# Patient Record
Sex: FEMALE | Race: WHITE | NOT HISPANIC OR LATINO | Employment: PART TIME
[De-identification: names, ages, dates, MRNs, and addresses within clinical notes are randomized per-mention and may not be internally consistent; named-entity substitution may affect disease eponyms.]

---

## 2017-02-16 ENCOUNTER — HISTORIC RESULTS (OUTPATIENT)
Dept: ADMINISTRATIVE | Age: 42
End: 2017-02-16

## 2017-03-30 ENCOUNTER — TELEPHONE (OUTPATIENT)
Dept: FAMILY MEDICINE | Facility: CLINIC | Age: 42
End: 2017-03-30

## 2017-03-30 NOTE — TELEPHONE ENCOUNTER
Pt is past due for f/u pap smear.  Reminder letter was sent 12/01/16.  LMTC and schedule at [facility].  Left this writer's number in case of questions ([phone]).  If no reply and/or appt within 2 weeks (04/1317) pt will be considered lost to pap tracking f/u.  Elizabeth Liu,    Pap Tracking

## 2017-06-17 ENCOUNTER — HEALTH MAINTENANCE LETTER (OUTPATIENT)
Age: 42
End: 2017-06-17

## 2017-09-13 ENCOUNTER — TRANSFERRED RECORDS (OUTPATIENT)
Dept: HEALTH INFORMATION MANAGEMENT | Facility: CLINIC | Age: 42
End: 2017-09-13

## 2017-11-17 ENCOUNTER — OFFICE VISIT (OUTPATIENT)
Dept: FAMILY MEDICINE | Facility: CLINIC | Age: 42
End: 2017-11-17
Payer: COMMERCIAL

## 2017-11-17 VITALS
DIASTOLIC BLOOD PRESSURE: 82 MMHG | BODY MASS INDEX: 21.53 KG/M2 | RESPIRATION RATE: 18 BRPM | TEMPERATURE: 98.3 F | HEART RATE: 78 BPM | SYSTOLIC BLOOD PRESSURE: 122 MMHG | HEIGHT: 63 IN | OXYGEN SATURATION: 98 % | WEIGHT: 121.5 LBS

## 2017-11-17 DIAGNOSIS — J32.0 MAXILLARY SINUSITIS, UNSPECIFIED CHRONICITY: Primary | ICD-10-CM

## 2017-11-17 DIAGNOSIS — M79.10 MYALGIA: ICD-10-CM

## 2017-11-17 DIAGNOSIS — F10.10 ETOH ABUSE: ICD-10-CM

## 2017-11-17 DIAGNOSIS — B00.9 HSV (HERPES SIMPLEX VIRUS) INFECTION: ICD-10-CM

## 2017-11-17 LAB
BASOPHILS # BLD AUTO: 0 10E9/L (ref 0–0.2)
BASOPHILS NFR BLD AUTO: 0.6 %
DIFFERENTIAL METHOD BLD: ABNORMAL
EOSINOPHIL # BLD AUTO: 0.2 10E9/L (ref 0–0.7)
EOSINOPHIL NFR BLD AUTO: 3.1 %
ERYTHROCYTE [DISTWIDTH] IN BLOOD BY AUTOMATED COUNT: 12.6 % (ref 10–15)
ERYTHROCYTE [SEDIMENTATION RATE] IN BLOOD BY WESTERGREN METHOD: 9 MM/H (ref 0–20)
HCT VFR BLD AUTO: 42.9 % (ref 35–47)
HGB BLD-MCNC: 14.1 G/DL (ref 11.7–15.7)
LYMPHOCYTES # BLD AUTO: 1.1 10E9/L (ref 0.8–5.3)
LYMPHOCYTES NFR BLD AUTO: 21.1 %
MCH RBC QN AUTO: 33.4 PG (ref 26.5–33)
MCHC RBC AUTO-ENTMCNC: 32.9 G/DL (ref 31.5–36.5)
MCV RBC AUTO: 102 FL (ref 78–100)
MONOCYTES # BLD AUTO: 0.8 10E9/L (ref 0–1.3)
MONOCYTES NFR BLD AUTO: 14.7 %
NEUTROPHILS # BLD AUTO: 3.1 10E9/L (ref 1.6–8.3)
NEUTROPHILS NFR BLD AUTO: 60.5 %
PLATELET # BLD AUTO: 239 10E9/L (ref 150–450)
RBC # BLD AUTO: 4.22 10E12/L (ref 3.8–5.2)
WBC # BLD AUTO: 5.1 10E9/L (ref 4–11)

## 2017-11-17 PROCEDURE — 82728 ASSAY OF FERRITIN: CPT | Performed by: NURSE PRACTITIONER

## 2017-11-17 PROCEDURE — 82306 VITAMIN D 25 HYDROXY: CPT | Performed by: NURSE PRACTITIONER

## 2017-11-17 PROCEDURE — 36415 COLL VENOUS BLD VENIPUNCTURE: CPT | Performed by: NURSE PRACTITIONER

## 2017-11-17 PROCEDURE — 85652 RBC SED RATE AUTOMATED: CPT | Performed by: NURSE PRACTITIONER

## 2017-11-17 PROCEDURE — 99214 OFFICE O/P EST MOD 30 MIN: CPT | Performed by: NURSE PRACTITIONER

## 2017-11-17 PROCEDURE — 85025 COMPLETE CBC W/AUTO DIFF WBC: CPT | Performed by: NURSE PRACTITIONER

## 2017-11-17 PROCEDURE — 80053 COMPREHEN METABOLIC PANEL: CPT | Performed by: NURSE PRACTITIONER

## 2017-11-17 RX ORDER — SERTRALINE HYDROCHLORIDE 25 MG/1
TABLET, FILM COATED ORAL DAILY
COMMUNITY
End: 2017-11-17

## 2017-11-17 RX ORDER — VALACYCLOVIR HYDROCHLORIDE 500 MG/1
500 TABLET, FILM COATED ORAL DAILY
Qty: 90 TABLET | Refills: 3 | Status: ON HOLD | OUTPATIENT
Start: 2017-11-17 | End: 2018-09-14

## 2017-11-17 RX ORDER — AZITHROMYCIN 250 MG/1
TABLET, FILM COATED ORAL
Qty: 6 TABLET | Refills: 0 | Status: SHIPPED | OUTPATIENT
Start: 2017-11-17 | End: 2017-11-22

## 2017-11-17 NOTE — MR AVS SNAPSHOT
"              After Visit Summary   11/17/2017    Alana Mujica    MRN: [id number]           Patient Information     Date Of Birth          1975        Visit Information        Provider Department      11/17/2017 4:00 PM Pati Coffey APRN CNP [facility]        Today's Diagnoses     HSV (herpes simplex virus) infection    -  1    ETOH abuse        Myalgia        Maxillary sinusitis, unspecified chronicity          Care Instructions    Begin taking flonase nasal spray.      Begin taking z-pack.     Come see me next Friday.           Follow-ups after your visit        Follow-up notes from your care team     Return in about 1 week (around 11/24/2017).      Who to contact     If you have questions or need follow up information about today's clinic visit or your schedule please contact [facility] directly at [phone].  Normal or non-critical lab and imaging results will be communicated to you by [employer], letter or phone within 4 business days after the clinic has received the results. If you do not hear from us within 7 days, please contact the clinic through [employer] or phone. If you have a critical or abnormal lab result, we will notify you by phone as soon as possible.  Submit refill requests through [facility] or call your pharmacy and they will forward the refill request to us. Please allow 3 business days for your refill to be completed.          Additional Information About Your Visit        MyChart Information     [facility] lets you send messages to your doctor, view your test results, renew your prescriptions, schedule appointments and more. To sign up, go to [email or web address] . Click on \"Log in\" on the left side of the screen, which will take you to the Welcome page. Then click on \"Sign up Now\" on the right side of the page.     You will be asked to enter the access code listed below, as well as some personal information. Please follow the directions to create your " "username and password.     Your access code is: [email or web address]  Expires: 2/15/2018  4:59 PM     Your access code will  in 90 days. If you need help or a new code, please call your [facility] or [phone].        Care EveryWhere ID     This is your Care EveryWhere ID. This could be used by other organizations to access your [place] medical records  [id number]        Your Vitals Were     Pulse Temperature Respirations Height Pulse Oximetry BMI (Body Mass Index)    78 98.3  F (36.8  C) (Oral) 18 5' 3\" (1.6 m) 98% 21.52 kg/m2       Blood Pressure from Last 3 Encounters:   17 122/82   09/15/16 112/82   16 118/85    Weight from Last 3 Encounters:   17 121 lb 8 oz (55.1 kg)   09/15/16 111 lb (50.3 kg)   16 120 lb (54.4 kg)              We Performed the Following     CBC with platelets differential     Comprehensive metabolic panel     ESR: Erythrocyte sedimentation rate     Ferritin     Vitamin D Deficiency          Today's Medication Changes          These changes are accurate as of: 17  4:59 PM.  If you have any questions, ask your nurse or doctor.               Start taking these medicines.        Dose/Directions    azithromycin 250 MG tablet   Commonly known as:  ZITHROMAX   Used for:  Maxillary sinusitis, unspecified chronicity   Started by:  Pati Coffey APRN CNP        Two tablets first day, then one tablet daily for four days.   Quantity:  6 tablet   Refills:  0            Where to get your medicines      These medications were sent to [facility]/pharmacy #[id number] - [place], [place] - [address]  [address] [facility] [address]     Phone:  [phone]     azithromycin 250 MG tablet    valACYclovir 500 MG tablet                Primary Care Provider Office Phone # Fax #    Sabrina Pérez PA-C [phone] [address]  [facility] [id number]        [facility]        General    Alana will contact her insurance to determine in-network " psychiatry providers. (pt-stated)     Notes - Note created  1/29/2016  9:52 AM by Peter Ochoa    As of today's date 1/29/2016 goal was unable to be assessed due to lack of contact from patient.           Equal Access to Services     CARMINE COOK : Hadkanu aldair woodard desmond Soconsueloali, wacristianda luqadaha, qaybta kaalmada ademary kate, isabel mina rashardelena joya manfred erika vann. So [facility] [phone].    ATENCIÓN: Si habla español, tiene a wang disposición servicios gratuitos de asistencia lingüística. Llame al [phone].    We comply with applicable federal civil rights laws and [place] laws. We do not discriminate on the basis of race, color, national origin, age, disability, sex, sexual orientation, or gender identity.            Thank you!     Thank you for choosing [facility]  for your care. Our goal is always to provide you with excellent care. Hearing back from our patients is one way we can continue to improve our services. Please take a few minutes to complete the written survey that you may receive in the mail after your visit with us. Thank you!             Your Updated Medication List - Protect others around you: Learn how to safely use, store and throw away your medicines at www.disposemymeds.org.          This list is accurate as of: 11/17/17  4:59 PM.  Always use your most recent med list.                   Brand Name Dispense Instructions for use Diagnosis    ADVIL PO      Take by mouth as needed for moderate pain        azithromycin 250 MG tablet    ZITHROMAX    6 tablet    Two tablets first day, then one tablet daily for four days.    Maxillary sinusitis, unspecified chronicity       NEXPLANON 68 MG Impl   Generic drug:  etonogestrel     1 each    1 each (68 mg) by Subdermal route once        valACYclovir 500 MG tablet    VALTREX    90 tablet    Take 1 tablet (500 mg) by mouth daily    HSV (herpes simplex virus) infection

## 2017-11-17 NOTE — NURSING NOTE
"Chief Complaint   Patient presents with     Eye Problem       Initial /82 (BP Location: Right arm, Patient Position: Chair, Cuff Size: Adult Regular)  Pulse 78  Temp 98.3  F (36.8  C) (Oral)  Resp 18  Ht 5' 3\" (1.6 m)  Wt 121 lb 8 oz (55.1 kg)  SpO2 98%  BMI 21.52 kg/m2 Estimated body mass index is 21.52 kg/(m^2) as calculated from the following:    Height as of this encounter: 5' 3\" (1.6 m).    Weight as of this encounter: 121 lb 8 oz (55.1 kg).  Medication Reconciliation: complete   Leann Santos MA       "
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
## 2017-11-19 LAB
ALBUMIN SERPL-MCNC: 4.3 G/DL (ref 3.4–5)
ALP SERPL-CCNC: 89 U/L (ref 40–150)
ALT SERPL W P-5'-P-CCNC: 88 U/L (ref 0–50)
ANION GAP SERPL CALCULATED.3IONS-SCNC: 9 MMOL/L (ref 3–14)
AST SERPL W P-5'-P-CCNC: 119 U/L (ref 0–45)
BILIRUB SERPL-MCNC: 0.8 MG/DL (ref 0.2–1.3)
BUN SERPL-MCNC: 9 MG/DL (ref 7–30)
CALCIUM SERPL-MCNC: 9.8 MG/DL (ref 8.5–10.1)
CHLORIDE SERPL-SCNC: 99 MMOL/L (ref 94–109)
CO2 SERPL-SCNC: 29 MMOL/L (ref 20–32)
CREAT SERPL-MCNC: 0.82 MG/DL (ref 0.52–1.04)
FERRITIN SERPL-MCNC: 168 NG/ML (ref 12–150)
GFR SERPL CREATININE-BSD FRML MDRD: 76 ML/MIN/1.7M2
GLUCOSE SERPL-MCNC: 94 MG/DL (ref 70–99)
POTASSIUM SERPL-SCNC: 4.2 MMOL/L (ref 3.4–5.3)
PROT SERPL-MCNC: 8.1 G/DL (ref 6.8–8.8)
SODIUM SERPL-SCNC: 137 MMOL/L (ref 133–144)

## 2017-11-21 LAB — DEPRECATED CALCIDIOL+CALCIFEROL SERPL-MC: 52 UG/L (ref 20–75)
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
## 2017-12-08 ENCOUNTER — OFFICE VISIT (OUTPATIENT)
Dept: FAMILY MEDICINE | Facility: CLINIC | Age: 42
End: 2017-12-08
Payer: COMMERCIAL

## 2017-12-08 VITALS
BODY MASS INDEX: 22.71 KG/M2 | TEMPERATURE: 98.5 F | RESPIRATION RATE: 18 BRPM | OXYGEN SATURATION: 96 % | DIASTOLIC BLOOD PRESSURE: 92 MMHG | SYSTOLIC BLOOD PRESSURE: 119 MMHG | HEART RATE: 86 BPM | WEIGHT: 128.2 LBS | HEIGHT: 63 IN

## 2017-12-08 DIAGNOSIS — R03.0 ELEVATED BLOOD PRESSURE READING WITHOUT DIAGNOSIS OF HYPERTENSION: ICD-10-CM

## 2017-12-08 DIAGNOSIS — R94.5 NONSPECIFIC ABNORMAL RESULTS OF LIVER FUNCTION STUDY: Primary | ICD-10-CM

## 2017-12-08 PROCEDURE — 99213 OFFICE O/P EST LOW 20 MIN: CPT | Performed by: NURSE PRACTITIONER

## 2017-12-08 RX ORDER — LORAZEPAM 1 MG/1
1 TABLET ORAL PRN
COMMUNITY
Start: 2017-12-05 | End: 2018-04-05

## 2017-12-08 RX ORDER — OXYCODONE AND ACETAMINOPHEN 5; 325 MG/1; MG/1
1-2 TABLET ORAL EVERY 4 HOURS
COMMUNITY
Start: 2017-12-05 | End: 2018-04-05

## 2017-12-08 RX ORDER — AZITHROMYCIN 250 MG/1
250 TABLET, FILM COATED ORAL 2 TIMES DAILY
COMMUNITY
Start: 2017-11-17 | End: 2018-04-05

## 2017-12-08 RX ORDER — NORELGESTROMIN AND ETHINYL ESTRADIOL 35; 150 UG/MG; UG/MG
PATCH TRANSDERMAL
COMMUNITY
Start: 2017-11-29 | End: 2018-04-05

## 2017-12-08 RX ORDER — VALACYCLOVIR HYDROCHLORIDE 500 MG/1
500 TABLET, FILM COATED ORAL PRN
COMMUNITY
Start: 2017-11-17 | End: 2018-04-05

## 2017-12-08 NOTE — NURSING NOTE
"Chief Complaint   Patient presents with     Consult     F/U from 11/17/17       Initial BP (!) 119/92 (BP Location: Right arm, Patient Position: Chair, Cuff Size: Adult Regular)  Pulse 86  Temp 98.5  F (36.9  C) (Oral)  Resp 18  Ht 5' 3\" (1.6 m)  Wt 128 lb 3.2 oz (58.2 kg)  SpO2 96%  BMI 22.71 kg/m2 Estimated body mass index is 22.71 kg/(m^2) as calculated from the following:    Height as of this encounter: 5' 3\" (1.6 m).    Weight as of this encounter: 128 lb 3.2 oz (58.2 kg).  Medication Reconciliation: complete   Leann Santos MA       "

## 2017-12-08 NOTE — PATIENT INSTRUCTIONS
Discontinue use of iron supplement.     Return to clinic if your muscle soreness does not improve in the next couple weeks.

## 2017-12-08 NOTE — MR AVS SNAPSHOT
After Visit Summary   12/8/2017    Alana Mujica    MRN: [id number]           Patient Information     Date Of Birth          1975        Visit Information        Provider Department      12/8/2017 4:30 PM Pati Coffey APRN CNP [facility]        Care Instructions    Discontinue use of iron supplement.     Return to clinic if your muscle soreness does not improve in the next couple weeks.           Follow-ups after your visit        Follow-up notes from your care team     Return in about 1 month (around 1/8/2018).      Who to contact     If you have questions or need follow up information about today's clinic visit or your schedule please contact [facility] directly at [phone].  Normal or non-critical lab and imaging results will be communicated to you by Eqvilibriahart, letter or phone within 4 business days after the clinic has received the results. If you do not hear from us within 7 days, please contact the clinic through Eqvilibriahart or phone. If you have a critical or abnormal lab result, we will notify you by phone as soon as possible.  Submit refill requests through WangYou or call your pharmacy and they will forward the refill request to us. Please allow 3 business days for your refill to be completed.          Additional Information About Your Visit        MyChart Information     WangYou gives you secure access to your electronic health record. If you see a primary care provider, you can also send messages to your care team and make appointments. If you have questions, please call your primary care clinic.  If you do not have a primary care provider, please call [phone] and they will assist you.        Care EveryWhere ID     This is your Care EveryWhere ID. This could be used by other organizations to access your [place] medical records  [id number]        Your Vitals Were     Pulse Temperature Respirations Height Pulse Oximetry BMI (Body Mass Index)  "   86 98.5  F (36.9  C) (Oral) 18 5' 3\" (1.6 m) 96% 22.71 kg/m2       Blood Pressure from Last 3 Encounters:   12/08/17 (!) 119/92   11/17/17 122/82   09/15/16 112/82    Weight from Last 3 Encounters:   12/08/17 128 lb 3.2 oz (58.2 kg)   11/17/17 121 lb 8 oz (55.1 kg)   09/15/16 111 lb (50.3 kg)              Today, you had the following     No orders found for display       Primary Care Provider Office Phone # Fax #    Sabrina Pérez PA-C [phone]       [id number] [address] [facility] [id number]        Goals        General    Alana will contact her insurance to determine in-network psychiatry providers. (pt-stated)     Notes - Note created  1/29/2016  9:52 AM by Peter Ochoa    As of today's date 1/29/2016 goal was unable to be assessed due to lack of contact from patient.           Equal Access to Services     [facility]: Hadii aldair logan Somarga, waaxda luqadaha, qaybta kaalmanorah valera, isabel james . So [facility] [phone].    ATENCIÓN: Si habla español, tiene a wang disposición servicios gratuitos de asistencia lingüística. Llame al [phone].    We comply with applicable federal civil rights laws and [place] laws. We do not discriminate on the basis of race, color, national origin, age, disability, sex, sexual orientation, or gender identity.            Thank you!     Thank you for choosing [facility]  for your care. Our goal is always to provide you with excellent care. Hearing back from our patients is one way we can continue to improve our services. Please take a few minutes to complete the written survey that you may receive in the mail after your visit with us. Thank you!             Your Updated Medication List - Protect others around you: Learn how to safely use, store and throw away your medicines at www.disposemymeds.org.          This list is accurate as of: 12/8/17  5:12 PM.  Always use your most recent med list.       "             Brand Name Dispense Instructions for use Diagnosis    ADVIL PO      Take by mouth as needed for moderate pain        azithromycin 250 MG tablet    ZITHROMAX     Take 250 mg by mouth 2 times daily        LORazepam 1 MG tablet    ATIVAN     Take 1 mg by mouth as needed        NEXPLANON 68 MG Impl   Generic drug:  etonogestrel     1 each    1 each (68 mg) by Subdermal route once        oxyCODONE-acetaminophen 5-325 MG per tablet    PERCOCET     Take 1-2 tablets by mouth every 4 hours        UNABLE TO FIND      MEDICATION NAME: Mirena IUD        * valACYclovir 500 MG tablet    VALTREX    90 tablet    Take 1 tablet (500 mg) by mouth daily    HSV (herpes simplex virus) infection       * valACYclovir 500 MG tablet    VALTREX     Take 500 mg by mouth as needed        XULANE 150-35 MCG/24HR patch   Generic drug:  norelgestromin-ethinyl estradiol      APPLY 1 PATCH(ES) TO BUTTOCK, ABDOMEN, UPPER OUTER AR EVERY WEEK FOR 3 WEEKS OF EACH MONTH.        * Notice:  This list has 2 medication(s) that are the same as other medications prescribed for you. Read the directions carefully, and ask your doctor or other care provider to review them with you.

## 2018-01-18 ENCOUNTER — TELEPHONE (OUTPATIENT)
Dept: FAMILY MEDICINE | Facility: CLINIC | Age: 43
End: 2018-01-18

## 2018-02-28 ENCOUNTER — TRANSFERRED RECORDS (OUTPATIENT)
Dept: HEALTH INFORMATION MANAGEMENT | Facility: CLINIC | Age: 43
End: 2018-02-28

## 2018-04-05 ENCOUNTER — OFFICE VISIT (OUTPATIENT)
Dept: PEDIATRICS | Facility: CLINIC | Age: 43
End: 2018-04-05
Payer: MEDICAID

## 2018-04-05 VITALS
HEART RATE: 124 BPM | OXYGEN SATURATION: 100 % | WEIGHT: 117.1 LBS | DIASTOLIC BLOOD PRESSURE: 88 MMHG | BODY MASS INDEX: 20.74 KG/M2 | SYSTOLIC BLOOD PRESSURE: 133 MMHG | TEMPERATURE: 98.8 F

## 2018-04-05 DIAGNOSIS — J02.9 ACUTE PHARYNGITIS, UNSPECIFIED ETIOLOGY: Primary | ICD-10-CM

## 2018-04-05 LAB
DEPRECATED S PYO AG THROAT QL EIA: NORMAL
SPECIMEN SOURCE: NORMAL

## 2018-04-05 PROCEDURE — 99213 OFFICE O/P EST LOW 20 MIN: CPT | Performed by: PHYSICIAN ASSISTANT

## 2018-04-05 PROCEDURE — 87880 STREP A ASSAY W/OPTIC: CPT | Performed by: PHYSICIAN ASSISTANT

## 2018-04-05 PROCEDURE — 87081 CULTURE SCREEN ONLY: CPT | Performed by: PHYSICIAN ASSISTANT

## 2018-04-05 RX ORDER — SERTRALINE HYDROCHLORIDE 25 MG/1
25 TABLET, FILM COATED ORAL DAILY
COMMUNITY
Start: 2017-11-07 | End: 2018-06-04

## 2018-04-05 NOTE — PATIENT INSTRUCTIONS
With distilled water 2-3x per day for a minimum 2-3 days  Mucinex, increased fluids, humidifier at night, steaming in the day  Cough drops and hot saltwater gargles as needed    Adult Self-Care for Colds  Colds are caused by viruses. They can't be cured with antibiotics. However, you can ease symptoms and support your body's efforts to heal itself.  No matter which symptoms you have, be sure to:    Drink plenty of fluids (water or clear soup)    Stop smoking and drinking alcohol    Get plenty of rest    Understand a fever    Take your temperature several times a day. If your fever is 100.4 F (38.0 C) for more than a day, call your healthcare provider.    Relax, lie down. Go to bed if you want. Just get off your feet and rest. Also, drink plenty of fluids to avoid dehydration.    Take acetaminophen or a nonsteroidal anti-inflammatory agent (NSAID), such as ibuprofen.  Treat a troubled nose kindly    Breathe steam or heated humidified air to open blocked nasal passages.  a hot shower or use a vaporizer. Be careful not to get burned by the steam.    Saline nasal sprays and decongestant tablets help open a stuffy nose. Antihistamines can also help, but they can cause side effects such as drowsiness and drying of the eyes, nose, and mouth.  Soothe a sore throat and cough    Gargle every 2 hours with 1/4 teaspoon of salt dissolved in 1/2 cup of warm water. Suck on throat lozenges and cough drops to moisten your throat.    Cough medicines are available but it is unclear how well they actually work.    Take acetaminophen or an NSAID, such as ibuprofen, to ease throat pain  Ease digestive problems    Put fluids back into your body. Take frequent sips of clear liquids such as water or broth. Avoid drinks that have a lot of sugar in them, such as juices and sodas. These can make diarrhea worse. Older children and adults can drink sports drinks.    As your appetite returns, you can resume your normal diet. Ask your  healthcare provider if there are any foods you should avoid.  When to seek medical care  When you first notice symptoms, ask your healthcare provider if antiviral medicines are appropriate. Antibiotics should not be taken for colds or flu. Also, call your healthcare provider if you have any of the following symptoms or if you aren't feeling better after 7 days:    Shortness of breath    Pain or pressure in the chest or belly (abdomen)    Worsening symptoms, especially after a period of improvement    Fever of 100.4 F  (38.0 C) or higher, or fever that doesn't go down with medicine    Sudden dizziness or confusion    Severe or continued vomiting    Signs of dehydration, including extreme thirst, dark urine, infrequent urination, dry mouth    Spotted, red, or very sore throat   Date Last Reviewed: 12/1/2016 2000-2017 [employer]. [address] [place] [address], [place] [address]. All rights reserved. This information is not intended as a substitute for professional medical care. Always follow your healthcare professional's instructions.

## 2018-04-05 NOTE — MR AVS SNAPSHOT
After Visit Summary   4/5/2018    Alana Mujica    MRN: [id number]           Patient Information     Date Of Birth          1975        Visit Information        Provider Department      4/5/2018 11:00 AM Mushtaq Parikh PA-C [facility]        Today's Diagnoses     Acute pharyngitis    -  1      Care Instructions        With distilled water 2-3x per day for a minimum 2-3 days  Mucinex, increased fluids, humidifier at night, steaming in the day  Cough drops and hot saltwater gargles as needed    Adult Self-Care for Colds  Colds are caused by viruses. They can't be cured with antibiotics. However, you can ease symptoms and support your body's efforts to heal itself.  No matter which symptoms you have, be sure to:    Drink plenty of fluids (water or clear soup)    Stop smoking and drinking alcohol    Get plenty of rest    Understand a fever    Take your temperature several times a day. If your fever is 100.4 F (38.0 C) for more than a day, call your healthcare provider.    Relax, lie down. Go to bed if you want. Just get off your feet and rest. Also, drink plenty of fluids to avoid dehydration.    Take acetaminophen or a nonsteroidal anti-inflammatory agent (NSAID), such as ibuprofen.  Treat a troubled nose kindly    Breathe steam or heated humidified air to open blocked nasal passages.  a hot shower or use a vaporizer. Be careful not to get burned by the steam.    Saline nasal sprays and decongestant tablets help open a stuffy nose. Antihistamines can also help, but they can cause side effects such as drowsiness and drying of the eyes, nose, and mouth.  Soothe a sore throat and cough    Gargle every 2 hours with 1/4 teaspoon of salt dissolved in 1/2 cup of warm water. Suck on throat lozenges and cough drops to moisten your throat.    Cough medicines are available but it is unclear how well they actually work.    Take acetaminophen or an NSAID, such as ibuprofen, to  ease throat pain  Ease digestive problems    Put fluids back into your body. Take frequent sips of clear liquids such as water or broth. Avoid drinks that have a lot of sugar in them, such as juices and sodas. These can make diarrhea worse. Older children and adults can drink sports drinks.    As your appetite returns, you can resume your normal diet. Ask your healthcare provider if there are any foods you should avoid.  When to seek medical care  When you first notice symptoms, ask your healthcare provider if antiviral medicines are appropriate. Antibiotics should not be taken for colds or flu. Also, call your healthcare provider if you have any of the following symptoms or if you aren't feeling better after 7 days:    Shortness of breath    Pain or pressure in the chest or belly (abdomen)    Worsening symptoms, especially after a period of improvement    Fever of 100.4 F  (38.0 C) or higher, or fever that doesn't go down with medicine    Sudden dizziness or confusion    Severe or continued vomiting    Signs of dehydration, including extreme thirst, dark urine, infrequent urination, dry mouth    Spotted, red, or very sore throat   Date Last Reviewed: 12/1/2016 2000-2017 [employer]. [address] [place] [address]. All rights reserved. This information is not intended as a substitute for professional medical care. Always follow your healthcare professional's instructions.            Follow-ups after your visit        Who to contact     If you have questions or need follow up information about today's clinic visit or your schedule please contact [facility] directly at [phone].  Normal or non-critical lab and imaging results will be communicated to you by MyChart, letter or phone within 4 business days after the clinic has received the results. If you do not hear from us within 7 days, please contact the clinic through MyChart or phone. If you have a critical or abnormal  lab result, we will notify you by phone as soon as possible.  Submit refill requests through Life800 or call your pharmacy and they will forward the refill request to us. Please allow 3 business days for your refill to be completed.          Additional Information About Your Visit        Coinkitehart Information     Life800 gives you secure access to your electronic health record. If you see a primary care provider, you can also send messages to your care team and make appointments. If you have questions, please call your primary care clinic.  If you do not have a primary care provider, please call [phone] and they will assist you.        Care EveryWhere ID     This is your Care EveryWhere ID. This could be used by other organizations to access your [place] medical records  [id number]        Your Vitals Were     Pulse Temperature Pulse Oximetry BMI (Body Mass Index)          124 98.8  F (37.1  C) (Tympanic) 100% 20.74 kg/m2         Blood Pressure from Last 3 Encounters:   04/05/18 133/88   12/08/17 (!) 119/92   11/17/17 122/82    Weight from Last 3 Encounters:   04/05/18 117 lb 1.6 oz (53.1 kg)   12/08/17 128 lb 3.2 oz (58.2 kg)   11/17/17 121 lb 8 oz (55.1 kg)              We Performed the Following     Beta strep group A culture     Strep, Rapid Screen        Primary Care Provider Office Phone # Fax #    Sabrina Pérez PA-C [phone] [address]  [facility] [id number]        Goals        General    Alana will contact her insurance to determine in-network psychiatry providers. (pt-stated)     Notes - Note created  1/29/2016  9:52 AM by Peter Ochoa    As of today's date 1/29/2016 goal was unable to be assessed due to lack of contact from patient.           Equal Access to Services     [facility] : Hadii aldair Christianson, waalivia martines, qaybta kaalmaisabel torrez. So [facility] [phone].    ATENCIÓN: elann Roldan  a wang disposición servicios gratuitos de asistencia lingüística. Vic rose [phone].    We comply with applicable federal civil rights laws and [place] laws. We do not discriminate on the basis of race, color, national origin, age, disability, sex, sexual orientation, or gender identity.            Thank you!     Thank you for choosing [facility]  for your care. Our goal is always to provide you with excellent care. Hearing back from our patients is one way we can continue to improve our services. Please take a few minutes to complete the written survey that you may receive in the mail after your visit with us. Thank you!             Your Updated Medication List - Protect others around you: Learn how to safely use, store and throw away your medicines at www.disposemymeds.org.          This list is accurate as of 4/5/18 11:51 AM.  Always use your most recent med list.                   Brand Name Dispense Instructions for use Diagnosis    NEXPLANON 68 MG Impl   Generic drug:  etonogestrel     1 each    1 each (68 mg) by Subdermal route once        sertraline 25 MG tablet    ZOLOFT     Take 25 mg by mouth daily        valACYclovir 500 MG tablet    VALTREX    90 tablet    Take 1 tablet (500 mg) by mouth daily    HSV (herpes simplex virus) infection
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
## 2018-04-06 LAB
BACTERIA SPEC CULT: NORMAL
SPECIMEN SOURCE: NORMAL

## 2018-04-13 ENCOUNTER — VIRTUAL VISIT (OUTPATIENT)
Dept: FAMILY MEDICINE | Facility: CLINIC | Age: 43
End: 2018-04-13
Payer: MEDICAID

## 2018-04-13 DIAGNOSIS — F19.20 CHEMICAL DEPENDENCY (H): Primary | ICD-10-CM

## 2018-04-13 PROCEDURE — 98966 PH1 ASSMT&MGMT NQHP 5-10: CPT | Performed by: NURSE PRACTITIONER

## 2018-04-13 NOTE — MR AVS SNAPSHOT
After Visit Summary   4/13/2018    Alana Mujica    MRN: [id number]           Patient Information     Date Of Birth          1975        Visit Information        Provider Department      4/13/2018 4:40 PM Pati Coffey APRN CNP [facility]        Today's Diagnoses     Chemical dependency (H)    -  1       Follow-ups after your visit        Your next 10 appointments already scheduled     Apr 20, 2018  1:20 PM CDT   Office Visit with SUE Sharpe CNP   [facility] ([facility]    [address] [id number] [phone]           Bring a current list of meds and any records pertaining to this visit. For Physicals, please bring immunization records and any forms needing to be filled out. Please arrive 10 minutes early to complete paperwork.              Who to contact     If you have questions or need follow up information about today's clinic visit or your schedule please contact [facility] directly at [phone].  Normal or non-critical lab and imaging results will be communicated to you by [employer], letter or phone within 4 business days after the clinic has received the results. If you do not hear from us within 7 days, please contact the clinic through ENDYMIONt or phone. If you have a critical or abnormal lab result, we will notify you by phone as soon as possible.  Submit refill requests through Extra Life or call your pharmacy and they will forward the refill request to us. Please allow 3 business days for your refill to be completed.          Additional Information About Your Visit        [employer] Information     Extra Life gives you secure access to your electronic health record. If you see a primary care provider, you can also send messages to your care team and make appointments. If you have questions, please call your primary care clinic.  If you do not have a primary care provider, please call  [phone] and they will assist you.        Care EveryWhere ID     This is your Care EveryWhere ID. This could be used by other organizations to access your [place] medical records  [id number]         Blood Pressure from Last 3 Encounters:   04/05/18 133/88   12/08/17 (!) 119/92   11/17/17 122/82    Weight from Last 3 Encounters:   04/05/18 117 lb 1.6 oz (53.1 kg)   12/08/17 128 lb 3.2 oz (58.2 kg)   11/17/17 121 lb 8 oz (55.1 kg)              Today, you had the following     No orders found for display       Primary Care Provider Office Phone # Fax #    Sabrina Pérez PA-C [phone]       [address]  [facility] [address]        Goals        General    Alana will contact her insurance to determine in-network psychiatry providers. (pt-stated)     Notes - Note created  1/29/2016  9:52 AM by Peter Ochoa    As of today's date 1/29/2016 goal was unable to be assessed due to lack of contact from patient.           Equal Access to Services     [facility]: Hadii aldair logan Somarga, waaxda luqadaha, qaybta kaalmada soto, isabel james . So [facility] [phone].    ATENCIÓN: Si habla español, tiene a wang disposición servicios gratuitos de asistencia lingüística. Abdulazizame al [phone].    We comply with applicable federal civil rights laws and [place] laws. We do not discriminate on the basis of race, color, national origin, age, disability, sex, sexual orientation, or gender identity.            Thank you!     Thank you for choosing [facility]  for your care. Our goal is always to provide you with excellent care. Hearing back from our patients is one way we can continue to improve our services. Please take a few minutes to complete the written survey that you may receive in the mail after your visit with us. Thank you!             Your Updated Medication List - Protect others around you: Learn how to safely use, store and throw away  your medicines at www.disposemymeds.org.          This list is accurate as of 4/13/18 11:59 PM.  Always use your most recent med list.                   Brand Name Dispense Instructions for use Diagnosis    cloNIDine 0.1 MG tablet    CATAPRES    10 tablet    TAKE 1 TABLET BY MOUTH 2 TIMES DAILY    Alcohol withdrawal syndrome without complication (H)       LORazepam 1 MG tablet    ATIVAN    15 tablet    TAKE 1 TABLET BY MOUTH 3 TIMES A DAY AS NEEDED FOR ANXIETY    Alcohol withdrawal syndrome without complication (H)       NEXPLANON 68 MG Impl   Generic drug:  etonogestrel     1 each    1 each (68 mg) by Subdermal route once        sertraline 25 MG tablet    ZOLOFT     Take 25 mg by mouth daily        valACYclovir 500 MG tablet    VALTREX    90 tablet    Take 1 tablet (500 mg) by mouth daily    HSV (herpes simplex virus) infection

## 2018-04-13 NOTE — PROGRESS NOTES
"Alana Mujica is a 42 year old female who is being evaluated via a telephone visit.      The patient has been notified of following (by GIANA TABOR M.A.       \"We have found that certain health care needs can be provided without the need for a physical exam.  This service lets us provide the care you need with a short phone conversation.  If a prescription is necessary we can send it directly to your pharmacy.  If lab work is needed we can place an order for that and you can then stop by our lab to have the test done at a later time.    This telephone visit will be conducted via 3 way call with the you (the patient) , the physician/provider, and a me all on the line at the same time.  This allows your physician/provider to have the phone conversation with you while I will be taking notes for your medical record.  We will have full access to your [place] medical record during this entire phone call.    Since this is like an office visit,  will bill your insurance company for this service.  Please check with your medical insurance if this type of telephone/virtual is covered . You may be responsible for the cost of this service if insurance coverage is denied.  The typical cost is $30 (10min), $59(11-20min) and $85 (21-30min)     If during the course of the call the physician/provider feels a telephone visit is not appropriate, you will not be charged for this service\"    Consent has been obtained for this service by care team member: yes.  See the scanned image in the medical record.        ===================================================  S:  Alana Mujica is a 42 year old female who presents for telephone visit.  Has requested a refill on meds that we did not prescribe.  I spoke with her and she relates that she had presented for withdrawal of ETOH symptoms to another healthcare system and was prescribed medication to help with withdrawal.  Reports that she is ETOH free currently and would like to continue " meds as she is still concerns about her early sobriety.      O:  There were no vitals taken for this visit.  Phone visit.  Normal rate tone and content of speech.  She sounds dramatically improved from her state she was in during her last clinic visit.  Much better historian and better affect in her speech  Verbalizes understanding of instructions.    A/P:    ICD-10-CM    1. Chemical dependency (H) F19.20         Refill meds short term and see patient at my next open clinic appt in one week.  Pt agrees and staff scheduled her immediately post phone call    Visit time 10 min

## 2018-04-26 ENCOUNTER — TRANSFERRED RECORDS (OUTPATIENT)
Dept: HEALTH INFORMATION MANAGEMENT | Facility: CLINIC | Age: 43
End: 2018-04-26

## 2018-04-26 LAB
HPV ABSTRACT: NORMAL
PAP-ABSTRACT: NORMAL

## 2018-05-28 ENCOUNTER — TRANSFERRED RECORDS (OUTPATIENT)
Dept: HEALTH INFORMATION MANAGEMENT | Facility: CLINIC | Age: 43
End: 2018-05-28

## 2018-06-04 ENCOUNTER — OFFICE VISIT (OUTPATIENT)
Dept: FAMILY MEDICINE | Facility: CLINIC | Age: 43
End: 2018-06-04
Payer: COMMERCIAL

## 2018-06-04 VITALS
BODY MASS INDEX: 21.09 KG/M2 | TEMPERATURE: 98.1 F | WEIGHT: 119 LBS | OXYGEN SATURATION: 98 % | HEIGHT: 63 IN | RESPIRATION RATE: 16 BRPM | SYSTOLIC BLOOD PRESSURE: 104 MMHG | DIASTOLIC BLOOD PRESSURE: 80 MMHG | HEART RATE: 90 BPM

## 2018-06-04 DIAGNOSIS — G44.201 ACUTE INTRACTABLE TENSION-TYPE HEADACHE: Primary | ICD-10-CM

## 2018-06-04 DIAGNOSIS — F10.20 UNCOMPLICATED ALCOHOL DEPENDENCE (H): ICD-10-CM

## 2018-06-04 PROCEDURE — 99214 OFFICE O/P EST MOD 30 MIN: CPT | Performed by: PHYSICIAN ASSISTANT

## 2018-06-04 NOTE — MR AVS SNAPSHOT
After Visit Summary   6/4/2018    Alana Mujica    MRN: [id number]           Patient Information     Date Of Birth          1975        Visit Information        Provider Department      6/4/2018 11:30 AM Sabrina Pérez PA-C [facility]        Today's Diagnoses     Acute intractable tension-type headache    -  1    Uncomplicated alcohol dependence (H)           Follow-ups after your visit        Follow-up notes from your care team     Return in about 4 weeks (around 7/2/2018) for If symptoms worsen or fail to improve.      Who to contact     If you have questions or need follow up information about today's clinic visit or your schedule please contact [facility] directly at [phone].  Normal or non-critical lab and imaging results will be communicated to you by LifeOnKeyhart, letter or phone within 4 business days after the clinic has received the results. If you do not hear from us within 7 days, please contact the clinic through LifeOnKeyhart or phone. If you have a critical or abnormal lab result, we will notify you by phone as soon as possible.  Submit refill requests through GENBAND or call your pharmacy and they will forward the refill request to us. Please allow 3 business days for your refill to be completed.          Additional Information About Your Visit        MyChart Information     GENBAND gives you secure access to your electronic health record. If you see a primary care provider, you can also send messages to your care team and make appointments. If you have questions, please call your primary care clinic.  If you do not have a primary care provider, please call [phone] and they will assist you.        Care EveryWhere ID     This is your Care EveryWhere ID. This could be used by other organizations to access your [place] medical records  [id number]        Your Vitals Were     Pulse Temperature Respirations Height Last Period Pulse Oximetry  "   90 98.1  F (36.7  C) (Oral) 16 5' 3\" (1.6 m) (LMP Unknown) 98%    Breastfeeding? BMI (Body Mass Index)                No 21.08 kg/m2           Blood Pressure from Last 3 Encounters:   06/04/18 104/80   04/05/18 133/88   12/08/17 (!) 119/92    Weight from Last 3 Encounters:   06/04/18 119 lb (54 kg)   04/05/18 117 lb 1.6 oz (53.1 kg)   12/08/17 128 lb 3.2 oz (58.2 kg)              Today, you had the following     No orders found for display       Primary Care Provider Office Phone # Fax #    Sabrina Pérez PA-C [phone] [address] [facility] [address]        Goals        General    Alana will contact her insurance to determine in-network psychiatry providers. (pt-stated)     Notes - Note created  1/29/2016  9:52 AM by Peter Ochoa    As of today's date 1/29/2016 goal was unable to be assessed due to lack of contact from patient.           Equal Access to Services     [facility] : Hadkanu Christianson, susan martines, isabel leigh . So [facility] [phone].    ATENCIÓN: Si habla español, tiene a wang disposición servicios gratuitos de asistencia lingüística. [facility] [phone].    We comply with applicable federal civil rights laws and [place] laws. We do not discriminate on the basis of race, color, national origin, age, disability, sex, sexual orientation, or gender identity.            Thank you!     Thank you for choosing [facility]  for your care. Our goal is always to provide you with excellent care. Hearing back from our patients is one way we can continue to improve our services. Please take a few minutes to complete the written survey that you may receive in the mail after your visit with us. Thank you!             Your Updated Medication List - Protect others around you: Learn how to safely use, store and throw away your medicines at www.[employer] JapanemStayzilla.org.          This list is " accurate as of 6/4/18 12:17 PM.  Always use your most recent med list.                   Brand Name Dispense Instructions for use Diagnosis    LORazepam 1 MG tablet    ATIVAN    15 tablet    TAKE 1 TABLET BY MOUTH 3 TIMES A DAY AS NEEDED FOR ANXIETY    Alcohol withdrawal syndrome without complication (H)       NEXPLANON 68 MG Impl   Generic drug:  etonogestrel     1 each    1 each (68 mg) by Subdermal route once        valACYclovir 500 MG tablet    VALTREX    90 tablet    Take 1 tablet (500 mg) by mouth daily    HSV (herpes simplex virus) infection
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
## 2018-06-05 ENCOUNTER — HOSPITAL ENCOUNTER (EMERGENCY)
Facility: CLINIC | Age: 43
Discharge: HOME | End: 2018-06-05
Attending: EMERGENCY MEDICINE | Admitting: EMERGENCY MEDICINE
Payer: COMMERCIAL

## 2018-06-05 VITALS
TEMPERATURE: 98.5 F | OXYGEN SATURATION: 97 % | SYSTOLIC BLOOD PRESSURE: 116 MMHG | RESPIRATION RATE: 18 BRPM | DIASTOLIC BLOOD PRESSURE: 84 MMHG | HEART RATE: 76 BPM

## 2018-06-05 DIAGNOSIS — R51.9 NONINTRACTABLE EPISODIC HEADACHE, UNSPECIFIED HEADACHE TYPE: ICD-10-CM

## 2018-06-05 LAB
ANION GAP SERPL CALCULATED.3IONS-SCNC: 6 MMOL/L (ref 3–14)
BASOPHILS # BLD AUTO: 0 10E9/L (ref 0–0.2)
BASOPHILS NFR BLD AUTO: 0.8 %
BUN SERPL-MCNC: 8 MG/DL (ref 7–30)
CALCIUM SERPL-MCNC: 9.3 MG/DL (ref 8.5–10.1)
CHLORIDE SERPL-SCNC: 101 MMOL/L (ref 94–109)
CO2 SERPL-SCNC: 28 MMOL/L (ref 20–32)
CREAT SERPL-MCNC: 0.7 MG/DL (ref 0.52–1.04)
DIFFERENTIAL METHOD BLD: ABNORMAL
EOSINOPHIL # BLD AUTO: 0.1 10E9/L (ref 0–0.7)
EOSINOPHIL NFR BLD AUTO: 1.5 %
ERYTHROCYTE [DISTWIDTH] IN BLOOD BY AUTOMATED COUNT: 11.9 % (ref 10–15)
GFR SERPL CREATININE-BSD FRML MDRD: >90 ML/MIN/1.7M2
GLUCOSE SERPL-MCNC: 113 MG/DL (ref 70–99)
HCT VFR BLD AUTO: 40.3 % (ref 35–47)
HGB BLD-MCNC: 13.4 G/DL (ref 11.7–15.7)
IMM GRANULOCYTES # BLD: 0 10E9/L (ref 0–0.4)
IMM GRANULOCYTES NFR BLD: 0.2 %
LYMPHOCYTES # BLD AUTO: 0.7 10E9/L (ref 0.8–5.3)
LYMPHOCYTES NFR BLD AUTO: 15.6 %
MAGNESIUM SERPL-MCNC: 1.8 MG/DL (ref 1.6–2.3)
MCH RBC QN AUTO: 33.9 PG (ref 26.5–33)
MCHC RBC AUTO-ENTMCNC: 33.3 G/DL (ref 31.5–36.5)
MCV RBC AUTO: 102 FL (ref 78–100)
MONOCYTES # BLD AUTO: 0.8 10E9/L (ref 0–1.3)
MONOCYTES NFR BLD AUTO: 15.9 %
NEUTROPHILS # BLD AUTO: 3.1 10E9/L (ref 1.6–8.3)
NEUTROPHILS NFR BLD AUTO: 66 %
NRBC # BLD AUTO: 0 10*3/UL
NRBC BLD AUTO-RTO: 0 /100
PLATELET # BLD AUTO: 238 10E9/L (ref 150–450)
POTASSIUM SERPL-SCNC: 3.7 MMOL/L (ref 3.4–5.3)
RBC # BLD AUTO: 3.95 10E12/L (ref 3.8–5.2)
SODIUM SERPL-SCNC: 135 MMOL/L (ref 133–144)
WBC # BLD AUTO: 4.7 10E9/L (ref 4–11)

## 2018-06-05 PROCEDURE — 25000132 ZZH RX MED GY IP 250 OP 250 PS 637: Performed by: EMERGENCY MEDICINE

## 2018-06-05 PROCEDURE — 99284 EMERGENCY DEPT VISIT MOD MDM: CPT | Mod: 25

## 2018-06-05 PROCEDURE — 85025 COMPLETE CBC W/AUTO DIFF WBC: CPT | Performed by: EMERGENCY MEDICINE

## 2018-06-05 PROCEDURE — 96367 TX/PROPH/DG ADDL SEQ IV INF: CPT

## 2018-06-05 PROCEDURE — 80048 BASIC METABOLIC PNL TOTAL CA: CPT | Performed by: EMERGENCY MEDICINE

## 2018-06-05 PROCEDURE — 25000128 H RX IP 250 OP 636: Performed by: EMERGENCY MEDICINE

## 2018-06-05 PROCEDURE — 96375 TX/PRO/DX INJ NEW DRUG ADDON: CPT

## 2018-06-05 PROCEDURE — 25000125 ZZHC RX 250: Performed by: EMERGENCY MEDICINE

## 2018-06-05 PROCEDURE — 83735 ASSAY OF MAGNESIUM: CPT | Performed by: EMERGENCY MEDICINE

## 2018-06-05 PROCEDURE — 96365 THER/PROPH/DIAG IV INF INIT: CPT

## 2018-06-05 RX ORDER — DIPHENHYDRAMINE HYDROCHLORIDE 50 MG/ML
25 INJECTION INTRAMUSCULAR; INTRAVENOUS ONCE
Status: COMPLETED | OUTPATIENT
Start: 2018-06-05 | End: 2018-06-05

## 2018-06-05 RX ORDER — KETOROLAC TROMETHAMINE 15 MG/ML
15 INJECTION, SOLUTION INTRAMUSCULAR; INTRAVENOUS ONCE
Status: COMPLETED | OUTPATIENT
Start: 2018-06-05 | End: 2018-06-05

## 2018-06-05 RX ORDER — BUTALBITAL, ACETAMINOPHEN AND CAFFEINE 50; 325; 40 MG/1; MG/1; MG/1
1 TABLET ORAL EVERY 4 HOURS PRN
Qty: 28 TABLET | Refills: 0 | Status: ON HOLD | OUTPATIENT
Start: 2018-06-05 | End: 2018-08-26

## 2018-06-05 RX ORDER — BUTALBITAL, ACETAMINOPHEN AND CAFFEINE 50; 325; 40 MG/1; MG/1; MG/1
1 TABLET ORAL ONCE
Status: COMPLETED | OUTPATIENT
Start: 2018-06-05 | End: 2018-06-05

## 2018-06-05 RX ORDER — VALPROATE SODIUM 100 MG/ML
500 INJECTION, SOLUTION INTRAVENOUS ONCE
Status: COMPLETED | OUTPATIENT
Start: 2018-06-05 | End: 2018-06-05

## 2018-06-05 RX ORDER — DEXAMETHASONE SODIUM PHOSPHATE 10 MG/ML
10 INJECTION, SOLUTION INTRAMUSCULAR; INTRAVENOUS ONCE
Status: COMPLETED | OUTPATIENT
Start: 2018-06-05 | End: 2018-06-05

## 2018-06-05 RX ADMIN — MAGNESIUM SULFATE HEPTAHYDRATE 2 G: 40 INJECTION, SOLUTION INTRAVENOUS at 18:02

## 2018-06-05 RX ADMIN — PROCHLORPERAZINE EDISYLATE 10 MG: 5 INJECTION INTRAMUSCULAR; INTRAVENOUS at 15:32

## 2018-06-05 RX ADMIN — KETOROLAC TROMETHAMINE 15 MG: 15 INJECTION, SOLUTION INTRAMUSCULAR; INTRAVENOUS at 15:32

## 2018-06-05 RX ADMIN — VALPROATE SODIUM 500 MG: 100 INJECTION, SOLUTION INTRAVENOUS at 19:30

## 2018-06-05 RX ADMIN — DIPHENHYDRAMINE HYDROCHLORIDE 25 MG: 50 INJECTION, SOLUTION INTRAMUSCULAR; INTRAVENOUS at 15:32

## 2018-06-05 RX ADMIN — DEXAMETHASONE SODIUM PHOSPHATE 10 MG: 10 INJECTION, SOLUTION INTRAMUSCULAR; INTRAVENOUS at 15:32

## 2018-06-05 RX ADMIN — BUTALBITAL, ACETAMINOPHEN AND CAFFEINE 1 TABLET: 50; 325; 40 TABLET ORAL at 18:02

## 2018-06-05 ASSESSMENT — ENCOUNTER SYMPTOMS
SLEEP DISTURBANCE: 1
HEADACHES: 1

## 2018-06-05 NOTE — ED AVS SNAPSHOT
[facility] Emergency Department    [address]    [facility] [address]    Phone:  [phone]    Fax:  [phone]                                       Alana Mujica   MRN: [id number]    Department:  [facility] Emergency Department   Date of Visit:  6/5/2018           After Visit Summary Signature Page     I have received my discharge instructions, and my questions have been answered. I have discussed any challenges I see with this plan with the nurse or doctor.    ..........................................................................................................................................  Patient/Patient Representative Signature      ..........................................................................................................................................  Patient Representative Print Name and Relationship to Patient    ..................................................               ................................................  Date                                            Time    ..........................................................................................................................................  Reviewed by Signature/Title    ...................................................              ..............................................  Date                                                            Time

## 2018-06-05 NOTE — ED AVS SNAPSHOT
` ` Patient Information     Patient Name Sex     Alana Mujica ([id number]) Female 1975       Room Bed    ED14 ED14      Patient Demographics     Address Phone E-mail Address    [address] [address] [phone] (Home)  [phone] (Work)  [phone] (Mobile) *Preferred* [email or web address]      Patient Ethnicity & Race     Ethnic Group Patient Race    American White      PCP and Center    Primary Care Provider  Phone [place]     RimaSabrina mcgilllle [phone] [address], [place] [address]        Emergency Contact(s)     Name Relation Home Work Mobile    Raghu Mujica Father [phone] NONE [phone]    Andreina Mujica Mother [phone] NONE [phone]      Documents on File        Status Date Received Description       Documents for the Patient    Privacy Notice - [place] Received 12     Insurance Card Received () 12     External Medication Information Consent       Patient ID Received 18 expires 2018    Consent for Services - Hospital/Clinic Received () 12     HIM RANDI Authorization - File Only  12 DEC RELEASE OF INFORMATION    HIM RANDI Authorization  09/10/12     HIM RANDI Authorization - File Only  12 AUTHORIZATION FOR RELEASE OF PROTECTED HEALTH INFORMATION    Privacy Notice - [place]  12 ACKNOWLEDGMENT OF RECEIPT OF NOTICE OF PRIVACY PRACTICE    Consent for EHR Access  13 Copied from existing Consent for services - C/HOD collected on 2012    HIM RANDI Authorization  10/14/13 [facility]    HIM RANDI Authorization  14 [place]    HIM RANDI Authorization  14 [place]    [facility] Specified Other Received () 10/30/15 42 CFR    Consent for Services - Hospital/Clinic Received () 10/06/15     HIM RANDI Authorization - File Only  10/09/15 DEC RANDI 10/6/15    HIM RANDI Authorization - File Only Received 10/30/15 Yassine Uribe therapist    HIM RANDI Authorization - File Only  Received 10/30/15 [facility] Specified Other Received () 11/18/15     External Medication Information Consent Accepted 11/24/15     HIM RANDI Authorization - File Only   Faxed to [facility] 12/7/15    HIM RANDI Authorization - File Only   MyChart 51    HIM RANDI Authorization  12/26/15 [employer] 2015    Insurance Card Received () 16 [id number]    Consent for Services/Privacy Notice - Hospital/Clinic Received () 16     Immunization Record  16 IMMUNIZATION HISTORY [facility] 2016    HIM RANDI Authorization - File Only  16 AUTHORIZATION TO RELEASE PHI-RELATED TO SUSPECTED MALTREATMENT    Insurance Card Received () 09/15/16 bcbs    Care Everywhere Prospective Auth Received 17     Consent for Services/Privacy Notice - Hospital/Clinic Received 17     Consent for Services - Hospital and Clinic Received 18     HIE Auth Received 18        Documents for the Encounter    CMS IM for Patient Signature         Admission Information     Attending Provider Admitting Provider Admission Type Admission Date/Time      Emergency 18  1436    Discharge Date Hospital Service Auth/Cert Status Service Area    18 Emergency Medicine Incomplete [facility] SERVICES    Unit Room/Bed Admission Status        EMERGENCY DEPT ED14/ED14 Discharged (Confirmed)       Admission     Complaint    None      Hospital Account     Name Acct ID Class Status Primary Coverage    Alana Mujica [id number] Emergency Discharged/Not Billed Critical access hospital            Guarantor Account (for Hospital Account #[id number])     Name Relation to Pt Service Area Active? Acct Type    Alana Mujica Self FCS Yes Personal/Family    Address Phone          [address] [place] [address] [phone](H)  [phone](O)              Coverage Information (for Hospital Account #[id number])     F/O Payor/Plan  Precert #    CHERIE/CHERIE GIBBS     Subscriber Subscriber #    Alana Mujica [id number]    Address Phone    [address]  [place] [address] [phone]

## 2018-06-05 NOTE — ED AVS SNAPSHOT
[facility]    [address]    [facility] [address]    Phone:  [phone]    Fax:  [phone]                                       Alana Mujica   MRN: [id number]    Department:  [facility]   Date of Visit:  6/5/2018           Patient Information     Date Of Birth          1975        Your diagnoses for this visit were:     Nonintractable episodic headache, unspecified headache type        You were seen by Cortes Escobar DO.      Follow-up Information     Follow up with Sabrina Pérez PA-C. Call in 2 days.    Specialty:  Physician Assistant    Why:  As needed    Contact information:    [address] [facility] [facility] [address]  [phone]          Follow up with [facility] OF NEUROLOGY.    Contact information:    [address]  [facility] [id number] [phone]        Follow up with [facility].    Specialty:  EMERGENCY MEDICINE    Why:  If symptoms worsen    Contact information:    [address]  [facility] [address] [phone]        Discharge Instructions          * HEADACHE [unspecified]    The cause of your headache today is not clear, but it does not appear to be the sign of any serious illness.  Under stress, some people tense the muscles of their shoulder, neck and scalp without knowing it. If this condition lasts long enough, a TENSION HEADACHE can occur.  A MIGRAINE HEADACHE is caused by changes in blood flow to the brain. It can be mild or severe. A migraine attack may be triggered by emotional stress, hormone changes during the menstrual cycle, oral contraceptives, alcohol use, certain foods containing tyramine, eye strain, weather changes, missing meals, lack of sleep or oversleeping.  Other causes of headache include a viral illness, sinus, ear or throat infection, dental pain and TMJ (jaw joint) pain.  HOME  CARE:      If you were given pain medicine for this headache, do not drive yourself home. Arrange for a ride, instead. When you get home, try to sleep. You should feel much better when you wake up.    If you are having nausea or vomiting, follow a light diet until your headache is relieved.    If you have a migraine type headache, use sunglasses when in the daylight or around bright indoor lighting until symptoms improve. Bright glaring light can worsen this kind of headache.  FOLLOW UP with your doctor if the headache is not better within the next 24 hours. If you have frequent headaches you should discuss a treatment plan with your primary care doctor. By being aware of the earliest signs of headache, and starting treatment right away, you may be able to stop the pain yourself.  GET PROMPT MEDICAL ATTENTION if any of the following occur:    Worsening of your head pain or no improvement within 24 hours    Repeated vomiting (unable to keep liquids down)    Fever over 101 F (38.3 C)    Stiff neck    Extreme drowsiness, confusion or fainting    Weakness of an arm or leg or one side of the face    Difficulty with speech or vision    5666-0318 [employer]. [address] [place] [address]. All rights reserved. This information is not intended as a substitute for professional medical care. Always follow your healthcare professional's instructions.  This information has been modified by your health care provider with permission from the publisher.      Your next 10 appointments already scheduled     Jun 12, 2018  9:30 AM CDT   Office Visit with Sabrina Pérez PA-C   [facility] ([facility]    [address] [facility] [address] [phone]           Bring a current list of meds and any records pertaining to this visit. For Physicals, please bring immunization records and any forms needing to be filled out. Please arrive 10 minutes early to  complete paperwork.              [facility] [place] [facility] Scheduling Hotline     To schedule an appointment at [facility], please call [phone]. If you don't have a family doctor or clinic, we will help you find one. [facility] are conveniently located to serve the needs of you and your family.           Review of your medicines      START taking        Dose / Directions Last dose taken    butalbital-acetaminophen-caffeine -40 MG per tablet   Commonly known as:  FIORICET/ESGIC   Dose:  1 tablet   Quantity:  28 tablet        Take 1 tablet by mouth every 4 hours as needed   Refills:  0          Our records show that you are taking the medicines listed below. If these are incorrect, please call your family doctor or clinic.        Dose / Directions Last dose taken    LORazepam 1 MG tablet   Commonly known as:  ATIVAN   Quantity:  15 tablet        TAKE 1 TABLET BY MOUTH 3 TIMES A DAY AS NEEDED FOR ANXIETY   Refills:  0        NEXPLANON 68 MG Impl   Dose:  1 each   Quantity:  1 each   Generic drug:  etonogestrel        1 each (68 mg) by Subdermal route once   Refills:  0        valACYclovir 500 MG tablet   Commonly known as:  VALTREX   Dose:  500 mg   Quantity:  90 tablet        Take 1 tablet (500 mg) by mouth daily   Refills:  3                Prescriptions were sent or printed at these locations (1 Prescription)                   Other Prescriptions                Printed at Department/Unit printer (1 of 1)         butalbital-acetaminophen-caffeine (FIORICET/ESGIC) -40 MG per tablet                Procedures and tests performed during your visit     Basic metabolic panel    CBC with platelets differential    Magnesium    Peripheral IV catheter      Orders Needing Specimen Collection     None      Pending Results     No orders found from 6/3/2018 to 6/6/2018.            Pending Culture Results     No orders found from 6/3/2018 to 6/6/2018.            Pending Results Instructions     If you had any lab  results that were not finalized at the time of your Discharge, you can call the ED Lab Result RN at [phone]. You will be contacted by this team for any positive Lab results or changes in treatment. The nurses are available 7 days a week from 10A to 6:30P.  You can leave a message 24 hours per day and they will return your call.        Test Results From Your Hospital Stay        6/5/2018  3:33 PM      Component Results     Component Value Ref Range & Units Status    WBC 4.7 4.0 - 11.0 10e9/L Final    RBC Count 3.95 3.8 - 5.2 10e12/L Final    Hemoglobin 13.4 11.7 - 15.7 g/dL Final    Hematocrit 40.3 35.0 - 47.0 % Final     (H) 78 - 100 fl Final    MCH 33.9 (H) 26.5 - 33.0 pg Final    MCHC 33.3 31.5 - 36.5 g/dL Final    RDW 11.9 10.0 - 15.0 % Final    Platelet Count 238 150 - 450 10e9/L Final    Diff Method Automated Method  Final    % Neutrophils 66.0 % Final    % Lymphocytes 15.6 % Final    % Monocytes 15.9 % Final    % Eosinophils 1.5 % Final    % Basophils 0.8 % Final    % Immature Granulocytes 0.2 % Final    Nucleated RBCs 0 0 /100 Final    Absolute Neutrophil 3.1 1.6 - 8.3 10e9/L Final    Absolute Lymphocytes 0.7 (L) 0.8 - 5.3 10e9/L Final    Absolute Monocytes 0.8 0.0 - 1.3 10e9/L Final    Absolute Eosinophils 0.1 0.0 - 0.7 10e9/L Final    Absolute Basophils 0.0 0.0 - 0.2 10e9/L Final    Abs Immature Granulocytes 0.0 0 - 0.4 10e9/L Final    Absolute Nucleated RBC 0.0  Final         6/5/2018  3:50 PM      Component Results     Component Value Ref Range & Units Status    Sodium 135 133 - 144 mmol/L Final    Potassium 3.7 3.4 - 5.3 mmol/L Final    Chloride 101 94 - 109 mmol/L Final    Carbon Dioxide 28 20 - 32 mmol/L Final    Anion Gap 6 3 - 14 mmol/L Final    Glucose 113 (H) 70 - 99 mg/dL Final    Urea Nitrogen 8 7 - 30 mg/dL Final    Creatinine 0.70 0.52 - 1.04 mg/dL Final    GFR Estimate >90 >60 mL/min/1.7m2 Final    Non  GFR Calc    GFR Estimate If Black >90 >60 mL/min/1.7m2 Final      GFR Calc    Calcium 9.3 8.5 - 10.1 mg/dL Final         6/5/2018  3:50 PM      Component Results     Component Value Ref Range & Units Status    Magnesium 1.8 1.6 - 2.3 mg/dL Final                Clinical Quality Measure: Blood Pressure Screening     Your blood pressure was checked while you were in the emergency department today. The last reading we obtained was  BP: 115/79 . Please read the guidelines below about what these numbers mean and what you should do about them.  If your systolic blood pressure (the top number) is less than 120 and your diastolic blood pressure (the bottom number) is less than 80, then your blood pressure is normal. There is nothing more that you need to do about it.  If your systolic blood pressure (the top number) is 120-139 or your diastolic blood pressure (the bottom number) is 80-89, your blood pressure may be higher than it should be. You should have your blood pressure rechecked within a year by a primary care provider.  If your systolic blood pressure (the top number) is 140 or greater or your diastolic blood pressure (the bottom number) is 90 or greater, you may have high blood pressure. High blood pressure is treatable, but if left untreated over time it can put you at risk for heart attack, stroke, or kidney failure. You should have your blood pressure rechecked by a primary care provider within the next 4 weeks.  If your provider in the emergency department today gave you specific instructions to follow-up with your doctor or provider even sooner than that, you should follow that instruction and not wait for up to 4 weeks for your follow-up visit.        Thank you for choosing [facility]       Thank you for choosing [facility] for your care. Our goal is always to provide you with excellent care. Hearing back from our patients is one way we can continue to improve our services. Please take a few minutes to complete the written survey that you may receive in the mail  after you visit with us. Thank you!        [employer] Information     [employer] gives you secure access to your electronic health record. If you see a primary care provider, you can also send messages to your care team and make appointments. If you have questions, please call your primary care clinic.  If you do not have a primary care provider, please call [phone] and they will assist you.        Care EveryWhere ID     This is your Care EveryWhere ID. This could be used by other organizations to access your [facility] medical records  [id number]        Equal Access to Services     CARMINE COOK : Josh Christianson, waalivia martines, mike renteriaalsuzanne valera, isabel james . So [facility] [phone].    ATENCIÓN: Si habla español, tiene a wang disposición servicios gratuitos de asistencia lingüística. Llame al [phone].    We comply with applicable federal civil rights laws and [place] laws. We do not discriminate on the basis of race, color, national origin, age, disability, sex, sexual orientation, or gender identity.            After Visit Summary       This is your record. Keep this with you and show to your community pharmacist(s) and doctor(s) at your next visit.

## 2018-06-05 NOTE — ED TRIAGE NOTES
Patient states that she has had a headache for a month, was seen in ED recently for similar symptoms. Patient states pain in back of head 8/10 currently, states that pain is constant now. Patient decreased appetite and fatigued.

## 2018-06-05 NOTE — ED AVS SNAPSHOT
[facility] [place] [facility]    [address]    [facility] [address]    Phone:  [phone]    Fax:  [phone]                                       Alana Mujica   MRN: [id number]    Department:  [facility] [place] [facility]   Date of Visit:  6/5/2018           Patient Information     Date Of Birth          1975        Your diagnoses for this visit were:     Nonintractable episodic headache, unspecified headache type        You were seen by Cortes Escobar DO.      Follow-up Information     Follow up with Sabrina Pérez PA-C. Call in 2 days.    Specialty:  Physician Assistant    Why:  As needed    Contact information:    [address] [facility] [address]  [phone]          Follow up with [facility] OF NEUROLOGY.    Contact information:    [address]  [facility] [id number] [phone]        Follow up with [facility] [place] [facility].    Specialty:  EMERGENCY MEDICINE    Why:  If symptoms worsen    Contact information:    [address]  [facility] [address] [phone]        Discharge Instructions          * HEADACHE [unspecified]    The cause of your headache today is not clear, but it does not appear to be the sign of any serious illness.  Under stress, some people tense the muscles of their shoulder, neck and scalp without knowing it. If this condition lasts long enough, a TENSION HEADACHE can occur.  A MIGRAINE HEADACHE is caused by changes in blood flow to the brain. It can be mild or severe. A migraine attack may be triggered by emotional stress, hormone changes during the menstrual cycle, oral contraceptives, alcohol use, certain foods containing tyramine, eye strain, weather changes, missing meals, lack of sleep or oversleeping.  Other causes of headache include a viral illness, sinus, ear or throat infection, dental pain and TMJ (jaw joint) pain.  HOME  CARE:      If you were given pain medicine for this headache, do not drive yourself home. Arrange for a ride, instead. When you get home, try to sleep. You should feel much better when you wake up.    If you are having nausea or vomiting, follow a light diet until your headache is relieved.    If you have a migraine type headache, use sunglasses when in the daylight or around bright indoor lighting until symptoms improve. Bright glaring light can worsen this kind of headache.  FOLLOW UP with your doctor if the headache is not better within the next 24 hours. If you have frequent headaches you should discuss a treatment plan with your primary care doctor. By being aware of the earliest signs of headache, and starting treatment right away, you may be able to stop the pain yourself.  GET PROMPT MEDICAL ATTENTION if any of the following occur:    Worsening of your head pain or no improvement within 24 hours    Repeated vomiting (unable to keep liquids down)    Fever over 101 F (38.3 C)    Stiff neck    Extreme drowsiness, confusion or fainting    Weakness of an arm or leg or one side of the face    Difficulty with speech or vision    5192-9561 [employer]. [address] [place] [address]. All rights reserved. This information is not intended as a substitute for professional medical care. Always follow your healthcare professional's instructions.  This information has been modified by your health care provider with permission from the publisher.      Your next 10 appointments already scheduled     Jun 12, 2018  9:30 AM CDT   Office Visit with Sabrina Pérez PA-C   [facility]    [address] [facility] [address] [phone]           Bring a current list of meds and any records pertaining to this visit. For Physicals, please bring immunization records and any forms needing to be filled out. Please arrive 10 minutes early to  complete paperwork.              24 Hour Appointment Hotline       To make an appointment at any [facility], call [phone] ([phone]). If you don't have a family doctor or clinic, we will help you find one. [facility] clinics are conveniently located to serve the needs of you and your family.             Review of your medicines      START taking        Dose / Directions Last dose taken    butalbital-acetaminophen-caffeine -40 MG per tablet   Commonly known as:  FIORICET/ESGIC   Dose:  1 tablet   Quantity:  28 tablet        Take 1 tablet by mouth every 4 hours as needed   Refills:  0          Our records show that you are taking the medicines listed below. If these are incorrect, please call your family doctor or clinic.        Dose / Directions Last dose taken    LORazepam 1 MG tablet   Commonly known as:  ATIVAN   Quantity:  15 tablet        TAKE 1 TABLET BY MOUTH 3 TIMES A DAY AS NEEDED FOR ANXIETY   Refills:  0        NEXPLANON 68 MG Impl   Dose:  1 each   Quantity:  1 each   Generic drug:  etonogestrel        1 each (68 mg) by Subdermal route once   Refills:  0        valACYclovir 500 MG tablet   Commonly known as:  VALTREX   Dose:  500 mg   Quantity:  90 tablet        Take 1 tablet (500 mg) by mouth daily   Refills:  3                Prescriptions were sent or printed at these locations (1 Prescription)                   Other Prescriptions                Printed at Department/Unit printer (1 of 1)         butalbital-acetaminophen-caffeine (FIORICET/ESGIC) -40 MG per tablet                Procedures and tests performed during your visit     Basic metabolic panel    CBC with platelets differential    Magnesium    Peripheral IV catheter      Orders Needing Specimen Collection     None      Pending Results     No orders found from 6/3/2018 to 6/6/2018.            Pending Culture Results     No orders found from 6/3/2018 to 6/6/2018.            Pending Results Instructions     If you had  any lab results that were not finalized at the time of your Discharge, you can call the ED Lab Result RN at [phone]. You will be contacted by this team for any positive Lab results or changes in treatment. The nurses are available 7 days a week from 10A to 6:30P.  You can leave a message 24 hours per day and they will return your call.        Test Results From Your Hospital Stay        6/5/2018  3:33 PM      Component Results     Component Value Ref Range & Units Status    WBC 4.7 4.0 - 11.0 10e9/L Final    RBC Count 3.95 3.8 - 5.2 10e12/L Final    Hemoglobin 13.4 11.7 - 15.7 g/dL Final    Hematocrit 40.3 35.0 - 47.0 % Final     (H) 78 - 100 fl Final    MCH 33.9 (H) 26.5 - 33.0 pg Final    MCHC 33.3 31.5 - 36.5 g/dL Final    RDW 11.9 10.0 - 15.0 % Final    Platelet Count 238 150 - 450 10e9/L Final    Diff Method Automated Method  Final    % Neutrophils 66.0 % Final    % Lymphocytes 15.6 % Final    % Monocytes 15.9 % Final    % Eosinophils 1.5 % Final    % Basophils 0.8 % Final    % Immature Granulocytes 0.2 % Final    Nucleated RBCs 0 0 /100 Final    Absolute Neutrophil 3.1 1.6 - 8.3 10e9/L Final    Absolute Lymphocytes 0.7 (L) 0.8 - 5.3 10e9/L Final    Absolute Monocytes 0.8 0.0 - 1.3 10e9/L Final    Absolute Eosinophils 0.1 0.0 - 0.7 10e9/L Final    Absolute Basophils 0.0 0.0 - 0.2 10e9/L Final    Abs Immature Granulocytes 0.0 0 - 0.4 10e9/L Final    Absolute Nucleated RBC 0.0  Final         6/5/2018  3:50 PM      Component Results     Component Value Ref Range & Units Status    Sodium 135 133 - 144 mmol/L Final    Potassium 3.7 3.4 - 5.3 mmol/L Final    Chloride 101 94 - 109 mmol/L Final    Carbon Dioxide 28 20 - 32 mmol/L Final    Anion Gap 6 3 - 14 mmol/L Final    Glucose 113 (H) 70 - 99 mg/dL Final    Urea Nitrogen 8 7 - 30 mg/dL Final    Creatinine 0.70 0.52 - 1.04 mg/dL Final    GFR Estimate >90 >60 mL/min/1.7m2 Final    Non  GFR Calc    GFR Estimate If Black >90 >60 mL/min/1.7m2  Final     GFR Calc    Calcium 9.3 8.5 - 10.1 mg/dL Final         6/5/2018  3:50 PM      Component Results     Component Value Ref Range & Units Status    Magnesium 1.8 1.6 - 2.3 mg/dL Final                Clinical Quality Measure: Blood Pressure Screening     Your blood pressure was checked while you were in the emergency department today. The last reading we obtained was  BP: 115/79 . Please read the guidelines below about what these numbers mean and what you should do about them.  If your systolic blood pressure (the top number) is less than 120 and your diastolic blood pressure (the bottom number) is less than 80, then your blood pressure is normal. There is nothing more that you need to do about it.  If your systolic blood pressure (the top number) is 120-139 or your diastolic blood pressure (the bottom number) is 80-89, your blood pressure may be higher than it should be. You should have your blood pressure rechecked within a year by a primary care provider.  If your systolic blood pressure (the top number) is 140 or greater or your diastolic blood pressure (the bottom number) is 90 or greater, you may have high blood pressure. High blood pressure is treatable, but if left untreated over time it can put you at risk for heart attack, stroke, or kidney failure. You should have your blood pressure rechecked by a primary care provider within the next 4 weeks.  If your provider in the emergency department today gave you specific instructions to follow-up with your doctor or provider even sooner than that, you should follow that instruction and not wait for up to 4 weeks for your follow-up visit.        Thank you for choosing [facility]       Thank you for choosing [facility] for your care. Our goal is always to provide you with excellent care. Hearing back from our patients is one way we can continue to improve our services. Please take a few minutes to complete the written survey that you may receive in  the mail after you visit with us. Thank you!        [employer] Information     [employer] gives you secure access to your electronic health record. If you see a primary care provider, you can also send messages to your care team and make appointments. If you have questions, please call your primary care clinic.  If you do not have a primary care provider, please call [phone] and they will assist you.        Care EveryWhere ID     This is your Care EveryWhere ID. This could be used by other organizations to access your [facility] medical records  [id number]        Equal Access to Services     CARMINE COOK : Josh Christianson, susan martines, mike valera, isabel james . So [facility] [phone].    ATENCIÓN: Si habla español, tiene a wang disposición servicios gratuitos de asistencia lingüística. Llame al [phone].    We comply with applicable federal civil rights laws and [place] laws. We do not discriminate on the basis of race, color, national origin, age, disability, sex, sexual orientation, or gender identity.            After Visit Summary       This is your record. Keep this with you and show to your community pharmacist(s) and doctor(s) at your next visit.

## 2018-06-05 NOTE — ED PROVIDER NOTES
History     Chief Complaint:  Headache    The history is provided by the patient.      Alana Mujica is a 42 year old female with a history of bulimia who presents with headache. The patient reports that for the past four weeks she has been feeling pain in the back of her head along with a throbbing pain in the temples and pressure in her forehead. She has previously been to the ER for the headaches, where she was encouraged to take ibuprofen and allergy medication, which she states has helped in the past but has not recently. She also notes trying ice and heat which has not helped, although pain improved when she drinks alcohol, which she states she has been consuming more frequently lately. She states she saw her primary doctor yesterday who recommended she come to the ED, although she did not come until this afternoon. She states her pain is unrelenting and she is having difficulty sleeping, which prompted her to come in today.    Allergies:  No known drug allergies    Medications:    Nexplanon  Ativan PRN  Valtrex    Past Medical History:    Anorexia  Anxiety  Bulimia  Depressive disorder  Genital herpes  HPV  Osteoporosis  Substance abuse  Hyponatremia  Suicidal ideation    Past Surgical History:    LEEP    Family History:    Breast cancer    Social History:  Smoking status: Former smoker  Alcohol use: Yes  Marital Status:  Single [1]    Review of Systems   Neurological: Positive for headaches.   Psychiatric/Behavioral: Positive for sleep disturbance.   All other systems reviewed and are negative.    Physical Exam     Patient Vitals for the past 24 hrs:   BP Temp Temp src Pulse Heart Rate Resp SpO2   06/05/18 1930 - - - 71 71 - 99 %   06/05/18 1915 - - - - - - 96 %   06/05/18 1900 115/79 - - - - - 97 %   06/05/18 1830 112/83 - - - - - 96 %   06/05/18 1815 - - - - - - 96 %   06/05/18 1800 122/90 - - - - - 96 %   06/05/18 1745 - - - 65 65 - 97 %   06/05/18 1730 115/85 - - - - - 99 %   06/05/18 1700 110/78 - -  - - - 97 %   06/05/18 1645 - - - - - - 99 %   06/05/18 1630 108/81 - - - - - 98 %   06/05/18 1615 - - - - - - 96 %   06/05/18 1600 109/85 - - 70 70 - 97 %   06/05/18 1545 - - - - - - 100 %   06/05/18 1530 114/85 - - - - - 98 %   06/05/18 1430 (!) 122/95 98.5  F (36.9  C) Oral 96 - 18 100 %     Physical Exam  Constitutional: Patient appears well-developed and well-nourished.   HENT:   Head: No external signs of trauma noted.  Eyes: Pupils are equal, round, and reactive to light.   Cardiovascular: Normal rate, regular rhythm, normal heart sounds and intact distal pulses.    Pulmonary/Chest: Effort normal and breath sounds normal. No respiratory distress. No wheezes noted.   Abdominal: Soft. There is no tenderness. There is no rebound.   Musculoskeletal:   No deformities appreciated   No edema noted  Neurological:    Patient is alert and oriented to person, place, and time.    Speech is fluent, cognition is normal.   CN 2-12 intact (PERRL, EOMI, symmetric smile, equal eye squeeze and forehead raise, normal and equal sensation to bilateral forehead/cheek/chin, equal hearing to finger rub, midline tongue protrusion with nl side-to-side movement, normal shoulder shrug).    RUE strength 5/5: , finger abd, wrist flex/ext, elbow flex/ext.    LUE strength 5/5: , finger abd, wrist flex/ext, elbow flex/ext.    RLE strength 5/5: ankle flex/ext, knee flex/ext, hip flex.    LLE strength 5/5: ankle flex/ext, knee flex/ext, hip flex.    Sensation equal in all 4 extremities.    No arm drift.     Cerebellar: Normal rapid alternating movements     ( finger-nose-finger, rapid pronation/supination, hand rolling)    Normal heel-to-shin   Normal gait.   Skin: Skin is warm and dry.     Emergency Department Course   Laboratory:  CBC: WNL (WBC 4.7, HGB 13.4, )   BMP: Glucose 113 (H), o/w WNL (Creatinine 0.70)  Magnesium: 1.8    Interventions:  1532: Compazine 10 mg IV  1532: Benadryl 25 mg IV  1532: Toradol 15 mg IV  1532:  Decadron 10 mg IV  1802: Magnesium sulfate 2 g IV  1802: Fioricet 1 tablet PO  1930: Depacon 500 mg IV    Emergency Department Course:  Past medical records, nursing notes, and vitals reviewed.  1449: I performed an exam of the patient and obtained history, as documented above.  IV inserted and blood drawn.    1725: I rechecked the patient. Explained findings to patient.    1835: I rechecked the patient.     Findings and plan explained to the Patient. Patient discharged home with instructions regarding supportive care, medications, and reasons to return. The importance of close follow-up was reviewed.     Impression & Plan    Medical Decision Making:  Alana Mujica is a 42 year old female presented with a headache consistent with previous headaches.  Evaluation in the emergency department has been negative. The patient's neurological examination has been normal. There has been no fever or neck stiffness so I doubt meningitis.  The headache was gradual in onset and like previous headaches so I doubt SAH.  There is no associated numbness, paresthesia or confusion and I doubt stroke or CNS tumor. I do not feel that advanced imaging is indicated at this time. The patient was treated symptomatically and pain has improved with medication interventions.  The patient should follow-up with the primary physician in the outpatient setting. If the headache continues or the frequency increases, consultation with neurology will be indicated.  Return if increasing pain, fever, vomiting or weakness.  Anticipatory guidance given prior to discharge.    Diagnosis:    ICD-10-CM   1. Nonintractable episodic headache, unspecified headache type R51     Disposition:  discharged to home    Discharge Medications:  New Prescriptions    BUTALBITAL-ACETAMINOPHEN-CAFFEINE (FIORICET/ESGIC) -40 MG PER TABLET    Take 1 tablet by mouth every 4 hours as needed       Madhu Meek  6/5/2018   [facility] EMERGENCY DEPARTMENT    I,  Madhu Meek, am serving as a scribe at 2:49 PM on 6/5/2018 to document services personally performed by Cortes Escobar DO based on my observations and the provider's statements to me.        Cortes Escobar DO  06/05/18 [id number]

## 2018-06-05 NOTE — ED NOTES
Patient reports she has had this headache for a month unrelentingly, even with ibuprofen. Pain is in temples, back of head, and into upper jaw and lower cheeks. She has also tried antihistamines in case this was sinus related. She has had a similar headache in the past and had an LP done which was negative. She has an active eating disorder (anorexia and bulemia).

## 2018-06-06 NOTE — DISCHARGE INSTRUCTIONS
* HEADACHE [unspecified]    The cause of your headache today is not clear, but it does not appear to be the sign of any serious illness.  Under stress, some people tense the muscles of their shoulder, neck and scalp without knowing it. If this condition lasts long enough, a TENSION HEADACHE can occur.  A MIGRAINE HEADACHE is caused by changes in blood flow to the brain. It can be mild or severe. A migraine attack may be triggered by emotional stress, hormone changes during the menstrual cycle, oral contraceptives, alcohol use, certain foods containing tyramine, eye strain, weather changes, missing meals, lack of sleep or oversleeping.  Other causes of headache include a viral illness, sinus, ear or throat infection, dental pain and TMJ (jaw joint) pain.  HOME CARE:      If you were given pain medicine for this headache, do not drive yourself home. Arrange for a ride, instead. When you get home, try to sleep. You should feel much better when you wake up.    If you are having nausea or vomiting, follow a light diet until your headache is relieved.    If you have a migraine type headache, use sunglasses when in the daylight or around bright indoor lighting until symptoms improve. Bright glaring light can worsen this kind of headache.  FOLLOW UP with your doctor if the headache is not better within the next 24 hours. If you have frequent headaches you should discuss a treatment plan with your primary care doctor. By being aware of the earliest signs of headache, and starting treatment right away, you may be able to stop the pain yourself.  GET PROMPT MEDICAL ATTENTION if any of the following occur:    Worsening of your head pain or no improvement within 24 hours    Repeated vomiting (unable to keep liquids down)    Fever over 101 F (38.3 C)    Stiff neck    Extreme drowsiness, confusion or fainting    Weakness of an arm or leg or one side of the face    Difficulty with speech or vision    [id number] [facility]  [employer], [place]. [address] [place] [id number]. All rights reserved. This information is not intended as a substitute for professional medical care. Always follow your healthcare professional's instructions.  This information has been modified by your health care provider with permission from the publisher.

## 2018-06-09 ENCOUNTER — HEALTH MAINTENANCE LETTER (OUTPATIENT)
Age: 43
End: 2018-06-09

## 2018-06-15 ENCOUNTER — OFFICE VISIT (OUTPATIENT)
Dept: FAMILY MEDICINE | Facility: CLINIC | Age: 43
End: 2018-06-15
Payer: COMMERCIAL

## 2018-06-15 VITALS
SYSTOLIC BLOOD PRESSURE: 110 MMHG | OXYGEN SATURATION: 98 % | DIASTOLIC BLOOD PRESSURE: 80 MMHG | TEMPERATURE: 98.5 F | HEART RATE: 75 BPM

## 2018-06-15 DIAGNOSIS — J01.90 ACUTE SINUSITIS WITH SYMPTOMS > 10 DAYS: Primary | ICD-10-CM

## 2018-06-15 DIAGNOSIS — F19.20 CHEMICAL DEPENDENCY (H): ICD-10-CM

## 2018-06-15 DIAGNOSIS — M54.2 NECK PAIN: ICD-10-CM

## 2018-06-15 PROCEDURE — 99214 OFFICE O/P EST MOD 30 MIN: CPT | Performed by: NURSE PRACTITIONER

## 2018-06-15 RX ORDER — CYCLOBENZAPRINE HCL 10 MG
10 TABLET ORAL
Qty: 10 TABLET | Refills: 0 | Status: SHIPPED | OUTPATIENT
Start: 2018-06-15 | End: 2018-06-22
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
## 2018-06-15 NOTE — PATIENT INSTRUCTIONS
Try Flexeril at night for 1 week, monitor symptoms & sleep. Also take antibiotics to treat a possible sinus infection & observe if this relieves any pain. Return after this time for a follow-up appointment to discuss how this worked.

## 2018-06-22 ENCOUNTER — OFFICE VISIT (OUTPATIENT)
Dept: FAMILY MEDICINE | Facility: CLINIC | Age: 43
End: 2018-06-22
Payer: COMMERCIAL

## 2018-06-22 VITALS
WEIGHT: 119 LBS | DIASTOLIC BLOOD PRESSURE: 82 MMHG | RESPIRATION RATE: 18 BRPM | BODY MASS INDEX: 21.08 KG/M2 | OXYGEN SATURATION: 100 % | SYSTOLIC BLOOD PRESSURE: 122 MMHG | HEART RATE: 79 BPM | TEMPERATURE: 98 F

## 2018-06-22 DIAGNOSIS — F19.20 CHEMICAL DEPENDENCY (H): ICD-10-CM

## 2018-06-22 DIAGNOSIS — M54.2 CERVICAL PAIN: ICD-10-CM

## 2018-06-22 DIAGNOSIS — M54.2 NECK PAIN: ICD-10-CM

## 2018-06-22 DIAGNOSIS — R51.9 CHRONIC DAILY HEADACHE: ICD-10-CM

## 2018-06-22 DIAGNOSIS — G47.09 OTHER INSOMNIA: Primary | ICD-10-CM

## 2018-06-22 DIAGNOSIS — J01.90 ACUTE SINUSITIS WITH SYMPTOMS > 10 DAYS: ICD-10-CM

## 2018-06-22 PROCEDURE — 99214 OFFICE O/P EST MOD 30 MIN: CPT | Performed by: NURSE PRACTITIONER

## 2018-06-22 RX ORDER — TRAZODONE HYDROCHLORIDE 50 MG/1
25 TABLET, FILM COATED ORAL
Qty: 10 TABLET | Refills: 0 | Status: ON HOLD | OUTPATIENT
Start: 2018-06-22 | End: 2018-08-26

## 2018-06-22 ASSESSMENT — ANXIETY QUESTIONNAIRES
5. BEING SO RESTLESS THAT IT IS HARD TO SIT STILL: SEVERAL DAYS
7. FEELING AFRAID AS IF SOMETHING AWFUL MIGHT HAPPEN: SEVERAL DAYS
1. FEELING NERVOUS, ANXIOUS, OR ON EDGE: MORE THAN HALF THE DAYS
2. NOT BEING ABLE TO STOP OR CONTROL WORRYING: MORE THAN HALF THE DAYS
6. BECOMING EASILY ANNOYED OR IRRITABLE: NOT AT ALL
GAD7 TOTAL SCORE: 10
3. WORRYING TOO MUCH ABOUT DIFFERENT THINGS: MORE THAN HALF THE DAYS
IF YOU CHECKED OFF ANY PROBLEMS ON THIS QUESTIONNAIRE, HOW DIFFICULT HAVE THESE PROBLEMS MADE IT FOR YOU TO DO YOUR WORK, TAKE CARE OF THINGS AT HOME, OR GET ALONG WITH OTHER PEOPLE: SOMEWHAT DIFFICULT

## 2018-06-22 ASSESSMENT — PATIENT HEALTH QUESTIONNAIRE - PHQ9: 5. POOR APPETITE OR OVEREATING: MORE THAN HALF THE DAYS
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
## 2018-06-22 NOTE — PATIENT INSTRUCTIONS
Discontinue flexeril. Recommend retrying trazodone instead at lowest dose possible- 25 mg.    Recommend PT for headaches

## 2018-06-22 NOTE — MR AVS SNAPSHOT
After Visit Summary   6/22/2018    Alana Mujica    MRN: [id number]           Patient Information     Date Of Birth          1975        Visit Information        Provider Department      6/22/2018 2:20 PM Sherman, Pati, APRN CNP [facility]        Today's Diagnoses     Other insomnia    -  1    Acute sinusitis with symptoms > 10 days        Neck pain          Care Instructions    Discontinue flexeril. Recommend retrying trazodone instead at lowest dose possible- 25 mg.    Recommend PT for headaches              Follow-ups after your visit        Additional Services     [facility] PT, HAND, AND CHIROPRACTIC REFERRAL       **This order will print in the [facility] Scheduling Office**    Physical Therapy, Hand Therapy and Chiropractic Care are available through:    *[facility]  *[facility]  *[facility]    Call one number to schedule at any of the above locations: [phone].    Your provider has referred you to: As Indicated:     Indication/Reason for Referral: neck pain  Onset of Illness: recurrent  Therapy Orders: Evaluate and Treat  Special Programs: None  Special Request: None    Anne Marie Kenney      Additional Comments for the Therapist or Chiropractor:     Please be aware that coverage of these services is subject to the terms and limitations of your health insurance plan.  Call member services at your health plan with any benefit or coverage questions.      Please bring the following to your appointment:    *Your personal calendar for scheduling future appointments  *Comfortable clothing                  Follow-up notes from your care team     Return in about 3 weeks (around 7/13/2018).      Who to contact     If you have questions or need follow up information about today's clinic visit or your schedule please contact [facility] directly at [phone].  Normal or non-critical lab and imaging results will be  communicated to you by Fashiontrothart, letter or phone within 4 business days after the clinic has received the results. If you do not hear from us within 7 days, please contact the clinic through The Switch or phone. If you have a critical or abnormal lab result, we will notify you by phone as soon as possible.  Submit refill requests through The Switch or call your pharmacy and they will forward the refill request to us. Please allow 3 business days for your refill to be completed.          Additional Information About Your Visit        The Switch Information     The Switch gives you secure access to your electronic health record. If you see a primary care provider, you can also send messages to your care team and make appointments. If you have questions, please call your primary care clinic.  If you do not have a primary care provider, please call [phone] and they will assist you.        Care EveryWhere ID     This is your Care EveryWhere ID. This could be used by other organizations to access your [facility] medical records  [id number]        Your Vitals Were     Pulse Temperature Respirations Last Period Pulse Oximetry BMI (Body Mass Index)    79 98  F (36.7  C) (Oral) 18 (LMP Unknown) 100% 21.08 kg/m2       Blood Pressure from Last 3 Encounters:   06/22/18 122/82   06/15/18 110/80   06/05/18 116/84    Weight from Last 3 Encounters:   06/22/18 119 lb (54 kg)   06/04/18 119 lb (54 kg)   04/05/18 117 lb 1.6 oz (53.1 kg)              We Performed the Following     [facility] PT, HAND, AND CHIROPRACTIC REFERRAL          Today's Medication Changes          These changes are accurate as of 6/22/18  3:04 PM.  If you have any questions, ask your nurse or doctor.               Start taking these medicines.        Dose/Directions    traZODone 50 MG tablet   Commonly known as:  DESYREL   Used for:  Other insomnia   Started by:  Pati Coffey APRN CNP        Dose:  25 mg   Take 0.5 tablets (25 mg) by mouth nightly as needed for sleep    Quantity:  10 tablet   Refills:  0         Stop taking these medicines if you haven't already. Please contact your care team if you have questions.     cyclobenzaprine 10 MG tablet   Commonly known as:  FLEXERIL   Stopped by:  Pati Coffey APRN CNP                Where to get your medicines      These medications were sent to [facility]/pharmacy #[id number] - [place], [place] - [address]  [address], [facility] [address]     Phone:  [phone]     amoxicillin-clavulanate 875-125 MG per tablet    traZODone 50 MG tablet                Primary Care Provider Office Phone # Fax #    Sabrina Pérez PA-C [phone] [address] [address]        Goals        General    Alana will contact her insurance to determine in-network psychiatry providers. (pt-stated)     Notes - Note created  1/29/2016  9:52 AM by Peter Ochoa    As of today's date 1/29/2016 goal was unable to be assessed due to lack of contact from patient.           Equal Access to Services     [facility]: Hadii aldair woodard hadasho Soomaali, waaxda luqadaha, qaybta kaalmada adeegyanorah, isabel james . So [facility] [phone].    ATENCIÓN: Si habla español, tiene a wang disposición servicios gratuitos de asistencia lingüística. [facility] [phone].    We comply with applicable federal civil rights laws and [place] laws. We do not discriminate on the basis of race, color, national origin, age, disability, sex, sexual orientation, or gender identity.            Thank you!     Thank you for choosing [facility]  for your care. Our goal is always to provide you with excellent care. Hearing back from our patients is one way we can continue to improve our services. Please take a few minutes to complete the written survey that you may receive in the mail after your visit with us. Thank you!             Your Updated Medication List - Protect others around you: Learn how to  safely use, store and throw away your medicines at www.disposemymeds.org.          This list is accurate as of 6/22/18  3:04 PM.  Always use your most recent med list.                   Brand Name Dispense Instructions for use Diagnosis    amoxicillin-clavulanate 875-125 MG per tablet    AUGMENTIN    8 tablet    Take 1 tablet by mouth 2 times daily    Acute sinusitis with symptoms > 10 days       butalbital-acetaminophen-caffeine -40 MG per tablet    FIORICET/ESGIC    28 tablet    Take 1 tablet by mouth every 4 hours as needed        LORazepam 1 MG tablet    ATIVAN    15 tablet    TAKE 1 TABLET BY MOUTH 3 TIMES A DAY AS NEEDED FOR ANXIETY    Alcohol withdrawal syndrome without complication (H)       NEXPLANON 68 MG Impl   Generic drug:  etonogestrel     1 each    1 each (68 mg) by Subdermal route once        traZODone 50 MG tablet    DESYREL    10 tablet    Take 0.5 tablets (25 mg) by mouth nightly as needed for sleep    Other insomnia       valACYclovir 500 MG tablet    VALTREX    90 tablet    Take 1 tablet (500 mg) by mouth daily    HSV (herpes simplex virus) infection

## 2018-06-23 ASSESSMENT — ANXIETY QUESTIONNAIRES: GAD7 TOTAL SCORE: 10

## 2018-06-23 ASSESSMENT — PATIENT HEALTH QUESTIONNAIRE - PHQ9: SUM OF ALL RESPONSES TO PHQ QUESTIONS 1-9: 15

## 2018-06-25 ENCOUNTER — THERAPY VISIT (OUTPATIENT)
Dept: PHYSICAL THERAPY | Facility: CLINIC | Age: 43
End: 2018-06-25
Payer: COMMERCIAL

## 2018-06-25 DIAGNOSIS — M54.2 CERVICAL PAIN: Primary | ICD-10-CM

## 2018-06-25 PROCEDURE — 97110 THERAPEUTIC EXERCISES: CPT | Mod: GP | Performed by: PHYSICAL THERAPIST

## 2018-06-25 PROCEDURE — 97161 PT EVAL LOW COMPLEX 20 MIN: CPT | Mod: GP | Performed by: PHYSICAL THERAPIST

## 2018-06-25 PROCEDURE — 97140 MANUAL THERAPY 1/> REGIONS: CPT | Mod: GP | Performed by: PHYSICAL THERAPIST
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
## 2018-06-25 NOTE — MR AVS SNAPSHOT
After Visit Summary   6/25/2018    Alana Mujica    MRN: [id number]           Patient Information     Date Of Birth          1975        Visit Information        Provider Department      6/25/2018 9:50 AM Ria Sams PT [facility] [place]        Today's Diagnoses     Cervical pain    -  1       Follow-ups after your visit        Your next 10 appointments already scheduled     Jul 02, 2018  9:50 AM CDT   [facility] Spine with Ria Sams PT   [facility] ([facility] [place])    [address] [place] [address]   [phone]            Jul 11, 2018  5:00 PM CDT   [facility] Spine with Ria Sams PT   [facility] ([facility] [place])    [address]  [place] [address]   [phone]              Who to contact     If you have questions or need follow up information about today's clinic visit or your schedule please contact [facility] [place] directly at [phone].  Normal or non-critical lab and imaging results will be communicated to you by [employer] i[employer] Online Science and Technologyhart, letter or phone within 4 business days after the clinic has received the results. If you do not hear from us within 7 days, please contact the clinic through Company Cubedt or phone. If you have a critical or abnormal lab result, we will notify you by phone as soon as possible.  Submit refill requests through Graph Alchemist or call your pharmacy and they will forward the refill request to us. Please allow 3 business days for your refill to be completed.          Additional Information About Your Visit        [employer] and [employer] Information     Graph Alchemist gives you secure access to your electronic health record. If you see a primary care provider, you can also send messages to your care team and make appointments. If you have questions, please call your primary care clinic.  If you do not have a primary care provider, please call [phone] and  they will assist you.        Care EveryWhere ID     This is your Care EveryWhere ID. This could be used by other organizations to access your [facility] medical records  [id number]        Your Vitals Were     Last Period                   (LMP Unknown)            Blood Pressure from Last 3 Encounters:   06/22/18 122/82   06/15/18 110/80   06/05/18 116/84    Weight from Last 3 Encounters:   06/22/18 54 kg (119 lb)   06/04/18 54 kg (119 lb)   04/05/18 53.1 kg (117 lb 1.6 oz)              We Performed the Following     HC PT EVAL, LOW COMPLEXITY     [facility] INITIAL EVAL REPORT     MANUAL THER TECH,1+REGIONS,EA 15 MIN     THERAPEUTIC EXERCISES        Primary Care Provider Office Phone # Fax #    Sabrina Pérez PA-C [phone]       [address] [place] [address]        Goals        General    Alana will contact her insurance to determine in-network psychiatry providers. (pt-stated)     Notes - Note created  1/29/2016  9:52 AM by Peter Ochoa    As of today's date 1/29/2016 goal was unable to be assessed due to lack of contact from patient.           Equal Access to Services     [facility] : Hadii aldair Christianson, waaxda lulazarusadaha, qaybta kaalmada ademary kate, isabel vann. So [facility] [phone].    ATENCIÓN: Si habla español, tiene a wang disposición servicios gratuitos de asistencia lingüística. Abdulazizame al [phone].    We comply with applicable federal civil rights laws and [place] laws. We do not discriminate on the basis of race, color, national origin, age, disability, sex, sexual orientation, or gender identity.            Thank you!     Thank you for choosing [facility]  for your care. Our goal is always to provide you with excellent care. Hearing back from our patients is one way we can continue to improve our services. Please take a few minutes to complete the written survey that you may receive in the mail  after your visit with us. Thank you!             Your Updated Medication List - Protect others around you: Learn how to safely use, store and throw away your medicines at www.disposemymeds.org.          This list is accurate as of 6/25/18 11:07 AM.  Always use your most recent med list.                   Brand Name Dispense Instructions for use Diagnosis    amoxicillin-clavulanate 875-125 MG per tablet    AUGMENTIN    8 tablet    Take 1 tablet by mouth 2 times daily    Acute sinusitis with symptoms > 10 days       butalbital-acetaminophen-caffeine -40 MG per tablet    FIORICET/ESGIC    28 tablet    Take 1 tablet by mouth every 4 hours as needed        LORazepam 1 MG tablet    ATIVAN    15 tablet    TAKE 1 TABLET BY MOUTH 3 TIMES A DAY AS NEEDED FOR ANXIETY    Alcohol withdrawal syndrome without complication (H)       NEXPLANON 68 MG Impl   Generic drug:  etonogestrel     1 each    1 each (68 mg) by Subdermal route once        traZODone 50 MG tablet    DESYREL    10 tablet    Take 0.5 tablets (25 mg) by mouth nightly as needed for sleep    Other insomnia       valACYclovir 500 MG tablet    VALTREX    90 tablet    Take 1 tablet (500 mg) by mouth daily    HSV (herpes simplex virus) infection

## 2018-06-29 PROBLEM — R51.9 CHRONIC DAILY HEADACHE: Status: ACTIVE | Noted: 2018-06-29

## 2018-07-02 ENCOUNTER — THERAPY VISIT (OUTPATIENT)
Dept: PHYSICAL THERAPY | Facility: CLINIC | Age: 43
End: 2018-07-02
Payer: COMMERCIAL

## 2018-07-02 DIAGNOSIS — M54.2 CERVICAL PAIN: ICD-10-CM

## 2018-07-02 DIAGNOSIS — R51.9 CHRONIC DAILY HEADACHE: ICD-10-CM

## 2018-07-02 PROCEDURE — 97110 THERAPEUTIC EXERCISES: CPT | Mod: GP | Performed by: PHYSICAL THERAPIST

## 2018-07-02 PROCEDURE — 97140 MANUAL THERAPY 1/> REGIONS: CPT | Mod: GP | Performed by: PHYSICAL THERAPIST

## 2018-07-11 ENCOUNTER — THERAPY VISIT (OUTPATIENT)
Dept: PHYSICAL THERAPY | Facility: CLINIC | Age: 43
End: 2018-07-11
Payer: COMMERCIAL

## 2018-07-11 DIAGNOSIS — R51.9 CHRONIC DAILY HEADACHE: ICD-10-CM

## 2018-07-11 DIAGNOSIS — M54.2 CERVICAL PAIN: ICD-10-CM

## 2018-07-11 PROCEDURE — 97140 MANUAL THERAPY 1/> REGIONS: CPT | Mod: GP | Performed by: PHYSICAL THERAPIST

## 2018-07-11 PROCEDURE — 97110 THERAPEUTIC EXERCISES: CPT | Mod: GP | Performed by: PHYSICAL THERAPIST

## 2018-07-11 NOTE — MR AVS SNAPSHOT
After Visit Summary   7/11/2018    Alana Mujica    MRN: [id number]           Patient Information     Date Of Birth          1975        Visit Information        Provider Department      7/11/2018 5:00 PM Ria Sams PT [facility] [place]        Today's Diagnoses     Chronic daily headache        Cervical pain           Follow-ups after your visit        Your next 10 appointments already scheduled     Aug 01, 2018  5:40 PM CDT   [facility] Spine with Ria Sams PT   [facility] ([facility] [place])    [address]  [place] [address] [phone]              Who to contact     If you have questions or need follow up information about today's clinic visit or your schedule please contact [facility] directly at [phone].  Normal or non-critical lab and imaging results will be communicated to you by InvitedHomehart, letter or phone within 4 business days after the clinic has received the results. If you do not hear from us within 7 days, please contact the clinic through InvitedHomehart or phone. If you have a critical or abnormal lab result, we will notify you by phone as soon as possible.  Submit refill requests through SalonBookr or call your pharmacy and they will forward the refill request to us. Please allow 3 business days for your refill to be completed.          Additional Information About Your Visit        MyChart Information     SalonBookr gives you secure access to your electronic health record. If you see a primary care provider, you can also send messages to your care team and make appointments. If you have questions, please call your primary care clinic.  If you do not have a primary care provider, please call [phone] and they will assist you.        Care EveryWhere ID     This is your Care EveryWhere ID. This could be used by other organizations to access your [facility]  records  [id number]         Blood Pressure from Last 3 Encounters:   06/22/18 122/82   06/15/18 110/80   06/05/18 116/84    Weight from Last 3 Encounters:   06/22/18 54 kg (119 lb)   06/04/18 54 kg (119 lb)   04/05/18 53.1 kg (117 lb 1.6 oz)              We Performed the Following     MANUAL THER TECH,1+REGIONS,EA 15 MIN     THERAPEUTIC EXERCISES        Primary Care Provider Office Phone # Fax #    Sabrina Pérez PA-C [phone]       [address]  [place] [address]        Goals        General    Alana will contact her insurance to determine in-network psychiatry providers. (pt-stated)     Notes - Note created  1/29/2016  9:52 AM by Peter Ochoa    As of today's date 1/29/2016 goal was unable to be assessed due to lack of contact from patient.           Equal Access to Services     [facility] : Hadii aldair Christianson, waaxda lulazarusadaha, qaybta kaalmada ademary kate, isabel james . So [facility] [phone].    ATENCIÓN: Si habla español, tiene a wang disposición servicios gratuitos de asistencia lingüística. Llame al [phone].    We comply with applicable federal civil rights laws and [place] laws. We do not discriminate on the basis of race, color, national origin, age, disability, sex, sexual orientation, or gender identity.            Thank you!     Thank you for choosing [facility]  for your care. Our goal is always to provide you with excellent care. Hearing back from our patients is one way we can continue to improve our services. Please take a few minutes to complete the written survey that you may receive in the mail after your visit with us. Thank you!             Your Updated Medication List - Protect others around you: Learn how to safely use, store and throw away your medicines at www.disposemymeds.org.          This list is accurate as of 7/11/18 11:59 PM.  Always use your most recent med list.                    Brand Name Dispense Instructions for use Diagnosis    amoxicillin-clavulanate 875-125 MG per tablet    AUGMENTIN    8 tablet    Take 1 tablet by mouth 2 times daily    Acute sinusitis with symptoms > 10 days       butalbital-acetaminophen-caffeine -40 MG per tablet    FIORICET/ESGIC    28 tablet    Take 1 tablet by mouth every 4 hours as needed        LORazepam 1 MG tablet    ATIVAN    15 tablet    TAKE 1 TABLET BY MOUTH 3 TIMES A DAY AS NEEDED FOR ANXIETY    Alcohol withdrawal syndrome without complication (H)       NEXPLANON 68 MG Impl   Generic drug:  etonogestrel     1 each    1 each (68 mg) by Subdermal route once        traZODone 50 MG tablet    DESYREL    10 tablet    Take 0.5 tablets (25 mg) by mouth nightly as needed for sleep    Other insomnia       valACYclovir 500 MG tablet    VALTREX    90 tablet    Take 1 tablet (500 mg) by mouth daily    HSV (herpes simplex virus) infection

## 2018-08-26 ENCOUNTER — HOSPITAL ENCOUNTER (OUTPATIENT)
Facility: CLINIC | Age: 43
Setting detail: OBSERVATION
Discharge: HOME | End: 2018-08-27
Attending: EMERGENCY MEDICINE | Admitting: INTERNAL MEDICINE
Payer: COMMERCIAL

## 2018-08-26 ENCOUNTER — MYC MEDICAL ADVICE (OUTPATIENT)
Dept: FAMILY MEDICINE | Facility: CLINIC | Age: 43
End: 2018-08-26

## 2018-08-26 DIAGNOSIS — F43.22 ADJUSTMENT DISORDER WITH ANXIOUS MOOD: Primary | ICD-10-CM

## 2018-08-26 DIAGNOSIS — E87.6 HYPOKALEMIA: ICD-10-CM

## 2018-08-26 DIAGNOSIS — E87.1 HYPONATREMIA: ICD-10-CM

## 2018-08-26 DIAGNOSIS — F10.930 ALCOHOL WITHDRAWAL SYNDROME WITHOUT COMPLICATION (H): ICD-10-CM

## 2018-08-26 DIAGNOSIS — F10.20 ALCOHOLISM (H): ICD-10-CM

## 2018-08-26 LAB
ALBUMIN SERPL-MCNC: 4.1 G/DL (ref 3.4–5)
ALBUMIN UR-MCNC: NEGATIVE MG/DL
ALP SERPL-CCNC: 91 U/L (ref 40–150)
ALT SERPL W P-5'-P-CCNC: 32 U/L (ref 0–50)
AMPHETAMINES UR QL SCN: NEGATIVE
ANION GAP SERPL CALCULATED.3IONS-SCNC: 15 MMOL/L (ref 3–14)
APPEARANCE UR: CLEAR
AST SERPL W P-5'-P-CCNC: 39 U/L (ref 0–45)
B-HCG FREE SERPL-ACNC: <5 IU/L
BARBITURATES UR QL: NEGATIVE
BASOPHILS # BLD AUTO: 0.1 10E9/L (ref 0–0.2)
BASOPHILS NFR BLD AUTO: 0.6 %
BENZODIAZ UR QL: NEGATIVE
BILIRUB SERPL-MCNC: 1 MG/DL (ref 0.2–1.3)
BILIRUB UR QL STRIP: NEGATIVE
BUN SERPL-MCNC: 6 MG/DL (ref 7–30)
CALCIUM SERPL-MCNC: 8.5 MG/DL (ref 8.5–10.1)
CANNABINOIDS UR QL SCN: NEGATIVE
CHLORIDE SERPL-SCNC: 89 MMOL/L (ref 94–109)
CO2 SERPL-SCNC: 23 MMOL/L (ref 20–32)
COCAINE UR QL: NEGATIVE
COLOR UR AUTO: YELLOW
CREAT SERPL-MCNC: 0.71 MG/DL (ref 0.52–1.04)
DIFFERENTIAL METHOD BLD: ABNORMAL
EOSINOPHIL # BLD AUTO: 0 10E9/L (ref 0–0.7)
EOSINOPHIL NFR BLD AUTO: 0.1 %
ERYTHROCYTE [DISTWIDTH] IN BLOOD BY AUTOMATED COUNT: 11.9 % (ref 10–15)
ETHANOL SERPL-MCNC: <0.01 G/DL
GFR SERPL CREATININE-BSD FRML MDRD: 90 ML/MIN/1.7M2
GLUCOSE SERPL-MCNC: 123 MG/DL (ref 70–99)
GLUCOSE UR STRIP-MCNC: NEGATIVE MG/DL
HCT VFR BLD AUTO: 37.9 % (ref 35–47)
HGB BLD-MCNC: 13.1 G/DL (ref 11.7–15.7)
HGB UR QL STRIP: NEGATIVE
IMM GRANULOCYTES # BLD: 0 10E9/L (ref 0–0.4)
IMM GRANULOCYTES NFR BLD: 0.2 %
INR PPP: 1.02 (ref 0.86–1.14)
KETONES UR STRIP-MCNC: NEGATIVE MG/DL
LEUKOCYTE ESTERASE UR QL STRIP: NEGATIVE
LYMPHOCYTES # BLD AUTO: 0.7 10E9/L (ref 0.8–5.3)
LYMPHOCYTES NFR BLD AUTO: 8.3 %
MAGNESIUM SERPL-MCNC: 1.3 MG/DL (ref 1.6–2.3)
MCH RBC QN AUTO: 32.7 PG (ref 26.5–33)
MCHC RBC AUTO-ENTMCNC: 34.6 G/DL (ref 31.5–36.5)
MCV RBC AUTO: 95 FL (ref 78–100)
MONOCYTES # BLD AUTO: 0.7 10E9/L (ref 0–1.3)
MONOCYTES NFR BLD AUTO: 7.8 %
NEUTROPHILS # BLD AUTO: 7.1 10E9/L (ref 1.6–8.3)
NEUTROPHILS NFR BLD AUTO: 83 %
NITRATE UR QL: NEGATIVE
NRBC # BLD AUTO: 0 10*3/UL
NRBC BLD AUTO-RTO: 0 /100
OPIATES UR QL SCN: NEGATIVE
PCP UR QL SCN: NEGATIVE
PH UR STRIP: 8 PH (ref 5–7)
PLATELET # BLD AUTO: 156 10E9/L (ref 150–450)
POTASSIUM SERPL-SCNC: 2.9 MMOL/L (ref 3.4–5.3)
PROT SERPL-MCNC: 7.6 G/DL (ref 6.8–8.8)
RBC # BLD AUTO: 4.01 10E12/L (ref 3.8–5.2)
RBC #/AREA URNS AUTO: <1 /HPF (ref 0–2)
SODIUM SERPL-SCNC: 127 MMOL/L (ref 133–144)
SOURCE: ABNORMAL
SP GR UR STRIP: 1 (ref 1–1.03)
UROBILINOGEN UR STRIP-MCNC: 0 MG/DL (ref 0–2)
WBC # BLD AUTO: 8.5 10E9/L (ref 4–11)
WBC #/AREA URNS AUTO: <1 /HPF (ref 0–5)

## 2018-08-26 PROCEDURE — 85610 PROTHROMBIN TIME: CPT | Performed by: EMERGENCY MEDICINE

## 2018-08-26 PROCEDURE — 80320 DRUG SCREEN QUANTALCOHOLS: CPT | Mod: 59 | Performed by: EMERGENCY MEDICINE

## 2018-08-26 PROCEDURE — 84702 CHORIONIC GONADOTROPIN TEST: CPT

## 2018-08-26 PROCEDURE — 99207 ZZC CDG-MDM COMPONENT: MEETS LOW - DOWN CODED: CPT | Performed by: PHYSICIAN ASSISTANT

## 2018-08-26 PROCEDURE — 25000132 ZZH RX MED GY IP 250 OP 250 PS 637: Performed by: EMERGENCY MEDICINE

## 2018-08-26 PROCEDURE — 93005 ELECTROCARDIOGRAM TRACING: CPT

## 2018-08-26 PROCEDURE — 25000132 ZZH RX MED GY IP 250 OP 250 PS 637: Performed by: PHYSICIAN ASSISTANT

## 2018-08-26 PROCEDURE — 99222 1ST HOSP IP/OBS MODERATE 55: CPT | Mod: AI | Performed by: PHYSICIAN ASSISTANT

## 2018-08-26 PROCEDURE — 83735 ASSAY OF MAGNESIUM: CPT | Performed by: EMERGENCY MEDICINE

## 2018-08-26 PROCEDURE — 96361 HYDRATE IV INFUSION ADD-ON: CPT

## 2018-08-26 PROCEDURE — 96376 TX/PRO/DX INJ SAME DRUG ADON: CPT

## 2018-08-26 PROCEDURE — 80307 DRUG TEST PRSMV CHEM ANLYZR: CPT | Mod: 59 | Performed by: EMERGENCY MEDICINE

## 2018-08-26 PROCEDURE — 80307 DRUG TEST PRSMV CHEM ANLYZR: CPT | Performed by: PHYSICIAN ASSISTANT

## 2018-08-26 PROCEDURE — 81001 URINALYSIS AUTO W/SCOPE: CPT | Performed by: PHYSICIAN ASSISTANT

## 2018-08-26 PROCEDURE — 96374 THER/PROPH/DIAG INJ IV PUSH: CPT

## 2018-08-26 PROCEDURE — 12000000 ZZH R&B MED SURG/OB

## 2018-08-26 PROCEDURE — 99285 EMERGENCY DEPT VISIT HI MDM: CPT | Mod: 25

## 2018-08-26 PROCEDURE — 96375 TX/PRO/DX INJ NEW DRUG ADDON: CPT

## 2018-08-26 PROCEDURE — 25000125 ZZHC RX 250: Performed by: EMERGENCY MEDICINE

## 2018-08-26 PROCEDURE — 25000128 H RX IP 250 OP 636: Performed by: EMERGENCY MEDICINE

## 2018-08-26 PROCEDURE — 80053 COMPREHEN METABOLIC PANEL: CPT | Performed by: EMERGENCY MEDICINE

## 2018-08-26 PROCEDURE — 25000128 H RX IP 250 OP 636: Performed by: PHYSICIAN ASSISTANT

## 2018-08-26 PROCEDURE — 85025 COMPLETE CBC W/AUTO DIFF WBC: CPT | Performed by: EMERGENCY MEDICINE

## 2018-08-26 RX ORDER — ONDANSETRON 4 MG/1
4 TABLET, ORALLY DISINTEGRATING ORAL EVERY 6 HOURS PRN
Status: DISCONTINUED | OUTPATIENT
Start: 2018-08-26 | End: 2018-08-27 | Stop reason: HOSPADM

## 2018-08-26 RX ORDER — FEXOFENADINE HCL 180 MG/1
180 TABLET ORAL DAILY PRN
Status: DISCONTINUED | OUTPATIENT
Start: 2018-08-26 | End: 2018-08-27 | Stop reason: HOSPADM

## 2018-08-26 RX ORDER — DIAZEPAM 5 MG
10 TABLET ORAL EVERY 30 MIN PRN
Status: DISCONTINUED | OUTPATIENT
Start: 2018-08-26 | End: 2018-08-27 | Stop reason: HOSPADM

## 2018-08-26 RX ORDER — FLUTICASONE PROPIONATE 50 MCG
1 SPRAY, SUSPENSION (ML) NASAL DAILY PRN
Status: DISCONTINUED | OUTPATIENT
Start: 2018-08-26 | End: 2018-08-27 | Stop reason: HOSPADM

## 2018-08-26 RX ORDER — CITALOPRAM HYDROBROMIDE 20 MG/1
20 TABLET ORAL DAILY
Status: ON HOLD | COMMUNITY
End: 2018-08-27

## 2018-08-26 RX ORDER — MAGNESIUM SULFATE HEPTAHYDRATE 40 MG/ML
4 INJECTION, SOLUTION INTRAVENOUS EVERY 4 HOURS PRN
Status: DISCONTINUED | OUTPATIENT
Start: 2018-08-26 | End: 2018-08-27 | Stop reason: HOSPADM

## 2018-08-26 RX ORDER — POTASSIUM CHLORIDE 1500 MG/1
20-40 TABLET, EXTENDED RELEASE ORAL
Status: DISCONTINUED | OUTPATIENT
Start: 2018-08-26 | End: 2018-08-27 | Stop reason: HOSPADM

## 2018-08-26 RX ORDER — IBUPROFEN 600 MG/1
600 TABLET, FILM COATED ORAL EVERY 6 HOURS PRN
Status: DISCONTINUED | OUTPATIENT
Start: 2018-08-26 | End: 2018-08-27 | Stop reason: HOSPADM

## 2018-08-26 RX ORDER — LORAZEPAM 2 MG/ML
1 INJECTION INTRAMUSCULAR ONCE
Status: COMPLETED | OUTPATIENT
Start: 2018-08-26 | End: 2018-08-26

## 2018-08-26 RX ORDER — AMOXICILLIN 500 MG/1
500 TABLET, FILM COATED ORAL 3 TIMES DAILY
Status: ON HOLD | COMMUNITY
Start: 2018-08-19 | End: 2018-08-27

## 2018-08-26 RX ORDER — PROCHLORPERAZINE 25 MG
25 SUPPOSITORY, RECTAL RECTAL EVERY 12 HOURS PRN
Status: DISCONTINUED | OUTPATIENT
Start: 2018-08-26 | End: 2018-08-27 | Stop reason: HOSPADM

## 2018-08-26 RX ORDER — FOLIC ACID 1 MG/1
1 TABLET ORAL DAILY
Status: DISCONTINUED | OUTPATIENT
Start: 2018-08-27 | End: 2018-08-27 | Stop reason: HOSPADM

## 2018-08-26 RX ORDER — FEXOFENADINE HCL 180 MG/1
180 TABLET ORAL DAILY PRN
Status: ON HOLD | COMMUNITY
End: 2018-09-16

## 2018-08-26 RX ORDER — POTASSIUM CHLORIDE 29.8 MG/ML
20 INJECTION INTRAVENOUS
Status: DISCONTINUED | OUTPATIENT
Start: 2018-08-26 | End: 2018-08-27 | Stop reason: HOSPADM

## 2018-08-26 RX ORDER — LIDOCAINE 40 MG/G
CREAM TOPICAL
Status: DISCONTINUED | OUTPATIENT
Start: 2018-08-26 | End: 2018-08-27 | Stop reason: HOSPADM

## 2018-08-26 RX ORDER — AMOXICILLIN 250 MG
1 CAPSULE ORAL 2 TIMES DAILY PRN
Status: DISCONTINUED | OUTPATIENT
Start: 2018-08-26 | End: 2018-08-27 | Stop reason: HOSPADM

## 2018-08-26 RX ORDER — FLUTICASONE PROPIONATE 50 MCG
1 SPRAY, SUSPENSION (ML) NASAL DAILY PRN
Status: ON HOLD | COMMUNITY
End: 2022-03-17

## 2018-08-26 RX ORDER — POTASSIUM CL/LIDO/0.9 % NACL 10MEQ/0.1L
10 INTRAVENOUS SOLUTION, PIGGYBACK (ML) INTRAVENOUS
Status: DISCONTINUED | OUTPATIENT
Start: 2018-08-26 | End: 2018-08-27 | Stop reason: HOSPADM

## 2018-08-26 RX ORDER — POLYETHYLENE GLYCOL 3350 17 G/17G
17 POWDER, FOR SOLUTION ORAL DAILY PRN
Status: DISCONTINUED | OUTPATIENT
Start: 2018-08-26 | End: 2018-08-27 | Stop reason: HOSPADM

## 2018-08-26 RX ORDER — ONDANSETRON 2 MG/ML
4 INJECTION INTRAMUSCULAR; INTRAVENOUS EVERY 6 HOURS PRN
Status: DISCONTINUED | OUTPATIENT
Start: 2018-08-26 | End: 2018-08-27 | Stop reason: HOSPADM

## 2018-08-26 RX ORDER — POTASSIUM CHLORIDE 1.5 G/1.58G
20-40 POWDER, FOR SOLUTION ORAL
Status: DISCONTINUED | OUTPATIENT
Start: 2018-08-26 | End: 2018-08-27 | Stop reason: HOSPADM

## 2018-08-26 RX ORDER — POTASSIUM CHLORIDE 1500 MG/1
40 TABLET, EXTENDED RELEASE ORAL ONCE
Status: COMPLETED | OUTPATIENT
Start: 2018-08-26 | End: 2018-08-26

## 2018-08-26 RX ORDER — AMOXICILLIN 250 MG
2 CAPSULE ORAL 2 TIMES DAILY PRN
Status: DISCONTINUED | OUTPATIENT
Start: 2018-08-26 | End: 2018-08-27 | Stop reason: HOSPADM

## 2018-08-26 RX ORDER — NALOXONE HYDROCHLORIDE 0.4 MG/ML
.1-.4 INJECTION, SOLUTION INTRAMUSCULAR; INTRAVENOUS; SUBCUTANEOUS
Status: DISCONTINUED | OUTPATIENT
Start: 2018-08-26 | End: 2018-08-27 | Stop reason: HOSPADM

## 2018-08-26 RX ORDER — LORAZEPAM 2 MG/ML
1 INJECTION INTRAMUSCULAR
Status: DISCONTINUED | OUTPATIENT
Start: 2018-08-26 | End: 2018-08-26

## 2018-08-26 RX ORDER — CITALOPRAM HYDROBROMIDE 20 MG/1
20 TABLET ORAL DAILY
Status: DISCONTINUED | OUTPATIENT
Start: 2018-08-27 | End: 2018-08-27 | Stop reason: HOSPADM

## 2018-08-26 RX ORDER — SODIUM CHLORIDE, SODIUM LACTATE, POTASSIUM CHLORIDE, CALCIUM CHLORIDE 600; 310; 30; 20 MG/100ML; MG/100ML; MG/100ML; MG/100ML
1000 INJECTION, SOLUTION INTRAVENOUS CONTINUOUS
Status: DISCONTINUED | OUTPATIENT
Start: 2018-08-26 | End: 2018-08-26

## 2018-08-26 RX ORDER — DIAZEPAM 10 MG/2ML
5-10 INJECTION, SOLUTION INTRAMUSCULAR; INTRAVENOUS EVERY 30 MIN PRN
Status: DISCONTINUED | OUTPATIENT
Start: 2018-08-26 | End: 2018-08-27 | Stop reason: HOSPADM

## 2018-08-26 RX ORDER — PROCHLORPERAZINE MALEATE 5 MG
10 TABLET ORAL EVERY 6 HOURS PRN
Status: DISCONTINUED | OUTPATIENT
Start: 2018-08-26 | End: 2018-08-27 | Stop reason: HOSPADM

## 2018-08-26 RX ORDER — MULTIPLE VITAMINS W/ MINERALS TAB 9MG-400MCG
1 TAB ORAL DAILY
Status: DISCONTINUED | OUTPATIENT
Start: 2018-08-27 | End: 2018-08-27 | Stop reason: HOSPADM

## 2018-08-26 RX ORDER — SODIUM CHLORIDE, SODIUM LACTATE, POTASSIUM CHLORIDE, CALCIUM CHLORIDE 600; 310; 30; 20 MG/100ML; MG/100ML; MG/100ML; MG/100ML
INJECTION, SOLUTION INTRAVENOUS CONTINUOUS
Status: DISCONTINUED | OUTPATIENT
Start: 2018-08-26 | End: 2018-08-27 | Stop reason: HOSPADM

## 2018-08-26 RX ORDER — AMITRIPTYLINE HYDROCHLORIDE 10 MG/1
10 TABLET ORAL
COMMUNITY
End: 2018-12-04

## 2018-08-26 RX ORDER — LANOLIN ALCOHOL/MO/W.PET/CERES
100 CREAM (GRAM) TOPICAL DAILY
Status: DISCONTINUED | OUTPATIENT
Start: 2018-08-27 | End: 2018-08-27 | Stop reason: HOSPADM

## 2018-08-26 RX ORDER — CLONIDINE HYDROCHLORIDE 0.1 MG/1
0.1 TABLET ORAL 2 TIMES DAILY
Status: DISCONTINUED | OUTPATIENT
Start: 2018-08-26 | End: 2018-08-27 | Stop reason: HOSPADM

## 2018-08-26 RX ORDER — ACETAMINOPHEN 325 MG/1
650 TABLET ORAL EVERY 4 HOURS PRN
Status: DISCONTINUED | OUTPATIENT
Start: 2018-08-26 | End: 2018-08-27 | Stop reason: HOSPADM

## 2018-08-26 RX ORDER — AMITRIPTYLINE HYDROCHLORIDE 10 MG/1
10 TABLET ORAL
Status: DISCONTINUED | OUTPATIENT
Start: 2018-08-26 | End: 2018-08-27 | Stop reason: HOSPADM

## 2018-08-26 RX ORDER — POTASSIUM CHLORIDE 7.45 MG/ML
10 INJECTION INTRAVENOUS
Status: DISCONTINUED | OUTPATIENT
Start: 2018-08-26 | End: 2018-08-27 | Stop reason: HOSPADM

## 2018-08-26 RX ADMIN — SODIUM CHLORIDE, POTASSIUM CHLORIDE, SODIUM LACTATE AND CALCIUM CHLORIDE 1000 ML: 600; 310; 30; 20 INJECTION, SOLUTION INTRAVENOUS at 20:30

## 2018-08-26 RX ADMIN — LORAZEPAM 1 MG: 2 INJECTION INTRAMUSCULAR; INTRAVENOUS at 20:30

## 2018-08-26 RX ADMIN — POTASSIUM CHLORIDE 40 MEQ: 1500 TABLET, EXTENDED RELEASE ORAL at 21:27

## 2018-08-26 RX ADMIN — FOLIC ACID: 5 INJECTION, SOLUTION INTRAMUSCULAR; INTRAVENOUS; SUBCUTANEOUS at 21:31

## 2018-08-26 RX ADMIN — DIAZEPAM 10 MG: 5 TABLET ORAL at 22:43

## 2018-08-26 RX ADMIN — CLONIDINE HYDROCHLORIDE 0.1 MG: 0.1 TABLET ORAL at 22:43

## 2018-08-26 RX ADMIN — SODIUM CHLORIDE, POTASSIUM CHLORIDE, SODIUM LACTATE AND CALCIUM CHLORIDE: 600; 310; 30; 20 INJECTION, SOLUTION INTRAVENOUS at 22:37

## 2018-08-26 RX ADMIN — LORAZEPAM 1 MG: 2 INJECTION INTRAMUSCULAR; INTRAVENOUS at 21:27

## 2018-08-26 ASSESSMENT — ENCOUNTER SYMPTOMS
VOMITING: 0
TREMORS: 1
NAUSEA: 0
BLOOD IN STOOL: 0
NERVOUS/ANXIOUS: 1
ABDOMINAL PAIN: 0
DIARRHEA: 0
PALPITATIONS: 1

## 2018-08-26 NOTE — IP AVS SNAPSHOT
[place] [address] [facility]    [address]    [facility] [address]    Phone:  [phone]    Fax:  [phone]                                       After Visit Summary   8/26/2018    Alana Mujica    MRN: [id number]           After Visit Summary Signature Page     I have received my discharge instructions, and my questions have been answered. I have discussed any challenges I see with this plan with the nurse or doctor.    ..........................................................................................................................................  Patient/Patient Representative Signature      ..........................................................................................................................................  Patient Representative Print Name and Relationship to Patient    ..................................................               ................................................  Date                                            Time    ..........................................................................................................................................  Reviewed by Signature/Title    ...................................................              ..............................................  Date                                                            Time          22EPIC Rev 08/18

## 2018-08-26 NOTE — IP AVS SNAPSHOT
MRN:[id number]                      After Visit Summary   8/26/2018    Alana Mujica    MRN: [id number]           Thank you!     Thank you for choosing [facility] for your care. Our goal is always to provide you with excellent care. Hearing back from our patients is one way we can continue to improve our services. Please take a few minutes to complete the written survey that you may receive in the mail after you visit. If you would like to speak to someone directly about your visit please contact Patient Relations at [phone]. Thank you!          Patient Information     Date Of Birth          1975        Designated Caregiver       Most Recent Value    Caregiver    Will someone help with your care after discharge? no      About your hospital stay     You were admitted on:  August 26, 2018 You last received care in the:  [place] [address] [facility]    You were discharged on:  August 27, 2018        Reason for your hospital stay       Concern for alcohol withdrawal                  Who to Call     For medical emergencies, please call 911.  For non-urgent questions about your medical care, please call your primary care provider or clinic, [phone]          Attending Provider     Provider Specialty    Felecia Márquez MD Emergency Medicine    EnigmaGa quinteros MD Internal Medicine    Messi Pabon MD Internal Medicine       Primary Care Provider Office Phone # Fax #    Sabrina Pérez PA-C [phone]      After Care Instructions     Diet       Follow this diet upon discharge: Orders Placed This Encounter      Combination Diet Regular Diet Adult                  Follow-up Appointments     Follow-up and recommended labs and tests        Follow-up with chemical dependency on Wednesday as scheduled  Follow-up was primary care physician later next week this week  Refer to psych psychiatrist in the next 2 weeks                 "  Pending Results     No orders found for last 3 day(s).            Statement of Approval     Ordered          08/27/18 1232  I have reviewed and agree with all the recommendations and orders detailed in this document.  EFFECTIVE NOW     Approved and electronically signed by:  Messi Pabon MD             Admission Information     Date & Time Provider Department Dept. Phone    8/26/2018 Messi Pabon MD [place] [address] [facility] [phone]      Your Vitals Were     Blood Pressure Temperature Respirations Height Weight Pulse Oximetry    95/65 (BP Location: Left arm) 97.7  F (36.5  C) (Oral) 16 1.6 m (5' 3\") 53.3 kg (117 lb 8 oz) 98%    BMI (Body Mass Index)                   20.81 kg/m2           MyChart Information     "[employer] gives you secure access to your electronic health record. If you see a primary care provider, you can also send messages to your care team and make appointments. If you have questions, please call your primary care clinic.  If you do not have a primary care provider, please call [phone] and they will assist you.        Care EveryWhere ID     This is your Care EveryWhere ID. This could be used by other organizations to access your [place] medical records  [id number]        Equal Access to Services     CARMINE COOK AH: Josh Christianson, wacristianda conrad, qaybta kaalmada ademary kate, isabel vann. So [facility] [phone].    ATENCIÓN: Si habla español, tiene a wang disposición servicios gratuitos de asistencia lingüística. Llame al [phone].    We comply with applicable federal civil rights laws and [place] laws. We do not discriminate on the basis of race, color, national origin, age, disability, sex, sexual orientation, or gender identity.               Review of your medicines      START taking        Dose / Directions    multivitamin, therapeutic with minerals Tabs tablet   Used for:  Alcohol withdrawal syndrome without " complication (H)        Dose:  1 tablet   Start taking on:  8/28/2018   Take 1 tablet by mouth daily   Quantity:  30 each   Refills:  0         CONTINUE these medicines which may have CHANGED, or have new prescriptions. If we are uncertain of the size of tablets/capsules you have at home, strength may be listed as something that might have changed.        Dose / Directions    LORazepam 1 MG tablet   Commonly known as:  ATIVAN   This may have changed:  See the new instructions.   Used for:  Alcohol withdrawal syndrome without complication (H)        Dose:  1 mg   Take 1 tablet (1 mg) by mouth every 8 hours as needed for anxiety AS NEEDED FOR ANXIETY   Quantity:  10 tablet   Refills:  0         CONTINUE these medicines which have NOT CHANGED        Dose / Directions    amitriptyline 10 MG tablet   Commonly known as:  ELAVIL        Dose:  10 mg   Take 10 mg by mouth nightly as needed for sleep   Refills:  0       citalopram 20 MG tablet   Commonly known as:  celeXA   Used for:  Adjustment disorder with anxious mood        Dose:  20 mg   Take 1 tablet (20 mg) by mouth daily   Quantity:  30 tablet   Refills:  0       fexofenadine 180 MG tablet   Commonly known as:  ALLEGRA        Dose:  180 mg   Take 180 mg by mouth daily as needed for allergies   Refills:  0       fluticasone 50 MCG/ACT spray   Commonly known as:  FLONASE        Dose:  1 spray   Spray 1 spray into both nostrils daily as needed for rhinitis or allergies   Refills:  0       NEXPLANON 68 MG Impl   Generic drug:  etonogestrel        Dose:  1 each   1 each (68 mg) by Subdermal route once   Quantity:  1 each   Refills:  0       valACYclovir 500 MG tablet   Commonly known as:  VALTREX   Used for:  HSV (herpes simplex virus) infection        Dose:  500 mg   Take 1 tablet (500 mg) by mouth daily   Quantity:  90 tablet   Refills:  3         STOP taking     amoxicillin 500 MG tablet   Commonly known as:  AMOXIL                Where to get your medicines      These  medications were sent to [place], [place] - [address] [facility]  [address] [facility] [address]     Phone:  [phone]     citalopram 20 MG tablet    multivitamin, therapeutic with minerals Tabs tablet         Some of these will need a paper prescription and others can be bought over the counter. Ask your nurse if you have questions.     Bring a paper prescription for each of these medications     LORazepam 1 MG tablet                Protect others around you: Learn how to safely use, store and throw away your medicines at www.disposemymeds.org.             Medication List: This is a list of all your medications and when to take them. Check marks below indicate your daily home schedule. Keep this list as a reference.      Medications           Morning Afternoon Evening Bedtime As Needed    amitriptyline 10 MG tablet   Commonly known as:  ELAVIL   Take 10 mg by mouth nightly as needed for sleep                                citalopram 20 MG tablet   Commonly known as:  celeXA   Take 1 tablet (20 mg) by mouth daily                                fexofenadine 180 MG tablet   Commonly known as:  ALLEGRA   Take 180 mg by mouth daily as needed for allergies                                fluticasone 50 MCG/ACT spray   Commonly known as:  FLONASE   Spray 1 spray into both nostrils daily as needed for rhinitis or allergies                                LORazepam 1 MG tablet   Commonly known as:  ATIVAN   Take 1 tablet (1 mg) by mouth every 8 hours as needed for anxiety AS NEEDED FOR ANXIETY                                multivitamin, therapeutic with minerals Tabs tablet   Take 1 tablet by mouth daily   Start taking on:  8/28/2018   Last time this was given:  1 tablet on 8/27/2018  9:14 AM                                NEXPLANON 68 MG Impl   1 each (68 mg) by Subdermal route once   Generic drug:  etonogestrel                                valACYclovir 500 MG tablet    Commonly known as:  VALTREX   Take 1 tablet (500 mg) by mouth daily

## 2018-08-27 VITALS
WEIGHT: 117.5 LBS | BODY MASS INDEX: 20.82 KG/M2 | OXYGEN SATURATION: 98 % | HEIGHT: 63 IN | DIASTOLIC BLOOD PRESSURE: 65 MMHG | RESPIRATION RATE: 16 BRPM | TEMPERATURE: 97.7 F | SYSTOLIC BLOOD PRESSURE: 95 MMHG

## 2018-08-27 PROBLEM — F10.10 ALCOHOL ABUSE: Status: ACTIVE | Noted: 2018-08-27

## 2018-08-27 LAB
ANION GAP SERPL CALCULATED.3IONS-SCNC: 5 MMOL/L (ref 3–14)
BASOPHILS # BLD AUTO: 0 10E9/L (ref 0–0.2)
BASOPHILS NFR BLD AUTO: 0.8 %
BUN SERPL-MCNC: 6 MG/DL (ref 7–30)
CALCIUM SERPL-MCNC: 8.2 MG/DL (ref 8.5–10.1)
CHLORIDE SERPL-SCNC: 105 MMOL/L (ref 94–109)
CO2 SERPL-SCNC: 27 MMOL/L (ref 20–32)
CREAT SERPL-MCNC: 0.83 MG/DL (ref 0.52–1.04)
DIFFERENTIAL METHOD BLD: ABNORMAL
EOSINOPHIL # BLD AUTO: 0.1 10E9/L (ref 0–0.7)
EOSINOPHIL NFR BLD AUTO: 2.3 %
ERYTHROCYTE [DISTWIDTH] IN BLOOD BY AUTOMATED COUNT: 12.3 % (ref 10–15)
GFR SERPL CREATININE-BSD FRML MDRD: 75 ML/MIN/1.7M2
GLUCOSE SERPL-MCNC: 87 MG/DL (ref 70–99)
HCT VFR BLD AUTO: 37.4 % (ref 35–47)
HGB BLD-MCNC: 12.4 G/DL (ref 11.7–15.7)
IMM GRANULOCYTES # BLD: 0 10E9/L (ref 0–0.4)
IMM GRANULOCYTES NFR BLD: 0.3 %
INTERPRETATION ECG - MUSE: NORMAL
LYMPHOCYTES # BLD AUTO: 1 10E9/L (ref 0.8–5.3)
LYMPHOCYTES NFR BLD AUTO: 24.9 %
MAGNESIUM SERPL-MCNC: 3.5 MG/DL (ref 1.6–2.3)
MCH RBC QN AUTO: 32.3 PG (ref 26.5–33)
MCHC RBC AUTO-ENTMCNC: 33.2 G/DL (ref 31.5–36.5)
MCV RBC AUTO: 97 FL (ref 78–100)
MONOCYTES # BLD AUTO: 0.5 10E9/L (ref 0–1.3)
MONOCYTES NFR BLD AUTO: 12.1 %
NEUTROPHILS # BLD AUTO: 2.3 10E9/L (ref 1.6–8.3)
NEUTROPHILS NFR BLD AUTO: 59.6 %
NRBC # BLD AUTO: 0 10*3/UL
NRBC BLD AUTO-RTO: 0 /100
PLATELET # BLD AUTO: 142 10E9/L (ref 150–450)
POTASSIUM SERPL-SCNC: 4.5 MMOL/L (ref 3.4–5.3)
RBC # BLD AUTO: 3.84 10E12/L (ref 3.8–5.2)
SODIUM SERPL-SCNC: 137 MMOL/L (ref 133–144)
WBC # BLD AUTO: 3.9 10E9/L (ref 4–11)

## 2018-08-27 PROCEDURE — 25000128 H RX IP 250 OP 636: Performed by: PHYSICIAN ASSISTANT

## 2018-08-27 PROCEDURE — 80048 BASIC METABOLIC PNL TOTAL CA: CPT | Performed by: PHYSICIAN ASSISTANT

## 2018-08-27 PROCEDURE — G0378 HOSPITAL OBSERVATION PER HR: HCPCS

## 2018-08-27 PROCEDURE — 83735 ASSAY OF MAGNESIUM: CPT | Performed by: PHYSICIAN ASSISTANT

## 2018-08-27 PROCEDURE — 36415 COLL VENOUS BLD VENIPUNCTURE: CPT | Performed by: PHYSICIAN ASSISTANT

## 2018-08-27 PROCEDURE — 25000132 ZZH RX MED GY IP 250 OP 250 PS 637: Performed by: PHYSICIAN ASSISTANT

## 2018-08-27 PROCEDURE — 85025 COMPLETE CBC W/AUTO DIFF WBC: CPT | Performed by: PHYSICIAN ASSISTANT

## 2018-08-27 PROCEDURE — 99217 ZZC OBSERVATION CARE DISCHARGE: CPT | Performed by: INTERNAL MEDICINE

## 2018-08-27 RX ORDER — CITALOPRAM HYDROBROMIDE 20 MG/1
20 TABLET ORAL DAILY
Qty: 30 TABLET | Refills: 0 | Status: ON HOLD | OUTPATIENT
Start: 2018-08-27 | End: 2018-09-14

## 2018-08-27 RX ORDER — LORAZEPAM 1 MG/1
1 TABLET ORAL EVERY 8 HOURS PRN
Qty: 10 TABLET | Refills: 0 | Status: SHIPPED | OUTPATIENT
Start: 2018-08-27 | End: 2018-09-14

## 2018-08-27 RX ORDER — MULTIPLE VITAMINS W/ MINERALS TAB 9MG-400MCG
1 TAB ORAL DAILY
Qty: 30 EACH | Refills: 0 | Status: SHIPPED | OUTPATIENT
Start: 2018-08-28 | End: 2018-12-04

## 2018-08-27 RX ADMIN — ACETAMINOPHEN 650 MG: 325 TABLET, FILM COATED ORAL at 09:19

## 2018-08-27 RX ADMIN — MULTIPLE VITAMINS W/ MINERALS TAB 1 TABLET: TAB at 09:14

## 2018-08-27 RX ADMIN — FOLIC ACID 1 MG: 1 TABLET ORAL at 09:14

## 2018-08-27 RX ADMIN — MAGNESIUM SULFATE HEPTAHYDRATE 4 G: 40 INJECTION, SOLUTION INTRAVENOUS at 00:43

## 2018-08-27 RX ADMIN — Medication 100 MG: at 09:13

## 2018-08-27 RX ADMIN — POTASSIUM CHLORIDE 40 MEQ: 1500 TABLET, EXTENDED RELEASE ORAL at 00:43

## 2018-08-27 ASSESSMENT — ACTIVITIES OF DAILY LIVING (ADL)
ADLS_ACUITY_SCORE: 9
ADLS_ACUITY_SCORE: 8
ADLS_ACUITY_SCORE: 8
ADLS_ACUITY_SCORE: 9

## 2018-08-27 ASSESSMENT — PAIN DESCRIPTION - DESCRIPTORS: DESCRIPTORS: ACHING

## 2018-08-27 NOTE — PHARMACY-ADMISSION MEDICATION HISTORY
Admission medication history interview status for this patient is complete. See [facility] admission navigator for allergy information, prior to admission medications and immunization status.     Medication history interview source(s):Patient  Medication history resources (including written lists, pill bottles, clinic record):None  Primary pharmacy: [id number] - [facility] [address]    Changes made to PTA medication list:  Added: amitriptyline, amoxicillin, fluticasone nasal, fexofenadine  Deleted: fioricet, augmentin, trazodone  Changed: none    Actions taken by pharmacist (provider contacted, etc):None   Additional medication history information: PATIENT IS NON-COMPLIANT WITH MEDS AND A POOR HISTORIAN  Medication reconciliation/reorder completed by provider prior to medication history? No      Prior to Admission medications    Medication Sig Last Dose Taking? Auth Provider   amitriptyline (ELAVIL) 10 MG tablet Take 10 mg by mouth nightly as needed for sleep Past Month at Unknown time Yes Unknown, Entered By History   amoxicillin (AMOXIL) 500 MG tablet Take 500 mg by mouth 3 times daily Past Week at Unknown time Yes Unknown, Entered By History   etonogestrel (NEXPLANON) 68 MG IMPL 1 each (68 mg) by Subdermal route once  Yes Sabrina Pérez PA-C   citalopram (CELEXA) 20 MG tablet Take 20 mg by mouth daily More than a month at Unknown time  Unknown, Entered By History   fexofenadine (ALLEGRA) 180 MG tablet Take 180 mg by mouth daily as needed for allergies More than a month at Unknown time  Unknown, Entered By History   fluticasone (FLONASE) 50 MCG/ACT spray Spray 1 spray into both nostrils daily as needed for rhinitis or allergies More than a month at Unknown time  Unknown, Entered By History   LORazepam (ATIVAN) 1 MG tablet TAKE 1 TABLET BY MOUTH 3 TIMES A DAY AS NEEDED FOR ANXIETY More than a month at Unknown time  Pati Coffey APRN CNP   valACYclovir (VALTREX) 500 MG tablet Take 1  tablet (500 mg) by mouth daily More than a month at Unknown time  Pati Coffey APRN CNP

## 2018-08-27 NOTE — DISCHARGE SUMMARY
"    [facility]  Discharge Summary  Hospitalist      Date of Admission:  8/26/2018  Date of Discharge:  8/27/2018  Provider:  Messi Pabon MD. [facility]  Date of Service (when I last saw the patient): 08/27/18      Primary Provider: Sabrina Pérez          Discharge Diagnosis:     Discharge Diagnoses   Anxiety with panic attack  Alcohol abuse with possible withdrawal  Eating disorder bulimia      Other medical issues:  Past Medical History:   Diagnosis Date     Anorexia      Anxiety      Bulimia      Depressive disorder      Genital herpes      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/15    NIL, +HR HPV. Co-test 12 months     Osteoporosis      Substance abuse          Please see the admission history and physical for full details.     Hospital Course     Alana Mujica was admitted on 8/26/2018.  The following problems were addressed during her hospitalization:  42 year old female with a PMH significant for alcohol dependence, anorexia and bulmia s/p treatment at [facility] who presents for evaluation of concerns for alcohol withdrawal with anxiety and shakiness.     Patient reports 2 weeks of drinking heavily (\"1 case\" which appears to be 12-24 cans of alcoholic sparkling water, 12 oz cans; patient is not forthcoming about specific amount) since recently being dismissed from her outpatient treatment for alcohol dependence at [facility]. Her last drink was at 8 PM last Saturday. She started feeling anxious and tremulous Sunday AM. Does have history of alcohol withdrawal but has never been hospitalized for it. No seizure history. Patient declines Chem Dep consultation and states she has a planned meeting with [facility] for a Rule 25 assessment this week on 8/29/2018.  She was admitted with concern for alcohol withdrawal however since her admission she has not required and the benzodiazepine, her CIWA score this morning are 0.  She declined chemical dependency evaluation in the hospital.  She did not " have any of her medication, she was treating her anxiety with alcohol, I gave her a refill on her medication and 10 pills of lorazepam and highly recommend that she follows up with a mental health provider as well as chemical dependency and her primary care physician.  She was stable tolerating oral intake and showing no sign of withdrawal prior to discharge    Pending Results   Unresulted Labs Ordered in the Past 30 Days of this Admission     No orders found for last 61 day(s).          Discharge Orders     Reason for your hospital stay   Concern for alcohol withdrawal     Follow-up and recommended labs and tests    Follow-up with chemical dependency on Wednesday as scheduled  Follow-up was primary care physician later next week this week  Refer to psych psychiatrist in the next 2 weeks     Diet   Follow this diet upon discharge: Orders Placed This Encounter     Combination Diet Regular Diet Adult         Code Status   Full Code       Primary Care Physician   Sabrina Pérez    Physical Exam   Temp: 97.7  F (36.5  C) Temp src: Oral BP: 95/65   Heart Rate: 80 Resp: 16 SpO2: 98 % O2 Device: None (Room air)    Vitals:    08/26/18 2220   Weight: 53.3 kg (117 lb 8 oz)     Vital Signs with Ranges  Temp:  [97.7  F (36.5  C)-99.5  F (37.5  C)] 97.7  F (36.5  C)  Heart Rate:  [] 80  Resp:  [16-20] 16  BP: ()/() 95/65  SpO2:  [95 %-99 %] 98 %  I/O last 3 completed shifts:  In: 200 [P.O.:200]  Out: 2000 [Urine:2000]    Constitutional:  alert, cooperative, no apparent distress  Respiratory: No increased work of breathing, good air exchange, no crackles or wheezing.  Cardiovascular: apical impulse,normal S1 and S2  GI: bowel sounds present, soft, non-distended, non-tender      Discharge Disposition   Discharged to home    Consultations This Hospital Stay   CHEMICAL DEPENDENCY IP CONSULT  SOCIAL WORK IP CONSULT    Time Spent on this Encounter   IMessi, personally saw the patient today  and spent greater than 30 minutes discharging this patient.      Discharge Medications   Current Discharge Medication List      START taking these medications    Details   multivitamin, therapeutic with minerals (THERA-VIT-M) TABS tablet Take 1 tablet by mouth daily  Qty: 30 each, Refills: 0    Associated Diagnoses: Alcohol withdrawal syndrome without complication (H)         CONTINUE these medications which have CHANGED    Details   citalopram (CELEXA) 20 MG tablet Take 1 tablet (20 mg) by mouth daily  Qty: 30 tablet, Refills: 0    Associated Diagnoses: Adjustment disorder with anxious mood      LORazepam (ATIVAN) 1 MG tablet Take 1 tablet (1 mg) by mouth every 8 hours as needed for anxiety AS NEEDED FOR ANXIETY  Qty: 10 tablet, Refills: 0    Associated Diagnoses: Alcohol withdrawal syndrome without complication (H)         CONTINUE these medications which have NOT CHANGED    Details   amitriptyline (ELAVIL) 10 MG tablet Take 10 mg by mouth nightly as needed for sleep      etonogestrel (NEXPLANON) 68 MG IMPL 1 each (68 mg) by Subdermal route once  Qty: 1 each, Refills: 0      fexofenadine (ALLEGRA) 180 MG tablet Take 180 mg by mouth daily as needed for allergies      fluticasone (FLONASE) 50 MCG/ACT spray Spray 1 spray into both nostrils daily as needed for rhinitis or allergies      valACYclovir (VALTREX) 500 MG tablet Take 1 tablet (500 mg) by mouth daily  Qty: 90 tablet, Refills: 3    Associated Diagnoses: HSV (herpes simplex virus) infection         STOP taking these medications       amoxicillin (AMOXIL) 500 MG tablet Comments:   Reason for Stopping:             Allergies   No Known Allergies  Data   Most Recent 3 CBC's:  Recent Labs   Lab Test  08/27/18   0749  08/26/18 2014 06/05/18   1517   WBC  3.9*  8.5  4.7   HGB  12.4  13.1  13.4   MCV  97  95  102*   PLT  142*  156  238      Most Recent 3 BMP's:  Recent Labs   Lab Test  08/27/18   0749  08/26/18 2014 06/05/18   1517   NA  137  127*  135    POTASSIUM  4.5  2.9*  3.7   CHLORIDE  105  89*  101   CO2  27  23  28   BUN  6*  6*  8   CR  0.83  0.71  0.70   ANIONGAP  5  15*  6   KODY  8.2*  8.5  9.3   GLC  87  123*  113*     Most Recent 2 LFT's:  Recent Labs   Lab Test  08/26/18 2014 11/17/17   1701   AST  39  119*   ALT  32  88*   ALKPHOS  91  89   BILITOTAL  1.0  0.8     Most Recent INR's and Anticoagulation Dosing History:  Anticoagulation Dose History     Recent Dosing and Labs Latest Ref Rng & Units 8/26/2018    INR 0.86 - 1.14 1.02        Most Recent 3 Troponin's:No lab results found.  Most Recent Cholesterol Panel:  Recent Labs   Lab Test  11/24/15   0812   CHOL  236*   LDL  107*   HDL  114   TRIG  74     Most Recent 6 Bacteria Isolates From Any Culture (See EPIC Reports for Culture Details):  Recent Labs   Lab Test  04/05/18   1137  02/01/16   1415  11/24/15   0718   CULT  Questionable specimen  No growth  >100,000 colonies/mL mixed urogenital sasha     Most Recent TSH, T4 and A1c Labs:  Recent Labs   Lab Test  11/24/15   0812   TSH  1.98     Results for orders placed or performed in visit on 04/26/18   Mammogram - HIM Scan    Narrative    [facility] PROGRESS NOTE CLINIC NOTE           Disclaimer: This note consists of symbols derived from keyboarding, dictation and/or voice recognition software. As a result, there may be errors in the script that have gone undetected. Please consider this when interpreting information found in this chart.
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
## 2018-08-27 NOTE — ED NOTES
[facility]  ED Nurse Handoff Report    Alana Mujica is a 42 year old female   ED Chief complaint: Alcohol Problem  . ED Diagnosis:   Final diagnoses:   Alcohol withdrawal syndrome without complication (H)   Hypokalemia   Alcoholism (H)     Allergies: No Known Allergies    Code Status: Full Code  Activity level - Baseline/Home:  Independent. Activity Level - Current:   Stand with Assist. Lift room needed: No. Bariatric: No   Needed: No   Isolation: No. Infection: Not Applicable.     Vital Signs:   Vitals:    08/26/18 2001 08/26/18 2030 08/26/18 2100   BP: (!) 171/103  (!) 144/94   Resp: 18     Temp: 99  F (37.2  C)     TempSrc: Oral     SpO2: 99% 97% 98%       Cardiac Rhythm:  ,      Pain level:    Patient confused: No. Patient Falls Risk: Yes.   Elimination Status: Has voided   Patient Report - Initial Complaint: Alcohol withdrawl. Focused Assessment: Patient experiencing withdrawal symptoms, cooperative   Tests Performed:   No orders to display   . Abnormal Results:   Labs Ordered and Resulted from Time of ED Arrival Up to the Time of Departure from the ED   CBC WITH PLATELETS DIFFERENTIAL - Abnormal; Notable for the following:        Result Value    Absolute Lymphocytes 0.7 (*)     All other components within normal limits   COMPREHENSIVE METABOLIC PANEL - Abnormal; Notable for the following:     Sodium 127 (*)     Potassium 2.9 (*)     Chloride 89 (*)     Anion Gap 15 (*)     Glucose 123 (*)     Urea Nitrogen 6 (*)     All other components within normal limits   ALCOHOL ETHYL   INR   PULSE OXIMETRY NURSING   CARDIAC CONTINUOUS MONITORING   ISTAT HCG QUANTITATIVE PREGNANCY NURSING POCT   ISTAT HCG QUANTITATIVE PREGNANCY POCT   .   Treatments provided: IV, meds, fluids  Family Comments: N/A  OBS brochure/video discussed/provided to patient:  N/A  ED Medications:   Medications   lactated ringers BOLUS 1,000 mL (0 mLs Intravenous Stopped 8/26/18 2125)     Followed by   lactated ringers  infusion (not administered)   sodium chloride 0.9 % 1,000 mL with INFUVITE ADULT 10 mL, thiamine 100 mg, folic acid 1 mg, magnesium sulfate 2 g infusion ( Intravenous New Bag 8/26/18 2131)   LORazepam (ATIVAN) injection 1 mg (1 mg Intravenous Given 8/26/18 2030)   LORazepam (ATIVAN) injection 1 mg (1 mg Intravenous Given 8/26/18 2127)   potassium chloride SA (K-DUR/KLOR-CON M) CR tablet 40 mEq (40 mEq Oral Given 8/26/18 2127)     Drips infusing:  Yes  For the majority of the shift, the patient's behavior Green. Interventions performed were N/A.     Severe Sepsis OR Septic Shock Diagnosis Present: No      ED Nurse Name/Phone Number: Becky Jackson,   9:34 PM    RECEIVING UNIT ED HANDOFF REVIEW    Above ED Nurse Handoff Report was reviewed: Yes  Reviewed by: Diamond Winter on August 26, 2018 at 10:00 PM

## 2018-08-27 NOTE — UTILIZATION REVIEW
"Admission Status; Secondary Review Determination     Under the authority of the Utilization Management Committee, the utilization review process indicated a secondary review on the above patient.  The review outcome is based on review of the medical records, discussions with staff, and applying clinical experience noted on the date of the review.       (x) Observation Status Appropriate - This patient does not meet hospital inpatient criteria and is placed in observation status. If this patient's primary payer is Medicare and was admitted as an inpatient, Condition Code 44 should be used and patient status changed to \"observation\".     RATIONALE FOR DETERMINATION: 42-year-old female with significant alcohol dependence, history of anorexia and bulimia, insomnia who presented to the hospital with concerns of potential alcohol withdrawal due to feeling increase anxiousness and shakiness.  Observation care appropriate to monitor patient for any signs and symptoms of significant alcohol withdrawal that would require more aggressive inpatient management.        The severity of illness, intensity of service provided, expected LOS and risk for adverse outcome make the care appropriate for further observation; however, doesn't meet criteria for hospital inpatient admission. This was discussed with attending physician who concurred with this determination.    The information on this document is developed by the utilization review team in order for the business office to ensure compliance.  This only denotes the appropriateness of proper admission status and does not reflect the quality of care rendered.         The definitions of Inpatient Status and Observation Status used in making the determination above are those provided in the CMS Coverage Manual, Chapter 1 and Chapter 6, section 70.4.      Sincerely,     Jaden Kuhn MD    Physician Advisor  Utilization Review/ Case Management  [facility].    "

## 2018-08-27 NOTE — PROGRESS NOTES
Pt understands discharge meds follow up and instructions ------pt has paper to  own meds in pharmacy ------- Pt discharged with teaching completed at 1430

## 2018-08-27 NOTE — ED TRIAGE NOTES
Pt presents for help with alcohol withdrawal.  Pt states her last drink of alcohol was on Saturday but unsure what time.  Denies hx of alcohol withdrawal seizures.  Denies and n/v, denies pain.

## 2018-08-27 NOTE — H&P
"[facility]    Hospitalist History and Physical    Name: Alana Mujica    MRN: [id number]  YOB: 1975    Age: 42 year old  Date of Admission:  8/26/2018  Date of Service (when I saw the patient): 08/26/18    Assessment & Plan   Alana Mujica is a 42 year old female with a PMH significant for alcohol dependence, anorexia and bulmia s/p treatment at [facility], [facility] who presents for evaluation of concerns for alcohol withdrawal with anxiety and shakiness.     1. Alcohol withdrawal: Patient reports 2 weeks of drinking heavily (\"1 case\" which appears to be 12-24 cans of alcoholic sparkling water, 12 oz cans; patient is not forthcoming about specific amount) since recently being dismissed from her outpatient treatment for alcohol dependence at [facility]. Her last drink was at 8 PM last evening. She started feeling anxious and tremulous this AM. Does have history of alcohol withdrawal but has never been hospitalized for it. No seizure history. Patient declines Chem Dep consultation and states she has a planned meeting with [facility] for a Rule 25 assessment this week on 8/29/2018.  -CIWA protocol in place w/ PRN valium  -Monitor on telemetry  -Electrolyte replacement  -Thiamine, folic acid, and multivitamin replacement  -Supportive IVF  ml/hr overnight  -Collect UA and UDS to evaluate for co-ingestants  -Social work consult    2. H/o eating disorder, anxiety, insomnia: Patient reports she does not act on her eating disorder anymore, was treated at [facility] [place] for anorexia with bulimia.   -Continue PTA amitriptyline 10 mg HS prn and citalopram daily. Hold PTA lorazepam for now as patient is on CIWA protocol w/ prn valium.    DVT Prophylaxis: Pneumatic Compression Devices  Code Status: Full Code  Disposition: Anticipate discharge in > 2 evenings    Primary Care Physician   Sabrina Pérez    Chief Complaint   Alcohol withdrawal    History is obtained from the " "patient.    History of Present Illness   Alana Mujica is a 42 year old female with a PMH significant for alcohol dependence, anorexia and bulmia s/p treatment at [facility] who presents for evaluation of concerns for alcohol withdrawal with anxiety and shakiness.     Ms. Mujica is not forthcoming about her amount of alcohol consumption recently, stating she drinks \"a box of Smirnoff seltzer stroud or sometimes margaritas\" daily for the past 2 weeks (?12 but possibly as high as 24 alcoholic 12 oz cans in a case). Her last drink was at 8 PM yesterday evening, and this morning she started feeling shaky and anxious. She was recently dismissed from [facility] outpatient treatment because she had \"dirty UAs\" a couple of times and it sounds like she may have missed some of the treatment because her work conflicted with the timing. It sounds like she was not entirely sober while going through outpatient treatment, but when she was excused from the program a couple weeks ago, she started drinking more heavily.      She reports she has had alcohol withdrawal before but has never been hospitalized for it. She does state that she went through treatment for her eating disorder sometime prior to 2013 through [facility], and then some time after that she went to an inpatient program in [place] for drinking co-occurring with her eating disorder. She had been enrolled in [facility] outpatient treatment since 6/22/2018 and then was excused from the program about 2 weeks ago. She denies history of seizures. She has never required intubation for any reason. She adamantly denies any prior tobacco, marijuana, or illicit drug use. She was recently placed on amoxicillin for a sinus-related headache with no relief and she thinks she was supposed to complete her course today.    Workup in the ED reveals T 99 F, , /103, RR 18, SpO2 99% on room air.  CMP - Na 127, K 2.9, Cl 89, BUN 6, AG 15, , o/w wnl. HCG " quantitative serum testing <5.0. CBC w/ diff unremarkable. BAL <0.01. ECG  - sinus tachycardia . The patient received 1 L IV LR, 2 mg IV ativan, 40 mEq po K replacement, and IV 2 g infuvite infusion with moderate improvement in symptoms.      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 Co-test 12 months     Osteoporosis      Substance abuse          Past Surgical History   Past Surgical History:   Procedure Laterality Date     LEEP TX, CERVICAL      greater than 5 years ago from , uncertain date       Prior to Admission Medications   Prior to Admission Medications   Prescriptions Last Dose Informant Patient Reported? Taking?   LORazepam (ATIVAN) 1 MG tablet   No No   Sig: TAKE 1 TABLET BY MOUTH 3 TIMES A DAY AS NEEDED FOR ANXIETY   amoxicillin-clavulanate (AUGMENTIN) 875-125 MG per tablet   No No   Sig: Take 1 tablet by mouth 2 times daily   butalbital-acetaminophen-caffeine (FIORICET/ESGIC) -40 MG per tablet   No No   Sig: Take 1 tablet by mouth every 4 hours as needed   etonogestrel (NEXPLANON) 68 MG IMPL   Yes No   Si each (68 mg) by Subdermal route once   traZODone (DESYREL) 50 MG tablet   No No   Sig: Take 0.5 tablets (25 mg) by mouth nightly as needed for sleep   valACYclovir (VALTREX) 500 MG tablet   No No   Sig: Take 1 tablet (500 mg) by mouth daily      Facility-Administered Medications: None     Allergies   No Known Allergies    Social History   Social History   Substance Use Topics     Smoking status: Former Smoker     Smokeless tobacco: Never Used     Alcohol use 17.5 oz/week     21 Glasses of wine, 14 Cans of beer per week     Social History     Social History Narrative       Family History   Family history reviewed with patient and is noncontributory.    Review of Systems   A Comprehensive greater than 10 system review of systems was  carried out.  Pertinent positives and negatives are noted above.  Otherwise negative for contributory information.    Physical Exam   Temp: 99  F (37.2  C) Temp src: Oral BP: (!) 144/94   Heart Rate: 103 Resp: 18 SpO2: 98 % O2 Device: None (Room air)    Vital Signs with Ranges  Temp:  [99  F (37.2  C)] 99  F (37.2  C)  Heart Rate:  [103-130] 103  Resp:  [18] 18  BP: (144-171)/() 144/94  SpO2:  [97 %-99 %] 98 %  0 lbs 0 oz    GEN:  Alert, oriented x 3, appears anxious and mildly tremulous but no overt distress  HEENT:  Normocephalic/atraumatic, no scleral icterus, no nasal discharge, mouth moist.  CV:  Mildly tachycardic but regular rate and rhythm, no murmur or JVD.  S1 + S2 noted, no S3 or S4.  LUNGS:  Clear to auscultation bilaterally without rales/rhonchi/wheezing/retractions.  Symmetric chest rise on inhalation noted.  ABD:  Active bowel sounds, soft, non-tender/non-distended.  No rebound/guarding/rigidity.  EXT:  No edema.  No cyanosis.  No acute joint synovitis noted.  SKIN:  Dry to touch, no exanthems noted in the visualized areas.  NEURO:  Symmetric muscle strength, sensation to touch grossly intact.  Coordination symmetric on general exam.  No new focal deficits appreciated.    Data   Data reviewed today:  ECG - Sinus tachycardia w/ .    Results for orders placed or performed during the hospital encounter of 08/26/18 (from the past 48 hour(s))   Alcohol ethyl   Result Value Ref Range    Ethanol g/dL <0.01 <0.01 g/dL   CBC with platelets differential   Result Value Ref Range    WBC 8.5 4.0 - 11.0 10e9/L    RBC Count 4.01 3.8 - 5.2 10e12/L    Hemoglobin 13.1 11.7 - 15.7 g/dL    Hematocrit 37.9 35.0 - 47.0 %    MCV 95 78 - 100 fl    MCH 32.7 26.5 - 33.0 pg    MCHC 34.6 31.5 - 36.5 g/dL    RDW 11.9 10.0 - 15.0 %    Platelet Count 156 150 - 450 10e9/L    Diff Method Automated Method     % Neutrophils 83.0 %    % Lymphocytes 8.3 %    % Monocytes 7.8 %    % Eosinophils 0.1 %    % Basophils 0.6 %    %  Immature Granulocytes 0.2 %    Nucleated RBCs 0 0 /100    Absolute Neutrophil 7.1 1.6 - 8.3 10e9/L    Absolute Lymphocytes 0.7 (L) 0.8 - 5.3 10e9/L    Absolute Monocytes 0.7 0.0 - 1.3 10e9/L    Absolute Eosinophils 0.0 0.0 - 0.7 10e9/L    Absolute Basophils 0.1 0.0 - 0.2 10e9/L    Abs Immature Granulocytes 0.0 0 - 0.4 10e9/L    Absolute Nucleated RBC 0.0    Comprehensive metabolic panel   Result Value Ref Range    Sodium 127 (L) 133 - 144 mmol/L    Potassium 2.9 (L) 3.4 - 5.3 mmol/L    Chloride 89 (L) 94 - 109 mmol/L    Carbon Dioxide 23 20 - 32 mmol/L    Anion Gap 15 (H) 3 - 14 mmol/L    Glucose 123 (H) 70 - 99 mg/dL    Urea Nitrogen 6 (L) 7 - 30 mg/dL    Creatinine 0.71 0.52 - 1.04 mg/dL    GFR Estimate 90 >60 mL/min/1.7m2    GFR Estimate If Black >90 >60 mL/min/1.7m2    Calcium 8.5 8.5 - 10.1 mg/dL    Bilirubin Total 1.0 0.2 - 1.3 mg/dL    Albumin 4.1 3.4 - 5.0 g/dL    Protein Total 7.6 6.8 - 8.8 g/dL    Alkaline Phosphatase 91 40 - 150 U/L    ALT 32 0 - 50 U/L    AST 39 0 - 45 U/L   INR   Result Value Ref Range    INR 1.02 0.86 - 1.14   ISTAT HCG Quantitative Pregnancy POCT   Result Value Ref Range    HCG Quantitative Serum <5.0 <5.0 IU/L       Eva Jacques PA-C    I discussed the patient with Dr. Barlow who is in agreement with the above plan.

## 2018-08-27 NOTE — ED PROVIDER NOTES
History     Chief Complaint:  Alcohol Withdrawal     HPI   Alana Mujica is a 42 year old female with a history of alcohol abuse, depression, and anxiety who presents for evaluation of alcohol withdrawal. The patient reports that she has been drinking approximately 12 alcoholic drinks every day for the last two weeks and drank more heavily over the last three days, and her last drink was in the evening of 8/25/2018. This morning, the patient started to develop feelings of anxiety, palpitations, and tremulousness. Due to this, the patient presents to the ED with concern that she is experiencing alcohol withdrawal. She did take two tablets of a medication that she uses for sleep around 1800 this evening. She has no history of alcohol withdrawal seizures. She has not had any recent abdominal pain, nausea, vomiting, diarrhea, or bloody stools. The patient was in outpatient treatment until several weeks ago and plans on going back into treatment soon. She is scheduled to have a Rule 25 assessment next week.      Allergies:  NKDA      Medications:    amoxicillin-clavulanate (AUGMENTIN) 875-125 MG per tablet  butalbital-acetaminophen-caffeine (FIORICET/ESGIC) -40 MG per tablet  etonogestrel (NEXPLANON) 68 MG IMPL  LORazepam (ATIVAN) 1 MG tablet  traZODone (DESYREL) 50 MG tablet  valACYclovir (VALTREX) 500 MG tablet    Past Medical History:    Anorexia  Bulimia  Anxiety  Depression  Genital herpes  HPV   Osteoporosis   Substance abuse   Alcohol dependence   Chronic daily headache     Past Surgical History:    Leep tx cervical     Family History:    Cancer, breast - Mother     Social History:  Tobacco use:    Former smoker  Alcohol use:    Positive, 21 glasses of wine and 14 cans of beer per week   Marital status:    Single   Accompanied to ED by:  Alone      Review of Systems   Cardiovascular: Positive for palpitations.   Gastrointestinal: Negative for abdominal pain, blood in stool, diarrhea, nausea and vomiting.    Neurological: Positive for tremors.   Psychiatric/Behavioral: The patient is nervous/anxious.    All other systems reviewed and are negative.    Physical Exam     Patient Vitals for the past 24 hrs:   BP Temp Temp src Heart Rate Resp SpO2   08/26/18 2145 - - - 111 - 95 %   08/26/18 2130 - - - 105 - 98 %   08/26/18 2120 (!) 146/101 - - 111 - 98 %   08/26/18 2115 - - - 110 - 98 %   08/26/18 2100 (!) 144/94 - - 103 - 98 %   08/26/18 2030 - - - 118 - 97 %   08/26/18 2001 (!) 171/103 99  F (37.2  C) Oral 130 18 99 %       Physical Exam    Nursing note and vitals reviewed.    Constitutional: Pleasant and well groomed. Normal lean habitus.          HENT:    Mouth/Throat: Oropharynx is without swelling or erythema. Oral mucosa moist.    Eyes: Conjunctivae are normal. No scleral icterus. Pupils equal round and reactive. Extraocular motion is intact. No nystagmus.    Neck: Neck supple.   Cardiovascular: Tachycardic rate, regular rhythm and intact distal pulses.    Pulmonary/Chest: Effort normal and breath sounds normal.   Abdominal: Soft.  No distension. There is no tenderness.   Musculoskeletal:  No edema, No calf tenderness  Neurological:Alert and oriented x3. Tongue fasciculations. Tremulous.    Skin: Skin is warm and dry.   Psychiatric: Normal mood and affect.     Emergency Department Course   ECG (20:20:12):  Indication: Screening for cardiovascular disease.   Rate 114 bpm. WI interval 132 ms. QRS duration 66 ms. QT/QTc 332/457 ms. P-R-T axes 39 15 50.   Interpretation: Sinus tachycardia, Otherwise normal ECG  Agree with computer interpretation. Yes    Interpreted at 2025 by Dr. Márquez.      Laboratory:  CBC: WNL (WBC 8.5, HGB 13.1, )   CMP:  low, Potassium 2.9 low, Chloride 89 low, Anion 15 high, Glucose 123 high, BUN 6 low, o/w WNL (Creatinine 0.71)  INR: 1.02   Alcohol ethyl: <0.01   ISTAT HCG Quantitative Pregnancy POCT: <5.0     Interventions:  2030 Lactated ringers 1,000 mL IV   2030 Ativan 1 mg IV    2127 Ativan 1 mg IV   2127 Potassium chloride 40 mEq PO  2131 NS 1,000 mL with infuvite 10 mL, thiamine 100 mg, folic acid 1 mg, magnesium sulfate 2 g, 1,000 mL/hr IV     Emergency Department Course:  Nursing notes and vitals reviewed.  2010: I performed an exam of the patient as documented above.     2103: I updated and reassessed the patient. She reports feeling a little better but is still tremulous.     2112: I updated and reassessed the patient.     2124: I spoke with WILLIAM Jacques for Dr. Barlow of the hospitalist service regarding patient's presentation, findings, and plan of care.    Findings and plan explained to the Patient who consents to admission. Discussed the patient with WILLIAM Jacques for Dr. Barlow, who will admit the patient to a [facility] bed for further monitoring, evaluation, and treatment.     Impression & Plan      Medical Decision Making:  Alana Mujica is a 42 year old female who presents with palpitations, shakiness, and feeling anxious in the setting of recent daily alcohol consumption, which she stopped approximately 24 hours ago. Differential diagnosis included but was not limited to alcohol withdrawal, dehydration, electrolyte abnormality, less likely toxidrome or anemia. ED evaluation was as noted above. She was noted to be hyponatremic and hypokalemic. The remainder of her ED evaluation was otherwise non-diagnostic. We initiated treatment for alcohol withdrawal with IV benzodiazepines with improvement of her blood pressure and heart rate. She also had improvement of, but not resolution of, the fasciculations and tremulousness. The patient has been accepted for admission by WILLIAM Leon, for Dr. Barlow. She will be admitted for ongoing evaluation, monitoring, and management.      Diagnosis:    ICD-10-CM    1. Alcohol withdrawal syndrome without complication (H) F10.230 UA with Microscopic reflex to Culture     Drug abuse screen 77 urine (FL, RH, SH)     Magnesium     Magnesium      CANCELED: Magnesium   2. Hypokalemia E87.6    3. Alcoholism (H) F10.20    4. Hyponatremia E87.1          Disposition:  Admit.       I, Joshua Langford, am serving as a scribe at 8:10 PM on 8/26/2018 to document services personally performed by Dr. Márquez, based on my observations and the provider's statements to me.    [facility] EMERGENCY DEPARTMENT       Felecia Márquez MD  08/27/18 0012

## 2018-08-28 ENCOUNTER — TELEPHONE (OUTPATIENT)
Dept: FAMILY MEDICINE | Facility: CLINIC | Age: 43
End: 2018-08-28

## 2018-08-28 NOTE — TELEPHONE ENCOUNTER
Please contact patient for In-patient follow up.  [phone] (home) [phone] (work)    Visit date: 8/27/2018  Diagnosis listed:Alcohol withdrawal syndrome without complication (H), Adjustment disorder with anxious mood  Number of visits in past 12 months:1/1

## 2018-09-11 ENCOUNTER — OFFICE VISIT (OUTPATIENT)
Dept: FAMILY MEDICINE | Facility: CLINIC | Age: 43
End: 2018-09-11
Payer: COMMERCIAL

## 2018-09-11 VITALS
HEART RATE: 112 BPM | SYSTOLIC BLOOD PRESSURE: 122 MMHG | HEIGHT: 63 IN | DIASTOLIC BLOOD PRESSURE: 103 MMHG | RESPIRATION RATE: 14 BRPM | TEMPERATURE: 97.6 F | OXYGEN SATURATION: 92 %

## 2018-09-11 DIAGNOSIS — R30.0 DYSURIA: Primary | ICD-10-CM

## 2018-09-11 DIAGNOSIS — N30.01 ACUTE CYSTITIS WITH HEMATURIA: ICD-10-CM

## 2018-09-11 DIAGNOSIS — R82.90 NONSPECIFIC FINDING ON EXAMINATION OF URINE: ICD-10-CM

## 2018-09-11 LAB
BACTERIA #/AREA URNS HPF: ABNORMAL /HPF
NON-SQ EPI CELLS #/AREA URNS LPF: ABNORMAL /LPF
RBC #/AREA URNS AUTO: ABNORMAL /HPF
URNS CMNT MICRO: ABNORMAL
WBC #/AREA URNS AUTO: ABNORMAL /HPF

## 2018-09-11 PROCEDURE — 87086 URINE CULTURE/COLONY COUNT: CPT | Performed by: PHYSICIAN ASSISTANT

## 2018-09-11 PROCEDURE — 87088 URINE BACTERIA CULTURE: CPT | Performed by: PHYSICIAN ASSISTANT

## 2018-09-11 PROCEDURE — 87186 SC STD MICRODIL/AGAR DIL: CPT | Performed by: PHYSICIAN ASSISTANT

## 2018-09-11 PROCEDURE — 99213 OFFICE O/P EST LOW 20 MIN: CPT | Performed by: PHYSICIAN ASSISTANT

## 2018-09-11 PROCEDURE — 81015 MICROSCOPIC EXAM OF URINE: CPT | Performed by: PHYSICIAN ASSISTANT

## 2018-09-11 RX ORDER — CIPROFLOXACIN 500 MG/1
500 TABLET, FILM COATED ORAL 2 TIMES DAILY
Qty: 14 TABLET | Refills: 0 | Status: ON HOLD | OUTPATIENT
Start: 2018-09-11 | End: 2018-09-16
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
## 2018-09-11 NOTE — MR AVS SNAPSHOT
After Visit Summary   9/11/2018    Alana Mujica    MRN: [id number]           Patient Information     Date Of Birth          1975        Visit Information        Provider Department      9/11/2018 11:40 AM Ga Alaniz PA-C [facility]        Today's Diagnoses     Dysuria    -  1    Nonspecific finding on examination of urine        Acute cystitis with hematuria           Follow-ups after your visit        Follow-up notes from your care team     Return in about 1 week (around 9/18/2018) for recheck if symptoms are not improving..      Who to contact     If you have questions or need follow up information about today's clinic visit or your schedule please contact [facility] directly at [phone].  Normal or non-critical lab and imaging results will be communicated to you by [employer], letter or phone within 4 business days after the clinic has received the results. If you do not hear from us within 7 days, please contact the clinic through [employer] or phone. If you have a critical or abnormal lab result, we will notify you by phone as soon as possible.  Submit refill requests through [employer] or call your pharmacy and they will forward the refill request to us. Please allow 3 business days for your refill to be completed.          Additional Information About Your Visit        MyChart Information     [employer] gives you secure access to your electronic health record. If you see a primary care provider, you can also send messages to your care team and make appointments. If you have questions, please call your primary care clinic.  If you do not have a primary care provider, please call [phone] and they will assist you.        Care EveryWhere ID     This is your Care EveryWhere ID. This could be used by other organizations to access your [place] medical records  [id number]        Your Vitals Were     Pulse Temperature Respirations Height Pulse Oximetry  "      112 97.6  F (36.4  C) (Tympanic) 14 5' 3\" (1.6 m) 92%        Blood Pressure from Last 3 Encounters:   09/11/18 (!) 122/103   08/27/18 95/65   06/22/18 122/82    Weight from Last 3 Encounters:   08/26/18 117 lb 8 oz (53.3 kg)   06/22/18 119 lb (54 kg)   06/04/18 119 lb (54 kg)              We Performed the Following     Urine Culture Aerobic Bacterial     Urine Microscopic          Today's Medication Changes          These changes are accurate as of 9/11/18 12:52 PM.  If you have any questions, ask your nurse or doctor.               Start taking these medicines.        Dose/Directions    ciprofloxacin 500 MG tablet   Commonly known as:  CIPRO   Used for:  Acute cystitis with hematuria   Started by:  Ga Alaniz PA-C        Dose:  500 mg   Take 1 tablet (500 mg) by mouth 2 times daily   Quantity:  14 tablet   Refills:  0            Where to get your medicines      These medications were sent to [facility] [id number] [place] [id number]     Phone:  [phone]     ciprofloxacin 500 MG tablet                Primary Care Provider Office Phone # Fax #    Sabrina Lisa Pérez PA-C [phone] [address]  [facility] [address]        Goals        General    Alana will contact her insurance to determine in-network psychiatry providers. (pt-stated)     Notes - Note created  1/29/2016  9:52 AM by Peter Ochoa    As of today's date 1/29/2016 goal was unable to be assessed due to lack of contact from patient.           Equal Access to Services     [facility]: Josh Christianson, waalivia lulazarusadaha, qaybsonya renteriaalisabel penny. So [facility] [phone].    ATENCIÓN: Si habla español, tiene a wang disposición servicios gratuitos de asistencia lingüística. Llame al [phone].    We comply with applicable federal civil rights laws and [place] laws. We do not discriminate on the " basis of race, color, national origin, age, disability, sex, sexual orientation, or gender identity.            Thank you!     Thank you for choosing [facility]  for your care. Our goal is always to provide you with excellent care. Hearing back from our patients is one way we can continue to improve our services. Please take a few minutes to complete the written survey that you may receive in the mail after your visit with us. Thank you!             Your Updated Medication List - Protect others around you: Learn how to safely use, store and throw away your medicines at www.disposemymeds.org.          This list is accurate as of 9/11/18 12:52 PM.  Always use your most recent med list.                   Brand Name Dispense Instructions for use Diagnosis    amitriptyline 10 MG tablet    ELAVIL     Take 10 mg by mouth nightly as needed for sleep        ciprofloxacin 500 MG tablet    CIPRO    14 tablet    Take 1 tablet (500 mg) by mouth 2 times daily    Acute cystitis with hematuria       citalopram 20 MG tablet    celeXA    30 tablet    Take 1 tablet (20 mg) by mouth daily    Adjustment disorder with anxious mood       fexofenadine 180 MG tablet    ALLEGRA     Take 180 mg by mouth daily as needed for allergies        fluticasone 50 MCG/ACT spray    FLONASE     Spray 1 spray into both nostrils daily as needed for rhinitis or allergies        LORazepam 1 MG tablet    ATIVAN    10 tablet    Take 1 tablet (1 mg) by mouth every 8 hours as needed for anxiety AS NEEDED FOR ANXIETY    Alcohol withdrawal syndrome without complication (H)       multivitamin, therapeutic with minerals Tabs tablet     30 each    Take 1 tablet by mouth daily    Alcohol withdrawal syndrome without complication (H)       NEXPLANON 68 MG Impl   Generic drug:  etonogestrel     1 each    1 each (68 mg) by Subdermal route once        valACYclovir 500 MG tablet    VALTREX    90 tablet    Take 1 tablet (500 mg) by mouth daily    HSV (herpes  simplex virus) infection

## 2018-09-12 ENCOUNTER — TELEPHONE (OUTPATIENT)
Dept: FAMILY MEDICINE | Facility: CLINIC | Age: 43
End: 2018-09-12

## 2018-09-12 NOTE — TELEPHONE ENCOUNTER
Pt was seen for an ov on 9/11/18 - for unrelated issue.      See telephone encounter of 9/12/18.

## 2018-09-12 NOTE — TELEPHONE ENCOUNTER
Pt calling in thinking she is going through alcohol withdraw. Last drink 8am. Pt is shaking. Pt is on Cipro for UTI. Not eaten today. Sips of water- approx 4 oz. Pt also took MV and AZO. Urinated twice- small amounts. She is worried it is going to get worse- wanting some Lorazepam and another medication. Huddled w/ KO- adv that pt need to be seen in ER. Asked if she could go to , adv no ER would be the best.     Eli JOEL RN

## 2018-09-13 LAB
BACTERIA SPEC CULT: ABNORMAL
BACTERIA SPEC CULT: ABNORMAL
SPECIMEN SOURCE: ABNORMAL

## 2018-09-14 ENCOUNTER — HOSPITAL ENCOUNTER (INPATIENT)
Facility: CLINIC | Age: 43
LOS: 3 days | Discharge: HOME | DRG: 683 | End: 2018-09-17
Attending: EMERGENCY MEDICINE | Admitting: INTERNAL MEDICINE
Payer: COMMERCIAL

## 2018-09-14 ENCOUNTER — APPOINTMENT (OUTPATIENT)
Dept: CT IMAGING | Facility: CLINIC | Age: 43
DRG: 683 | End: 2018-09-14
Attending: EMERGENCY MEDICINE
Payer: COMMERCIAL

## 2018-09-14 DIAGNOSIS — N17.9 ACUTE RENAL FAILURE, UNSPECIFIED ACUTE RENAL FAILURE TYPE (H): ICD-10-CM

## 2018-09-14 DIAGNOSIS — F43.22 ADJUSTMENT DISORDER WITH ANXIOUS MOOD: Primary | ICD-10-CM

## 2018-09-14 DIAGNOSIS — F10.239 ALCOHOL DEPENDENCE WITH WITHDRAWAL WITH COMPLICATION (H): ICD-10-CM

## 2018-09-14 DIAGNOSIS — R30.0 DYSURIA: ICD-10-CM

## 2018-09-14 LAB
ALBUMIN SERPL-MCNC: 3.7 G/DL (ref 3.4–5)
ALBUMIN UR-MCNC: NEGATIVE MG/DL
ALP SERPL-CCNC: 85 U/L (ref 40–150)
ALT SERPL W P-5'-P-CCNC: 16 U/L (ref 0–50)
ANION GAP SERPL CALCULATED.3IONS-SCNC: 11 MMOL/L (ref 3–14)
ANION GAP SERPL CALCULATED.3IONS-SCNC: 6 MMOL/L (ref 3–14)
APPEARANCE UR: CLEAR
AST SERPL W P-5'-P-CCNC: 27 U/L (ref 0–45)
BASOPHILS # BLD AUTO: 0 10E9/L (ref 0–0.2)
BASOPHILS NFR BLD AUTO: 0.4 %
BILIRUB SERPL-MCNC: 1.1 MG/DL (ref 0.2–1.3)
BILIRUB UR QL STRIP: NEGATIVE
BUN SERPL-MCNC: 13 MG/DL (ref 7–30)
BUN SERPL-MCNC: 13 MG/DL (ref 7–30)
CALCIUM SERPL-MCNC: 8.1 MG/DL (ref 8.5–10.1)
CALCIUM SERPL-MCNC: 8.8 MG/DL (ref 8.5–10.1)
CHLORIDE SERPL-SCNC: 85 MMOL/L (ref 94–109)
CHLORIDE SERPL-SCNC: 93 MMOL/L (ref 94–109)
CO2 SERPL-SCNC: 28 MMOL/L (ref 20–32)
CO2 SERPL-SCNC: 29 MMOL/L (ref 20–32)
COLOR UR AUTO: YELLOW
CREAT SERPL-MCNC: 3.13 MG/DL (ref 0.52–1.04)
CREAT SERPL-MCNC: 3.5 MG/DL (ref 0.52–1.04)
DIFFERENTIAL METHOD BLD: ABNORMAL
EOSINOPHIL # BLD AUTO: 0.1 10E9/L (ref 0–0.7)
EOSINOPHIL NFR BLD AUTO: 1 %
ERYTHROCYTE [DISTWIDTH] IN BLOOD BY AUTOMATED COUNT: 11.9 % (ref 10–15)
ETHANOL SERPL-MCNC: <0.01 G/DL
GFR SERPL CREATININE-BSD FRML MDRD: 14 ML/MIN/1.7M2
GFR SERPL CREATININE-BSD FRML MDRD: 16 ML/MIN/1.7M2
GLUCOSE SERPL-MCNC: 127 MG/DL (ref 70–99)
GLUCOSE SERPL-MCNC: 92 MG/DL (ref 70–99)
GLUCOSE UR STRIP-MCNC: NEGATIVE MG/DL
HCG UR QL: NEGATIVE
HCT VFR BLD AUTO: 39.1 % (ref 35–47)
HGB BLD-MCNC: 13.5 G/DL (ref 11.7–15.7)
HGB UR QL STRIP: NEGATIVE
IMM GRANULOCYTES # BLD: 0 10E9/L (ref 0–0.4)
IMM GRANULOCYTES NFR BLD: 0.4 %
KETONES UR STRIP-MCNC: NEGATIVE MG/DL
LEUKOCYTE ESTERASE UR QL STRIP: NEGATIVE
LIPASE SERPL-CCNC: 182 U/L (ref 73–393)
LYMPHOCYTES # BLD AUTO: 0.5 10E9/L (ref 0.8–5.3)
LYMPHOCYTES NFR BLD AUTO: 6.6 %
MCH RBC QN AUTO: 32.4 PG (ref 26.5–33)
MCHC RBC AUTO-ENTMCNC: 34.5 G/DL (ref 31.5–36.5)
MCV RBC AUTO: 94 FL (ref 78–100)
MONOCYTES # BLD AUTO: 0.8 10E9/L (ref 0–1.3)
MONOCYTES NFR BLD AUTO: 10.8 %
NEUTROPHILS # BLD AUTO: 5.8 10E9/L (ref 1.6–8.3)
NEUTROPHILS NFR BLD AUTO: 80.8 %
NITRATE UR QL: NEGATIVE
NRBC # BLD AUTO: 0 10*3/UL
NRBC BLD AUTO-RTO: 0 /100
PH UR STRIP: 7 PH (ref 5–7)
PLATELET # BLD AUTO: 196 10E9/L (ref 150–450)
POTASSIUM SERPL-SCNC: 3.5 MMOL/L (ref 3.4–5.3)
POTASSIUM SERPL-SCNC: 3.7 MMOL/L (ref 3.4–5.3)
PROT SERPL-MCNC: 7.4 G/DL (ref 6.8–8.8)
RBC # BLD AUTO: 4.17 10E12/L (ref 3.8–5.2)
RBC #/AREA URNS AUTO: <1 /HPF (ref 0–2)
SODIUM SERPL-SCNC: 124 MMOL/L (ref 133–144)
SODIUM SERPL-SCNC: 128 MMOL/L (ref 133–144)
SODIUM UR-SCNC: 22 MMOL/L
SOURCE: ABNORMAL
SP GR UR STRIP: 1 (ref 1–1.03)
SQUAMOUS #/AREA URNS AUTO: 1 /HPF (ref 0–1)
UROBILINOGEN UR STRIP-MCNC: 0 MG/DL (ref 0–2)
WBC # BLD AUTO: 7.2 10E9/L (ref 4–11)
WBC #/AREA URNS AUTO: 2 /HPF (ref 0–5)

## 2018-09-14 PROCEDURE — 36415 COLL VENOUS BLD VENIPUNCTURE: CPT | Performed by: INTERNAL MEDICINE

## 2018-09-14 PROCEDURE — 81025 URINE PREGNANCY TEST: CPT | Performed by: EMERGENCY MEDICINE

## 2018-09-14 PROCEDURE — 40000358 ZZHCL STATISTIC DRUG SCREEN MULTIPLE (METRO): Performed by: INTERNAL MEDICINE

## 2018-09-14 PROCEDURE — 99285 EMERGENCY DEPT VISIT HI MDM: CPT | Mod: 25

## 2018-09-14 PROCEDURE — 80053 COMPREHEN METABOLIC PANEL: CPT | Performed by: EMERGENCY MEDICINE

## 2018-09-14 PROCEDURE — 96375 TX/PRO/DX INJ NEW DRUG ADDON: CPT

## 2018-09-14 PROCEDURE — HZ2ZZZZ DETOXIFICATION SERVICES FOR SUBSTANCE ABUSE TREATMENT: ICD-10-PCS | Performed by: INTERNAL MEDICINE

## 2018-09-14 PROCEDURE — 74176 CT ABD & PELVIS W/O CONTRAST: CPT

## 2018-09-14 PROCEDURE — 12000007 ZZH R&B INTERMEDIATE

## 2018-09-14 PROCEDURE — 83690 ASSAY OF LIPASE: CPT | Performed by: EMERGENCY MEDICINE

## 2018-09-14 PROCEDURE — 25000128 H RX IP 250 OP 636: Performed by: INTERNAL MEDICINE

## 2018-09-14 PROCEDURE — 25000128 H RX IP 250 OP 636: Performed by: EMERGENCY MEDICINE

## 2018-09-14 PROCEDURE — 25000132 ZZH RX MED GY IP 250 OP 250 PS 637: Performed by: EMERGENCY MEDICINE

## 2018-09-14 PROCEDURE — 85025 COMPLETE CBC W/AUTO DIFF WBC: CPT | Performed by: EMERGENCY MEDICINE

## 2018-09-14 PROCEDURE — 84300 ASSAY OF URINE SODIUM: CPT | Performed by: EMERGENCY MEDICINE

## 2018-09-14 PROCEDURE — 81001 URINALYSIS AUTO W/SCOPE: CPT | Performed by: EMERGENCY MEDICINE

## 2018-09-14 PROCEDURE — 25000125 ZZHC RX 250: Performed by: INTERNAL MEDICINE

## 2018-09-14 PROCEDURE — 96361 HYDRATE IV INFUSION ADD-ON: CPT

## 2018-09-14 PROCEDURE — 80320 DRUG SCREEN QUANTALCOHOLS: CPT | Performed by: EMERGENCY MEDICINE

## 2018-09-14 PROCEDURE — 80048 BASIC METABOLIC PNL TOTAL CA: CPT | Performed by: INTERNAL MEDICINE

## 2018-09-14 PROCEDURE — 25000132 ZZH RX MED GY IP 250 OP 250 PS 637: Performed by: INTERNAL MEDICINE

## 2018-09-14 PROCEDURE — 40000358 ZZHCL STATISTIC DRUG SCREEN MULTIPLE (METRO): Performed by: EMERGENCY MEDICINE

## 2018-09-14 PROCEDURE — 80307 DRUG TEST PRSMV CHEM ANLYZR: CPT | Performed by: INTERNAL MEDICINE

## 2018-09-14 PROCEDURE — 96374 THER/PROPH/DIAG INJ IV PUSH: CPT

## 2018-09-14 PROCEDURE — 99223 1ST HOSP IP/OBS HIGH 75: CPT | Mod: AI | Performed by: INTERNAL MEDICINE

## 2018-09-14 RX ORDER — VALACYCLOVIR HYDROCHLORIDE 500 MG/1
500 TABLET, FILM COATED ORAL DAILY PRN
COMMUNITY
End: 2018-12-04

## 2018-09-14 RX ORDER — DIAZEPAM 10 MG/2ML
5 INJECTION, SOLUTION INTRAMUSCULAR; INTRAVENOUS EVERY 4 HOURS PRN
Status: DISCONTINUED | OUTPATIENT
Start: 2018-09-14 | End: 2018-09-14

## 2018-09-14 RX ORDER — CEFTRIAXONE 1 G/1
1 INJECTION, POWDER, FOR SOLUTION INTRAMUSCULAR; INTRAVENOUS EVERY 24 HOURS
Status: DISCONTINUED | OUTPATIENT
Start: 2018-09-14 | End: 2018-09-16 | Stop reason: ALTCHOICE

## 2018-09-14 RX ORDER — DIAZEPAM 5 MG
5 TABLET ORAL ONCE
Status: COMPLETED | OUTPATIENT
Start: 2018-09-14 | End: 2018-09-14

## 2018-09-14 RX ORDER — ONDANSETRON 2 MG/ML
4 INJECTION INTRAMUSCULAR; INTRAVENOUS EVERY 6 HOURS PRN
Status: DISCONTINUED | OUTPATIENT
Start: 2018-09-14 | End: 2018-09-17 | Stop reason: HOSPADM

## 2018-09-14 RX ORDER — ONDANSETRON 4 MG/1
4 TABLET, ORALLY DISINTEGRATING ORAL EVERY 6 HOURS PRN
Status: DISCONTINUED | OUTPATIENT
Start: 2018-09-14 | End: 2018-09-17 | Stop reason: HOSPADM

## 2018-09-14 RX ORDER — LORAZEPAM 2 MG/ML
0.5 INJECTION INTRAMUSCULAR ONCE
Status: COMPLETED | OUTPATIENT
Start: 2018-09-14 | End: 2018-09-14

## 2018-09-14 RX ORDER — QUETIAPINE FUMARATE 25 MG/1
25 TABLET, FILM COATED ORAL EVERY 6 HOURS PRN
Status: DISCONTINUED | OUTPATIENT
Start: 2018-09-14 | End: 2018-09-17 | Stop reason: HOSPADM

## 2018-09-14 RX ORDER — LORAZEPAM 1 MG/1
1-2 TABLET ORAL EVERY 30 MIN PRN
Status: DISCONTINUED | OUTPATIENT
Start: 2018-09-14 | End: 2018-09-17 | Stop reason: HOSPADM

## 2018-09-14 RX ORDER — ONDANSETRON 2 MG/ML
4 INJECTION INTRAMUSCULAR; INTRAVENOUS ONCE
Status: COMPLETED | OUTPATIENT
Start: 2018-09-14 | End: 2018-09-14

## 2018-09-14 RX ORDER — CITALOPRAM HYDROBROMIDE 20 MG/1
20 TABLET ORAL DAILY PRN
COMMUNITY
End: 2018-12-04

## 2018-09-14 RX ORDER — DIPHENHYDRAMINE HCL 25 MG
50 CAPSULE ORAL
Status: DISCONTINUED | OUTPATIENT
Start: 2018-09-14 | End: 2018-09-17 | Stop reason: HOSPADM

## 2018-09-14 RX ORDER — SODIUM CHLORIDE 9 MG/ML
1000 INJECTION, SOLUTION INTRAVENOUS CONTINUOUS
Status: DISCONTINUED | OUTPATIENT
Start: 2018-09-14 | End: 2018-09-14

## 2018-09-14 RX ORDER — NALOXONE HYDROCHLORIDE 0.4 MG/ML
.1-.4 INJECTION, SOLUTION INTRAMUSCULAR; INTRAVENOUS; SUBCUTANEOUS
Status: DISCONTINUED | OUTPATIENT
Start: 2018-09-14 | End: 2018-09-17 | Stop reason: HOSPADM

## 2018-09-14 RX ORDER — LORAZEPAM 2 MG/ML
1-2 INJECTION INTRAMUSCULAR EVERY 30 MIN PRN
Status: DISCONTINUED | OUTPATIENT
Start: 2018-09-14 | End: 2018-09-17 | Stop reason: HOSPADM

## 2018-09-14 RX ORDER — SODIUM CHLORIDE 9 MG/ML
INJECTION, SOLUTION INTRAVENOUS CONTINUOUS
Status: DISCONTINUED | OUTPATIENT
Start: 2018-09-14 | End: 2018-09-16 | Stop reason: ALTCHOICE

## 2018-09-14 RX ORDER — HYDROMORPHONE HYDROCHLORIDE 1 MG/ML
0.2 INJECTION, SOLUTION INTRAMUSCULAR; INTRAVENOUS; SUBCUTANEOUS
Status: DISCONTINUED | OUTPATIENT
Start: 2018-09-14 | End: 2018-09-17 | Stop reason: HOSPADM

## 2018-09-14 RX ADMIN — LORAZEPAM 1 MG: 1 TABLET ORAL at 14:01

## 2018-09-14 RX ADMIN — LORAZEPAM 1 MG: 1 TABLET ORAL at 23:58

## 2018-09-14 RX ADMIN — CEFTRIAXONE SODIUM 1 G: 1 INJECTION, POWDER, FOR SOLUTION INTRAMUSCULAR; INTRAVENOUS at 14:41

## 2018-09-14 RX ADMIN — SODIUM CHLORIDE 1000 ML: 9 INJECTION, SOLUTION INTRAVENOUS at 11:06

## 2018-09-14 RX ADMIN — Medication 1 MG: at 21:55

## 2018-09-14 RX ADMIN — LORAZEPAM 0.5 MG: 2 INJECTION INTRAMUSCULAR; INTRAVENOUS at 08:58

## 2018-09-14 RX ADMIN — ONDANSETRON 4 MG: 2 INJECTION INTRAMUSCULAR; INTRAVENOUS at 10:25

## 2018-09-14 RX ADMIN — QUETIAPINE FUMARATE 25 MG: 25 TABLET ORAL at 21:55

## 2018-09-14 RX ADMIN — FOLIC ACID: 5 INJECTION, SOLUTION INTRAMUSCULAR; INTRAVENOUS; SUBCUTANEOUS at 14:01

## 2018-09-14 RX ADMIN — SODIUM CHLORIDE 1000 ML: 9 INJECTION, SOLUTION INTRAVENOUS at 08:53

## 2018-09-14 RX ADMIN — DIAZEPAM 5 MG: 5 TABLET ORAL at 11:06

## 2018-09-14 ASSESSMENT — ACTIVITIES OF DAILY LIVING (ADL)
FALL_HISTORY_WITHIN_LAST_SIX_MONTHS: NO
COGNITION: 0 - NO COGNITION ISSUES REPORTED
ADLS_ACUITY_SCORE: 11
SWALLOWING: 0-->SWALLOWS FOODS/LIQUIDS WITHOUT DIFFICULTY
ADLS_ACUITY_SCORE: 11
TRANSFERRING: 0-->INDEPENDENT
AMBULATION: 0-->INDEPENDENT
BATHING: 0-->INDEPENDENT
RETIRED_EATING: 0-->INDEPENDENT
DRESS: 0-->INDEPENDENT
TOILETING: 0-->INDEPENDENT
RETIRED_COMMUNICATION: 0-->UNDERSTANDS/COMMUNICATES WITHOUT DIFFICULTY

## 2018-09-14 ASSESSMENT — ENCOUNTER SYMPTOMS
VOMITING: 1
SHORTNESS OF BREATH: 0
NAUSEA: 1
TREMORS: 1
DIARRHEA: 0
DIZZINESS: 1

## 2018-09-14 ASSESSMENT — PAIN DESCRIPTION - DESCRIPTORS: DESCRIPTORS: HEADACHE

## 2018-09-14 NOTE — H&P
"[facility]  Hospitalist History and Physical    Name: Alana Mujica    MRN: [id number]  YOB: 1975    Age: 42 year old  Date of Admission:  9/14/2018    Assessment & Plan   Alana Mujica is a 42 year old female with chronic alcohol abuse history who presented to the [facility] ER feeling unwell.  She had marked hyponatremia, RUPINDER, and signs of alcohol withdrawal.  She also had been diagnosed with a recent UTI and given ciprofloxacin.    RUPINDER:  -  Suspect volume depletion with poor recent intake.  Cipro also possibly contributing though she only took 3 doses.  Plan hydration and repeat labs in a few hours.    UTI:  -  Recently diagnosis of a UTI.  Appears was E. Coli.  Patient nauseated and unable to take cipro.  I will change to IV ceftriaxone.  I don't think her back pain likely represents pyelonephritis though will cover with IV abx either way as she cannot take oral right now with nausea.    Hyponatremia:  -  Recurrent problem looking back with her alcohol abuse/variable intake.  Recheck this afternoon.    Recent Nausea/emesis:  -  Patient denies inducing vomiting.  Possibly related to recent cipro and UTI.  Should could also have some alcoholic gastritis.  I will give some empiric pepcid.    Bulemia/Eating disorder:  -  Patient denies inducing vomiting recently.  She reports she hasn't eaten well lately though is unsure if she is afraid of weight gain or more just doesn't feel hungry.    Alcohol abuse and withdrawal:  -  Lorazepam WA protocol  -  Vitamin replacement  -  CD Consult  -  Patient reports she was going to start outpatient treatment next week    DVT Prophylaxis: Pneumatic Compression Devices  Code Status: Full Code    Disposition: Expected discharge in 2+ days given multiple acute issues.    Primary Care Physician   Sabrina Pérez    Chief Complaint   \"I didn't feel well\"    History is obtained from the patient.  I also spoke with the ER provider about the history. "     History of Present Illness   Alana Mujica is a 42 year old female who presents with feeling tired and nauseated.  She had some dysuria and was diagnosed with a UTI earlier this week.  Culture grew E. Coli.  She was given Cipro.  She took 3 doses and had worsening nausea/emesis.  She hasn't been able to eat or drink.  She also reports decreased urination and fatigue.  She denies self inducing emesis. She drinks significant alcohol baseline.  Her last drink was 2300 yesterday.  She drinks anywhere from 3-6 beers everyday along with larger amounts of other alcohol some days.  Last weekend she reports she drank a bottle of wine and mixed drinks.  She reports she was planning to enter an outpatient program next week for alcohol abuse.  She denies chest pain, SOB, abdominal pain.  She has some mild left back pain.  No other acute complaints.    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 Co-test 12 months     Osteoporosis      Substance abuse          Past Surgical History   Past Surgical History:   Procedure Laterality Date     LEEP TX, CERVICAL      greater than 5 years ago from , uncertain date       Prior to Admission Medications   Prior to Admission Medications   Prescriptions Last Dose Informant Patient Reported? Taking?   TRAZODONE HCL PO Past Week at Unknown time  Yes Yes   Sig: Take 25 mg by mouth nightly as needed    amitriptyline (ELAVIL) 10 MG tablet More than a month at Unknown time  Yes No   Sig: Take 10 mg by mouth nightly as needed for sleep   ciprofloxacin (CIPRO) 500 MG tablet 2018 at Unknown time  No Yes   Sig: Take 1 tablet (500 mg) by mouth 2 times daily   citalopram (CELEXA) 20 MG tablet More than a month at Unknown time  Yes No   Sig: Take 20 mg by mouth daily as needed   etonogestrel (NEXPLANON) 68 MG IMPL   Yes Yes   Si each (68 mg)  by Subdermal route once   fexofenadine (ALLEGRA) 180 MG tablet More than a month at Unknown time  Yes No   Sig: Take 180 mg by mouth daily as needed for allergies   fluticasone (FLONASE) 50 MCG/ACT spray More than a month at Unknown time  Yes No   Sig: Spray 1 spray into both nostrils daily as needed for rhinitis or allergies   multivitamin, therapeutic with minerals (THERA-VIT-M) TABS tablet Past Month at Unknown time  No Yes   Sig: Take 1 tablet by mouth daily   valACYclovir (VALTREX) 500 MG tablet More than a month at Unknown time  Yes No   Sig: Take 500 mg by mouth daily as needed (when starting to feel sick)      Facility-Administered Medications: None     Allergies   No Known Allergies    Social History   Social History   Substance Use Topics     Smoking status: Former Smoker     Smokeless tobacco: Never Used     Alcohol use 17.5 oz/week     21 Glasses of wine, 14 Cans of beer per week       Family History   I have reviewed this patient's family history and updated it with pertinent information if needed.   Family History   Problem Relation Age of Onset     Breast Cancer Mother 68     2014/2015     Unknown/Adopted No family hx of      Depression No family hx of      Anxiety Disorder No family hx of      Schizophrenia No family hx of      Bipolar Disorder No family hx of      Suicide No family hx of      Substance Abuse No family hx of      Dementia No family hx of      [place] Disease No family hx of      Parkinsonism No family hx of      Autism Spectrum Disorder No family hx of      Intellectual Disability (Mental Retardation) No family hx of      Mental Illness No family hx of        Review of Systems   A Comprehensive greater than 10 system review of systems was carried out.  Pertinent positives and negatives are noted above.  Otherwise negative for contributory information.    Physical Exam   Temp: 99.3  F (37.4  C) Temp src: Oral BP: 126/87 Pulse: 88 Heart Rate: 96 Resp: 18 SpO2: 97 % O2 Device: None  (Room air)    Vital Signs with Ranges  Temp:  [98.2  F (36.8  C)-99.3  F (37.4  C)] 99.3  F (37.4  C)  Pulse:  [88] 88  Heart Rate:  [88-96] 96  Resp:  [18] 18  BP: (126-132)/(87-97) 126/87  SpO2:  [97 %-99 %] 97 %  118 lbs 0 oz    GEN:  Alert, oriented x 3, appears ill but comfortable, somewhat shaky.  HEENT:  Normocephalic/atraumatic, no scleral icterus, no nasal discharge, mouth dry.  CV:  Regular rate and rhythm, no murmur or JVD.  S1 + S2 noted, no S3 or S4.  LUNGS:  Clear to auscultation bilaterally without rales/rhonchi/wheezing/retractions.  Symmetric chest rise on inhalation noted.  ABD:  Active bowel sounds, soft, non-tender, mildly distended.  No rebound/guarding/rigidity.  EXT:  No edema.  No cyanosis.  No acute joint synovitis noted.  SKIN:  Dry to touch, no exanthems noted in the visualized areas.  NEURO:  Symmetric muscle strength, sensation to touch grossly intact.  Hands tremulous on exam.  No new focal deficits appreciated.    Data   Data reviewed today:  I personally reviewed no images or EKG's today.      Recent Labs  Lab 09/14/18  0853   WBC 7.2   HGB 13.5   HCT 39.1   MCV 94          Recent Labs  Lab 09/14/18  0853   *   POTASSIUM 3.5   CHLORIDE 85*   CO2 28   ANIONGAP 11   GLC 92   BUN 13   CR 3.50*   GFRESTIMATED 14*   GFRESTBLACK 17*   KODY 8.8   PROTTOTAL 7.4   ALBUMIN 3.7   BILITOTAL 1.1   ALKPHOS 85   AST 27   ALT 16       Recent Labs  Lab 09/14/18  0804   COLOR Yellow   APPEARANCE Clear   URINEGLC Negative   URINEBILI Negative   URINEKETONE Negative   SG 1.002*   UBLD Negative   URINEPH 7.0   PROTEIN Negative   NITRITE Negative   LEUKEST Negative   RBCU <1   WBCU 2     Urine sodium 22  HCG negative    Recent Results (from the past 24 hour(s))   Abd/pelvis CT no contrast - Stone Protocol    Narrative    CT ABDOMEN AND PELVIS WITHOUT CONTRAST  9/14/2018 10:31 AM    HISTORY: Worsening renal function.    TECHNIQUE: Helical axial scans from the dome of liver through the  pubic  symphysis without contrast. Radiation dose for this scan was  reduced using automated exposure control, adjustment of the mA and/or  kV according to patient size, or iterative reconstruction technique.    COMPARISON: None.      FINDINGS: No urinary tract calculi or obstruction bilaterally. There  is mild symmetric bilateral perinephric soft tissue stranding which is  of uncertain age and etiology.    The liver is normal without contrast. High-density material seen in  the dependent portion of the gallbladder which may be milk-of-calcium  bile or multiple tiny gallstones. Correlation with ultrasound is  recommended if clinically indicated. The spleen, pancreas, bilateral  adrenal glands and kidneys bilaterally without contrast appear normal.  The bowel and mesentery in the upper abdomen are unremarkable.    Scans through the pelvis show no acute abnormality or free fluid. The  appendix is identified and appears normal.      Impression    IMPRESSION:  1. Mild symmetric perinephric soft tissue stranding bilaterally of  uncertain age and etiology.  2. Milk-of-calcium bile versus cholelithiasis. Ultrasound exam could  be obtained if clinically indicated.

## 2018-09-14 NOTE — ED PROVIDER NOTES
History     Chief Complaint:  Alcohol Withdrawal Symptoms    The history is provided by the patient.      Alana Mujica is a 42 year old female with a history of alcohol abuse, alcohol withdrawals, and recurrent UTI who presents to the emergency department for evaluation of alcohol withdrawal symptoms. Of note, on 9/11 the patient was seen in clinic for evaluation of hematuria, dysuria and frequency that began on 9/7. The patient was given a urinalysis that showed signs of infection and urine culture was sent, results below. The patient was prescribed ciprofloxacin. Since her discharge, the patient reports that she has been nauseous and dizzy secondary to the ciprofloxacin with 3-4 episodes of vomiting yesterday. Additionally, the patient reports frequent alcohol drinking, with her last drink being at 2300 yesterday. This morning, the patient was prompted to come to the ED for further evaluation of the combination of nausea and dizziness from ciprofloxacin as well as shaking from what she attributes to be alcohol withdrawal. Of note, the patient reports taking Azo lately for her UTI, but denies taking any medication this morning, including her ciprofloxacin or her prescribed ativan for alcohol withdrawal symptoms. Here, the patient complains of the shaking associated with her alcohol withdrawal as well as nausea, dizziness and vomiting from ciprofloxacin. The patient notes the co-occurrence of lower back pain as well, but denies any diarrhea, chest pain or shortness of breath. Of note, the patient reports that she is starting outpatient treatment for alcoholism in 3 days.      Urine Culture 9/11: Positive, 10,000-50,000 colonies of Escherichia Coli    Allergies:  NKDA    Medications:    Amitriptyline  Ciprofloxacin  Citalopram  Etonogestrel  Fexofenadine  Fluticasone  Lorazepam  Valacyclovir  Phenazopyridine    Past Medical History:    Anorexia  Anxiety  Bulimia  Depressive Disorder  HPV  Osteoporosis  Substance  Abuse  Alcohol Abuse  Alcohol Withdrawal  Anorexia   Chemical Dependency  Recurrent UTI    Past Surgical History:    Cervical Leep Procedure    Family History:    Breast Cancer    Social History:  Marital Status:  Single [1]  Tobacco Use: Former  Alcohol Use: 21 glasses of wine, 14 cans of beer per week      Review of Systems   Respiratory: Negative for shortness of breath.    Cardiovascular: Negative for chest pain.   Gastrointestinal: Positive for nausea and vomiting. Negative for diarrhea.   Neurological: Positive for dizziness and tremors.   All other systems reviewed and are negative.      Physical Exam     Patient Vitals for the past 24 hrs:   BP Temp Temp src Pulse Heart Rate Resp SpO2   09/14/18 0900 (!) 132/97 98.2  F (36.8  C) Oral 88 88 18 99 %       Physical Exam  General: Patient is alert and interactive when I enter the room  Head:  The scalp, face, and head appear normal  Eyes:  Conjunctivae are normal  ENT:    The nose is normal, dry mucous membranes    Pinnae are normal    External acoustic canals are normal  Neck:  Trachea midline  CV:  Pulses are normal.    Resp:  No respiratory distress   Abdomen:      Soft, non-tender, non-distended  Musc:  Normal muscular tone    No major joint effusions  Skin:  No rash or lesions noted  Neuro: Speech is normal and fluent. Face is symmetric.     Moving all extremities well. Tongue fasciculations. Tremulous.   Psych:  Awake. Alert.  Normal affect. Anxious mood Appropriate interactions.      Emergency Department Course     Imaging:  CT Abdomen/Pelvis without contrast- Stone Protocol:   1. Mild symmetric perinephric soft tissue stranding bilaterally of uncertain age and etiology.  2. Milk-of-calcium bile versus cholelithiasis. Ultrasound exam could be obtained if clinically indicated. As per radiology.    Laboratory:  CBC: WBC: 7.2, HGB: 13.5, PLT: 196  CMP: Creatinine: 3.50 (H), GFR: 14 (L), o/w WNL  Lipase: 182  UA with micro: 1.002 (L) o/w negative  Sodium  Urine: 22  Alcohol Ethyl: <0.01  HCG Qualitative Urine: Negative    Interventions:  0853 NS 1L IV  0858 Ativan 0.5 mg, IV  1025 Zofran 4 mg, IV  1106 Valium 5 mg, PO  1106 NS Infusion 125 mL/hr     Emergency Department Course:  0825 Nursing notes and vitals reviewed. I performed an exam of the patient as documented above.     IV inserted. Medicine administered as documented above. Blood drawn. This was sent to the lab for further testing, results above.    The patient was sent for a stone protocol abdomen/pelvic CT while in the emergency department, findings above.     The patient provided a urine sample here in the emergency department. This was sent for laboratory testing, findings above.     1022 I rechecked the patient and discussed the results of her workup thus far.     1042 I rechecked the patient and informed her of her CT results.    1045 I consulted with Dr. Ramey of the hospitalist services. They are in agreement to accept the patient for admission.    Findings and plan explained to the patient who consents to admission. Discussed the patient with Dr. Ramey, who will admit the patient to a cardiac tele bed for further monitoring, evaluation, and treatment.        Impression & Plan      Medical Decision Making:  Alana Mjuica is a 42 year old female with a history of alcohol abuse who presents with concern for alcohol withdrawal and possible UTI.  She definitely was tremulous on exam, but was vitally stable. Concern for alcohol withdrawal, so ativan was given. Her creatinine returned quite elevated at 3.50. Her BUN was not elevated, so it is not clearly pre-renal however she has not been taking in much PO so it seems likely to be pre-urinal than anything lese. Urine shows no signs of infection however with a recent treatment of a UTI we did do a CT with revealed mild perinephric stranding. Given a negative urine, I do not think this is pyelonephritis. Patient was given valium, PO, for her alcohol  withdrawal. Patient otherwise looked well. Patient will be admitted for RUPINDER and alcohol withdrawal. Patient admitted to cardiac tele.     Diagnosis:    ICD-10-CM    1. Alcohol dependence with withdrawal with complication (H) F10.239 Sodium random urine     Sodium random urine     CANCELED: Sodium random urine   2. Acute renal failure, unspecified acute renal failure type (H) N17.9        Disposition:  Admitted to Dr. Ramey to a cardiac tele bed    Scribe Disclosure:  I, Kaushik Banegas, am serving as a scribe on 9/14/2018 at 8:00 AM to personally document services performed by Sangeeta Yanes MD based on my observations and the provider's statements to me.     Kaushik Banegas  9/14/2018   [facility] EMERGENCY DEPARTMENT       Sangeeta Yanes MD  09/14/18 [id number]

## 2018-09-14 NOTE — ED NOTES
"[facility]  ED Nurse Handoff Report    Alana Mujica is a 42 year old female   ED Chief complaint: Alcohol Intoxication and UTI  . ED Diagnosis:   Final diagnoses:   None     Allergies: No Known Allergies    Code Status: Full Code  Activity level - Baseline/Home:  Independent. Activity Level - Current:   Stand with Assist. Lift room needed: No. Bariatric: No   Needed: No   Isolation: No. Infection: Not Applicable.     Vital Signs:   Vitals:    09/14/18 0900   BP: (!) 132/97   Pulse: 88   Resp: 18   Temp: 98.2  F (36.8  C)   TempSrc: Oral   SpO2: 99%       Cardiac Rhythm:  ,      Pain level:    Patient confused: No. Patient Falls Risk: Yes.   Elimination Status: Has voided   Patient Report - Initial Complaint: alcohol withdrawal symptoms and continued UTI symptoms. Focused Assessment: Patient seen in clinic for urgency and blood in urine. Patient given cipro but patient reports taking medication intermittently due to nausea and vomiting after taking medication. Reports poor PO intake for past couple days but \"has drank a lot Saturday\". Patient denies fevers, blood in urine recently, or any flank pain. Patient reports she is planned to go to detox for alcohol abuse on Monday. Patient reports last drink was around 23:30 yesterday. Patient denies history of seizures. Currently noted to have tremors throughout. Patient also states she feels anxious.   Tests Performed: labs, ct. Abnormal Results:   Labs Ordered and Resulted from Time of ED Arrival Up to the Time of Departure from the ED   CBC WITH PLATELETS DIFFERENTIAL - Abnormal; Notable for the following:        Result Value    Absolute Lymphocytes 0.5 (*)     All other components within normal limits   COMPREHENSIVE METABOLIC PANEL - Abnormal; Notable for the following:     Sodium 124 (*)     Chloride 85 (*)     Creatinine 3.50 (*)     GFR Estimate 14 (*)     GFR Estimate If Black 17 (*)     All other components within normal limits   ROUTINE " UA WITH MICROSCOPIC - Abnormal; Notable for the following:     Specific Gravity Urine 1.002 (*)     All other components within normal limits   LIPASE   HCG QUALITATIVE URINE   ALCOHOL ETHYL   SODIUM RANDOM URINE     Abd/pelvis CT no contrast - Stone Protocol   Preliminary Result   IMPRESSION:   1. Mild symmetric perinephric soft tissue stranding bilaterally of   uncertain age and etiology.   2. Milk-of-calcium bile versus cholelithiasis. Ultrasound exam could   be obtained if clinically indicated.         Treatments provided: ativan, fluids, nausea medicine  Family Comments: not present  OBS brochure/video discussed/provided to patient:  N/A  ED Medications:   Medications   0.9% sodium chloride BOLUS (1,000 mLs Intravenous New Bag 9/14/18 0853)     Followed by   sodium chloride 0.9% infusion (not administered)   diazepam (VALIUM) tablet 5 mg (not administered)   LORazepam (ATIVAN) injection 0.5 mg (0.5 mg Intravenous Given 9/14/18 0858)   ondansetron (ZOFRAN) injection 4 mg (4 mg Intravenous Given 9/14/18 1025)     Drips infusing:  Yes  For the majority of the shift, the patient's behavior Green. Interventions performed were to provide much emotional support.     Severe Sepsis OR Septic Shock Diagnosis Present: No      ED Nurse Name/Phone Number: Eli Tita,   10:43 AM    RECEIVING UNIT ED HANDOFF REVIEW    Above ED Nurse Handoff Report was reviewed: Yes  Reviewed by: Susan Escobar on September 14, 2018 at 11:25 AM

## 2018-09-14 NOTE — PLAN OF CARE
Problem: Anxiety (Adult)  Goal: Identify Related Risk Factors and Signs and Symptoms  Related risk factors and signs and symptoms are identified upon initiation of Human [employer] Clinical Practice Guideline (CPG).  PRIMARY DIAGNOSIS: Acute Kidney Injury    GOALS TO BE MET BEFORE DISCHARGE  1. Orthostatic performed: N/A    2. Tolerating PO medications: No. Intermittent nausea is persisting per patient.    3. Return to near baseline physical activity: No. Weakness & unsteady gait, x1 assist w/gait belt for ambulation    4. Cleared for discharge by consultants (if involved): No. Telemetry & blood work monitoring will continue.    Discharge Planner Nurse   Safe discharge environment identified: No. Unsteady & poor gait requires x1 assist with gait belt for patient's safety.  Barriers to discharge: Yes. Weakness, intermittent nausea continues       Entered by: VIRGILIO NORRIS 09/14/2018      Patient alert & orient x3-4, forgetful at times. No respiratory or cardiac distress voiced or noted. (+)CSM BUE/BLE. Alcohol Withdrawal Assessment (CIWA) score is 7. Patient weak with unsteady gait, requires x1 assist with gait belt for ambulation. Telemetry shows SR w/elevated T-waves. Blood work shows elevation in Creat (3.13) levels. 9/11/18 shows Positive for E-Coli in urine. Antibiotics continue per provider's orders.

## 2018-09-14 NOTE — IP AVS SNAPSHOT
[place] [address] [facility]    [address]    [facility] [address]    Phone:  [phone]    Fax:  [phone]                                       After Visit Summary   9/14/2018    Alana Mujica    MRN: [id number]           After Visit Summary Signature Page     I have received my discharge instructions, and my questions have been answered. I have discussed any challenges I see with this plan with the nurse or doctor.    ..........................................................................................................................................  Patient/Patient Representative Signature      ..........................................................................................................................................  Patient Representative Print Name and Relationship to Patient    ..................................................               ................................................  Date                                   Time    ..........................................................................................................................................  Reviewed by Signature/Title    ...................................................              ..............................................  Date                                               Time          22EPIC Rev 08/18

## 2018-09-14 NOTE — ED TRIAGE NOTES
Patient arrived at around 08:00 complaining of withdrawal symptoms and UTI symptoms. Patient recently given cipro to treat urgency and dysuria. Patient reports having difficulty tolerating the antibiotic stating that it made her nauseated and gave her an upset stomach. Reports taking it intermittently with last dose yesterday. Patient also worried about withdrawal. Patient is a daily drinker and plans to go to rehab on Monday. Reports last drink around 23:30 last night. Noted to have tremors. No emesis today. Denies hallucinations or seizures. ABCs intact. Alert and oriented X4. Oriented to room and call light. Patient educated about hand hygiene practices.

## 2018-09-14 NOTE — IP AVS SNAPSHOT
MRN:[id number]                      After Visit Summary   9/14/2018    Alana Mujica    MRN: [id number]           Thank you!     Thank you for choosing [facility] for your care. Our goal is always to provide you with excellent care. Hearing back from our patients is one way we can continue to improve our services. Please take a few minutes to complete the written survey that you may receive in the mail after you visit. If you would like to speak to someone directly about your visit please contact Patient Relations at [phone]. Thank you!          Patient Information     Date Of Birth          1975        Designated Caregiver       Most Recent Value    Caregiver    Name of designated caregiver NA    Phone number of caregiver NA      About your hospital stay     You were admitted on:  September 14, 2018 You last received care in the:  [place] [address] [facility]    You were discharged on:  September 17, 2018        Reason for your hospital stay       RUPINDER, UTI                  Who to Call     For medical emergencies, please call 911.  For non-urgent questions about your medical care, please call your primary care provider or clinic, [phone]          Attending Provider     Provider Specialty    Sangeeta Yanes MD Emergency Medicine    Julio César Ramey DO Internal Medicine       Primary Care Provider Office Phone # Fax #    Sabrina Pérez PA-C [phone]      After Care Instructions     Activity       Your activity upon discharge: activity as tolerated            Diet       Follow this diet upon discharge: Orders Placed This Encounter      Regular Diet Adult                  Follow-up Appointments     Follow-up and recommended labs and tests        Follow up with primary care provider, Sabrina Pérez, within 7 days                  Pending Results     Date and Time Order Name Status Description    9/14/2018 1436 Drug screen urine In  "process             Statement of Approval     Ordered          09/17/18 0846  I have reviewed and agree with all the recommendations and orders detailed in this document.  EFFECTIVE NOW     Approved and electronically signed by:  Aurelio Fraser MD             Admission Information     Date & Time Provider Department Dept. Phone    9/14/2018 Julio César Ramey DO [place] [address] [facility] [phone]      Your Vitals Were     Blood Pressure Pulse Temperature Respirations Height Weight    136/84 (BP Location: Right arm) 57 98.7  F (37.1  C) (Oral) 16 1.6 m (5' 3\") 53.5 kg (118 lb)    Pulse Oximetry BMI (Body Mass Index)                98% 20.9 kg/m2          MyChart Information     [employer] gives you secure access to your electronic health record. If you see a primary care provider, you can also send messages to your care team and make appointments. If you have questions, please call your primary care clinic.  If you do not have a primary care provider, please call [phone] and they will assist you.        Care EveryWhere ID     This is your Care EveryWhere ID. This could be used by other organizations to access your [facility] medical records  [id number]        Equal Access to Services     CARMINE COOK AH: Hadii aldair Christianson, wacristianda ludion, qaybta kaalmada soto, isabel vann. So [facility] [phone].    ATENCIÓN: Si habla español, tiene a wang disposición servicios gratuitos de asistencia lingüística. Llame al [phone].    We comply with applicable federal civil rights laws and [place] laws. We do not discriminate on the basis of race, color, national origin, age, disability, sex, sexual orientation, or gender identity.               Review of your medicines      START taking        Dose / Directions    cephALEXin 500 MG capsule   Commonly known as:  KEFLEX   Indication:  Urinary Tract Infection   Used for:  Dysuria        Dose:  500 mg   Take 1 " capsule (500 mg) by mouth every 12 hours for 4 days   Quantity:  8 capsule   Refills:  0       folic acid 1 MG tablet   Commonly known as:  FOLVITE   Used for:  Alcohol dependence with withdrawal with complication (H)        Dose:  1 mg   Take 1 tablet (1 mg) by mouth daily   Quantity:  30 tablet   Refills:  0       QUEtiapine 25 MG tablet   Commonly known as:  SEROquel   Used for:  Adjustment disorder with anxious mood        Dose:  25 mg   Take 1 tablet (25 mg) by mouth every 6 hours as needed (Moderate agitation)   Quantity:  60 tablet   Refills:  0       thiamine mononitrate 100 MG Tabs   Used for:  Alcohol dependence with withdrawal with complication (H)        Dose:  100 mg   Take 100 mg by mouth daily   Quantity:  30 tablet   Refills:  0         CONTINUE these medicines which have NOT CHANGED        Dose / Directions    amitriptyline 10 MG tablet   Commonly known as:  ELAVIL        Dose:  10 mg   Take 10 mg by mouth nightly as needed for sleep   Refills:  0       citalopram 20 MG tablet   Commonly known as:  celeXA        Dose:  20 mg   Take 20 mg by mouth daily as needed   Refills:  0       fluticasone 50 MCG/ACT spray   Commonly known as:  FLONASE        Dose:  1 spray   Spray 1 spray into both nostrils daily as needed for rhinitis or allergies   Refills:  0       multivitamin, therapeutic with minerals Tabs tablet   Used for:  Alcohol withdrawal syndrome without complication (H)        Dose:  1 tablet   Take 1 tablet by mouth daily   Quantity:  30 each   Refills:  0       NEXPLANON 68 MG Impl   Generic drug:  etonogestrel        Dose:  1 each   1 each (68 mg) by Subdermal route once   Quantity:  1 each   Refills:  0       TRAZODONE HCL PO   Indication:  Trouble Sleeping        Dose:  25 mg   Take 25 mg by mouth nightly as needed   Refills:  0       valACYclovir 500 MG tablet   Commonly known as:  VALTREX        Dose:  500 mg   Take 500 mg by mouth daily as needed (when starting to feel sick)   Refills:  0          STOP taking     ciprofloxacin 500 MG tablet   Commonly known as:  CIPRO           fexofenadine 180 MG tablet   Commonly known as:  ALLEGRA                Where to get your medicines      These medications were sent to [facility] [place], [place] - [address] [facility]  [address] [facility] [address]     Phone:  [phone]     cephALEXin 500 MG capsule    folic acid 1 MG tablet    QUEtiapine 25 MG tablet    thiamine mononitrate 100 MG Tabs                Protect others around you: Learn how to safely use, store and throw away your medicines at www.disposemymeds.org.        ANTIBIOTIC INSTRUCTION     You've Been Prescribed an Antibiotic - Now What?  Your healthcare team thinks that you or your loved one might have an infection. Some infections can be treated with antibiotics, which are powerful, life-saving drugs. Like all medications, antibiotics have side effects and should only be used when necessary. There are some important things you should know about your antibiotic treatment.      Your healthcare team may run tests before you start taking an antibiotic.    Your team may take samples (e.g., from your blood, urine or other areas) to run tests to look for bacteria. These test can be important to determine if you need an antibiotic at all and, if you do, which antibiotic will work best.      Within a few days, your healthcare team might change or even stop your antibiotic.    Your team may start you on an antibiotic while they are working to find out what is making you sick.    Your team might change your antibiotic because test results show that a different antibiotic would be better to treat your infection.    In some cases, once your team has more information, they learn that you do not need an antibiotic at all. They may find out that you don't have an infection, or that the antibiotic you're taking won't work against your infection. For example, an infection caused  by a virus can't be treated with antibiotics. Staying on an antibiotic when you don't need it is more likely to be harmful than helpful.      You may experience side effects from your antibiotic.    Like all medications, antibiotics have side effects. Some of these can be serious.    Let you healthcare team know if you have any known allergies when you are admitted to the hospital.    One significant side effect of nearly all antibiotics is the risk of severe and sometimes deadly diarrhea caused by Clostridium difficile (C. Difficile). This occurs when a person takes antibiotics because some good germs are destroyed. Antibiotic use allows C. diificile to take over, putting patients at high risk for this serious infection.    As a patient or caregiver, it is important to understand your or your loved one's antibiotic treatment. It is especially important for caregivers to speak up when patients can't speak for themselves. Here are some important questions to ask your healthcare team.    What infection is this antibiotic treating and how do you know I have that infection?    What side effects might occur from this antibiotic?    How long will I need to take this antibiotic?    Is it safe to take this antibiotic with other medications or supplements (e.g., vitamins) that I am taking?     Are there any special directions I need to know about taking this antibiotic? For example, should I take it with food?    How will I be monitored to know whether my infection is responding to the antibiotic?    What tests may help to make sure the right antibiotic is prescribed for me?      Information provided by:  www.cdc.gov/getsmart  U.S. Department of Health and Human Services  Centers for disease Control and Prevention  National Center for Emerging and Zoonotic Infectious Diseases  Division of Healthcare Quality Promotion             Medication List: This is a list of all your medications and when to take them. Check marks below  indicate your daily home schedule. Keep this list as a reference.      Medications           Morning Afternoon Evening Bedtime As Needed    amitriptyline 10 MG tablet   Commonly known as:  ELAVIL   Take 10 mg by mouth nightly as needed for sleep                                cephALEXin 500 MG capsule   Commonly known as:  KEFLEX   Take 1 capsule (500 mg) by mouth every 12 hours for 4 days   Last time this was given:  500 mg on 9/17/2018 10:46 AM                                citalopram 20 MG tablet   Commonly known as:  celeXA   Take 20 mg by mouth daily as needed                                fluticasone 50 MCG/ACT spray   Commonly known as:  FLONASE   Spray 1 spray into both nostrils daily as needed for rhinitis or allergies                                folic acid 1 MG tablet   Commonly known as:  FOLVITE   Take 1 tablet (1 mg) by mouth daily   Last time this was given:  1 mg on 9/17/2018  7:51 AM                                multivitamin, therapeutic with minerals Tabs tablet   Take 1 tablet by mouth daily                                NEXPLANON 68 MG Impl   1 each (68 mg) by Subdermal route once   Generic drug:  etonogestrel                                QUEtiapine 25 MG tablet   Commonly known as:  SEROquel   Take 1 tablet (25 mg) by mouth every 6 hours as needed (Moderate agitation)   Last time this was given:  25 mg on 9/16/2018  9:40 PM                                thiamine mononitrate 100 MG Tabs   Take 100 mg by mouth daily                                TRAZODONE HCL PO   Take 25 mg by mouth nightly as needed                                valACYclovir 500 MG tablet   Commonly known as:  VALTREX   Take 500 mg by mouth daily as needed (when starting to feel sick)                                          More Information        Alcohol Abuse  Alcoholic drinks are harmful when you have too many of them. There is no set number of drinks that defines too much. Drinking that disrupts your life or  "your health is called alcohol abuse. Alcohol abuse can hurt your relationships with others. You may lose friends, a spouse, or even your job. You may be abusing alcohol if any of the following are true for you:    Duties at home or with  suffer because of drinking.    Duties at work or in school suffer because of drinking.    You have missed work or school because of drinking.    You use alcohol while driving or operating machinery.    You have legal problems such as arrests due to drinking.    You keep drinking even though it causes serious problems in your life.  Health effects  Alcohol abuse causes health problems. Sometimes this can happen after only drinking a  little.\" There is no set number of drinks or amount of alcohol that defines too much. The more you drink at one time, and the more often you drink determine both the short-term and long-term health effects. It affects all parts of your body and your health, including your:    Brain. Alcohol is a central nervous system depressant. It can damage parts of the brain that affect your balance, memory, thinking, and emotions. It can cause memory loss, blackouts, depression, agitation, sleep cycle changes, and seizures. These changes may or may not be reversible.    Heart and vascular system. Alcohol affects multiple areas. It can damage heart muscle causing cardiomyopathy, which is a weakening and stretching of the heart muscle. This can lead to trouble breathing, an irregular heartbeat, atrial fibrillation, leg swelling, and heart failure. Alcohol use makes the blood vessels stiffen causing high blood pressure. All of these problems increase your risk of having heart attacks or strokes.    Liver. Alcohol causes fat to build up in the liver, affecting its normal function. This increases the risk for hepatitis, leading to abdominal pain, appetite loss, jaundice, bleeding problems, liver fibrosis, and cirrhosis. This, in turn, can affect your ability to " fight off infections, and can cause diabetes. The liver changes prevent it from removing toxins in your blood that can cause encephalopathy, which may show with confusion, altered level of consciousness, personality changes, memory loss, seizures, coma, and death.    Pancreas. Alcohol can cause inflammation of the pancreas, or pancreatitis. This can cause abdominal pain, fever, and diabetes.    Immune system. Alcohol weakens your immune system in a number of ways. It suppresses your immune system making it harder to fight infections and colds. It also increases the chance of getting pneumonia and tuberculosis.    Cancer. Alcohol is a risk factor for developing cancer of the mouth, esophagus, pharynx, larynx, liver, and breast.    Sexual function. Alcohol can lead to sexual problems.  Home care  The following guidelines will help you deal with alcohol abuse:    Admit you have a problem with alcohol.    Ask for help from your healthcare provider and trusted family members or close friends.    Get help from people trained in dealing with alcohol abuse. This may be individual counseling or group therapy, or it may be a supervised alcohol treatment program.    Join a self-help group for alcohol abuse such as Alcoholics Anonymous (AA).    Stay away from people who abuse alcohol or tempt you to drink.  Follow-up care  Follow up with your healthcare provider, or as advised. Contact these groups to get help:    Alcoholics Anonymous (AA): Go to www.aa.org or check the phone book for meetings near you.    National Alcohol and Substance Abuse Information Center (NASAIC): 366.849.9746, www.addictioncareoptions.com    National [place] on Alcoholism and Drug Dependence (NCADD): 884-SDK-YBKH (611-308-4760), www.ncadd.org  Call 911  Call 911 if any of these occur:    Trouble breathing or slow irregular breathing    Chest pain    Sudden weakness on one side of your body or sudden trouble speaking    Heavy bleeding or vomiting  blood    Very drowsy or trouble awakening    Fainting or loss of consciousness    Rapid heart rate    Seizure  When to seek medical care  Call your healthcare provider right away if any of these occur:     Confusion    Hallucinations (seeing, hearing, or feeling things that aren t there)    Pain in your upper abdomen that gets worse    Repeated vomiting or black or tarry stools    Severe shakiness  Date Last Reviewed: 6/1/2016 2000-2017 [employer]. [address] [place] [address], [place] [id number]. All rights reserved. This information is not intended as a substitute for professional medical care. Always follow your healthcare professional's instructions.

## 2018-09-15 LAB
ANION GAP SERPL CALCULATED.3IONS-SCNC: 8 MMOL/L (ref 3–14)
BUN SERPL-MCNC: 12 MG/DL (ref 7–30)
CALCIUM SERPL-MCNC: 8.1 MG/DL (ref 8.5–10.1)
CHLORIDE SERPL-SCNC: 103 MMOL/L (ref 94–109)
CO2 SERPL-SCNC: 27 MMOL/L (ref 20–32)
CREAT SERPL-MCNC: 2.51 MG/DL (ref 0.52–1.04)
GFR SERPL CREATININE-BSD FRML MDRD: 21 ML/MIN/1.7M2
GLUCOSE SERPL-MCNC: 89 MG/DL (ref 70–99)
POTASSIUM SERPL-SCNC: 3.9 MMOL/L (ref 3.4–5.3)
SODIUM SERPL-SCNC: 138 MMOL/L (ref 133–144)

## 2018-09-15 PROCEDURE — 12000007 ZZH R&B INTERMEDIATE

## 2018-09-15 PROCEDURE — 99207 ZZC CDG-MDM COMPONENT: MEETS LOW - DOWN CODED: CPT | Performed by: INTERNAL MEDICINE

## 2018-09-15 PROCEDURE — 36415 COLL VENOUS BLD VENIPUNCTURE: CPT | Performed by: INTERNAL MEDICINE

## 2018-09-15 PROCEDURE — 80048 BASIC METABOLIC PNL TOTAL CA: CPT | Performed by: INTERNAL MEDICINE

## 2018-09-15 PROCEDURE — 25000128 H RX IP 250 OP 636: Performed by: INTERNAL MEDICINE

## 2018-09-15 PROCEDURE — 99232 SBSQ HOSP IP/OBS MODERATE 35: CPT | Performed by: INTERNAL MEDICINE

## 2018-09-15 PROCEDURE — 25000125 ZZHC RX 250: Performed by: INTERNAL MEDICINE

## 2018-09-15 RX ADMIN — FAMOTIDINE 20 MG: 10 INJECTION, SOLUTION INTRAVENOUS at 15:08

## 2018-09-15 RX ADMIN — FOLIC ACID: 5 INJECTION, SOLUTION INTRAMUSCULAR; INTRAVENOUS; SUBCUTANEOUS at 12:24

## 2018-09-15 RX ADMIN — SODIUM CHLORIDE: 9 INJECTION, SOLUTION INTRAVENOUS at 05:21

## 2018-09-15 RX ADMIN — CEFTRIAXONE SODIUM 1 G: 1 INJECTION, POWDER, FOR SOLUTION INTRAMUSCULAR; INTRAVENOUS at 15:08

## 2018-09-15 ASSESSMENT — ACTIVITIES OF DAILY LIVING (ADL)
ADLS_ACUITY_SCORE: 12
ADLS_ACUITY_SCORE: 11
ADLS_ACUITY_SCORE: 12
ADLS_ACUITY_SCORE: 11

## 2018-09-15 NOTE — PLAN OF CARE
Problem: Patient Care Overview  Goal: Plan of Care/Patient Progress Review  Pt a/o x4, forgetful, up with SBA.  VSS. Banana bag running.  LS clear. Scoring 6 and 5 on CIWA, not requiring any Ativan at this time.  Cr 2.51.  Possible DC tomorrow if still not needing Ativan and labs are good.  Will continue POC.

## 2018-09-15 NOTE — PLAN OF CARE
"Problem: Patient Care Overview  Goal: Plan of Care/Patient Progress Review  Outcome: No Change  A&O. VSS on RA. LS clear. Scoring 8, 3, 3 on CIWA, 1 mg ativan given this shift, tremors, anxiety and headache present. NS infusing at 150 mL/hr. Tolerating clear liquid diet. Denies n/v. Up with SBA. Chem dep following. Continue with POC.     /82  Pulse 88  Temp 97.2  F (36.2  C) (Oral)  Resp 18  Ht 1.6 m (5' 3\")  Wt 53.5 kg (118 lb)  SpO2 99%  BMI 20.9 kg/m2      "

## 2018-09-15 NOTE — PROGRESS NOTES
[facility]    Hospitalist Progress Note  Name: Alana Mujica    MRN: [id number]  Provider: Sofía Gomez MD  Date of Service: 09/15/2018    Assessment & Plan   Summary of Stay: Alana Mujica is a 42 year old female with history of chronic alcohol abuse presented to the ER with symptoms of not feeling well.  She was found to have severe hyponatremia, AK I and signs of alcohol withdrawal.  She also has a recent diagnosis of UTI and was given ciprofloxacin.  She was admitted on 9/14/2018 for a TIA, UTI, hyponatremia and possible alcohol withdrawal.    Acute kidney injury  --Secondary to poor oral intake and volume depletion  --Was also started on Cipro  --IV fluids  --Improved somewhat with hydration.  Down to 2.5 today    UTI  --Secondary to E. Coli  --Started on IV ceftriaxone  --Unable to tolerate p.o. Cipro secondary to nausea and vomiting.    Hyponatremia  --Resolved sodium today was 138    Significant nausea and vomiting  --Likely secondary to Cipro,  UTI, hyponatremia cannot exclude alcoholic gastritis  --Started on Pepcid    Bulimia and eating disorder  --Presented with poor oral intake  --Denies inducing vomiting.  --Patient has history and cannot exclude eating disorder for poor oral intake    Possible alcohol withdrawal  --On CIWA protocol with Lorazepam  --Thiamine folate and multivitamin  -Cd consult    DVT Prophylaxis: Pneumatic Compression Devices  Code Status: Full Code    Disposition: Expected discharge in 2-3 days to home once able to tolerate p.o. and not in withdrawal      Interval History   Patient complains of feeling lightheaded.  Nausea is somewhat better but is still shaky and restless.  Not able to eat much only tolerating  clears.  We will try to advance diet.  Denies chest pain or shortness of breath.  Review of all the other systems negative    -Data reviewed today: I reviewed all new labs and imaging reports over the last 24 hours. I personally reviewed no images or EKG's  today.    Physical Exam   Temp: 98  F (36.7  C) Temp src: Oral BP: 134/81 Pulse: 88 Heart Rate: 79 Resp: 18 SpO2: 99 % O2 Device: None (Room air)    Vitals:    09/14/18 1150   Weight: 53.5 kg (118 lb)     Vital Signs with Ranges  Temp:  [97.2  F (36.2  C)-99.3  F (37.4  C)] 98  F (36.7  C)  Pulse:  [78-88] 88  Heart Rate:  [69-96] 79  Resp:  [16-18] 18  BP: (116-134)/(73-97) 134/81  SpO2:  [94 %-99 %] 99 %  I/O last 3 completed shifts:  In: 4697 [P.O.:2847; I.V.:1850]  Out: 880 [Urine:880]      GEN:  Alert, oriented x 3, appears ill but comfortable, somewhat shaky.  HEENT:  Normocephalic/atraumatic, no scleral icterus, no nasal discharge, mouth dry.  CV:  Regular rate and rhythm, no murmur or JVD.  S1 + S2 noted, no S3 or S4.  LUNGS:  Clear to auscultation bilaterally without rales/rhonchi/wheezing/retractions.  Symmetric chest rise on inhalation noted.  ABD:  Active bowel sounds, soft, non-tender, mildly distended.  No rebound/guarding/rigidity.  EXT:  No edema.  No cyanosis.  No acute joint synovitis noted.  SKIN:  Dry to touch, no exanthems noted in the visualized areas.  NEURO:  + Fine tremors otherwise symmetric muscle strength, sensation to touch grossly intact.  Hands tremulous on exam.  No new focal deficits appreciated.    Medications     - MEDICATION INSTRUCTIONS -       sodium chloride 150 mL/hr at 09/15/18 0521       cefTRIAXone  1 g Intravenous Q24H     famotidine  20 mg Intravenous Q24H     IV Fluid with vitamins   Intravenous Q24H     Data       Recent Labs  Lab 09/14/18  0853   WBC 7.2   HGB 13.5   HCT 39.1   MCV 94          Recent Labs  Lab 09/15/18  0606 09/14/18  1515 09/14/18  0853    128* 124*   POTASSIUM 3.9 3.7 3.5   CHLORIDE 103 93* 85*   CO2 27 29 28   ANIONGAP 8 6 11   GLC 89 127* 92   BUN 12 13 13   CR 2.51* 3.13* 3.50*   GFRESTIMATED 21* 16* 14*   GFRESTBLACK 25* 19* 17*   KODY 8.1* 8.1* 8.8       Recent Labs  Lab 09/11/18  1151   CULT 10,000 to 50,000 colonies/mLEscherichia  coli*  <10,000 colonies/mLurogenital floraSusceptibility testing not routinely done       Recent Labs  Lab 09/14/18  0853   AST 27   ALT 16   ALKPHOS 85   BILITOTAL 1.1     No results for input(s): INR in the last 168 hours.  No results for input(s): LACT in the last 168 hours.  No results for input(s): TROPONIN, TROPI, TROPR in the last 168 hours.    Invalid input(s): TROP, TROPONINIES    Recent Labs  Lab 09/14/18  0804   COLOR Yellow   APPEARANCE Clear   URINEGLC Negative   URINEBILI Negative   URINEKETONE Negative   SG 1.002*   UBLD Negative   URINEPH 7.0   PROTEIN Negative   NITRITE Negative   LEUKEST Negative   RBCU <1   WBCU 2       Recent Results (from the past 24 hour(s))   Abd/pelvis CT no contrast - [facility] Protocol    Narrative    CT ABDOMEN AND PELVIS WITHOUT CONTRAST  9/14/2018 10:31 AM    HISTORY: Worsening renal function.    TECHNIQUE: Helical axial scans from the dome of liver through the  pubic symphysis without contrast. Radiation dose for this scan was  reduced using automated exposure control, adjustment of the mA and/or  kV according to patient size, or iterative reconstruction technique.    COMPARISON: None.      FINDINGS: No urinary tract calculi or obstruction bilaterally. There  is mild symmetric bilateral perinephric soft tissue stranding which is  of uncertain age and etiology.    The liver is normal without contrast. High-density material seen in  the dependent portion of the gallbladder which may be milk-of-calcium  bile or multiple tiny gallstones. Correlation with ultrasound is  recommended if clinically indicated. The spleen, pancreas, bilateral  adrenal glands and kidneys bilaterally without contrast appear normal.  The bowel and mesentery in the upper abdomen are unremarkable.    Scans through the pelvis show no acute abnormality or free fluid. The  appendix is identified and appears normal.      Impression    IMPRESSION:  1. Mild symmetric perinephric soft tissue stranding  bilaterally of  uncertain age and etiology.  2. Milk-of-calcium bile versus cholelithiasis. Ultrasound exam could  be obtained if clinically indicated.    GIGI BERRY MD

## 2018-09-15 NOTE — CONSULTS
9/15/18 chem dep consult.  Met with patient, she reported that she has IOP treatment set up at [facility] to start this Monday.  She said she had been in treatment at [facility] this summer but did not like it. She said she recently had an evaluation, she did ask some questions about [place] outpatient programs. I encouraged her to follow up with [facility] as she has a scheduled start date and to attend AA or other community support groups.  I did inform her if she began and was looking for different program or other resources she could reach out to me and I left her with my business card.

## 2018-09-16 LAB
ANION GAP SERPL CALCULATED.3IONS-SCNC: 8 MMOL/L (ref 3–14)
BUN SERPL-MCNC: 10 MG/DL (ref 7–30)
CALCIUM SERPL-MCNC: 7.9 MG/DL (ref 8.5–10.1)
CHLORIDE SERPL-SCNC: 106 MMOL/L (ref 94–109)
CO2 SERPL-SCNC: 25 MMOL/L (ref 20–32)
CREAT SERPL-MCNC: 1.65 MG/DL (ref 0.52–1.04)
GFR SERPL CREATININE-BSD FRML MDRD: 34 ML/MIN/1.7M2
GLUCOSE SERPL-MCNC: 92 MG/DL (ref 70–99)
POTASSIUM SERPL-SCNC: 3.7 MMOL/L (ref 3.4–5.3)
SODIUM SERPL-SCNC: 139 MMOL/L (ref 133–144)

## 2018-09-16 PROCEDURE — 25000132 ZZH RX MED GY IP 250 OP 250 PS 637

## 2018-09-16 PROCEDURE — 80048 BASIC METABOLIC PNL TOTAL CA: CPT | Performed by: INTERNAL MEDICINE

## 2018-09-16 PROCEDURE — 12000007 ZZH R&B INTERMEDIATE

## 2018-09-16 PROCEDURE — 36415 COLL VENOUS BLD VENIPUNCTURE: CPT | Performed by: INTERNAL MEDICINE

## 2018-09-16 PROCEDURE — 25000128 H RX IP 250 OP 636: Performed by: INTERNAL MEDICINE

## 2018-09-16 PROCEDURE — 25000132 ZZH RX MED GY IP 250 OP 250 PS 637: Performed by: INTERNAL MEDICINE

## 2018-09-16 PROCEDURE — 99232 SBSQ HOSP IP/OBS MODERATE 35: CPT | Performed by: INTERNAL MEDICINE

## 2018-09-16 RX ORDER — ACETAMINOPHEN 325 MG/1
650 TABLET ORAL EVERY 4 HOURS PRN
Status: DISCONTINUED | OUTPATIENT
Start: 2018-09-16 | End: 2018-09-17 | Stop reason: HOSPADM

## 2018-09-16 RX ORDER — GAUZE BANDAGE 2" X 2"
100 BANDAGE TOPICAL DAILY
Qty: 30 TABLET | Refills: 0 | Status: ON HOLD | OUTPATIENT
Start: 2018-09-16 | End: 2019-02-22

## 2018-09-16 RX ORDER — QUETIAPINE FUMARATE 25 MG/1
25 TABLET, FILM COATED ORAL EVERY 6 HOURS PRN
Qty: 60 TABLET | Refills: 0 | Status: SHIPPED | OUTPATIENT
Start: 2018-09-16 | End: 2018-12-04

## 2018-09-16 RX ORDER — CALCIUM CARBONATE 500 MG/1
1000 TABLET, CHEWABLE ORAL
Status: DISCONTINUED | OUTPATIENT
Start: 2018-09-16 | End: 2018-09-17 | Stop reason: HOSPADM

## 2018-09-16 RX ORDER — FOLIC ACID 1 MG/1
1 TABLET ORAL DAILY
Status: DISCONTINUED | OUTPATIENT
Start: 2018-09-16 | End: 2018-09-17 | Stop reason: HOSPADM

## 2018-09-16 RX ORDER — ACETAMINOPHEN 650 MG/1
650 SUPPOSITORY RECTAL EVERY 4 HOURS PRN
Status: DISCONTINUED | OUTPATIENT
Start: 2018-09-16 | End: 2018-09-17 | Stop reason: HOSPADM

## 2018-09-16 RX ORDER — LANOLIN ALCOHOL/MO/W.PET/CERES
100 CREAM (GRAM) TOPICAL DAILY
Status: DISCONTINUED | OUTPATIENT
Start: 2018-09-16 | End: 2018-09-17 | Stop reason: HOSPADM

## 2018-09-16 RX ORDER — CEPHALEXIN 500 MG/1
500 CAPSULE ORAL EVERY 12 HOURS
Qty: 8 CAPSULE | Refills: 0 | Status: ON HOLD | OUTPATIENT
Start: 2018-09-17 | End: 2019-02-22

## 2018-09-16 RX ORDER — FEXOFENADINE HCL 60 MG/1
60 TABLET, FILM COATED ORAL DAILY
Status: DISCONTINUED | OUTPATIENT
Start: 2018-09-16 | End: 2018-09-17 | Stop reason: HOSPADM

## 2018-09-16 RX ORDER — CEPHALEXIN 500 MG/1
500 CAPSULE ORAL EVERY 12 HOURS
Status: DISCONTINUED | OUTPATIENT
Start: 2018-09-16 | End: 2018-09-17 | Stop reason: HOSPADM

## 2018-09-16 RX ORDER — FOLIC ACID 1 MG/1
1 TABLET ORAL DAILY
Qty: 30 TABLET | Refills: 0 | Status: ON HOLD | OUTPATIENT
Start: 2018-09-16 | End: 2019-02-22

## 2018-09-16 RX ADMIN — SODIUM CHLORIDE: 9 INJECTION, SOLUTION INTRAVENOUS at 00:33

## 2018-09-16 RX ADMIN — Medication 100 MG: at 13:10

## 2018-09-16 RX ADMIN — QUETIAPINE FUMARATE 25 MG: 25 TABLET ORAL at 00:05

## 2018-09-16 RX ADMIN — ACETAMINOPHEN 650 MG: 325 TABLET, FILM COATED ORAL at 00:52

## 2018-09-16 RX ADMIN — QUETIAPINE FUMARATE 25 MG: 25 TABLET ORAL at 21:40

## 2018-09-16 RX ADMIN — ACETAMINOPHEN 650 MG: 325 TABLET, FILM COATED ORAL at 17:28

## 2018-09-16 RX ADMIN — CEPHALEXIN 500 MG: 500 CAPSULE ORAL at 23:39

## 2018-09-16 RX ADMIN — Medication 1 MG: at 21:40

## 2018-09-16 RX ADMIN — CEPHALEXIN 500 MG: 500 CAPSULE ORAL at 13:10

## 2018-09-16 RX ADMIN — FOLIC ACID 1 MG: 1 TABLET ORAL at 13:10

## 2018-09-16 RX ADMIN — Medication 1 MG: at 00:05

## 2018-09-16 RX ADMIN — FEXOFENADINE 60 MG: 60 TABLET, FILM COATED ORAL at 18:56

## 2018-09-16 RX ADMIN — RANITIDINE 150 MG: 150 TABLET ORAL at 13:10

## 2018-09-16 RX ADMIN — CALCIUM CARBONATE (ANTACID) CHEW TAB 500 MG 1000 MG: 500 CHEW TAB at 06:50

## 2018-09-16 RX ADMIN — SODIUM CHLORIDE: 9 INJECTION, SOLUTION INTRAVENOUS at 06:51

## 2018-09-16 ASSESSMENT — ACTIVITIES OF DAILY LIVING (ADL)
ADLS_ACUITY_SCORE: 11
ADLS_ACUITY_SCORE: 11
ADLS_ACUITY_SCORE: 12
ADLS_ACUITY_SCORE: 11

## 2018-09-16 ASSESSMENT — PAIN DESCRIPTION - DESCRIPTORS: DESCRIPTORS: HEADACHE

## 2018-09-16 NOTE — PLAN OF CARE
Problem: Alcohol Withdrawal Acute, Risk/Actual (Adult)  Goal: Signs and Symptoms of Listed Potential Problems Will be Absent, Minimized or Managed (Alcohol Withdrawal Acute, Risk/Actual)  Signs and symptoms of listed potential problems will be absent, minimized or managed by discharge/transition of care (reference Alcohol Withdrawal Acute, Risk/Actual (Adult) CPG).   PRIMARY DIAGNOSIS: Acute Kidney Injury  GOALS TO BE MET BEFORE DISCHARGE:  1. ADLs back to baseline: No. CIWA (ETOH withdrawal assessment) score is 7.    2. Activity and level of assistance: Up with standby assistance.    3. Pain status: Improved-controlled with oral pain medications.    4. Return to near baseline physical activity: No. Weakness with unsteady gait.  Patient is a possible discharge tomorrow into a Rehab unit located in [place], [place].     Discharge Planner Nurse   Safe discharge environment identified: No  Barriers to discharge: Yes. CIWA score is 7, No noted seizure activity noted.  Patient alert & orient x3-4, anxious & intermittent forgetfulness. (+)CSM BUE/BLE. No respiratory or cardiac distress voiced or noted. Ambulates with Stand-by assist & gait belt. No seizure activity noted. Patient denies any chest pain/discomfort. Oral intake without complaints of Nausea/vomiting.

## 2018-09-16 NOTE — PLAN OF CARE
"Problem: Patient Care Overview  Goal: Plan of Care/Patient Progress Review  Outcome: Improving  A&O. VSS on RA. LS clear. Scoring 2, 4, 2 on CIWA, mild anxiety and tremors present. C/o difficulty sleeping, PRN melatonin and Seroquel given. C/o 4/10 back pain, MD notified, PRN tylenol ordered and given. NS infusing at 150 mL/hr. Denies nausea. C/o heartburn, PRN tums given. Up with SBA. Continue with POC.    /86 (BP Location: Right arm)  Pulse 70  Temp 98.7  F (37.1  C) (Oral)  Resp 20  Ht 1.6 m (5' 3\")  Wt 53.5 kg (118 lb)  SpO2 97%  BMI 20.9 kg/m2      "

## 2018-09-16 NOTE — PLAN OF CARE
Problem: Patient Care Overview  Goal: Plan of Care/Patient Progress Review  Pt a/o x4 and forgetful.  Up with SBA.  On oral antibiotic and vitamins.  Tolerating regular diet.  Scoring 3 on CIWA, no Ativan required.  Cr 1.65.  Will continue POC.

## 2018-09-16 NOTE — PROGRESS NOTES
[facility]    Hospitalist Progress Note  Name: Alana Mujica    MRN: [id number]  Provider: Sofía Gomez MD  Date of Service: 09/16/2018    Assessment & Plan   Summary of Stay: Alana Mujica is a 42 year old female with history of chronic alcohol abuse presented to the ER with symptoms of not feeling well.  She was found to have severe hyponatremia, AK I and signs of alcohol withdrawal.  She also has a recent diagnosis of UTI and was given ciprofloxacin.  She was admitted on 9/14/2018 for RUPINDER, UTI, hyponatremia and alcohol withdrawal.    AK I resolved.  E. coli UTI seems to be responsive to cephalosporins will switch to Keflex as patient was not able to tolerate ciprofloxacin.  She continues to feel anxious and is tremulous somewhat today.  Patient is determined to undergo treatment and would like to be discharged tomorrow prior to her intake.  Feels unsafe to be discharged today given her anxiety and tremors.    Acute kidney injury  --Secondary to poor oral intake and volume depletion  --Was on Cipro prior to admission  --IV fluids initially discontinued as tolerating p.o.  --Resolved  -- DC IV fluids  -- encourage PO    UTI  --Secondary to E. Coli  --Started on IV ceftriaxone  --Unable to tolerate p.o. Cipro secondary to nausea and vomiting.  -- will switch to Keflex today    Hyponatremia  --Resolved sodium today was 138    Significant nausea and vomiting  --Likely secondary to Cipro,  UTI, hyponatremia cannot exclude alcoholic gastritis  --Started on Pepcid    Bulimia and eating disorder  --Presented with poor oral intake  --Denies inducing vomiting.  --Patient has history and cannot exclude eating disorder for poor oral intake    Possible alcohol withdrawal/ continues to feel shaky and is tremulous  --On CIWA protocol with Lorazepam  --Thiamine folate and multivitamin  -Cd scheduled for outpt treatment 9/17 for intake    DVT Prophylaxis: Pneumatic Compression Devices  Code Status: Full  Code    Disposition: Expected discharge tomorrow if not severely withdrawing.  Patient is scheduled for chemical dependency treatment intake on 117.  Will discharge tomorrow so she could make to her appointment if she remains stable    Interval History   Patient complains of feeling shaky unsteady and tremulous.  She is determined to treat her dependency.  She does have a scheduled appointment tomorrow and would like to be discharged before noon tomorrow so that she could make her intake appointment.  She feels unsafe today given her tremors and anxiety.  Review of all the other systems negative    -Data reviewed today: I reviewed all new labs and imaging reports over the last 24 hours. I personally reviewed no images or EKG's today.    Physical Exam   Temp: 98.7  F (37.1  C) Temp src: Oral BP: 137/86 Pulse: 70 Heart Rate: 61 Resp: 20 SpO2: 97 % O2 Device: None (Room air)    Vitals:    09/14/18 1150   Weight: 53.5 kg (118 lb)     Vital Signs with Ranges  Temp:  [97.1  F (36.2  C)-98.9  F (37.2  C)] 98.7  F (37.1  C)  Pulse:  [70-79] 70  Heart Rate:  [61-80] 61  Resp:  [16-20] 20  BP: (120-139)/(86-88) 137/86  SpO2:  [97 %-98 %] 97 %  I/O last 3 completed shifts:  In: 5307 [P.O.:2150; I.V.:3157]  Out: -       GEN:  Alert, oriented x 3, appears ill but comfortable, somewhat shaky.  HEENT:  Normocephalic/atraumatic, no scleral icterus, no nasal discharge, mouth dry.  CV:  Regular rate and rhythm, no murmur or JVD.  S1 + S2 noted, no S3 or S4.  LUNGS:  Clear to auscultation bilaterally without rales/rhonchi/wheezing/retractions.  Symmetric chest rise on inhalation noted.  ABD:  Active bowel sounds, soft, non-tender, mildly distended.  No rebound/guarding/rigidity.  EXT:  No edema.  No cyanosis.  No acute joint synovitis noted.  SKIN:  Dry to touch, no exanthems noted in the visualized areas.  NEURO:  + Fine tremors otherwise symmetric muscle strength, sensation to touch grossly intact.  Hands tremulous on exam.  No new  focal deficits appreciated.    Medications     - MEDICATION INSTRUCTIONS -       sodium chloride 150 mL/hr at 09/16/18 0651       cefTRIAXone  1 g Intravenous Q24H     famotidine  20 mg Intravenous Q24H     IV Fluid with vitamins   Intravenous Q24H     Data       Recent Labs  Lab 09/14/18  0853   WBC 7.2   HGB 13.5   HCT 39.1   MCV 94          Recent Labs  Lab 09/16/18  0618 09/15/18  0606 09/14/18  1515    138 128*   POTASSIUM 3.7 3.9 3.7   CHLORIDE 106 103 93*   CO2 25 27 29   ANIONGAP 8 8 6   GLC 92 89 127*   BUN 10 12 13   CR 1.65* 2.51* 3.13*   GFRESTIMATED 34* 21* 16*   GFRESTBLACK 41* 25* 19*   KODY 7.9* 8.1* 8.1*       Recent Labs  Lab 09/11/18  1151   CULT 10,000 to 50,000 colonies/mLEscherichia coli*  <10,000 colonies/mLurogenital floraSusceptibility testing not routinely done       Recent Labs  Lab 09/14/18  0853   AST 27   ALT 16   ALKPHOS 85   BILITOTAL 1.1     No results for input(s): INR in the last 168 hours.  No results for input(s): LACT in the last 168 hours.  No results for input(s): TROPONIN, TROPI, TROPR in the last 168 hours.    Invalid input(s): TROP, TROPONINIES    Recent Labs  Lab 09/14/18  0804   COLOR Yellow   APPEARANCE Clear   URINEGLC Negative   URINEBILI Negative   URINEKETONE Negative   SG 1.002*   UBLD Negative   URINEPH 7.0   PROTEIN Negative   NITRITE Negative   LEUKEST Negative   RBCU <1   WBCU 2       No results found for this or any previous visit (from the past 24 hour(s)).

## 2018-09-16 NOTE — PROGRESS NOTES
"CLINICAL NUTRITION SERVICES  -  ASSESSMENT NOTE    Recommendations Ordered by Registered Dietitian (RD):   - Continue Regular diet as ordered   Malnutrition: unable to fully assess     REASON FOR ASSESSMENT  Alana Mujica is a 42 year old female seen by Registered Dietitian for Admission Nutrition Risk Screen - reduced oral intakes over the last month.     NUTRITION HISTORY  - Information obtained from Chart Review  - PMH chronic alcohol abuse, recurrent UTI, bulimia/eating disorder - cannot exclude from cause of poor oral intakes.   - Presented to ED with severe hyponatremia, RUPINDER, signs of alcohol withdrawal. Nausea/vomiting reported, patient denies induced vomiting recently. She feels she has not eaten well lately but was unable to state if cause was lack of appetite or due to fear of weight gain.   - Patient hopes to discharge to treatment center for CD.   - Alcohol use has been documented as 21 glasses of wine, 14 beers weekly. Last drink the evening PTA.   - Pt follows a ?regular diet   - NKFA      CURRENT NUTRITION ORDERS  Diet Order:     Regular (upgraded from clears yesterday evening)    Current Intake/Tolerance:  Tolerating solids.   BM x1 9/15    PHYSICAL FINDINGS  Observed  Unable to observe patient  Obtained from Chart/Interdisciplinary Team  Ill but comfortable, somewhat shaky.   Planned discharge tomorrow for CD intake    ANTHROPOMETRICS  Height: 5' 3\"  Weight: 118 lbs 0 oz (53.5 kg)   Body mass index is 20.9 kg/(m^2).  Weight Status:  Normal BMI  IBW: 52.3 kg  % IBW: 102%  Weight History: Weight appears stable over the past ~5 months, potential for up to 10# loss in the past 10 months (7.8%).   Wt Readings from Last 10 Encounters:   09/14/18 53.5 kg (118 lb)   08/26/18 53.3 kg (117 lb 8 oz)   06/22/18 54 kg (119 lb)   06/04/18 54 kg (119 lb)   04/05/18 53.1 kg (117 lb 1.6 oz)   12/08/17 58.2 kg (128 lb 3.2 oz)   11/17/17 55.1 kg (121 lb 8 oz)   09/15/16 50.3 kg (111 lb)   03/01/16 54.4 kg (120 lb) "   02/12/16 50.6 kg (111 lb 9.6 oz)       LABS  Labs reviewed    MEDICATIONS  Medications reviewed  Folic acid 1 mg, Thiamine 100 mg daily    ASSESSED NUTRITION NEEDS PER APPROVED PRACTICE GUIDELINES:  Dosing Weight 53.5 kg  Estimated Energy Needs: 6125-8045 kcals (25-30 Kcal/Kg)  Justification: maintenance  Estimated Protein Needs: 54-64 grams protein (1.2-1.5 g pro/Kg)  Justification: preservation of lean body mass  Estimated Fluid Needs: 7476-9577 mL (1 mL/Kcal)  Justification: maintenance    MALNUTRITION:  % Weight Loss:  Weight loss does not meet criteria for malnutrition   % Intake:  Unable to assess  Subcutaneous Fat Loss:  Unable to assess  Muscle Loss:  Unable to assess  Fluid Retention:  None noted    Malnutrition Diagnosis: Unable to fully assess at this time.     NUTRITION DIAGNOSIS:  Inadequate oral intake related to decreased appetite 2/2 withdrawal, nausea/vomiting, ?impact of eating disorder hx as evidenced by intakes <50% x at least 2 days since admission, now tolerating solids.       NUTRITION INTERVENTIONS  Recommendations / Nutrition Prescription  Diet per MD      Implementation  Nutrition education: Per Provider order if indicated       Nutrition Goals  Pt to tolerate at least 75% meals TID.       MONITORING AND EVALUATION:  Progress towards goals will be monitored and evaluated per protocol and Practice Guidelines        Nelli Barone RD, LD  3rd floor/ICU: [phone]  All other floors: [phone]  Weekend/holiday: [phone]  Office: [phone]

## 2018-09-17 VITALS
TEMPERATURE: 98.7 F | RESPIRATION RATE: 16 BRPM | WEIGHT: 118 LBS | DIASTOLIC BLOOD PRESSURE: 84 MMHG | HEIGHT: 63 IN | SYSTOLIC BLOOD PRESSURE: 136 MMHG | HEART RATE: 57 BPM | OXYGEN SATURATION: 98 % | BODY MASS INDEX: 20.91 KG/M2

## 2018-09-17 LAB
ACETAMINOPHEN QUAL: NEGATIVE
AMANTADINE: NEGATIVE
AMITRIPTYLINE QUAL: NEGATIVE
AMOXAPINE: NEGATIVE
AMPHETAMINES QUAL: NEGATIVE
ANION GAP SERPL CALCULATED.3IONS-SCNC: 6 MMOL/L (ref 3–14)
ATROPINE: NEGATIVE
BENZODIAZ UR QL: NEGATIVE
BUN SERPL-MCNC: 7 MG/DL (ref 7–30)
CAFFEINE QUAL: POSITIVE
CALCIUM SERPL-MCNC: 8.4 MG/DL (ref 8.5–10.1)
CANNABINOIDS UR QL SCN: NEGATIVE
CARBAMAZEPINE QUAL: NEGATIVE
CHLORIDE SERPL-SCNC: 107 MMOL/L (ref 94–109)
CHLORPHENIRAMINE: NEGATIVE
CHLORPROMAZINE: NEGATIVE
CITALOPRAM QUAL: POSITIVE
CLOMIPRAMINE QUAL: NEGATIVE
CO2 SERPL-SCNC: 27 MMOL/L (ref 20–32)
COCAINE QUAL: NEGATIVE
COCAINE UR QL: NEGATIVE
CODEINE QUAL: NEGATIVE
CREAT SERPL-MCNC: 1.38 MG/DL (ref 0.52–1.04)
DESIPRAMINE QUAL: NEGATIVE
DEXTROMETHORPHAN: NEGATIVE
DIPHENHYDRAMINE: NEGATIVE
DOXEPIN/METABOLITE: NEGATIVE
DOXYLAMINE: NEGATIVE
EPHEDRINE OR PSEUDO: NEGATIVE
FENTANYL QUAL: NEGATIVE
FLUOXETINE AND METAB: NEGATIVE
GFR SERPL CREATININE-BSD FRML MDRD: 41 ML/MIN/1.7M2
GLUCOSE SERPL-MCNC: 82 MG/DL (ref 70–99)
HYDROCODONE QUAL: NEGATIVE
HYDROMORPHONE QUAL: NEGATIVE
IBUPROFEN QUAL: NEGATIVE
IMIPRAMINE QUAL: NEGATIVE
LAMOTRIGINE QUAL: NEGATIVE
LOXAPINE: NEGATIVE
MAPROTYLINE: NEGATIVE
MDMA QUAL: NEGATIVE
MEPERIDINE QUAL: NEGATIVE
METHAMPHETAMINE: NEGATIVE
METHODONE QUAL: NEGATIVE
MORPHINE QUAL: NEGATIVE
NICOTINE: NEGATIVE
NORTRIPTYLINE QUAL: NEGATIVE
OLANZAPINE QUAL: NEGATIVE
OPIATES UR QL SCN: NEGATIVE
OXYCODONE QUAL: NEGATIVE
PENTAZOCINE: NEGATIVE
PHENCYCLIDINE QUAL: NEGATIVE
PHENMETRAZINE: NEGATIVE
PHENTERMINE: NEGATIVE
PHENYLBUTAZONE: NEGATIVE
PHENYLPROPANOLAMINE: NEGATIVE
POTASSIUM SERPL-SCNC: 3.7 MMOL/L (ref 3.4–5.3)
PROPOXPHENE QUAL: NEGATIVE
PROPRANOLOL QUAL: NEGATIVE
PYRILAMINE: NEGATIVE
SALICYLATE QUAL: NEGATIVE
SODIUM SERPL-SCNC: 140 MMOL/L (ref 133–144)
THEOBROMINE: NEGATIVE
TOPIRAMATE QUAL: NEGATIVE
TRIMIPRAMINE QUAL: NEGATIVE
VENLAFAXINE QUAL: NEGATIVE

## 2018-09-17 PROCEDURE — 99239 HOSP IP/OBS DSCHRG MGMT >30: CPT | Performed by: INTERNAL MEDICINE

## 2018-09-17 PROCEDURE — 25000132 ZZH RX MED GY IP 250 OP 250 PS 637: Performed by: INTERNAL MEDICINE

## 2018-09-17 PROCEDURE — 80048 BASIC METABOLIC PNL TOTAL CA: CPT | Performed by: INTERNAL MEDICINE

## 2018-09-17 PROCEDURE — 25000132 ZZH RX MED GY IP 250 OP 250 PS 637

## 2018-09-17 PROCEDURE — 36415 COLL VENOUS BLD VENIPUNCTURE: CPT | Performed by: INTERNAL MEDICINE

## 2018-09-17 RX ADMIN — FEXOFENADINE 60 MG: 60 TABLET, FILM COATED ORAL at 07:51

## 2018-09-17 RX ADMIN — CEPHALEXIN 500 MG: 500 CAPSULE ORAL at 10:46

## 2018-09-17 RX ADMIN — Medication 100 MG: at 07:52

## 2018-09-17 RX ADMIN — FOLIC ACID 1 MG: 1 TABLET ORAL at 07:51

## 2018-09-17 RX ADMIN — RANITIDINE 150 MG: 150 TABLET ORAL at 07:52

## 2018-09-17 ASSESSMENT — ACTIVITIES OF DAILY LIVING (ADL)
ADLS_ACUITY_SCORE: 11
ADLS_ACUITY_SCORE: 11
ADLS_ACUITY_SCORE: 12

## 2018-09-17 NOTE — DISCHARGE SUMMARY
[facility]    Discharge Summary  Hospitalist    Date of Admission:  9/14/2018  Date of Discharge:  9/17/2018 11:05 AM  Discharging Provider: Aurelio Fraser MD  Date of Service (when I saw the patient): 09/17/18    Discharge Diagnoses      1. AK I resolved.     2. Acute kidney injury     3.  E. coli UTI.    4. Hyponatremia     5. Significant nausea and vomiting     6. Bulimia and eating disorder    History of Present Illness   Alana Mujica is an 42 year old female     Hospital Course    Summary of Stay: Alana Mujica is a 42 year old female with history of chronic alcohol abuse presented to the ER with symptoms of not feeling well.  She was found to have severe hyponatremia, AK I and signs of alcohol withdrawal. She also has a recent diagnosis of UTI and was given ciprofloxacin. She was admitted on 9/14/2018 for RUPINDER, UTI, hyponatremia and alcohol withdrawal.     AK I resolved.  E. coli UTI seems to be responsive to cephalosporins will switch to Keflex as patient was not able to tolerate ciprofloxacin. She continues to feel anxious and is tremulous somewhat today.  Patient is determined to undergo treatment.  She stated that her appointment is today and wants to attend.      Acute kidney injury  --Secondary to poor oral intake and volume depletion.  She was given IV fluids.  Cipro was discontinued.  She was given IV antibiotic for her UTI.  On discharge her antibiotic was changed to Keflex to complete course of treatment.     UTI  --Secondary to E. Coli  --Started on IV ceftriaxone in the hospital.  She was unable to tolerate oral ciprofloxacin at home.  On discharge she was put on oral Keflex.  sury     Hyponatremia  --Serum sodium resolved     Significant nausea and vomiting  --Likely secondary to Cipro,  UTI, hyponatremia cannot exclude alcoholic gastritis  --Started on Pepcid     Bulimia and eating disorder  --Presented with poor oral intake     Possible alcohol withdrawal/ continues to  feel shaky and is tremulous  --On CIWA protocol with Lorazepam.  --Thiamine folate and multivitamin  -Cd scheduled for outpt treatment 9/17 for intake     Significant Results and Procedures   Results for orders placed or performed during the hospital encounter of 09/14/18   Abd/pelvis CT no contrast - Stone Protocol    Narrative    CT ABDOMEN AND PELVIS WITHOUT CONTRAST  9/14/2018 10:31 AM    HISTORY: Worsening renal function.    TECHNIQUE: Helical axial scans from the dome of liver through the  pubic symphysis without contrast. Radiation dose for this scan was  reduced using automated exposure control, adjustment of the mA and/or  kV according to patient size, or iterative reconstruction technique.    COMPARISON: None.      FINDINGS: No urinary tract calculi or obstruction bilaterally. There  is mild symmetric bilateral perinephric soft tissue stranding which is  of uncertain age and etiology.    The liver is normal without contrast. High-density material seen in  the dependent portion of the gallbladder which may be milk-of-calcium  bile or multiple tiny gallstones. Correlation with ultrasound is  recommended if clinically indicated. The spleen, pancreas, bilateral  adrenal glands and kidneys bilaterally without contrast appear normal.  The bowel and mesentery in the upper abdomen are unremarkable.    Scans through the pelvis show no acute abnormality or free fluid. The  appendix is identified and appears normal.      Impression    IMPRESSION:  1. Mild symmetric perinephric soft tissue stranding bilaterally of  uncertain age and etiology.  2. Milk-of-calcium bile versus cholelithiasis. Ultrasound exam could  be obtained if clinically indicated.    GIGI BERRY MD         Pending Results   None  Code Status   Full Code       Primary Care Physician   Sabrina Pérez        Discharge Disposition   Discharged to home  Condition at discharge: Stable    Consultations This Hospital Stay   CHEMICAL DEPENDENCY  IP CONSULT    Time Spent on this Encounter   I, Aurelio Fraser MD, personally saw the patient today and spent greater than 30 minutes discharging this patient.    Discharge Orders     Reason for your hospital stay   RUPINDER, UTI     Follow-up and recommended labs and tests    Follow up with primary care provider, Sabrina Pérez, within 7 days     Activity   Your activity upon discharge: activity as tolerated     Full Code     Diet   Follow this diet upon discharge: Orders Placed This Encounter     Regular Diet Adult       Discharge Medications       Allergies   No Known Allergies  Data   Most Recent 3 CBC's:  Recent Labs   Lab Test  09/14/18   0853  08/27/18   0749  08/26/18 2014   WBC  7.2  3.9*  8.5   HGB  13.5  12.4  13.1   MCV  94  97  95   PLT  196  142*  156      Most Recent 3 BMP's:  Recent Labs   Lab Test  09/17/18   0649  09/16/18   0618  09/15/18   0606   NA  140  139  138   POTASSIUM  3.7  3.7  3.9   CHLORIDE  107  106  103   CO2  27  25  27   BUN  7  10  12   CR  1.38*  1.65*  2.51*   ANIONGAP  6  8  8   KODY  8.4*  7.9*  8.1*   GLC  82  92  89     Most Recent 2 LFT's:  Recent Labs   Lab Test  09/14/18   0853  08/26/18 2014   AST  27  39   ALT  16  32   ALKPHOS  85  91   BILITOTAL  1.1  1.0     Most Recent INR's and Anticoagulation Dosing History:  Anticoagulation Dose History     Recent Dosing and Labs Latest Ref Rng & Units 8/26/2018    INR 0.86 - 1.14 1.02        Most Recent 3 Troponin's:No lab results found.  Most Recent Cholesterol Panel:  Recent Labs   Lab Test  11/24/15   0812   CHOL  236*   LDL  107*   HDL  114   TRIG  74     Most Recent 6 Bacteria Isolates From Any Culture (See EPIC Reports for Culture Details):  Recent Labs   Lab Test  09/11/18   1151  04/05/18   1137  02/01/16   1415  11/24/15   0718   CULT  10,000 to 50,000 colonies/mL  Escherichia coli  *  <10,000 colonies/mL  urogenital sasha  Susceptibility testing not routinely done    Questionable specimen  No growth   >100,000 colonies/mL mixed urogenital sasha     Most Recent TSH, T4 and A1c Labs:  Recent Labs   Lab Test  11/24/15   0812   TSH  1.98

## 2018-09-17 NOTE — PLAN OF CARE
Problem: Patient Care Overview  Goal: Plan of Care/Patient Progress Review  Pt a/o x4, up independently in room.  Cr 1.38.  Scoring 3 on CIWA.  Plan to DC today and go to her scheduled outpt treatment in [place].  Will continue POC.      Addendum:  DC paperwork gone over with pt with understanding.  All belongings packed up to be sent with pt. Parents transporting pt to outpt therapy.  Keflex, thiamine, folic acid and seroquel to be sent with pt upon DC.

## 2018-09-17 NOTE — PLAN OF CARE
"Problem: Patient Care Overview  Goal: Plan of Care/Patient Progress Review  Outcome: Improving  A&O. BP elevated, other VSS on RA. LS clear. CIWA 2, 2, tremors present. Denies pain, SOB, nausea. Up with SBA. On PO keflex. Plan to discharge today, continue with POC.    /77 (BP Location: Right arm)  Pulse 57  Temp 98.1  F (36.7  C) (Oral)  Resp 20  Ht 1.6 m (5' 3\")  Wt 53.5 kg (118 lb)  SpO2 96%  BMI 20.9 kg/m2      "

## 2018-09-18 ENCOUNTER — TELEPHONE (OUTPATIENT)
Dept: FAMILY MEDICINE | Facility: CLINIC | Age: 43
End: 2018-09-18

## 2018-09-18 NOTE — TELEPHONE ENCOUNTER
Please contact patient for In-patient follow up.  [phone] (home) [phone] (work)    Visit date: 9/17/2018  Diagnosis listed:Adjustment Disorder with Anxious Mood, Alcohol Dependence with Withdrawal with Complication  Number of visits in past 12 months:1/1

## 2018-09-19 NOTE — TELEPHONE ENCOUNTER
"Pt calls back.      ED/Discharge Protocol    \"Hi, my name is Gabriela HUGHESGee Ochoa, a registered nurse, and I am calling on behalf of Sabrina Pérez's office at [facility].  I am calling to follow up and see how things are going for you after your recent visit.\"    \"I see that you were in the (ER/UC/IP) on 9/14/18.    How are you doing now that you are home?\" doing ok, on antibiotics, otherwise doing good    Is patient experiencing symptoms that may require a hospital visit?  no    Discharge Instructions    \"Let's review your discharge instructions.  What is/are the follow-up recommendations?  Pt. Response: She is to make an appt with Sabrina Pérez    \"Were you instructed to make a follow-up appointment?\"  Pt. Response: Yes.  Has appointment been made?   No.  \"Can I help you schedule that appointment?\" yes      \"When you see the provider, I would recommend that you bring your discharge instructions with you.    Medications    \"How many new medications are you on since your hospitalization/ED visit?\"    2 or more, declined MTM visit - antibiotics, vitamins, seroquel  \"How many of your current medicines changed (dose, timing, name, etc.) while you were in the hospital/ED visit?\"   0-1  \"Do you have questions about your medications?\"   No  \"Were you newly diagnosed with heart failure, COPD, diabetes or did you have a heart attack?\"   No  For patients on insulin: \"Did you start on insulin in the hospital or did you have your insulin dose changed?\"   No    Medication reconciliation completed? Yes    Was MTM referral placed (*Make sure to put transitions as reason for referral)?   No    Call Summary    \"Do you have any questions or concerns about your condition or care plan at the moment?\"    No  Triage nurse advice given: none    Patient was in ER four times in the past year (assess appropriateness of ER visits.)      \"If you have questions or things don't continue to improve, we encourage you contact us through the " "main clinic number,  [phone].  Even if the clinic is not open, triage nurses are available 24/7 to help you.     We would like you to know that our clinic has extended hours (provide information).  We also have urgent care (provide details on closest location and hours/contact info)\"      \"Thank you for your time and take care!\"        "

## 2018-09-26 PROBLEM — R51.9 CHRONIC DAILY HEADACHE: Status: RESOLVED | Noted: 2018-06-29 | Resolved: 2018-09-26

## 2018-09-26 PROBLEM — M54.2 CERVICAL PAIN: Status: RESOLVED | Noted: 2018-06-25 | Resolved: 2018-09-26
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
## 2018-10-22 ENCOUNTER — TELEPHONE (OUTPATIENT)
Dept: FAMILY MEDICINE | Facility: CLINIC | Age: 43
End: 2018-10-22

## 2018-10-22 NOTE — TELEPHONE ENCOUNTER
Called the pt back.      She fell down 3 stairs.  She came down more on her right side.  She was seen at [facility].  They wanted her to do a f/u.  She was to f/u in 7 days.  She says it is not as convenient to go to [place].  She does not think anything is broken.  Her leg is swollen, under her knee she is having pain.      Advised to be seen.  Advised to get records from [facility].      She is thinking she may need to go to ortho.  Advised to call her insurance to see if she needs a referral to see ortho and if not, then she could schedule with them because she says it is hard to go to all of these appointments.

## 2018-10-22 NOTE — TELEPHONE ENCOUNTER
Reason for Call:  Other call back    Detailed comments: Patient was seen at [facility] about 3 weeks ago for knee/leg injury and had xray done. Patient is still having some concerns and she would like to discuss with the nurse. Please call patient back.     Phone Number Patient can be reached at: Home number on file [phone] (home)    Best Time: any    Can we leave a detailed message on this number? Not Applicable    Call taken on 10/22/2018 at 1:31 PM by Geovanna Sands

## 2018-12-04 ENCOUNTER — HOSPITAL ENCOUNTER (OUTPATIENT)
Facility: CLINIC | Age: 43
Setting detail: OBSERVATION
Discharge: HOME | End: 2018-12-05
Attending: EMERGENCY MEDICINE | Admitting: INTERNAL MEDICINE
Payer: COMMERCIAL

## 2018-12-04 ENCOUNTER — APPOINTMENT (OUTPATIENT)
Dept: CT IMAGING | Facility: CLINIC | Age: 43
End: 2018-12-04
Attending: EMERGENCY MEDICINE
Payer: COMMERCIAL

## 2018-12-04 ENCOUNTER — APPOINTMENT (OUTPATIENT)
Dept: GENERAL RADIOLOGY | Facility: CLINIC | Age: 43
End: 2018-12-04
Attending: EMERGENCY MEDICINE
Payer: COMMERCIAL

## 2018-12-04 DIAGNOSIS — F10.939 ALCOHOL WITHDRAWAL SYNDROME WITH COMPLICATION (H): ICD-10-CM

## 2018-12-04 LAB
ALBUMIN SERPL-MCNC: 4.6 G/DL (ref 3.4–5)
ALBUMIN UR-MCNC: NEGATIVE MG/DL
ALP SERPL-CCNC: 111 U/L (ref 40–150)
ALT SERPL W P-5'-P-CCNC: 56 U/L (ref 0–50)
ANION GAP SERPL CALCULATED.3IONS-SCNC: 26 MMOL/L (ref 3–14)
APPEARANCE UR: CLEAR
AST SERPL W P-5'-P-CCNC: 79 U/L (ref 0–45)
BACTERIA #/AREA URNS HPF: ABNORMAL /HPF
BASOPHILS # BLD AUTO: 0.1 10E9/L (ref 0–0.2)
BASOPHILS NFR BLD AUTO: 0.6 %
BILIRUB SERPL-MCNC: 0.7 MG/DL (ref 0.2–1.3)
BILIRUB UR QL STRIP: NEGATIVE
BUN SERPL-MCNC: 20 MG/DL (ref 7–30)
CALCIUM SERPL-MCNC: 9.1 MG/DL (ref 8.5–10.1)
CHLORIDE SERPL-SCNC: 101 MMOL/L (ref 94–109)
CO2 SERPL-SCNC: 11 MMOL/L (ref 20–32)
COLOR UR AUTO: ABNORMAL
CREAT SERPL-MCNC: 0.86 MG/DL (ref 0.52–1.04)
D DIMER PPP FEU-MCNC: 1.9 UG/ML FEU (ref 0–0.5)
DIFFERENTIAL METHOD BLD: ABNORMAL
EOSINOPHIL # BLD AUTO: 0 10E9/L (ref 0–0.7)
EOSINOPHIL NFR BLD AUTO: 0 %
ERYTHROCYTE [DISTWIDTH] IN BLOOD BY AUTOMATED COUNT: 12.3 % (ref 10–15)
GFR SERPL CREATININE-BSD FRML MDRD: 71 ML/MIN/1.7M2
GLUCOSE SERPL-MCNC: 71 MG/DL (ref 70–99)
GLUCOSE UR STRIP-MCNC: NEGATIVE MG/DL
HCT VFR BLD AUTO: 44.3 % (ref 35–47)
HGB BLD-MCNC: 14 G/DL (ref 11.7–15.7)
HGB UR QL STRIP: NEGATIVE
IMM GRANULOCYTES # BLD: 0.1 10E9/L (ref 0–0.4)
IMM GRANULOCYTES NFR BLD: 0.5 %
INTERPRETATION ECG - MUSE: NORMAL
KETONES UR STRIP-MCNC: 80 MG/DL
LACTATE BLD-SCNC: 3.8 MMOL/L (ref 0.7–2)
LACTATE BLD-SCNC: 6.5 MMOL/L (ref 0.7–2)
LACTATE SERPL-SCNC: 0.8 MMOL/L (ref 0.4–2)
LEUKOCYTE ESTERASE UR QL STRIP: NEGATIVE
LIPASE SERPL-CCNC: 277 U/L (ref 73–393)
LYMPHOCYTES # BLD AUTO: 0.5 10E9/L (ref 0.8–5.3)
LYMPHOCYTES NFR BLD AUTO: 4 %
MCH RBC QN AUTO: 32.7 PG (ref 26.5–33)
MCHC RBC AUTO-ENTMCNC: 31.6 G/DL (ref 31.5–36.5)
MCV RBC AUTO: 104 FL (ref 78–100)
MONOCYTES # BLD AUTO: 0.7 10E9/L (ref 0–1.3)
MONOCYTES NFR BLD AUTO: 5.6 %
NEUTROPHILS # BLD AUTO: 11.4 10E9/L (ref 1.6–8.3)
NEUTROPHILS NFR BLD AUTO: 89.3 %
NITRATE UR QL: NEGATIVE
NRBC # BLD AUTO: 0 10*3/UL
NRBC BLD AUTO-RTO: 0 /100
PH UR STRIP: 5 PH (ref 5–7)
PLATELET # BLD AUTO: 197 10E9/L (ref 150–450)
POTASSIUM SERPL-SCNC: 4.1 MMOL/L (ref 3.4–5.3)
PROT SERPL-MCNC: 8.8 G/DL (ref 6.8–8.8)
RBC # BLD AUTO: 4.28 10E12/L (ref 3.8–5.2)
RBC #/AREA URNS AUTO: <1 /HPF (ref 0–2)
SODIUM SERPL-SCNC: 138 MMOL/L (ref 133–144)
SOURCE: ABNORMAL
SP GR UR STRIP: 1.01 (ref 1–1.03)
TROPONIN I SERPL-MCNC: <0.015 UG/L (ref 0–0.04)
TSH SERPL DL<=0.005 MIU/L-ACNC: 0.75 MU/L (ref 0.4–4)
UROBILINOGEN UR STRIP-MCNC: 0 MG/DL (ref 0–2)
WBC # BLD AUTO: 12.7 10E9/L (ref 4–11)
WBC #/AREA URNS AUTO: 1 /HPF (ref 0–5)

## 2018-12-04 PROCEDURE — 25000125 ZZHC RX 250: Performed by: PHYSICIAN ASSISTANT

## 2018-12-04 PROCEDURE — 25000132 ZZH RX MED GY IP 250 OP 250 PS 637: Performed by: PHYSICIAN ASSISTANT

## 2018-12-04 PROCEDURE — 96376 TX/PRO/DX INJ SAME DRUG ADON: CPT

## 2018-12-04 PROCEDURE — 36415 COLL VENOUS BLD VENIPUNCTURE: CPT | Performed by: PHYSICIAN ASSISTANT

## 2018-12-04 PROCEDURE — 96361 HYDRATE IV INFUSION ADD-ON: CPT

## 2018-12-04 PROCEDURE — 83605 ASSAY OF LACTIC ACID: CPT | Performed by: EMERGENCY MEDICINE

## 2018-12-04 PROCEDURE — 12000007 ZZH R&B INTERMEDIATE

## 2018-12-04 PROCEDURE — 85379 FIBRIN DEGRADATION QUANT: CPT | Performed by: EMERGENCY MEDICINE

## 2018-12-04 PROCEDURE — 71260 CT THORAX DX C+: CPT

## 2018-12-04 PROCEDURE — 84484 ASSAY OF TROPONIN QUANT: CPT | Performed by: EMERGENCY MEDICINE

## 2018-12-04 PROCEDURE — 71046 X-RAY EXAM CHEST 2 VIEWS: CPT

## 2018-12-04 PROCEDURE — 83605 ASSAY OF LACTIC ACID: CPT | Performed by: PHYSICIAN ASSISTANT

## 2018-12-04 PROCEDURE — 25000132 ZZH RX MED GY IP 250 OP 250 PS 637: Performed by: EMERGENCY MEDICINE

## 2018-12-04 PROCEDURE — 96374 THER/PROPH/DIAG INJ IV PUSH: CPT

## 2018-12-04 PROCEDURE — C9113 INJ PANTOPRAZOLE SODIUM, VIA: HCPCS | Performed by: EMERGENCY MEDICINE

## 2018-12-04 PROCEDURE — 84443 ASSAY THYROID STIM HORMONE: CPT | Performed by: PHYSICIAN ASSISTANT

## 2018-12-04 PROCEDURE — 25000128 H RX IP 250 OP 636: Performed by: EMERGENCY MEDICINE

## 2018-12-04 PROCEDURE — 96375 TX/PRO/DX INJ NEW DRUG ADDON: CPT

## 2018-12-04 PROCEDURE — 25000128 H RX IP 250 OP 636: Performed by: PHYSICIAN ASSISTANT

## 2018-12-04 PROCEDURE — 84443 ASSAY THYROID STIM HORMONE: CPT | Performed by: EMERGENCY MEDICINE

## 2018-12-04 PROCEDURE — 93005 ELECTROCARDIOGRAM TRACING: CPT

## 2018-12-04 PROCEDURE — 83690 ASSAY OF LIPASE: CPT | Performed by: EMERGENCY MEDICINE

## 2018-12-04 PROCEDURE — 99223 1ST HOSP IP/OBS HIGH 75: CPT | Mod: AI | Performed by: PHYSICIAN ASSISTANT

## 2018-12-04 PROCEDURE — 81001 URINALYSIS AUTO W/SCOPE: CPT | Performed by: EMERGENCY MEDICINE

## 2018-12-04 PROCEDURE — 80053 COMPREHEN METABOLIC PANEL: CPT | Performed by: EMERGENCY MEDICINE

## 2018-12-04 PROCEDURE — 85025 COMPLETE CBC W/AUTO DIFF WBC: CPT | Performed by: EMERGENCY MEDICINE

## 2018-12-04 PROCEDURE — 99285 EMERGENCY DEPT VISIT HI MDM: CPT | Mod: 25

## 2018-12-04 RX ORDER — LANOLIN ALCOHOL/MO/W.PET/CERES
100 CREAM (GRAM) TOPICAL DAILY
Status: DISCONTINUED | OUTPATIENT
Start: 2018-12-05 | End: 2018-12-05 | Stop reason: HOSPADM

## 2018-12-04 RX ORDER — DIAZEPAM 10 MG/2ML
5 INJECTION, SOLUTION INTRAMUSCULAR; INTRAVENOUS ONCE
Status: COMPLETED | OUTPATIENT
Start: 2018-12-04 | End: 2018-12-04

## 2018-12-04 RX ORDER — DIAZEPAM 5 MG
5 TABLET ORAL ONCE
Status: COMPLETED | OUTPATIENT
Start: 2018-12-04 | End: 2018-12-04

## 2018-12-04 RX ORDER — SODIUM CHLORIDE 9 MG/ML
INJECTION, SOLUTION INTRAVENOUS CONTINUOUS
Status: DISCONTINUED | OUTPATIENT
Start: 2018-12-04 | End: 2018-12-05

## 2018-12-04 RX ORDER — MULTIPLE VITAMINS W/ MINERALS TAB 9MG-400MCG
1 TAB ORAL DAILY
Status: DISCONTINUED | OUTPATIENT
Start: 2018-12-05 | End: 2018-12-05 | Stop reason: HOSPADM

## 2018-12-04 RX ORDER — ONDANSETRON 2 MG/ML
4 INJECTION INTRAMUSCULAR; INTRAVENOUS EVERY 30 MIN PRN
Status: DISCONTINUED | OUTPATIENT
Start: 2018-12-04 | End: 2018-12-04

## 2018-12-04 RX ORDER — LANOLIN ALCOHOL/MO/W.PET/CERES
100 CREAM (GRAM) TOPICAL ONCE
Status: COMPLETED | OUTPATIENT
Start: 2018-12-04 | End: 2018-12-04

## 2018-12-04 RX ORDER — ATENOLOL 25 MG/1
25 TABLET ORAL DAILY
Status: DISCONTINUED | OUTPATIENT
Start: 2018-12-04 | End: 2018-12-05 | Stop reason: HOSPADM

## 2018-12-04 RX ORDER — LORAZEPAM 2 MG/ML
1-2 INJECTION INTRAMUSCULAR EVERY 30 MIN PRN
Status: DISCONTINUED | OUTPATIENT
Start: 2018-12-04 | End: 2018-12-05 | Stop reason: HOSPADM

## 2018-12-04 RX ORDER — FOLIC ACID 1 MG/1
1 TABLET ORAL DAILY
Status: DISCONTINUED | OUTPATIENT
Start: 2018-12-05 | End: 2018-12-05 | Stop reason: HOSPADM

## 2018-12-04 RX ORDER — DIAZEPAM 5 MG
5 TABLET ORAL
Status: COMPLETED | OUTPATIENT
Start: 2018-12-04 | End: 2018-12-04

## 2018-12-04 RX ORDER — NALOXONE HYDROCHLORIDE 0.4 MG/ML
.1-.4 INJECTION, SOLUTION INTRAMUSCULAR; INTRAVENOUS; SUBCUTANEOUS
Status: DISCONTINUED | OUTPATIENT
Start: 2018-12-04 | End: 2018-12-05 | Stop reason: HOSPADM

## 2018-12-04 RX ORDER — THIAMINE HYDROCHLORIDE 100 MG/ML
100 INJECTION, SOLUTION INTRAMUSCULAR; INTRAVENOUS ONCE
Status: DISCONTINUED | OUTPATIENT
Start: 2018-12-04 | End: 2018-12-04

## 2018-12-04 RX ORDER — QUETIAPINE FUMARATE 25 MG/1
25-50 TABLET, FILM COATED ORAL EVERY 6 HOURS PRN
Status: DISCONTINUED | OUTPATIENT
Start: 2018-12-04 | End: 2018-12-05 | Stop reason: HOSPADM

## 2018-12-04 RX ORDER — SODIUM CHLORIDE 9 MG/ML
INJECTION, SOLUTION INTRAVENOUS CONTINUOUS
Status: DISCONTINUED | OUTPATIENT
Start: 2018-12-04 | End: 2018-12-04

## 2018-12-04 RX ORDER — IOPAMIDOL 755 MG/ML
500 INJECTION, SOLUTION INTRAVASCULAR ONCE
Status: COMPLETED | OUTPATIENT
Start: 2018-12-04 | End: 2018-12-04

## 2018-12-04 RX ORDER — MULTIVITAMIN,THERAPEUTIC
1 TABLET ORAL DAILY
COMMUNITY
End: 2019-04-30

## 2018-12-04 RX ORDER — FOLIC ACID 1 MG/1
1 TABLET ORAL ONCE
Status: COMPLETED | OUTPATIENT
Start: 2018-12-04 | End: 2018-12-04

## 2018-12-04 RX ORDER — ACETAMINOPHEN 325 MG/1
650 TABLET ORAL EVERY 4 HOURS PRN
Status: DISCONTINUED | OUTPATIENT
Start: 2018-12-04 | End: 2018-12-05 | Stop reason: HOSPADM

## 2018-12-04 RX ORDER — VALACYCLOVIR HYDROCHLORIDE 500 MG/1
1000 TABLET, FILM COATED ORAL PRN
COMMUNITY
End: 2019-12-13

## 2018-12-04 RX ORDER — LORAZEPAM 1 MG/1
1-2 TABLET ORAL EVERY 30 MIN PRN
Status: DISCONTINUED | OUTPATIENT
Start: 2018-12-04 | End: 2018-12-05 | Stop reason: HOSPADM

## 2018-12-04 RX ORDER — ONDANSETRON 2 MG/ML
4 INJECTION INTRAMUSCULAR; INTRAVENOUS ONCE
Status: COMPLETED | OUTPATIENT
Start: 2018-12-04 | End: 2018-12-04

## 2018-12-04 RX ORDER — SODIUM CHLORIDE 9 MG/ML
1000 INJECTION, SOLUTION INTRAVENOUS CONTINUOUS
Status: DISCONTINUED | OUTPATIENT
Start: 2018-12-04 | End: 2018-12-04

## 2018-12-04 RX ORDER — MULTIVITAMIN WITH IRON
1 TABLET ORAL DAILY
Status: ON HOLD | COMMUNITY
End: 2022-03-17

## 2018-12-04 RX ADMIN — FOLIC ACID 1 MG: 1 TABLET ORAL at 12:31

## 2018-12-04 RX ADMIN — Medication 5 MG: at 11:13

## 2018-12-04 RX ADMIN — SODIUM CHLORIDE: 9 INJECTION, SOLUTION INTRAVENOUS at 18:45

## 2018-12-04 RX ADMIN — ONDANSETRON 4 MG: 2 INJECTION INTRAMUSCULAR; INTRAVENOUS at 11:13

## 2018-12-04 RX ADMIN — SODIUM CHLORIDE: 9 INJECTION, SOLUTION INTRAVENOUS at 13:14

## 2018-12-04 RX ADMIN — Medication 1 MG: at 22:09

## 2018-12-04 RX ADMIN — Medication 5 MG: at 14:03

## 2018-12-04 RX ADMIN — ACETAMINOPHEN 650 MG: 325 TABLET, FILM COATED ORAL at 18:53

## 2018-12-04 RX ADMIN — SODIUM CHLORIDE 82 ML: 9 INJECTION, SOLUTION INTRAVENOUS at 15:45

## 2018-12-04 RX ADMIN — DIAZEPAM 5 MG: 5 TABLET ORAL at 16:11

## 2018-12-04 RX ADMIN — LORAZEPAM 1 MG: 1 TABLET ORAL at 18:51

## 2018-12-04 RX ADMIN — PANTOPRAZOLE SODIUM 40 MG: 40 INJECTION, POWDER, FOR SOLUTION INTRAVENOUS at 14:03

## 2018-12-04 RX ADMIN — FOLIC ACID: 5 INJECTION, SOLUTION INTRAMUSCULAR; INTRAVENOUS; SUBCUTANEOUS at 18:45

## 2018-12-04 RX ADMIN — Medication 5 MG: at 14:48

## 2018-12-04 RX ADMIN — ATENOLOL 25 MG: 25 TABLET ORAL at 18:35

## 2018-12-04 RX ADMIN — ONDANSETRON 4 MG: 2 INJECTION INTRAMUSCULAR; INTRAVENOUS at 14:03

## 2018-12-04 RX ADMIN — Medication 100 MG: at 12:31

## 2018-12-04 RX ADMIN — SODIUM CHLORIDE 1000 ML: 9 INJECTION, SOLUTION INTRAVENOUS at 12:31

## 2018-12-04 RX ADMIN — SODIUM CHLORIDE 1000 ML: 9 INJECTION, SOLUTION INTRAVENOUS at 11:07

## 2018-12-04 RX ADMIN — DIAZEPAM 5 MG: 5 TABLET ORAL at 12:31

## 2018-12-04 RX ADMIN — IOPAMIDOL 60 ML: 755 INJECTION, SOLUTION INTRAVENOUS at 15:45

## 2018-12-04 ASSESSMENT — ENCOUNTER SYMPTOMS
NAUSEA: 1
ABDOMINAL PAIN: 1
CHILLS: 0
PALPITATIONS: 1
FEVER: 0
VOMITING: 1
SHORTNESS OF BREATH: 1
DIARRHEA: 0

## 2018-12-04 ASSESSMENT — ACTIVITIES OF DAILY LIVING (ADL): ADLS_ACUITY_SCORE: 11

## 2018-12-04 NOTE — ED NOTES
[facility]  ED Nurse Handoff Report    Alana Mujica is a 42 year old female   ED Chief complaint: Chest Pain and Delirium Tremens (DTS)  . ED Diagnosis:   Final diagnoses:   Alcohol withdrawal syndrome with complication (H)     Allergies: No Known Allergies    Code Status: Full Code  Activity level - Baseline/Home:  Independent. Activity Level - Current:   Independent. Lift room needed: No. Bariatric: No   Needed: No   Isolation: No. Infection: Not Applicable.     Vital Signs:   Vitals:    12/04/18 1200 12/04/18 1215 12/04/18 1230 12/04/18 1245   BP: 132/86 133/89 134/80 134/89   Pulse:       Resp:       Temp:       SpO2:  100% 100%    Weight:           Cardiac Rhythm:  ,      Pain level: 0-10 Pain Scale: 6  Patient confused: No. Patient Falls Risk: no.   Elimination Status: has not voided yet   Patient Report - Initial Complaint: chest pain for 2 weeks with palpitations.  Pt also in outpatient alcohol treatment and had been sober for 6 weeks, drank this weekend.  Pt last drink was 5pm Monday. Pt has been vomiting at home unable to keep fluids down.. Focused Assessment: tremors,  Tachycardia, headache, nausea   Tests Performed: labs, xray. Abnormal Results:   Labs Ordered and Resulted from Time of ED Arrival Up to the Time of Departure from the ED   CBC WITH PLATELETS DIFFERENTIAL - Abnormal; Notable for the following:        Result Value    WBC 12.7 (*)      (*)     Absolute Neutrophil 11.4 (*)     Absolute Lymphocytes 0.5 (*)     All other components within normal limits   COMPREHENSIVE METABOLIC PANEL - Abnormal; Notable for the following:     Carbon Dioxide 11 (*)     Anion Gap 26 (*)     ALT 56 (*)     AST 79 (*)     All other components within normal limits   LACTIC ACID WHOLE BLOOD - Abnormal; Notable for the following:     Lactic Acid 6.5 (*)     All other components within normal limits   LACTIC ACID WHOLE BLOOD - Abnormal; Notable for the following:     Lactic Acid 3.8 (*)      All other components within normal limits   LIPASE   TROPONIN I   ROUTINE UA WITH MICROSCOPIC   BASIC METABOLIC PANEL   TSH WITH FREE T4 REFLEX     XR Chest 2 Views   Final Result   IMPRESSION:    Heart and pulmonary vessels within normal limits. Lungs clear. No   pleural effusion.. Old right seventh rib fracture.      MARC OROURKE MD        .   Treatments provided: fluids, meds  Family Comments: parents at bedside for part of ED visit.  Aware of admission plan  OBS brochure/video discussed/provided to patient:  No  ED Medications:   Medications   0.9% sodium chloride BOLUS (0 mLs Intravenous Stopped 12/4/18 1232)     Followed by   sodium chloride 0.9% infusion (0 mLs Intravenous Stopped 12/4/18 1232)   ondansetron (ZOFRAN) injection 4 mg (4 mg Intravenous Given 12/4/18 1113)   sodium chloride 0.9% infusion ( Intravenous New Bag 12/4/18 1314)   diazepam (VALIUM) injection 5 mg (5 mg Intravenous Given 12/4/18 1113)   diazepam (VALIUM) tablet 5 mg (5 mg Oral Given 12/4/18 1231)   folic acid (FOLVITE) tablet 1 mg (1 mg Oral Given 12/4/18 1231)   vitamin B1 (THIAMINE) tablet 100 mg (100 mg Oral Given 12/4/18 1231)     Drips infusing:  No  For the majority of the shift, the patient's behavior Green. Interventions performed were none  .     Severe Sepsis OR Septic Shock Diagnosis Present: No      ED Nurse Name/Phone Number: Diamond HINES Wesleymoses,   1:21 PM    RECEIVING UNIT ED HANDOFF REVIEW    Above ED Nurse Handoff Report was reviewed: Yes  Reviewed by: Kathrine Beckwith on December 4, 2018 at 2:10 PM

## 2018-12-04 NOTE — ED TRIAGE NOTES
Pt having chest pain for the past 2 weeks. Pt also withdrawing from alcohol currnirmaly.  Last drink last night at 5pm.  In outpatient treatment currenly but drank the last 3 days.  Was sober for 6 weeks prior. Pt also has been vomiting.
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
## 2018-12-04 NOTE — PHARMACY-ADMISSION MEDICATION HISTORY
Admission medication history interview status for this patient is complete. See [facility] admission navigator for allergy information, prior to admission medications and immunization status.     Medication history interview source(s):Patient  Medication history resources (including written lists, pill bottles, clinic record):None  Primary pharmacy: [facility]     Changes made to PTA medication list:  Added: MVI, folic acid, b complex, valtrex  Deleted: none  Changed: none    Actions taken by pharmacist (provider contacted, etc):None     Additional medication history information:None    Medication reconciliation/reorder completed by provider prior to medication history? No    Do you take OTC medications (eg tylenol, ibuprofen, fish oil, eye/ear drops, etc)? Y (Y/N)    For patients on insulin therapy: N (Y/N)    Prior to Admission medications    Medication Sig Last Dose Taking? Auth Provider   etonogestrel (NEXPLANON) 68 MG IMPL 1 each (68 mg) by Subdermal route once  at in place Yes Sabrina Pérez PA-C   FOLIC ACID PO Take 1 tablet by mouth daily (unknown dose) Past Month at Unknown time Yes Unknown, Entered By History   multivitamin, therapeutic (THERA-VIT) TABS tablet Take 1 tablet by mouth daily Past Month at Unknown time Yes Unknown, Entered By History   vitamin (B COMPLEX-C) tablet Take 1 tablet by mouth daily Past Month at Unknown time Yes Unknown, Entered By History   fluticasone (FLONASE) 50 MCG/ACT spray Spray 1 spray into both nostrils daily as needed for rhinitis or allergies More than a month at Unknown time  Unknown, Entered By History   valACYclovir (VALTREX) 500 MG tablet Take 1,000 mg by mouth as needed (herpes outbreak) More than a month at Unknown time  Unknown, Entered By History

## 2018-12-04 NOTE — H&P
Full H and P Dictated    41 yo hx of etoh abuse here with w/d sxs, nausea and palpitations. Last drink 5:30 pm last noc.   Here she is tremulous but alert no sxs of seizure or dT.   Admit for etoh w/d protocol  Fluids, ativan, MVI etc  SW and chemical dep to see

## 2018-12-04 NOTE — IP AVS SNAPSHOT
MRN:[id number]                      After Visit Summary   12/4/2018    Alana Mujica    MRN: [id number]           Thank you!     Thank you for choosing [facility] for your care. Our goal is always to provide you with excellent care. Hearing back from our patients is one way we can continue to improve our services. Please take a few minutes to complete the written survey that you may receive in the mail after you visit. If you would like to speak to someone directly about your visit please contact Patient Relations at [phone]. Thank you!          Patient Information     Date Of Birth          1975        Designated Caregiver       Most Recent Value    Caregiver    Will someone help with your care after discharge? no    Name of designated caregiver NA      About your hospital stay     You were admitted on:  December 4, 2018 You last received care in the:  [place] [address] [facility]    You were discharged on:  December 5, 2018        Reason for your hospital stay       Dehydration, lactic acidosis-resolved  ?mild active withdrawal                  Who to Call     For medical emergencies, please call 911.  For non-urgent questions about your medical care, please call your primary care provider or clinic, None          Attending Provider     Provider Specialty    Sangeeta Yanes MD Emergency Medicine    Aurelio Fraser MD Internal Medicine       Primary Care Provider Fax #    [facility] [phone]      After Care Instructions     Activity       Your activity upon discharge: activity as tolerated            Diet       Follow this diet upon discharge: Orders Placed This Encounter      Regular Diet Adult                  Follow-up Appointments     Follow-up and recommended labs and tests        Follow up with primary care provider, [facility], within 7 days  Continue CD treatment as scheduled                  Pending Results      "No orders found for last 3 day(s).            Statement of Approval     Ordered          12/05/18 1253  I have reviewed and agree with all the recommendations and orders detailed in this document.  EFFECTIVE NOW     Approved and electronically signed by:  Aurelio Fraser MD             Admission Information     Date & Time Provider Department Dept. Phone    12/4/2018 Aurelio Fraser MD [place] [address] [facility] [phone]      Your Vitals Were     Blood Pressure Pulse Temperature Respirations Height Weight    121/85 143 98.6  F (37  C) (Oral) 20 1.6 m (5' 3\") 53.1 kg (117 lb)    Pulse Oximetry BMI (Body Mass Index)                98% 20.73 kg/m2          MyChart Information     [facility] gives you secure access to your electronic health record. If you see a primary care provider, you can also send messages to your care team and make appointments. If you have questions, please call your primary care clinic.  If you do not have a primary care provider, please call [phone] and they will assist you.        Care EveryWhere ID     This is your Care EveryWhere ID. This could be used by other organizations to access your [place] medical records  [id number]        Equal Access to Services     CARMINE COOK AH: Josh Christianson, waalivia martines, qaybta kaalmada soto, isabel vann. So [facility] [phone].    ATENCIÓN: Si habla español, tiene a wang disposición servicios gratuitos de asistencia lingüística. Llame al [phone].    We comply with applicable federal civil rights laws and [place] laws. We do not discriminate on the basis of race, color, national origin, age, disability, sex, sexual orientation, or gender identity.               Review of your medicines      CONTINUE these medicines which have NOT CHANGED        Dose / Directions    fluticasone 50 MCG/ACT nasal spray   Commonly known as:  FLONASE        Dose:  1 spray   Spray 1 spray " into both nostrils daily as needed for rhinitis or allergies   Refills:  0       FOLIC ACID PO        Dose:  1 tablet   Take 1 tablet by mouth daily (unknown dose)   Refills:  0       multivitamin, therapeutic Tabs tablet        Dose:  1 tablet   Take 1 tablet by mouth daily   Refills:  0       NEXPLANON 68 MG Impl   Generic drug:  etonogestrel        Dose:  1 each   1 each (68 mg) by Subdermal route once   Quantity:  1 each   Refills:  0       valACYclovir 500 MG tablet   Commonly known as:  VALTREX        Dose:  1000 mg   Take 1,000 mg by mouth as needed (herpes outbreak)   Refills:  0       vitamin tablet   Commonly known as:  B COMPLEX-C        Dose:  1 tablet   Take 1 tablet by mouth daily   Refills:  0                Protect others around you: Learn how to safely use, store and throw away your medicines at www.disposemymeds.org.        ANTIBIOTIC INSTRUCTION     You've Been Prescribed an Antibiotic - Now What?  Your healthcare team thinks that you or your loved one might have an infection. Some infections can be treated with antibiotics, which are powerful, life-saving drugs. Like all medications, antibiotics have side effects and should only be used when necessary. There are some important things you should know about your antibiotic treatment.      Your healthcare team may run tests before you start taking an antibiotic.    Your team may take samples (e.g., from your blood, urine or other areas) to run tests to look for bacteria. These test can be important to determine if you need an antibiotic at all and, if you do, which antibiotic will work best.      Within a few days, your healthcare team might change or even stop your antibiotic.    Your team may start you on an antibiotic while they are working to find out what is making you sick.    Your team might change your antibiotic because test results show that a different antibiotic would be better to treat your infection.    In some cases, once your team  has more information, they learn that you do not need an antibiotic at all. They may find out that you don't have an infection, or that the antibiotic you're taking won't work against your infection. For example, an infection caused by a virus can't be treated with antibiotics. Staying on an antibiotic when you don't need it is more likely to be harmful than helpful.      You may experience side effects from your antibiotic.    Like all medications, antibiotics have side effects. Some of these can be serious.    Let you healthcare team know if you have any known allergies when you are admitted to the hospital.    One significant side effect of nearly all antibiotics is the risk of severe and sometimes deadly diarrhea caused by Clostridium difficile (C. Difficile). This occurs when a person takes antibiotics because some good germs are destroyed. Antibiotic use allows C. diificile to take over, putting patients at high risk for this serious infection.    As a patient or caregiver, it is important to understand your or your loved one's antibiotic treatment. It is especially important for caregivers to speak up when patients can't speak for themselves. Here are some important questions to ask your healthcare team.    What infection is this antibiotic treating and how do you know I have that infection?    What side effects might occur from this antibiotic?    How long will I need to take this antibiotic?    Is it safe to take this antibiotic with other medications or supplements (e.g., vitamins) that I am taking?     Are there any special directions I need to know about taking this antibiotic? For example, should I take it with food?    How will I be monitored to know whether my infection is responding to the antibiotic?    What tests may help to make sure the right antibiotic is prescribed for me?      Information provided by:  www.cdc.gov/getsmart  U.S. Department of Health and Human Services  Centers for disease  Control and Prevention  National Center for Emerging and Zoonotic Infectious Diseases  Division of Healthcare Quality Promotion             Medication List: This is a list of all your medications and when to take them. Check marks below indicate your daily home schedule. Keep this list as a reference.      Medications           Morning Afternoon Evening Bedtime As Needed    fluticasone 50 MCG/ACT nasal spray   Commonly known as:  FLONASE   Spray 1 spray into both nostrils daily as needed for rhinitis or allergies                                FOLIC ACID PO   Take 1 tablet by mouth daily (unknown dose)   Last time this was given:  1 mg on 12/5/2018 10:00 AM                                multivitamin, therapeutic Tabs tablet   Take 1 tablet by mouth daily                                NEXPLANON 68 MG Impl   1 each (68 mg) by Subdermal route once   Generic drug:  etonogestrel                                valACYclovir 500 MG tablet   Commonly known as:  VALTREX   Take 1,000 mg by mouth as needed (herpes outbreak)                                vitamin tablet   Commonly known as:  B COMPLEX-C   Take 1 tablet by mouth daily

## 2018-12-04 NOTE — IP AVS SNAPSHOT
[place] [address] [facility]    [address]    [facility] [address]    Phone:  [phone]    Fax:  [phone]                                       After Visit Summary   12/4/2018    Alana Mujica    MRN: [id number]           After Visit Summary Signature Page     I have received my discharge instructions, and my questions have been answered. I have discussed any challenges I see with this plan with the nurse or doctor.    ..........................................................................................................................................  Patient/Patient Representative Signature      ..........................................................................................................................................  Patient Representative Print Name and Relationship to Patient    ..................................................               ................................................  Date                                   Time    ..........................................................................................................................................  Reviewed by Signature/Title    ...................................................              ..............................................  Date                                               Time          22EPIC Rev 08/18

## 2018-12-05 VITALS
BODY MASS INDEX: 20.73 KG/M2 | TEMPERATURE: 98.6 F | RESPIRATION RATE: 20 BRPM | SYSTOLIC BLOOD PRESSURE: 121 MMHG | DIASTOLIC BLOOD PRESSURE: 85 MMHG | WEIGHT: 117 LBS | HEIGHT: 63 IN | OXYGEN SATURATION: 98 % | HEART RATE: 143 BPM

## 2018-12-05 LAB
ALBUMIN SERPL-MCNC: 3.1 G/DL (ref 3.4–5)
ALP SERPL-CCNC: 78 U/L (ref 40–150)
ALT SERPL W P-5'-P-CCNC: 33 U/L (ref 0–50)
ANION GAP SERPL CALCULATED.3IONS-SCNC: 7 MMOL/L (ref 3–14)
AST SERPL W P-5'-P-CCNC: 39 U/L (ref 0–45)
BILIRUB DIRECT SERPL-MCNC: 0.3 MG/DL (ref 0–0.2)
BILIRUB SERPL-MCNC: 1.3 MG/DL (ref 0.2–1.3)
BUN SERPL-MCNC: 10 MG/DL (ref 7–30)
CALCIUM SERPL-MCNC: 7.9 MG/DL (ref 8.5–10.1)
CHLORIDE SERPL-SCNC: 108 MMOL/L (ref 94–109)
CO2 SERPL-SCNC: 22 MMOL/L (ref 20–32)
CREAT SERPL-MCNC: 0.76 MG/DL (ref 0.52–1.04)
ERYTHROCYTE [DISTWIDTH] IN BLOOD BY AUTOMATED COUNT: 12 % (ref 10–15)
GFR SERPL CREATININE-BSD FRML MDRD: 82 ML/MIN/1.7M2
GLUCOSE SERPL-MCNC: 104 MG/DL (ref 70–99)
HCT VFR BLD AUTO: 33.5 % (ref 35–47)
HGB BLD-MCNC: 10.8 G/DL (ref 11.7–15.7)
MCH RBC QN AUTO: 33.1 PG (ref 26.5–33)
MCHC RBC AUTO-ENTMCNC: 32.2 G/DL (ref 31.5–36.5)
MCV RBC AUTO: 103 FL (ref 78–100)
PLATELET # BLD AUTO: 122 10E9/L (ref 150–450)
POTASSIUM SERPL-SCNC: 4.1 MMOL/L (ref 3.4–5.3)
PROT SERPL-MCNC: 6.3 G/DL (ref 6.8–8.8)
RBC # BLD AUTO: 3.26 10E12/L (ref 3.8–5.2)
SODIUM SERPL-SCNC: 137 MMOL/L (ref 133–144)
WBC # BLD AUTO: 4.3 10E9/L (ref 4–11)

## 2018-12-05 PROCEDURE — 36415 COLL VENOUS BLD VENIPUNCTURE: CPT | Performed by: PHYSICIAN ASSISTANT

## 2018-12-05 PROCEDURE — 80076 HEPATIC FUNCTION PANEL: CPT | Performed by: PHYSICIAN ASSISTANT

## 2018-12-05 PROCEDURE — 25000128 H RX IP 250 OP 636: Performed by: PHYSICIAN ASSISTANT

## 2018-12-05 PROCEDURE — 80048 BASIC METABOLIC PNL TOTAL CA: CPT | Performed by: PHYSICIAN ASSISTANT

## 2018-12-05 PROCEDURE — 99207 ZZC CDG-CODE CATEGORY CHANGED: CPT | Performed by: INTERNAL MEDICINE

## 2018-12-05 PROCEDURE — G0378 HOSPITAL OBSERVATION PER HR: HCPCS

## 2018-12-05 PROCEDURE — 85027 COMPLETE CBC AUTOMATED: CPT | Performed by: PHYSICIAN ASSISTANT

## 2018-12-05 PROCEDURE — 25000132 ZZH RX MED GY IP 250 OP 250 PS 637: Performed by: PHYSICIAN ASSISTANT

## 2018-12-05 PROCEDURE — 99217 ZZC OBSERVATION CARE DISCHARGE: CPT | Performed by: INTERNAL MEDICINE

## 2018-12-05 RX ADMIN — FOLIC ACID 1 MG: 1 TABLET ORAL at 10:00

## 2018-12-05 RX ADMIN — MULTIPLE VITAMINS W/ MINERALS TAB 1 TABLET: TAB at 10:00

## 2018-12-05 RX ADMIN — SODIUM CHLORIDE: 9 INJECTION, SOLUTION INTRAVENOUS at 04:55

## 2018-12-05 RX ADMIN — Medication 100 MG: at 10:00

## 2018-12-05 RX ADMIN — ATENOLOL 25 MG: 25 TABLET ORAL at 10:00

## 2018-12-05 ASSESSMENT — ACTIVITIES OF DAILY LIVING (ADL)
ADLS_ACUITY_SCORE: 11

## 2018-12-05 NOTE — H&P
Admitted:     12/04/2018      PRIMARY CARE PROVIDER:  Through [facility].      CHIEF COMPLAINT:  Nausea, vomiting and potential alcohol withdrawal.      HISTORY OF PRESENT ILLNESS:  Ms. Mujica is a pleasant 42-year-old female with a longstanding history of depression, PTSD, alcohol and eating disorder, who presents to the emergency room due to concerns for early alcohol withdrawal along with intractable nausea and vomiting and palpitation.  History is obtained by speaking with the ER physician, the patient interview and chart review.  The patient states that she has had a longstanding history of alcohol abuse, at least for the last 6 years.  She was a able to maintain sobriety by attending outpatient AA until a few weeks ago.  She was able to maintain sobriety for about 6 weeks and then she relapsed.  She states that she has been drinking heavily over the weekend, at least 1 liter of vodka that will last her a couple of days.  Her last drink was around 5:30 yesterday evening.  She started becoming somewhat tremulous overnight and she started to feel mild palpitations, but by this morning, her tremors were worse.  She was lightheaded and dizzy and she could not keep any food down and therefore prompted her to come into the emergency room.      Upon arrival to the emergency room, she was tachycardic and hypertensive with blood pressures in the 130s/100s, heart rate were as high as 143.  However, EKG obtained showed sinus tachycardia, but no dysrhythmias.      LABORATORY DATA:  Show actually fairly normal electrolyte panel, but AST, ALT are elevated along with elevated lactic acid at 6.5.  Lipase was normal.  TSH was also normal.  She was aggressively fluid resuscitated with improvement of her lactic acid down to 3.8.  Urinalysis showed no obvious infection, but presence of ketones, likely related to starvation ketosis.  She received a couple doses of IV Valium with improvement in her symptoms and I  have been requested to admit her to the hospital for treatment of alcohol withdrawal.      PAST MEDICAL HISTORY:   1.  History of substance abuse, osteoporosis, HPV, depression, PTSD, previous history of bulimia, anxiety and anorexia.   2.  Alcohol abuse history.  She has never been into inpatient rehabilitation.  No reported history of alcohol withdrawal seizures.      PAST SURGICAL HISTORY:  LEEP.      MEDICATIONS:  Prior to arrival includes birth control subdermal, folic acid, multivitamins, Flonase as needed.      ALLERGIES:  NO KNOWN DRUG ALLERGY.      FAMILY HISTORY:  Reviewed and noncontributory to this admission.      SOCIAL HISTORY:  The patient works in IT remotely.  She is a nonsmoker.  She denies any illicit drug use.  Alcohol: Mainly drinks vodka or wine, recently has been drinking at least 1 liter of vodka every 2 days.      REVIEW OF SYSTEMS:  Negative for any recent fevers or chills.  No urinary symptoms.  Otherwise, 12-point system reviewed and all negative beyond that stated in HPI.      PHYSICAL EXAMINATION:   VITAL SIGNS:  T-max of 99.8 with a heart rate in the one-teens, blood pressures in the 140s.   GENERAL:  She is alert,  but tremulous.   HEENT:  Pupils are reactive.  Extraocular movements intact.  Sclerae are nonicteric.  Conjunctivae is pink.  Oral mucosa is slightly dry.  She does have visible fasciculation in her tongue.   NECK:  Supple.  No evidence of cervical lymphadenopathy or thyromegaly.   HEART:  Tachycardic, normal S1, S2.  No loud murmurs appreciated.   PULMONARY:  Exam is clear to auscultation bilaterally.  No wheezing, rales, rhonchi, no use of accessory muscles or intercostal retraction.   ABDOMEN:  Bowel sounds present, soft, nontender, no hepatomegaly.   EXTREMITIES:  Reveals no clubbing, cyanosis or edema.   NEUROLOGIC:  Cranial II-XII intact.  She moves all extremities.  She has no focal deficit.  She has visible tremors.  She has no asterixis.   PSYCHIATRIC:  Mood and  affect are appropriate.      LABORATORY RESULTS:  D-dimer was also obtained in the emergency room which was mildly elevated with subsequent CT scan of the chest that was unremarkable for PE.      ASSESSMENT AND PLAN:  Mr. Vivek Burroughs is a 42-year-old female with a history of depression, substance abuse, alcohol abuse and eating disorder, who unfortunately, relapsed after a 6-week period of sobriety.  She comes in with complaints of palpitations, nausea, vomiting and tremors.  Her last drink was around 5:30 yesterday.  She will be admitted to the hospital for treatment of alcohol withdrawal.   1.  Acute alcohol withdrawal -- seems a bit early to be in withdrawal with her last drink at 530, but she has been drinking quite heavily over the last 4 or 5 days.  She is tremulous on exam and tachycardic.  She will be placed on the CIWA protocol with p.r.n. Ativan.  I have also added atenolol to her regimen.  She needs continue IV fluid hydration along with multivitamins.  Upon further questioning, she states that she hopes to return back to outpatient AA group.  I am not quite sure that this is realistic given her difficulty in maintaining sobriety.  She did agree to have social work come talk to her about additional potential resources as outpatient.   2.  Lactic acidosis:  Initially elevated at 6.5, but improved to 3.8.  Continue IV fluid hydration.  This is not indicative of sepsis, but rather an elevated lactic acidosis related to alcohol use.  She will repeat another lactic acid this afternoon.      This patient will be admitted under inpatient status.   1.  Deep venous thrombosis prophylaxis -- Recommend increase ambulation.   2.  Disposition -- unclear.  I suspect in 2-3 days.         RL CALHOUN MD       As dictated by WILLIAM JIMENEZ            D: 2018   T: 2018   MT: HERBERT      Name:     VIVEK BURROUGHS   MRN:      [id number]        Account:      [id number]   :      1975         Admitted:     12/04/2018                   Document: [id number]

## 2018-12-05 NOTE — PLAN OF CARE
Problem: Patient Care Overview  Goal: Plan of Care/Patient Progress Review  Outcome: No Change  Pt A/OX4, VSS, c/o HA- PRN tylenol given with relief, IVF NS @ 125ml/hr, banana bag infusing @ 100/hr, scoring 12,8 and 6 on CIWA scale- PRN valium given x1, ativan given x1, lactic recheck 0.8, on clear liquid diet, denies nausea, transfers SBA, alarms on for safety, continue POC.

## 2018-12-05 NOTE — PLAN OF CARE
Problem: Patient Care Overview  Goal: Plan of Care/Patient Progress Review  Outcome: No Change  Pt. A&Ox3, up with SBA, IVF infusing without difficulty. Lung sounds clear, room air sat's at 97%. Pt. tolerating clear liquid diet. CIWA score 1. Plan: IVF, monitor ETOH w/d, Pt. denies any needs at this time. Will continue with POC.

## 2018-12-05 NOTE — CONSULTS
Care Transition Initial Assessment - KERRIE  Reason For Consult: substance use concerns  Met with: Patient    Active Problems:    Alcohol withdrawal (H)       DATA  Lives With: parent(s) - Wanda  Living Arrangements: house  Description of Support System: Involved, Supportive - Pt states that her parents are very supportive of her sobriety.  Who is your support system?: Parent(s)  Support Assessment: Adequate family and caregiver support. Pt states that she thinks she has enough support.  Identified issues/concerns regarding health management: Pt was admitted for ETOH withdrawal.        Other Resources: [facility] Dependency [facility]  Quality Of Family Relationships: supportive, helpful, involved  Transportation Available: car, family or friend will provide - Raghu will transport the pt home.    ASSESSMENT  Cognitive Status:  awake, alert and oriented  Concerns to be addressed: Pt will discharge back home today.  Pt states that she is already part of an intensive outpatient CD treatment program, but did not want to elaborate.  She said that she is also going to be seeing a MH counselor soon.  Pt said that her sobriety was going well, but she just drank too much this past weekend.  Her plan is to continue the treatment program she is currently involved in.  She said that the treatment program she was previously with was not a good fit for her so she found a new one and she like this program.   Sw provided the pt with the CD resource pamphlet and information about the SUDS program.  She accepted the resources and said she will keep them in case she needs other treatment options.     PLAN  Sw will continue to be available as needed until discharge.

## 2018-12-05 NOTE — DISCHARGE SUMMARY
[facility]    Discharge Summary  Hospitalist    Date of Admission:  12/4/2018  Date of Discharge:  12/5/2018  1:25 PM  Discharging Provider: Aurelio Fraser MD  Date of Service (when I saw the patient): 12/05/18    Discharge Diagnoses     1.  Mild  alcohol withdrawal    2. Lactic acidosis-resolved.     History of Present Illness   Alana Mujica is an 42 year old female who presented with complaints of palpitations, nausea, vomiting and tremors. Please see H&P and hospital course for details.     Hospital Course   Mr. Alana Mujica is a 42-year-old female with a history of depression, substance abuse, alcohol abuse and eating disorder, who unfortunately, relapsed after a 6-week period of sobriety.  She comes in with complaints of palpitations, nausea, vomiting and tremors.  Her last drink was around 5:30 yesterday.  She will be admitted to the hospital for treatment of alcohol withdrawal.     1.Mild alcohol withdrawal -- seems a bit early to be in withdrawal with her last drink at 530, but she has been drinking quite heavily over the last 4 or 5 days.  She is tremulous on exam and tachycardic. She was placed on CIWA protocol with p.r.n. Ativan. She didn't have significant withdrawal symptoms. She didn't want CD evaluation. She stated that she has CD treatment arranged as outpatient and wants to keep her appointment. She went home in a stable condition.     2.  Lactic acidosis:  Initially elevated at 6.5, but improved to 3.8. She was given IV fluids. Her lactic acid resolved. There is no evidence of sepsis.    Significant Results and Procedures   No results found for this or any previous visit (from the past 24 hour(s)).      Pending Results   None  Code Status   Full Code       Primary Care Physician   [facility]        Discharge Disposition   Discharged to home  Condition at discharge: Stable    Consultations This Hospital Stay   SOCIAL WORK IP CONSULT  CHEMICAL DEPENDENCY IP  CONSULT    Time Spent on this Encounter   I, Aurelio Fraser MD, personally saw the patient today and spent less than or equal to 30 minutes discharging this patient.    Discharge Orders     Reason for your hospital stay   Dehydration, lactic acidosis-resolved  ?mild active withdrawal     Follow-up and recommended labs and tests    Follow up with primary care provider, [facility], within 7 days  Continue CD treatment as scheduled     Activity   Your activity upon discharge: activity as tolerated     Diet   Follow this diet upon discharge: Orders Placed This Encounter     Regular Diet Adult       Discharge Medications   Discharge Medication List as of 12/5/2018  1:02 PM      CONTINUE these medications which have NOT CHANGED    Details   etonogestrel (NEXPLANON) 68 MG IMPL 1 each (68 mg) by Subdermal route once, Disp-1 each, R-0, Historical      FOLIC ACID PO Take 1 tablet by mouth daily (unknown dose), Historical      multivitamin, therapeutic (THERA-VIT) TABS tablet Take 1 tablet by mouth daily, Historical      vitamin (B COMPLEX-C) tablet Take 1 tablet by mouth daily, Historical      fluticasone (FLONASE) 50 MCG/ACT spray Spray 1 spray into both nostrils daily as needed for rhinitis or allergies, Historical      valACYclovir (VALTREX) 500 MG tablet Take 1,000 mg by mouth as needed (herpes outbreak), Historical           # Pain Assessment:  Current Pain Score 12/5/2018   Patient currently in pain? denies   Pain score (0-10) -   Pain location -   Pain descriptors -   Alana hampton pain level was assessed and she currently denies pain.      Allergies   No Known Allergies  Data   Most Recent 3 CBC's:  Recent Labs   Lab Test  12/05/18   0739  12/04/18   1107  09/14/18   0853   WBC  4.3  12.7*  7.2   HGB  10.8*  14.0  13.5   MCV  103*  104*  94   PLT  122*  197  196      Most Recent 3 BMP's:  Recent Labs   Lab Test  12/05/18   0739  12/04/18   1107  09/17/18   0649   NA  137  138  140   POTASSIUM  4.1   4.1  3.7   CHLORIDE  108  101  107   CO2  22  11*  27   BUN  10  20  7   CR  0.76  0.86  1.38*   ANIONGAP  7  26*  6   KODY  7.9*  9.1  8.4*   GLC  104*  71  82     Most Recent 2 LFT's:  Recent Labs   Lab Test  12/05/18   0739  12/04/18   1107   AST  39  79*   ALT  33  56*   ALKPHOS  78  111   BILITOTAL  1.3  0.7     Most Recent INR's and Anticoagulation Dosing History:  Anticoagulation Dose History     Recent Dosing and Labs Latest Ref Rng & Units 8/26/2018    INR 0.86 - 1.14 1.02        Most Recent 3 Troponin's:  Recent Labs   Lab Test  12/04/18   1107   TROPI  <0.015     Most Recent Cholesterol Panel:  Recent Labs   Lab Test  11/24/15   0812   CHOL  236*   LDL  107*   HDL  114   TRIG  74     Most Recent 6 Bacteria Isolates From Any Culture (See EPIC Reports for Culture Details):  Recent Labs   Lab Test  09/11/18   1151  04/05/18   1137  02/01/16   1415  11/24/15   0718   CULT  10,000 to 50,000 colonies/mL  Escherichia coli  *  <10,000 colonies/mL  urogenital sasha  Susceptibility testing not routinely done    Questionable specimen  No growth  >100,000 colonies/mL mixed urogenital sasha     Most Recent TSH, T4 and A1c Labs:  Recent Labs   Lab Test  12/04/18   1107   TSH  0.75

## 2018-12-05 NOTE — UTILIZATION REVIEW
"[facility]     Admission Status; Secondary Review Determination     Admission Date: 12/4/2018 10:42 AM      Under the authority of the Utilization Management Committee, the utilization review process indicated a secondary review on the above patient.  The review outcome is based on review of the medical records, discussions with staff, and applying clinical experience noted on the date of the review.          (x) Observation Status Appropriate - This patient does not meet hospital inpatient criteria and is placed in observation status. If this patient's primary payer is Medicare and was admitted as an inpatient, Condition Code 44 should be used and patient status changed to \"observation\".     RATIONALE FOR DETERMINATION   42-year-old female with history of alcohol dependence presented with alcohol withdrawal symptoms as well as lactic acidosis.  She was provided with IV fluids and the lactic acid level has improved.  This morning she has minimal evidence of ongoing and evolving alcohol withdrawal.  She is feeling well and ambulating with good oral intake.  At the time of this review the patient does not meet medical necessity for inpatient hospitalization.    The severity of illness, intensity of service provided, expected LOS and risk for adverse outcome make the care appropriate for further observation; however, doesn't meet criteria for hospital inpatient admission. This was discussed with Dr Fraser who concurred with this determination.        The information on this document is developed by the utilization review team in order for the business office to ensure compliance.  This only denotes the appropriateness of proper admission status and does not reflect the quality of care rendered.         The definitions of Inpatient Status and Observation Status used in making the determination above are those provided in the CMS Coverage Manual, Chapter 1 and Chapter 6, section 70.4.      Sincerely, "     Madhu Greenwood DO MPH   Physician Advisor  Utilization Review  [facility]

## 2018-12-05 NOTE — PROGRESS NOTES
Md determined this pt was adequate for discharge.  PIV removed.  Writer went through all discharge instructions, including all medications and follow up appointments.  Pt expressed understanding of all discharge teaching and all questions were answered.  Pt requested to ambulate to the main entrance and wait for family transportion home.

## 2018-12-05 NOTE — PROGRESS NOTES
LATE NOTE AFTER DISCHARGE    CM notified by UR that patient has changed from IP to Obs status. Pt discharged from hospital at 1:25pm. CM notified at 1:45pm of status change. Obs letter not given.     Jaycee Batista RN, BSN, CTS  [facility]  [phone]

## 2018-12-06 ENCOUNTER — TELEPHONE (OUTPATIENT)
Dept: FAMILY MEDICINE | Facility: CLINIC | Age: 43
End: 2018-12-06

## 2018-12-06 NOTE — TELEPHONE ENCOUNTER
ED / Discharge Outreach Protocol    Patient Contact    Attempt # 1    Was call answered?  No.  Left message on voicemail with information to call me back.    Lisa Cedeño RN

## 2018-12-06 NOTE — TELEPHONE ENCOUNTER
Please contact patient for In-patient follow up.  [phone] (home) [phone] (work)    Visit date: 12/5/2018  Diagnosis listed:Alcohol withdrawal Syndrome with Complication (H)  Number of visits in past 12 months:1/2

## 2018-12-07 NOTE — TELEPHONE ENCOUNTER
ED / Discharge Outreach Protocol    Patient Contact    Attempt # 2    Was call answered?  No.  Left message on voicemail with information to call me back.    Lisa Cedeño RN

## 2018-12-11 NOTE — TELEPHONE ENCOUNTER
"Pt calls back.  Her right forearm is sore where her IV was.  The vein is kind of hard and swollen.  It hurts.  No reddness.  Advised she could try a warm compress/heat 4 times a day, putting on for 15 minutes at a time.  Advised to let us know if it gets worse and monitor.      ED/Discharge Protocol    \"Hi, my name is Gabriela Ochoa, a registered nurse, and I am calling on behalf of Sabrina Pérez's office at [place].  I am calling to follow up and see how things are going for you after your recent visit.\"    \"I see that you were in the (ER/UC/IP) on 12/4/18.    How are you doing now that you are home?\" see above    Is patient experiencing symptoms that may require a hospital visit?  no    Discharge Instructions    \"Let's review your discharge instructions.  What is/are the follow-up recommendations?  Pt. Response: She is supposed to f/u, but she says she is not going to.  Asked if she goes here or to [facility] per above.  She says she kind of goes back and forth.      \"Were you instructed to make a follow-up appointment?\"  Pt. Response: Yes.  Has appointment been made?   No.  \"Can I help you schedule that appointment?\" no, see above, she says she was told to f/u, but she is not going to      \"When you see the provider, I would recommend that you bring your discharge instructions with you.    Medications    \"How many new medications are you on since your hospitalization/ED visit?\"    0-1  \"How many of your current medicines changed (dose, timing, name, etc.) while you were in the hospital/ED visit?\"   0-1  \"Do you have questions about your medications?\"   No  \"Were you newly diagnosed with heart failure, COPD, diabetes or did you have a heart attack?\"   No  For patients on insulin: \"Did you start on insulin in the hospital or did you have your insulin dose changed?\"   No    Medication reconciliation completed? Yes    Was MTM referral placed (*Make sure to put transitions as reason for " "referral)?   No    Call Summary    \"Do you have any questions or concerns about your condition or care plan at the moment?\"    Yes see above - re: IV   Triage nurse advice given: see above    Patient was in ER 5 times in the past year (assess appropriateness of ER visits.)      \"If you have questions or things don't continue to improve, we encourage you contact us through the main clinic number,  [phone].  Even if the clinic is not open, triage nurses are available 24/7 to help you.     We would like you to know that our clinic has extended hours (provide information).  We also have urgent care (provide details on closest location and hours/contact info)\"      \"Thank you for your time and take care!\"                "

## 2019-02-18 ENCOUNTER — HOSPITAL ENCOUNTER (INPATIENT)
Facility: CLINIC | Age: 44
LOS: 4 days | Discharge: HOME | End: 2019-02-22
Attending: EMERGENCY MEDICINE | Admitting: INTERNAL MEDICINE
Payer: COMMERCIAL

## 2019-02-18 DIAGNOSIS — T18.9XXA INGESTION OF FOREIGN SUBSTANCE, INITIAL ENCOUNTER: ICD-10-CM

## 2019-02-18 DIAGNOSIS — F32.89 OTHER DEPRESSION: ICD-10-CM

## 2019-02-18 DIAGNOSIS — F10.939 ALCOHOL WITHDRAWAL SYNDROME WITH COMPLICATION (H): ICD-10-CM

## 2019-02-18 DIAGNOSIS — F10.930 ALCOHOL WITHDRAWAL SYNDROME WITHOUT COMPLICATION (H): ICD-10-CM

## 2019-02-18 DIAGNOSIS — G47.09 OTHER INSOMNIA: Primary | ICD-10-CM

## 2019-02-18 LAB
ALBUMIN SERPL-MCNC: 4.3 G/DL (ref 3.4–5)
ALP SERPL-CCNC: 72 U/L (ref 40–150)
ALT SERPL W P-5'-P-CCNC: 84 U/L (ref 0–50)
ANION GAP SERPL CALCULATED.3IONS-SCNC: 13 MMOL/L (ref 3–14)
APAP SERPL-MCNC: <2 MG/L (ref 10–20)
AST SERPL W P-5'-P-CCNC: 83 U/L (ref 0–45)
B-HCG FREE SERPL-ACNC: <5 IU/L
BASOPHILS # BLD AUTO: 0 10E9/L (ref 0–0.2)
BASOPHILS NFR BLD AUTO: 0.9 %
BILIRUB SERPL-MCNC: 0.4 MG/DL (ref 0.2–1.3)
BUN SERPL-MCNC: 4 MG/DL (ref 7–30)
CALCIUM SERPL-MCNC: 9.5 MG/DL (ref 8.5–10.1)
CHLORIDE SERPL-SCNC: 101 MMOL/L (ref 94–109)
CO2 SERPL-SCNC: 24 MMOL/L (ref 20–32)
CREAT SERPL-MCNC: 0.65 MG/DL (ref 0.52–1.04)
DIFFERENTIAL METHOD BLD: ABNORMAL
EOSINOPHIL # BLD AUTO: 0 10E9/L (ref 0–0.7)
EOSINOPHIL NFR BLD AUTO: 0.6 %
ERYTHROCYTE [DISTWIDTH] IN BLOOD BY AUTOMATED COUNT: 13.6 % (ref 10–15)
ETHANOL SERPL-MCNC: 0.1 G/DL
GFR SERPL CREATININE-BSD FRML MDRD: >90 ML/MIN/{1.73_M2}
GLUCOSE BLDC GLUCOMTR-MCNC: 100 MG/DL (ref 70–99)
GLUCOSE SERPL-MCNC: 85 MG/DL (ref 70–99)
HCT VFR BLD AUTO: 38.3 % (ref 35–47)
HGB BLD-MCNC: 12.7 G/DL (ref 11.7–15.7)
IMM GRANULOCYTES # BLD: 0 10E9/L (ref 0–0.4)
IMM GRANULOCYTES NFR BLD: 0.3 %
INR PPP: 0.96 (ref 0.86–1.14)
INTERPRETATION ECG - MUSE: NORMAL
LACTATE BLD-SCNC: 1.8 MMOL/L (ref 0.7–2)
LYMPHOCYTES # BLD AUTO: 0.5 10E9/L (ref 0.8–5.3)
LYMPHOCYTES NFR BLD AUTO: 14.7 %
MCH RBC QN AUTO: 32.4 PG (ref 26.5–33)
MCHC RBC AUTO-ENTMCNC: 33.2 G/DL (ref 31.5–36.5)
MCV RBC AUTO: 98 FL (ref 78–100)
MONOCYTES # BLD AUTO: 0.4 10E9/L (ref 0–1.3)
MONOCYTES NFR BLD AUTO: 10.4 %
NEUTROPHILS # BLD AUTO: 2.5 10E9/L (ref 1.6–8.3)
NEUTROPHILS NFR BLD AUTO: 73.1 %
NRBC # BLD AUTO: 0 10*3/UL
NRBC BLD AUTO-RTO: 0 /100
PLATELET # BLD AUTO: 122 10E9/L (ref 150–450)
POTASSIUM SERPL-SCNC: 3.7 MMOL/L (ref 3.4–5.3)
PROT SERPL-MCNC: 8.1 G/DL (ref 6.8–8.8)
RBC # BLD AUTO: 3.92 10E12/L (ref 3.8–5.2)
SALICYLATES SERPL-MCNC: <2 MG/DL
SODIUM SERPL-SCNC: 138 MMOL/L (ref 133–144)
WBC # BLD AUTO: 3.5 10E9/L (ref 4–11)

## 2019-02-18 PROCEDURE — 25000125 ZZHC RX 250: Performed by: INTERNAL MEDICINE

## 2019-02-18 PROCEDURE — 96374 THER/PROPH/DIAG INJ IV PUSH: CPT

## 2019-02-18 PROCEDURE — 83605 ASSAY OF LACTIC ACID: CPT | Performed by: INTERNAL MEDICINE

## 2019-02-18 PROCEDURE — 87640 STAPH A DNA AMP PROBE: CPT | Performed by: INTERNAL MEDICINE

## 2019-02-18 PROCEDURE — 87641 MR-STAPH DNA AMP PROBE: CPT | Performed by: INTERNAL MEDICINE

## 2019-02-18 PROCEDURE — 36415 COLL VENOUS BLD VENIPUNCTURE: CPT | Performed by: INTERNAL MEDICINE

## 2019-02-18 PROCEDURE — 80053 COMPREHEN METABOLIC PANEL: CPT | Performed by: EMERGENCY MEDICINE

## 2019-02-18 PROCEDURE — 99207 ZZC APP CREDIT; MD BILLING SHARED VISIT: CPT | Performed by: INTERNAL MEDICINE

## 2019-02-18 PROCEDURE — 99285 EMERGENCY DEPT VISIT HI MDM: CPT | Mod: 25

## 2019-02-18 PROCEDURE — 25800030 ZZH RX IP 258 OP 636: Performed by: EMERGENCY MEDICINE

## 2019-02-18 PROCEDURE — 25000132 ZZH RX MED GY IP 250 OP 250 PS 637: Performed by: EMERGENCY MEDICINE

## 2019-02-18 PROCEDURE — 25800030 ZZH RX IP 258 OP 636: Performed by: INTERNAL MEDICINE

## 2019-02-18 PROCEDURE — 20000003 ZZH R&B ICU

## 2019-02-18 PROCEDURE — 80329 ANALGESICS NON-OPIOID 1 OR 2: CPT | Performed by: EMERGENCY MEDICINE

## 2019-02-18 PROCEDURE — 80320 DRUG SCREEN QUANTALCOHOLS: CPT | Performed by: EMERGENCY MEDICINE

## 2019-02-18 PROCEDURE — 25000132 ZZH RX MED GY IP 250 OP 250 PS 637: Performed by: INTERNAL MEDICINE

## 2019-02-18 PROCEDURE — HZ2ZZZZ DETOXIFICATION SERVICES FOR SUBSTANCE ABUSE TREATMENT: ICD-10-PCS | Performed by: INTERNAL MEDICINE

## 2019-02-18 PROCEDURE — 25000128 H RX IP 250 OP 636: Performed by: INTERNAL MEDICINE

## 2019-02-18 PROCEDURE — 93005 ELECTROCARDIOGRAM TRACING: CPT

## 2019-02-18 PROCEDURE — 00000146 ZZHCL STATISTIC GLUCOSE BY METER IP

## 2019-02-18 PROCEDURE — 85610 PROTHROMBIN TIME: CPT | Performed by: EMERGENCY MEDICINE

## 2019-02-18 PROCEDURE — 25000128 H RX IP 250 OP 636: Performed by: EMERGENCY MEDICINE

## 2019-02-18 PROCEDURE — 85025 COMPLETE CBC W/AUTO DIFF WBC: CPT | Performed by: EMERGENCY MEDICINE

## 2019-02-18 PROCEDURE — 99223 1ST HOSP IP/OBS HIGH 75: CPT | Mod: AI | Performed by: INTERNAL MEDICINE

## 2019-02-18 PROCEDURE — 84702 CHORIONIC GONADOTROPIN TEST: CPT

## 2019-02-18 PROCEDURE — 96361 HYDRATE IV INFUSION ADD-ON: CPT

## 2019-02-18 RX ORDER — LANOLIN ALCOHOL/MO/W.PET/CERES
100 CREAM (GRAM) TOPICAL DAILY
Status: COMPLETED | OUTPATIENT
Start: 2019-02-19 | End: 2019-02-22

## 2019-02-18 RX ORDER — POTASSIUM CHLORIDE 7.45 MG/ML
10 INJECTION INTRAVENOUS
Status: DISCONTINUED | OUTPATIENT
Start: 2019-02-18 | End: 2019-02-22 | Stop reason: HOSPADM

## 2019-02-18 RX ORDER — DIAZEPAM 10 MG/2ML
10 INJECTION, SOLUTION INTRAMUSCULAR; INTRAVENOUS ONCE
Status: COMPLETED | OUTPATIENT
Start: 2019-02-18 | End: 2019-02-18

## 2019-02-18 RX ORDER — FLUTICASONE PROPIONATE 50 MCG
1 SPRAY, SUSPENSION (ML) NASAL DAILY PRN
Status: DISCONTINUED | OUTPATIENT
Start: 2019-02-18 | End: 2019-02-22 | Stop reason: HOSPADM

## 2019-02-18 RX ORDER — ONDANSETRON 2 MG/ML
4 INJECTION INTRAMUSCULAR; INTRAVENOUS EVERY 6 HOURS PRN
Status: DISCONTINUED | OUTPATIENT
Start: 2019-02-18 | End: 2019-02-22 | Stop reason: HOSPADM

## 2019-02-18 RX ORDER — FOLIC ACID 1 MG/1
1 TABLET ORAL DAILY
Status: DISCONTINUED | OUTPATIENT
Start: 2019-02-19 | End: 2019-02-22 | Stop reason: HOSPADM

## 2019-02-18 RX ORDER — LORAZEPAM 2 MG/ML
1-2 INJECTION INTRAMUSCULAR EVERY 30 MIN PRN
Status: DISCONTINUED | OUTPATIENT
Start: 2019-02-18 | End: 2019-02-22 | Stop reason: HOSPADM

## 2019-02-18 RX ORDER — DIAZEPAM 5 MG
10 TABLET ORAL ONCE
Status: COMPLETED | OUTPATIENT
Start: 2019-02-18 | End: 2019-02-18

## 2019-02-18 RX ORDER — MULTIPLE VITAMINS W/ MINERALS TAB 9MG-400MCG
1 TAB ORAL DAILY
Status: DISCONTINUED | OUTPATIENT
Start: 2019-02-19 | End: 2019-02-22 | Stop reason: HOSPADM

## 2019-02-18 RX ORDER — METOPROLOL TARTRATE 1 MG/ML
5 INJECTION, SOLUTION INTRAVENOUS ONCE
Status: COMPLETED | OUTPATIENT
Start: 2019-02-18 | End: 2019-02-18

## 2019-02-18 RX ORDER — NALOXONE HYDROCHLORIDE 0.4 MG/ML
.1-.4 INJECTION, SOLUTION INTRAMUSCULAR; INTRAVENOUS; SUBCUTANEOUS
Status: DISCONTINUED | OUTPATIENT
Start: 2019-02-18 | End: 2019-02-22 | Stop reason: HOSPADM

## 2019-02-18 RX ORDER — MAGNESIUM SULFATE HEPTAHYDRATE 40 MG/ML
4 INJECTION, SOLUTION INTRAVENOUS EVERY 4 HOURS PRN
Status: DISCONTINUED | OUTPATIENT
Start: 2019-02-18 | End: 2019-02-22 | Stop reason: HOSPADM

## 2019-02-18 RX ORDER — LORAZEPAM 1 MG/1
1-2 TABLET ORAL EVERY 30 MIN PRN
Status: DISCONTINUED | OUTPATIENT
Start: 2019-02-18 | End: 2019-02-22 | Stop reason: HOSPADM

## 2019-02-18 RX ORDER — ONDANSETRON 4 MG/1
4 TABLET, ORALLY DISINTEGRATING ORAL EVERY 6 HOURS PRN
Status: DISCONTINUED | OUTPATIENT
Start: 2019-02-18 | End: 2019-02-22 | Stop reason: HOSPADM

## 2019-02-18 RX ORDER — POTASSIUM CHLORIDE 29.8 MG/ML
20 INJECTION INTRAVENOUS
Status: DISCONTINUED | OUTPATIENT
Start: 2019-02-18 | End: 2019-02-22 | Stop reason: HOSPADM

## 2019-02-18 RX ORDER — POTASSIUM CHLORIDE 1500 MG/1
20-40 TABLET, EXTENDED RELEASE ORAL
Status: DISCONTINUED | OUTPATIENT
Start: 2019-02-18 | End: 2019-02-22 | Stop reason: HOSPADM

## 2019-02-18 RX ORDER — POTASSIUM CL/LIDO/0.9 % NACL 10MEQ/0.1L
10 INTRAVENOUS SOLUTION, PIGGYBACK (ML) INTRAVENOUS
Status: DISCONTINUED | OUTPATIENT
Start: 2019-02-18 | End: 2019-02-22 | Stop reason: HOSPADM

## 2019-02-18 RX ORDER — POTASSIUM CHLORIDE 1.5 G/1.58G
20-40 POWDER, FOR SOLUTION ORAL
Status: DISCONTINUED | OUTPATIENT
Start: 2019-02-18 | End: 2019-02-22 | Stop reason: HOSPADM

## 2019-02-18 RX ORDER — LANOLIN ALCOHOL/MO/W.PET/CERES
100 CREAM (GRAM) TOPICAL ONCE
Status: COMPLETED | OUTPATIENT
Start: 2019-02-18 | End: 2019-02-18

## 2019-02-18 RX ORDER — IBUPROFEN 400 MG/1
400 TABLET, FILM COATED ORAL ONCE
Status: COMPLETED | OUTPATIENT
Start: 2019-02-18 | End: 2019-02-18

## 2019-02-18 RX ADMIN — FOLIC ACID: 5 INJECTION, SOLUTION INTRAMUSCULAR; INTRAVENOUS; SUBCUTANEOUS at 18:30

## 2019-02-18 RX ADMIN — LORAZEPAM 2 MG: 1 TABLET ORAL at 18:05

## 2019-02-18 RX ADMIN — IBUPROFEN 400 MG: 400 TABLET ORAL at 19:41

## 2019-02-18 RX ADMIN — LORAZEPAM 1 MG: 1 TABLET ORAL at 18:42

## 2019-02-18 RX ADMIN — SODIUM CHLORIDE, POTASSIUM CHLORIDE, SODIUM LACTATE AND CALCIUM CHLORIDE 1000 ML: 600; 310; 30; 20 INJECTION, SOLUTION INTRAVENOUS at 11:44

## 2019-02-18 RX ADMIN — DIAZEPAM 10 MG: 5 TABLET ORAL at 14:31

## 2019-02-18 RX ADMIN — LORAZEPAM 2 MG: 1 TABLET ORAL at 16:56

## 2019-02-18 RX ADMIN — DIAZEPAM 10 MG: 5 TABLET ORAL at 13:54

## 2019-02-18 RX ADMIN — LORAZEPAM 2 MG: 1 TABLET ORAL at 20:58

## 2019-02-18 RX ADMIN — Medication 10 MG: at 11:56

## 2019-02-18 RX ADMIN — LORAZEPAM 2 MG: 1 TABLET ORAL at 19:58

## 2019-02-18 RX ADMIN — LORAZEPAM 2 MG: 1 TABLET ORAL at 21:57

## 2019-02-18 RX ADMIN — Medication 100 MG: at 11:54

## 2019-02-18 RX ADMIN — METOPROLOL TARTRATE 5 MG: 1 INJECTION, SOLUTION INTRAVENOUS at 19:41

## 2019-02-18 RX ADMIN — LORAZEPAM 2 MG: 1 TABLET ORAL at 23:00

## 2019-02-18 RX ADMIN — Medication 1 TABLET: at 18:05

## 2019-02-18 ASSESSMENT — ACTIVITIES OF DAILY LIVING (ADL): ADLS_ACUITY_SCORE: 14.5

## 2019-02-18 ASSESSMENT — ENCOUNTER SYMPTOMS
HALLUCINATIONS: 0
CONFUSION: 1
HEADACHES: 1
NAUSEA: 0
TREMORS: 1

## 2019-02-18 NOTE — H&P
[facility]    History and Physical  Hospitalist       Date of Admission:  2/18/2019  Date of Service (when I saw the patient): 02/18/19    Assessment & Plan      Alana Mujica is a 43 year old female patient with past medical history of anxiety, depression, anorexia, bulimia, substance abuse, osteoporosis, was brought to emergency room for evaluation for possible overdose.    1. Alcohol withdrawal symptoms. Her last alcohol intake was last night. She has tremors. Feels anxious. Will start on alcohol withdrawal protocol, vitamins.     2. Possible drug overdose. Patient was found with empty pill bottle near her but she couldn't tell what she took.. Acetaminophen level <2, ethanol level 0.1, salicylate <2. Currently appears to have withdrawal symptoms.      3.  Mild transaminitis: Likely due to alcohol abuse.  Will recheck her LFTs.  Acetaminophen level is undetectable.    4.  Alcohol abuse: Patient with evaluation by chemical dependency once alcohol withdrawal symptoms improved    5.  Depression: Does not appear to be on treatment for depression.    Patient will be admitted to telemetry unit     DVT Prophylaxis: Pneumatic Compression Devices  Code Status: Full Code    Disposition: Expected discharge: anticipate more than two nights of hospital course until withdrawal symptoms improve.     Aurelio Fraser MD    Primary Care Physician   [facility]    Chief Complaint   Possible overdose    History is obtained from the patient and ER physician    History of Present Illness   Alana Mujica is a 43 year old female patient with past medical history of anxiety, depression, anorexia, bulimia, substance abuse, osteoporosis, was brought to emergency room for evaluation for possible overdose. History was mainly obtained from ER physician. Patient has been drinking daily. Last night she drank heavily but her father was unsure how much she drank. Her father found her sleeping on the floor with an  empty pill bottle near her. Patient couldn't tell if she took the pills but she also stated that she took pills for headaches. She was having tremors and was confused. She was also complaining of headaches. Per history obtained from the ER physician, she was trying to end her life last night. Patient denied suicidal ideation when I asked her.she also denies prior suicidal attempts.    She denies nausea or vomiting. She also denies abdominal pain, dysuria, urgency or frequency. She has history of alcohol withdrawal in the past. She stated that she has been to outpatient treatment. She denies history of seizures.   Vital signs on arrival to emergency room showed temperature 98, pulse 132, blood pressure 136/80, oxygen saturation 100% on room air.  Laboratory workup showed normal BMP, lactic acid 1.8, hemoglobin 12.7, WBC 3.5, glucose 85.  Blood alcohol level was 0.1.  Salicylate less than 2, acetaminophen level less than 2.  In the ER, she was given IV fluids.  He was also given diazepam for alcohol withdrawal symptoms.  She was admitted for further management.    Past Medical History    I have reviewed this patient's medical history and updated it with pertinent information if needed.   Past Medical History:   Diagnosis Date     Anorexia      Anxiety      Bulimia      Depressive disorder      Genital herpes      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/15    NIL, +HR HPV. Co-test 12 months     Osteoporosis      Substance abuse (H)        Past Surgical History   I have reviewed this patient's surgical history and updated it with pertinent information if needed.  Past Surgical History:   Procedure Laterality Date     LEEP TX, CERVICAL      greater than 5 years ago from 2015, uncertain date       Prior to Admission Medications   Prior to Admission Medications   Prescriptions Last Dose Informant Patient Reported? Taking?   cephALEXin (KEFLEX) 500 MG capsule   No No   Sig: Take 1 capsule (500 mg) by mouth every 12 hours for 4 days    etonogestrel (NEXPLANON) 68 MG IMPL   Yes Yes   Si each (68 mg) by Subdermal route once   fluticasone (FLONASE) 50 MCG/ACT spray Past Month at Unknown time  Yes Yes   Sig: Spray 1 spray into both nostrils daily as needed for rhinitis or allergies   folic acid (FOLVITE) 1 MG tablet   No No   Sig: Take 1 tablet (1 mg) by mouth daily   multivitamin, therapeutic (THERA-VIT) TABS tablet Past Week at Unknown time  Yes Yes   Sig: Take 1 tablet by mouth daily   thiamine mononitrate 100 MG TABS   No No   Sig: Take 100 mg by mouth daily   valACYclovir (VALTREX) 500 MG tablet prn  Yes Yes   Sig: Take 1,000 mg by mouth as needed (herpes outbreak)   vitamin (B COMPLEX-C) tablet Past Week at Unknown time  Yes Yes   Sig: Take 1 tablet by mouth daily      Facility-Administered Medications: None     Allergies   No Known Allergies    Social History   I have reviewed this patient's social history and updated it with pertinent information if needed. Alana YOUNGBLOOD Sil  reports that she has quit smoking. she has never used smokeless tobacco. She reports that she drinks about 17.5 oz of alcohol per week. She reports that she does not use drugs.    Family History   I have reviewed this patient's family history and updated it with pertinent information if needed.   Family History   Problem Relation Age of Onset     Breast Cancer Mother 68             Unknown/Adopted No family hx of      Depression No family hx of      Anxiety Disorder No family hx of      Schizophrenia No family hx of      Bipolar Disorder No family hx of      Suicide No family hx of      Substance Abuse No family hx of      Dementia No family hx of      Deuel Disease No family hx of      Parkinsonism No family hx of      Autism Spectrum Disorder No family hx of      Intellectual Disability (Mental Retardation) No family hx of      Mental Illness No family hx of        Review of Systems   The 10 point Review of Systems is negative other than noted in the  HPI or here.     Physical Exam   Temp: 98  F (36.7  C) Temp src: Oral BP: (!) 143/94 Pulse: 101     SpO2: 99 %      Vital Signs with Ranges  Temp:  [98  F (36.7  C)] 98  F (36.7  C)  Pulse:  [] 101  BP: (128-147)/() 143/94  SpO2:  [92 %-100 %] 99 %  0 lbs 0 oz    GEN:  Alert, oriented x 3, appears comfortable, NAD.  HEENT:  Normocephalic/atraumatic, no scleral icterus, no nasal discharge, mouth moist.  CV:  Regular rate and rhythm, no murmur or JVD.  S1 + S2 noted, no S3 or S4.  LUNGS:  Clear to auscultation bilaterally without rales/rhonchi/wheezing/retractions.  Symmetric chest rise on inhalation noted.  ABD:  Active bowel sounds, soft, non-tender/non-distended.  No rebound/guarding/rigidity.  EXT:  No edema or cyanosis.  Hands/feet warm to touch with good signs of peripheral perfusion.  No joint synovitis noted.  SKIN:  Dry to touch, no exanthems noted in the visualized areas.  NEURO:  Symmetric muscle strength, sensation to touch grossly intact.  No new focal deficits appreciated.    Data   Data reviewed today:  I personally reviewed no imaging done today  Recent Labs   Lab 02/18/19  1137   WBC 3.5*   HGB 12.7   MCV 98   *   INR 0.96      POTASSIUM 3.7   CHLORIDE 101   CO2 24   BUN 4*   CR 0.65   ANIONGAP 13   KODY 9.5   GLC 85   ALBUMIN 4.3   PROTTOTAL 8.1   BILITOTAL 0.4   ALKPHOS 72   ALT 84*   AST 83*       No results found for this or any previous visit (from the past 24 hour(s)).
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
## 2019-02-18 NOTE — PHARMACY-ADMISSION MEDICATION HISTORY
Admission medication history interview status for this patient is complete. See [facility] admission navigator for allergy information, prior to admission medications and immunization status.     Medication history interview source(s):Patient  Medication history resources (including written lists, pill bottles, clinic record):None    Changes made to PTA medication list:  Added: none  Deleted: folic acid  Changed: none    Actions taken by pharmacist (provider contacted, etc):None     Additional medication history information:None    Medication reconciliation/reorder completed by provider prior to medication history? No    For patients on insulin therapy: no (Yes/No)   Lantus/levemir/NPH/Mix 70/30 dose: ___ in AM/PM or twice daily   Sliding scale Novolog Y/N   If Yes, do you have a baseline novolog pre-meal dose: ______units with meals   Patients eat three meals a day: Y/N ---  How many episodes of hypoglycemia (low blood glucose) do you have weekly: ---   How many missed doses do you have a week: ---  How many times do you check your blood glucose per day: ---  Any Barriers to therapy: cost of medications/comfortable with giving injections (if applicable)/ comfortable and confident with current diabetes regimen ---      Prior to Admission medications    Medication Sig Last Dose Taking? Auth Provider   etonogestrel (NEXPLANON) 68 MG IMPL 1 each (68 mg) by Subdermal route once  Yes Sabrina Pérez PA-C   fluticasone (FLONASE) 50 MCG/ACT spray Spray 1 spray into both nostrils daily as needed for rhinitis or allergies Past Month at Unknown time Yes Unknown, Entered By History   multivitamin, therapeutic (THERA-VIT) TABS tablet Take 1 tablet by mouth daily Past Week at Unknown time Yes Unknown, Entered By History   valACYclovir (VALTREX) 500 MG tablet Take 1,000 mg by mouth as needed (herpes outbreak) prn Yes Unknown, Entered By History   vitamin (B COMPLEX-C) tablet Take 1 tablet by mouth daily Past Week at Unknown  time Yes Unknown, Entered By History

## 2019-02-18 NOTE — ED TRIAGE NOTES
Arrives with parents after an apparent ingestion, found lying on floor, incontinent and pills on floor around her. Was drinking heavily last night and drinks daily. Patient states she is unaware of what or how many pills she took, does admit it was a suicide attempt. She arrives very tremorous, confused and having trouble answering questions. ABCs intact at this time.

## 2019-02-19 LAB
ACETAMINOPHEN QUAL: NEGATIVE
ALBUMIN SERPL-MCNC: 3.9 G/DL (ref 3.4–5)
ALP SERPL-CCNC: 72 U/L (ref 40–150)
ALT SERPL W P-5'-P-CCNC: 75 U/L (ref 0–50)
AMANTADINE: NEGATIVE
AMITRIPTYLINE QUAL: POSITIVE
AMOXAPINE: NEGATIVE
AMPHETAMINES QUAL: NEGATIVE
ANION GAP SERPL CALCULATED.3IONS-SCNC: 7 MMOL/L (ref 3–14)
AST SERPL W P-5'-P-CCNC: 66 U/L (ref 0–45)
ATROPINE: NEGATIVE
BASOPHILS # BLD AUTO: 0 10E9/L (ref 0–0.2)
BASOPHILS NFR BLD AUTO: 0.8 %
BENZODIAZ UR QL: POSITIVE
BILIRUB SERPL-MCNC: 0.9 MG/DL (ref 0.2–1.3)
BUN SERPL-MCNC: 7 MG/DL (ref 7–30)
BUPROPION QUAL: NEGATIVE
CAFFEINE QUAL: POSITIVE
CALCIUM SERPL-MCNC: 9.2 MG/DL (ref 8.5–10.1)
CANNABINOIDS UR QL SCN: NEGATIVE
CARBAMAZEPINE QUAL: NEGATIVE
CHLORIDE SERPL-SCNC: 101 MMOL/L (ref 94–109)
CHLORPHENIRAMINE: NEGATIVE
CHLORPROMAZINE: NEGATIVE
CITALOPRAM QUAL: NEGATIVE
CLOMIPRAMINE QUAL: NEGATIVE
CO2 SERPL-SCNC: 27 MMOL/L (ref 20–32)
COCAINE QUAL: NEGATIVE
COCAINE UR QL: NEGATIVE
CODEINE QUAL: NEGATIVE
CREAT SERPL-MCNC: 0.86 MG/DL (ref 0.52–1.04)
DESIPRAMINE QUAL: NEGATIVE
DEXTROMETHORPHAN: NEGATIVE
DIFFERENTIAL METHOD BLD: ABNORMAL
DIPHENHYDRAMINE: POSITIVE
DOXEPIN/METABOLITE: NEGATIVE
DOXYLAMINE: NEGATIVE
EOSINOPHIL # BLD AUTO: 0.1 10E9/L (ref 0–0.7)
EOSINOPHIL NFR BLD AUTO: 3 %
EPHEDRINE OR PSEUDO: POSITIVE
ERYTHROCYTE [DISTWIDTH] IN BLOOD BY AUTOMATED COUNT: 13.8 % (ref 10–15)
FENTANYL QUAL: NEGATIVE
FLUOXETINE AND METAB: NEGATIVE
GFR SERPL CREATININE-BSD FRML MDRD: 82 ML/MIN/{1.73_M2}
GLUCOSE BLDC GLUCOMTR-MCNC: 73 MG/DL (ref 70–99)
GLUCOSE BLDC GLUCOMTR-MCNC: 77 MG/DL (ref 70–99)
GLUCOSE BLDC GLUCOMTR-MCNC: 82 MG/DL (ref 70–99)
GLUCOSE BLDC GLUCOMTR-MCNC: 85 MG/DL (ref 70–99)
GLUCOSE SERPL-MCNC: 74 MG/DL (ref 70–99)
HCT VFR BLD AUTO: 39.4 % (ref 35–47)
HGB BLD-MCNC: 12.7 G/DL (ref 11.7–15.7)
HYDROCODONE QUAL: NEGATIVE
HYDROMORPHONE QUAL: NEGATIVE
IBUPROFEN QUAL: NEGATIVE
IMIPRAMINE QUAL: NEGATIVE
IMM GRANULOCYTES # BLD: 0 10E9/L (ref 0–0.4)
IMM GRANULOCYTES NFR BLD: 0 %
KETAMINE QUAL: NEGATIVE
LAMOTRIGINE QUAL: NEGATIVE
LIDOCAIN SPEC QL: NEGATIVE
LOXAPINE: NEGATIVE
LYMPHOCYTES # BLD AUTO: 0.7 10E9/L (ref 0.8–5.3)
LYMPHOCYTES NFR BLD AUTO: 17.8 %
MAGNESIUM SERPL-MCNC: 2 MG/DL (ref 1.6–2.3)
MAPROTYLINE: NEGATIVE
MCH RBC QN AUTO: 31.8 PG (ref 26.5–33)
MCHC RBC AUTO-ENTMCNC: 32.2 G/DL (ref 31.5–36.5)
MCV RBC AUTO: 99 FL (ref 78–100)
MDMA QUAL: NEGATIVE
MEPERIDINE QUAL: NEGATIVE
METHAMPHETAMINE: NEGATIVE
METHODONE QUAL: NEGATIVE
MIRTAZAPINE QUAL: NEGATIVE
MONOCYTES # BLD AUTO: 0.5 10E9/L (ref 0–1.3)
MONOCYTES NFR BLD AUTO: 12 %
MORPHINE QUAL: NEGATIVE
MRSA DNA SPEC QL NAA+PROBE: NEGATIVE
NEUTROPHILS # BLD AUTO: 2.7 10E9/L (ref 1.6–8.3)
NEUTROPHILS NFR BLD AUTO: 66.4 %
NICOTINE: NEGATIVE
NORTRIPTYLINE QUAL: POSITIVE
NRBC # BLD AUTO: 0 10*3/UL
NRBC BLD AUTO-RTO: 0 /100
OLANZAPINE QUAL: NEGATIVE
OPIATES UR QL SCN: NEGATIVE
OXYCODONE QUAL: NEGATIVE
PENTAZOCINE: NEGATIVE
PHENCYCLIDINE QUAL: NEGATIVE
PHENTERMINE: NEGATIVE
PHOSPHATE SERPL-MCNC: 3.8 MG/DL (ref 2.5–4.5)
PLATELET # BLD AUTO: 122 10E9/L (ref 150–450)
POTASSIUM SERPL-SCNC: 3.7 MMOL/L (ref 3.4–5.3)
PROPOFOL QUAL: NEGATIVE
PROPOXPHENE QUAL: NEGATIVE
PROPRANOLOL QUAL: NEGATIVE
PROT SERPL-MCNC: 7.6 G/DL (ref 6.8–8.8)
PYRILAMINE: NEGATIVE
QUETIAPINE METAB QUAL: NEGATIVE
RBC # BLD AUTO: 3.99 10E12/L (ref 3.8–5.2)
SALICYLATE QUAL: NEGATIVE
SERTRALINE QUAL: NEGATIVE
SODIUM SERPL-SCNC: 135 MMOL/L (ref 133–144)
SPECIMEN SOURCE: NORMAL
THEOBROMINE: NEGATIVE
TOPIRAMATE QUAL: NEGATIVE
TRAMADOL QUAL: NEGATIVE
TRIMIPRAMINE QUAL: NEGATIVE
VENLAFAXINE QUAL: NEGATIVE
WBC # BLD AUTO: 4 10E9/L (ref 4–11)

## 2019-02-19 PROCEDURE — 25000125 ZZHC RX 250: Performed by: INTERNAL MEDICINE

## 2019-02-19 PROCEDURE — 25800030 ZZH RX IP 258 OP 636: Performed by: INTERNAL MEDICINE

## 2019-02-19 PROCEDURE — 40000358 ZZHCL STATISTIC DRUG SCREEN MULTIPLE (METRO): Performed by: INTERNAL MEDICINE

## 2019-02-19 PROCEDURE — 83735 ASSAY OF MAGNESIUM: CPT | Performed by: INTERNAL MEDICINE

## 2019-02-19 PROCEDURE — 84100 ASSAY OF PHOSPHORUS: CPT | Performed by: INTERNAL MEDICINE

## 2019-02-19 PROCEDURE — 40000914 ZZH STATISTIC SITTER, DAY HOURS

## 2019-02-19 PROCEDURE — 80053 COMPREHEN METABOLIC PANEL: CPT | Performed by: INTERNAL MEDICINE

## 2019-02-19 PROCEDURE — 25000132 ZZH RX MED GY IP 250 OP 250 PS 637: Performed by: INTERNAL MEDICINE

## 2019-02-19 PROCEDURE — 40000915 ZZH STATISTIC SITTER, EVENING HOURS

## 2019-02-19 PROCEDURE — 00000146 ZZHCL STATISTIC GLUCOSE BY METER IP

## 2019-02-19 PROCEDURE — 80307 DRUG TEST PRSMV CHEM ANLYZR: CPT | Performed by: INTERNAL MEDICINE

## 2019-02-19 PROCEDURE — 99233 SBSQ HOSP IP/OBS HIGH 50: CPT | Performed by: INTERNAL MEDICINE

## 2019-02-19 PROCEDURE — 85025 COMPLETE CBC W/AUTO DIFF WBC: CPT | Performed by: INTERNAL MEDICINE

## 2019-02-19 PROCEDURE — 20000003 ZZH R&B ICU

## 2019-02-19 PROCEDURE — 25000128 H RX IP 250 OP 636: Performed by: INTERNAL MEDICINE

## 2019-02-19 PROCEDURE — 36415 COLL VENOUS BLD VENIPUNCTURE: CPT | Performed by: INTERNAL MEDICINE

## 2019-02-19 RX ORDER — SODIUM CHLORIDE 9 MG/ML
INJECTION, SOLUTION INTRAVENOUS CONTINUOUS
Status: DISCONTINUED | OUTPATIENT
Start: 2019-02-19 | End: 2019-02-21

## 2019-02-19 RX ADMIN — Medication 100 MG: at 09:44

## 2019-02-19 RX ADMIN — Medication 1 MG: at 22:25

## 2019-02-19 RX ADMIN — LORAZEPAM 1 MG: 1 TABLET ORAL at 22:25

## 2019-02-19 RX ADMIN — MULTIPLE VITAMINS W/ MINERALS TAB 1 TABLET: TAB at 09:45

## 2019-02-19 RX ADMIN — SODIUM CHLORIDE: 9 INJECTION, SOLUTION INTRAVENOUS at 08:00

## 2019-02-19 RX ADMIN — LORAZEPAM 2 MG: 2 INJECTION INTRAMUSCULAR; INTRAVENOUS at 23:09

## 2019-02-19 RX ADMIN — FOLIC ACID 1 MG: 1 TABLET ORAL at 09:44

## 2019-02-19 RX ADMIN — DEXMEDETOMIDINE HYDROCHLORIDE 0.2 MCG/KG/HR: 100 INJECTION, SOLUTION INTRAVENOUS at 00:10

## 2019-02-19 RX ADMIN — Medication 1 TABLET: at 09:44

## 2019-02-19 RX ADMIN — LORAZEPAM 1 MG: 1 TABLET ORAL at 19:34

## 2019-02-19 ASSESSMENT — ACTIVITIES OF DAILY LIVING (ADL)
ADLS_ACUITY_SCORE: 14.5
ADLS_ACUITY_SCORE: 23

## 2019-02-19 NOTE — PROVIDER NOTIFICATION
2020: Patient still scoring high on CIWA, last score 23, has maxed out Ativan doses per orders. Also, /104 after metoprolol. Please Advise. Thanks

## 2019-02-19 NOTE — PLAN OF CARE
ICU End of Shift Summary.  For vital signs and complete assessments, please see documentation flowsheets.   RN [id number]    Pertinent assessments: VSS, Disoriented to time and situation, denies pain, LS clear/diminished, CMS intact, BS +, gomez patent w/ good UOP, Tele SR   Major Shift Events: CIWA score 7, precedex 0.2 mcg/kg/hr with RASS scores at 0, pt calm, very flat, more alert this evening, sitter at bedside, increased appetite this evening   Plan (Upcoming Events): CIWA, wean off precedex as tolerated, continue safety plan, possible CD consult, possible psych consult   Discharge/Transfer Needs: TBD, continue plan of care     Bedside Shift Report Completed : Yes   Bedside Safety Check Completed: Yes

## 2019-02-19 NOTE — PLAN OF CARE
ICU End of Shift Summary.  For vital signs and complete assessments, please see documentation flowsheets.     Pertinent assessments: Precedex gtt at 0.2 mg/hr. NS at TKO. RASS 0/-1. Drowsy, arouses to voice and gentle touch. Oriented to self only. Denies pain. L/S clear/diminished. Positive bowel sounds. Soriano in place for  retention, good UO. X2 PIV. Tele SR.   Major Shift Events: Very flat and withdrawn. Refusing to eat, as patient states she is depressed. Parents at the bedside this morning discussed frustration and feeling stuck. Active listening and reassurance provided. Offered  services, which parents stated they would be interested in tomorrow.   Plan (Upcoming Events): CIWA. Safety. Eventual psych, CD and care coordinator consults.   Discharge/Transfer Needs: TBD, continue POC.     Bedside Shift Report Completed : yes  Bedside Safety Check Completed: yes

## 2019-02-19 NOTE — PLAN OF CARE
ICU End of Shift Summary.  For vital signs and complete assessments, please see documentation flowsheets.     Pertinent assessments: When pt arrived, CIWA elevated, still elevated when pt awake, but improved with use of precedex.  Pt denies pain, nausea.  A&O to self.  Pt LS clear.  Tele initially ST, improving to SR.  When asked about ideation, pt did verbalize some, no specific plan given.  Major Shift Events: Precedex initiated, gomez placed  Plan (Upcoming Events): continue precedex gtt  Discharge/Transfer Needs: Continue ICU cares    Bedside Shift Report Completed : y  Bedside Safety Check Completed:y

## 2019-02-19 NOTE — PROGRESS NOTES
[facility]    Hospitalist Progress Note  Provider : Aurelio Fraser MD  Date of Service (when I saw the patient): 02/19/2019    Assessment & Plan      Alana Mujica is a 43 year old female patient with past medical history of anxiety, depression, anorexia, bulimia, substance abuse, osteoporosis, was brought to emergency room for evaluation for possible overdose.     1. Alcohol withdrawal symptoms.  She was initially started on alcohol withdrawal protocol and admitted to telemetry floor.  She was transferred to ICU last night and started on Precedex drip.  Her anxiety and tremors improving.  We will continue Precedex drip and wean as able.     2. Possible drug overdose. Patient was found with empty pill bottle near her but she couldn't tell what she took.  -- Acetaminophen level <2, ethanol level 0.1, salicylate <2.  Currently having alcohol withdrawal symptoms.  --Will continue Precedex drip    3.  Mild transaminitis: Likely due to alcohol abuse.    LFTs slightly down. Acetaminophen level is undetectable.     4.  Alcohol abuse: Patient with evaluation by chemical dependency once alcohol withdrawal symptoms improved     5.  Depression: Does not appear to be on treatment for depression.       DVT Prophylaxis: Pneumatic Compression Devices  Code Status: Full Code     Disposition: Expected discharge: anticipate more than two nights of hospital course until withdrawal symptoms improve.     Aurelio Fraser MD    Interval History   Patient seen and examined.  She stated that she has no nausea or vomiting.  Sleepy.  Anxiety and tremors improving.    -Data reviewed today: I reviewed all new labs and imaging results over the last 24 hours. I personally reviewed       Physical Exam   Temp: 97.1  F (36.2  C) Temp src: Axillary BP: 120/90 Pulse: 81 Heart Rate: 81 Resp: 19 SpO2: 98 % O2 Device: None (Room air)    There were no vitals filed for this visit.  Vital Signs with Ranges  Temp:  [97.1  F (36.2   C)-99.5  F (37.5  C)] 97.1  F (36.2  C)  Pulse:  [] 81  Heart Rate:  [] 81  Resp:  [10-26] 19  BP: (120-147)/() 120/90  SpO2:  [92 %-100 %] 98 %  I/O last 3 completed shifts:  In: -   Out: 2600 [Urine:2600]    GEN:  Alert, oriented x 3, sleepy, appears comfortable, NAD.  HEENT:  Normocephalic/atraumatic, no scleral icterus, no nasal discharge, mouth moist.  CV:  Regular rate and rhythm, no murmur or JVD.  S1 + S2 noted, no S3 or S4.  LUNGS:  Clear to auscultation bilaterally without rales/rhonchi/wheezing/retractions.  Symmetric chest rise on inhalation noted.  ABD:  Active bowel sounds, soft, non-tender/non-distended.  No rebound/guarding/rigidity.  EXT:  No edema or cyanosis.  Hands/feet warm to touch with good signs of peripheral perfusion.  No joint synovitis noted.  SKIN:  Dry to touch, no exanthems noted in the visualized areas.  NEURO: Has mild tremors, symmetric muscle strength, sensation to touch grossly intact.  No new focal deficits appreciated.    Medications     dexmedetomidine 0.2 mcg/kg/hr (02/19/19 0800)     sodium chloride 10 mL/hr at 02/19/19 0800       folic acid  1 mg Oral Daily     multivitamin w/minerals  1 tablet Oral Daily     vitamin B complex with vitamin C  1 tablet Oral Daily     vitamin B1  100 mg Oral Daily       Data   Recent Labs   Lab 02/19/19  0527 02/18/19  1137   WBC 4.0 3.5*   HGB 12.7 12.7   MCV 99 98   * 122*   INR  --  0.96    138   POTASSIUM 3.7 3.7   CHLORIDE 101 101   CO2 27 24   BUN 7 4*   CR 0.86 0.65   ANIONGAP 7 13   KODY 9.2 9.5   GLC 74 85   ALBUMIN 3.9 4.3   PROTTOTAL 7.6 8.1   BILITOTAL 0.9 0.4   ALKPHOS 72 72   ALT 75* 84*   AST 66* 83*       No results found for this or any previous visit (from the past 24 hour(s)).

## 2019-02-19 NOTE — PROGRESS NOTES
[facility]    Critical care progress Note  Name: Alana Mujica    MRN: [id number]  Provider: Sofía Gomez MD  Date of Service: 02/18/2019    Assessment & Plan   Summary of Stay: Alana Mujica is a 43 year old female who was admitted on 2/18/2019 for alcohol withdrawal.  Asked to evaluate patient and is requiring higher doses of Ativan and still remains agitated and restless.  Heart rate was up to 170s        --We will transfer patient to ICU  --We will start Precedex  --We will notify telemetry ICU #2 I called telemetry ICU  --Ordered IV metoprolol for tachycardia heart rate responded well          Code Status: Full Code        Interval History   Increasingly restless and agitated    -Data reviewed today: I reviewed all new labs and imaging reports over the last 24 hours. I personally reviewed the EKG tracing showing Sinus tachycardia.  Heart rate was up to 170s as noted on alarm review.  .    Physical Exam   Temp: 98.8  F (37.1  C) Temp src: Oral BP: (!) 145/106 Pulse: 119   Resp: 16 SpO2: 97 % O2 Device: None (Room air)    There were no vitals filed for this visit.  Vital Signs with Ranges  Temp:  [98  F (36.7  C)-98.8  F (37.1  C)] 98.8  F (37.1  C)  Pulse:  [] 119  Resp:  [14-16] 16  BP: (128-147)/() 145/106  SpO2:  [92 %-100 %] 97 %  No intake/output data recorded.      GEN: Agitated restless shaking  HEENT:  Normocephalic/atraumatic, no scleral icterus, no nasal discharge, mouth moist.  CV: Tachycardic no murmur or JVD.  S1 + S2 noted, no S3 or S4.  LUNGS:  Clear to auscultation bilaterally without rales/rhonchi/wheezing/retractions.  Symmetric chest rise on inhalation noted.  ABD:  Active bowel sounds, soft, non-tender/non-distended.  No rebound/guarding/rigidity.  EXT:  No edema.  No cyanosis.    SKIN:  Dry to touch    Medications       [START ON 2/19/2019] folic acid  1 mg Oral Daily     [START ON 2/19/2019] multivitamin w/minerals  1 tablet Oral Daily     vitamin B complex  with vitamin C  1 tablet Oral Daily     [START ON 2/19/2019] vitamin B1  100 mg Oral Daily     Data     Recent Labs   Lab 02/18/19  1137      POTASSIUM 3.7   CHLORIDE 101   CO2 24   ANIONGAP 13   GLC 85   BUN 4*   CR 0.65   GFRESTIMATED >90   GFRESTBLACK >90   KODY 9.5   PROTTOTAL 8.1   ALBUMIN 4.3   BILITOTAL 0.4   ALKPHOS 72   AST 83*   ALT 84*     Recent Labs   Lab 02/18/19  1137   AST 83*   ALT 84*   ALKPHOS 72   BILITOTAL 0.4     Recent Labs   Lab 02/18/19  1137   INR 0.96     No results for input(s): LACT in the last 168 hours.    No results found for this or any previous visit (from the past 24 hour(s)).       labor/delivery

## 2019-02-19 NOTE — PROGRESS NOTES
TELE ICU    Dr. Gomez contacted me to informed me that patient was transferred to ICU due to ongoing agitation secondary to alcohol withdrawal symptoms.   Patient will be started on precedex drip, titrate to RASS 0 to -1.    Denzel Katz MD  Critical Care Medicine   2/18/2019 21:00

## 2019-02-19 NOTE — PLAN OF CARE
ORIENTATION: Disoriented to situation, time and place; became increasingly disoriented as night progressed. Was first just disoriented to situation.  VS: Tachycardic and elevated BPs; IV metoprolol x 1  NEURO: Very unsteady with moderate to severe tremors  TELE: ST  LUNGS: 97% RA; Clear  : Pt asked to use bathroom, was too unsteady to get up; attempted bed pan and unable to void  GI: Per patient, last BM 2/17/19  IV: Infusing Banana Bag 100mL/hr  PAIN: c/o headache, Advil x 1  ACTIVITY: Assist x 2 w/karlee steady  DIET: Regular diet, no appetite  PLAN: Patient to be transferred to ICU once bed is available  OTHER: Patient continues to have auditory and visual hallucinations. She was having conversations with people that were not present in the room. Had to be redirected several times by sitter as she would try to get out of bed and say that she needed to go home. Patient was also very difficult to understand and was mumbling and incoherent.

## 2019-02-20 LAB
ALBUMIN UR-MCNC: NEGATIVE MG/DL
APPEARANCE UR: ABNORMAL
BACTERIA #/AREA URNS HPF: ABNORMAL /HPF
BILIRUB UR QL STRIP: NEGATIVE
COLOR UR AUTO: YELLOW
GLUCOSE UR STRIP-MCNC: NEGATIVE MG/DL
HGB UR QL STRIP: NEGATIVE
KETONES UR STRIP-MCNC: NEGATIVE MG/DL
LACTATE BLD-SCNC: 2.4 MMOL/L (ref 0.7–2)
LEUKOCYTE ESTERASE UR QL STRIP: ABNORMAL
NITRATE UR QL: NEGATIVE
PH UR STRIP: 7 PH (ref 5–7)
RBC #/AREA URNS AUTO: <1 /HPF (ref 0–2)
SOURCE: ABNORMAL
SP GR UR STRIP: 1 (ref 1–1.03)
SQUAMOUS #/AREA URNS AUTO: <1 /HPF (ref 0–1)
UROBILINOGEN UR STRIP-MCNC: 0 MG/DL (ref 0–2)
WBC #/AREA URNS AUTO: 24 /HPF (ref 0–5)

## 2019-02-20 PROCEDURE — 25000132 ZZH RX MED GY IP 250 OP 250 PS 637: Performed by: INTERNAL MEDICINE

## 2019-02-20 PROCEDURE — 40000914 ZZH STATISTIC SITTER, DAY HOURS

## 2019-02-20 PROCEDURE — 87086 URINE CULTURE/COLONY COUNT: CPT | Performed by: INTERNAL MEDICINE

## 2019-02-20 PROCEDURE — 25000125 ZZHC RX 250: Performed by: INTERNAL MEDICINE

## 2019-02-20 PROCEDURE — 40000916 ZZH STATISTIC SITTER, NIGHT HOURS

## 2019-02-20 PROCEDURE — 40000915 ZZH STATISTIC SITTER, EVENING HOURS

## 2019-02-20 PROCEDURE — 81001 URINALYSIS AUTO W/SCOPE: CPT | Performed by: INTERNAL MEDICINE

## 2019-02-20 PROCEDURE — 25800030 ZZH RX IP 258 OP 636: Performed by: INTERNAL MEDICINE

## 2019-02-20 PROCEDURE — 83605 ASSAY OF LACTIC ACID: CPT | Performed by: INTERNAL MEDICINE

## 2019-02-20 PROCEDURE — 12000000 ZZH R&B MED SURG/OB

## 2019-02-20 PROCEDURE — 36415 COLL VENOUS BLD VENIPUNCTURE: CPT | Performed by: INTERNAL MEDICINE

## 2019-02-20 PROCEDURE — 25000128 H RX IP 250 OP 636: Performed by: INTERNAL MEDICINE

## 2019-02-20 PROCEDURE — 99233 SBSQ HOSP IP/OBS HIGH 50: CPT | Performed by: INTERNAL MEDICINE

## 2019-02-20 RX ORDER — ACETAMINOPHEN 325 MG/1
650 TABLET ORAL EVERY 6 HOURS PRN
Status: DISCONTINUED | OUTPATIENT
Start: 2019-02-20 | End: 2019-02-22 | Stop reason: HOSPADM

## 2019-02-20 RX ORDER — IBUPROFEN 200 MG
200 TABLET ORAL EVERY 6 HOURS PRN
Status: DISCONTINUED | OUTPATIENT
Start: 2019-02-20 | End: 2019-02-22 | Stop reason: HOSPADM

## 2019-02-20 RX ADMIN — FOLIC ACID 1 MG: 1 TABLET ORAL at 14:21

## 2019-02-20 RX ADMIN — LORAZEPAM 1 MG: 1 TABLET ORAL at 21:37

## 2019-02-20 RX ADMIN — Medication 12.5 MG: at 20:51

## 2019-02-20 RX ADMIN — LORAZEPAM 2 MG: 2 INJECTION INTRAMUSCULAR; INTRAVENOUS at 20:22

## 2019-02-20 RX ADMIN — Medication 100 MG: at 14:21

## 2019-02-20 RX ADMIN — LORAZEPAM 2 MG: 2 INJECTION INTRAMUSCULAR; INTRAVENOUS at 00:44

## 2019-02-20 RX ADMIN — Medication 1 TABLET: at 14:21

## 2019-02-20 RX ADMIN — DEXMEDETOMIDINE HYDROCHLORIDE 0.6 MCG/KG/HR: 100 INJECTION, SOLUTION INTRAVENOUS at 02:40

## 2019-02-20 RX ADMIN — MULTIPLE VITAMINS W/ MINERALS TAB 1 TABLET: TAB at 14:21

## 2019-02-20 RX ADMIN — ACETAMINOPHEN 650 MG: 325 TABLET, FILM COATED ORAL at 21:37

## 2019-02-20 RX ADMIN — SODIUM CHLORIDE, POTASSIUM CHLORIDE, SODIUM LACTATE AND CALCIUM CHLORIDE 1000 ML: 600; 310; 30; 20 INJECTION, SOLUTION INTRAVENOUS at 22:47

## 2019-02-20 ASSESSMENT — ACTIVITIES OF DAILY LIVING (ADL)
ADLS_ACUITY_SCORE: 14.5

## 2019-02-20 NOTE — PLAN OF CARE
ICU End of Shift Summary.  For vital signs and complete assessments, please see documentation flowsheets.     Pertinent assessments: A&Ox4, lethargic, soft voice, VSS, LS clear on RA, c/o HA at times, tolerating regular diet, due to void after gomez removed, declines activity  Major Shift Events: precedex stopped, gomez removed  Plan (Upcoming Events): psych eval tomorrow  Discharge/Transfer Needs: meet with chem dep before discharge, could transfer tomorrow if no longer requiring precedex    Bedside Shift Report Completed : y  Bedside Safety Check Completed: y

## 2019-02-20 NOTE — PROGRESS NOTES
[facility]    Hospitalist Progress Note  Provider : Aurelio Fraser MD  Date of Service (when I saw the patient): 02/20/2019    Assessment & Plan      Alana Mujica is a 43 year old female patient with past medical history of anxiety, depression, anorexia, bulimia, substance abuse, osteoporosis, was brought to emergency room for evaluation for possible overdose.     1. Alcohol withdrawal symptoms. She was initially started on alcohol withdrawal protocol and admitted to telemetry floor.  --She was transferred to ICU and started on Precedex drip.  Her anxiety and tremors improving. Still sleepy  -- Will continue Precedex drip and wean as able.     2. Possible drug overdose. Patient was found with empty pill bottle near her but she couldn't tell what she took.  -- Acetaminophen level <2, ethanol level 0.1, salicylate <2.  Currently having alcohol withdrawal symptoms.  --Will continue Precedex drip    3.  Mild transaminitis: Likely due to alcohol abuse.  LFTs slightly down. Acetaminophen level is undetectable.     4.  Alcohol abuse: Patient with evaluation by chemical dependency once alcohol withdrawal symptoms improved     5.  Depression: Does not appear to be on treatment for depression. Per report from ER physician, patient tried to end her life the night before admission. She denies suicidal ideation here. Sitter at bed side. Will consult psych tomorrow for further evaluation and recommendations if she is more awake    DVT Prophylaxis: Pneumatic Compression Devices    Code Status: Full Code     Disposition: Expected discharge: anticipate more than two nights of hospital course until withdrawal symptoms improve.     Aurelio Fraser MD    Interval History   Patient seen and examined. She stated that she has no nausea or vomiting.  Sleepy.  Anxiety and tremors improving.    -Data reviewed today: I reviewed all new labs and imaging results over the last 24 hours. I personally reviewed      Physical Exam   Temp: 97.8  F (36.6  C) Temp src: Oral BP: 126/89 Pulse: 77 Heart Rate: 80 Resp: 17 SpO2: 97 % O2 Device: None (Room air)    There were no vitals filed for this visit.  Vital Signs with Ranges  Temp:  [97.7  F (36.5  C)-98.7  F (37.1  C)] 97.8  F (36.6  C)  Pulse:  [] 77  Heart Rate:  [] 80  Resp:  [9-42] 17  BP: (116-148)/() 126/89  SpO2:  [95 %-100 %] 97 %  I/O last 3 completed shifts:  In: 795.83 [P.O.:501; I.V.:294.83]  Out: 3258 [Urine:3258]    GEN:   sleepy, appears comfortable, NAD.  HEENT:  Normocephalic/atraumatic, no scleral icterus, no nasal discharge, mouth moist.  CV:  Regular rate and rhythm, no murmur or JVD.  S1 + S2 noted, no S3 or S4.  LUNGS:  Clear to auscultation bilaterally without rales/rhonchi/wheezing/retractions.  Symmetric chest rise on inhalation noted.  ABD:  Active bowel sounds, soft, non-tender/non-distended.  No rebound/guarding/rigidity.  EXT:  No edema or cyanosis.  Hands/feet warm to touch with good signs of peripheral perfusion.  No joint synovitis noted.  SKIN:  Dry to touch, no exanthems noted in the visualized areas.  NEURO: Has mild tremors, symmetric muscle strength, sensation to touch grossly intact.  No new focal deficits appreciated.    Medications     dexmedetomidine 0.2 mcg/kg/hr (02/20/19 0940)     sodium chloride 10 mL/hr at 02/19/19 1800       folic acid  1 mg Oral Daily     multivitamin w/minerals  1 tablet Oral Daily     vitamin B complex with vitamin C  1 tablet Oral Daily     vitamin B1  100 mg Oral Daily       Data   Recent Labs   Lab 02/19/19  0527 02/18/19  1137   WBC 4.0 3.5*   HGB 12.7 12.7   MCV 99 98   * 122*   INR  --  0.96    138   POTASSIUM 3.7 3.7   CHLORIDE 101 101   CO2 27 24   BUN 7 4*   CR 0.86 0.65   ANIONGAP 7 13   KODY 9.2 9.5   GLC 74 85   ALBUMIN 3.9 4.3   PROTTOTAL 7.6 8.1   BILITOTAL 0.9 0.4   ALKPHOS 72 72   ALT 75* 84*   AST 66* 83*       No results found for this or any previous visit (from  the past 24 hour(s)).

## 2019-02-20 NOTE — PLAN OF CARE
ICU End of Shift Summary.  For vital signs and complete assessments, please see documentation flowsheets.     Pertinent assessments: VSS, tele SR.  Pt agitated at times, improving appetite.  Sleeping following increase in precedex.  Pt gomez with good output.  Pt LS clear, equal bilat.  Denying pain besides a headache.  Major Shift Events: Increase precedex, denying suicidal ideation.  Plan (Upcoming Events): Chem dep and psych to see  Discharge/Transfer Needs: Continue ICU cares while receiving precedex    Bedside Shift Report Completed : y  Bedside Safety Check Completed:y

## 2019-02-21 LAB — LACTATE SERPL-SCNC: 2.3 MMOL/L (ref 0.4–2)

## 2019-02-21 PROCEDURE — 90791 PSYCH DIAGNOSTIC EVALUATION: CPT | Performed by: PSYCHOLOGIST

## 2019-02-21 PROCEDURE — 12000000 ZZH R&B MED SURG/OB

## 2019-02-21 PROCEDURE — 36415 COLL VENOUS BLD VENIPUNCTURE: CPT | Performed by: INTERNAL MEDICINE

## 2019-02-21 PROCEDURE — 25000132 ZZH RX MED GY IP 250 OP 250 PS 637: Performed by: INTERNAL MEDICINE

## 2019-02-21 PROCEDURE — 99207 ZZC CDG-MDM COMPONENT: MEETS LOW - DOWN CODED: CPT | Performed by: INTERNAL MEDICINE

## 2019-02-21 PROCEDURE — 99232 SBSQ HOSP IP/OBS MODERATE 35: CPT | Performed by: INTERNAL MEDICINE

## 2019-02-21 PROCEDURE — 83605 ASSAY OF LACTIC ACID: CPT | Performed by: INTERNAL MEDICINE

## 2019-02-21 RX ORDER — TRAZODONE HYDROCHLORIDE 50 MG/1
50 TABLET, FILM COATED ORAL AT BEDTIME
Status: DISCONTINUED | OUTPATIENT
Start: 2019-02-21 | End: 2019-02-22 | Stop reason: HOSPADM

## 2019-02-21 RX ORDER — SERTRALINE HYDROCHLORIDE 25 MG/1
25 TABLET, FILM COATED ORAL DAILY
Status: DISCONTINUED | OUTPATIENT
Start: 2019-02-21 | End: 2019-02-22 | Stop reason: HOSPADM

## 2019-02-21 RX ADMIN — ACETAMINOPHEN 650 MG: 325 TABLET, FILM COATED ORAL at 17:29

## 2019-02-21 RX ADMIN — Medication 100 MG: at 08:55

## 2019-02-21 RX ADMIN — ACETAMINOPHEN 650 MG: 325 TABLET, FILM COATED ORAL at 03:47

## 2019-02-21 RX ADMIN — TRAZODONE HYDROCHLORIDE 50 MG: 50 TABLET ORAL at 21:14

## 2019-02-21 RX ADMIN — SERTRALINE HYDROCHLORIDE 25 MG: 25 TABLET ORAL at 13:21

## 2019-02-21 RX ADMIN — Medication 12.5 MG: at 21:13

## 2019-02-21 RX ADMIN — MULTIPLE VITAMINS W/ MINERALS TAB 1 TABLET: TAB at 08:55

## 2019-02-21 RX ADMIN — FOLIC ACID 1 MG: 1 TABLET ORAL at 08:55

## 2019-02-21 RX ADMIN — Medication 1 TABLET: at 08:55

## 2019-02-21 RX ADMIN — Medication 12.5 MG: at 08:55

## 2019-02-21 ASSESSMENT — ACTIVITIES OF DAILY LIVING (ADL)
ADLS_ACUITY_SCORE: 15
ADLS_ACUITY_SCORE: 14.5
ADLS_ACUITY_SCORE: 23
ADLS_ACUITY_SCORE: 23
ADLS_ACUITY_SCORE: 22
ADLS_ACUITY_SCORE: 15

## 2019-02-21 NOTE — PROGRESS NOTES
[facility]    Sepsis Evaluation Progress Note    Date of Service: 02/20/2019    I was called to see Alana Mujica due to abnormal vital signs triggering the Sepsis SIRS screening alert. She is not known to have an infection.     Physical Exam    Vital Signs:  Temp: 98.2  F (36.8  C) Temp src: Oral BP: (!) 135/95 Pulse: 125 Heart Rate: 97 Resp: 18 SpO2: 100 % O2 Device: None (Room air)      Lab:  Lactic Acid   Date Value Ref Range Status   12/04/2018 0.8 0.4 - 2.0 mmol/L Final     Lactate for Sepsis Protocol   Date Value Ref Range Status   02/20/2019 2.4 (H) 0.7 - 2.0 mmol/L Final     Comment:     Critical Value called to and read back by  MS3 (AMIE) @5648 ON 02/20/19 BY MT         The patient is at baseline mental status.    Patient with some urinary urgency    Assessment and Plan    The SIRS and exam findings are likely due to alcohol withdrawal and dehydration, there is no sign of sepsis at this time.    Disposition: The patient will remain on the current unit. We will continue to monitor this patient closely     Being treated for alcohol withdrawal, was tremulous on arrival to floor from ICU, but doing better after receiving IV ativan.Will give 1L fluid bolus and recheck lactic acid. Discussed case with nursing staff.     Travis Mancia MD

## 2019-02-21 NOTE — PLAN OF CARE
VSS on RA. A/O. CIWA 2,3. Tele ST. Ambulated in the saleem x1 this shift with SBA. Chem dep to see before discharge tomorrow.

## 2019-02-21 NOTE — PROGRESS NOTES
[facility]    Hospitalist Progress Note  Provider : Aurelio Fraser MD  Date of Service (when I saw the patient): 02/21/2019    Assessment & Plan      Alana Mujica is a 43 year old female patient with past medical history of anxiety, depression, anorexia, bulimia, substance abuse, osteoporosis, was brought to emergency room for evaluation for possible overdose.     1. Alcohol withdrawal symptoms. She was initially started on alcohol withdrawal protocol and admitted to telemetry floor.  --She was initially transferred to ICU and started on Precedex drip. Her alcohol withdrawal symptoms improved. She was transferred out of ICU.     2. Possible drug overdose. Patient was found with empty pill bottle near her but she couldn't tell what she took.  -- Acetaminophen level <2, ethanol level 0.1, salicylate <2.  Currently having alcohol withdrawal symptoms.  --now stable. Off precedex drip. No agitation    3.  Mild transaminitis: Likely due to alcohol abuse.  LFTs slightly down. Acetaminophen level is undetectable.     4.  Alcohol abuse: Patient with evaluation by chemical dependency once alcohol withdrawal symptoms improved     5.  Depression: Does not appear to be on treatment for depression. Per report from ER physician, patient tried to end her life the night before admission. She denies suicidal ideation here.  --seen by psych and started on Zoloft 25 mg daily, trazodone  mg at bedtime prn for sleep. Discussed with Dr. Garrison  --Will watch for tonight. Anticipate discharge tomorrow.   --sitter discontinued      DVT Prophylaxis: Pneumatic Compression Devices    Code Status: Full Code     Disposition: Expected discharge: anticipate discharge tomorrow if stable.     Aurelio Fraser MD    Interval History   Patient seen and examined. She is sleepy. Denies sucidal ideations. No nausea or vomiting. No abdominal pain. Denies fever.     -Data reviewed today: I reviewed all new labs and imaging  results over the last 24 hours. I personally reviewed     Physical Exam   Temp: 96.8  F (36  C) Temp src: Axillary BP: (!) 132/93(RN notified) Pulse: 125 Heart Rate: 96 Resp: 20 SpO2: 98 % O2 Device: None (Room air)    Vitals:    02/21/19 0511   Weight: 52.3 kg (115 lb 3.2 oz)     Vital Signs with Ranges  Temp:  [96.8  F (36  C)-98.8  F (37.1  C)] 96.8  F (36  C)  Pulse:  [125-138] 125  Heart Rate:  [] 96  Resp:  [18-26] 20  BP: (105-138)/(75-99) 132/93  SpO2:  [97 %-100 %] 98 %  I/O last 3 completed shifts:  In: 3217 [P.O.:2170; I.V.:1047]  Out: 2350 [Urine:2350]    GEN:   sleepy, appears comfortable, NAD.  HEENT:  Normocephalic/atraumatic, no scleral icterus, no nasal discharge, mouth moist.  CV:  Regular rate and rhythm, no murmur or JVD.  S1 + S2 noted, no S3 or S4.  LUNGS:  Clear to auscultation bilaterally without rales/rhonchi/wheezing/retractions.  Symmetric chest rise on inhalation noted.  ABD:  Active bowel sounds, soft, non-tender/non-distended.  No rebound/guarding/rigidity.  EXT:  No edema or cyanosis.  Hands/feet warm to touch with good signs of peripheral perfusion.  No joint synovitis noted.  SKIN:  Dry to touch, no exanthems noted in the visualized areas.  NEURO: Has mild tremors, symmetric muscle strength, sensation to touch grossly intact.  No new focal deficits appreciated.    Medications       folic acid  1 mg Oral Daily     metoprolol tartrate  12.5 mg Oral BID     multivitamin w/minerals  1 tablet Oral Daily     sertraline  25 mg Oral Daily     traZODone  50 mg Oral At Bedtime    Or     traZODone  75 mg Oral At Bedtime     vitamin B complex with vitamin C  1 tablet Oral Daily     vitamin B1  100 mg Oral Daily       Data   Recent Labs   Lab 02/19/19  0527 02/18/19  1137   WBC 4.0 3.5*   HGB 12.7 12.7   MCV 99 98   * 122*   INR  --  0.96    138   POTASSIUM 3.7 3.7   CHLORIDE 101 101   CO2 27 24   BUN 7 4*   CR 0.86 0.65   ANIONGAP 7 13   KODY 9.2 9.5   GLC 74 85   ALBUMIN 3.9  4.3   PROTTOTAL 7.6 8.1   BILITOTAL 0.9 0.4   ALKPHOS 72 72   ALT 75* 84*   AST 66* 83*       No results found for this or any previous visit (from the past 24 hour(s)).

## 2019-02-21 NOTE — PROVIDER NOTIFICATION
Text page sent to admitting hospitalist:    Pt had positive lactic acid drawn on eves, received 1L bolus. Note indicates lactic redraw but nothing ordered. Please address. Thank you    5:29 AM  Lactic acid collected at 0111, remains elevated but improved. Continue to monitor.

## 2019-02-21 NOTE — CONSULTS
This document was created with voice recognition software.  As result, there may be errors in grammar and syntax.  Please consider this when reading this document.    Psychiatric consult, under supervision of Dr. Lal, ordered by Dr. Fraser.  This was an initial consult, which took a total of 55 minutes, with half the time coordinating care.    Summary of consult  Please see the H&P by Dr. Fraser for detailed information about why Alana Mujica was admitted.  For the purposes of this report, it should be noted that she is a 43-year-old female with PMH significant for anxiety, depression, anorexia, bulimia, substance abuse and osteoporosis.  She was brought to the [facility] ED for evaluation of possible overdose.  She was found by her parents, with whom she lives,  with an empty pill bottle nearby, and was unable to accurately report how many pills she had taken.  In the ED she was assessed as being in withdrawal from alcohol abuse.    Alana was admitted as a voluntary patient for treatment of alcohol withdrawal, and to permit further assessment of possible psychiatric/behavioral/CHAD risk factors including suicidal risk.    Consultation with other team members  I conferred remotely with Dr. Lal, who recommended:   1.  For help with sleep, trazodone, as needed, 50 to 100 mg at bedtime.  She also recommended consideration of Remeron, 15 mg at bedtime although the patient has an eating disorder and told me that she is reluctant to take any medication that might promote weight gain, and this would be a concern with Remeron.  This could be discussed with the patient, if the hospitalist feels that it is indicated.  2.  Dr. Lal also recommended offering Zoloft, 25 mg in the morning, increase to 50 mg after 1 week.    I conferred with Dr. Fraser to discuss Dr. Lal's recommendations.     -----------------------------------------------------------------------------------------    Records reviewed   H&P  "done by Dr. Fraser, including review of systems,  as well as chart review including  previous admissions, Care Everywhere records and  the pharmacy admission note.     Progress notes/consults  The most recent nursing progress note documents that she is monitored with a an in-room , she is cooperative with nursing cares, but is not oriented to situation, persisting and stating that she is here because of a fall and hitting her head.    Previous treatment records  Discharge summary from treatment here at [facility], dated 12-5-18, documents that she was here for alcohol withdrawal.  A social work consult documents that patient lives with her parents and informed staff that she was participating in an [facility] CD treatment program and plan to start seeing a mental health  therapist.  She claimed that she was working on sobriety, but  had a slip on the weekend prior to admission, and otherwise was doing well.    I also briefly scanned to previous discharge summaries from treatment here in the latter part of 2018, both with diagnosis of alcohol abuse and one also diagnosing her with anxiety and panic symptoms.      Patient Report of Admission Events/Circumstances  Alana initially stated that she is here because, \"I hit my head.\"  She went on to explain that she slipped on the ice, hit her head and was briefly unconscious, a passerby brought her back to her home, and she was bothered by headaches and nausea.  She unwisely resumed drinking to \"help with the pain.\"  She also acknowledged taking Benadryl for sleep, but emphasized that there were \"only a few pills\" in the bottle which her parents found to be empty.  She also spontaneously told me that she had been distressed and took the Benadryl for help with sleep and distress, and said, \"I should have talked with my parents about what was bothering me.\"  She denied suicidal intent.     Mental and Chemical Health Treatment History    Alana has a " "history of inpatient ED treatment, in about 2013.  She initially was in the [facility] eating disorder program, affiliated with [facility], and transferred to an eating disorder program in [place], which did not work out for her and she returned to the [facility] program.  She reports that part of the program was \"dual programming\" for both CHAD symptoms and eating disorder symptoms.  She acknowledged that she currently continues to struggle with her eating disorder, with excessive concern about her weight and food.  While discussing medication options, anticipating my discussion with Dr. Lal, Alana emphasized that she is reluctant to take any medication that might promote weight gain.     Alana has a history of 2 CD treatment programs, and recently started her third program, [facility].  She has been enrolled in the program for about 2 weeks, but has a problem with insurance coverage and apparently completed an intake with the program but has not attended regular programming due to the insurance coverage problem.  She did, however, report that she believes the program is a good match for her and she plans to participate once insurance problem is resolved.  The CD programming includes counseling.  We also discussed the potential benefits of seeing a therapist with specialized training in eating disorder services.  She has a history of participating in DBT programming, which often is beneficial for patients with and eating disorder, and I encouraged her to resume DBT once she establishes sobriety.    Social background  Alana lives with her parents.  She is single, and has no children and is not currently dating.  She spontaneously said she is dissatisfied with her life circumstances.  She works in an [employer] field and is also satisfied with this line of work.    Behavioral Assessment  Alana was resting in bed when I introduced myself, and greeted me in a quiet manner..  Appearance was notable for looking tired and " "run down, and otherwise was typical for age and medical status.  She was oriented in all spheres. Eye contact was normal  Attention and concentration were intact. No unusual movements were observed. Muscle strength/tone, gait and station are deferred to the hospitalist team.     Alana's speech was soft, fluent, logical and goal-directed.  It was not pressured, and he made no unusual statements. Memory functions are intact.  She endorses some concern about feeling \"foggy in the head\" after falling on 2-17-19.  Associations are intact. IQ and fund of knowledge are cautiously estimated to be in the Average range.     Alana described her current mood as \"anxious.\"  Affect was glum.  She endorses current symptoms of depression, particularly low mood and discouragement.  She also endorses ongoing problems with anxiety, including muscular tension, shallow breathing, and palpitations.  She reports that her sleep is chronically poor, with problems with onset, maintenance, and early awakening.    She has no history of bipolar behavioral patterns. No history of hallucinations, and there were no obvious behavioral indications of problems with basic perceptual and cognitive process.     Insight/judgement are likely at baseline, with some problems but they do not rise to the level of considering referral to inpatient care.    Risk Assessment  She denies history of suicidal behavior, and denies, with solid eye contact and convincing affect, current suicidal ideation.     Impressions  Strengths: Alana interacted with me in a quiet and collaborative manner  Liabilities: Alana suffers from a chronic eating disorder, depression anxiety and  alcohol dependency.    Diagnoses   Alcohol dependency, not in remission; depression, unspecified, rule out secondary to chronic substance abuse; anxiety disorder, unspecified, rule out secondary to chronic substance abuse;    Recommendations   1. Discontinue . This was discussed " with Dr. Fraser.   2. When medically stable discharge to community resources including her new CD program. I encouraged her to add therapy for her eating disorder, depression and anxiety once she establishes sobriety     Ambar [email or web address]., L.P.  [phone] (cell)  [phone] (office)

## 2019-02-21 NOTE — PLAN OF CARE
VSS on RA overnight. C/O HA improving throughout shift, prn tylenol given but pt denies change. CIWA scores 6, 5, no indications for intervention. PIV with IVF at TKO following bolus of LR; lactic acid recheck 2.3. Voiding with good UO. Reg diet and tolerating frequent snacks overnight. Disoriented to situation, continues to state pt admitted for fall and hitting head. Safe room provided this shift. Sitter at bedside, cooperative. Tele ST. Continue to monitor.

## 2019-02-21 NOTE — PLAN OF CARE
ICU End of Shift Summary.  For vital signs and complete assessments, please see documentation flowsheets.     Pertinent assessments: LS clear, tele ST. CIWA 13, given 2mg ativan. Pt voiding spontaneously following gomez removal.  Major Shift Events: Ativan given, started metoprolol for HR  Plan (Upcoming Events): Transfer to [facility]  Discharge/Transfer Needs: Transferring to floor, report given to ROSE Johnson    Bedside Shift Report Completed : N-telephone report given  Bedside Safety Check Completed: Y

## 2019-02-21 NOTE — PLAN OF CARE
Pt arrived to floor from ICU around 2130. AOx4. CIWA 9\6 - ativan given. Ext.1 w/ transfers. Cont of B&B - complains of frequency with urination. UA ordered - sample sent. Sepsis triggered upon arrival - lactic 2.4. LR Bolus running. Sitter present. CD/ Psych consulted. Continue to monitor.

## 2019-02-22 VITALS
RESPIRATION RATE: 16 BRPM | DIASTOLIC BLOOD PRESSURE: 77 MMHG | HEART RATE: 125 BPM | OXYGEN SATURATION: 99 % | TEMPERATURE: 97.2 F | BODY MASS INDEX: 20.73 KG/M2 | SYSTOLIC BLOOD PRESSURE: 111 MMHG | WEIGHT: 117 LBS

## 2019-02-22 LAB
BACTERIA SPEC CULT: NORMAL
Lab: NORMAL
SPECIMEN SOURCE: NORMAL

## 2019-02-22 PROCEDURE — 25000132 ZZH RX MED GY IP 250 OP 250 PS 637: Performed by: INTERNAL MEDICINE

## 2019-02-22 PROCEDURE — 99239 HOSP IP/OBS DSCHRG MGMT >30: CPT | Performed by: INTERNAL MEDICINE

## 2019-02-22 RX ORDER — MULTIPLE VITAMINS W/ MINERALS TAB 9MG-400MCG
1 TAB ORAL DAILY
Qty: 30 TABLET | Refills: 0 | Status: SHIPPED | OUTPATIENT
Start: 2019-02-23 | End: 2019-04-30

## 2019-02-22 RX ORDER — SERTRALINE HYDROCHLORIDE 25 MG/1
25 TABLET, FILM COATED ORAL DAILY
Qty: 30 TABLET | Refills: 0 | Status: SHIPPED | OUTPATIENT
Start: 2019-02-23 | End: 2019-04-30

## 2019-02-22 RX ORDER — FOLIC ACID 1 MG/1
1 TABLET ORAL DAILY
Qty: 30 TABLET | Refills: 0 | Status: SHIPPED | OUTPATIENT
Start: 2019-02-23 | End: 2019-04-30

## 2019-02-22 RX ORDER — TRAZODONE HYDROCHLORIDE 50 MG/1
50 TABLET, FILM COATED ORAL
Qty: 30 TABLET | Refills: 0 | Status: SHIPPED | OUTPATIENT
Start: 2019-02-22 | End: 2019-04-30

## 2019-02-22 RX ADMIN — MULTIPLE VITAMINS W/ MINERALS TAB 1 TABLET: TAB at 08:52

## 2019-02-22 RX ADMIN — SERTRALINE HYDROCHLORIDE 25 MG: 25 TABLET ORAL at 08:52

## 2019-02-22 RX ADMIN — Medication 1 TABLET: at 08:53

## 2019-02-22 RX ADMIN — Medication 100 MG: at 08:52

## 2019-02-22 RX ADMIN — FOLIC ACID 1 MG: 1 TABLET ORAL at 08:52

## 2019-02-22 RX ADMIN — Medication 12.5 MG: at 08:52

## 2019-02-22 ASSESSMENT — ACTIVITIES OF DAILY LIVING (ADL)
ADLS_ACUITY_SCORE: 22

## 2019-02-22 NOTE — CONSULTS
2/22/2019    CD consult acknowledged. Writer called and spoke with nurse and confirmed that pt's plan is to stay with [facility] (as report in Dr. Garrison, psychologist) for treatment. Will close consult as she already has services in place. If she wants to go some where else for treatment, please re order consult.     TONI Roberts  [email or web address]

## 2019-02-22 NOTE — DISCHARGE SUMMARY
[facility]    Discharge Summary  Hospitalist    Date of Admission:  2/18/2019  Date of Discharge:  2/22/2019 10:26 AM  Discharging Provider: Aurelio Fraser MD  Date of Service (when I saw the patient): 02/22/19    Discharge Diagnoses      1. Alcohol withdrawal symptoms. She was initially started on alcohol withdrawal protocol and admitted to telemetry floor     2. Possible drug overdose.    3.  Mild transaminitis: Likely due to alcohol abuse    4.  Alcohol use with mood disorder     5.  Depression: Does not appear to be on treatment for depression     Aurelio Fraser MD    History of Present Illness   Alana Mujica is an 43 year old female who presented with     Hospital Course   Alana Mujica is a 43 year old female patient with past medical history of anxiety, depression, anorexia, bulimia, substance abuse, osteoporosis, was brought to emergency room for evaluation for possible overdose.     1. Alcohol withdrawal symptoms. She was initially started on alcohol withdrawal protocol and admitted to telemetry floor.  --She was initially transferred to ICU and started on Precedex drip. Her alcohol withdrawal symptoms improved. She was transferred out of ICU an continued on CIWA protocol until her withdrawal symptoms improved.      2. Possible drug overdose. Patient was found with empty pill bottle near her but she couldn't tell what she took.  -- Acetaminophen level <2, ethanol level 0.1, salicylate <2.  Currently having alcohol withdrawal symptoms. Off precedex drip.She had no agitation.      3.  Mild transaminitis: Likely due to alcohol abuse.  LFTs slightly down. Acetaminophen level is undetectable.     4.  Alcohol abuse: Patient with evaluation by chemical dependency once alcohol withdrawal symptoms improved     5.  Depression: Does not appear to be on treatment for depression. Per report from ER physician, patient tried to end her life the night before admission. She denies suicidal  ideation here.  --seen by psych and started on Zoloft 25 mg daily, trazodone  mg at bedtime prn for sleep. Discussed with Dr. Garrison  Patient was advised to follow as outpatient. She was discharged home in a stable condition.     Aurelio Fraser MD     Significant Results and Procedures   Results for orders placed or performed during the hospital encounter of 12/04/18   XR Chest 2 Views    Narrative    CHEST TWO VIEWS  12/4/2018 11:33 AM     HISTORY: Shortness of breath and chest pain    COMPARISON: None.      Impression    IMPRESSION:   Heart and pulmonary vessels within normal limits. Lungs clear. No  pleural effusion.. Old right seventh rib fracture.    MARC OROURKE MD   Chest CT, IV contrast only - PE protocol    Narrative    CT CHEST PULMONARY EMBOLISM WITH CONTRAST 12/4/2018 3:54 PM     HISTORY: Chest pain.     COMPARISON: Chest x-ray 12/4/2018. CT abdomen and pelvis 9/14/2018.    TECHNIQUE: Thin section axial images are performed from the thoracic  inlet to the lung bases utilizing 60 mL of Isovue 370 IV contrast  without adverse event. Coronal reformatted images are also generated.  Radiation dose for this scan was reduced using automated exposure  control, adjustment of the mA and/or kV according to patient size, or  iterative reconstruction technique.    FINDINGS:     Chest: Lungs are clear. No infiltrate, consolidation or mass. Airways  appear patent. No pleural or pericardial fluid. Heart is normal in  size. Small hiatal hernia is present. Esophagus demonstrates mild  circumferential wall thickening possibly related to esophagitis.  Thyroid gland is normal in size. No enlarged lymph nodes. No evidence  of pulmonary embolism. Thoracic aorta is unremarkable. Limited images  upper abdomen demonstrate new diffuse fatty infiltration of the liver.  Spleen is normal in size where imaged. Upper abdomen is otherwise  unremarkable. Bone window examination is unremarkable. Old healed  posterior right rib  fractures are again noted.      Impression    IMPRESSION:  1. No evidence of pulmonary embolism. Thoracic aorta is unremarkable.  2. Lungs are clear. No infiltrate or consolidation. No pleural fluid.  3. New diffuse fatty infiltration of the liver.    FERNANDA HILL MD         Pending Results    None  Code Status   Full Code       Primary Care Physician   [facility]        Discharge Disposition   Discharged to home  Condition at discharge: Stable    Consultations This Hospital Stay   CARE COORDINATOR IP CONSULT  PSYCHIATRY IP CONSULT  CHEMICAL DEPENDENCY IP CONSULT    Time Spent on this Encounter   I, Aurelio Fraser MD, personally saw the patient today and spent greater than 30 minutes discharging this patient.    Discharge Orders      Reason for your hospital stay    Alcohol withdrawal syndrome     Follow-up and recommended labs and tests     Follow up with primary care provider, [facility], within 7 days  Outpatient chemical dependency/psych treatment  Further sertraline(zoloft)dose adjustment per psych     Activity    Your activity upon discharge: activity as tolerated     Diet    Follow this diet upon discharge: Orders Placed This Encounter      Combination Diet Regular Diet Adult; Safe Tray - with utensils     Discharge Medications   Discharge Medication List as of 2/22/2019 10:14 AM      START taking these medications    Details   multivitamin w/minerals (THERA-VIT-M) tablet Take 1 tablet by mouth daily, Disp-30 tablet, R-0, E-Prescribe      sertraline (ZOLOFT) 25 MG tablet Take 1 tablet (25 mg) by mouth daily, Disp-30 tablet, R-0, E-Prescribe      traZODone (DESYREL) 50 MG tablet Take 1 tablet (50 mg) by mouth nightly as needed for sleep, Disp-30 tablet, R-0, E-Prescribe         CONTINUE these medications which have CHANGED    Details   folic acid (FOLVITE) 1 MG tablet Take 1 tablet (1 mg) by mouth daily, Disp-30 tablet, R-0, E-Prescribe         CONTINUE these medications  which have NOT CHANGED    Details   etonogestrel (NEXPLANON) 68 MG IMPL 1 each (68 mg) by Subdermal route once, Disp-1 each, R-0, Historical      fluticasone (FLONASE) 50 MCG/ACT spray Spray 1 spray into both nostrils daily as needed for rhinitis or allergies, Historical      multivitamin, therapeutic (THERA-VIT) TABS tablet Take 1 tablet by mouth daily, Historical      valACYclovir (VALTREX) 500 MG tablet Take 1,000 mg by mouth as needed (herpes outbreak), Historical      vitamin (B COMPLEX-C) tablet Take 1 tablet by mouth daily, Historical         STOP taking these medications       cephALEXin (KEFLEX) 500 MG capsule Comments:   Reason for Stopping:         thiamine mononitrate 100 MG TABS Comments:   Reason for Stopping:                 Allergies   No Known Allergies  Data   Most Recent 3 CBC's:  Recent Labs   Lab Test 02/19/19 0527 02/18/19  1137 12/05/18  0739   WBC 4.0 3.5* 4.3   HGB 12.7 12.7 10.8*   MCV 99 98 103*   * 122* 122*      Most Recent 3 BMP's:  Recent Labs   Lab Test 02/19/19 0527 02/18/19  1137 12/05/18  0739    138 137   POTASSIUM 3.7 3.7 4.1   CHLORIDE 101 101 108   CO2 27 24 22   BUN 7 4* 10   CR 0.86 0.65 0.76   ANIONGAP 7 13 7   KODY 9.2 9.5 7.9*   GLC 74 85 104*     Most Recent 2 LFT's:  Recent Labs   Lab Test 02/19/19 0527 02/18/19  1137   AST 66* 83*   ALT 75* 84*   ALKPHOS 72 72   BILITOTAL 0.9 0.4     Most Recent INR's and Anticoagulation Dosing History:  Anticoagulation Dose History     Recent Dosing and Labs Latest Ref Rng & Units 8/26/2018 2/18/2019    INR 0.86 - 1.14 1.02 0.96        Most Recent 3 Troponin's:  Recent Labs   Lab Test 12/04/18  1107   TROPI <0.015     Most Recent Cholesterol Panel:  Recent Labs   Lab Test 11/24/15  0812   CHOL 236*   *      TRIG 74     Most Recent 6 Bacteria Isolates From Any Culture (See EPIC Reports for Culture Details):  Recent Labs   Lab Test 02/20/19  2310 09/11/18  1151 04/05/18  1137 02/01/16  1415 11/24/15  0718    CULT <10,000 colonies/mL  urogenital sasha  Susceptibility testing not routinely done   10,000 to 50,000 colonies/mL  Escherichia coli  *  <10,000 colonies/mL  urogenital sasha  Susceptibility testing not routinely done   Questionable specimen No growth >100,000 colonies/mL mixed urogenital sasha     Most Recent TSH, T4 and A1c Labs:  Recent Labs   Lab Test 12/04/18  1107   TSH 0.75

## 2019-02-22 NOTE — PLAN OF CARE
A&Ox4. VSS. Headache reported - tylenol given with some relief. CIWA scores 2,1. Up - SBA. Diet - regular. Tele - SR. Possible discharge tomorrow.

## 2019-02-22 NOTE — PLAN OF CARE
Pt here for alcohol withdrawal and overdose. CIWA score of 1 and 1. Pt complained of slight headache at beginning of shift but denied any interventions. Psych cleared pt from sitter. Full code. SBA. Regular diet. Plan is to discharge today.

## 2019-02-25 ENCOUNTER — TELEPHONE (OUTPATIENT)
Dept: FAMILY MEDICINE | Facility: CLINIC | Age: 44
End: 2019-02-25

## 2019-02-25 NOTE — TELEPHONE ENCOUNTER
Please contact patient for In-patient follow up.  [phone] (home) [phone] (work)    Visit date:  02 22 2019   Diagnosis listed: alcohol withdrawl syndrome w complications(H), other insomnia,  Number of visits in past 12 months: 0/2

## 2019-02-26 NOTE — TELEPHONE ENCOUNTER
Pt has an appt scheduled for 3/1/19.      ED / Discharge Outreach Protocol    Patient Contact    Attempt # 2    Was call answered?  No.  Left message on voicemail with information to call me back.

## 2019-04-30 ENCOUNTER — HOSPITAL ENCOUNTER (INPATIENT)
Facility: CLINIC | Age: 44
LOS: 3 days | Discharge: HOME | End: 2019-05-04
Attending: EMERGENCY MEDICINE | Admitting: INTERNAL MEDICINE
Payer: MEDICAID

## 2019-04-30 DIAGNOSIS — F10.930 ALCOHOL WITHDRAWAL SYNDROME WITHOUT COMPLICATION (H): ICD-10-CM

## 2019-04-30 DIAGNOSIS — B96.89 BACTERIAL VAGINOSIS: ICD-10-CM

## 2019-04-30 DIAGNOSIS — N76.0 BACTERIAL VAGINOSIS: ICD-10-CM

## 2019-04-30 DIAGNOSIS — F19.20 CHEMICAL DEPENDENCY (H): Primary | ICD-10-CM

## 2019-04-30 LAB
ALBUMIN SERPL-MCNC: 3.8 G/DL (ref 3.4–5)
ALBUMIN UR-MCNC: 30 MG/DL
ALP SERPL-CCNC: 84 U/L (ref 40–150)
ALT SERPL W P-5'-P-CCNC: 111 U/L (ref 0–50)
AMORPH CRY #/AREA URNS HPF: ABNORMAL /HPF
ANION GAP SERPL CALCULATED.3IONS-SCNC: 7 MMOL/L (ref 3–14)
APPEARANCE UR: ABNORMAL
AST SERPL W P-5'-P-CCNC: 127 U/L (ref 0–45)
B-HCG FREE SERPL-ACNC: <5 IU/L
BACTERIA #/AREA URNS HPF: ABNORMAL /HPF
BASOPHILS # BLD AUTO: 0.1 10E9/L (ref 0–0.2)
BASOPHILS NFR BLD AUTO: 1.7 %
BILIRUB SERPL-MCNC: 0.4 MG/DL (ref 0.2–1.3)
BILIRUB UR QL STRIP: NEGATIVE
BUN SERPL-MCNC: 10 MG/DL (ref 7–30)
CALCIUM SERPL-MCNC: 9 MG/DL (ref 8.5–10.1)
CHLORIDE SERPL-SCNC: 105 MMOL/L (ref 94–109)
CO2 SERPL-SCNC: 27 MMOL/L (ref 20–32)
COLOR UR AUTO: YELLOW
CREAT SERPL-MCNC: 0.74 MG/DL (ref 0.52–1.04)
DIFFERENTIAL METHOD BLD: ABNORMAL
EOSINOPHIL # BLD AUTO: 0 10E9/L (ref 0–0.7)
EOSINOPHIL NFR BLD AUTO: 0.6 %
ERYTHROCYTE [DISTWIDTH] IN BLOOD BY AUTOMATED COUNT: 13 % (ref 10–15)
ETHANOL SERPL-MCNC: 0.1 G/DL
GFR SERPL CREATININE-BSD FRML MDRD: >90 ML/MIN/{1.73_M2}
GLUCOSE SERPL-MCNC: 86 MG/DL (ref 70–99)
GLUCOSE UR STRIP-MCNC: NEGATIVE MG/DL
HCG UR QL: NEGATIVE
HCT VFR BLD AUTO: 43.3 % (ref 35–47)
HGB BLD-MCNC: 14.3 G/DL (ref 11.7–15.7)
HGB UR QL STRIP: NEGATIVE
IMM GRANULOCYTES # BLD: 0 10E9/L (ref 0–0.4)
IMM GRANULOCYTES NFR BLD: 0.4 %
INTERPRETATION ECG - MUSE: NORMAL
KETONES UR STRIP-MCNC: NEGATIVE MG/DL
LACTATE BLD-SCNC: 2.7 MMOL/L (ref 0.7–2)
LACTATE SERPL-SCNC: 1 MMOL/L (ref 0.4–2)
LEUKOCYTE ESTERASE UR QL STRIP: NEGATIVE
LIPASE SERPL-CCNC: 389 U/L (ref 73–393)
LYMPHOCYTES # BLD AUTO: 0.5 10E9/L (ref 0.8–5.3)
LYMPHOCYTES NFR BLD AUTO: 9.7 %
MAGNESIUM SERPL-MCNC: 1.8 MG/DL (ref 1.6–2.3)
MCH RBC QN AUTO: 33.4 PG (ref 26.5–33)
MCHC RBC AUTO-ENTMCNC: 33 G/DL (ref 31.5–36.5)
MCV RBC AUTO: 101 FL (ref 78–100)
MONOCYTES # BLD AUTO: 0.5 10E9/L (ref 0–1.3)
MONOCYTES NFR BLD AUTO: 10.3 %
MUCOUS THREADS #/AREA URNS LPF: PRESENT /LPF
NEUTROPHILS # BLD AUTO: 4 10E9/L (ref 1.6–8.3)
NEUTROPHILS NFR BLD AUTO: 77.3 %
NITRATE UR QL: NEGATIVE
NRBC # BLD AUTO: 0 10*3/UL
NRBC BLD AUTO-RTO: 0 /100
PH UR STRIP: 7 PH (ref 5–7)
PHOSPHATE SERPL-MCNC: 2.5 MG/DL (ref 2.5–4.5)
PLATELET # BLD AUTO: 143 10E9/L (ref 150–450)
POTASSIUM SERPL-SCNC: 4.3 MMOL/L (ref 3.4–5.3)
PROT SERPL-MCNC: 8.1 G/DL (ref 6.8–8.8)
RBC # BLD AUTO: 4.28 10E12/L (ref 3.8–5.2)
RBC #/AREA URNS AUTO: <1 /HPF (ref 0–2)
SODIUM SERPL-SCNC: 139 MMOL/L (ref 133–144)
SOURCE: ABNORMAL
SP GR UR STRIP: 1.02 (ref 1–1.03)
SPECIMEN SOURCE: ABNORMAL
SQUAMOUS #/AREA URNS AUTO: 3 /HPF (ref 0–1)
UROBILINOGEN UR STRIP-MCNC: NORMAL MG/DL (ref 0–2)
WBC # BLD AUTO: 5.2 10E9/L (ref 4–11)
WBC #/AREA URNS AUTO: 1 /HPF (ref 0–5)
WET PREP SPEC: ABNORMAL

## 2019-04-30 PROCEDURE — 83605 ASSAY OF LACTIC ACID: CPT | Performed by: INTERNAL MEDICINE

## 2019-04-30 PROCEDURE — HZ2ZZZZ DETOXIFICATION SERVICES FOR SUBSTANCE ABUSE TREATMENT: ICD-10-PCS | Performed by: HOSPITALIST

## 2019-04-30 PROCEDURE — 99285 EMERGENCY DEPT VISIT HI MDM: CPT | Mod: 25

## 2019-04-30 PROCEDURE — 25000128 H RX IP 250 OP 636: Performed by: INTERNAL MEDICINE

## 2019-04-30 PROCEDURE — 99220 ZZC INITIAL OBSERVATION CARE,LEVL III: CPT | Performed by: INTERNAL MEDICINE

## 2019-04-30 PROCEDURE — 80320 DRUG SCREEN QUANTALCOHOLS: CPT | Performed by: EMERGENCY MEDICINE

## 2019-04-30 PROCEDURE — 25000128 H RX IP 250 OP 636: Performed by: PHYSICIAN ASSISTANT

## 2019-04-30 PROCEDURE — 84702 CHORIONIC GONADOTROPIN TEST: CPT

## 2019-04-30 PROCEDURE — 81001 URINALYSIS AUTO W/SCOPE: CPT | Performed by: EMERGENCY MEDICINE

## 2019-04-30 PROCEDURE — 83735 ASSAY OF MAGNESIUM: CPT | Performed by: EMERGENCY MEDICINE

## 2019-04-30 PROCEDURE — 81025 URINE PREGNANCY TEST: CPT | Performed by: EMERGENCY MEDICINE

## 2019-04-30 PROCEDURE — 87086 URINE CULTURE/COLONY COUNT: CPT | Performed by: PHYSICIAN ASSISTANT

## 2019-04-30 PROCEDURE — 25800030 ZZH RX IP 258 OP 636: Performed by: PHYSICIAN ASSISTANT

## 2019-04-30 PROCEDURE — 36415 COLL VENOUS BLD VENIPUNCTURE: CPT | Performed by: INTERNAL MEDICINE

## 2019-04-30 PROCEDURE — 36415 COLL VENOUS BLD VENIPUNCTURE: CPT | Performed by: PHYSICIAN ASSISTANT

## 2019-04-30 PROCEDURE — 85025 COMPLETE CBC W/AUTO DIFF WBC: CPT | Performed by: EMERGENCY MEDICINE

## 2019-04-30 PROCEDURE — 96372 THER/PROPH/DIAG INJ SC/IM: CPT

## 2019-04-30 PROCEDURE — 96361 HYDRATE IV INFUSION ADD-ON: CPT

## 2019-04-30 PROCEDURE — 87591 N.GONORRHOEAE DNA AMP PROB: CPT | Performed by: EMERGENCY MEDICINE

## 2019-04-30 PROCEDURE — G0378 HOSPITAL OBSERVATION PER HR: HCPCS

## 2019-04-30 PROCEDURE — 83690 ASSAY OF LIPASE: CPT | Performed by: EMERGENCY MEDICINE

## 2019-04-30 PROCEDURE — 93005 ELECTROCARDIOGRAM TRACING: CPT

## 2019-04-30 PROCEDURE — 25000132 ZZH RX MED GY IP 250 OP 250 PS 637: Performed by: PHYSICIAN ASSISTANT

## 2019-04-30 PROCEDURE — 25000128 H RX IP 250 OP 636: Performed by: EMERGENCY MEDICINE

## 2019-04-30 PROCEDURE — 87210 SMEAR WET MOUNT SALINE/INK: CPT | Performed by: EMERGENCY MEDICINE

## 2019-04-30 PROCEDURE — 96376 TX/PRO/DX INJ SAME DRUG ADON: CPT

## 2019-04-30 PROCEDURE — 25000132 ZZH RX MED GY IP 250 OP 250 PS 637: Performed by: EMERGENCY MEDICINE

## 2019-04-30 PROCEDURE — 96374 THER/PROPH/DIAG INJ IV PUSH: CPT

## 2019-04-30 PROCEDURE — 87491 CHLMYD TRACH DNA AMP PROBE: CPT | Performed by: EMERGENCY MEDICINE

## 2019-04-30 PROCEDURE — 84100 ASSAY OF PHOSPHORUS: CPT | Performed by: PHYSICIAN ASSISTANT

## 2019-04-30 PROCEDURE — 80053 COMPREHEN METABOLIC PANEL: CPT | Performed by: EMERGENCY MEDICINE

## 2019-04-30 RX ORDER — PROCHLORPERAZINE 25 MG
25 SUPPOSITORY, RECTAL RECTAL EVERY 12 HOURS PRN
Status: DISCONTINUED | OUTPATIENT
Start: 2019-04-30 | End: 2019-05-04 | Stop reason: HOSPADM

## 2019-04-30 RX ORDER — POLYETHYLENE GLYCOL 3350 17 G/17G
17 POWDER, FOR SOLUTION ORAL DAILY PRN
Status: DISCONTINUED | OUTPATIENT
Start: 2019-04-30 | End: 2019-05-04 | Stop reason: HOSPADM

## 2019-04-30 RX ORDER — MULTIPLE VITAMINS W/ MINERALS TAB 9MG-400MCG
1 TAB ORAL DAILY
COMMUNITY

## 2019-04-30 RX ORDER — TRAZODONE HYDROCHLORIDE 50 MG/1
50 TABLET, FILM COATED ORAL
COMMUNITY
End: 2019-10-21

## 2019-04-30 RX ORDER — POTASSIUM CHLORIDE 29.8 MG/ML
20 INJECTION INTRAVENOUS
Status: DISCONTINUED | OUTPATIENT
Start: 2019-04-30 | End: 2019-05-04 | Stop reason: HOSPADM

## 2019-04-30 RX ORDER — LANOLIN ALCOHOL/MO/W.PET/CERES
100 CREAM (GRAM) TOPICAL ONCE
Status: COMPLETED | OUTPATIENT
Start: 2019-04-30 | End: 2019-04-30

## 2019-04-30 RX ORDER — LORAZEPAM 1 MG/1
1-2 TABLET ORAL EVERY 30 MIN PRN
Status: DISCONTINUED | OUTPATIENT
Start: 2019-04-30 | End: 2019-05-04 | Stop reason: HOSPADM

## 2019-04-30 RX ORDER — MAGNESIUM SULFATE HEPTAHYDRATE 40 MG/ML
4 INJECTION, SOLUTION INTRAVENOUS EVERY 4 HOURS PRN
Status: DISCONTINUED | OUTPATIENT
Start: 2019-04-30 | End: 2019-05-04 | Stop reason: HOSPADM

## 2019-04-30 RX ORDER — MULTIPLE VITAMINS W/ MINERALS TAB 9MG-400MCG
1 TAB ORAL DAILY
Status: DISCONTINUED | OUTPATIENT
Start: 2019-05-01 | End: 2019-05-04 | Stop reason: HOSPADM

## 2019-04-30 RX ORDER — CALCIUM/MAGNESIUM/ZINC 333-133 MG
3 TABLET ORAL DAILY
Status: ON HOLD | COMMUNITY
End: 2022-03-18

## 2019-04-30 RX ORDER — POTASSIUM CHLORIDE 1500 MG/1
20-40 TABLET, EXTENDED RELEASE ORAL
Status: DISCONTINUED | OUTPATIENT
Start: 2019-04-30 | End: 2019-05-04 | Stop reason: HOSPADM

## 2019-04-30 RX ORDER — FOLIC ACID 1 MG/1
1 TABLET ORAL DAILY PRN
Status: ON HOLD | COMMUNITY
End: 2022-03-17

## 2019-04-30 RX ORDER — FOLIC ACID 1 MG/1
1 TABLET ORAL ONCE
Status: COMPLETED | OUTPATIENT
Start: 2019-04-30 | End: 2019-04-30

## 2019-04-30 RX ORDER — MULTIPLE VITAMINS W/ MINERALS TAB 9MG-400MCG
1 TAB ORAL ONCE
Status: COMPLETED | OUTPATIENT
Start: 2019-04-30 | End: 2019-04-30

## 2019-04-30 RX ORDER — SODIUM CHLORIDE 9 MG/ML
1000 INJECTION, SOLUTION INTRAVENOUS CONTINUOUS
Status: DISCONTINUED | OUTPATIENT
Start: 2019-04-30 | End: 2019-04-30

## 2019-04-30 RX ORDER — ACETAMINOPHEN 650 MG/1
650 SUPPOSITORY RECTAL EVERY 4 HOURS PRN
Status: DISCONTINUED | OUTPATIENT
Start: 2019-04-30 | End: 2019-05-04 | Stop reason: HOSPADM

## 2019-04-30 RX ORDER — HEPARIN SODIUM 5000 [USP'U]/.5ML
5000 INJECTION, SOLUTION INTRAVENOUS; SUBCUTANEOUS EVERY 12 HOURS
Status: DISCONTINUED | OUTPATIENT
Start: 2019-04-30 | End: 2019-05-04 | Stop reason: HOSPADM

## 2019-04-30 RX ORDER — POTASSIUM CHLORIDE 7.45 MG/ML
10 INJECTION INTRAVENOUS
Status: DISCONTINUED | OUTPATIENT
Start: 2019-04-30 | End: 2019-05-04 | Stop reason: HOSPADM

## 2019-04-30 RX ORDER — ONDANSETRON 2 MG/ML
4 INJECTION INTRAMUSCULAR; INTRAVENOUS EVERY 6 HOURS PRN
Status: DISCONTINUED | OUTPATIENT
Start: 2019-04-30 | End: 2019-05-04 | Stop reason: HOSPADM

## 2019-04-30 RX ORDER — NALOXONE HYDROCHLORIDE 0.4 MG/ML
.1-.4 INJECTION, SOLUTION INTRAMUSCULAR; INTRAVENOUS; SUBCUTANEOUS
Status: DISCONTINUED | OUTPATIENT
Start: 2019-04-30 | End: 2019-05-04 | Stop reason: HOSPADM

## 2019-04-30 RX ORDER — ACETAMINOPHEN 325 MG/1
650 TABLET ORAL EVERY 4 HOURS PRN
Status: DISCONTINUED | OUTPATIENT
Start: 2019-04-30 | End: 2019-05-04 | Stop reason: HOSPADM

## 2019-04-30 RX ORDER — LORAZEPAM 2 MG/ML
1-2 INJECTION INTRAMUSCULAR EVERY 30 MIN PRN
Status: DISCONTINUED | OUTPATIENT
Start: 2019-04-30 | End: 2019-05-04 | Stop reason: HOSPADM

## 2019-04-30 RX ORDER — POTASSIUM CL/LIDO/0.9 % NACL 10MEQ/0.1L
10 INTRAVENOUS SOLUTION, PIGGYBACK (ML) INTRAVENOUS
Status: DISCONTINUED | OUTPATIENT
Start: 2019-04-30 | End: 2019-05-04 | Stop reason: HOSPADM

## 2019-04-30 RX ORDER — LIDOCAINE 40 MG/G
CREAM TOPICAL
Status: DISCONTINUED | OUTPATIENT
Start: 2019-04-30 | End: 2019-05-04 | Stop reason: HOSPADM

## 2019-04-30 RX ORDER — AMOXICILLIN 250 MG
2 CAPSULE ORAL 2 TIMES DAILY PRN
Status: DISCONTINUED | OUTPATIENT
Start: 2019-04-30 | End: 2019-05-04 | Stop reason: HOSPADM

## 2019-04-30 RX ORDER — SERTRALINE HYDROCHLORIDE 25 MG/1
25 TABLET, FILM COATED ORAL DAILY
COMMUNITY
End: 2019-06-24

## 2019-04-30 RX ORDER — AMOXICILLIN 250 MG
1 CAPSULE ORAL 2 TIMES DAILY PRN
Status: DISCONTINUED | OUTPATIENT
Start: 2019-04-30 | End: 2019-05-04 | Stop reason: HOSPADM

## 2019-04-30 RX ORDER — LANOLIN ALCOHOL/MO/W.PET/CERES
100 CREAM (GRAM) TOPICAL DAILY
Status: DISCONTINUED | OUTPATIENT
Start: 2019-05-01 | End: 2019-05-04 | Stop reason: HOSPADM

## 2019-04-30 RX ORDER — PROCHLORPERAZINE MALEATE 5 MG
10 TABLET ORAL EVERY 6 HOURS PRN
Status: DISCONTINUED | OUTPATIENT
Start: 2019-04-30 | End: 2019-05-04 | Stop reason: HOSPADM

## 2019-04-30 RX ORDER — FOLIC ACID 1 MG/1
1 TABLET ORAL DAILY
Status: DISCONTINUED | OUTPATIENT
Start: 2019-05-01 | End: 2019-05-04 | Stop reason: HOSPADM

## 2019-04-30 RX ORDER — POTASSIUM CHLORIDE 1.5 G/1.58G
20-40 POWDER, FOR SOLUTION ORAL
Status: DISCONTINUED | OUTPATIENT
Start: 2019-04-30 | End: 2019-05-04 | Stop reason: HOSPADM

## 2019-04-30 RX ORDER — SODIUM CHLORIDE, SODIUM LACTATE, POTASSIUM CHLORIDE, CALCIUM CHLORIDE 600; 310; 30; 20 MG/100ML; MG/100ML; MG/100ML; MG/100ML
INJECTION, SOLUTION INTRAVENOUS CONTINUOUS
Status: DISCONTINUED | OUTPATIENT
Start: 2019-04-30 | End: 2019-05-01

## 2019-04-30 RX ORDER — ONDANSETRON 4 MG/1
4 TABLET, ORALLY DISINTEGRATING ORAL EVERY 6 HOURS PRN
Status: DISCONTINUED | OUTPATIENT
Start: 2019-04-30 | End: 2019-05-04 | Stop reason: HOSPADM

## 2019-04-30 RX ORDER — LORAZEPAM 2 MG/ML
1 INJECTION INTRAMUSCULAR
Status: DISCONTINUED | OUTPATIENT
Start: 2019-04-30 | End: 2019-04-30

## 2019-04-30 RX ORDER — METRONIDAZOLE 7.5 MG/G
GEL VAGINAL 2 TIMES DAILY
Status: DISCONTINUED | OUTPATIENT
Start: 2019-04-30 | End: 2019-05-04 | Stop reason: HOSPADM

## 2019-04-30 RX ORDER — LORATADINE 10 MG/1
10 TABLET ORAL DAILY PRN
COMMUNITY
End: 2019-10-21

## 2019-04-30 RX ADMIN — LORAZEPAM 1 MG: 1 TABLET ORAL at 15:12

## 2019-04-30 RX ADMIN — FOLIC ACID 1 MG: 1 TABLET ORAL at 08:36

## 2019-04-30 RX ADMIN — LORAZEPAM 1 MG: 1 TABLET ORAL at 14:15

## 2019-04-30 RX ADMIN — LORAZEPAM 2 MG: 2 INJECTION INTRAMUSCULAR; INTRAVENOUS at 21:07

## 2019-04-30 RX ADMIN — Medication 100 MG: at 08:36

## 2019-04-30 RX ADMIN — SODIUM CHLORIDE 1000 ML: 9 INJECTION, SOLUTION INTRAVENOUS at 08:36

## 2019-04-30 RX ADMIN — LORAZEPAM 2 MG: 1 TABLET ORAL at 18:04

## 2019-04-30 RX ADMIN — SODIUM CHLORIDE, POTASSIUM CHLORIDE, SODIUM LACTATE AND CALCIUM CHLORIDE: 600; 310; 30; 20 INJECTION, SOLUTION INTRAVENOUS at 12:31

## 2019-04-30 RX ADMIN — LORAZEPAM 1 MG: 2 INJECTION INTRAMUSCULAR; INTRAVENOUS at 22:49

## 2019-04-30 RX ADMIN — LORAZEPAM 1 MG: 2 INJECTION INTRAMUSCULAR; INTRAVENOUS at 08:52

## 2019-04-30 RX ADMIN — MULTIPLE VITAMINS W/ MINERALS TAB 1 TABLET: TAB at 08:36

## 2019-04-30 RX ADMIN — SODIUM CHLORIDE 1000 ML: 9 INJECTION, SOLUTION INTRAVENOUS at 13:28

## 2019-04-30 RX ADMIN — LORAZEPAM 2 MG: 2 INJECTION INTRAMUSCULAR; INTRAVENOUS at 23:27

## 2019-04-30 RX ADMIN — LORAZEPAM 2 MG: 2 INJECTION INTRAMUSCULAR; INTRAVENOUS at 20:10

## 2019-04-30 RX ADMIN — LORAZEPAM 1 MG: 1 TABLET ORAL at 12:23

## 2019-04-30 RX ADMIN — LORAZEPAM 1 MG: 2 INJECTION INTRAMUSCULAR; INTRAVENOUS at 10:15

## 2019-04-30 RX ADMIN — LORAZEPAM 2 MG: 2 INJECTION INTRAMUSCULAR; INTRAVENOUS at 12:56

## 2019-04-30 RX ADMIN — LORAZEPAM 2 MG: 2 INJECTION INTRAMUSCULAR; INTRAVENOUS at 21:39

## 2019-04-30 RX ADMIN — LORAZEPAM 2 MG: 2 INJECTION INTRAMUSCULAR; INTRAVENOUS at 15:52

## 2019-04-30 RX ADMIN — HEPARIN SODIUM 5000 UNITS: 5000 INJECTION, SOLUTION INTRAVENOUS; SUBCUTANEOUS at 15:12

## 2019-04-30 ASSESSMENT — ENCOUNTER SYMPTOMS
DIZZINESS: 1
TREMORS: 1
ABDOMINAL PAIN: 1
HEADACHES: 1

## 2019-04-30 ASSESSMENT — MIFFLIN-ST. JEOR
SCORE: 1153.91
SCORE: 1127.6

## 2019-04-30 NOTE — ED TRIAGE NOTES
ABCs intact. Pt hx ETOH abuse. Pt c/o withdrawal symptoms. Last drink this morning. Pt would like help with detox. Pt c/o UTI symptoms. Pt went to [facility] about a week ago and was on abx. Pt c/o ongoing symptoms.

## 2019-04-30 NOTE — PHARMACY-ADMISSION MEDICATION HISTORY
Admission medication history interview status for this patient is complete. See [facility] admission navigator for allergy information, prior to admission medications and immunization status.     Medication history interview source(s):Patient  Medication history resources (including written lists, pill bottles, clinic record):None  Primary pharmacy:[facility]    Changes made to PTA medication list:  Added: milk thistle, unknown allergy medication  Deleted: none  Changed: none    Actions taken by pharmacist (provider contacted, etc):None     Additional medication history information:Has not taken medications for a couple of months    Medication reconciliation/reorder completed by provider prior to medication history? No    Do you take OTC medications (eg tylenol, ibuprofen, fish oil, eye/ear drops, etc)? Y(Y/N)    For patients on insulin therapy: N (Y/N)  Lantus/levemir/NPH/Mix 70/30 dose:   (Y/N) (see Med list for doses)   Sliding scale Novolog Y/N  If Yes, do you have a baseline novolog pre-meal dose:  units with meals  Patients eat three meals a day:   Y/N    How many episodes of hypoglycemia do you have per week: _______  How many missed doses do you have per week: ______  How many times do you check your blood glucose per day: _______  Do you have a Continuous glucose monitor (CGM)   Y/N (remind pt that not approved for hospital use)   Any Barriers to therapy - Be specific :  cost of medications, comfortable with giving injections (if applicable), comfortable and confident with current diabetes regimen: Y/N ______________      Prior to Admission medications    Medication Sig Last Dose Taking? Auth Provider   etonogestrel (NEXPLANON) 68 MG IMPL 1 each (68 mg) by Subdermal route once  Yes Sabrina Pérez PA-C   fluticasone (FLONASE) 50 MCG/ACT spray Spray 1 spray into both nostrils daily as needed for rhinitis or allergies  Yes Unknown, Entered By History   loratadine (CLEAR-ATADINE) 10 MG tablet Take 10 mg by mouth  daily as needed for allergies  Yes Unknown, Entered By History   valACYclovir (VALTREX) 500 MG tablet Take 1,000 mg by mouth as needed (herpes outbreak)  Yes Unknown, Entered By History   folic acid (FOLVITE) 1 MG tablet Take 1 mg by mouth daily More than a month at Unknown time  Unknown, Entered By History   Milk Thistle-Dand-Fennel-Licor (MILK THISTLE XTRA) CAPS capsule Take 3 capsules by mouth daily More than a month at Unknown time  Unknown, Entered By History   multivitamin w/minerals (THERA-VIT-M) tablet Take 1 tablet by mouth daily More than a month at Unknown time  Unknown, Entered By History   sertraline (ZOLOFT) 25 MG tablet Take 25 mg by mouth daily More than a month at Unknown time  Unknown, Entered By History   traZODone (DESYREL) 50 MG tablet Take 50 mg by mouth nightly as needed for sleep More than a month at Unknown time  Unknown, Entered By History   vitamin (B COMPLEX-C) tablet Take 1 tablet by mouth daily More than a month at Unknown time  Unknown, Entered By History
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
## 2019-04-30 NOTE — LETTER
Key Recommendations:  FYI of pt hospitalizations for ETOH:    CTS consulted for elevated YEE score. SW consulted for resources. This is pt's third admit in the last 6 months. She was here from 12/4-12/5 with alcohol withdrawal. She was seen by SW and given the CD resource pamphlet and SUDS program information. She was again admitted from 2/18-2/22 and was seen by CD and psych. Per their notes, pt was continuing with her [facility] program. She has tried [facility] and [facility] support and AA. She continues to live with her parents and is again admitted with ETOH withdrawal. She has been followed by psych and is recommended to call her identified CD program to schedule an intake prior to discharge.    She is scheduled for an assessment for CD support services. This will be 3-6 month out pt intensive CD program that offers both MH/CD support 3 days per week  Pt does not drive.  .      PLAN  KERRI[facility] provided resources for counseling support and AA info        Aubree Choi    AVS/Discharge Summary is the source of truth; this is a helpful guide for improved communication of patient story
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
## 2019-04-30 NOTE — LETTER
Transition Communication Hand-off for Care Transitions to Next Level of Care Provider    Name: Alana Mujica  : 1975  MRN #: [id number]  Primary Care Provider: Eva Davies     Primary Clinic: [address]  [facility] [address]  Primary Care Clinic Name: [facility]  Reason for Hospitalization:  Bacterial vaginosis [N76.0, B96.89]  Alcohol withdrawal syndrome without complication (H) [F10.230]  Alcohol withdrawal delirium (H) [F10.231]  Admit Date/Time: 2019  7:47 AM  Discharge Date: 19  Payor Source: No coverage found.      Readmission Assessment Measure (YEE) Risk Score/category: Elevated         Reason for Communication Hand-off Referral: No support or lacking a support system    Discharge Plan:       Concern for non-adherence with plan of care:   Yes  Discharge Needs Assessment:  Needs      Most Recent Value   Equipment Currently Used at Home  none   Other Resources  Chemical Dependency Services          Already enrolled in Tele-monitoring program and name of program:  NA  Follow-up specialty is recommended: No    Follow-up plan:  No future appointments.    Any outstanding tests or procedures:       Key Recommendations:  FYI of pt hospitalizations for ETOH:    CTS consulted for elevated YEE score. SW consulted for resources. This is pt's third admit in the last 6 months. She was here from - with alcohol withdrawal. She was seen by SW and given the CD resource pamphlet and SUDS program information. She was again admitted from - and was seen by CD and psych. Per their notes, pt was continuing with her [facility] program. She has tried [employer], [employer] and [employer] support and AA. She continues to live with her parents and is again admitted with ETOH withdrawal. She has been followed by psych and is recommended to call her identified CD program to schedule an intake prior to discharge.    She is scheduled for an assessment for CD support  services. This will be 3-6 month out pt intensive CD program that offers both MH/CD support 3 days per week  Pt does not drive.  .      PLAN  SW provided resources for counseling support and AA info    Pt reporting that she plans to attend [facility] in [place] as well as AA    Aubree Choi/ JESSICA Andrea RN CTS    AVS/Discharge Summary is the source of truth; this is a helpful guide for improved communication of patient story

## 2019-04-30 NOTE — PROGRESS NOTES
[facility]    Sepsis Evaluation Progress Note    Date of Service: 04/30/2019    I was called to see Alana Mujica due to abnormal vital signs triggering the Sepsis SIRS screening alert. She is not known to have an infection.     Physical Exam    Vital Signs:  Temp: 100.1  F (37.8  C) Temp src: Oral BP: (!) 149/100 Pulse: 109 Heart Rate: 109 Resp: 20 SpO2: 97 % O2 Device: None (Room air)      Lab:  Lactic Acid   Date Value Ref Range Status   02/21/2019 2.3 (H) 0.4 - 2.0 mmol/L Final     Lactate for Sepsis Protocol   Date Value Ref Range Status   04/30/2019 2.7 (H) 0.7 - 2.0 mmol/L Final     Comment:     Critical Value called to and read back by  ANSHUL COATES([id number]) ON 4.30.19 AT 1235 BY CK         The patient is at baseline mental status.    The rest of their physical exam is significant for + tremors, abdominal exam is benign.    Assessment and Plan    The SIRS and exam findings are likely due to ETOH w/d., there is no sign of sepsis at this time.    Disposition: The patient will remain on the current unit. We will continue to monitor this patient closely.    Carmen Graf MD

## 2019-05-01 LAB
ALT SERPL W P-5'-P-CCNC: 80 U/L (ref 0–50)
ANION GAP SERPL CALCULATED.3IONS-SCNC: 4 MMOL/L (ref 3–14)
AST SERPL W P-5'-P-CCNC: 76 U/L (ref 0–45)
BACTERIA SPEC CULT: NORMAL
BUN SERPL-MCNC: 13 MG/DL (ref 7–30)
C TRACH DNA SPEC QL NAA+PROBE: NEGATIVE
CALCIUM SERPL-MCNC: 8.5 MG/DL (ref 8.5–10.1)
CHLORIDE SERPL-SCNC: 104 MMOL/L (ref 94–109)
CO2 SERPL-SCNC: 27 MMOL/L (ref 20–32)
CREAT SERPL-MCNC: 0.67 MG/DL (ref 0.52–1.04)
ERYTHROCYTE [DISTWIDTH] IN BLOOD BY AUTOMATED COUNT: 12.8 % (ref 10–15)
GFR SERPL CREATININE-BSD FRML MDRD: >90 ML/MIN/{1.73_M2}
GLUCOSE BLDC GLUCOMTR-MCNC: 100 MG/DL (ref 70–99)
GLUCOSE BLDC GLUCOMTR-MCNC: 109 MG/DL (ref 70–99)
GLUCOSE SERPL-MCNC: 93 MG/DL (ref 70–99)
HCT VFR BLD AUTO: 39.5 % (ref 35–47)
HGB BLD-MCNC: 12.8 G/DL (ref 11.7–15.7)
LACTATE BLD-SCNC: 0.7 MMOL/L (ref 0.7–2)
Lab: NORMAL
MCH RBC QN AUTO: 33.2 PG (ref 26.5–33)
MCHC RBC AUTO-ENTMCNC: 32.4 G/DL (ref 31.5–36.5)
MCV RBC AUTO: 103 FL (ref 78–100)
MRSA DNA SPEC QL NAA+PROBE: NEGATIVE
N GONORRHOEA DNA SPEC QL NAA+PROBE: NEGATIVE
PLATELET # BLD AUTO: 116 10E9/L (ref 150–450)
POTASSIUM SERPL-SCNC: 4 MMOL/L (ref 3.4–5.3)
RBC # BLD AUTO: 3.85 10E12/L (ref 3.8–5.2)
SODIUM SERPL-SCNC: 135 MMOL/L (ref 133–144)
SPECIMEN SOURCE: NORMAL
WBC # BLD AUTO: 3.9 10E9/L (ref 4–11)

## 2019-05-01 PROCEDURE — 83605 ASSAY OF LACTIC ACID: CPT | Performed by: INTERNAL MEDICINE

## 2019-05-01 PROCEDURE — 25000128 H RX IP 250 OP 636: Performed by: INTERNAL MEDICINE

## 2019-05-01 PROCEDURE — 84460 ALANINE AMINO (ALT) (SGPT): CPT | Performed by: PHYSICIAN ASSISTANT

## 2019-05-01 PROCEDURE — 96372 THER/PROPH/DIAG INJ SC/IM: CPT

## 2019-05-01 PROCEDURE — 36415 COLL VENOUS BLD VENIPUNCTURE: CPT | Performed by: INTERNAL MEDICINE

## 2019-05-01 PROCEDURE — G0378 HOSPITAL OBSERVATION PER HR: HCPCS

## 2019-05-01 PROCEDURE — 25000128 H RX IP 250 OP 636: Performed by: PHYSICIAN ASSISTANT

## 2019-05-01 PROCEDURE — 36415 COLL VENOUS BLD VENIPUNCTURE: CPT | Performed by: PHYSICIAN ASSISTANT

## 2019-05-01 PROCEDURE — 25000125 ZZHC RX 250: Performed by: INTERNAL MEDICINE

## 2019-05-01 PROCEDURE — 00000146 ZZHCL STATISTIC GLUCOSE BY METER IP

## 2019-05-01 PROCEDURE — 96376 TX/PRO/DX INJ SAME DRUG ADON: CPT

## 2019-05-01 PROCEDURE — 90791 PSYCH DIAGNOSTIC EVALUATION: CPT | Performed by: PSYCHOLOGIST

## 2019-05-01 PROCEDURE — 85027 COMPLETE CBC AUTOMATED: CPT | Performed by: PHYSICIAN ASSISTANT

## 2019-05-01 PROCEDURE — 99233 SBSQ HOSP IP/OBS HIGH 50: CPT | Performed by: INTERNAL MEDICINE

## 2019-05-01 PROCEDURE — 20000003 ZZH R&B ICU

## 2019-05-01 PROCEDURE — 84450 TRANSFERASE (AST) (SGOT): CPT | Performed by: PHYSICIAN ASSISTANT

## 2019-05-01 PROCEDURE — 25000132 ZZH RX MED GY IP 250 OP 250 PS 637: Performed by: PHYSICIAN ASSISTANT

## 2019-05-01 PROCEDURE — 25800030 ZZH RX IP 258 OP 636: Performed by: INTERNAL MEDICINE

## 2019-05-01 PROCEDURE — 25800030 ZZH RX IP 258 OP 636: Performed by: PHYSICIAN ASSISTANT

## 2019-05-01 PROCEDURE — 87641 MR-STAPH DNA AMP PROBE: CPT | Performed by: INTERNAL MEDICINE

## 2019-05-01 PROCEDURE — 87640 STAPH A DNA AMP PROBE: CPT | Performed by: INTERNAL MEDICINE

## 2019-05-01 PROCEDURE — 80048 BASIC METABOLIC PNL TOTAL CA: CPT | Performed by: PHYSICIAN ASSISTANT

## 2019-05-01 RX ORDER — SODIUM CHLORIDE 9 MG/ML
INJECTION, SOLUTION INTRAVENOUS CONTINUOUS
Status: DISCONTINUED | OUTPATIENT
Start: 2019-05-01 | End: 2019-05-03

## 2019-05-01 RX ORDER — HALOPERIDOL 5 MG/ML
2.5-5 INJECTION INTRAMUSCULAR
Status: DISCONTINUED | OUTPATIENT
Start: 2019-05-01 | End: 2019-05-04 | Stop reason: HOSPADM

## 2019-05-01 RX ORDER — LORAZEPAM 2 MG/ML
2 INJECTION INTRAMUSCULAR EVERY 4 HOURS
Status: COMPLETED | OUTPATIENT
Start: 2019-05-01 | End: 2019-05-03

## 2019-05-01 RX ADMIN — HEPARIN SODIUM 5000 UNITS: 5000 INJECTION, SOLUTION INTRAVENOUS; SUBCUTANEOUS at 17:29

## 2019-05-01 RX ADMIN — MULTIPLE VITAMINS W/ MINERALS TAB 1 TABLET: TAB at 07:46

## 2019-05-01 RX ADMIN — LORAZEPAM 1 MG: 1 TABLET ORAL at 07:47

## 2019-05-01 RX ADMIN — HEPARIN SODIUM 5000 UNITS: 5000 INJECTION, SOLUTION INTRAVENOUS; SUBCUTANEOUS at 03:37

## 2019-05-01 RX ADMIN — LORAZEPAM 1 MG: 1 TABLET ORAL at 10:01

## 2019-05-01 RX ADMIN — LORAZEPAM 2 MG: 2 INJECTION INTRAMUSCULAR; INTRAVENOUS at 13:47

## 2019-05-01 RX ADMIN — LORAZEPAM 2 MG: 2 INJECTION INTRAMUSCULAR; INTRAVENOUS at 10:49

## 2019-05-01 RX ADMIN — LORAZEPAM 2 MG: 2 INJECTION INTRAMUSCULAR; INTRAVENOUS at 17:29

## 2019-05-01 RX ADMIN — LORAZEPAM 2 MG: 2 INJECTION INTRAMUSCULAR; INTRAVENOUS at 12:33

## 2019-05-01 RX ADMIN — LORAZEPAM 2 MG: 2 INJECTION INTRAMUSCULAR; INTRAVENOUS at 20:44

## 2019-05-01 RX ADMIN — Medication 100 MG: at 07:46

## 2019-05-01 RX ADMIN — LORAZEPAM 1 MG: 2 INJECTION INTRAMUSCULAR; INTRAVENOUS at 01:19

## 2019-05-01 RX ADMIN — LORAZEPAM 2 MG: 2 INJECTION INTRAMUSCULAR; INTRAVENOUS at 00:10

## 2019-05-01 RX ADMIN — LORAZEPAM 1 MG: 1 TABLET ORAL at 09:10

## 2019-05-01 RX ADMIN — LORAZEPAM 2 MG: 2 INJECTION INTRAMUSCULAR; INTRAVENOUS at 03:37

## 2019-05-01 RX ADMIN — LORAZEPAM 2 MG: 2 INJECTION INTRAMUSCULAR; INTRAVENOUS at 15:14

## 2019-05-01 RX ADMIN — METRONIDAZOLE: 7.5 GEL VAGINAL at 09:12

## 2019-05-01 RX ADMIN — LORAZEPAM 2 MG: 2 INJECTION INTRAMUSCULAR; INTRAVENOUS at 04:58

## 2019-05-01 RX ADMIN — ACETAMINOPHEN 650 MG: 325 TABLET, FILM COATED ORAL at 16:10

## 2019-05-01 RX ADMIN — SODIUM CHLORIDE, POTASSIUM CHLORIDE, SODIUM LACTATE AND CALCIUM CHLORIDE: 600; 310; 30; 20 INJECTION, SOLUTION INTRAVENOUS at 01:19

## 2019-05-01 RX ADMIN — LORAZEPAM 2 MG: 2 INJECTION INTRAMUSCULAR; INTRAVENOUS at 00:51

## 2019-05-01 RX ADMIN — LORAZEPAM 2 MG: 2 INJECTION INTRAMUSCULAR; INTRAVENOUS at 02:54

## 2019-05-01 RX ADMIN — ONDANSETRON 4 MG: 2 INJECTION INTRAMUSCULAR; INTRAVENOUS at 15:15

## 2019-05-01 RX ADMIN — LORAZEPAM 2 MG: 2 INJECTION INTRAMUSCULAR; INTRAVENOUS at 02:17

## 2019-05-01 RX ADMIN — DEXMEDETOMIDINE 0.2 MCG/KG/HR: 100 INJECTION, SOLUTION, CONCENTRATE INTRAVENOUS at 16:57

## 2019-05-01 RX ADMIN — SODIUM CHLORIDE: 9 INJECTION, SOLUTION INTRAVENOUS at 16:04

## 2019-05-01 RX ADMIN — FOLIC ACID 1 MG: 1 TABLET ORAL at 07:45

## 2019-05-01 ASSESSMENT — ACTIVITIES OF DAILY LIVING (ADL)
ADLS_ACUITY_SCORE: 16
ADLS_ACUITY_SCORE: 16
ADLS_ACUITY_SCORE: 14

## 2019-05-01 ASSESSMENT — MIFFLIN-ST. JEOR: SCORE: 1128.13

## 2019-05-01 NOTE — CONSULTS
Care Coordination:  CTS consulted for elevated YEE score. SW consulted for resources. This is pt's third admit in the last 6 months. She was here from 12/4-12/5 with alcohol withdrawal. She was seen by SW and given the CD resource pamphlet and SUDS program information. She was again admitted from 2/18-2/22 and was seen by CD and psych. Per their notes, pt was continuing with her [facility] program. She has tried [facility] support and AA. She continues to live with her parents and is again admitted with ETOH withdrawal. She has been followed by psych and is recommended to call her identified CD program to schedule an intake prior to discharge. SW to meet with pt and mom this afternoon. She follows with [facility] clinic. Handoff will be sent to [facility] regarding hospitalizations.    Aubree Choi RN CTS

## 2019-05-01 NOTE — PROGRESS NOTES
Increased agitation and anxiety- pt had dressed self and called dad to come and ; having dry heaves, states will stay if VPM removed from room- given Ativan 2 mg IV at 1515 and Zofran for nausea; pt impulsive and unsafe out of bed-unsteady gait; paged Dr Graf; plan to send to ICU on Precedex gtt; sepsis protocol triggered, lactic drawn, pending results; started IVF per orders; pt c/o HA-Tylenol given; Report called to ICU RN; transferred via bed

## 2019-05-01 NOTE — PROGRESS NOTES
[facility]    Medicine Progress Note - Hospitalist Service       Date of Admission:  4/30/2019  Assessment & Plan      Alana Mujica is a 43 year old female with a PMH significant for alcoholism, recent diagnoses of UTI and BV, anxiety/depression, anorexia, bulimia and osteoporosis who presents with concern of alcohol withdrawal.     Patient was discussed with Dr. Bond, who was provider in ED. Chart review of ED work up was reviewed as well as chart review of Care Everywhere, previous visits and admissions.      1. Alcohol withdrawal with hx of dependency  Presents with blood alcohol level of 0.1 requesting to go to detox for alcohol withdrawal but found to be tremulous and tachycardic.  Last admission in 2/2019 required transfer to ICU for Precedex trip due to agitation and tachycardia from 2/18-2/20.   -Monitor on [employer] protocol, has required quite a bit of ativan, however, seems stable.  -oral vitamins  -Chem dep consult (In February was in [facility] but now will be joining [facility] in )  -Seen by Psych in 2/2019 with recommendations of Trazodone or Remeron to help with sleep and Zoloft but she has not been taking     2. Bacterial vaginosis  Diagnosed on 4/16 but did not complete treatment with Metrogel At that time HIV, Chlamydia, and Gonorrhea were negative. Pregnancy test negative.  -Will start Metrogel vaginally BID x 5 days     3. Urinary symptoms  Diagnosed with UTI on visit to ED on 4/16. Culture growing multiple organisms. Did not complete treatment with Keflex. Complaining of dysuria but UA appears clean, could be related to #2.  -Send urine for culture     4. Anxiety, depression and eating disorder  Not on medication and requesting to see Psychiatry  -Psych consult     5. Transaminitis  AST of 127 and ALT of 111 consistent with alcohol use.  No right upper quadrant discomfort with palpation.    Addendum.  - Will transfer to ICU given > 20 mg ativan over a short period of time.  Start precedex gtt. Pt agreeable to stay, however, if attempts to elope would need hold.        Diet: Regular Diet Adult    DVT Prophylaxis: Heparin SQ  Soriano Catheter: not present  Code Status: Full Code      Disposition Plan   Expected discharge: 2 - 3 days, recommended to prior living arrangement once ETOH w/d resolved..  Entered: Carmen Graf MD 05/01/2019, 9:22 AM       The patient's care was discussed with the Patient.    Carmen Graf MD  Hospitalist Service  [facility]    ______________________________________________________________________    Interval History     No chest pain/SOB/palpitations/emesis.    Data reviewed today: I reviewed all medications, new labs and imaging results over the last 24 hours. I personally reviewed no images or EKG's today.    Physical Exam   Vital Signs: Temp: 98.3  F (36.8  C) Temp src: Oral BP: (!) 133/98 Pulse: 110 Heart Rate: 82 Resp: 20 SpO2: 98 % O2 Device: None (Room air)    Weight: 113 lbs 3.2 oz    Gen - AAO x 3 in NAD.  Lungs - CTA B.  Heart - RR,S1+S2 nml, no m/g/r.  Abd - soft, NT, ND, + BS.  Ext - no edema.  Neuro - Cn II-XII wnl, FALK's.    Data   Recent Labs   Lab 05/01/19  0638 04/30/19  0835   WBC 3.9* 5.2   HGB 12.8 14.3   * 101*   * 143*    139   POTASSIUM 4.0 4.3   CHLORIDE 104 105   CO2 27 27   BUN 13 10   CR 0.67 0.74   ANIONGAP 4 7   KODY 8.5 9.0   GLC 93 86   ALBUMIN  --  3.8   PROTTOTAL  --  8.1   BILITOTAL  --  0.4   ALKPHOS  --  84   ALT  --  111*   AST  --  127*   LIPASE  --  389     No results found for this or any previous visit (from the past 24 hour(s)).  Medications     lactated ringers 100 mL/hr at 05/01/19 0119       folic acid  1 mg Oral Daily     heparin  5,000 Units Subcutaneous Q12H     metroNIDAZOLE   Vaginal BID     multivitamin w/minerals  1 tablet Oral Daily     sodium chloride (PF)  3 mL Intracatheter Q8H     vitamin B1  100 mg Oral Daily

## 2019-05-01 NOTE — PROGRESS NOTES
"PRIMARY DIAGNOSIS: \"GENERIC\" NURSING (ETOH)  OUTPATIENT/OBSERVATION GOALS TO BE MET BEFORE DISCHARGE:  1. ADLs back to baseline: No    2. Activity and level of assistance: Up with 1 assistance D/T active ETOH W/D.    3. Pain status: Pain free.    4. Return to near baseline physical activity: No     Discharge Planner Nurse   Safe discharge environment identified: Not at this time  Barriers to discharge: Yes D/T active ETOH W/D       Entered by: Carole Bradshaw 05/01/2019 3:49 AM     Please review provider order for any additional goals.   Nurse to notify provider when observation goals have been met and patient is ready for discharge.  "

## 2019-05-01 NOTE — PROGRESS NOTES
Brief sw note:    Sw attempted multiple times to see the pt today to provide CD resources.  Pt requested sw return because she was on the phone and later pt requested sw return because she was throwing up.  Sw will attempt to see the pt again tomorrow morning.

## 2019-05-01 NOTE — CONSULTS
"CLINICAL NUTRITION SERVICES  -  ASSESSMENT NOTE      MALNUTRITION:  % Weight Loss:  None noted  % Intake: Unable to determine, possibility of nutritionally inadequate diet with etoh hx  Subcutaneous Fat Loss: Unable to determine --> patient denied NFPE  Muscle Loss: Unable to determine --> patient denied NFPE  Fluid Retention: None documented    Malnutrition Diagnosis: Patient does not meet two of the above criteria necessary for diagnosing malnutrition        REASON FOR ASSESSMENT  Alana Mujica is a 43 year old female seen by Registered Dietitian for Admission Nutrition Risk Screen for unintentional loss of 10# or more in the past two months and reduced oral intake over the last month.      NUTRITION HISTORY  - Information obtained mostly from chart.  Patient reporting in 1 word answers with this writer.    - Patient with a h/o etoh abuse, \"anorexia, bulimia\", anxiety, depression.  Admitted 2/2 concerns for alcohol withdrawal.  - Does not verbalize eating disorder history (previous documentation of restriction and binge/purging), or if currently experiencing active eating disorder behavior with this writer.  Psych following.  - Patient reports ?regular diet at home.  Only glimpse into nutrition history provided is report of eating \"food\".  Does not wish to describe usual number of meals/snacks, likes/dislikes, or if concerns with decreased oral intake PTA as indicated in screen above.  In combination with etoh history, would suspect potential for nutritionally inadequate diet at baseline.  - NKFA.      CURRENT NUTRITION ORDERS  Diet Order:     Regular    Current Intake/Tolerance:  % meal consumption since admission.  Consistently ordering balanced meals TID since admit.        NUTRITION FOCUSED PHYSICAL ASSESSMENT (NFPA)  Completed:  No, patient refusal         Observed:    Unable to determine    Obtained from Chart/Interdisciplinary Team:  Refer to psych note    ANTHROPOMETRICS  Height: 5' 3\"  Weight: 52.2 " "kg (113#)  Body mass index is 20.05 kg/m .  Weight Status:  Normal BMI  IBW: 52.3 kg (115#)  % IBW: 100%  Weight History:  Wt Readings from Last 10 Encounters:   05/02/19 52.2 kg (115 lb 1.3 oz)   02/22/19 53.1 kg (117 lb)   12/04/18 53.1 kg (117 lb)   09/14/18 53.5 kg (118 lb)   08/26/18 53.3 kg (117 lb 8 oz)   06/22/18 54 kg (119 lb)   06/04/18 54 kg (119 lb)   04/05/18 53.1 kg (117 lb 1.6 oz)   12/08/17 58.2 kg (128 lb 3.2 oz)   11/17/17 55.1 kg (121 lb 8 oz)   - When asked open ended questions relating to weight trends, UBW, or recent changes, patient simply replied \"no\". Per trends above, appears relatively stable over the past year, though data limited.     LABS  Labs reviewed    MEDICATIONS  Medications reviewed      ASSESSED NUTRITION NEEDS PER APPROVED PRACTICE GUIDELINES:    Dosing Weight 52.2 kg  Estimated Energy Needs: 0013-1936 kcals (25-30 Kcal/Kg)  Justification: maintenance  Estimated Protein Needs: 52-63 grams protein (1-1.2 g pro/Kg)  Justification: preservation of lean body mass  Estimated Fluid Needs: per MD    MALNUTRITION:  % Weight Loss:  None noted  % Intake: Unable to determine, possibility of nutritionally inadequate diet with etoh hx  Subcutaneous Fat Loss: Unable to determine --> patient denied NFPE  Muscle Loss: Unable to determine --> patient denied NFPE  Fluid Retention: None documented    Malnutrition Diagnosis: Patient does not meet two of the above criteria necessary for diagnosing malnutrition      NUTRITION DIAGNOSIS:  Predicted suboptimal nutrient intake (energy/protein) related to potential for decline in PO intakes during hospital LOS with etoh and documentation of ED history, currently consuming %.       NUTRITION INTERVENTIONS  Recommendations / Nutrition Prescription  Diet per MD.      Continue daily MVI/M.      Implementation  Nutrition education: Provided education on availability for RD contact prior to set follow-up as requested.  Available for any " nutrition-related discussions as needed.     Collaboration and Referral of Nutrition care: Discussed POC with team during rounds.        Nutrition Goals  Patient to consume at least 75% of meals TID.       MONITORING AND EVALUATION:  Progress towards goals will be monitored and evaluated per protocol and Practice Guidelines          Jaymie Quesada RD, LD  Clinical Dietitian  3rd floor/ICU: [phone]  All other floors: [phone]  Weekend/holiday: [phone]

## 2019-05-01 NOTE — PROVIDER NOTIFICATION
Admitting MD web base paged to inform of administering total of 22 mg IV Lorazepam since 20:10 last evening. Pt now scoring < 7. No call back needed at this time.  Carole Bradshaw, BSN, RN  Medical/Telemetry - 3

## 2019-05-01 NOTE — PROGRESS NOTES
"PRIMARY DIAGNOSIS: \"GENERIC\" NURSING (ETOH)  OUTPATIENT/OBSERVATION GOALS TO BE MET BEFORE DISCHARGE:  1. ADLs back to baseline: No    2. Activity and level of assistance: Up w/1 assist currently D/T unsteadiness    3. Pain status: Pain free.    4. Return to near baseline physical activity: No D/T active ETOH W/D     Discharge Planner Nurse   Safe discharge environment identified: Not at this time  Barriers to discharge: Yes, actively W/D from ETOH scoring 12-21, medicated w/Lorazepam according to [facility] protocol.       Entered by: Carole Bradshaw 04/30/2019 9:19 PM     Please review provider order for any additional goals.   Nurse to notify provider when observation goals have been met and patient is ready for discharge.  "
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
## 2019-05-01 NOTE — PROGRESS NOTES
"PRIMARY DIAGNOSIS: \"GENERIC\" NURSING (ETOH)  OUTPATIENT/OBSERVATION GOALS TO BE MET BEFORE DISCHARGE:  1. ADLs back to baseline: No    2. Activity and level of assistance: Up with standby to 1 assist assistance D/T unsteadiness for active W/D.    3. Pain status: Pain free.    4. Return to near baseline physical activity: No, requiring assistance     Discharge Planner Nurse   Safe discharge environment identified: Not at this time  Barriers to discharge: Yes, active ETOH W/D, has received 9 mg of Lorazepam IV since 20:10 this evening w/scores of 12-21.        Entered by: Carole Bradshaw 04/30/2019 11:47 PM     Please review provider order for any additional goals.   Nurse to notify provider when observation goals have been met and patient is ready for discharge.  "

## 2019-05-02 LAB
GLUCOSE BLDC GLUCOMTR-MCNC: 106 MG/DL (ref 70–99)
GLUCOSE BLDC GLUCOMTR-MCNC: 107 MG/DL (ref 70–99)
GLUCOSE BLDC GLUCOMTR-MCNC: 118 MG/DL (ref 70–99)
GLUCOSE BLDC GLUCOMTR-MCNC: 87 MG/DL (ref 70–99)
GLUCOSE BLDC GLUCOMTR-MCNC: 98 MG/DL (ref 70–99)
GLUCOSE BLDC GLUCOMTR-MCNC: 98 MG/DL (ref 70–99)

## 2019-05-02 PROCEDURE — 20000003 ZZH R&B ICU

## 2019-05-02 PROCEDURE — 00000146 ZZHCL STATISTIC GLUCOSE BY METER IP

## 2019-05-02 PROCEDURE — 25000128 H RX IP 250 OP 636: Performed by: PHYSICIAN ASSISTANT

## 2019-05-02 PROCEDURE — 40000916 ZZH STATISTIC SITTER, NIGHT HOURS

## 2019-05-02 PROCEDURE — 25000128 H RX IP 250 OP 636: Performed by: INTERNAL MEDICINE

## 2019-05-02 PROCEDURE — 25000125 ZZHC RX 250: Performed by: INTERNAL MEDICINE

## 2019-05-02 PROCEDURE — 99233 SBSQ HOSP IP/OBS HIGH 50: CPT | Performed by: INTERNAL MEDICINE

## 2019-05-02 PROCEDURE — 25000132 ZZH RX MED GY IP 250 OP 250 PS 637: Performed by: PHYSICIAN ASSISTANT

## 2019-05-02 PROCEDURE — 25800030 ZZH RX IP 258 OP 636: Performed by: INTERNAL MEDICINE

## 2019-05-02 RX ADMIN — Medication 100 MG: at 09:26

## 2019-05-02 RX ADMIN — LORAZEPAM 2 MG: 2 INJECTION INTRAMUSCULAR; INTRAVENOUS at 13:38

## 2019-05-02 RX ADMIN — SODIUM CHLORIDE: 9 INJECTION, SOLUTION INTRAVENOUS at 04:40

## 2019-05-02 RX ADMIN — LORAZEPAM 2 MG: 2 INJECTION INTRAMUSCULAR; INTRAVENOUS at 04:36

## 2019-05-02 RX ADMIN — LORAZEPAM 2 MG: 2 INJECTION INTRAMUSCULAR; INTRAVENOUS at 21:25

## 2019-05-02 RX ADMIN — FOLIC ACID 1 MG: 1 TABLET ORAL at 09:26

## 2019-05-02 RX ADMIN — LORAZEPAM 2 MG: 2 INJECTION INTRAMUSCULAR; INTRAVENOUS at 19:10

## 2019-05-02 RX ADMIN — SODIUM CHLORIDE: 9 INJECTION, SOLUTION INTRAVENOUS at 18:21

## 2019-05-02 RX ADMIN — LORAZEPAM 2 MG: 2 INJECTION INTRAMUSCULAR; INTRAVENOUS at 12:36

## 2019-05-02 RX ADMIN — LORAZEPAM 2 MG: 2 INJECTION INTRAMUSCULAR; INTRAVENOUS at 10:53

## 2019-05-02 RX ADMIN — HEPARIN SODIUM 5000 UNITS: 5000 INJECTION, SOLUTION INTRAVENOUS; SUBCUTANEOUS at 04:35

## 2019-05-02 RX ADMIN — LORAZEPAM 2 MG: 2 INJECTION INTRAMUSCULAR; INTRAVENOUS at 16:06

## 2019-05-02 RX ADMIN — LORAZEPAM 1 MG: 1 TABLET ORAL at 23:02

## 2019-05-02 RX ADMIN — MULTIPLE VITAMINS W/ MINERALS TAB 1 TABLET: TAB at 09:26

## 2019-05-02 RX ADMIN — METRONIDAZOLE: 7.5 GEL VAGINAL at 12:36

## 2019-05-02 RX ADMIN — DEXMEDETOMIDINE 0.2 MCG/KG/HR: 100 INJECTION, SOLUTION, CONCENTRATE INTRAVENOUS at 09:40

## 2019-05-02 RX ADMIN — LORAZEPAM 2 MG: 2 INJECTION INTRAMUSCULAR; INTRAVENOUS at 16:28

## 2019-05-02 RX ADMIN — ACETAMINOPHEN 650 MG: 325 TABLET, FILM COATED ORAL at 09:34

## 2019-05-02 RX ADMIN — METRONIDAZOLE: 7.5 GEL VAGINAL at 04:34

## 2019-05-02 RX ADMIN — LORAZEPAM 2 MG: 2 INJECTION INTRAMUSCULAR; INTRAVENOUS at 14:55

## 2019-05-02 RX ADMIN — LORAZEPAM 2 MG: 2 INJECTION INTRAMUSCULAR; INTRAVENOUS at 16:57

## 2019-05-02 RX ADMIN — LORAZEPAM 2 MG: 2 INJECTION INTRAMUSCULAR; INTRAVENOUS at 00:47

## 2019-05-02 RX ADMIN — LORAZEPAM 2 MG: 2 INJECTION INTRAMUSCULAR; INTRAVENOUS at 09:26

## 2019-05-02 ASSESSMENT — ACTIVITIES OF DAILY LIVING (ADL)
ADLS_ACUITY_SCORE: 15
ADLS_ACUITY_SCORE: 15
ADLS_ACUITY_SCORE: 10.5
ADLS_ACUITY_SCORE: 15

## 2019-05-02 ASSESSMENT — MIFFLIN-ST. JEOR: SCORE: 1136.13
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
## 2019-05-02 NOTE — PROGRESS NOTES
[facility]    Medicine Progress Note - Hospitalist Service       Date of Admission:  4/30/2019  Assessment & Plan       Alana Mujica is a 43 year old female with a PMH significant for alcoholism, recent diagnoses of UTI and BV, anxiety/depression, anorexia, bulimia and osteoporosis who presents with concern of alcohol withdrawal.     Patient was discussed with Dr. Bond, who was provider in ED. Chart review of ED work up was reviewed as well as chart review of Care Everywhere, previous visits and admissions.      1. Alcohol withdrawal and DT's with hx of dependency  Presents with blood alcohol level of 0.1 requesting to go to detox for alcohol withdrawal but found to be tremulous and tachycardic.  Last admission in 2/2019 required transfer to ICU for Precedex trip due to agitation and tachycardia from 2/18-2/20.   -Monitor on tele  -On precedex gtt and scheduled ativan.  -oral vitamins  -Chem dep consult (In February was in [facility] but now will be joining [facility] in )  -Seen by Psych in 2/2019 with recommendations of Trazodone or Remeron to help with sleep and Zoloft but she has not been taking     2. Bacterial vaginosis  Diagnosed on 4/16 but did not complete treatment with Metrogel At that time HIV, Chlamydia, and Gonorrhea were negative. Pregnancy test negative.  -Will start Metrogel vaginally BID x 5 days     3. Urinary symptoms  Diagnosed with UTI on visit to ED on 4/16. Culture growing multiple organisms. Did not complete treatment with Keflex. Complaining of dysuria but UA appears clean, could be related to #2.  -Send urine for culture     4. Anxiety, depression and eating disorder  Not on medication and requesting to see Psychiatry  -Psych consult, no med changes at present, needs to contact CD program.     5. Transaminitis  Improved, likely ETOH related.      Diet: Regular Diet Adult    DVT Prophylaxis: Heparin SQ  Soriano Catheter: not present  Code Status: Full Code      Disposition  Plan   Expected discharge: 2 - 3 days, recommended to prior living arrangement once mental status at baseline.  Entered: Carmen Graf MD 05/02/2019, 8:04 AM       The patient's care was discussed with the Bedside Nurse and Patient.    Carmen Graf MD  Hospitalist Service  [facility]    ______________________________________________________________________    Interval History     No chest pain/SOB/fever/chills/N/V.    Data reviewed today: I reviewed all medications, new labs and imaging results over the last 24 hours. I personally reviewed no images or EKG's today.    Physical Exam   Vital Signs: Temp: 97.3  F (36.3  C) Temp src: Axillary BP: 120/87 Pulse: 63 Heart Rate: 68 Resp: 16 SpO2: 97 % O2 Device: None (Room air)    Weight: 115 lbs 1.28 oz    Gen - AAO x 3 in NAD.  Lungs - CTA B.  Heart - RR,S1+S2 nml, no m/g/r.  Abd - soft, NT, ND, + BS.  Ext - no edema.  Neuro - Cn II-XII wnl, FALK's.          Data   Recent Labs   Lab 05/01/19  0638 04/30/19  0835   WBC 3.9* 5.2   HGB 12.8 14.3   * 101*   * 143*    139   POTASSIUM 4.0 4.3   CHLORIDE 104 105   CO2 27 27   BUN 13 10   CR 0.67 0.74   ANIONGAP 4 7   KODY 8.5 9.0   GLC 93 86   ALBUMIN  --  3.8   PROTTOTAL  --  8.1   BILITOTAL  --  0.4   ALKPHOS  --  84   ALT 80* 111*   AST 76* 127*   LIPASE  --  389     No results found for this or any previous visit (from the past 24 hour(s)).  Medications     dexmedetomidine 0.2 mcg/kg/hr (05/02/19 0600)     sodium chloride 75 mL/hr at 05/02/19 0600       folic acid  1 mg Oral Daily     heparin  5,000 Units Subcutaneous Q12H     LORazepam  2 mg Intravenous Q4H     metroNIDAZOLE   Vaginal BID     multivitamin w/minerals  1 tablet Oral Daily     sodium chloride (PF)  3 mL Intracatheter Q8H     vitamin B1  100 mg Oral Daily

## 2019-05-03 LAB
GLUCOSE BLDC GLUCOMTR-MCNC: 100 MG/DL (ref 70–99)
GLUCOSE BLDC GLUCOMTR-MCNC: 127 MG/DL (ref 70–99)

## 2019-05-03 PROCEDURE — 40000915 ZZH STATISTIC SITTER, EVENING HOURS

## 2019-05-03 PROCEDURE — 99233 SBSQ HOSP IP/OBS HIGH 50: CPT | Performed by: INTERNAL MEDICINE

## 2019-05-03 PROCEDURE — 40000916 ZZH STATISTIC SITTER, NIGHT HOURS

## 2019-05-03 PROCEDURE — 00000146 ZZHCL STATISTIC GLUCOSE BY METER IP

## 2019-05-03 PROCEDURE — 25000131 ZZH RX MED GY IP 250 OP 636 PS 637: Performed by: PHYSICIAN ASSISTANT

## 2019-05-03 PROCEDURE — 40000914 ZZH STATISTIC SITTER, DAY HOURS

## 2019-05-03 PROCEDURE — 25000128 H RX IP 250 OP 636: Performed by: INTERNAL MEDICINE

## 2019-05-03 PROCEDURE — 20000003 ZZH R&B ICU

## 2019-05-03 PROCEDURE — 25000128 H RX IP 250 OP 636: Performed by: PHYSICIAN ASSISTANT

## 2019-05-03 PROCEDURE — 25000132 ZZH RX MED GY IP 250 OP 250 PS 637: Performed by: PHYSICIAN ASSISTANT

## 2019-05-03 RX ADMIN — LORAZEPAM 2 MG: 2 INJECTION INTRAMUSCULAR; INTRAVENOUS at 21:22

## 2019-05-03 RX ADMIN — LORAZEPAM 1 MG: 2 INJECTION INTRAMUSCULAR; INTRAVENOUS at 15:54

## 2019-05-03 RX ADMIN — LORAZEPAM 2 MG: 1 TABLET ORAL at 23:20

## 2019-05-03 RX ADMIN — Medication 100 MG: at 08:00

## 2019-05-03 RX ADMIN — METRONIDAZOLE: 7.5 GEL VAGINAL at 21:22

## 2019-05-03 RX ADMIN — FOLIC ACID 1 MG: 1 TABLET ORAL at 08:00

## 2019-05-03 RX ADMIN — LORAZEPAM 2 MG: 2 INJECTION INTRAMUSCULAR; INTRAVENOUS at 13:34

## 2019-05-03 RX ADMIN — LORAZEPAM 2 MG: 2 INJECTION INTRAMUSCULAR; INTRAVENOUS at 20:46

## 2019-05-03 RX ADMIN — LORAZEPAM 2 MG: 2 INJECTION INTRAMUSCULAR; INTRAVENOUS at 15:18

## 2019-05-03 RX ADMIN — LORAZEPAM 2 MG: 1 TABLET ORAL at 22:42

## 2019-05-03 RX ADMIN — MULTIPLE VITAMINS W/ MINERALS TAB 1 TABLET: TAB at 08:00

## 2019-05-03 RX ADMIN — LORAZEPAM 2 MG: 2 INJECTION INTRAMUSCULAR; INTRAVENOUS at 01:31

## 2019-05-03 RX ADMIN — LORAZEPAM 2 MG: 1 TABLET ORAL at 20:10

## 2019-05-03 RX ADMIN — ACETAMINOPHEN 650 MG: 325 TABLET, FILM COATED ORAL at 18:17

## 2019-05-03 RX ADMIN — ONDANSETRON 4 MG: 4 TABLET, ORALLY DISINTEGRATING ORAL at 04:43

## 2019-05-03 RX ADMIN — METRONIDAZOLE: 7.5 GEL VAGINAL at 10:58

## 2019-05-03 RX ADMIN — ACETAMINOPHEN 650 MG: 325 TABLET, FILM COATED ORAL at 08:00

## 2019-05-03 RX ADMIN — LORAZEPAM 2 MG: 1 TABLET ORAL at 16:33

## 2019-05-03 RX ADMIN — HEPARIN SODIUM 5000 UNITS: 5000 INJECTION, SOLUTION INTRAVENOUS; SUBCUTANEOUS at 05:00

## 2019-05-03 RX ADMIN — LORAZEPAM 2 MG: 2 INJECTION INTRAMUSCULAR; INTRAVENOUS at 22:10

## 2019-05-03 RX ADMIN — LORAZEPAM 1 MG: 1 TABLET ORAL at 12:04

## 2019-05-03 RX ADMIN — LORAZEPAM 2 MG: 2 INJECTION INTRAMUSCULAR; INTRAVENOUS at 09:02

## 2019-05-03 RX ADMIN — LORAZEPAM 2 MG: 1 TABLET ORAL at 18:17

## 2019-05-03 RX ADMIN — LORAZEPAM 2 MG: 2 INJECTION INTRAMUSCULAR; INTRAVENOUS at 05:00

## 2019-05-03 RX ADMIN — LORAZEPAM 2 MG: 1 TABLET ORAL at 10:44

## 2019-05-03 ASSESSMENT — ACTIVITIES OF DAILY LIVING (ADL)
ADLS_ACUITY_SCORE: 15
ADLS_ACUITY_SCORE: 10.5
ADLS_ACUITY_SCORE: 10.5
ADLS_ACUITY_SCORE: 15
ADLS_ACUITY_SCORE: 15
ADLS_ACUITY_SCORE: 10.5

## 2019-05-03 NOTE — PROGRESS NOTES
"Patient woke up at 01:00 and was alert.  Patient used restroom with an assist of one.  Patient called her dad at 1:10 and became argumentative with him.  Patient stated to her dad she wanted to leave the hospital and go home.  Patient became more agitated and stated \"fuck you dad\" and hung up the phone.  Patient has calmed down and is now eating crackers.   "

## 2019-05-03 NOTE — CONSULTS
5/3/2019    Per chart review, pt has a CD plan in place and has an assessment set up for Monday at [facility]. SW and psych notes both indicate that plan.  CD will close consult at this time. If pt's plans change, please re-order consult.     Ella Fontaine, [facility]  [phone]

## 2019-05-03 NOTE — PROGRESS NOTES
[facility]    Medicine Progress Note - Hospitalist Service       Date of Admission:  4/30/2019  Assessment & Plan       Alana Mujica is a 43 year old female with a PMH significant for alcoholism, recent diagnoses of UTI and BV, anxiety/depression, anorexia, bulimia and osteoporosis who presents with concern of alcohol withdrawal.     Patient was discussed with Dr. Bond, who was provider in ED. Chart review of ED work up was reviewed as well as chart review of Care Everywhere, previous visits and admissions.      1. Alcohol withdrawal and DT's with hx of dependency  Presents with blood alcohol level of 0.1 requesting to go to detox for alcohol withdrawal but found to be tremulous and tachycardic.  Last admission in 2/2019 required transfer to ICU for Precedex trip due to agitation and tachycardia from 2/18-2/20.   -Monitor on tele  -On precedex gtt and scheduled ativan. Attempt to wean off precedex gtt today.  -oral vitamins  -Chem dep consult (In February was in [facility] but now will be joining [facility] in )  -Seen by Psych in 2/2019 with recommendations of Trazodone or Remeron to help with sleep and Zoloft but she has not been taking     2. Bacterial vaginosis  Diagnosed on 4/16 but did not complete treatment with Metrogel At that time HIV, Chlamydia, and Gonorrhea were negative. Pregnancy test negative.  -On Metrogel vaginally BID x 5 days     3. Urinary symptoms  Diagnosed with UTI on visit to ED on 4/16. Culture growing multiple organisms. Did not complete treatment with Keflex. Complaining of dysuria but UA appears clean, could be related to #2.  -Send urine for culture     4. Anxiety, depression and eating disorder  Not on medication and requesting to see Psychiatry  -Psych consult, no med changes at present, needs to contact CD program.     5. Transaminitis  Improved, likely ETOH related.  Recheck in am.    Will need hold if attempts to elope today.       Diet: Regular Diet Adult    DVT  Prophylaxis: Heparin SQ  Soriano Catheter: not present  Code Status: Full Code      Disposition Plan   Expected discharge: Tomorrow, recommended to prior living arrangement once mental status at baseline.  Entered: Carmen Graf MD 05/03/2019, 7:40 AM       The patient's care was discussed with the Bedside Nurse and Patient.    Carmen Graf MD  Hospitalist Service  [facility]    ______________________________________________________________________    Interval History     No chest pain/SOB/N/V/fever/chills.    Data reviewed today: I reviewed all medications, new labs and imaging results over the last 24 hours. I personally reviewed no images or EKG's today.    Physical Exam   Vital Signs: Temp: 97.6  F (36.4  C) Temp src: Oral BP: 125/84 Pulse: 58 Heart Rate: 54 Resp: 29 SpO2: 97 % O2 Device: None (Room air)    Weight: 115 lbs 1.28 oz    Gen - AAO x 3 in NAD.  Lungs - CTA B.  Heart - RR,S1+S2 nml, no m/g/r.  Abd - soft, NT, ND, + BS.  Ext - no edema.  Neuro - Cn II-XII wnl, FALK's.          Data   Recent Labs   Lab 05/01/19  0638 04/30/19  0835   WBC 3.9* 5.2   HGB 12.8 14.3   * 101*   * 143*    139   POTASSIUM 4.0 4.3   CHLORIDE 104 105   CO2 27 27   BUN 13 10   CR 0.67 0.74   ANIONGAP 4 7   KODY 8.5 9.0   GLC 93 86   ALBUMIN  --  3.8   PROTTOTAL  --  8.1   BILITOTAL  --  0.4   ALKPHOS  --  84   ALT 80* 111*   AST 76* 127*   LIPASE  --  389     No results found for this or any previous visit (from the past 24 hour(s)).  Medications     dexmedetomidine 0.2 mcg/kg/hr (05/03/19 0600)     sodium chloride 75 mL/hr at 05/03/19 0600       folic acid  1 mg Oral Daily     heparin  5,000 Units Subcutaneous Q12H     LORazepam  2 mg Intravenous Q4H     metroNIDAZOLE   Vaginal BID     multivitamin w/minerals  1 tablet Oral Daily     sodium chloride (PF)  3 mL Intracatheter Q8H     vitamin B1  100 mg Oral Daily

## 2019-05-03 NOTE — PROGRESS NOTES
Patient ambulated in the saleem 120ft, with the assist of one using a gait belt.  Patient c/o dizziness, so we returned to room.

## 2019-05-04 VITALS
SYSTOLIC BLOOD PRESSURE: 133 MMHG | HEIGHT: 63 IN | HEART RATE: 87 BPM | BODY MASS INDEX: 20.39 KG/M2 | OXYGEN SATURATION: 100 % | TEMPERATURE: 97.4 F | DIASTOLIC BLOOD PRESSURE: 99 MMHG | WEIGHT: 115.08 LBS | RESPIRATION RATE: 18 BRPM

## 2019-05-04 LAB
ALBUMIN SERPL-MCNC: 3.6 G/DL (ref 3.4–5)
ALP SERPL-CCNC: 75 U/L (ref 40–150)
ALT SERPL W P-5'-P-CCNC: 67 U/L (ref 0–50)
ANION GAP SERPL CALCULATED.3IONS-SCNC: 3 MMOL/L (ref 3–14)
AST SERPL W P-5'-P-CCNC: 41 U/L (ref 0–45)
BILIRUB SERPL-MCNC: 0.6 MG/DL (ref 0.2–1.3)
BUN SERPL-MCNC: 10 MG/DL (ref 7–30)
CALCIUM SERPL-MCNC: 8.8 MG/DL (ref 8.5–10.1)
CHLORIDE SERPL-SCNC: 103 MMOL/L (ref 94–109)
CO2 SERPL-SCNC: 28 MMOL/L (ref 20–32)
CREAT SERPL-MCNC: 0.71 MG/DL (ref 0.52–1.04)
GFR SERPL CREATININE-BSD FRML MDRD: >90 ML/MIN/{1.73_M2}
GLUCOSE SERPL-MCNC: 85 MG/DL (ref 70–99)
PLATELET # BLD AUTO: 149 10E9/L (ref 150–450)
POTASSIUM SERPL-SCNC: 3.7 MMOL/L (ref 3.4–5.3)
PROT SERPL-MCNC: 7.6 G/DL (ref 6.8–8.8)
SODIUM SERPL-SCNC: 134 MMOL/L (ref 133–144)

## 2019-05-04 PROCEDURE — 25000128 H RX IP 250 OP 636: Performed by: PHYSICIAN ASSISTANT

## 2019-05-04 PROCEDURE — 85049 AUTOMATED PLATELET COUNT: CPT | Performed by: INTERNAL MEDICINE

## 2019-05-04 PROCEDURE — 80053 COMPREHEN METABOLIC PANEL: CPT | Performed by: INTERNAL MEDICINE

## 2019-05-04 PROCEDURE — 25000132 ZZH RX MED GY IP 250 OP 250 PS 637: Performed by: PHYSICIAN ASSISTANT

## 2019-05-04 PROCEDURE — 99238 HOSP IP/OBS DSCHRG MGMT 30/<: CPT | Performed by: HOSPITALIST

## 2019-05-04 PROCEDURE — 36415 COLL VENOUS BLD VENIPUNCTURE: CPT | Performed by: INTERNAL MEDICINE

## 2019-05-04 PROCEDURE — 40000914 ZZH STATISTIC SITTER, DAY HOURS

## 2019-05-04 RX ORDER — LORAZEPAM 1 MG/1
1-2 TABLET ORAL EVERY 6 HOURS PRN
Qty: 14 TABLET | Refills: 0 | Status: SHIPPED | OUTPATIENT
Start: 2019-05-04 | End: 2019-10-21

## 2019-05-04 RX ORDER — METRONIDAZOLE 7.5 MG/G
GEL VAGINAL 2 TIMES DAILY
Qty: 70 G | Refills: 0 | Status: SHIPPED | OUTPATIENT
Start: 2019-05-04 | End: 2019-06-24

## 2019-05-04 RX ADMIN — Medication 1 MG: at 02:34

## 2019-05-04 RX ADMIN — LORAZEPAM 1 MG: 1 TABLET ORAL at 07:46

## 2019-05-04 RX ADMIN — METRONIDAZOLE: 7.5 GEL VAGINAL at 08:57

## 2019-05-04 RX ADMIN — LORAZEPAM 2 MG: 1 TABLET ORAL at 05:06

## 2019-05-04 RX ADMIN — MULTIPLE VITAMINS W/ MINERALS TAB 1 TABLET: TAB at 07:47

## 2019-05-04 RX ADMIN — FOLIC ACID 1 MG: 1 TABLET ORAL at 07:46

## 2019-05-04 RX ADMIN — LORAZEPAM 2 MG: 1 TABLET ORAL at 02:34

## 2019-05-04 RX ADMIN — LORAZEPAM 2 MG: 1 TABLET ORAL at 01:11

## 2019-05-04 RX ADMIN — Medication 100 MG: at 07:48

## 2019-05-04 RX ADMIN — LORAZEPAM 2 MG: 2 INJECTION INTRAMUSCULAR; INTRAVENOUS at 01:46

## 2019-05-04 ASSESSMENT — ACTIVITIES OF DAILY LIVING (ADL)
ADLS_ACUITY_SCORE: 10.5

## 2019-05-04 NOTE — PROGRESS NOTES
[facility]    Medicine Progress Note - Hospitalist Service       Date of Admission:  4/30/2019  Assessment & Plan          Alana Mujica is a 43 year old female with a PMH significant for alcoholism, recent diagnoses of UTI and BV, anxiety/depression, anorexia, bulimia and osteoporosis who presents with concern of alcohol withdrawal.       1. Alcohol withdrawal and DT's with hx of dependency  Presents with blood alcohol level of 0.1 requesting to go to detox for alcohol withdrawal but found to be tremulous and tachycardic.  Last admission in 2/2019 required transfer to ICU for Precedex trip due to agitation and tachycardia from 2/18-2/20.   -Monitor on tele  -On precedex gtt and scheduled ativan. Attempt to wean off precedex gtt today.  -oral vitamins  -Chem dep consult (In February was in [facility] but now will be joining [facility] in ).  Also plans to do AA she tells me.  -Seen by Psych in 2/2019 with recommendations of Trazodone or Remeron to help with sleep and Zoloft but she has not been taking     2. Bacterial vaginosis  Diagnosed on 4/16 but did not complete treatment with Metrogel At that time HIV, Chlamydia, and Gonorrhea were negative. Pregnancy test negative.  -On Metrogel vaginally BID x 5 days     3. Urinary symptoms  Diagnosed with UTI on visit to ED on 4/16. Culture growing multiple organisms. Did not complete treatment with Keflex. Complaining of dysuria but UA appears clean, could be related to #2.  -Send urine for culture.  Reviewed Microbiology, negative.     4. Anxiety, depression and eating disorder  Not on medication and requesting to see Psychiatry  -Psych consult, no med changes at present, needs to contact CD program.     5. Transaminitis  Improved, likely ETOH related.      Diet: Regular Diet Adult    DVT Prophylaxis: Low Risk/Ambulatory with no VTE prophylaxis indicated  Soriano Catheter: not present  Code Status: Full Code      Disposition Plan   Expected discharge: Today,  recommended to prior living arrangement once discharge orders done.  Patient has outpatient followup scheduled..  Entered: Isaak Chang MD 05/04/2019, 10:10 AM       The patient's care was discussed with the Bedside Nurse, Patient, Patient's Family and sitter.    Isaak Chang MD  Hospitalist Service  [facility]    ______________________________________________________________________    Interval History   Patient doing better.  Scored down to CIWA zero and three today.  Anxious to go home.  Boyfriend is here.  She has a plan for alcohol cessation.    Data reviewed today: I reviewed all medications, new labs and imaging results over the last 24 hours. I personally reviewed no images or EKG's today.    Physical Exam   Vital Sign: Temp: 95.5  F (35.3  C) Temp src: Oral BP: 117/82 Pulse: 83 Heart Rate: 66 Resp: 18 SpO2: 100 % O2 Device: None (Room air)    Weight: 115 lbs 1.28 oz  General Appearance: NAD  Respiratory: Bilateral, clear to auscultation.  Cardiovascular: RRR no murmers gallops or rubs  GI: Active bowel sounds, nontender.  Skin: no rashes  Other: Calm and cooperative.  Minimal tremor.     Data   Recent Labs   Lab 05/04/19  0538 05/01/19  0638 04/30/19  0835   WBC  --  3.9* 5.2   HGB  --  12.8 14.3   MCV  --  103* 101*   * 116* 143*    135 139   POTASSIUM 3.7 4.0 4.3   CHLORIDE 103 104 105   CO2 28 27 27   BUN 10 13 10   CR 0.71 0.67 0.74   ANIONGAP 3 4 7   KODY 8.8 8.5 9.0   GLC 85 93 86   ALBUMIN 3.6  --  3.8   PROTTOTAL 7.6  --  8.1   BILITOTAL 0.6  --  0.4   ALKPHOS 75  --  84   ALT 67* 80* 111*   AST 41 76* 127*   LIPASE  --   --  389

## 2019-05-04 NOTE — PROGRESS NOTES
Discharge instructions given, iv removed. Written prescriptions given for patient to fill at own pharmacy. Patient wheeled down to vehicle by staff.

## 2019-05-04 NOTE — DISCHARGE INSTRUCTIONS
Negative Effects of Alcohol including multiple organ damage, and the various mechanisms of   *** brain damage to the most uniquely human part of the brain:  the frontal lobes***  (see large blue sidebar):  http://1.usa.gov/8l4MlF7     When you damage the frontal lobes, you risk damaging the area that is most important in making you special and unique from other people.      Find Help:  http://BotanoCap.[facility].gov/find-help    >UpToDate Information Regarding Chronic Pancreatitis (this is what happens when you drink alcohol chronically especially if you have already had pancreatitis):  http://OneMob.KeyCAPTCHA/JobxF3    > Stop alcohol.  Your body is obviously sensitive, and it will severely damage your pancreas (and probably other organs) with continued use.       Some patients that cannot stop alcohol drink less with the Jordan Method, but this should only be undertaken with consultation with an addicion psychiatrist, and is not as good at preventing alcohol toxicity compared to cessation.   I recommend reading about other people's experience with this on message boards before considering this approach, which again has some disadvantages compared to cessation.  Http://www.Optini.EnChroma/community/

## 2019-05-04 NOTE — DISCHARGE SUMMARY
[facility]  Hospitalist Discharge Summary       Date of Admission:  4/30/2019  Date of Discharge:  5/4/2019 10:45 AM  Discharging Provider: Isaak Chang MD    Discharge Diagnoses   1.  EtOH abuse.  2.  Bacterial Vaginosis.  3.  Anxiety  4.  Depression.  5.  Recent UTI.  6.  Alcoholism with withdrawal    Follow-ups Needed After Discharge   1.  Alcohol cessation.  2.  Routine post-hospital followup.    Unresulted Labs Ordered in the Past 30 Days of this Admission     No orders found from 3/1/2019 to 5/1/2019.      These results will be followed up by N/A    Discharge Disposition   Discharged to home  Condition at discharge: Stable    Hospital Course         Alana Mujica is a 43 year old female with a PMH significant for alcoholism, recent diagnoses of UTI and BV, anxiety/depression, anorexia, bulimia and osteoporosis who presents with concern of alcohol withdrawal.    1. Alcohol withdrawal and DT's with hx of dependency  Presents with blood alcohol level of 0.1 requesting to go to detox for alcohol withdrawal but found to be tremulous and tachycardic.  Last admission in 2/2019 required transfer to ICU for Precedex trip due to agitation and tachycardia from 2/18-2/20.   -Monitor on tele  -On precedex gtt and scheduled ativan. Attempt to wean off precedex gtt today.  -oral vitamins  -Chem dep consult (In February was in [facility] but now will be joining [facility] in [place]).  Also plans to do AA she tells me.  -Seen by Psych in 2/2019 with recommendations of Trazodone or Remeron to help with sleep and Zoloft but she has not been taking     2. Bacterial vaginosis  Diagnosed on 4/16 but did not complete treatment with Metrogel At that time HIV, Chlamydia, and Gonorrhea were negative. Pregnancy test negative.  -On Metrogel vaginally BID x 5 days     3. Urinary symptoms  Diagnosed with UTI on visit to ED on 4/16. Culture growing multiple organisms. Did not complete treatment with Keflex. Complaining  of dysuria but UA appears clean, could be related to #2.  -Send urine for culture.  Reviewed Microbiology, negative.     4. Anxiety, depression and eating disorder  Not on medication and requesting to see Psychiatry  -Psych consult, no med changes at present, needs to contact CD program.     5. Transaminitis  Improved, likely ETOH related.      Diet: Regular Diet Adult    DVT Prophylaxis: Low Risk/Ambulatory with no VTE prophylaxis indicated  Soriano Catheter: not present  Code Status: Full Code        Consultations This Hospital Stay   PSYCHIATRY IP CONSULT  CHEMICAL DEPENDENCY IP CONSULT  SOCIAL WORK IP CONSULT  CARE COORDINATOR IP CONSULT    Code Status   Full Code    Time Spent on this Encounter   I, Isaak Chang, personally saw the patient today and spent less than or equal to 30 minutes discharging this patient.       Isaak Chang MD  [facility]  ______________________________________________________________________    Physical Exam   Vital Signs: Temp: 95.5  F (35.3  C) Temp src: Oral BP: 117/82 Pulse: 83 Heart Rate: 66 Resp: 18 SpO2: 100 % O2 Device: None (Room air)    Weight: 115 lbs 1.28 oz  General Appearance:  NAD  Respiratory: Bilateral, clear to auscultation.  Cardiovascular: RRR no murmers gallops or rubs  GI: Active bowel sounds, nontender.  Skin: no rashes  Other:  Calm and cooperative.  Minimal tremor.        Primary Care Physician   [facility]     [phone]    Discharge Orders   No discharge procedures on file.    Significant Results and Procedures   Results for orders placed or performed during the hospital encounter of 12/04/18   XR Chest 2 Views    Narrative    CHEST TWO VIEWS  12/4/2018 11:33 AM     HISTORY: Shortness of breath and chest pain    COMPARISON: None.      Impression    IMPRESSION:   Heart and pulmonary vessels within normal limits. Lungs clear. No  pleural effusion.. Old right seventh rib fracture.    MARC OROURKE MD   Chest  CT, IV contrast only - PE protocol    Narrative    CT CHEST PULMONARY EMBOLISM WITH CONTRAST 12/4/2018 3:54 PM     HISTORY: Chest pain.     COMPARISON: Chest x-ray 12/4/2018. CT abdomen and pelvis 9/14/2018.    TECHNIQUE: Thin section axial images are performed from the thoracic  inlet to the lung bases utilizing 60 mL of Isovue 370 IV contrast  without adverse event. Coronal reformatted images are also generated.  Radiation dose for this scan was reduced using automated exposure  control, adjustment of the mA and/or kV according to patient size, or  iterative reconstruction technique.    FINDINGS:     Chest: Lungs are clear. No infiltrate, consolidation or mass. Airways  appear patent. No pleural or pericardial fluid. Heart is normal in  size. Small hiatal hernia is present. Esophagus demonstrates mild  circumferential wall thickening possibly related to esophagitis.  Thyroid gland is normal in size. No enlarged lymph nodes. No evidence  of pulmonary embolism. Thoracic aorta is unremarkable. Limited images  upper abdomen demonstrate new diffuse fatty infiltration of the liver.  Spleen is normal in size where imaged. Upper abdomen is otherwise  unremarkable. Bone window examination is unremarkable. Old healed  posterior right rib fractures are again noted.      Impression    IMPRESSION:  1. No evidence of pulmonary embolism. Thoracic aorta is unremarkable.  2. Lungs are clear. No infiltrate or consolidation. No pleural fluid.  3. New diffuse fatty infiltration of the liver.    FERNANDA HILL MD       Discharge Medications   Current Discharge Medication List      CONTINUE these medications which have NOT CHANGED    Details   etonogestrel (NEXPLANON) 68 MG IMPL 1 each (68 mg) by Subdermal route once  Qty: 1 each, Refills: 0      fluticasone (FLONASE) 50 MCG/ACT spray Spray 1 spray into both nostrils daily as needed for rhinitis or allergies      loratadine (CLEAR-ATADINE) 10 MG tablet Take 10 mg by mouth daily as needed  for allergies      valACYclovir (VALTREX) 500 MG tablet Take 1,000 mg by mouth as needed (herpes outbreak)      folic acid (FOLVITE) 1 MG tablet Take 1 mg by mouth daily      Milk Thistle-Dand-Fennel-Licor (MILK THISTLE XTRA) CAPS capsule Take 3 capsules by mouth daily      multivitamin w/minerals (THERA-VIT-M) tablet Take 1 tablet by mouth daily      sertraline (ZOLOFT) 25 MG tablet Take 25 mg by mouth daily      traZODone (DESYREL) 50 MG tablet Take 50 mg by mouth nightly as needed for sleep      vitamin (B COMPLEX-C) tablet Take 1 tablet by mouth daily           Allergies   No Known Allergies
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
## 2019-05-05 NOTE — PROGRESS NOTES
Transition Communication Hand-off for Care Transitions to Next Level of Care Provider    Name: Alana Mujica  : 1975  MRN #: [id number]  Primary Care Provider: Eva Davies     Primary Clinic: [address]  Replaced by [facility] [address]  Primary Care Clinic Name: [facility]  Reason for Hospitalization:  Bacterial vaginosis [N76.0, B96.89]  Alcohol withdrawal syndrome without complication (H) [F10.230]  Alcohol withdrawal delirium (H) [F10.231]  Admit Date/Time: 2019  7:47 AM  Discharge Date: 19  Payor Source: No coverage found.      Readmission Assessment Measure (YEE) Risk Score/category: Elevated         Reason for Communication Hand-off Referral: No support or lacking a support system    Discharge Plan:       Concern for non-adherence with plan of care:   Yes  Discharge Needs Assessment:  Needs      Most Recent Value   Equipment Currently Used at Home  none   Other Resources  Chemical Dependency Services          Already enrolled in Tele-monitoring program and name of program:  NA  Follow-up specialty is recommended: No    Follow-up plan:  No future appointments.    Any outstanding tests or procedures:       Key Recommendations:  FYI of pt hospitalizations for ETOH:    CTS consulted for elevated YEE score. SW consulted for resources. This is pt's third admit in the last 6 months. She was here from - with alcohol withdrawal. She was seen by SW and given the CD resource pamphlet and SUDS program information. She was again admitted from - and was seen by CD and psych. Per their notes, pt was continuing with her [facility] program. She has tried [facility], [facility] and [facility] support and AA. She continues to live with her parents and is again admitted with ETOH withdrawal. She has been followed by psych and is recommended to call her identified CD program to schedule an intake prior to discharge.    She is scheduled for an assessment for CD support  services. This will be 3-6 month out pt intensive CD program that offers both MH/CD support 3 days per week  Pt does not drive.  .      PLAN  SW provided resources for counseling support and AA info    Pt reporting that she plans to attend [facility] in [place] as well as AA    Aubree Choi/ JESSICA Andrea RN CTS    AVS/Discharge Summary is the source of truth; this is a helpful guide for improved communication of patient story

## 2019-05-06 ENCOUNTER — PATIENT OUTREACH (OUTPATIENT)
Dept: CARE COORDINATION | Facility: CLINIC | Age: 44
End: 2019-05-06

## 2019-05-06 ASSESSMENT — ACTIVITIES OF DAILY LIVING (ADL): DEPENDENT_IADLS:: INDEPENDENT
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
## 2019-05-06 NOTE — PROGRESS NOTES
Clinic Care Coordination Contact  [facility]/Voicemail    Referral Source: IP Handoff  Clinical Data: Care Coordinator Outreach  Outreach attempted x 1.  Left message on voicemail with call back information and requested return call.  Plan: Care Coordinator mailed out care coordination introduction letter on 5/6/19. Care Coordinator will try to reach patient again in 3-5 business days.      ALVARO Pham   Care Coordination Team  [phone]

## 2019-05-06 NOTE — LETTER
[place] CARE COORDINATION  [facility]   May 6, 2019    Alana Mujica  [address]  [place] [address]      Dear Alana,    I am a clinic care coordinator who works with [facility] at. I have been trying to reach you recently to introduce Clinic Care Coordination and to see if there was anything I could assist you with.  I wanted to introduce myself and provide you with my contact information so that you can call me with questions or concerns about your health care. Below is a description of clinic care coordination and how I can further assist you.     The clinic care coordinator is a registered nurse and/or  who understand the health care system. The goal of clinic care coordination is to help you manage your health and improve access to the [facility] system in the most efficient manner. The registered nurse can assist you in meeting your health care goals by providing education, coordinating services, and strengthening the communication among your providers. The  can assist you with financial, behavioral, psychosocial, chemical dependency, counseling, and/or psychiatric resources.    Please feel free to contact me at [phone], with any questions or concerns. We at [facility] are focused on providing you with the highest-quality healthcare experience possible and that all starts with you.     Sincerely,     Mesha Lackey    Enclosed: I have enclosed a copy of a 24 Hour Access Plan. This has helpful phone numbers for you to call when needed. Please keep this in an easy to access place to use as needed.

## 2019-05-16 ENCOUNTER — HOSPITAL ENCOUNTER (EMERGENCY)
Facility: CLINIC | Age: 44
Discharge: HOME | End: 2019-05-16
Attending: NURSE PRACTITIONER | Admitting: NURSE PRACTITIONER
Payer: COMMERCIAL

## 2019-05-16 ENCOUNTER — APPOINTMENT (OUTPATIENT)
Dept: ULTRASOUND IMAGING | Facility: CLINIC | Age: 44
End: 2019-05-16
Attending: NURSE PRACTITIONER
Payer: COMMERCIAL

## 2019-05-16 VITALS
TEMPERATURE: 99.2 F | HEART RATE: 99 BPM | HEIGHT: 63 IN | DIASTOLIC BLOOD PRESSURE: 78 MMHG | SYSTOLIC BLOOD PRESSURE: 118 MMHG | RESPIRATION RATE: 16 BRPM | BODY MASS INDEX: 21.09 KG/M2 | OXYGEN SATURATION: 98 % | WEIGHT: 119 LBS

## 2019-05-16 DIAGNOSIS — F10.929 ALCOHOL INTOXICATION (H): ICD-10-CM

## 2019-05-16 DIAGNOSIS — N39.0 URINARY TRACT INFECTION: ICD-10-CM

## 2019-05-16 DIAGNOSIS — F10.10 ETOH ABUSE: ICD-10-CM

## 2019-05-16 DIAGNOSIS — F14.90 COCAINE USE: ICD-10-CM

## 2019-05-16 LAB
ALBUMIN SERPL-MCNC: 3.9 G/DL (ref 3.4–5)
ALBUMIN UR-MCNC: 30 MG/DL
ALP SERPL-CCNC: 85 U/L (ref 40–150)
ALT SERPL W P-5'-P-CCNC: 80 U/L (ref 0–50)
AMPHETAMINES UR QL SCN: NEGATIVE
ANION GAP SERPL CALCULATED.3IONS-SCNC: 11 MMOL/L (ref 3–14)
APPEARANCE UR: CLEAR
AST SERPL W P-5'-P-CCNC: 101 U/L (ref 0–45)
BACTERIA #/AREA URNS HPF: ABNORMAL /HPF
BARBITURATES UR QL: NEGATIVE
BENZODIAZ UR QL: NEGATIVE
BILIRUB SERPL-MCNC: 0.3 MG/DL (ref 0.2–1.3)
BILIRUB UR QL STRIP: NEGATIVE
BUN SERPL-MCNC: 8 MG/DL (ref 7–30)
CALCIUM SERPL-MCNC: 8.6 MG/DL (ref 8.5–10.1)
CANNABINOIDS UR QL SCN: NEGATIVE
CHLORIDE SERPL-SCNC: 110 MMOL/L (ref 94–109)
CO2 SERPL-SCNC: 27 MMOL/L (ref 20–32)
COCAINE UR QL: POSITIVE
COLOR UR AUTO: YELLOW
CREAT SERPL-MCNC: 0.87 MG/DL (ref 0.52–1.04)
ERYTHROCYTE [DISTWIDTH] IN BLOOD BY AUTOMATED COUNT: 13 % (ref 10–15)
ETHANOL SERPL-MCNC: 0.38 G/DL
GFR SERPL CREATININE-BSD FRML MDRD: 80 ML/MIN/{1.73_M2}
GLUCOSE SERPL-MCNC: 88 MG/DL (ref 70–99)
GLUCOSE UR STRIP-MCNC: NEGATIVE MG/DL
HCG UR QL: NEGATIVE
HCT VFR BLD AUTO: 37.3 % (ref 35–47)
HGB BLD-MCNC: 12.7 G/DL (ref 11.7–15.7)
HGB UR QL STRIP: ABNORMAL
KETONES UR STRIP-MCNC: NEGATIVE MG/DL
LEUKOCYTE ESTERASE UR QL STRIP: ABNORMAL
LIPASE SERPL-CCNC: 460 U/L (ref 73–393)
MCH RBC QN AUTO: 33.5 PG (ref 26.5–33)
MCHC RBC AUTO-ENTMCNC: 34 G/DL (ref 31.5–36.5)
MCV RBC AUTO: 98 FL (ref 78–100)
MUCOUS THREADS #/AREA URNS LPF: PRESENT /LPF
NITRATE UR QL: NEGATIVE
OPIATES UR QL SCN: NEGATIVE
PCP UR QL SCN: NEGATIVE
PH UR STRIP: 6.5 PH (ref 5–7)
PLATELET # BLD AUTO: 191 10E9/L (ref 150–450)
POTASSIUM SERPL-SCNC: 3.8 MMOL/L (ref 3.4–5.3)
PROT SERPL-MCNC: 7.8 G/DL (ref 6.8–8.8)
RBC # BLD AUTO: 3.79 10E12/L (ref 3.8–5.2)
RBC #/AREA URNS AUTO: 3 /HPF (ref 0–2)
SODIUM SERPL-SCNC: 148 MMOL/L (ref 133–144)
SOURCE: ABNORMAL
SP GR UR STRIP: 1.01 (ref 1–1.03)
SQUAMOUS #/AREA URNS AUTO: 2 /HPF (ref 0–1)
UROBILINOGEN UR STRIP-MCNC: NORMAL MG/DL (ref 0–2)
WBC # BLD AUTO: 5.1 10E9/L (ref 4–11)
WBC #/AREA URNS AUTO: 81 /HPF (ref 0–5)

## 2019-05-16 PROCEDURE — 25000128 H RX IP 250 OP 636: Performed by: NURSE PRACTITIONER

## 2019-05-16 PROCEDURE — 96360 HYDRATION IV INFUSION INIT: CPT

## 2019-05-16 PROCEDURE — 80053 COMPREHEN METABOLIC PANEL: CPT | Performed by: NURSE PRACTITIONER

## 2019-05-16 PROCEDURE — 76705 ECHO EXAM OF ABDOMEN: CPT

## 2019-05-16 PROCEDURE — 25000132 ZZH RX MED GY IP 250 OP 250 PS 637: Performed by: NURSE PRACTITIONER

## 2019-05-16 PROCEDURE — 96361 HYDRATE IV INFUSION ADD-ON: CPT

## 2019-05-16 PROCEDURE — 81025 URINE PREGNANCY TEST: CPT | Performed by: NURSE PRACTITIONER

## 2019-05-16 PROCEDURE — 99285 EMERGENCY DEPT VISIT HI MDM: CPT | Mod: 25

## 2019-05-16 PROCEDURE — 85027 COMPLETE CBC AUTOMATED: CPT | Performed by: NURSE PRACTITIONER

## 2019-05-16 PROCEDURE — 80320 DRUG SCREEN QUANTALCOHOLS: CPT | Performed by: NURSE PRACTITIONER

## 2019-05-16 PROCEDURE — 83690 ASSAY OF LIPASE: CPT | Performed by: NURSE PRACTITIONER

## 2019-05-16 PROCEDURE — 81001 URINALYSIS AUTO W/SCOPE: CPT | Performed by: NURSE PRACTITIONER

## 2019-05-16 PROCEDURE — 80307 DRUG TEST PRSMV CHEM ANLYZR: CPT | Performed by: NURSE PRACTITIONER

## 2019-05-16 RX ORDER — CEPHALEXIN 500 MG/1
500 CAPSULE ORAL ONCE
Status: COMPLETED | OUTPATIENT
Start: 2019-05-16 | End: 2019-05-16

## 2019-05-16 RX ORDER — CEPHALEXIN 500 MG/1
500 CAPSULE ORAL 2 TIMES DAILY
Qty: 14 CAPSULE | Refills: 0 | Status: SHIPPED | OUTPATIENT
Start: 2019-05-16 | End: 2019-07-17

## 2019-05-16 RX ADMIN — SODIUM CHLORIDE 1000 ML: 9 INJECTION, SOLUTION INTRAVENOUS at 13:55

## 2019-05-16 RX ADMIN — CEPHALEXIN 500 MG: 500 CAPSULE ORAL at 18:49

## 2019-05-16 ASSESSMENT — ENCOUNTER SYMPTOMS
DYSURIA: 0
CHILLS: 0
ABDOMINAL DISTENTION: 1
ABDOMINAL PAIN: 1
FREQUENCY: 0
SHORTNESS OF BREATH: 0
FEVER: 0

## 2019-05-16 ASSESSMENT — MIFFLIN-ST. JEOR: SCORE: 1153.91

## 2019-05-16 NOTE — DISCHARGE INSTRUCTIONS

## 2019-05-16 NOTE — ED NOTES
Pt transferred from room 22 to room 16. Pt has expressed she is upset about her labs showing positive cocaine. Pt was offered a meal tray but declined to take it.
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
## 2019-05-16 NOTE — ED AVS SNAPSHOT
Emergency Department  [address] [place] [id number]  Phone:  [phone]  Fax:  [phone]                                    Alana Mujica   MRN: [id number]    Department:   Emergency Department   Date of Visit:  5/16/2019           After Visit Summary Signature Page    I have received my discharge instructions, and my questions have been answered. I have discussed any challenges I see with this plan with the nurse or doctor.    ..........................................................................................................................................  Patient/Patient Representative Signature      ..........................................................................................................................................  Patient Representative Print Name and Relationship to Patient    ..................................................               ................................................  Date                                   Time    ..........................................................................................................................................  Reviewed by Signature/Title    ...................................................              ..............................................  Date                                               Time          22EPIC Rev 08/18
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
## 2019-05-20 NOTE — PROGRESS NOTES
Clinic Care Coordination Contact  [facility]/Voicemail    Referral Source: IP Handoff  Clinical Data: Care Coordinator Outreach  Outreach attempted x 2.  Left message on voicemail with call back information and requested return call.  Plan: Care Coordinator mailed out care coordination introduction letter on 5/6/19.. Care Coordinator will do no further outreaches at this time.      ALVARO Pham   Care Coordination Team  [phone]

## 2019-06-24 ENCOUNTER — OFFICE VISIT (OUTPATIENT)
Dept: FAMILY MEDICINE | Facility: CLINIC | Age: 44
End: 2019-06-24
Payer: COMMERCIAL

## 2019-06-24 VITALS
BODY MASS INDEX: 21.37 KG/M2 | OXYGEN SATURATION: 99 % | TEMPERATURE: 98.5 F | WEIGHT: 120.6 LBS | SYSTOLIC BLOOD PRESSURE: 109 MMHG | DIASTOLIC BLOOD PRESSURE: 79 MMHG | HEART RATE: 99 BPM | RESPIRATION RATE: 18 BRPM | HEIGHT: 63 IN

## 2019-06-24 DIAGNOSIS — B96.89 BACTERIAL VAGINOSIS: Primary | ICD-10-CM

## 2019-06-24 DIAGNOSIS — A60.00 GENITAL HERPES SIMPLEX, UNSPECIFIED SITE: ICD-10-CM

## 2019-06-24 DIAGNOSIS — N76.0 BACTERIAL VAGINOSIS: Primary | ICD-10-CM

## 2019-06-24 DIAGNOSIS — B37.31 CANDIDIASIS OF VAGINA: ICD-10-CM

## 2019-06-24 DIAGNOSIS — R30.0 DYSURIA: ICD-10-CM

## 2019-06-24 LAB
ALBUMIN UR-MCNC: 30 MG/DL
APPEARANCE UR: ABNORMAL
BACTERIA #/AREA URNS HPF: ABNORMAL /HPF
BILIRUB UR QL STRIP: ABNORMAL
COLOR UR AUTO: YELLOW
GLUCOSE UR STRIP-MCNC: NEGATIVE MG/DL
HGB UR QL STRIP: NEGATIVE
KETONES UR STRIP-MCNC: 15 MG/DL
LEUKOCYTE ESTERASE UR QL STRIP: NEGATIVE
NITRATE UR QL: NEGATIVE
NON-SQ EPI CELLS #/AREA URNS LPF: ABNORMAL /LPF
PH UR STRIP: 6 PH (ref 5–7)
RBC #/AREA URNS AUTO: ABNORMAL /HPF
SOURCE: ABNORMAL
SP GR UR STRIP: 1.02 (ref 1–1.03)
SPECIMEN SOURCE: ABNORMAL
UROBILINOGEN UR STRIP-ACNC: 0.2 EU/DL (ref 0.2–1)
WBC #/AREA URNS AUTO: ABNORMAL /HPF
WET PREP SPEC: ABNORMAL

## 2019-06-24 PROCEDURE — 99213 OFFICE O/P EST LOW 20 MIN: CPT | Performed by: PHYSICIAN ASSISTANT

## 2019-06-24 PROCEDURE — 81001 URINALYSIS AUTO W/SCOPE: CPT | Performed by: PHYSICIAN ASSISTANT

## 2019-06-24 PROCEDURE — 87210 SMEAR WET MOUNT SALINE/INK: CPT | Performed by: PHYSICIAN ASSISTANT

## 2019-06-24 RX ORDER — CLINDAMYCIN HCL 300 MG
300 CAPSULE ORAL 2 TIMES DAILY
Qty: 14 CAPSULE | Refills: 0 | Status: SHIPPED | OUTPATIENT
Start: 2019-06-24 | End: 2019-07-17 | Stop reason: SINTOL

## 2019-06-24 RX ORDER — FLUCONAZOLE 150 MG/1
150 TABLET ORAL ONCE
Qty: 1 TABLET | Refills: 0 | Status: SHIPPED | OUTPATIENT
Start: 2019-06-24 | End: 2019-07-17

## 2019-06-24 RX ORDER — GABAPENTIN 100 MG/1
100 CAPSULE ORAL 3 TIMES DAILY
COMMUNITY
End: 2019-10-21

## 2019-06-24 RX ORDER — VALACYCLOVIR HYDROCHLORIDE 500 MG/1
500 TABLET, FILM COATED ORAL 2 TIMES DAILY
Qty: 18 TABLET | Refills: 1 | Status: SHIPPED | OUTPATIENT
Start: 2019-06-24 | End: 2019-07-17

## 2019-06-24 ASSESSMENT — MIFFLIN-ST. JEOR: SCORE: 1161.17

## 2019-06-24 NOTE — PATIENT INSTRUCTIONS
With the clindamycin we have to be very mindful of ANY DIARRHEA.  Make sure to take your probiotics.  Let us know any symptoms occur
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
## 2019-06-28 ENCOUNTER — TRANSFERRED RECORDS (OUTPATIENT)
Dept: HEALTH INFORMATION MANAGEMENT | Facility: CLINIC | Age: 44
End: 2019-06-28

## 2019-07-02 ENCOUNTER — OFFICE VISIT (OUTPATIENT)
Dept: FAMILY MEDICINE | Facility: CLINIC | Age: 44
End: 2019-07-02
Payer: COMMERCIAL

## 2019-07-02 VITALS
DIASTOLIC BLOOD PRESSURE: 60 MMHG | HEIGHT: 63 IN | WEIGHT: 122.1 LBS | HEART RATE: 82 BPM | RESPIRATION RATE: 16 BRPM | TEMPERATURE: 97.8 F | SYSTOLIC BLOOD PRESSURE: 98 MMHG | BODY MASS INDEX: 21.63 KG/M2

## 2019-07-02 DIAGNOSIS — R30.0 DYSURIA: Primary | ICD-10-CM

## 2019-07-02 DIAGNOSIS — N76.0 BV (BACTERIAL VAGINOSIS): ICD-10-CM

## 2019-07-02 DIAGNOSIS — B96.89 BV (BACTERIAL VAGINOSIS): ICD-10-CM

## 2019-07-02 LAB
ALBUMIN UR-MCNC: NEGATIVE MG/DL
APPEARANCE UR: CLEAR
BILIRUB UR QL STRIP: NEGATIVE
COLOR UR AUTO: YELLOW
GLUCOSE UR STRIP-MCNC: NEGATIVE MG/DL
HGB UR QL STRIP: NEGATIVE
KETONES UR STRIP-MCNC: NEGATIVE MG/DL
LEUKOCYTE ESTERASE UR QL STRIP: NEGATIVE
NITRATE UR QL: NEGATIVE
PH UR STRIP: 6.5 PH (ref 5–7)
SOURCE: NORMAL
SP GR UR STRIP: <=1.005 (ref 1–1.03)
SPECIMEN SOURCE: ABNORMAL
UROBILINOGEN UR STRIP-ACNC: 0.2 EU/DL (ref 0.2–1)
WET PREP SPEC: ABNORMAL

## 2019-07-02 PROCEDURE — 99213 OFFICE O/P EST LOW 20 MIN: CPT | Performed by: PHYSICIAN ASSISTANT

## 2019-07-02 PROCEDURE — 81003 URINALYSIS AUTO W/O SCOPE: CPT | Performed by: PHYSICIAN ASSISTANT

## 2019-07-02 PROCEDURE — 87210 SMEAR WET MOUNT SALINE/INK: CPT | Performed by: PHYSICIAN ASSISTANT

## 2019-07-02 ASSESSMENT — MIFFLIN-ST. JEOR: SCORE: 1167.97
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
## 2019-07-02 NOTE — PATIENT INSTRUCTIONS
Use the complete course of the clindamycin suppositories.     Follow-up if not improving in 1 week. Consider OBGYN referral if not resolved.

## 2019-07-16 ENCOUNTER — TELEPHONE (OUTPATIENT)
Dept: FAMILY MEDICINE | Facility: CLINIC | Age: 44
End: 2019-07-16

## 2019-07-16 NOTE — TELEPHONE ENCOUNTER
Pt calls back.     She is still having pain in lower ab and like where her intestines are have been bothering her, has a headache, nausea, and she feels like she pulled a muscle.      Advised she should be seen.  Offered appt today.  She said that does not work for her.  Advised of UC hours and locations or else she will call in the morning for a same day.

## 2019-07-16 NOTE — TELEPHONE ENCOUNTER
Reason for call:  Patient reporting a symptom    Symptom or request: pt is calling about the same symptoms as in the two previous visits. The spot in the muscle of the rear area is spreading going up towards the back. No appetite and feels nauseated.    Duration (how long have symptoms been present): 2-3 weeks    Have you been treated for this before? Yes    Additional comments: wants to be seen soon.    Phone Number patient can be reached at:  Home number on file [phone] (home)    Best Time:  any    Can we leave a detailed message on this number:  YES    Call taken on 7/16/2019 at 12:04 PM by Janis Velázquez

## 2019-07-17 ENCOUNTER — OFFICE VISIT (OUTPATIENT)
Dept: FAMILY MEDICINE | Facility: CLINIC | Age: 44
End: 2019-07-17
Payer: COMMERCIAL

## 2019-07-17 ENCOUNTER — RESULT FOLLOW UP (OUTPATIENT)
Dept: FAMILY MEDICINE | Facility: CLINIC | Age: 44
End: 2019-07-17

## 2019-07-17 VITALS
DIASTOLIC BLOOD PRESSURE: 80 MMHG | SYSTOLIC BLOOD PRESSURE: 136 MMHG | TEMPERATURE: 98.4 F | WEIGHT: 117 LBS | HEIGHT: 63 IN | RESPIRATION RATE: 16 BRPM | BODY MASS INDEX: 20.73 KG/M2 | HEART RATE: 101 BPM | OXYGEN SATURATION: 98 %

## 2019-07-17 DIAGNOSIS — N94.9 VAGINAL DISCOMFORT: ICD-10-CM

## 2019-07-17 DIAGNOSIS — R82.90 ABNORMAL URINE FINDINGS: ICD-10-CM

## 2019-07-17 DIAGNOSIS — F10.20 UNCOMPLICATED ALCOHOL DEPENDENCE (H): ICD-10-CM

## 2019-07-17 DIAGNOSIS — R10.2 PELVIC PAIN IN FEMALE: Primary | ICD-10-CM

## 2019-07-17 DIAGNOSIS — B97.7 HPV IN FEMALE: ICD-10-CM

## 2019-07-17 DIAGNOSIS — M54.42 ACUTE BILATERAL LOW BACK PAIN WITH LEFT-SIDED SCIATICA: ICD-10-CM

## 2019-07-17 DIAGNOSIS — R25.1 TREMOR: ICD-10-CM

## 2019-07-17 LAB
ALBUMIN UR-MCNC: NEGATIVE MG/DL
APPEARANCE UR: CLEAR
BACTERIA #/AREA URNS HPF: ABNORMAL /HPF
BILIRUB UR QL STRIP: NEGATIVE
COLOR UR AUTO: YELLOW
GLUCOSE UR STRIP-MCNC: NEGATIVE MG/DL
HGB UR QL STRIP: NEGATIVE
KETONES UR STRIP-MCNC: NEGATIVE MG/DL
LEUKOCYTE ESTERASE UR QL STRIP: ABNORMAL
NITRATE UR QL: NEGATIVE
NON-SQ EPI CELLS #/AREA URNS LPF: ABNORMAL /LPF
PH UR STRIP: 7 PH (ref 5–7)
RBC #/AREA URNS AUTO: ABNORMAL /HPF
SOURCE: ABNORMAL
SP GR UR STRIP: 1.01 (ref 1–1.03)
SPECIMEN SOURCE: NORMAL
UROBILINOGEN UR STRIP-ACNC: 0.2 EU/DL (ref 0.2–1)
WBC #/AREA URNS AUTO: ABNORMAL /HPF
WET PREP SPEC: NORMAL

## 2019-07-17 PROCEDURE — 99214 OFFICE O/P EST MOD 30 MIN: CPT | Performed by: PHYSICIAN ASSISTANT

## 2019-07-17 PROCEDURE — G0476 HPV COMBO ASSAY CA SCREEN: HCPCS | Performed by: PHYSICIAN ASSISTANT

## 2019-07-17 PROCEDURE — 81001 URINALYSIS AUTO W/SCOPE: CPT | Performed by: PHYSICIAN ASSISTANT

## 2019-07-17 PROCEDURE — G0145 SCR C/V CYTO,THINLAYER,RESCR: HCPCS | Performed by: PHYSICIAN ASSISTANT

## 2019-07-17 PROCEDURE — 87491 CHLMYD TRACH DNA AMP PROBE: CPT | Performed by: PHYSICIAN ASSISTANT

## 2019-07-17 PROCEDURE — 87591 N.GONORRHOEAE DNA AMP PROB: CPT | Performed by: PHYSICIAN ASSISTANT

## 2019-07-17 PROCEDURE — 87210 SMEAR WET MOUNT SALINE/INK: CPT | Performed by: PHYSICIAN ASSISTANT

## 2019-07-17 PROCEDURE — 87086 URINE CULTURE/COLONY COUNT: CPT | Performed by: PHYSICIAN ASSISTANT

## 2019-07-17 ASSESSMENT — MIFFLIN-ST. JEOR: SCORE: 1144.84

## 2019-07-17 NOTE — LETTER
July 29, 2019      Alana RYLIE Sil  [address] [address] [facility] [address]    Dear ,      This letter is in regards to the PAP smear and HPV (Human Papillomavirus) test you had done recently. Your PAP test result is normal, but your HPV (Human Papillomavirus) test was positive.     About 80 percent of women have been exposed to HPV virus throughout their lifetime. There is no medication for the treatment of HPV. Typically your own immune system gets rid of the virus before it does harm. HPV is spread by direct skin-to-skin contact, including sexual intercourse, oral sex, anal sex, or any other contact involving the genital area (example: hand to genital contact). It is not possible to become infected with HPV by touching an object, such as a toilet seat. Most people who are infected with HPV have no signs or symptoms.    Things that you can do to boost your immune system and help your body get rid of HPV: get plenty of rest, eat a well-balanced diet of healthy foods, stop smoking, exercise regularly and decrease stress.    Please return in 1 year to repeat your pap smear and HPV test.     If you have additional questions regarding this result, please call our registered nurse, Lawanda at [phone].    Sincerely,      Sabrina Pérez PA-C/geronimo
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
## 2019-07-18 LAB
BACTERIA SPEC CULT: NORMAL
SPECIMEN SOURCE: NORMAL

## 2019-07-19 LAB
C TRACH DNA SPEC QL NAA+PROBE: NEGATIVE
N GONORRHOEA DNA SPEC QL NAA+PROBE: NEGATIVE
SPECIMEN SOURCE: NORMAL
SPECIMEN SOURCE: NORMAL
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
## 2019-07-23 ENCOUNTER — ANCILLARY PROCEDURE (OUTPATIENT)
Dept: ULTRASOUND IMAGING | Facility: CLINIC | Age: 44
End: 2019-07-23
Attending: PHYSICIAN ASSISTANT
Payer: COMMERCIAL

## 2019-07-23 DIAGNOSIS — R10.2 PELVIC PAIN IN FEMALE: ICD-10-CM

## 2019-07-23 LAB
COPATH REPORT: NORMAL
PAP: NORMAL

## 2019-07-23 PROCEDURE — 76856 US EXAM PELVIC COMPLETE: CPT | Performed by: OBSTETRICS & GYNECOLOGY

## 2019-07-23 PROCEDURE — 76830 TRANSVAGINAL US NON-OB: CPT | Performed by: OBSTETRICS & GYNECOLOGY

## 2019-07-24 ENCOUNTER — OFFICE VISIT (OUTPATIENT)
Dept: OBGYN | Facility: CLINIC | Age: 44
End: 2019-07-24
Payer: COMMERCIAL

## 2019-07-24 VITALS
BODY MASS INDEX: 21.08 KG/M2 | HEART RATE: 88 BPM | SYSTOLIC BLOOD PRESSURE: 98 MMHG | WEIGHT: 119 LBS | DIASTOLIC BLOOD PRESSURE: 80 MMHG

## 2019-07-24 DIAGNOSIS — R10.2 PELVIC PAIN IN FEMALE: ICD-10-CM

## 2019-07-24 DIAGNOSIS — Z11.3 ROUTINE SCREENING FOR STI (SEXUALLY TRANSMITTED INFECTION): ICD-10-CM

## 2019-07-24 DIAGNOSIS — N89.8 VAGINAL DISCHARGE: Primary | ICD-10-CM

## 2019-07-24 DIAGNOSIS — N89.8 VAGINAL ODOR: ICD-10-CM

## 2019-07-24 LAB
FINAL DIAGNOSIS: ABNORMAL
GRAM STN SPEC: ABNORMAL
HPV HR 12 DNA CVX QL NAA+PROBE: POSITIVE
HPV16 DNA SPEC QL NAA+PROBE: NEGATIVE
HPV18 DNA SPEC QL NAA+PROBE: NEGATIVE
Lab: ABNORMAL
SPECIMEN DESCRIPTION: ABNORMAL
SPECIMEN SOURCE CVX/VAG CYTO: ABNORMAL
SPECIMEN SOURCE: ABNORMAL
SPECIMEN SOURCE: NORMAL
T VAGINALIS DNA SPEC QL NAA+PROBE: NORMAL

## 2019-07-24 PROCEDURE — 87109 MYCOPLASMA: CPT | Mod: 59 | Performed by: NURSE PRACTITIONER

## 2019-07-24 PROCEDURE — 87653 STREP B DNA AMP PROBE: CPT | Performed by: NURSE PRACTITIONER

## 2019-07-24 PROCEDURE — 87205 SMEAR GRAM STAIN: CPT | Performed by: NURSE PRACTITIONER

## 2019-07-24 PROCEDURE — 87661 TRICHOMONAS VAGINALIS AMPLIF: CPT | Performed by: NURSE PRACTITIONER

## 2019-07-24 PROCEDURE — 87109 MYCOPLASMA: CPT | Performed by: NURSE PRACTITIONER

## 2019-07-24 PROCEDURE — 99203 OFFICE O/P NEW LOW 30 MIN: CPT | Performed by: NURSE PRACTITIONER

## 2019-07-24 RX ORDER — DIAZEPAM 10 MG
TABLET ORAL
Refills: 0 | COMMUNITY
Start: 2019-05-30 | End: 2019-07-24

## 2019-07-24 NOTE — Clinical Note
Please abstract the following data from this visit with this patient into the appropriate field in Epic:Mammogram done on this date: 6/28/19 (approximately), by this group: Emily, results were BenignPlease abstract the following data from this visit with this patient into the appropriate field in Epic:Mammogram done on this date: 4/27/18 (approximately), by this group: Emily, results were Normal. .

## 2019-07-24 NOTE — NURSING NOTE
Please abstract the following data from this visit with this patient into the appropriate field in Epic:    Mammogram done on this date: 6/28/19 (approximately), by this group: Eimly, results were Benign      Please abstract the following data from this visit with this patient into the appropriate field in Epic:    Mammogram done on this date: 4/27/18 (approximately), by this group: Emily, results were Normal.   .

## 2019-07-24 NOTE — PATIENT INSTRUCTIONS
Vaginitis (Vaginal Irritation/Infection)    Vaginitis is very common!  The most common vaginal infections are bacterial vaginosis or yeast. These infections are not sexually transmitted but can be incredibly uncomfortable. Seek care from your midwife if signs or symptoms arise.     Normal vaginal discharge:      Is white, clear, thick or thin (it may change depending on where you are in your cycle)    Does not have a foul odor    The amount of discharge varies    Abnormal discharge/symptoms:       Itching in and around the vagina    Redness, pain or swelling    Discharge that is foamy, greenish, curd like, or bloody    Foul smelling odor    Pain when urinating or having sex    Fever    Causes of vaginal infections:      Good bacteria from the vagina have been destroyed by bad bacteria    Reaction to something in the vagina such as a tampon or scented/perfumed soaps or bubble bath    STI's    Sensitivities to soaps/detergents/dryer sheets, lubricants, etc.    Hormonal changes    Recent use of antibiotics     Infections can also occur after you've had intercourse with a new partner or if you have had frequent intercourse         Here is a list of suggestions that may help prevent/treat vaginal infections and will help maintain a healthy vaginal environment:      1.  Boosting your immune system so you can heal faster      Make sure you are getting adequate sleep    Drink 2-3 quarts of fluids per day, Cranberries or cranberry juice (unsweetened)    Eat more nuts, grains, raw veggies, yogurt, andria, grapefruit    Decrease intake of refined sugar, red meat and alcohol    Echinacea - 3 times a day for chronic problem or every 2 hours for acute symptoms; use as directed on bottle          2.  Changing the vaginal environment to a more acid state       Soak in a warm bath tub with one cup of vinegar or lemon juice. Do not use scented soap, bubble bath, or oils.       3.  Increasing the good healthy bacteria      At each  meal drink 1 tsp apple cider vinegar and 1 tsp honey in   cup warm water    Eat garlic daily, capsules or fresh.      Take probiotics 4-8 billion units/day      4.  Preventive measures      Wear cotton underwear, no thongs.  Do not wear tight clothes or pantyhose    Shower soon after working or change out of sweaty clothing     Do not wear underwear to bed.  The vaginal environment needs to breathe    Never douche or use vaginal , the vagina is self-cleaning!    Use white, unscented toilet paper.  Do not use baby wipes.  Wipe from front to back    Use only unscented tampons and pads, buy organic products if desired    Do not use perfumes/oils/lotions near your vagina or take bubble baths    Use only mild, unscented soaps around your vaginal area     Do not use fabric softeners/dryer sheets    Use gentle, unscented detergent, consider buying non-petroleum based detergents    Use only water based lubricants during sexual contact    Abstain from intercourse during times of infection      If your symptoms do not resolve or if you have questions please call:     [facility]   [phone]              Patient Education     Pelvic Pain, Uncertain Cause    Pelvic pain is pain felt in the lowest part of the belly (abdomen) and between the hipbones. The pain may occur suddenly and recently (acute). Or the pain may last for 6 months or longer (chronic).  There are many possible causes of pelvic pain. The pain may be due to a problem in the female reproductive system. Or, it may be due to a problem in the digestive, urinary, or musculoskeletal systems.  Based on your visit today, the exact cause of your pelvic pain is not certain. Your condition does not appear to be serious at this time. But it is important for you to keep watching for any new symptoms or worsening of your condition.  General care  Your healthcare provider may advise a number of ways to help manage your pain. These can include:    Taking  over-the-counter pain medicine. Stronger pain medicine may also be prescribed, if needed.    Applying heat to the pelvic area. Use a heating pad or a hot pack. Taking a hot bath may also help.    Getting plenty of rest.    Making certain lifestyle changes. These can include practicing good posture and getting regular exercise. Studies have shown that these changes help reduce pelvic pain in some women.    Seeing a physical therapist or pain specialist. These healthcare providers can discuss other ways to manage pain with you.  Follow-up care  Follow up with your healthcare provider, or as advised.   When to seek medical advice  Call your healthcare provider right away if any of the following occur:    Fever of 100.4 F or higher, or as directed by your healthcare provider    Pain worsens or you have sudden, severe pain or new pain    Nausea, vomiting, sweating, or restlessness    Dizziness or fainting    Unusual vaginal discharge    Abnormal vaginal bleeding (especially bleeding after menopause)  Date Last Reviewed: 10/1/2017    1316-2870 [employer]. [address] [place] [address], [place] [address]. All rights reserved. This information is not intended as a substitute for professional medical care. Always follow your healthcare professional's instructions.

## 2019-07-25 DIAGNOSIS — N89.8 VAGINAL DISCHARGE: Primary | ICD-10-CM

## 2019-07-26 LAB
GP B STREP DNA SPEC QL NAA+PROBE: NEGATIVE
SPECIMEN SOURCE: NORMAL

## 2019-07-29 NOTE — PROGRESS NOTES
Hx of LEEP in 2010?  11/24/15 NIL, +HR HPV. Co-test 12 months  04/13/17 Would consider patient to be lost to follow-up.  4/26/18 Normal, HPV not detected (pt reported pap result)  7/17/19 NIL, +HR HPV, not 16/18. Plan cotest in 1 year per provider. Pt was advised.  07/29/19 Result letter sent at request of RN. ([facility])

## 2019-07-31 ENCOUNTER — THERAPY VISIT (OUTPATIENT)
Dept: PHYSICAL THERAPY | Facility: CLINIC | Age: 44
End: 2019-07-31
Payer: COMMERCIAL

## 2019-07-31 DIAGNOSIS — M54.42 BILATERAL LOW BACK PAIN WITH LEFT-SIDED SCIATICA: ICD-10-CM

## 2019-07-31 PROCEDURE — 97110 THERAPEUTIC EXERCISES: CPT | Mod: GP | Performed by: PHYSICAL THERAPIST

## 2019-07-31 PROCEDURE — 97161 PT EVAL LOW COMPLEX 20 MIN: CPT | Mod: GP | Performed by: PHYSICAL THERAPIST

## 2019-07-31 NOTE — PROGRESS NOTES
[facility] Initial Evaluation  Subjective:  The history is provided by the patient. No  was used.   Alana Mujica being seen for LBP with radiation into L LE.   Problem began 7/17/2019 (saw MD, had been going on for about a month). Problem occurred: unknown  and reported as 8/10 on pain scale. General health as reported by patient is good. Pertinent medical history includes:  Chemical dependency and depression (in treatment for chemical dependency, osteopenia).    Surgeries include:  None.  Current medications:  Other. Other medications details: Vitoral.    Other job/home tasks details: walking.  Pain is described as aching and burning (numbness) and is constant. Pain is the same all the time. Since onset symptoms are gradually worsening.      Patient is none.   Barriers include:  None as reported by patient.  Red flags:  None as reported by patient (denies bowel or bladder changes).  Type of problem:  Lumbar (more gluteal pain, not as much back pain)   Condition occurred with:  Other reason. This is a new condition   Problem details: Started Vitoral injections 2 months ago, injections into the buttock region, switches every month, had already been having L sided symptoms before injection.     Concerned because it has progressively gotten worse but she has not changed anything about her workload. She has avoided more intense exercises.     Pain is mostly generated with transitional movements from sitting to standing, or twisting to maneuver in bed. .   Site of Pain: denies much low back pain. Radiates to:  Gluteals left, thigh left and knee left. Associated symptoms:  Loss of strength and numbness. Symptoms are exacerbated by bending, sitting and lifting and relieved by activity/movement and rest (Tens unit provides temporarily relief).                      Objective:  System         Lumbar/SI Evaluation  ROM:    AROM Lumbar:   Flexion:        To ankles, PDM in buttock  Ext:                     WNL, no pain    Side Bend:        Left:     Right:   Rotation:           Left:     Right:   Side Glide:        Left:     Right:           Lumbar Myotomes:    T12-L3 (Hip Flex):  Left: 5    Right: 5  L2-4 (Quads):  Left:  5    Right:  5  L4 (Ankle DF):  Left:  5    Right:  5  L5 (Great Toe Ext): Left: 5    Right: 5           Neural Tension/Mobility:    Left side:  Slump positive.                                                            Mayra Lumbar Evaluation    Posture:  Sitting: poor  Standing: poor  Lordosis: Reduced    Correction of Posture: worse  Other Observations: lumbar roll immediately removed due to increase in buttock pain  Movement Loss:  Flexion (Flex): min and pain  Extension (EXT): nil  Side Glide R (SG R): nil  Side Glide L (SG L): nil    Static Tests:  Sitting Slouched: produced more buttock pain, unchanged numbness in the back of the leg  Sitting Erect: felt worse in the buttock, unchanged numbness down leg      Lying Prone in Extension: prone on elbows produced peripheralization      Principle of Treatment:          Other: Repeated motions not recommended at this time due to unpredictability and unstability of symptoms                                        ROS    Assessment/Plan:    Patient is a 43 year old female with lumbar complaints.    Patient has the following significant findings with corresponding treatment plan.                Diagnosis 1:  Low back pain with radiculopathy  Pain -  hot/cold therapy and home program  Decreased ROM/flexibility - manual therapy, therapeutic exercise and home program  Impaired muscle performance - neuro re-education and home program  Decreased function - therapeutic activities and home program  Impaired posture - neuro re-education and home program  Instability -  Therapeutic Activity  Therapeutic Exercise  home program    Therapy Evaluation Codes:     Cumulative Therapy Evaluation is: Low complexity.    Previous and current  functional limitations:  (See Goal Flow Sheet for this information)    Short term and Long term goals: (See Goal Flow Sheet for this information)     Communication ability:  Patient appears to be able to clearly communicate and understand verbal and written communication and follow directions correctly.  Treatment Explanation - The following has been discussed with the patient:   RX ordered/plan of care  Anticipated outcomes  Possible risks and side effects  This patient would benefit from PT intervention to resume normal activities.   Rehab potential is good.    Frequency:  1 X week, once daily  Duration:  for 6-8 weeks  Discharge Plan:  Achieve all LTG.  Independent in home treatment program.  Return to previous functional level by discharge.  Reach maximal therapeutic benefit.    Please refer to the daily flowsheet for treatment today, total treatment time and time spent performing 1:1 timed codes.     Examination was performed by Roshan Sinha, SPT, ATC, under full supervision by Nathan Cardona DPT.

## 2019-08-02 LAB
BACTERIA SPEC CULT: NORMAL
BACTERIA SPEC CULT: NORMAL
Lab: NORMAL
Lab: NORMAL
SPECIMEN SOURCE: NORMAL
SPECIMEN SOURCE: NORMAL

## 2019-08-07 ENCOUNTER — THERAPY VISIT (OUTPATIENT)
Dept: PHYSICAL THERAPY | Facility: CLINIC | Age: 44
End: 2019-08-07
Payer: COMMERCIAL

## 2019-08-07 ENCOUNTER — TELEPHONE (OUTPATIENT)
Dept: FAMILY MEDICINE | Facility: CLINIC | Age: 44
End: 2019-08-07

## 2019-08-07 DIAGNOSIS — M54.42 ACUTE BILATERAL LOW BACK PAIN WITH LEFT-SIDED SCIATICA: ICD-10-CM

## 2019-08-07 DIAGNOSIS — R10.2 PELVIC PAIN IN FEMALE: Primary | ICD-10-CM

## 2019-08-07 DIAGNOSIS — M54.42 BILATERAL LOW BACK PAIN WITH LEFT-SIDED SCIATICA: ICD-10-CM

## 2019-08-07 PROCEDURE — 97140 MANUAL THERAPY 1/> REGIONS: CPT | Mod: GP | Performed by: PHYSICAL THERAPIST

## 2019-08-07 PROCEDURE — 97110 THERAPEUTIC EXERCISES: CPT | Mod: GP | Performed by: PHYSICAL THERAPIST

## 2019-08-07 NOTE — TELEPHONE ENCOUNTER
Alana would like a women's wellness referral in [place] or [place]. She was told about it at physical therapy. Please advise.

## 2019-08-20 ENCOUNTER — TELEPHONE (OUTPATIENT)
Dept: FAMILY MEDICINE | Facility: CLINIC | Age: 44
End: 2019-08-20

## 2019-08-20 ENCOUNTER — HOSPITAL ENCOUNTER (EMERGENCY)
Facility: CLINIC | Age: 44
Discharge: HOME | End: 2019-08-20
Attending: EMERGENCY MEDICINE | Admitting: EMERGENCY MEDICINE
Payer: COMMERCIAL

## 2019-08-20 ENCOUNTER — APPOINTMENT (OUTPATIENT)
Dept: GENERAL RADIOLOGY | Facility: CLINIC | Age: 44
End: 2019-08-20
Attending: EMERGENCY MEDICINE
Payer: COMMERCIAL

## 2019-08-20 VITALS
DIASTOLIC BLOOD PRESSURE: 100 MMHG | OXYGEN SATURATION: 100 % | SYSTOLIC BLOOD PRESSURE: 140 MMHG | RESPIRATION RATE: 16 BRPM | TEMPERATURE: 98.9 F | HEART RATE: 91 BPM

## 2019-08-20 DIAGNOSIS — B97.89 VIRAL SINUSITIS: ICD-10-CM

## 2019-08-20 DIAGNOSIS — J32.9 VIRAL SINUSITIS: ICD-10-CM

## 2019-08-20 DIAGNOSIS — S63.635A SPRAIN OF INTERPHALANGEAL JOINT OF LEFT RING FINGER, INITIAL ENCOUNTER: ICD-10-CM

## 2019-08-20 DIAGNOSIS — S06.0X0A CONCUSSION WITHOUT LOSS OF CONSCIOUSNESS, INITIAL ENCOUNTER: ICD-10-CM

## 2019-08-20 LAB
ALBUMIN UR-MCNC: 10 MG/DL
APPEARANCE UR: CLEAR
BILIRUB UR QL STRIP: NEGATIVE
COLOR UR AUTO: ABNORMAL
GLUCOSE UR STRIP-MCNC: NEGATIVE MG/DL
HCG UR QL: NEGATIVE
HGB UR QL STRIP: NEGATIVE
HYALINE CASTS #/AREA URNS LPF: 1 /LPF (ref 0–2)
KETONES UR STRIP-MCNC: 10 MG/DL
LEUKOCYTE ESTERASE UR QL STRIP: NEGATIVE
MUCOUS THREADS #/AREA URNS LPF: PRESENT /LPF
NITRATE UR QL: NEGATIVE
PH UR STRIP: 7.5 PH (ref 5–7)
RBC #/AREA URNS AUTO: <1 /HPF (ref 0–2)
SOURCE: ABNORMAL
SP GR UR STRIP: 1.01 (ref 1–1.03)
SQUAMOUS #/AREA URNS AUTO: 13 /HPF (ref 0–1)
UROBILINOGEN UR STRIP-MCNC: NORMAL MG/DL (ref 0–2)
WBC #/AREA URNS AUTO: 1 /HPF (ref 0–5)

## 2019-08-20 PROCEDURE — 25000131 ZZH RX MED GY IP 250 OP 636 PS 637: Performed by: EMERGENCY MEDICINE

## 2019-08-20 PROCEDURE — 81025 URINE PREGNANCY TEST: CPT | Performed by: EMERGENCY MEDICINE

## 2019-08-20 PROCEDURE — 99284 EMERGENCY DEPT VISIT MOD MDM: CPT

## 2019-08-20 PROCEDURE — 25000132 ZZH RX MED GY IP 250 OP 250 PS 637: Performed by: EMERGENCY MEDICINE

## 2019-08-20 PROCEDURE — 81001 URINALYSIS AUTO W/SCOPE: CPT | Performed by: EMERGENCY MEDICINE

## 2019-08-20 PROCEDURE — 73140 X-RAY EXAM OF FINGER(S): CPT | Mod: LT

## 2019-08-20 RX ORDER — OXYMETAZOLINE HYDROCHLORIDE 0.05 G/100ML
2 SPRAY NASAL 2 TIMES DAILY
Qty: 1 BOTTLE | Refills: 0 | Status: SHIPPED | OUTPATIENT
Start: 2019-08-20 | End: 2019-10-21

## 2019-08-20 RX ORDER — ACETAMINOPHEN 500 MG
1000 TABLET ORAL ONCE
Status: COMPLETED | OUTPATIENT
Start: 2019-08-20 | End: 2019-08-20

## 2019-08-20 RX ORDER — METOCLOPRAMIDE 10 MG/1
10 TABLET ORAL 4 TIMES DAILY PRN
Qty: 15 TABLET | Refills: 0 | Status: SHIPPED | OUTPATIENT
Start: 2019-08-20 | End: 2019-10-21

## 2019-08-20 RX ORDER — ONDANSETRON 4 MG/1
4 TABLET, ORALLY DISINTEGRATING ORAL ONCE
Status: COMPLETED | OUTPATIENT
Start: 2019-08-20 | End: 2019-08-20

## 2019-08-20 RX ADMIN — ACETAMINOPHEN 1000 MG: 500 TABLET, FILM COATED ORAL at 11:43

## 2019-08-20 RX ADMIN — ONDANSETRON 4 MG: 4 TABLET, ORALLY DISINTEGRATING ORAL at 11:44

## 2019-08-20 ASSESSMENT — ENCOUNTER SYMPTOMS
RHINORRHEA: 1
HEADACHES: 1
DIZZINESS: 1
NECK PAIN: 1
VOMITING: 1
NAUSEA: 1
MYALGIAS: 1
BACK PAIN: 1
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
## 2019-08-20 NOTE — ED AVS SNAPSHOT
[facility] Emergency Department  [address]  [facility] [address]  Phone:  [phone]  Fax:  [phone]                                    Alana Mujica   MRN: [id number]    Department:  [facility] Emergency Department   Date of Visit:  8/20/2019           After Visit Summary Signature Page    I have received my discharge instructions, and my questions have been answered. I have discussed any challenges I see with this plan with the nurse or doctor.    ..........................................................................................................................................  Patient/Patient Representative Signature      ..........................................................................................................................................  Patient Representative Print Name and Relationship to Patient    ..................................................               ................................................  Date                                   Time    ..........................................................................................................................................  Reviewed by Signature/Title    ...................................................              ..............................................  Date                                               Time          22EPIC Rev 08/18
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
## 2019-08-20 NOTE — DISCHARGE INSTRUCTIONS
Discharge Instructions  Concussion    You were seen today for signs of a concussion.  The symptoms will vary, depending on the nature of your injury and your health. You may have: headache, confusion, nausea (feel sick to your stomach), vomiting (throwing up) and problems with memory, concentrating, or sleep. You may feel dizzy, irritable, and tired. Children and teens may need help from their parents, teachers, and coaches to watch for symptoms as they recover.    Generally, every Emergency Department visit should have a follow-up clinic visit with either a primary or a specialty clinic/provider. Please follow-up as instructed by your emergency provider today.     Return to the Emergency Department if:  Your headache gets worse or you start to have a really bad headache even with the recommended treatment plan.   You feel drowsier, have growing confusion, or slurred speech.   You keep repeating yourself.   You have strange behavior or are feeling more irritable.   You have a seizure.   You vomit (throw up) more than once.   You have trouble walking.   You have weakness or numbness.  Your neck pain gets worse.   You have a loss of consciousness.   You have blood for fluid coming from your ears or nose.   You have new symptoms or anything that worries you.     Home Care:  Get lots of rest and get enough sleep at night. Take daytime naps or rest if you feel tired.   Limit physical activity and  thinking  activities. These can make symptoms worse.   Physical activities include gym, sports, weight training, running, exercise, and heavy lifting.   Thinking activities include homework, class work, job-related work, and screen time (phone, computer, tablet, TV, and video games).   Stick to a healthy diet and drink lots of fluids. Avoid alcohol.  As symptoms improve, you may slowly return to your daily activities. If symptoms get worse or return, reduce your activity.   Know that it is normal to feel sad or frustrated when  you do not feel right and are less active.     Going Back to Work:  Your care team will tell you when you are ready to return to work.    Limit the amount of work you do soon after your injury. This may speed healing. Take breaks if your symptoms get worse. You should also reduce your physical activity as well as activities that require a lot of thinking until you see your doctor. You may need shorter work days and a lighter workload.  Avoid heavy lifting, working with machinery, driving and working at heights until your symptoms are gone or you are cleared by a provider.    Going Back to School:  If you are still having symptoms, you may need extra help at school.  Tell your teachers and school nurse about your injury and symptoms. Ask them to watch for problems with learning, memory, and concentrating. Symptoms may get worse when you do schoolwork, and you may become more irritable. You may need shorter school days, a reduced workload, and to postpone testing.  Do not drive or take gym class (physical activity) until cleared by a provider.    Returning to Sports:  Never return to play if you have any symptoms. A full recovery will reduce the chances of getting hurt again. Remember, it is better to miss one or two games than a whole season.  You should rest from all physical activity until you see your provider. Generally, if all symptoms have completely cleared, your provider can help guide you to slowly return to sports. If symptoms return or worsen, stop the activity and see your provider.  Important: If you are in an organized sport and under age 18, you will need written consent from a healthcare provider before you return to sports. Typically, this will be your primary care or sports medicine provider. Please make an appointment.    If you were given a prescription for medicine here today, be sure to read all of the information (including the package insert) that comes with your prescription.  This will  include important information about the medicine, its side effects, and any warnings that you need to know about.  The pharmacist who fills the prescription can provide more information and answer questions you may have about the medicine.  If you have questions or concerns that the pharmacist cannot address, please call or return to the Emergency Department.     Remember that you can always come back to the Emergency Department if you are not able to see your regular provider in the amount of time listed above, if you get any new symptoms, or if there is anything that worries you.  Discharge Instructions  Sinus Infection    You have acute sinusitis, or an infection of the sinuses. The sinuses are the hollow areas within the facial bones that are connected to the nasal opening. The most common cause of acute sinusitis is a virus infection associated with the common cold. Bacterial sinusitis occurs much less commonly, usually as a complication of viral sinusitis. Experts say that most sinusitis is caused by a virus within the first 7-10 days of illness. Antibiotics do nothing to help with virus infections, so most people do not need antibiotics for acute sinusitis.     Generally, every Emergency Department visit should have a follow-up clinic visit with either a primary or a specialty clinic/provider. Please follow-up as instructed by your emergency provider today.    Return to the Emergency Department if:  Your vision changes.  You are confused or have difficulty thinking clearly.  You have swelling around your eye.  You develop a severe headache or neck stiffness.  Your symptoms get worse and you are unable to see your primary provider.      Treatment:  Pain relief -- Non-prescription pain medications, such as Tylenol  (acetaminophen) or Motrin  or Advil  (ibuprofen) are recommended for pain.  Do not use a medicine that you are allergic to, or if your provider has told you not to use it.     Nasal irrigation --  Flushing the nose and sinuses with a saline solution several times per day can help to decrease pain caused by congestion.  Nasal decongestants -- Nasal decongestant sprays, including Afrin  (oxymetazoline) and Eduardo-Synephrine  (phenylephrine) can be used to temporarily treat congestion. However, these sprays should not be used for more than two to three days due to the risk of rebound congestion (when the nose is congested constantly unless the medication is used repeatedly).  Nasal glucocorticoids -- These are prescription steroids delivered by a nasal spray that can help to reduce swelling inside the nose, usually within two to three days. These drugs have few side effects and dramatically relieve symptoms in most people.  If you use these in conjunction with Afrin  you will need to use at least 15 minutes prior to the nasal decongestant.    Antibiotic? -- Rarely antibiotics are used along with the above treatments.    If you were given a prescription for medicine here today, be sure to read all of the information (including the package insert) that comes with your prescription.  This will include important information about the medicine, its side effects, and any warnings that you need to know about.  The pharmacist who fills the prescription can provide more information and answer questions you may have about the medicine.  If you have questions or concerns that the pharmacist cannot address, please call or return to the Emergency Department.   Remember that you can always come back to the Emergency Department if you are not able to see your regular provider in the amount of time listed above, if you get any new symptoms, or if there is anything that worries you.
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
## 2019-08-30 ENCOUNTER — TELEPHONE (OUTPATIENT)
Dept: FAMILY MEDICINE | Facility: CLINIC | Age: 44
End: 2019-08-30

## 2019-08-30 ENCOUNTER — MYC MEDICAL ADVICE (OUTPATIENT)
Dept: FAMILY MEDICINE | Facility: CLINIC | Age: 44
End: 2019-08-30

## 2019-08-30 ENCOUNTER — NURSE TRIAGE (OUTPATIENT)
Dept: NURSING | Facility: CLINIC | Age: 44
End: 2019-08-30

## 2019-08-30 ENCOUNTER — E-VISIT (OUTPATIENT)
Dept: FAMILY MEDICINE | Facility: CLINIC | Age: 44
End: 2019-08-30
Payer: COMMERCIAL

## 2019-08-30 DIAGNOSIS — J01.90 ACUTE SINUSITIS WITH SYMPTOMS > 10 DAYS: Primary | ICD-10-CM

## 2019-08-30 PROCEDURE — 99444 ZZC PHYSICIAN ONLINE EVALUATION & MANAGEMENT SERVICE: CPT | Performed by: PHYSICIAN ASSISTANT

## 2019-08-30 NOTE — TELEPHONE ENCOUNTER
Alana is having headaches and also has congestion.  Alana has drainage in back of throat.  Drainage is green.  Symptoms have been going on for over three weeks.      Additional Information    Negative: Bluish (or gray) lips or face    Negative: Severe difficulty breathing (e.g., struggling for each breath, speaks in single words)    Negative: Rapid onset of cough and has hives    Negative: Coughing started suddenly after medicine, an allergic food or bee sting    Negative: Difficulty breathing after exposure to flames, smoke, or fumes    Negative: Sounds like a life-threatening emergency to the triager    Negative: Chest pain present when not coughing    Negative: Difficulty breathing    Negative: Passed out (i.e., fainted, collapsed and was not responding)    Negative: Patient sounds very sick or weak to the triager    Negative: Coughed up > 1 tablespoon (15 ml) blood (Exception: blood-tinged sputum)    Negative: Fever > 103 F (39.4 C)    Negative: Fever > 101 F (38.3 C) and over 60 years of age    Negative: Fever > 100.0 F (37.8 C) and has diabetes mellitus or a weak immune system (e.g., HIV positive, cancer chemotherapy, organ transplant, splenectomy, chronic steroids)    Negative: Fever > 100.0 F (37.8 C) and bedridden (e.g., nursing home patient, stroke, chronic illness, recovering from surgery)    Negative: Increasing ankle swelling    Negative: Wheezing is present    Negative: SEVERE coughing spells (e.g., whooping sound after coughing, vomiting after coughing)    Negative: Coughing up jose alfredo-colored (reddish-brown) or blood-tinged sputum    Negative: Fever present > 3 days (72 hours)    Negative: Fever returns after gone for over 24 hours and symptoms worse or not improved    Negative: Using nasal washes and pain medicine > 24 hours and sinus pain persists    Negative: Known COPD or other severe lung disease (i.e., bronchiectasis, cystic fibrosis, lung surgery) and worsening symptoms (i.e., increased sputum  purulence or amount, increased breathing difficulty)    Negative: Continuous (nonstop) coughing interferes with work or school and no improvement using cough treatment per Care Advice    Negative: Patient wants to be seen    Cough has been present for > 3 weeks    Protocols used: COUGH-A-OH

## 2019-08-30 NOTE — PATIENT INSTRUCTIONS
Thank you for choosing us for your care. I have placed an order for a prescription so that you can start treatment. View your full visit summary for details by clicking on the link below. Your pharmacist will able to address any questions you may have about the medication.     If you're not feeling better within 5-7 days, please schedule an appointment.  You can [place] an [place] right here [place] [facility], or call [phone]  If the visit is for the same symptoms as your e-visit, we'll refund the cost of your e-visit if seen within seven days.      Sinusitis (Antibiotic Treatment)    The sinuses are air-filled spaces within the bones of the face. They connect to the inside of the nose. Sinusitis is an inflammation of the tissue that lines the sinuses. Sinusitis can occur during a cold. It can also happen due to allergies to pollens and other particles in the air. Sinusitis can cause symptoms of sinus congestion and a feeling of fullness. A sinus infection causes fever, headache, and facial pain. There is often green or yellow fluid draining from the nose or into the back of the throat (post-nasal drip). You have been given antibiotics to treat this condition.  Home care    Take the full course of antibiotics as instructed. Do not stop taking them, even when you feel better.    Drink plenty of water, hot tea, and other liquids. This may help thin nasal mucus. It also may help your sinuses drain fluids.    Heat may help soothe painful areas of your face. Use a towel soaked in hot water. Or,  the shower and direct the warm spray onto your face. Using a vaporizer along with a menthol rub at night may also help soothe symptoms.     An expectorant with guaifenesin may help thin nasal mucus and help your sinuses drain fluids.    You can use an over-the-counter decongestant, unless a similar medicine was prescribed to you. Nasal sprays work the fastest. Use one that contains phenylephrine or oxymetazoline.  First blow your nose gently. Then use the spray. Do not use these medicines more often than directed on the label. If you do, your symptoms may get worse. You may also take pills that contain pseudoephedrine. Don t use products that combine multiple medicines. This is because side effects may be increased. Read labels. You can also ask the pharmacist for help. (People with high blood pressure should not use decongestants. They can raise blood pressure.)    Over-the-counter antihistamines may help if allergies contributed to your sinusitis.      Do not use nasal rinses or irrigation during an acute sinus infection, unless your healthcare provider tells you to. Rinsing may spread the infection to other areas in your sinuses.    Use acetaminophen or ibuprofen to control pain, unless another pain medicine was prescribed to you. If you have chronic liver or kidney disease or ever had a stomach ulcer, talk with your healthcare provider before using these medicines. (Aspirin should never be taken by anyone under age 18 who is ill with a fever. It may cause severe liver damage.)    Don't smoke. This can make symptoms worse.  Follow-up care  Follow up with your healthcare provider or our staff if you are not better in 1 week.  When to seek medical advice  Call your healthcare provider if any of these occur:    Facial pain or headache that gets worse    Stiff neck    Unusual drowsiness or confusion    Swelling of your forehead or eyelids    Vision problems, such as blurred or double vision    Fever of 100.4 F (38 C) or higher, or as directed by your healthcare provider    Seizure    Breathing problems    Symptoms don't go away in 10 days  Prevention  Here are steps you can take to help prevent an infection:    Keep good hand washing habits.    Don t have close contact with people who have sore throats, colds, or other upper respiratory infections.    Don t smoke, and stay away from secondhand smoke.    Stay up to date with of  your vaccines.  Date Last Reviewed: 11/1/2017 2000-2018 [employer]. [address] [place] [address], [place] [address]. All rights reserved. This information is not intended as a substitute for professional medical care. Always follow your healthcare professional's instructions.

## 2019-08-30 NOTE — TELEPHONE ENCOUNTER
Patient spoke to triage and is having sinus inf symptoms. She would like to be seen today in [facility] only, due to limited transportation options. Please contact patient today as per her request. She can be reached at [phone] ok to leave a voicemail.

## 2019-09-04 ENCOUNTER — NURSE TRIAGE (OUTPATIENT)
Dept: NURSING | Facility: CLINIC | Age: 44
End: 2019-09-04

## 2019-09-04 NOTE — TELEPHONE ENCOUNTER
"Pt reports taking Augmentin x 5 days for a sinus infection.  Diarrhea and vomiting started shortly after the first dose of medication and has been persisting since.  Pt reports symptoms seemed to be getting better and are now worse again.      Disposition:  ED.  She verbalized understanding and had requested to make an appointment, call transferred to scheduling.      Morelia Bahena RN/DOE    Reason for Disposition    Severe headache (e.g., excruciating) (Exception: similar to previous migraines)    Additional Information    Negative: Shock suspected (e.g., cold/pale/clammy skin, too weak to stand, low BP, rapid pulse)    Negative: Difficult to awaken or acting confused (e.g., disoriented, slurred speech)    Negative: Sounds like a life-threatening emergency to the triager    Negative: [1] Vomiting AND [2] contains red blood or black (\"coffee ground\") material  (Exception: few red streaks in vomit that only happened once)    Negative: Severe pain in one eye    Negative: Recent head injury (within last 3 days)    Negative: Recent abdominal injury (within last 3 days)    Negative: [1] Insulin-dependent diabetes (Type I) AND [2] glucose > 400 mg/dl (22 mmol/l)    Negative: [1] SEVERE vomiting (e.g., 6 or more times/day) AND [2] present > 8 hours    Negative: [1] MODERATE vomiting (e.g., 3 - 5 times/day) AND [2] age > 60    Protocols used: VOMITING-A-AH      "

## 2019-09-09 ENCOUNTER — TELEPHONE (OUTPATIENT)
Dept: FAMILY MEDICINE | Facility: CLINIC | Age: 44
End: 2019-09-09

## 2019-09-09 ENCOUNTER — HOSPITAL ENCOUNTER (OUTPATIENT)
Dept: BEHAVIORAL HEALTH | Facility: CLINIC | Age: 44
Discharge: HOME | End: 2019-09-09
Attending: SOCIAL WORKER | Admitting: SOCIAL WORKER
Payer: COMMERCIAL

## 2019-09-09 DIAGNOSIS — N89.8 VAGINAL DISCHARGE: Primary | ICD-10-CM

## 2019-09-09 PROCEDURE — H0001 ALCOHOL AND/OR DRUG ASSESS: HCPCS

## 2019-09-09 RX ORDER — FLUCONAZOLE 150 MG/1
150 TABLET ORAL ONCE
Qty: 1 TABLET | Refills: 0 | Status: SHIPPED | OUTPATIENT
Start: 2019-09-09 | End: 2019-10-21

## 2019-09-09 ASSESSMENT — ANXIETY QUESTIONNAIRES
5. BEING SO RESTLESS THAT IT IS HARD TO SIT STILL: NOT AT ALL
7. FEELING AFRAID AS IF SOMETHING AWFUL MIGHT HAPPEN: NOT AT ALL
6. BECOMING EASILY ANNOYED OR IRRITABLE: NOT AT ALL
GAD7 TOTAL SCORE: 0
2. NOT BEING ABLE TO STOP OR CONTROL WORRYING: NOT AT ALL
IF YOU CHECKED OFF ANY PROBLEMS ON THIS QUESTIONNAIRE, HOW DIFFICULT HAVE THESE PROBLEMS MADE IT FOR YOU TO DO YOUR WORK, TAKE CARE OF THINGS AT HOME, OR GET ALONG WITH OTHER PEOPLE: NOT DIFFICULT AT ALL
4. TROUBLE RELAXING: NOT AT ALL
3. WORRYING TOO MUCH ABOUT DIFFERENT THINGS: NOT AT ALL
1. FEELING NERVOUS, ANXIOUS, OR ON EDGE: NOT AT ALL

## 2019-09-09 ASSESSMENT — PATIENT HEALTH QUESTIONNAIRE - PHQ9: SUM OF ALL RESPONSES TO PHQ QUESTIONS 1-9: 0

## 2019-09-09 ASSESSMENT — PAIN SCALES - GENERAL: PAINLEVEL: NO PAIN (0)

## 2019-09-09 NOTE — TELEPHONE ENCOUNTER
Reason for call:  Other   Patient called regarding (reason for call): call back  Additional comments: Patient would like to know if she can get the prescription for her yeast infection. She states that It is one pill she took in the past that cleared up her yeast infection.    Phone number to reach patient:  Cell number on file:    Telephone Information:   Mobile [phone]       Best Time:  anytime    Can we leave a detailed message on this number?  YES
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
## 2019-09-09 NOTE — PROGRESS NOTES
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 following, occurring within a 12-month period:    Alcohol/drug is often taken in larger amounts or over a longer period than was intended.  There is a persistent desire or unsuccessful efforts to cut down or control alcohol/drug use  A great deal of time is spent in activities necessary to obtain alcohol, use alcohol, or recover from its effects.  Craving, or a strong desire or urge to use alcohol/drug  Continued alcohol use despite having persistent or recurrent social or interpersonal problems caused or exacerbated by the effects of alcohol/drug.  Recurrent alcohol/drug use in situations in which it is physically hazardous.  Alcohol/drug use is continued despite knowledge of having a persistent or recurrent physical or psychological problem that is likely to have been caused or exacerbated by alcohol.  Tolerance, as defined by either of the following: A need for markedly increased amounts of alcohol/drug to achieve intoxication or desired effect. and A markedly diminished effect with continued use of the same amount of alcohol/drug.      Specify if: In early remission:  After full criteria for alcohol/drug use disorder were previously met, none of the criteria for  alcohol/drug use disorder have been met for at least 3 months but for less than 12 months (with the exception that Criterion A4,  Craving or a strong desire or urge to use alcohol/drug  may be met).     In sustained remission:   After full criteria for alcohol use disorder were previously met, non of the criteria for alcohol/drug use disorder have been met at any time during a period of 12 months or longer (with the exception that Criterion A4,  Craving or strong desire or urge to use alcohol/drug  may be met).   Specify if:   This additional specifier is used if the individual is in an environment where access to alcohol is restricted.    Mild: Presence of 2-3 symptoms    Moderate: Presence of 4-5 symptoms    Severe: Presence of 6 or more symptoms

## 2019-09-09 NOTE — TELEPHONE ENCOUNTER
Called the pt and left a message to call us back.  Pt will need some kind of appt for this.  She could do an e-visit, office visit.

## 2019-09-09 NOTE — TELEPHONE ENCOUNTER
Patient states she has been taking abx and she started getting a yeast infection. She did try an OTC remedy but it is not gone.   States her sinus problems are still there but not as bad. Still needs to complete the course (has 4 more days left). Is also taking a probiotic with the abx.   Experiencing vaginal itching, a white discharge. No pain or bleeding noted.    Routing to PCP who did evisit 8/30/19 and prescribed the abx.     Carissa CHAUDHRY, Triage RN

## 2019-09-10 ASSESSMENT — ANXIETY QUESTIONNAIRES: GAD7 TOTAL SCORE: 0

## 2019-09-11 NOTE — PROGRESS NOTES
Visit Date:   09/09/2019      CHEMICAL DEPENDENCY ASSESSMENT      EVALUATION COUNSELOR:  TONI Ozuna.   CLIENT'S ADDRESS:  [address] [place] [address].   TELEPHONE:  [phone].   STATISTICS:  Date of Birth 1975, age 43.  Sex:  Female.  Marital Status:  Single.   INSURANCE:  [facility].   REFERRAL SOURCE:  Self and Probation.      REASON FOR EVALUATION:  Alana Mujica reports she is required to complete treatment as a court requirement.  Reports she has been referred to outpatient treatment.  She would not provide any further information than that.      HEALTH HISTORY AND MEDICATIONS:  Client denies any health conditions or concerns.  Reports her current medications are birth control, and she goes through [facility] for primary care purposes.      HISTORY OF PREVIOUS TREATMENT AND COUNSELING:  Client denies any history of detox admissions.  She has had previous hospitalizations related to alcohol use and intoxication, after medical record review she has had 6 ED or hospital visits related to alcohol use in 2019.  Reports she is currently in individual therapy at [facility], beginning EMDR services, and she does have a history of treatment, most recent was [facility] treatment in 2016, which she did not complete.  She has also been through [facility] outpatient well as residential treatment at [facility].  Reports she does attend a women's AA group weekly.      HISTORY OF ALCOHOL AND DRUG USE:  Client reports alcohol use, age of first use 15.  Reports over the past 6 months she was drinking about once or twice a month, usually a 6-pack per day.  It is typically a binge style drinking.  In the past she has had periods of daily drinking and last use was in mid 08/2019.  Reports experimental marijuana use in the past, most recent use was at the age of 38.  Reports she briefly smoked cigarettes while in treatment around the age of 37.   Client denies any other substance use.      SUMMARY OF CHEMICAL DEPENDENCY SYMPTOMS ACKNOWLEDGED BY CLIENT:  Client identifies with 8 out of the 11 DSM-V criteria for impression of a substance use disorder.      SUMMARY OF COLLATERAL DATA:  Initial request for collateral data specifically regarding client's probation and individual therapist, client refused and would not provide names.  She did sign releases of information for 2 of her friends, Ilia Sabillon.  He reports he has known her since this last winter.  He does not think she is drinking at this time anymore.  Also a voicemail was left for Satanm Wood, no collateral was obtained.      MENTAL STATUS ASSESSMENT:  Physical appearance and attire:  Appears stated age, and attire appropriate to age and situation.  Hygiene well groomed.  Eye contact at examiner.  Speech regular, volume regular, quality fluid.  Cognitive:  Perceptual, reality based.  Cognition:  Memory intact, judgment intact, insight intact.  Orientation to time, place, person and situation.  Thought logical.  Hallucinations:  None.  General behavioral tone:  Cooperative.  Psychomotor activity:  No problem noted.  Gait:  No problem.  Mood appropriate.  Affect congruent and appropriate.      VULNERABLE ADULT ASSESSMENT:  This person is not a functional vulnerable adult according to [place] statute 626.5572, subdivision 21.      IMPRESSION:  F10.20, alcohol use disorder, severe.      [facility] PLACEMENT CRITERIA:   DIMENSION 1:  Intoxication/withdrawal:  Risk level 0.  No concerns.      DIMENSION 2:  Biomedical Conditions:  Risk level 0.  Client denies any chronic or current health concerns.  She is able to seek services as needed.      DIMENSION 3:  Emotional/Behavioral:  Risk level 1.  Client reports depression, anxiety, PTSD and eating disorder.  She reports she is currently seeing an individual therapist.  She reports no current concern with mental health or active eating disorder behaviors.  She  denies any suicidal ideation.  PHQ-9 score and TRAM-7 score indicate no depression, no anxiety.      DIMENSION 4:  Readiness to Change:  Risk level 2.  Client is willing to seek treatment.  Does report legal requirements to do so, yet would not sign RANDI for her probation.  She appeared guarded during evaluation and initially did not appear forthcoming.      DIMENSION 5:  Relapse:  Risk level 3.  The client has had previous treatments but primarily for eating disorders.  She has co-occurring disorders and recognizes making healthy decisions.  She reports her alcohol use is a coping mechanism.      DIMENSION 6:  Recovery Environment:  Risk level 2.  Client reports she lives with her parents who are supportive.  She reports attending sober support groups and meeting with a therapist, all of whom are supportive.  She is not employed.  She is lacking structure.  She reports she is on probation, but would not disclose who probation is through.      INITIAL PROBLEM LIST:  Client has poor coping skills.      RECOMMENDATIONS:  Client is recommended to attend an intensive outpatient treatment program.      RATIONALE:  Recommendation at this time is based off the information that has been obtained.  It should be noted that client was resistant to certain requests during the evaluation that could have potentially provided additional clinical information that may support or negate client's reports.              This information has been disclosed to you from records protected by Federal confidentiality rules (42 CFR part 2). The Federal rules prohibit you from making any further disclosure of this information unless further disclosure is expressly permitted by the written consent of the person to whom it pertains or as otherwise permitted by 42 CFR part 2. A general authorization for the release of medical or other information is NOT sufficient for this purpose. The Federal rules restrict any use of the information to criminally  investigate or prosecute any alcohol or drug abuse patient.      [facility]             D: 09/10/2019   T: 09/10/2019   MT: RADHA      Name:     VIVEK BURROUGHS   MRN:      [id number]        Account:      [id number]   :      1975           Visit Date:   2019      Document: [id number]

## 2019-10-01 ENCOUNTER — HEALTH MAINTENANCE LETTER (OUTPATIENT)
Age: 44
End: 2019-10-01

## 2019-10-02 PROBLEM — M54.42 BILATERAL LOW BACK PAIN WITH LEFT-SIDED SCIATICA: Status: RESOLVED | Noted: 2019-07-31 | Resolved: 2019-10-02

## 2019-10-02 NOTE — PROGRESS NOTES
Patient only returned to 1 PT session following initial eval.  At this time pt will be DC from PT.  Please refer to eval for further information,

## 2019-10-21 ENCOUNTER — HOSPITAL ENCOUNTER (EMERGENCY)
Facility: CLINIC | Age: 44
Discharge: HOME | End: 2019-10-21
Attending: EMERGENCY MEDICINE | Admitting: EMERGENCY MEDICINE
Payer: COMMERCIAL

## 2019-10-21 VITALS
SYSTOLIC BLOOD PRESSURE: 194 MMHG | DIASTOLIC BLOOD PRESSURE: 108 MMHG | OXYGEN SATURATION: 97 % | HEART RATE: 139 BPM | TEMPERATURE: 97.2 F

## 2019-10-21 DIAGNOSIS — F10.930 ALCOHOL WITHDRAWAL SYNDROME WITHOUT COMPLICATION (H): ICD-10-CM

## 2019-10-21 DIAGNOSIS — I15.8 OTHER SECONDARY HYPERTENSION: ICD-10-CM

## 2019-10-21 PROCEDURE — 99282 EMERGENCY DEPT VISIT SF MDM: CPT

## 2019-10-21 PROCEDURE — 25000132 ZZH RX MED GY IP 250 OP 250 PS 637: Performed by: EMERGENCY MEDICINE

## 2019-10-21 RX ORDER — DIAZEPAM 5 MG
5 TABLET ORAL ONCE
Status: COMPLETED | OUTPATIENT
Start: 2019-10-21 | End: 2019-10-21

## 2019-10-21 RX ADMIN — DIAZEPAM 5 MG: 5 TABLET ORAL at 10:48

## 2019-10-21 ASSESSMENT — ENCOUNTER SYMPTOMS
NAUSEA: 1
TREMORS: 1
HALLUCINATIONS: 0
SEIZURES: 0
VOMITING: 0
DIARRHEA: 0

## 2019-10-21 NOTE — ED AVS SNAPSHOT
[facility] Emergency Department  [address]  [facility] [address]  Phone:  [phone]  Fax:  [phone]                                    Alana Mujica   MRN: [id number]    Department:  [facility] Emergency Department   Date of Visit:  10/21/2019           After Visit Summary Signature Page    I have received my discharge instructions, and my questions have been answered. I have discussed any challenges I see with this plan with the nurse or doctor.    ..........................................................................................................................................  Patient/Patient Representative Signature      ..........................................................................................................................................  Patient Representative Print Name and Relationship to Patient    ..................................................               ................................................  Date                                   Time    ..........................................................................................................................................  Reviewed by Signature/Title    ...................................................              ..............................................  Date                                               Time          22EPIC Rev 08/18

## 2019-10-21 NOTE — ED TRIAGE NOTES
"A&O x4, ABCs intact. Pt presents for alcohol withdrawal. Pt states last drink was last night around 2300. Pt states she drinks an unknown amount of beer and vodka per day for the past 2 days. Pt appears shaky. Pt states she starts \"treatment tomorrow and I don't want to not be able to make it.\"  "

## 2019-10-21 NOTE — ED PROVIDER NOTES
History     Chief Complaint:  Alcohol Withdrawal    HPI   Alana Mujica is a 43 year old female with a history of alcohol dependence who presents with concerns of alcohol withdrawal. The patient reports drinking about six large cans of beer every day for the last few months. She is due to start an intensive outpatient treatment program tomorrow, so she started to wean herself off of alcohol yesterday. When she started feeling tremulous at home, she had a drink, around 2300, to stave it off. Now, the shakiness and nausea have returned, but she does not want to drink any more before her treatment program, thus prompting presentation. Here, she states this feels similar to previous withdrawal episodes. She denies a history of previous alcohol withdrawal seizures and she has never been to detox before. She currently has no other physical complaints, including vomiting or diarrhea, and she has no psychologic concerns, including suicidal ideations or hallucinations.     Allergies:  NKDA    Medications:    Nexplanon implant  Gabapentin  Loratadine  Ativan  Reglan  Zoloft  Trazodone  Valtrex    Past Medical History:    Anorexia  Anxiety  Bulimia  Depression  Genital herpes  HPV   Osteoporosis  Substance abuse  GERD  Migraines  Alcohol dependence     Past Surgical History:    Cervical LEEP    Family History:    Mother: Breast cancer, Hypertension   Father: Hypertension    Social History:  Marital Status:  Single [1]  Former smoker  Positive for alcohol use.   Negative for drug use.      Review of Systems   Gastrointestinal: Positive for nausea. Negative for diarrhea and vomiting.   Neurological: Positive for tremors. Negative for seizures.   Psychiatric/Behavioral: Negative for hallucinations and suicidal ideas.   All other systems reviewed and are negative.        Physical Exam   First Vitals:  BP: (!) 194/108  Pulse: 139  Heart Rate: 125  Temp: 97.2  F (36.2  C)  SpO2: 100 %      Physical Exam  General: The patient is  alert, in no respiratory distress.    HENT: Mucous membranes moist.    Cardiovascular: Regular rate and rhythm. Good pulses in all four extremities. Normal capillary refill and skin turgor.     Respiratory: Lungs are clear. No nasal flaring. No retractions. No wheezing, no crackles.    Gastrointestinal: Abdomen soft. No guarding, no rebound. No palpable hernias.     Musculoskeletal: No gross deformity.     Skin: No rashes or petechiae.     Neurologic: The patient is alert and oriented x3. GCS 15. No testable cranial nerve deficit. Follows commands with clear and appropriate speech. Gives appropriate answers. Good strength in all extremities. No gross neurologic deficit. Gross sensation intact. Pupils are round and reactive. No meningismus. Tremor.     Lymphatic: No cervical adenopathy. No lower extremity swelling.    Psychiatric: The patient is non-tearful.    Emergency Department Course   Interventions:  1048 Valium, 5 mg, PO    Emergency Department Course:  Nursing notes and vitals reviewed.   1037: I performed an exam of the patient as documented above. I discussed the option of going to a detox facility, however patient refused this. I also discussed follow-up with a primary physician when the withdrawal persists.      Medicine administered as documented above.     Findings and plan explained to the Patient. Patient discharged home with instructions regarding supportive care, medications, and reasons to return. The importance of close follow-up was reviewed.      I personally answered all related questions prior to discharge.    Impression & Plan      Medical Decision Making:  Alana Mujica has a history of heavy alcohol use however is interested in quitting.  The patient does show some mild shaking however otherwise appears appropriate.  Her blood pressure is noted to be elevated.  I reviewed her chart and there is no known history of withdrawal seizures.  I gave her a dose of Valium here and question about  other potential causes for her symptoms.  I did feel was unlikely there is no electrolyte abnormality arrhythmia or other issue.  I suggested to the patient that she go to detox to help her get through the symptoms.  She did appear to have mild withdrawal symptoms.  I discussed that if she were to worsen she would need to return but she would not consent to go to detox.  She was discharged in good condition.    Diagnosis:    ICD-10-CM    1. Alcohol withdrawal syndrome without complication (H) F10.230    2. 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      10/21/2019   [facility] EMERGENCY DEPARTMENT       Isaak Mcdowell MD  10/21/19 7752

## 2019-12-12 ENCOUNTER — TELEPHONE (OUTPATIENT)
Dept: FAMILY MEDICINE | Facility: CLINIC | Age: 44
End: 2019-12-12

## 2019-12-12 DIAGNOSIS — B00.1 COLD SORE: Primary | ICD-10-CM

## 2019-12-12 NOTE — TELEPHONE ENCOUNTER
Reason for call:  Other   Patient called regarding (reason for call): prescription - Pt is in in-patient rehab currently and they will not allow her to take Valtrex because it says as needed on the bottle. Pt is requesting a new Rx for  valACYclovir (VALTREX) 500 MG tablet  with directions, 2 pills for 3 days as needed.       Additional comments: Requesting it is sent [facility]    Phone number to reach patient:  Other phone number:  [phone] - in-patient facility # *    Best Time:  any    Can we leave a detailed message on this number?  NO       Nicole Andre on 12/12/2019 at 12:47 PM

## 2019-12-13 NOTE — TELEPHONE ENCOUNTER
Unable to connect with patient at current facility, called her phone [phone] (home) and left message on her private VM to call us back to discuss why she needs the Valtrex RX . Does she currently have outbreak?  If so we cannot still order it as PRN at this facility apparently.  Most likely just each time she needs it.  Waiting for her to call back.  Meantime Dr. Alaniz do you want to order Valtrex for one episode (not PRN)? And possibly add refills?  Pharmacy pended as requested.  Liane Yadav RN

## 2019-12-15 ENCOUNTER — NURSE TRIAGE (OUTPATIENT)
Dept: NURSING | Facility: CLINIC | Age: 44
End: 2019-12-15

## 2019-12-15 NOTE — TELEPHONE ENCOUNTER
Alana called asking to get a Rx for Valtrex.  She's developed a cold sore.  She' in a treatment facility right now. I think she said it's in [place].  The Rx she has is written without stating PRN.  The staff cannot give it to her without it being written as a prn.    Call Alana, [phone]. This is her cell phone. She will be in treatment/support groups most of the day and can't have her cell phone with her so may not answer.  She would like a detailed message left on that voicemail if she doesn't answer.  Please let Alana know when this has been addressed.    The facility uses [facility] in [place].    Routed: P 99412  Arvind Alaniz  High priority  Andreina DIAZ RN [facility]     Please close encounter once addressed.

## 2019-12-15 NOTE — TELEPHONE ENCOUNTER
Alana called asking to get a Rx for Valtrex.  She's developed a cold sore.  She' in a treatment facility right now. I think she said it's in [place].  The Rx she has is written without stating PRN.  The staff cannot give it to her without it being written as a prn.    Call Alana, [phone]. This is her cell phone. She will be in treatment/support groups most of the day and can't have her cell phone with her so may not answer.  She would like a detailed message left on that voicemail if she doesn't answer.  Please let Alana know when this has been addressed.    The facility uses [facility] in [place].    Routed: P 05419  Arvind Alaniz  High priority  Andreina DIAZ RN [facility]     Please close encounter once addressed.

## 2019-12-16 RX ORDER — VALACYCLOVIR HYDROCHLORIDE 500 MG/1
1000 TABLET, FILM COATED ORAL 2 TIMES DAILY
Qty: 12 TABLET | Refills: 0 | Status: SHIPPED | OUTPATIENT
Start: 2019-12-16 | End: 2020-02-22

## 2019-12-16 NOTE — TELEPHONE ENCOUNTER
Tried to call the facility.  The phone rang several times and then disconnected.      Called and left a message for the pt advising of below.

## 2020-01-09 ENCOUNTER — HOSPITAL ENCOUNTER (EMERGENCY)
Facility: CLINIC | Age: 45
Discharge: HOME | End: 2020-01-09
Attending: EMERGENCY MEDICINE | Admitting: EMERGENCY MEDICINE
Payer: COMMERCIAL

## 2020-01-09 VITALS
HEART RATE: 84 BPM | OXYGEN SATURATION: 99 % | DIASTOLIC BLOOD PRESSURE: 69 MMHG | TEMPERATURE: 98.3 F | RESPIRATION RATE: 16 BRPM | SYSTOLIC BLOOD PRESSURE: 106 MMHG

## 2020-01-09 DIAGNOSIS — J06.9 UPPER RESPIRATORY TRACT INFECTION, UNSPECIFIED TYPE: ICD-10-CM

## 2020-01-09 DIAGNOSIS — R51.9 NONINTRACTABLE HEADACHE, UNSPECIFIED CHRONICITY PATTERN, UNSPECIFIED HEADACHE TYPE: ICD-10-CM

## 2020-01-09 PROCEDURE — 99285 EMERGENCY DEPT VISIT HI MDM: CPT | Mod: 25

## 2020-01-09 PROCEDURE — 96375 TX/PRO/DX INJ NEW DRUG ADDON: CPT

## 2020-01-09 PROCEDURE — 25000128 H RX IP 250 OP 636: Performed by: EMERGENCY MEDICINE

## 2020-01-09 PROCEDURE — 25800030 ZZH RX IP 258 OP 636: Performed by: EMERGENCY MEDICINE

## 2020-01-09 PROCEDURE — 96374 THER/PROPH/DIAG INJ IV PUSH: CPT

## 2020-01-09 PROCEDURE — 96361 HYDRATE IV INFUSION ADD-ON: CPT

## 2020-01-09 RX ORDER — KETOROLAC TROMETHAMINE 15 MG/ML
15 INJECTION, SOLUTION INTRAMUSCULAR; INTRAVENOUS ONCE
Status: COMPLETED | OUTPATIENT
Start: 2020-01-09 | End: 2020-01-09

## 2020-01-09 RX ORDER — DEXAMETHASONE SODIUM PHOSPHATE 10 MG/ML
10 INJECTION, SOLUTION INTRAMUSCULAR; INTRAVENOUS ONCE
Status: COMPLETED | OUTPATIENT
Start: 2020-01-09 | End: 2020-01-09

## 2020-01-09 RX ORDER — METOCLOPRAMIDE HYDROCHLORIDE 5 MG/ML
5 INJECTION INTRAMUSCULAR; INTRAVENOUS ONCE
Status: COMPLETED | OUTPATIENT
Start: 2020-01-09 | End: 2020-01-09

## 2020-01-09 RX ORDER — ONDANSETRON 4 MG/1
4 TABLET, FILM COATED ORAL EVERY 6 HOURS
Qty: 8 TABLET | Refills: 0 | Status: ON HOLD | OUTPATIENT
Start: 2020-01-09 | End: 2022-03-17

## 2020-01-09 RX ORDER — DIPHENHYDRAMINE HYDROCHLORIDE 50 MG/ML
25 INJECTION INTRAMUSCULAR; INTRAVENOUS ONCE
Status: COMPLETED | OUTPATIENT
Start: 2020-01-09 | End: 2020-01-09

## 2020-01-09 RX ADMIN — METOCLOPRAMIDE 5 MG: 5 INJECTION, SOLUTION INTRAMUSCULAR; INTRAVENOUS at 20:11

## 2020-01-09 RX ADMIN — DIPHENHYDRAMINE HYDROCHLORIDE 25 MG: 50 INJECTION, SOLUTION INTRAMUSCULAR; INTRAVENOUS at 20:11

## 2020-01-09 RX ADMIN — DEXAMETHASONE SODIUM PHOSPHATE 10 MG: 10 INJECTION, SOLUTION INTRAMUSCULAR; INTRAVENOUS at 21:03

## 2020-01-09 RX ADMIN — SODIUM CHLORIDE 500 ML: 9 INJECTION, SOLUTION INTRAVENOUS at 20:11

## 2020-01-09 RX ADMIN — KETOROLAC TROMETHAMINE 15 MG: 15 INJECTION, SOLUTION INTRAMUSCULAR; INTRAVENOUS at 20:11

## 2020-01-09 ASSESSMENT — ENCOUNTER SYMPTOMS
FEVER: 0
VOMITING: 1
NAUSEA: 1
HEADACHES: 1
SINUS PRESSURE: 1
PHOTOPHOBIA: 1

## 2020-01-09 NOTE — ED AVS SNAPSHOT
[facility] Emergency Department  [address]  [facility] [address]  Phone:  [phone]  Fax:  [phone]                                    Alana Mujica   MRN: [id number]    Department:  [facility] Emergency Department   Date of Visit:  1/9/2020           After Visit Summary Signature Page    I have received my discharge instructions, and my questions have been answered. I have discussed any challenges I see with this plan with the nurse or doctor.    ..........................................................................................................................................  Patient/Patient Representative Signature      ..........................................................................................................................................  Patient Representative Print Name and Relationship to Patient    ..................................................               ................................................  Date                                   Time    ..........................................................................................................................................  Reviewed by Signature/Title    ...................................................              ..............................................  Date                                               Time          22EPIC Rev 08/18

## 2020-01-10 NOTE — ED TRIAGE NOTES
Patient comes in for evaluation of a headache, has a history of headaches. Today headache is making her nauseated and it is not going away. Patient took ibuprofen earlier today without help and some type of musinex which did not help either. ABCs intact.
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
## 2020-02-06 ENCOUNTER — APPOINTMENT (OUTPATIENT)
Dept: CT IMAGING | Facility: CLINIC | Age: 45
End: 2020-02-06
Attending: EMERGENCY MEDICINE
Payer: COMMERCIAL

## 2020-02-06 ENCOUNTER — HOSPITAL ENCOUNTER (EMERGENCY)
Facility: CLINIC | Age: 45
Discharge: HOME | End: 2020-02-06
Attending: EMERGENCY MEDICINE | Admitting: EMERGENCY MEDICINE
Payer: COMMERCIAL

## 2020-02-06 VITALS
HEIGHT: 63 IN | HEART RATE: 87 BPM | TEMPERATURE: 97.9 F | RESPIRATION RATE: 20 BRPM | BODY MASS INDEX: 21.09 KG/M2 | DIASTOLIC BLOOD PRESSURE: 87 MMHG | SYSTOLIC BLOOD PRESSURE: 123 MMHG | WEIGHT: 119 LBS | OXYGEN SATURATION: 96 %

## 2020-02-06 DIAGNOSIS — K29.00 ACUTE GASTRITIS WITHOUT HEMORRHAGE, UNSPECIFIED GASTRITIS TYPE: ICD-10-CM

## 2020-02-06 DIAGNOSIS — R10.13 EPIGASTRIC PAIN: ICD-10-CM

## 2020-02-06 LAB
ALBUMIN SERPL-MCNC: 4.3 G/DL (ref 3.4–5)
ALP SERPL-CCNC: 74 U/L (ref 40–150)
ALT SERPL W P-5'-P-CCNC: 114 U/L (ref 0–50)
ANION GAP SERPL CALCULATED.3IONS-SCNC: 8 MMOL/L (ref 3–14)
AST SERPL W P-5'-P-CCNC: 68 U/L (ref 0–45)
BASOPHILS # BLD AUTO: 0 10E9/L (ref 0–0.2)
BASOPHILS NFR BLD AUTO: 0.7 %
BILIRUB SERPL-MCNC: 0.8 MG/DL (ref 0.2–1.3)
BUN SERPL-MCNC: 10 MG/DL (ref 7–30)
CALCIUM SERPL-MCNC: 9.7 MG/DL (ref 8.5–10.1)
CHLORIDE SERPL-SCNC: 100 MMOL/L (ref 94–109)
CO2 SERPL-SCNC: 25 MMOL/L (ref 20–32)
CREAT SERPL-MCNC: 0.77 MG/DL (ref 0.52–1.04)
DIFFERENTIAL METHOD BLD: NORMAL
EOSINOPHIL # BLD AUTO: 0.1 10E9/L (ref 0–0.7)
EOSINOPHIL NFR BLD AUTO: 1.5 %
ERYTHROCYTE [DISTWIDTH] IN BLOOD BY AUTOMATED COUNT: 13.4 % (ref 10–15)
GFR SERPL CREATININE-BSD FRML MDRD: >90 ML/MIN/{1.73_M2}
GLUCOSE SERPL-MCNC: 101 MG/DL (ref 70–99)
HCT VFR BLD AUTO: 38.1 % (ref 35–47)
HGB BLD-MCNC: 12.4 G/DL (ref 11.7–15.7)
IMM GRANULOCYTES # BLD: 0 10E9/L (ref 0–0.4)
IMM GRANULOCYTES NFR BLD: 0.3 %
LIPASE SERPL-CCNC: 322 U/L (ref 73–393)
LYMPHOCYTES # BLD AUTO: 0.9 10E9/L (ref 0.8–5.3)
LYMPHOCYTES NFR BLD AUTO: 15.1 %
MCH RBC QN AUTO: 32 PG (ref 26.5–33)
MCHC RBC AUTO-ENTMCNC: 32.5 G/DL (ref 31.5–36.5)
MCV RBC AUTO: 98 FL (ref 78–100)
MONOCYTES # BLD AUTO: 0.7 10E9/L (ref 0–1.3)
MONOCYTES NFR BLD AUTO: 11.5 %
NEUTROPHILS # BLD AUTO: 4.1 10E9/L (ref 1.6–8.3)
NEUTROPHILS NFR BLD AUTO: 70.9 %
NRBC # BLD AUTO: 0 10*3/UL
NRBC BLD AUTO-RTO: 0 /100
PLATELET # BLD AUTO: 166 10E9/L (ref 150–450)
POTASSIUM SERPL-SCNC: 3.2 MMOL/L (ref 3.4–5.3)
PROT SERPL-MCNC: 8.2 G/DL (ref 6.8–8.8)
RBC # BLD AUTO: 3.88 10E12/L (ref 3.8–5.2)
SODIUM SERPL-SCNC: 133 MMOL/L (ref 133–144)
WBC # BLD AUTO: 5.8 10E9/L (ref 4–11)

## 2020-02-06 PROCEDURE — 25000125 ZZHC RX 250: Performed by: EMERGENCY MEDICINE

## 2020-02-06 PROCEDURE — 96361 HYDRATE IV INFUSION ADD-ON: CPT

## 2020-02-06 PROCEDURE — 83690 ASSAY OF LIPASE: CPT | Performed by: EMERGENCY MEDICINE

## 2020-02-06 PROCEDURE — 74177 CT ABD & PELVIS W/CONTRAST: CPT

## 2020-02-06 PROCEDURE — 25000128 H RX IP 250 OP 636: Performed by: EMERGENCY MEDICINE

## 2020-02-06 PROCEDURE — 99285 EMERGENCY DEPT VISIT HI MDM: CPT | Mod: 25

## 2020-02-06 PROCEDURE — 85025 COMPLETE CBC W/AUTO DIFF WBC: CPT | Performed by: EMERGENCY MEDICINE

## 2020-02-06 PROCEDURE — 96374 THER/PROPH/DIAG INJ IV PUSH: CPT | Mod: 59

## 2020-02-06 PROCEDURE — 25800030 ZZH RX IP 258 OP 636: Performed by: EMERGENCY MEDICINE

## 2020-02-06 PROCEDURE — 80053 COMPREHEN METABOLIC PANEL: CPT | Performed by: EMERGENCY MEDICINE

## 2020-02-06 PROCEDURE — 96375 TX/PRO/DX INJ NEW DRUG ADDON: CPT

## 2020-02-06 RX ORDER — LORAZEPAM 2 MG/ML
1 INJECTION INTRAMUSCULAR ONCE
Status: COMPLETED | OUTPATIENT
Start: 2020-02-06 | End: 2020-02-06

## 2020-02-06 RX ORDER — SUCRALFATE 1 G/1
1 TABLET ORAL 2 TIMES DAILY
Qty: 14 TABLET | Refills: 0 | Status: SHIPPED | OUTPATIENT
Start: 2020-02-06 | End: 2020-02-13

## 2020-02-06 RX ORDER — HYDROMORPHONE HYDROCHLORIDE 1 MG/ML
0.5 INJECTION, SOLUTION INTRAMUSCULAR; INTRAVENOUS; SUBCUTANEOUS
Status: DISCONTINUED | OUTPATIENT
Start: 2020-02-06 | End: 2020-02-06 | Stop reason: HOSPADM

## 2020-02-06 RX ORDER — IOPAMIDOL 755 MG/ML
500 INJECTION, SOLUTION INTRAVASCULAR ONCE
Status: COMPLETED | OUTPATIENT
Start: 2020-02-06 | End: 2020-02-06

## 2020-02-06 RX ORDER — SODIUM CHLORIDE 9 MG/ML
1000 INJECTION, SOLUTION INTRAVENOUS CONTINUOUS
Status: DISCONTINUED | OUTPATIENT
Start: 2020-02-06 | End: 2020-02-06 | Stop reason: HOSPADM

## 2020-02-06 RX ADMIN — IOPAMIDOL 60 ML: 755 INJECTION, SOLUTION INTRAVENOUS at 20:11

## 2020-02-06 RX ADMIN — SODIUM CHLORIDE 55 ML: 9 INJECTION, SOLUTION INTRAVENOUS at 20:12

## 2020-02-06 RX ADMIN — SODIUM CHLORIDE 1000 ML: 9 INJECTION, SOLUTION INTRAVENOUS at 18:49

## 2020-02-06 RX ADMIN — LORAZEPAM 1 MG: 2 INJECTION INTRAMUSCULAR; INTRAVENOUS at 18:50

## 2020-02-06 RX ADMIN — HYDROMORPHONE HYDROCHLORIDE 0.5 MG: 1 INJECTION, SOLUTION INTRAMUSCULAR; INTRAVENOUS; SUBCUTANEOUS at 18:50

## 2020-02-06 ASSESSMENT — ENCOUNTER SYMPTOMS
CHILLS: 1
ABDOMINAL PAIN: 1
FEVER: 0
VOMITING: 0
APPETITE CHANGE: 0

## 2020-02-06 ASSESSMENT — MIFFLIN-ST. JEOR: SCORE: 1148.91

## 2020-02-06 NOTE — ED TRIAGE NOTES
Sent here for further evaluation of upper abdominal pain that radiates into back with drinking over the past 10 days.  Improved by not drinking any ETOH. Last drink Tuesday night at 8pm.  Jittery but appropriate.   Urine was done at  and was negative for infection per pastient.  Drinks about a case beer daily.

## 2020-02-06 NOTE — ED AVS SNAPSHOT
[facility] Emergency Department  [address]  [facility] [address]  Phone:  [phone]  Fax:  [phone]                                    Alana Mujica   MRN: [id number]    Department:  [facility] Emergency Department   Date of Visit:  2/6/2020           After Visit Summary Signature Page    I have received my discharge instructions, and my questions have been answered. I have discussed any challenges I see with this plan with the nurse or doctor.    ..........................................................................................................................................  Patient/Patient Representative Signature      ..........................................................................................................................................  Patient Representative Print Name and Relationship to Patient    ..................................................               ................................................  Date                                   Time    ..........................................................................................................................................  Reviewed by Signature/Title    ...................................................              ..............................................  Date                                               Time          22EPIC Rev 08/18
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
## 2020-02-22 ENCOUNTER — MYC REFILL (OUTPATIENT)
Dept: FAMILY MEDICINE | Facility: CLINIC | Age: 45
End: 2020-02-22

## 2020-02-22 DIAGNOSIS — B00.1 COLD SORE: ICD-10-CM

## 2020-02-25 RX ORDER — VALACYCLOVIR HYDROCHLORIDE 500 MG/1
1000 TABLET, FILM COATED ORAL 2 TIMES DAILY
Qty: 12 TABLET | Refills: 0 | Status: SHIPPED | OUTPATIENT
Start: 2020-02-25 | End: 2020-04-02

## 2020-02-25 NOTE — TELEPHONE ENCOUNTER
"Valtrex  LRF 12/16/19, disp 12  LOV 7/17/19    Requested Prescriptions   Pending Prescriptions Disp Refills     valACYclovir (VALTREX) 500 MG tablet 12 tablet 0     Sig: Take 2 tablets (1,000 mg) by mouth 2 times daily FOR ONE DAY per cold sore flare       Antivirals for Herpes Protocol Passed - 2/22/2020 11:51 AM        Passed - Patient is age 12 or older        Passed - Recent (12 mo) or future (30 days) visit within the authorizing provider's specialty     Patient has had an office visit with the authorizing provider or a provider within the authorizing providers department within the previous 12 mos or has a future within next 30 days. See \"Patient Info\" tab in inbasket, or \"Choose Columns\" in Meds & Orders section of the refill encounter.              Passed - Medication is active on med list        Passed - Normal serum creatinine on file in past 12 months     Recent Labs   Lab Test 02/06/20  1823   CR 0.77             Prescription approved per [facility] Refill Protocol.    "

## 2020-04-02 DIAGNOSIS — B00.1 COLD SORE: ICD-10-CM

## 2020-04-02 RX ORDER — VALACYCLOVIR HYDROCHLORIDE 500 MG/1
1000 TABLET, FILM COATED ORAL 2 TIMES DAILY
Qty: 12 TABLET | Refills: 1 | Status: SHIPPED | OUTPATIENT
Start: 2020-04-02 | End: 2020-06-01

## 2020-04-02 NOTE — TELEPHONE ENCOUNTER
valACYclovir (VALTREX) 500 MG tablet 12 tablet 0    Sig: Take 2 tablets (1,000 mg) by mouth 2 times daily FOR ONE DAY per cold sore flare   Prescription approved per [facility] [place] [place] Refill Protocol.    Geovanna Huynh RN

## 2020-04-02 NOTE — TELEPHONE ENCOUNTER
Pt states she knows last Rx for med did not include refills, however she is out and is requesting more.  Pls call pt with questions / concerns.   Thanks.

## 2020-04-02 NOTE — TELEPHONE ENCOUNTER
"Requested Prescriptions   Pending Prescriptions Disp Refills     valACYclovir (VALTREX) 500 MG tablet 12 tablet 0     Sig: Take 2 tablets (1,000 mg) by mouth 2 times daily FOR ONE DAY per cold sore flare   Last Written Prescription Date:  2/25/20  Last Fill Quantity: 12,  # refills: 0   Last office visit: 7/17/2019 with prescribing provider:  Sabrina Pérez PA-C    Future Office Visit:        Antivirals for Herpes Protocol Passed - 4/2/2020  2:11 PM        Passed - Patient is age 12 or older        Passed - Recent (12 mo) or future (30 days) visit within the authorizing provider's specialty     Patient has had an office visit with the authorizing provider or a provider within the authorizing providers department within the previous 12 mos or has a future within next 30 days. See \"Patient Info\" tab in inbasket, or \"Choose Columns\" in Meds & Orders section of the refill encounter.              Passed - Medication is active on med list        Passed - Normal serum creatinine on file in past 12 months     Recent Labs   Lab Test 02/06/20  1823   CR 0.77       Ok to refill medication if creatinine is low              "

## 2020-10-06 ENCOUNTER — APPOINTMENT (OUTPATIENT)
Dept: CT IMAGING | Facility: CLINIC | Age: 45
End: 2020-10-06
Attending: PHYSICIAN ASSISTANT
Payer: COMMERCIAL

## 2020-10-06 ENCOUNTER — HOSPITAL ENCOUNTER (EMERGENCY)
Facility: CLINIC | Age: 45
Discharge: HOME | End: 2020-10-06
Attending: PHYSICIAN ASSISTANT | Admitting: PHYSICIAN ASSISTANT
Payer: COMMERCIAL

## 2020-10-06 VITALS
DIASTOLIC BLOOD PRESSURE: 62 MMHG | WEIGHT: 115 LBS | SYSTOLIC BLOOD PRESSURE: 100 MMHG | HEART RATE: 94 BPM | BODY MASS INDEX: 20.38 KG/M2 | OXYGEN SATURATION: 96 % | HEIGHT: 63 IN | TEMPERATURE: 99.1 F | RESPIRATION RATE: 18 BRPM

## 2020-10-06 DIAGNOSIS — R11.0 NAUSEA: ICD-10-CM

## 2020-10-06 DIAGNOSIS — E87.1 HYPONATREMIA: ICD-10-CM

## 2020-10-06 DIAGNOSIS — E87.8 HYPOCHLOREMIA: ICD-10-CM

## 2020-10-06 DIAGNOSIS — E87.6 HYPOKALEMIA: ICD-10-CM

## 2020-10-06 DIAGNOSIS — R51.9 NONINTRACTABLE HEADACHE, UNSPECIFIED CHRONICITY PATTERN, UNSPECIFIED HEADACHE TYPE: ICD-10-CM

## 2020-10-06 DIAGNOSIS — R25.2 MUSCLE CRAMPING: ICD-10-CM

## 2020-10-06 DIAGNOSIS — R51.9 HEADACHE: ICD-10-CM

## 2020-10-06 DIAGNOSIS — R63.0 DECREASED APPETITE: ICD-10-CM

## 2020-10-06 LAB
ALBUMIN SERPL-MCNC: 3.9 G/DL (ref 3.4–5)
ALBUMIN UR-MCNC: NEGATIVE MG/DL
ALP SERPL-CCNC: 79 U/L (ref 40–150)
ALT SERPL W P-5'-P-CCNC: 98 U/L (ref 0–50)
ANION GAP SERPL CALCULATED.3IONS-SCNC: 9 MMOL/L (ref 3–14)
APPEARANCE UR: CLEAR
AST SERPL W P-5'-P-CCNC: 53 U/L (ref 0–45)
BASOPHILS # BLD AUTO: 0.1 10E9/L (ref 0–0.2)
BASOPHILS NFR BLD AUTO: 0.9 %
BILIRUB DIRECT SERPL-MCNC: 0.3 MG/DL (ref 0–0.2)
BILIRUB SERPL-MCNC: 1.1 MG/DL (ref 0.2–1.3)
BILIRUB UR QL STRIP: NEGATIVE
BUN SERPL-MCNC: 5 MG/DL (ref 7–30)
CALCIUM SERPL-MCNC: 8.9 MG/DL (ref 8.5–10.1)
CHLORIDE SERPL-SCNC: 90 MMOL/L (ref 94–109)
CK SERPL-CCNC: 54 U/L (ref 30–225)
CO2 SERPL-SCNC: 29 MMOL/L (ref 20–32)
COLOR UR AUTO: ABNORMAL
CREAT SERPL-MCNC: 0.83 MG/DL (ref 0.52–1.04)
DIFFERENTIAL METHOD BLD: ABNORMAL
EOSINOPHIL # BLD AUTO: 0.3 10E9/L (ref 0–0.7)
EOSINOPHIL NFR BLD AUTO: 4 %
ERYTHROCYTE [DISTWIDTH] IN BLOOD BY AUTOMATED COUNT: 11.8 % (ref 10–15)
GFR SERPL CREATININE-BSD FRML MDRD: 84 ML/MIN/{1.73_M2}
GLUCOSE SERPL-MCNC: 84 MG/DL (ref 70–99)
GLUCOSE UR STRIP-MCNC: NEGATIVE MG/DL
HCT VFR BLD AUTO: 36.8 % (ref 35–47)
HGB BLD-MCNC: 12.5 G/DL (ref 11.7–15.7)
HGB UR QL STRIP: NEGATIVE
IMM GRANULOCYTES # BLD: 0 10E9/L (ref 0–0.4)
IMM GRANULOCYTES NFR BLD: 0.4 %
KETONES UR STRIP-MCNC: NEGATIVE MG/DL
LEUKOCYTE ESTERASE UR QL STRIP: NEGATIVE
LIPASE SERPL-CCNC: 166 U/L (ref 73–393)
LYMPHOCYTES # BLD AUTO: 0.8 10E9/L (ref 0.8–5.3)
LYMPHOCYTES NFR BLD AUTO: 11.7 %
MCH RBC QN AUTO: 33.3 PG (ref 26.5–33)
MCHC RBC AUTO-ENTMCNC: 34 G/DL (ref 31.5–36.5)
MCV RBC AUTO: 98 FL (ref 78–100)
MONOCYTES # BLD AUTO: 0.6 10E9/L (ref 0–1.3)
MONOCYTES NFR BLD AUTO: 8.7 %
MUCOUS THREADS #/AREA URNS LPF: PRESENT /LPF
NEUTROPHILS # BLD AUTO: 5.2 10E9/L (ref 1.6–8.3)
NEUTROPHILS NFR BLD AUTO: 74.3 %
NITRATE UR QL: NEGATIVE
NRBC # BLD AUTO: 0 10*3/UL
NRBC BLD AUTO-RTO: 0 /100
PH UR STRIP: 7 PH (ref 5–7)
PLATELET # BLD AUTO: 168 10E9/L (ref 150–450)
POTASSIUM SERPL-SCNC: 3.1 MMOL/L (ref 3.4–5.3)
PROT SERPL-MCNC: 7.1 G/DL (ref 6.8–8.8)
RBC # BLD AUTO: 3.75 10E12/L (ref 3.8–5.2)
RBC #/AREA URNS AUTO: <1 /HPF (ref 0–2)
SODIUM SERPL-SCNC: 128 MMOL/L (ref 133–144)
SOURCE: ABNORMAL
SP GR UR STRIP: 1 (ref 1–1.03)
SQUAMOUS #/AREA URNS AUTO: <1 /HPF (ref 0–1)
UROBILINOGEN UR STRIP-MCNC: NORMAL MG/DL (ref 0–2)
WBC # BLD AUTO: 6.9 10E9/L (ref 4–11)
WBC #/AREA URNS AUTO: 0 /HPF (ref 0–5)

## 2020-10-06 PROCEDURE — 99285 EMERGENCY DEPT VISIT HI MDM: CPT | Mod: 25

## 2020-10-06 PROCEDURE — 258N000003 HC RX IP 258 OP 636: Performed by: PHYSICIAN ASSISTANT

## 2020-10-06 PROCEDURE — 70450 CT HEAD/BRAIN W/O DYE: CPT

## 2020-10-06 PROCEDURE — 82550 ASSAY OF CK (CPK): CPT | Performed by: PHYSICIAN ASSISTANT

## 2020-10-06 PROCEDURE — 96375 TX/PRO/DX INJ NEW DRUG ADDON: CPT

## 2020-10-06 PROCEDURE — 85025 COMPLETE CBC W/AUTO DIFF WBC: CPT | Performed by: PHYSICIAN ASSISTANT

## 2020-10-06 PROCEDURE — 80076 HEPATIC FUNCTION PANEL: CPT | Performed by: PHYSICIAN ASSISTANT

## 2020-10-06 PROCEDURE — 96374 THER/PROPH/DIAG INJ IV PUSH: CPT

## 2020-10-06 PROCEDURE — 83690 ASSAY OF LIPASE: CPT | Performed by: PHYSICIAN ASSISTANT

## 2020-10-06 PROCEDURE — 80048 BASIC METABOLIC PNL TOTAL CA: CPT | Performed by: PHYSICIAN ASSISTANT

## 2020-10-06 PROCEDURE — 250N000011 HC RX IP 250 OP 636: Performed by: PHYSICIAN ASSISTANT

## 2020-10-06 PROCEDURE — 93005 ELECTROCARDIOGRAM TRACING: CPT

## 2020-10-06 PROCEDURE — 96361 HYDRATE IV INFUSION ADD-ON: CPT

## 2020-10-06 PROCEDURE — 81001 URINALYSIS AUTO W/SCOPE: CPT | Performed by: PHYSICIAN ASSISTANT

## 2020-10-06 RX ORDER — POTASSIUM CHLORIDE 1500 MG/1
40 TABLET, EXTENDED RELEASE ORAL ONCE
Status: DISCONTINUED | OUTPATIENT
Start: 2020-10-06 | End: 2020-10-06 | Stop reason: HOSPADM

## 2020-10-06 RX ORDER — ONDANSETRON 2 MG/ML
4 INJECTION INTRAMUSCULAR; INTRAVENOUS EVERY 30 MIN PRN
Status: DISCONTINUED | OUTPATIENT
Start: 2020-10-06 | End: 2020-10-06

## 2020-10-06 RX ORDER — METOCLOPRAMIDE HYDROCHLORIDE 5 MG/ML
10 INJECTION INTRAMUSCULAR; INTRAVENOUS ONCE
Status: DISCONTINUED | OUTPATIENT
Start: 2020-10-06 | End: 2020-10-06

## 2020-10-06 RX ORDER — POTASSIUM CHLORIDE 1500 MG/1
20 TABLET, EXTENDED RELEASE ORAL 2 TIMES DAILY
Qty: 8 TABLET | Refills: 0 | Status: SHIPPED | OUTPATIENT
Start: 2020-10-06 | End: 2020-10-10

## 2020-10-06 RX ORDER — KETOROLAC TROMETHAMINE 15 MG/ML
15 INJECTION, SOLUTION INTRAMUSCULAR; INTRAVENOUS ONCE
Status: COMPLETED | OUTPATIENT
Start: 2020-10-06 | End: 2020-10-06

## 2020-10-06 RX ORDER — DIPHENHYDRAMINE HYDROCHLORIDE 50 MG/ML
25 INJECTION INTRAMUSCULAR; INTRAVENOUS ONCE
Status: DISCONTINUED | OUTPATIENT
Start: 2020-10-06 | End: 2020-10-06

## 2020-10-06 RX ADMIN — KETOROLAC TROMETHAMINE 15 MG: 15 INJECTION, SOLUTION INTRAMUSCULAR; INTRAVENOUS at 19:21

## 2020-10-06 RX ADMIN — ONDANSETRON 4 MG: 2 INJECTION INTRAMUSCULAR; INTRAVENOUS at 18:37

## 2020-10-06 RX ADMIN — SODIUM CHLORIDE 500 ML: 9 INJECTION, SOLUTION INTRAVENOUS at 18:36

## 2020-10-06 ASSESSMENT — MIFFLIN-ST. JEOR: SCORE: 1122.83

## 2020-10-06 NOTE — ED PROVIDER NOTES
History     Chief Complaint:  Abnormal Labs      HPI  Alana Mujica is a 44 year old female with a history of chronic, persistent headaches requiring occipital nerve blocks who presents to the ED for evaluation of headache and nausea.  The patient reports onset of headache approximately 4 months ago which has been persistent every day.  She reports associated nausea over the past few weeks, as well as recent muscle cramping and decreased appetite.  She states that approximately 2 weeks ago she had significant vomiting, however has felt well since that time.  She has any fevers, chills, chest pain, shortness of breath, abdominal pain, diarrhea, urinary symptoms, or black or bloody stools.  Of note, the patient was in detox up until approximately 4 days ago.  She has not consumed alcohol since that time.  No head trauma of note.    Allergies:  No known drug allergies    Medications:    Nexplanon  Folic acid  Fluticasone  Valtrex    Past Medical History:    Anorexia  Anxiety  Bulimia  Depression  Genital herpes  HPV  Osteoporosis  Substance abuse  RUPINDER  Alcohol abuse  Alcohol withdrawal  Adjustment disorder    Past Surgical History:    LEEP cervical    Family History:    Breast cancer - mother  HTN - mother, father    Social History:  Smoking Status: Former Smoker  Alcohol Use: Yes  Drug Use: No  PCP: Ga Alaniz   The patient presents to the emergency department by herself.  Marital Status: Single    Review of Systems   Constitutional: Positive for appetite change.   Gastrointestinal: Positive for nausea.   Musculoskeletal: Positive for myalgias.   Neurological: Positive for headaches.   All other systems reviewed and are negative.      Physical Exam     Patient Vitals for the past 24 hrs:   BP Temp Temp src Pulse Resp SpO2 Height Weight   10/06/20 2000 100/62 -- -- 94 -- 96 % -- --   10/06/20 1945 (!) 126/97 -- -- 82 -- 94 % -- --   10/06/20 1930 (!) 123/90 -- -- 82 -- 100 % -- --   10/06/20 1915 (!)  "115/91 -- -- 88 -- -- -- --   10/06/20 1845 (!) 127/97 -- -- 89 -- 100 % -- --   10/06/20 1815 (!) 125/90 -- -- 85 -- 99 % -- --   10/06/20 1810 (!) 127/90 -- -- 87 -- 98 % -- --   10/06/20 1657 (!) 130/105 99.1  F (37.3  C) Oral 99 18 100 % 1.588 m (5' 2.5\") 52.2 kg (115 lb)         Physical Exam  Constitutional: Pleasant. Cooperative.  Eyes: Pupils equally round and reactive  HENT: Head is normal in appearance. Dry mucous membranes.  Cardiovascular: Tachycardic. Regular rhythm, without murmurs.  Respiratory: Normal respiratory effort, lungs are clear bilaterally.  GI: Abdomen is soft, non-tender, non-distended. No guarding, rebound, or rigidity.  Skin: Normal, without rash.  Neurologic: Cranial nerves II-XII intact, nl cognition, no focal deficits. Alert and oriented x 3. Normal  strength. Normal leg raise. Sensation to light touch intact throughout all 4 extremities. 5/5 strength with dorsiflexion and plantarflexion bilaterally. No pronator drift. Normal finger nose finger.   Psychiatric: Normal affect.  Nursing notes and vital signs reviewed.      Emergency Department Course   EKG  Time: 1837  Rate 80 bpm. IA interval 154. QRS duration 72. QT/QTc 426/491. P-R-T axes 42 19 40  NSR. Prolonged QT.    Imaging:  Radiographic findings were communicated with the patient who voiced understanding of the findings.  CT Head w/o Contrast   Final Result   IMPRESSION:   1.  No CT evidence for acute intracranial process.   2.  Brain atrophy and presumed chronic microvascular ischemic changes as above.            Laboratory:  Labs Ordered and Resulted from Time of ED Arrival Up to the Time of Departure from the ED   CBC WITH PLATELETS DIFFERENTIAL - Abnormal; Notable for the following components:       Result Value    RBC Count 3.75 (*)     MCH 33.3 (*)     All other components within normal limits   BASIC METABOLIC PANEL - Abnormal; Notable for the following components:    Sodium 128 (*)     Potassium 3.1 (*)     Chloride " 90 (*)     Urea Nitrogen 5 (*)     All other components within normal limits   ROUTINE UA WITH MICROSCOPIC REFLEX TO CULTURE - Abnormal; Notable for the following components:    Specific Gravity Urine 1.002 (*)     Mucous Urine Present (*)     All other components within normal limits   HEPATIC PANEL - Abnormal; Notable for the following components:    Bilirubin Direct 0.3 (*)     ALT 98 (*)     AST 53 (*)     All other components within normal limits   CK TOTAL   LIPASE     Interventions:  Medications   0.9% sodium chloride BOLUS (0 mLs Intravenous Stopped 10/6/20 1920)   ketorolac (TORADOL) injection 15 mg (15 mg Intravenous Given 10/6/20 1921)       Emergency Department Course:  Nursing notes and vitals reviewed. I performed an exam of the patient as documented above.   ECG obtained.  Labs and imaging obtained as above.  Interventions provided as above.  Discussed results with patient.  Patient discharged to home.    Impression & Plan      Medical Decision Making:  Alana Mujica is a 44 year old female who presents to ED for evaluation of headache and nausea.  Symptoms have been present for long duration.  Patient was recently discharged from detox.  She was sent from urgent care with concern for electrolyte abnormalities.  See HPI as above for additional details.  Vitals and physical exam as above.  Differential for headache included meningitis, SAH, ICH, migraine, cluster, tension, mass, among others.  Neurologic exam is reassuring.  CT performed as above without evidence for intracranial anomaly such as mass or bleed.  Given chronic nature, have low suspicion for remainder of differential as above. Toradol provided with some improvement of patient's headache. She has previously seen neuro for occipital blocks, and I advised her to follow up with neuro again for consideration of additional management options.    With respect to lab work, repeat labs drawn as above.  I have been notable for hyponatremia 128,  hypochloremia at 90, and hypokalemia at 3.1.  No indication for admission at this time.  Suspect hyponatremia secondary to volume depletion and poor p.o. intake.  Patient does report having poor p.o. intake over the past few weeks.  Patient provided fluids here as above.  Will provide her potassium supplements as below.  Advised her to continue with multivitamins, and have her repeat labs drawn within the next few days.  [place] patient was safe for discharge home. Discussed reasons to return. All questions answered. Patient discharged to home in stable condition.    Diagnosis:    ICD-10-CM    1. Headache  R51.9    2. Nausea  R11.0    3. Muscle cramping  R25.2    4. Decreased appetite  R63.0    5. Hyponatremia  E87.1    6. Hypokalemia  E87.6    7. Hypochloremia  E87.8        Disposition:  Discharged to home    Discharge Medications:  Potassium    Davdi Barroso  10/6/2020   EMERGENCY DEPARTMENT  Scribe Disclosure:  I, David Barroso, am serving as a scribe at 5:27 PM on 10/6/2020 to document services personally performed by Greg Dolan PA-C based on my observations and the provider's statements to me.     This record was created at least in part using electronic voice recognition software, so please excuse any typographical errors.           Greg Dolan PA-C  10/07/20 0218

## 2020-10-06 NOTE — ED TRIAGE NOTES
Pt presents for evaluation of abnormal labs. Was feeling dizzy, headaches, decreased appetite with nausea and difficulty with gait for a few weeks. Was seen at [facility], had labs done, but couldn't get IV access, so was sent here. Discharged from Detox today, last drink was Thursday. Hx of alcoholic withdrawal.

## 2020-10-06 NOTE — ED AVS SNAPSHOT
[facility] Emergency Dept  [address]  [facility] [address]  Phone: [phone]  Fax: [phone]                                    Alana Mujica   MRN: [id number]    Department: [facility] Emergency Dept   Date of Visit: 10/6/2020           After Visit Summary Signature Page    I have received my discharge instructions, and my questions have been answered. I have discussed any challenges I see with this plan with the nurse or doctor.    ..........................................................................................................................................  Patient/Patient Representative Signature      ..........................................................................................................................................  Patient Representative Print Name and Relationship to Patient    ..................................................               ................................................  Date                                   Time    ..........................................................................................................................................  Reviewed by Signature/Title    ...................................................              ..............................................  Date                                               Time          22EPIC Rev 08/18

## 2020-10-07 LAB — INTERPRETATION ECG - MUSE: NORMAL

## 2020-10-07 ASSESSMENT — ENCOUNTER SYMPTOMS
HEADACHES: 1
NAUSEA: 1
APPETITE CHANGE: 1
MYALGIAS: 1

## 2020-12-11 ENCOUNTER — PATIENT OUTREACH (OUTPATIENT)
Dept: FAMILY MEDICINE | Facility: CLINIC | Age: 45
End: 2020-12-11

## 2020-12-11 DIAGNOSIS — B97.7 HPV IN FEMALE: ICD-10-CM

## 2020-12-11 NOTE — TELEPHONE ENCOUNTER
Hx of LEEP in 2010?  11/24/15 NIL, +HR HPV. Co-test 12 months  04/13/17 Would consider patient to be lost to follow-up.  4/26/18 Normal, HPV not detected (pt reported pap result)  7/17/19 NIL, +HR HPV, not 16/18. Plan cotest in 1 year  11/11/20 Reminder MyChart- Not read  12/11/20 Reminder call, LM to schedule

## 2020-12-19 ENCOUNTER — HOSPITAL ENCOUNTER (EMERGENCY)
Facility: CLINIC | Age: 45
Discharge: HOME | End: 2020-12-19
Attending: EMERGENCY MEDICINE | Admitting: EMERGENCY MEDICINE
Payer: COMMERCIAL

## 2020-12-19 ENCOUNTER — APPOINTMENT (OUTPATIENT)
Dept: CT IMAGING | Facility: CLINIC | Age: 45
End: 2020-12-19
Attending: EMERGENCY MEDICINE
Payer: COMMERCIAL

## 2020-12-19 ENCOUNTER — TRANSFERRED RECORDS (OUTPATIENT)
Dept: HEALTH INFORMATION MANAGEMENT | Facility: CLINIC | Age: 45
End: 2020-12-19

## 2020-12-19 VITALS
RESPIRATION RATE: 16 BRPM | SYSTOLIC BLOOD PRESSURE: 122 MMHG | TEMPERATURE: 98.3 F | DIASTOLIC BLOOD PRESSURE: 95 MMHG | HEART RATE: 103 BPM | OXYGEN SATURATION: 100 %

## 2020-12-19 DIAGNOSIS — R79.89 ELEVATED D-DIMER: ICD-10-CM

## 2020-12-19 DIAGNOSIS — Z20.822 SUSPECTED COVID-19 VIRUS INFECTION: ICD-10-CM

## 2020-12-19 DIAGNOSIS — F10.920 ALCOHOLIC INTOXICATION WITHOUT COMPLICATION (H): ICD-10-CM

## 2020-12-19 DIAGNOSIS — R55 SYNCOPE, UNSPECIFIED SYNCOPE TYPE: ICD-10-CM

## 2020-12-19 LAB
ANION GAP SERPL CALCULATED.3IONS-SCNC: 4 MMOL/L (ref 3–14)
BASOPHILS # BLD AUTO: 0 10E9/L (ref 0–0.2)
BASOPHILS NFR BLD AUTO: 0.9 %
BUN SERPL-MCNC: 3 MG/DL (ref 7–30)
CALCIUM SERPL-MCNC: 8.9 MG/DL (ref 8.5–10.1)
CHLORIDE SERPL-SCNC: 104 MMOL/L (ref 94–109)
CO2 SERPL-SCNC: 30 MMOL/L (ref 20–32)
CREAT SERPL-MCNC: 0.69 MG/DL (ref 0.52–1.04)
DIFFERENTIAL METHOD BLD: ABNORMAL
EOSINOPHIL # BLD AUTO: 0.1 10E9/L (ref 0–0.7)
EOSINOPHIL NFR BLD AUTO: 2.2 %
ERYTHROCYTE [DISTWIDTH] IN BLOOD BY AUTOMATED COUNT: 11.7 % (ref 10–15)
ETHANOL SERPL-MCNC: 0.3 G/DL
GFR SERPL CREATININE-BSD FRML MDRD: >90 ML/MIN/{1.73_M2}
GLUCOSE SERPL-MCNC: 96 MG/DL (ref 70–99)
HCT VFR BLD AUTO: 43 % (ref 35–47)
HGB BLD-MCNC: 14.7 G/DL (ref 11.7–15.7)
IMM GRANULOCYTES # BLD: 0 10E9/L (ref 0–0.4)
IMM GRANULOCYTES NFR BLD: 0.2 %
LYMPHOCYTES # BLD AUTO: 1.2 10E9/L (ref 0.8–5.3)
LYMPHOCYTES NFR BLD AUTO: 27.5 %
MCH RBC QN AUTO: 33.8 PG (ref 26.5–33)
MCHC RBC AUTO-ENTMCNC: 34.2 G/DL (ref 31.5–36.5)
MCV RBC AUTO: 99 FL (ref 78–100)
MONOCYTES # BLD AUTO: 0.5 10E9/L (ref 0–1.3)
MONOCYTES NFR BLD AUTO: 10 %
NEUTROPHILS # BLD AUTO: 2.7 10E9/L (ref 1.6–8.3)
NEUTROPHILS NFR BLD AUTO: 59.2 %
NRBC # BLD AUTO: 0 10*3/UL
NRBC BLD AUTO-RTO: 0 /100
PLATELET # BLD AUTO: 169 10E9/L (ref 150–450)
POTASSIUM SERPL-SCNC: 3.9 MMOL/L (ref 3.4–5.3)
RBC # BLD AUTO: 4.35 10E12/L (ref 3.8–5.2)
SODIUM SERPL-SCNC: 138 MMOL/L (ref 133–144)
TROPONIN I SERPL-MCNC: <0.015 UG/L (ref 0–0.04)
WBC # BLD AUTO: 4.5 10E9/L (ref 4–11)

## 2020-12-19 PROCEDURE — 80320 DRUG SCREEN QUANTALCOHOLS: CPT | Performed by: EMERGENCY MEDICINE

## 2020-12-19 PROCEDURE — 99285 EMERGENCY DEPT VISIT HI MDM: CPT | Mod: 25

## 2020-12-19 PROCEDURE — 96360 HYDRATION IV INFUSION INIT: CPT | Mod: 59

## 2020-12-19 PROCEDURE — 250N000011 HC RX IP 250 OP 636: Performed by: EMERGENCY MEDICINE

## 2020-12-19 PROCEDURE — 71275 CT ANGIOGRAPHY CHEST: CPT

## 2020-12-19 PROCEDURE — C9803 HOPD COVID-19 SPEC COLLECT: HCPCS

## 2020-12-19 PROCEDURE — 84484 ASSAY OF TROPONIN QUANT: CPT | Performed by: EMERGENCY MEDICINE

## 2020-12-19 PROCEDURE — 80048 BASIC METABOLIC PNL TOTAL CA: CPT | Performed by: EMERGENCY MEDICINE

## 2020-12-19 PROCEDURE — 85025 COMPLETE CBC W/AUTO DIFF WBC: CPT | Performed by: EMERGENCY MEDICINE

## 2020-12-19 PROCEDURE — 96361 HYDRATE IV INFUSION ADD-ON: CPT

## 2020-12-19 PROCEDURE — 250N000009 HC RX 250: Performed by: EMERGENCY MEDICINE

## 2020-12-19 PROCEDURE — 258N000003 HC RX IP 258 OP 636: Performed by: EMERGENCY MEDICINE

## 2020-12-19 PROCEDURE — U0003 INFECTIOUS AGENT DETECTION BY NUCLEIC ACID (DNA OR RNA); SEVERE ACUTE RESPIRATORY SYNDROME CORONAVIRUS 2 (SARS-COV-2) (CORONAVIRUS DISEASE [COVID-19]), AMPLIFIED PROBE TECHNIQUE, MAKING USE OF HIGH THROUGHPUT TECHNOLOGIES AS DESCRIBED BY CMS-2020-01-R: HCPCS | Performed by: EMERGENCY MEDICINE

## 2020-12-19 PROCEDURE — 93005 ELECTROCARDIOGRAM TRACING: CPT

## 2020-12-19 RX ORDER — IOPAMIDOL 755 MG/ML
500 INJECTION, SOLUTION INTRAVASCULAR ONCE
Status: COMPLETED | OUTPATIENT
Start: 2020-12-19 | End: 2020-12-19

## 2020-12-19 RX ADMIN — SODIUM CHLORIDE 1000 ML: 9 INJECTION, SOLUTION INTRAVENOUS at 17:00

## 2020-12-19 RX ADMIN — SODIUM CHLORIDE 1000 ML: 9 INJECTION, SOLUTION INTRAVENOUS at 15:05

## 2020-12-19 RX ADMIN — IOPAMIDOL 62 ML: 755 INJECTION, SOLUTION INTRAVENOUS at 16:43

## 2020-12-19 RX ADMIN — SODIUM CHLORIDE 72 ML: 9 INJECTION, SOLUTION INTRAVENOUS at 16:43

## 2020-12-19 ASSESSMENT — ENCOUNTER SYMPTOMS
SORE THROAT: 1
RHINORRHEA: 1
FEVER: 1
HEADACHES: 0
DIZZINESS: 1
COUGH: 1
APPETITE CHANGE: 1
SHORTNESS OF BREATH: 0
SINUS PRESSURE: 1
CONFUSION: 0

## 2020-12-19 NOTE — ED TRIAGE NOTES
Patient presents via EMS with loss of consciousness, abnormal labs. Patient was at [facility] for fever, SOB, nasal congestion, cough, and had blood work, D-dimer elevated. The nurse came out to lobby to get her and she was passed out in a chair and was hard to arouse. ABCDS intact, alert.

## 2020-12-19 NOTE — ED PROVIDER NOTES
History   Chief Complaint:  Loss of Consciousness and Abnormal Labs     The history is provided by the patient and medical records.      Alana Mujica is a 45 year old female with history of chronic sinusitis, alcoholism, HSV 2, RUPINDER, and substance abuse, who presents with a loss of consciousness and elevated D-dimer. Patient report she has been experiencing a cough, shortness of breath which she thinks may be related to her blood pressure, fever as high as 100.9 degrees F, ear congestion, rhinorrhea, and a sore throat for the last two weeks. She has tried taking Mucinex for her symptoms, which has provided some relief.     Due to her persisting symptoms, the patient decided to seek evaluation at the [facility] where they did basic lab work, an EKG, and a chest x-ray, see notable findings below. After lab work was done, the nurse went out to the lobby to get the patient, found her to be passed out in her chair and hard to arouse. Patient was then transferred to the ED for further evaluation.     Here, the patient also complains of a lack of appetite, sinus pressure but this is not new, and has mild dizziness at rest/laying down. Patient denies any headache, confusion, myalgia, or shortness of breath here. Patient admits to drinking a 16 oz today and is a light tobacco user.    To note, the patient has had two negative COVID tests in the last week.     [facility] Workup from Earlier today:    CBC: WBC: 5.2, HGB: 13.8, PLT: 162  CMP: Glucose: 127, BUN: 4, AST: 109, Globulin: 4.2, o/w WNL (Creatinine: 0.86)    D-dimer: 1.16 (H)    EKG showed sinus tachycardia.   Chest x-ray showed fine calcifications in GARRET.     Review of Systems   Constitutional: Positive for appetite change and fever.   HENT: Positive for ear pain, rhinorrhea, sinus pressure (not new) and sore throat.    Respiratory: Positive for cough. Negative for shortness of breath (none today).    Neurological: Positive for dizziness and syncope. Negative for headaches.    Psychiatric/Behavioral: Negative for confusion.   All other systems reviewed and are negative.      Allergies:  The patient has no known allergies.     Medications:  Acyclovir  Nexplanon implant  Folic acid  Naproxen    Past Medical History:    Chronic sinusitis  Bulimia nervosa  Osteoporosis  Alcoholism  HSV 2  GERD  Depression  TRAM  Deviated nasal septum  Substance abuse  RUPINDER     Past Surgical History:    LEEP  [place] teeth extraction    Family History:    Hypertension  Breast cancer    Social History:  Smoking status: Former - quit date: 2008  Alcohol use: Positive  Drug use: Negative  Marital Status: Single  Presents to the ED via EMS.  PCP: Ga Alaniz    Physical Exam     Patient Vitals for the past 24 hrs:   BP Temp Temp src Pulse Resp SpO2   12/19/20 1700 -- -- -- -- -- 98 %   12/19/20 1630 117/86 -- -- 85 -- 99 %   12/19/20 1600 109/85 -- -- 85 -- 99 %   12/19/20 1500 (!) 138/106 -- -- 99 -- 98 %   12/19/20 1422 (!) 139/105 98.3  F (36.8  C) Oral 100 18 97 %       Physical Exam  Constitutional: Vital signs reviewed as above.   Head: No external signs of trauma noted.  Eyes: Pupils are equal, round, and reactive to light.   Neck: No JVD noted  Cardiovascular: Normal rate, regular rhythm and normal heart sounds.  No murmur heard. Equal B/L peripheral pulses.  Pulmonary/Chest: Effort normal and breath sounds normal. No respiratory distress. Patient has no wheezes. Patient has no rales.   Gastrointestinal: Soft. There is no tenderness.   Musculoskeletal/Extremities: No edema noted. Normal tone.  Neurological: Patient is alert and oriented to person, place, and time. Speech seems slurred as though she is intoxicated  Skin: Skin is warm and dry. There is no diaphoresis noted.   Psychiatric: The patient appears calm.    Emergency Department Course     ECG:  Indication: LOC; Abnormal Labs  Time: 1441  Vent. Rate 99 bpm. MI interval 152. QRS duration 62. QT/QTc 354/454. P-R-T axis 51 6 34. Normal sinus  rhythm. Normal ECG. Read time: 1447    There are no significant changes when compared to an EKG done on 10/06/2020.    Imaging:  Radiology findings were communicated with the patient who voiced understanding of the findings.    CT Chest w/ IV contrast - PE protocol:   1.  No pulmonary emboli.  2.  No pulmonary infiltrate or effusion.  3.  Small hiatal hernia.  4.  Diffuse hepatic steatosis. As per radiology.     Laboratory:  Laboratory findings were communicated with the patient who voiced understanding of the findings.    CBC: WBC: 4.5, HGB: 14.7, PLT: 169  BMP: Urea Nitrogen: 3 (L), o/w WNL (Creatinine: 0.69)    Alcohol ethyl: 0.30 (H)     Troponin (1432): <0.015    Symptomatic COVID-19: Pending     Interventions:  1505 NS 1L IV    Emergency Department Course:  ED Course as of Dec 19 1837   Sat Dec 19, 2020   1439 I reviewed the patient's nursing notes, vitals, past medical records, and Care Everywhere.       1759 I rechecked the patient and discussed the results of her workup thus far.         1835 Findings and plan explained to the Patient. Patient discharged home with instructions regarding supportive care, medications, and reasons to return. The importance of close follow-up was reviewed.     I personally reviewed the laboratory results with the Patient and answered all related questions prior to discharge.            Impression & Plan     COVID-19  Alana Mujica was evaluated during a global COVID-19 pandemic, which necessitated consideration that the patient might be at risk for infection with the SARS-CoV-2 virus that causes COVID-19.   Applicable protocols for evaluation were followed during the patient's care.   COVID-19 was considered as part of the patient's evaluation. The plan for testing is:  a test was obtained during this visit.      Medical Decision Making:  Alana Mujica is a 45 year old female who presents today from [facility] due to elevated D-dimer, fever, as well as an  unresponsive state for the patient seemed to have fainted in her chair.  Please see the HPI and exam for specifics.  Patient remained stable in the ED.  CT imaging did not reveal pulmonary embolism.  The patient was found to be quite intoxicated and she does admit to drinking alcohol today.  It is certainly possible she could have COVID-19 so she was discharged with those precautions and encouraged to follow-up in the outpatient setting.  She may return to the emergency department with any new or worsening symptoms. Anticipatory guidance given prior to discharge.      Diagnosis:    ICD-10-CM    1. Syncope, unspecified syncope type  R55    2. Alcoholic intoxication without complication (H)  F10.920    3. Elevated d-dimer  R79.89    4. Suspected COVID-19 virus infection  Z20.828        Disposition:  Discharged to home.    Scribe Disclosure:  IYelena, am serving as a scribe on 12/19/2020 at 2:38 PM to personally document services performed by Cortes Escobar DO based on my observations and the provider's statements to me.      Cortes Escobar DO  12/19/20 0283

## 2020-12-19 NOTE — ED NOTES
Bed: ED09  Expected date: 12/19/20  Expected time:   Means of arrival: Ambulance  Comments:  Emily Spaulding

## 2020-12-19 NOTE — ED NOTES
DATE:  12/19/2020   TIME OF RECEIPT FROM LAB:  1542  LAB TEST:  Ethanol  LAB VALUE:  0.3  RESULTS GIVEN WITH READ-BACK TO (PROVIDER):  Cortes Escobar, DO  TIME LAB VALUE REPORTED TO PROVIDER:   1541

## 2020-12-19 NOTE — ED AVS SNAPSHOT
[facility] Emergency Dept  [address]  [facility] [address]  Phone: [phone]  Fax: [phone]                                    Alana Mujica   MRN: [id number]    Department: [facility] Emergency Dept   Date of Visit: 12/19/2020           After Visit Summary Signature Page    I have received my discharge instructions, and my questions have been answered. I have discussed any challenges I see with this plan with the nurse or doctor.    ..........................................................................................................................................  Patient/Patient Representative Signature      ..........................................................................................................................................  Patient Representative Print Name and Relationship to Patient    ..................................................               ................................................  Date                                   Time    ..........................................................................................................................................  Reviewed by Signature/Title    ...................................................              ..............................................  Date                                               Time          22EPIC Rev 08/18

## 2020-12-20 LAB
SARS-COV-2 RNA SPEC QL NAA+PROBE: NOT DETECTED
SPECIMEN SOURCE: NORMAL

## 2020-12-20 NOTE — DISCHARGE INSTRUCTIONS
What do you do next:   Continue your home medications unless we have specifically changed them  Try to cut down on your alcohol consumption.  This may have led to your passing out episode in the clinic.  Avoid others until your Covid test has come back.  You may enroll in the IncreaseCard to get electronic results delivered to your phone.  See your discharge paperwork for information about this.  Follow up as indicated below    When do you return: If you have lightheadedness or fainting, uncontrollable fevers, intractable vomiting, dizziness or fainting, or any other symptoms that concern you, please return to the ED for reevaluation.    Thank you for allowing us to care for you today.

## 2020-12-21 LAB — INTERPRETATION ECG - MUSE: NORMAL

## 2020-12-30 ENCOUNTER — MYC MEDICAL ADVICE (OUTPATIENT)
Dept: FAMILY MEDICINE | Facility: CLINIC | Age: 45
End: 2020-12-30

## 2020-12-30 DIAGNOSIS — B00.1 COLD SORE: ICD-10-CM

## 2020-12-30 NOTE — LETTER
January 14, 2021      Alana A Sil  [address] [place] [place] [address]        Dear Ms. Alana Mujica,    We recently received a call from your pharmacy requesting a refill of your medication Valacyclovir.    We are contacting you today to notify you that you are due for a office visit for further refills and to establish care with a provider.     We have authorized one refill of your medication to allow time for you to schedule your appointment.    This appointment can be in clinic or virtual by either telephone, video.call [phone] to schedule an appointment or if you have MyChart you can schedule with your provider as well.    Taking care of your health is important to us, an ongoing visits with your provider are vital to your care. We look forward to seeing you in the near future.    Thank you for using [facility] for your Medical Needs.

## 2020-12-31 RX ORDER — VALACYCLOVIR HYDROCHLORIDE 500 MG/1
TABLET, FILM COATED ORAL
Qty: 4 TABLET | Refills: 0 | Status: SHIPPED | OUTPATIENT
Start: 2020-12-31 | End: 2022-08-30

## 2020-12-31 NOTE — TELEPHONE ENCOUNTER
valACYclovir (VALTREX) 500 MG tablet  Last Written Prescription Date:  6/1/20  Last Fill Quantity: 12,   # refills: 0  Last Office Visit: 8/30/19 with KO  Future Office visit:       Routing refill request to provider for review/approval because:  Patient needs appt. See my chart.     Roxann Suggs RN on 12/31/2020 at 2:49 PM

## 2020-12-31 NOTE — TELEPHONE ENCOUNTER
One flare fill. Not seen in our clinic since Summer 2019. Needs appt with someone to establish care.

## 2021-01-07 NOTE — TELEPHONE ENCOUNTER
FYI to provider - Patient is lost to pap tracking follow-up. Attempts to contact pt have been made per reminder process and there has been no reply and/or no appt scheduled.       Hx of LEEP in 2010?  11/24/15 NIL, +HR HPV. Co-test 12 months  04/13/17 Would consider patient to be lost to follow-up.  4/26/18 Normal, HPV not detected (pt reported pap result)  7/17/19 NIL, +HR HPV, not 16/18. Plan cotest in 1 year  11/11/20 Reminder MyChart- Not read  12/11/20 Reminder call, LM to schedule  1/7/21 Lost to follow-up for pap tracking

## 2021-01-15 ENCOUNTER — VIRTUAL VISIT (OUTPATIENT)
Dept: FAMILY MEDICINE | Facility: CLINIC | Age: 46
End: 2021-01-15
Payer: COMMERCIAL

## 2021-01-15 ENCOUNTER — HEALTH MAINTENANCE LETTER (OUTPATIENT)
Age: 46
End: 2021-01-15

## 2021-01-15 DIAGNOSIS — A60.00 GENITAL HERPES SIMPLEX, UNSPECIFIED SITE: Primary | ICD-10-CM

## 2021-01-15 DIAGNOSIS — B00.1 COLD SORE: ICD-10-CM

## 2021-01-15 DIAGNOSIS — B97.7 HPV IN FEMALE: ICD-10-CM

## 2021-01-15 PROCEDURE — 99214 OFFICE O/P EST MOD 30 MIN: CPT | Mod: 95 | Performed by: PHYSICIAN ASSISTANT

## 2021-01-15 RX ORDER — VALACYCLOVIR HYDROCHLORIDE 500 MG/1
500 TABLET, FILM COATED ORAL DAILY
Qty: 90 TABLET | Refills: 0 | Status: ON HOLD | OUTPATIENT
Start: 2021-01-15 | End: 2022-03-17

## 2021-01-15 RX ORDER — CETIRIZINE HYDROCHLORIDE 10 MG/1
10 TABLET ORAL DAILY PRN
Status: ON HOLD | COMMUNITY
End: 2022-06-22

## 2021-01-15 NOTE — PROGRESS NOTES
Alana is a 45 year old who is being evaluated via a billable video visit.      How would you like to obtain your AVS? MyChart  If the video visit is dropped, the invitation should be resent by: Text to cell phone: [phone]  Will anyone else be joining your video visit? No      Video Start Time: 11:17 AM  Assessment & Plan     Genital herpes simplex, unspecified site  Cold sore  Increased flares lately. More than 2-3 monthly. Having alleviatory effect with valtrex prn but I suggested initiation of chornic suppression at this time with the amount of flares. She is agreeable. Start 500mg once daily. 3 months for now to ensure she is scheduled for below. If successful in suppression, can consider continuing vs transition again to prn use  - valACYclovir (VALTREX) 500 MG tablet; Take 1 tablet (500 mg) by mouth daily    HPV in female  Discussed importance of getting PAP. Last in 07/2019 with NIL and POS HPV other    Return in about 3 months (around 4/15/2021) for Physical Exam, Lab Work, PAP.    Ga Alaniz PA-C  [facility] [place]    Subjective     Alana is a 45 year old who presents to clinic today for the following health issues   HPI       Medication Recheck- Valtrex- Has been working for symptoms but didn't get a refill    Previously would need 3 days of Rx to treat flare  Having more flares lately  Some oral flares but more in the groin  Increased stress with COVID and without employment currently    Review of Systems   Constitutional, HEENT, cardiovascular, pulmonary, gi and gu systems are negative, except as otherwise noted.      Objective           Vitals:  No vitals were obtained today due to virtual visit.    Physical Exam   GENERAL: Healthy, alert and no distress  EYES: Eyes grossly normal to inspection.  No discharge or erythema, or obvious scleral/conjunctival abnormalities.  RESP: No audible wheeze, cough, or visible cyanosis.  No visible retractions or increased work of  breathing.    SKIN: not observed  NEURO: Cranial nerves grossly intact.  Mentation and speech appropriate for age.  PSYCH: Mentation appears normal, affect normal/bright, judgement and insight intact, normal speech and appearance well-groomed.          Video-Visit Details    Type of service:  Video Visit    Video End Time:11:33    Originating Location (pt. Location): Home    Distant Location (provider location):  [facility]     Platform used for Video Visit: Castillo

## 2021-08-19 ENCOUNTER — ANCILLARY PROCEDURE (OUTPATIENT)
Dept: MAMMOGRAPHY | Facility: CLINIC | Age: 46
End: 2021-08-19
Payer: COMMERCIAL

## 2021-08-19 DIAGNOSIS — Z12.31 VISIT FOR SCREENING MAMMOGRAM: ICD-10-CM

## 2021-09-04 ENCOUNTER — HEALTH MAINTENANCE LETTER (OUTPATIENT)
Age: 46
End: 2021-09-04

## 2022-02-19 ENCOUNTER — HEALTH MAINTENANCE LETTER (OUTPATIENT)
Age: 47
End: 2022-02-19

## 2022-03-17 ENCOUNTER — APPOINTMENT (OUTPATIENT)
Dept: ULTRASOUND IMAGING | Facility: CLINIC | Age: 47
End: 2022-03-17
Attending: EMERGENCY MEDICINE
Payer: COMMERCIAL

## 2022-03-17 ENCOUNTER — HOSPITAL ENCOUNTER (OUTPATIENT)
Facility: CLINIC | Age: 47
Setting detail: OBSERVATION
Discharge: HOME | End: 2022-03-20
Attending: EMERGENCY MEDICINE | Admitting: INTERNAL MEDICINE
Payer: COMMERCIAL

## 2022-03-17 ENCOUNTER — APPOINTMENT (OUTPATIENT)
Dept: ULTRASOUND IMAGING | Facility: CLINIC | Age: 47
End: 2022-03-17
Attending: NURSE PRACTITIONER
Payer: COMMERCIAL

## 2022-03-17 DIAGNOSIS — K75.9 HEPATITIS: ICD-10-CM

## 2022-03-17 DIAGNOSIS — F41.1 GENERALIZED ANXIETY DISORDER: Primary | ICD-10-CM

## 2022-03-17 LAB
ALBUMIN SERPL-MCNC: 3 G/DL (ref 3.4–5)
ALBUMIN SERPL-MCNC: 3.8 G/DL (ref 3.4–5)
ALBUMIN UR-MCNC: 200 MG/DL
ALP SERPL-CCNC: 76 U/L (ref 40–150)
ALP SERPL-CCNC: 92 U/L (ref 40–150)
ALT SERPL W P-5'-P-CCNC: 4844 U/L (ref 0–50)
ALT SERPL W P-5'-P-CCNC: 7005 U/L (ref 0–50)
ANION GAP SERPL CALCULATED.3IONS-SCNC: 6 MMOL/L (ref 3–14)
APAP SERPL-MCNC: 2 MG/L (ref 10–30)
APAP SERPL-MCNC: 3 MG/L (ref 10–30)
APPEARANCE UR: ABNORMAL
AST SERPL W P-5'-P-CCNC: 4381 U/L (ref 0–45)
AST SERPL W P-5'-P-CCNC: 9442 U/L (ref 0–45)
ATRIAL RATE - MUSE: 100 BPM
B-HCG SERPL-ACNC: <1 IU/L (ref 0–5)
BACTERIA #/AREA URNS HPF: ABNORMAL /HPF
BASOPHILS # BLD AUTO: 0.1 10E3/UL (ref 0–0.2)
BASOPHILS NFR BLD AUTO: 1 %
BILIRUB DIRECT SERPL-MCNC: 0.4 MG/DL (ref 0–0.2)
BILIRUB DIRECT SERPL-MCNC: 0.5 MG/DL (ref 0–0.2)
BILIRUB SERPL-MCNC: 0.9 MG/DL (ref 0.2–1.3)
BILIRUB SERPL-MCNC: 1 MG/DL (ref 0.2–1.3)
BILIRUB UR QL STRIP: NEGATIVE
BUN SERPL-MCNC: 7 MG/DL (ref 7–30)
CALCIUM SERPL-MCNC: 10.4 MG/DL (ref 8.5–10.1)
CHLORIDE BLD-SCNC: 92 MMOL/L (ref 94–109)
CK SERPL-CCNC: 56 U/L (ref 30–225)
CO2 SERPL-SCNC: 29 MMOL/L (ref 20–32)
COLOR UR AUTO: ABNORMAL
CREAT SERPL-MCNC: 0.84 MG/DL (ref 0.52–1.04)
DIASTOLIC BLOOD PRESSURE - MUSE: NORMAL MMHG
EOSINOPHIL # BLD AUTO: 0.3 10E3/UL (ref 0–0.7)
EOSINOPHIL NFR BLD AUTO: 4 %
ERYTHROCYTE [DISTWIDTH] IN BLOOD BY AUTOMATED COUNT: 11.5 % (ref 10–15)
ETHANOL SERPL-MCNC: <0.01 G/DL
FLUAV RNA SPEC QL NAA+PROBE: NEGATIVE
FLUBV RNA RESP QL NAA+PROBE: NEGATIVE
GFR SERPL CREATININE-BSD FRML MDRD: 85 ML/MIN/1.73M2
GLUCOSE BLD-MCNC: 96 MG/DL (ref 70–99)
GLUCOSE UR STRIP-MCNC: NEGATIVE MG/DL
HCT VFR BLD AUTO: 41.9 % (ref 35–47)
HGB BLD-MCNC: 14.3 G/DL (ref 11.7–15.7)
HGB UR QL STRIP: ABNORMAL
IMM GRANULOCYTES # BLD: 0 10E3/UL
IMM GRANULOCYTES NFR BLD: 1 %
INR PPP: 1.38 (ref 0.85–1.15)
INTERPRETATION ECG - MUSE: NORMAL
KETONES UR STRIP-MCNC: 10 MG/DL
LACTATE SERPL-SCNC: 1.2 MMOL/L (ref 0.7–2)
LEUKOCYTE ESTERASE UR QL STRIP: ABNORMAL
LIPASE SERPL-CCNC: 195 U/L (ref 73–393)
LYMPHOCYTES # BLD AUTO: 0.6 10E3/UL (ref 0.8–5.3)
LYMPHOCYTES NFR BLD AUTO: 10 %
MCH RBC QN AUTO: 32.1 PG (ref 26.5–33)
MCHC RBC AUTO-ENTMCNC: 34.1 G/DL (ref 31.5–36.5)
MCV RBC AUTO: 94 FL (ref 78–100)
MONOCYTES # BLD AUTO: 0.2 10E3/UL (ref 0–1.3)
MONOCYTES NFR BLD AUTO: 3 %
MONOCYTES NFR BLD AUTO: NEGATIVE %
MUCOUS THREADS #/AREA URNS LPF: PRESENT /LPF
NEUTROPHILS # BLD AUTO: 5 10E3/UL (ref 1.6–8.3)
NEUTROPHILS NFR BLD AUTO: 81 %
NITRATE UR QL: NEGATIVE
NRBC # BLD AUTO: 0 10E3/UL
NRBC BLD AUTO-RTO: 0 /100
P AXIS - MUSE: 73 DEGREES
PH UR STRIP: 5.5 [PH] (ref 5–7)
PLATELET # BLD AUTO: 191 10E3/UL (ref 150–450)
POTASSIUM BLD-SCNC: 3.5 MMOL/L (ref 3.4–5.3)
PR INTERVAL - MUSE: 130 MS
PROT SERPL-MCNC: 6.2 G/DL (ref 6.8–8.8)
PROT SERPL-MCNC: 7.5 G/DL (ref 6.8–8.8)
QRS DURATION - MUSE: 68 MS
QT - MUSE: 330 MS
QTC - MUSE: 425 MS
R AXIS - MUSE: 32 DEGREES
RBC # BLD AUTO: 4.45 10E6/UL (ref 3.8–5.2)
RBC URINE: 3 /HPF
SARS-COV-2 RNA RESP QL NAA+PROBE: NEGATIVE
SODIUM SERPL-SCNC: 127 MMOL/L (ref 133–144)
SP GR UR STRIP: 1.02 (ref 1–1.03)
SQUAMOUS EPITHELIAL: 49 /HPF
SYSTOLIC BLOOD PRESSURE - MUSE: NORMAL MMHG
T AXIS - MUSE: 57 DEGREES
UROBILINOGEN UR STRIP-MCNC: NORMAL MG/DL
VENTRICULAR RATE- MUSE: 100 BPM
WBC # BLD AUTO: 6.2 10E3/UL (ref 4–11)
WBC URINE: 24 /HPF

## 2022-03-17 PROCEDURE — 87529 HSV DNA AMP PROBE: CPT | Performed by: NURSE PRACTITIONER

## 2022-03-17 PROCEDURE — 96375 TX/PRO/DX INJ NEW DRUG ADDON: CPT

## 2022-03-17 PROCEDURE — 86381 MITOCHONDRIAL ANTIBODY EACH: CPT | Performed by: NURSE PRACTITIONER

## 2022-03-17 PROCEDURE — 87522 HEPATITIS C REVRS TRNSCRPJ: CPT | Performed by: PHYSICIAN ASSISTANT

## 2022-03-17 PROCEDURE — 250N000011 HC RX IP 250 OP 636: Performed by: INTERNAL MEDICINE

## 2022-03-17 PROCEDURE — 99285 EMERGENCY DEPT VISIT HI MDM: CPT | Mod: 25

## 2022-03-17 PROCEDURE — 258N000003 HC RX IP 258 OP 636: Performed by: EMERGENCY MEDICINE

## 2022-03-17 PROCEDURE — 250N000013 HC RX MED GY IP 250 OP 250 PS 637: Performed by: PHYSICIAN ASSISTANT

## 2022-03-17 PROCEDURE — 86706 HEP B SURFACE ANTIBODY: CPT | Performed by: PHYSICIAN ASSISTANT

## 2022-03-17 PROCEDURE — 80074 ACUTE HEPATITIS PANEL: CPT | Performed by: PHYSICIAN ASSISTANT

## 2022-03-17 PROCEDURE — 85025 COMPLETE CBC W/AUTO DIFF WBC: CPT | Performed by: EMERGENCY MEDICINE

## 2022-03-17 PROCEDURE — 86803 HEPATITIS C AB TEST: CPT | Performed by: PHYSICIAN ASSISTANT

## 2022-03-17 PROCEDURE — 82248 BILIRUBIN DIRECT: CPT | Performed by: NURSE PRACTITIONER

## 2022-03-17 PROCEDURE — 86705 HEP B CORE ANTIBODY IGM: CPT | Performed by: PHYSICIAN ASSISTANT

## 2022-03-17 PROCEDURE — 36415 COLL VENOUS BLD VENIPUNCTURE: CPT | Performed by: NURSE PRACTITIONER

## 2022-03-17 PROCEDURE — 87636 SARSCOV2 & INF A&B AMP PRB: CPT | Performed by: EMERGENCY MEDICINE

## 2022-03-17 PROCEDURE — 86308 HETEROPHILE ANTIBODY SCREEN: CPT | Performed by: EMERGENCY MEDICINE

## 2022-03-17 PROCEDURE — 250N000011 HC RX IP 250 OP 636: Performed by: PHYSICIAN ASSISTANT

## 2022-03-17 PROCEDURE — 250N000011 HC RX IP 250 OP 636: Performed by: EMERGENCY MEDICINE

## 2022-03-17 PROCEDURE — 258N000003 HC RX IP 258 OP 636: Performed by: PHYSICIAN ASSISTANT

## 2022-03-17 PROCEDURE — 80143 DRUG ASSAY ACETAMINOPHEN: CPT | Performed by: NURSE PRACTITIONER

## 2022-03-17 PROCEDURE — 81001 URINALYSIS AUTO W/SCOPE: CPT | Performed by: EMERGENCY MEDICINE

## 2022-03-17 PROCEDURE — 82310 ASSAY OF CALCIUM: CPT | Performed by: EMERGENCY MEDICINE

## 2022-03-17 PROCEDURE — 36415 COLL VENOUS BLD VENIPUNCTURE: CPT | Performed by: EMERGENCY MEDICINE

## 2022-03-17 PROCEDURE — 120N000001 HC R&B MED SURG/OB

## 2022-03-17 PROCEDURE — 82550 ASSAY OF CK (CPK): CPT | Performed by: NURSE PRACTITIONER

## 2022-03-17 PROCEDURE — 85610 PROTHROMBIN TIME: CPT | Performed by: NURSE PRACTITIONER

## 2022-03-17 PROCEDURE — 99207 PR APP CREDIT; MD BILLING SHARED VISIT: CPT | Performed by: PHYSICIAN ASSISTANT

## 2022-03-17 PROCEDURE — 86038 ANTINUCLEAR ANTIBODIES: CPT | Performed by: NURSE PRACTITIONER

## 2022-03-17 PROCEDURE — 82077 ASSAY SPEC XCP UR&BREATH IA: CPT | Performed by: EMERGENCY MEDICINE

## 2022-03-17 PROCEDURE — 93005 ELECTROCARDIOGRAM TRACING: CPT

## 2022-03-17 PROCEDURE — C9803 HOPD COVID-19 SPEC COLLECT: HCPCS

## 2022-03-17 PROCEDURE — 96365 THER/PROPH/DIAG IV INF INIT: CPT

## 2022-03-17 PROCEDURE — 96361 HYDRATE IV INFUSION ADD-ON: CPT

## 2022-03-17 PROCEDURE — 87086 URINE CULTURE/COLONY COUNT: CPT | Performed by: EMERGENCY MEDICINE

## 2022-03-17 PROCEDURE — 83605 ASSAY OF LACTIC ACID: CPT | Performed by: EMERGENCY MEDICINE

## 2022-03-17 PROCEDURE — 84155 ASSAY OF PROTEIN SERUM: CPT | Performed by: NURSE PRACTITIONER

## 2022-03-17 PROCEDURE — 84155 ASSAY OF PROTEIN SERUM: CPT | Performed by: EMERGENCY MEDICINE

## 2022-03-17 PROCEDURE — 76705 ECHO EXAM OF ABDOMEN: CPT

## 2022-03-17 PROCEDURE — 84702 CHORIONIC GONADOTROPIN TEST: CPT | Performed by: EMERGENCY MEDICINE

## 2022-03-17 PROCEDURE — 93976 VASCULAR STUDY: CPT

## 2022-03-17 PROCEDURE — 83690 ASSAY OF LIPASE: CPT | Performed by: EMERGENCY MEDICINE

## 2022-03-17 PROCEDURE — 82248 BILIRUBIN DIRECT: CPT | Mod: 91 | Performed by: EMERGENCY MEDICINE

## 2022-03-17 PROCEDURE — 96374 THER/PROPH/DIAG INJ IV PUSH: CPT

## 2022-03-17 PROCEDURE — 80143 DRUG ASSAY ACETAMINOPHEN: CPT | Performed by: EMERGENCY MEDICINE

## 2022-03-17 PROCEDURE — 86665 EPSTEIN-BARR CAPSID VCA: CPT | Performed by: INTERNAL MEDICINE

## 2022-03-17 PROCEDURE — 258N000003 HC RX IP 258 OP 636: Performed by: INTERNAL MEDICINE

## 2022-03-17 PROCEDURE — 86015 ACTIN ANTIBODY EACH: CPT | Performed by: NURSE PRACTITIONER

## 2022-03-17 PROCEDURE — 86708 HEPATITIS A ANTIBODY: CPT | Performed by: NURSE PRACTITIONER

## 2022-03-17 RX ORDER — NALOXONE HYDROCHLORIDE 0.4 MG/ML
0.2 INJECTION, SOLUTION INTRAMUSCULAR; INTRAVENOUS; SUBCUTANEOUS
Status: DISCONTINUED | OUTPATIENT
Start: 2022-03-17 | End: 2022-03-20 | Stop reason: HOSPADM

## 2022-03-17 RX ORDER — OXYCODONE HYDROCHLORIDE 5 MG/1
5 TABLET ORAL EVERY 4 HOURS PRN
Status: DISCONTINUED | OUTPATIENT
Start: 2022-03-17 | End: 2022-03-20 | Stop reason: HOSPADM

## 2022-03-17 RX ORDER — KETOROLAC TROMETHAMINE 15 MG/ML
15 INJECTION, SOLUTION INTRAMUSCULAR; INTRAVENOUS ONCE
Status: COMPLETED | OUTPATIENT
Start: 2022-03-17 | End: 2022-03-17

## 2022-03-17 RX ORDER — ONDANSETRON 4 MG/1
4 TABLET, ORALLY DISINTEGRATING ORAL EVERY 6 HOURS PRN
Status: DISCONTINUED | OUTPATIENT
Start: 2022-03-17 | End: 2022-03-20 | Stop reason: HOSPADM

## 2022-03-17 RX ORDER — SODIUM CHLORIDE, SODIUM LACTATE, POTASSIUM CHLORIDE, CALCIUM CHLORIDE 600; 310; 30; 20 MG/100ML; MG/100ML; MG/100ML; MG/100ML
INJECTION, SOLUTION INTRAVENOUS CONTINUOUS
Status: DISCONTINUED | OUTPATIENT
Start: 2022-03-17 | End: 2022-03-18

## 2022-03-17 RX ORDER — HYDROMORPHONE HCL IN WATER/PF 6 MG/30 ML
0.2 PATIENT CONTROLLED ANALGESIA SYRINGE INTRAVENOUS
Status: DISCONTINUED | OUTPATIENT
Start: 2022-03-17 | End: 2022-03-20 | Stop reason: HOSPADM

## 2022-03-17 RX ORDER — NALOXONE HYDROCHLORIDE 0.4 MG/ML
0.4 INJECTION, SOLUTION INTRAMUSCULAR; INTRAVENOUS; SUBCUTANEOUS
Status: DISCONTINUED | OUTPATIENT
Start: 2022-03-17 | End: 2022-03-20 | Stop reason: HOSPADM

## 2022-03-17 RX ORDER — PROCHLORPERAZINE 25 MG
25 SUPPOSITORY, RECTAL RECTAL EVERY 12 HOURS PRN
Status: DISCONTINUED | OUTPATIENT
Start: 2022-03-17 | End: 2022-03-20 | Stop reason: HOSPADM

## 2022-03-17 RX ORDER — PARSLEY 450 MG
500 CAPSULE ORAL DAILY
Status: ON HOLD | COMMUNITY
End: 2022-03-18

## 2022-03-17 RX ORDER — SODIUM CHLORIDE 9 MG/ML
INJECTION, SOLUTION INTRAVENOUS CONTINUOUS
Status: DISCONTINUED | OUTPATIENT
Start: 2022-03-17 | End: 2022-03-17

## 2022-03-17 RX ORDER — ONDANSETRON 2 MG/ML
4 INJECTION INTRAMUSCULAR; INTRAVENOUS ONCE
Status: COMPLETED | OUTPATIENT
Start: 2022-03-17 | End: 2022-03-17

## 2022-03-17 RX ORDER — PROCHLORPERAZINE MALEATE 5 MG
10 TABLET ORAL EVERY 6 HOURS PRN
Status: DISCONTINUED | OUTPATIENT
Start: 2022-03-17 | End: 2022-03-20 | Stop reason: HOSPADM

## 2022-03-17 RX ORDER — UBIDECARENONE 75 MG
100 CAPSULE ORAL DAILY
COMMUNITY
End: 2022-08-25

## 2022-03-17 RX ORDER — LIDOCAINE 40 MG/G
CREAM TOPICAL
Status: DISCONTINUED | OUTPATIENT
Start: 2022-03-17 | End: 2022-03-20 | Stop reason: HOSPADM

## 2022-03-17 RX ORDER — ONDANSETRON 2 MG/ML
4 INJECTION INTRAMUSCULAR; INTRAVENOUS EVERY 6 HOURS PRN
Status: DISCONTINUED | OUTPATIENT
Start: 2022-03-17 | End: 2022-03-20 | Stop reason: HOSPADM

## 2022-03-17 RX ORDER — TURMERIC 400 MG
400 CAPSULE ORAL DAILY
Status: ON HOLD | COMMUNITY
End: 2022-03-18

## 2022-03-17 RX ADMIN — ACETYLCYSTEINE 2.58 G: 200 INJECTION, SOLUTION INTRAVENOUS at 23:01

## 2022-03-17 RX ADMIN — SODIUM CHLORIDE 1000 ML: 9 INJECTION, SOLUTION INTRAVENOUS at 06:03

## 2022-03-17 RX ADMIN — SODIUM CHLORIDE 1000 ML: 9 INJECTION, SOLUTION INTRAVENOUS at 07:11

## 2022-03-17 RX ADMIN — ACETYLCYSTEINE 7.72 G: 200 INJECTION, SOLUTION INTRAVENOUS at 21:40

## 2022-03-17 RX ADMIN — OXYCODONE HYDROCHLORIDE 5 MG: 5 TABLET ORAL at 21:39

## 2022-03-17 RX ADMIN — ONDANSETRON 4 MG: 4 TABLET, ORALLY DISINTEGRATING ORAL at 22:59

## 2022-03-17 RX ADMIN — HYDROMORPHONE HYDROCHLORIDE 0.2 MG: 0.2 INJECTION, SOLUTION INTRAMUSCULAR; INTRAVENOUS; SUBCUTANEOUS at 12:41

## 2022-03-17 RX ADMIN — ONDANSETRON 4 MG: 2 INJECTION INTRAMUSCULAR; INTRAVENOUS at 07:10

## 2022-03-17 RX ADMIN — KETOROLAC TROMETHAMINE 15 MG: 15 INJECTION, SOLUTION INTRAMUSCULAR; INTRAVENOUS at 07:42

## 2022-03-17 RX ADMIN — SODIUM CHLORIDE, POTASSIUM CHLORIDE, SODIUM LACTATE AND CALCIUM CHLORIDE: 600; 310; 30; 20 INJECTION, SOLUTION INTRAVENOUS at 12:00

## 2022-03-17 ASSESSMENT — ACTIVITIES OF DAILY LIVING (ADL)
ADLS_ACUITY_SCORE: 1
ADLS_ACUITY_SCORE: 5.75
ADLS_ACUITY_SCORE: 1
ADLS_ACUITY_SCORE: 1
NUMBER_OF_TIMES_PATIENT_HAS_FALLEN_WITHIN_LAST_SIX_MONTHS: 1
CHANGE_IN_FUNCTIONAL_STATUS_SINCE_ONSET_OF_CURRENT_ILLNESS/INJURY: NO
DOING_ERRANDS_INDEPENDENTLY_DIFFICULTY: NO
DRESSING/BATHING_DIFFICULTY: NO
WALKING_OR_CLIMBING_STAIRS_DIFFICULTY: NO
FALL_HISTORY_WITHIN_LAST_SIX_MONTHS: YES
ADLS_ACUITY_SCORE: 1
ADLS_ACUITY_SCORE: 1
WEAR_GLASSES_OR_BLIND: NO
ADLS_ACUITY_SCORE: 1
DIFFICULTY_COMMUNICATING: NO
DIFFICULTY_EATING/SWALLOWING: NO
HEARING_DIFFICULTY_OR_DEAF: NO
ADLS_ACUITY_SCORE: 1
TOILETING_ISSUES: NO
ADLS_ACUITY_SCORE: 1
CONCENTRATING,_REMEMBERING_OR_MAKING_DECISIONS_DIFFICULTY: NO

## 2022-03-17 ASSESSMENT — ENCOUNTER SYMPTOMS
CHILLS: 0
DYSURIA: 0
VOMITING: 1
ABDOMINAL PAIN: 1
NAUSEA: 1
COUGH: 0
FEVER: 0
SHORTNESS OF BREATH: 0
BLOOD IN STOOL: 0
DIARRHEA: 0

## 2022-03-17 NOTE — PHARMACY-ADMISSION MEDICATION HISTORY
Admission medication history interview status for this patient is complete. See [facility] admission navigator for allergy information, prior to admission medications and immunization status.     Medication history interview done, indicate source(s): Patient  Medication history resources (including written lists, pill bottles, clinic record): SureScripts and Care Everywhere  Pharmacy: [facility].     Changes made to PTA medication list:  Added: milk thistle liquid, calcium-vitamin D3, ashwagandha, vitamin B12 tablet and liquid, turmeric, thyroid supplement, liver detox supplement  Changed: -  Reported as Not Taking: -  Removed: flonase, folic acid, Zofran, duplicate Valtrex, B complex    Actions taken by pharmacist (provider contacted, etc): Spoke with patient about home med list     Additional medication history information:None    Medication reconciliation/reorder completed by provider prior to medication history?  N   (Y/N)     Prior to Admission medications    Medication Sig Last Dose Taking? Auth Provider   Ashwagandha 500 MG CAPS Take 500 mg by mouth daily 3/16/2022 at AM Yes Unknown, Entered By History   calcium citrate-vitamin D (CITRACAL) 315-250 MG-UNIT TABS per tablet Take 1 tablet by mouth 2 times daily 3/16/2022 at AM Yes Unknown, Entered By History   cetirizine (ZYRTEC) 10 MG tablet Take 10 mg by mouth daily as needed for allergies (summer months)  More than a month at Unknown time Yes Reported, Patient   cyanocobalamin (VITAMIN B-12) 100 MCG tablet Take 100 mcg by mouth daily 3/16/2022 at AM Yes Unknown, Entered By History   Cyanocobalamin 1000 MCG/15ML LIQD Take by mouth daily Unknown dose - takes liquid and tablet formulations of vitamin B12 3/16/2022 at AM Yes Unknown, Entered By History   etonogestrel (NEXPLANON) 68 MG IMPL 1 each (68 mg) by Subdermal route once  Yes Sabrina Pérez PA-C   MILK THISTLE PO Take by mouth daily Liquid formulation in addition  to capsules. Unknown dose. 3/16/2022 at AM Yes Unknown, Entered By History   Milk Thistle-Dand-Fennel-Licor (MILK THISTLE XTRA) CAPS capsule Take 3 capsules by mouth daily 3/16/2022 at AM Yes Unknown, Entered By History   multivitamin w/minerals (THERA-VIT-M) tablet Take 1 tablet by mouth daily 3/16/2022 at AM Yes Unknown, Entered By History   NONFORMULARY Take by mouth daily Liver Detox supplement - contains desiccated beef liver extract 3/16/2022 at AM Yes Unknown, Entered By History   NONFORMULARY Take by mouth daily Thyroid support supplement - unknown ingredients and dosage 3/16/2022 at AM Yes Unknown, Entered By History   Turmeric 400 MG CAPS Take 400 mg by mouth daily 3/16/2022 at AM Yes Unknown, Entered By History   valACYclovir (VALTREX) 500 MG tablet TAKE 2 TABLETS BY MOUTH DAILY TWICE DAILY FOR 1 DAY PER COLD SORE FLARE More than a month at Unknown time Yes Ga Alaniz PA-C
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
## 2022-03-17 NOTE — ED TRIAGE NOTES
"Pt arrives to ED with feeling unwell since Monday. Pt states she has been nausea, poor PO, headache, and myalgias. Pt states she was drinking daily, last ETOH Sunday, has had tremors from withdrawals that she \"works through on her own\".   "
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
## 2022-03-17 NOTE — H&P
History and Physical     Alana Mujica MRN# [id number]   YOB: 1975 Age: 46 year old      Date of Admission:  3/17/2022    Primary care provider: [facility]          Assessment and Plan:   Alana Mujica is a 46 year old female with a PMH significant for eating disorder (anorexia, bulimia) and alcohol abuse with history of withdrawal who presents with abdominal pain, nausea, vomiting and generally feeling unwell.     Patient was discussed with Dr. Garcia, who was provider in ED. Chart review of ED work up was reviewed as well as chart review of Care Everywhere, previous visits and admissions.     In the ED she is afebrile with heart rate 90, pressure 103/86 and breathing comfortably on room air without hypoxia.  Lab work is remarkable for sodium 127, creatinine 0.84, calcium 10.4, anion gap 6, ALT 7005, AST 9442, ALP 92 and total bilirubin of 1 with direct bilirubin 0.5, lipase 195, pregnancy test negative, glucose 96, WBC 6.2 and hemoglobin 14.3.  Ethanol and Tylenol level unremarkable.  UA shows dark urine with a large amount of skin cells.  Bilirubin is negative.  Urine was sent for culture, COVID/influenza negative,    #Acute hepatitis  #Intractable abdominal pain and vomiting  -Presents with constant abdominal pain, right mid back pain, nausea, vomiting and fatigue since 3/14  -Last drink of alcohol was on 3/14.  No new medicines in the past month.  She does not report excessive use of Tylenol.  -Alcohol level undetectable and acetaminophen 2  -Right upper quadrant ultrasound showed normal gallbladder and hepatic steatosis  -Suspect possibly viral process but more likely related to alcohol use  -We will add on Hepatits A, B and C serologies  -GI consult  -LR at 100 mL/h  -N.p.o. except for medicines and ice chips  -We will have pain and nausea medicines available    #Hyponatremia  -Sodium 127  -Suspect due to poor oral intake  -Lactated Ringer's at 100 mL/h  -Recheck in  a.m.    #History of alcohol abuse  -Drinks approximately 6 Michelob ultra's daily with last drink on 3/14  -No withdrawal symptoms per her report    #History of disordered eating    #Anxiety  -Not on any medicines      Social: No concerns  Code: Discussed with patient and they have chosen full code  VTE prophylaxis: Lovenox  Disposition: Inpatient                    Chief Complaint:   Abdominal pain, nausea and vomiting         History of Present Illness:   Alana Mujica is a 46 year old female who presents with headache, abdominal pain, nausea, vomiting and poor oral intake since 3/14.  She states that she developed these symptoms acutely on 3/14 and despite not eating or drinking anything her symptoms have continued.  Anytime she eats or drinks anything it comes back up.  She also has pain in her right mid back.  The pain in her abdomen and back are constant.  She denies diarrhea and reports constipation.  She has felt chilled but without fever.  She denies shortness of breath, cough, chest discomfort and urinary symptoms.  She last drink alcohol 3/14 denies symptoms of withdrawal.  She has not started any new medicines recently but does take some supplements.  Does not smoke cigarettes.             Past Medical History:     Past Medical History:   Diagnosis Date     Anorexia      Anxiety      Bulimia      Depressive disorder      Genital herpes      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/15    NIL, +HR HPV. Co-test 12 months     Osteoporosis      Substance abuse (H)                Past Surgical History:     Past Surgical History:   Procedure Laterality Date     LEEP TX, CERVICAL      greater than 5 years ago from 2015, uncertain date               Social History:     Social History     Socioeconomic History     Marital status: Single     Spouse name: Not on file     Number of children: Not on file     Years of education: Not on file     Highest education level: Not on file   Occupational History     Not on file   Tobacco Use      Smoking status: Former Smoker     Packs/day: 0.00     Smokeless tobacco: Never Used   Substance and Sexual Activity     Alcohol use: Yes     Alcohol/week: 29.2 standard drinks     Types: 21 Glasses of wine, 14 Cans of beer per week     Drug use: No     Sexual activity: Yes     Partners: Male     Birth control/protection: Implant   Other Topics Concern     Parent/sibling w/ CABG, MI or angioplasty before 65F 55M? Not Asked   Social History Narrative     Not on file     Social Determinants of Health     Financial Resource Strain: Not on file   Food Insecurity: Not on file   Transportation Needs: Not on file   Physical Activity: Not on file   Stress: Not on file   Social Connections: Not on file   Intimate Partner Violence: Not on file   Housing Stability: Not on file               Family History:     Family History   Problem Relation Age of Onset     Breast Cancer Mother 68        2014/2015     Hypertension Mother      Hypertension Father      No Known Problems Maternal Grandmother      No Known Problems Maternal Grandfather      No Known Problems Paternal Grandmother      No Known Problems Paternal Grandfather      No Known Problems Brother      Unknown/Adopted No family hx of      Depression No family hx of      Anxiety Disorder No family hx of      Schizophrenia No family hx of      Bipolar Disorder No family hx of      Suicide No family hx of      Substance Abuse No family hx of      Dementia No family hx of      Benzie Disease No family hx of      Parkinsonism No family hx of      Autism Spectrum Disorder No family hx of      Intellectual Disability (Mental Retardation) No family hx of      Mental Illness No family hx of      Family history reviewed and is non contributory         Allergies:      Allergies   Allergen Reactions     No Clinical Screening - See Comments      PN: BILLY CM1: CONTRAST- NKA Reaction :               Medications:     Prior to Admission medications    Medication Sig Last Dose Taking? Auth  Provider   Ashwagandha 500 MG CAPS Take 500 mg by mouth daily 3/16/2022 at AM Yes Unknown, Entered By History   calcium citrate-vitamin D (CITRACAL) 315-250 MG-UNIT TABS per tablet Take 1 tablet by mouth 2 times daily 3/16/2022 at AM Yes Unknown, Entered By History   cetirizine (ZYRTEC) 10 MG tablet Take 10 mg by mouth daily as needed for allergies (summer months)  More than a month at Unknown time Yes Reported, Patient   cyanocobalamin (VITAMIN B-12) 100 MCG tablet Take 100 mcg by mouth daily 3/16/2022 at AM Yes Unknown, Entered By History   Cyanocobalamin 1000 MCG/15ML LIQD Take by mouth daily Unknown dose - takes liquid and tablet formulations of vitamin B12 3/16/2022 at AM Yes Unknown, Entered By History   etonogestrel (NEXPLANON) 68 MG IMPL 1 each (68 mg) by Subdermal route once  Yes Sabrina Pérez PA-C   MILK THISTLE PO Take by mouth daily Liquid formulation in addition to capsules. Unknown dose. 3/16/2022 at AM Yes Unknown, Entered By History   Milk Thistle-Dand-Fennel-Licor (MILK THISTLE XTRA) CAPS capsule Take 3 capsules by mouth daily 3/16/2022 at AM Yes Unknown, Entered By History   multivitamin w/minerals (THERA-VIT-M) tablet Take 1 tablet by mouth daily 3/16/2022 at AM Yes Unknown, Entered By History   NONFORMULARY Take by mouth daily Liver Detox supplement - contains desiccated beef liver extract 3/16/2022 at AM Yes Unknown, Entered By History   NONFORMULARY Take by mouth daily Thyroid support supplement - unknown ingredients and dosage 3/16/2022 at AM Yes Unknown, Entered By History   Turmeric 400 MG CAPS Take 400 mg by mouth daily 3/16/2022 at AM Yes Unknown, Entered By History   valACYclovir (VALTREX) 500 MG tablet TAKE 2 TABLETS BY MOUTH DAILY TWICE DAILY FOR 1 DAY PER COLD SORE FLARE More than a month at Unknown time Yes Ga Alaniz PA-C              Review of Systems:   A Comprehensive greater than 10 system review of systems was carried out.  Pertinent positives and  "negatives are noted above.  Otherwise negative for contributory information.            Physical Exam:   Blood pressure 120/77, pulse 88, temperature 98.1  F (36.7  C), temperature source Oral, resp. rate 18, height 1.646 m (5' 4.8\"), weight 51.4 kg (113 lb 6.4 oz), SpO2 95 %, not currently breastfeeding.  Exam:  GENERAL:  Comfortable.  PSYCH: pleasant, oriented, No acute distress.  HEENT:  PERRLA. Normal conjunctiva, normal hearing, nasal mucosa and Oropharynx are normal.  NECK:  Supple, no neck vein distention, adenopathy or bruits, normal thyroid.  HEART:  Normal S1, S2 with no murmur, no pericardial rub, gallops or S3 or S4.  LUNGS:  Clear to auscultation, normal Respiratory effort. No wheezing, rales or ronchi.  ABDOMEN:  Soft, no hepatosplenomegaly, normal bowel sounds. Tender epigastrum/RUQ.  EXTREMITIES:  No pedal edema, +2 pulses bilateral and equal.  SKIN:  Dry to touch, No rash, wound or ulcerations.  NEUROLOGIC:  CN 2-12 grossly intact, sensation is intact with no focal deficits.               Data:     Recent Labs   Lab 03/17/22  0554   WBC 6.2   HGB 14.3   HCT 41.9   MCV 94        Recent Labs   Lab 03/17/22  0554   *   POTASSIUM 3.5   CHLORIDE 92*   CO2 29   ANIONGAP 6   GLC 96   BUN 7   CR 0.84   GFRESTIMATED 85   KODY 10.4*   PROTTOTAL 7.5   ALBUMIN 3.8   BILITOTAL 1.0   ALKPHOS 92   AST 9,442*   ALT 7,005*     Recent Labs   Lab 03/17/22  0554   LIPASE 195     Recent Labs   Lab 03/17/22  0554   COLOR Dark Yellow*   APPEARANCE Cloudy*   URINEGLC Negative   URINEBILI Negative   URINEKETONE 10 *   SG 1.021   UBLD Small*   URINEPH 5.5   PROTEIN 200 *   NITRITE Negative   LEUKEST Trace*   RBCU 3*   WBCU 24*         Recent Results (from the past 24 hour(s))   Abdomen US, limited (RUQ only)    Narrative    US ABDOMEN LIMITED 3/17/2022 8:07 AM    CLINICAL HISTORY: Transaminitis, epigastric pain, vomiting,  Transaminitis, epigastric pain, vomiting  TECHNIQUE: Limited abdominal " ultrasound.    COMPARISON: 2/6/2020    FINDINGS:    GALLBLADDER: The gallbladder is normal. No gallstones, wall  thickening, or pericholecystic fluid. Negative sonographic Farnsworth's  sign.    BILE DUCTS: There is no biliary dilatation. The common duct measures  4mm.    LIVER: Diffuse mild increased echogenicity of the liver consistent  with mild hepatic steatosis. No focal hepatic masses.     RIGHT KIDNEY: Normal. No hydronephrosis.    PANCREAS: The visualized portions of the pancreas are normal.    Normal caliber upper abdominal aorta. Patent intrahepatic IVC.       Impression    IMPRESSION:  1.  Hepatic steatosis.  2.  Normal gallbladder. No biliary ductal dilatation.    PAUL TRAVIS MD         SYSTEM ID:  [id number]         Gabriella Haywood PA-C    This patient was seen and discussed with Dr. Winslow who agrees with the current plans as outlined above.

## 2022-03-17 NOTE — PLAN OF CARE
Goal Outcome Evaluation:    VSS. A&O x4. SBA. NPO. Rating headache and abd pain 7/10. PRN Dilaudid given x1 with relief. Refusing to use bed alarms; pt educated on why it is important, continues to refuse. GI consult  ordered. Denies CP, SOB, or cough. ALT 7005 and AST 9442. Hx of  ETOH withdrawal, no CIWA ordered. Continue to observe pt for symptoms. Refused Lovenox; states doesn't need it. Hoping to discharge remi as she is moving this weekend. Continue to follow POC.

## 2022-03-17 NOTE — ED NOTES
"[facility]  ED Nurse Handoff Report    Alana Mujica is a 46 year old female   ED Chief complaint: Flu Symptoms  . ED Diagnosis:   Final diagnoses:   Hepatitis     Allergies:   Allergies   Allergen Reactions     No Clinical Screening - See Comments      PN: LW CM1: CONTRAST- NKA Reaction :       Code Status: Full Code  Activity level - Baseline/Home:  Independent. Activity Level - Current:   Stand by Assist. Lift room needed: No. Bariatric: No   Needed: No   Isolation: No. Infection: Hepatitis    Vital Signs:   Vitals:    03/17/22 0513 03/17/22 0514 03/17/22 0715 03/17/22 0730   BP: (!) 134/92  103/86 115/83   Pulse:  (!) 133 90 81   Resp: 20      Temp: 98.6  F (37  C)      TempSrc: Temporal      SpO2: 100%  97% 100%       Cardiac Rhythm:  ,      Pain level:    Patient confused: No. Patient Falls Risk: No.   Elimination Status: Has voided   Patient Report - Initial Complaint: Pt arrives to ED with feeling unwell since Monday. Pt states she has been nausea, poor PO, headache, and myalgias. Pt states she was drinking daily, last ETOH Sunday, has had tremors from withdrawals that she \"works through on her own\". . Focused Assessment:  Pt states nausea, vomitting (last emesis yesterday), abd cramping, dizziness, body aches, headache, starting Monday. Last alcoholic drink Sunday. Pt states she was drinking michelob ultra daily X1 month, No tremors, no sweats. A&OX4.  Tests Performed: Labs, Imaging. Abnormal Results:   Abnormal Labs Resulted from Time of ED Arrival to Time of ED Departure   BASIC METABOLIC PANEL - Abnormal       Result Value    Sodium 127 (*)     Potassium 3.5      Chloride 92 (*)     Carbon Dioxide (CO2) 29      Anion Gap 6      Urea Nitrogen 7      Creatinine 0.84      Calcium 10.4 (*)     Glucose 96      GFR Estimate 85     HEPATIC FUNCTION PANEL - Abnormal    Bilirubin Total 1.0      Bilirubin Direct 0.5 (*)     Protein Total 7.5      Albumin 3.8      Alkaline Phosphatase 92   "    AST 9,442 (*)     ALT 7,005 (*)    ROUTINE UA WITH MICROSCOPIC REFLEX TO CULTURE - Abnormal    Color Urine Dark Yellow (*)     Appearance Urine Cloudy (*)     Glucose Urine Negative      Bilirubin Urine Negative      Ketones Urine 10  (*)     Specific Gravity Urine 1.021      Blood Urine Small (*)     pH Urine 5.5      Protein Albumin Urine 200  (*)     Urobilinogen Urine Normal      Nitrite Urine Negative      Leukocyte Esterase Urine Trace (*)     Bacteria Urine Moderate (*)     Mucus Urine Present (*)     RBC Urine 3 (*)     WBC Urine 24 (*)     Squamous Epithelials Urine 49 (*)    CBC WITH PLATELETS AND DIFFERENTIAL - Abnormal    WBC Count 6.2      RBC Count 4.45      Hemoglobin 14.3      Hematocrit 41.9      MCV 94      MCH 32.1      MCHC 34.1      RDW 11.5      Platelet Count 191      % Neutrophils 81      % Lymphocytes 10      % Monocytes 3      % Eosinophils 4      % Basophils 1      % Immature Granulocytes 1      NRBCs per 100 WBC 0      Absolute Neutrophils 5.0      Absolute Lymphocytes 0.6 (*)     Absolute Monocytes 0.2      Absolute Eosinophils 0.3      Absolute Basophils 0.1      Absolute Immature Granulocytes 0.0      Absolute NRBCs 0.0     ACETAMINOPHEN LEVEL - Abnormal    Acetaminophen 2 (*)      Abdomen US, limited (RUQ only)   Final Result   IMPRESSION:   1.  Hepatic steatosis.   2.  Normal gallbladder. No biliary ductal dilatation.      PAUL TRAVIS MD            SYSTEM ID:  [id number]      .   Treatments provided: IVF, Toradol  Family Comments: Patient updated family on the phone.   OBS brochure/video discussed/provided to patient:  N/A  ED Medications:   Medications   0.9% sodium chloride BOLUS (0 mLs Intravenous Stopped 3/17/22 0726)   0.9% sodium chloride BOLUS (1,000 mLs Intravenous New Bag 3/17/22 0711)   ondansetron (ZOFRAN) injection 4 mg (4 mg Intravenous Given 3/17/22 0710)   ketorolac (TORADOL) injection 15 mg (15 mg Intravenous Given 3/17/22 0742)     Drips infusing:  No  For the  majority of the shift, the patient's behavior Green. Interventions performed were N/A.    Sepsis treatment initiated: No     Patient tested for COVID 19 prior to admission: YES    ED Nurse Name/Phone Number: Theresa Melgoza RN,   9:30 AM  RECEIVING UNIT ED HANDOFF REVIEW    Above ED Nurse Handoff Report was reviewed: Yes  Reviewed by: Ria Buckley RN on March 17, 2022 at 10:42 AM

## 2022-03-18 LAB
ALBUMIN SERPL-MCNC: 3.2 G/DL (ref 3.4–5)
ALBUMIN SERPL-MCNC: 3.4 G/DL (ref 3.4–5)
ALP SERPL-CCNC: 72 U/L (ref 40–150)
ALP SERPL-CCNC: 76 U/L (ref 40–150)
ALT SERPL W P-5'-P-CCNC: 3239 U/L (ref 0–50)
ALT SERPL W P-5'-P-CCNC: 3691 U/L (ref 0–50)
ANA SER QL IF: NEGATIVE
ANION GAP SERPL CALCULATED.3IONS-SCNC: 7 MMOL/L (ref 3–14)
AST SERPL W P-5'-P-CCNC: 1061 U/L (ref 0–45)
AST SERPL W P-5'-P-CCNC: 1841 U/L (ref 0–45)
BACTERIA UR CULT: NORMAL
BILIRUB DIRECT SERPL-MCNC: 0.4 MG/DL (ref 0–0.2)
BILIRUB SERPL-MCNC: 1 MG/DL (ref 0.2–1.3)
BILIRUB SERPL-MCNC: 1.3 MG/DL (ref 0.2–1.3)
BUN SERPL-MCNC: 5 MG/DL (ref 7–30)
CALCIUM SERPL-MCNC: 8.8 MG/DL (ref 8.5–10.1)
CHLORIDE BLD-SCNC: 102 MMOL/L (ref 94–109)
CMV DNA SPEC NAA+PROBE-ACNC: NOT DETECTED IU/ML
CO2 SERPL-SCNC: 26 MMOL/L (ref 20–32)
CREAT SERPL-MCNC: 0.56 MG/DL (ref 0.52–1.04)
EBV VCA IGM SER IA-ACNC: <10 U/ML
EBV VCA IGM SER IA-ACNC: NORMAL
ERYTHROCYTE [DISTWIDTH] IN BLOOD BY AUTOMATED COUNT: 11.1 % (ref 10–15)
GFR SERPL CREATININE-BSD FRML MDRD: >90 ML/MIN/1.73M2
GLUCOSE BLD-MCNC: 93 MG/DL (ref 70–99)
HAV IGG SER QL IA: NONREACTIVE
HAV IGM SERPL QL IA: NONREACTIVE
HBV CORE IGM SERPL QL IA: NONREACTIVE
HBV SURFACE AB SERPL IA-ACNC: 0 M[IU]/ML
HBV SURFACE AG SERPL QL IA: NONREACTIVE
HCT VFR BLD AUTO: 38 % (ref 35–47)
HCV AB SERPL QL IA: NONREACTIVE
HGB BLD-MCNC: 12.8 G/DL (ref 11.7–15.7)
HSV1 DNA SPEC QL NAA+PROBE: NEGATIVE
HSV2 DNA SPEC QL NAA+PROBE: NEGATIVE
INR PPP: 1.19 (ref 0.85–1.15)
INR PPP: 1.2 (ref 0.85–1.15)
MCH RBC QN AUTO: 32.4 PG (ref 26.5–33)
MCHC RBC AUTO-ENTMCNC: 33.7 G/DL (ref 31.5–36.5)
MCV RBC AUTO: 96 FL (ref 78–100)
MITOCHONDRIA M2 IGG SER-ACNC: 1.1 U/ML
PLATELET # BLD AUTO: 193 10E3/UL (ref 150–450)
POTASSIUM BLD-SCNC: 3.4 MMOL/L (ref 3.4–5.3)
PROT SERPL-MCNC: 6.7 G/DL (ref 6.8–8.8)
PROT SERPL-MCNC: 7.1 G/DL (ref 6.8–8.8)
RBC # BLD AUTO: 3.95 10E6/UL (ref 3.8–5.2)
SODIUM SERPL-SCNC: 135 MMOL/L (ref 133–144)
WBC # BLD AUTO: 4.1 10E3/UL (ref 4–11)

## 2022-03-18 PROCEDURE — 36415 COLL VENOUS BLD VENIPUNCTURE: CPT | Performed by: INTERNAL MEDICINE

## 2022-03-18 PROCEDURE — G0378 HOSPITAL OBSERVATION PER HR: HCPCS

## 2022-03-18 PROCEDURE — 99217 PR OBSERVATION CARE DISCHARGE: CPT | Performed by: INTERNAL MEDICINE

## 2022-03-18 PROCEDURE — 80053 COMPREHEN METABOLIC PANEL: CPT | Performed by: PHYSICIAN ASSISTANT

## 2022-03-18 PROCEDURE — 96366 THER/PROPH/DIAG IV INF ADDON: CPT

## 2022-03-18 PROCEDURE — 36415 COLL VENOUS BLD VENIPUNCTURE: CPT | Performed by: PHYSICIAN ASSISTANT

## 2022-03-18 PROCEDURE — 82248 BILIRUBIN DIRECT: CPT | Performed by: INTERNAL MEDICINE

## 2022-03-18 PROCEDURE — 258N000003 HC RX IP 258 OP 636: Performed by: INTERNAL MEDICINE

## 2022-03-18 PROCEDURE — 85610 PROTHROMBIN TIME: CPT | Performed by: INTERNAL MEDICINE

## 2022-03-18 PROCEDURE — 250N000013 HC RX MED GY IP 250 OP 250 PS 637: Performed by: INTERNAL MEDICINE

## 2022-03-18 PROCEDURE — 85027 COMPLETE CBC AUTOMATED: CPT | Performed by: PHYSICIAN ASSISTANT

## 2022-03-18 PROCEDURE — 250N000011 HC RX IP 250 OP 636: Performed by: INTERNAL MEDICINE

## 2022-03-18 PROCEDURE — 84155 ASSAY OF PROTEIN SERUM: CPT | Performed by: INTERNAL MEDICINE

## 2022-03-18 RX ORDER — VENLAFAXINE HYDROCHLORIDE 75 MG/1
75 CAPSULE, EXTENDED RELEASE ORAL
Qty: 30 CAPSULE | Refills: 1 | Status: SHIPPED | OUTPATIENT
Start: 2022-03-19 | End: 2022-03-20

## 2022-03-18 RX ORDER — VENLAFAXINE HYDROCHLORIDE 75 MG/1
75 CAPSULE, EXTENDED RELEASE ORAL
Status: DISCONTINUED | OUTPATIENT
Start: 2022-03-18 | End: 2022-03-20

## 2022-03-18 RX ORDER — FAMOTIDINE 20 MG/1
20 TABLET, FILM COATED ORAL 2 TIMES DAILY
Status: DISCONTINUED | OUTPATIENT
Start: 2022-03-18 | End: 2022-03-20

## 2022-03-18 RX ADMIN — ACETYLCYSTEINE 6.25 MG/KG/HR: 200 INJECTION, SOLUTION INTRAVENOUS at 03:41

## 2022-03-18 RX ADMIN — VENLAFAXINE HYDROCHLORIDE 75 MG: 75 CAPSULE, EXTENDED RELEASE ORAL at 15:57

## 2022-03-18 RX ADMIN — FAMOTIDINE 20 MG: 20 TABLET ORAL at 20:14

## 2022-03-18 RX ADMIN — ACETYLCYSTEINE 6.25 MG/KG/HR: 200 INJECTION, SOLUTION INTRAVENOUS at 22:36

## 2022-03-18 RX ADMIN — FAMOTIDINE 20 MG: 20 TABLET ORAL at 10:17

## 2022-03-18 ASSESSMENT — ACTIVITIES OF DAILY LIVING (ADL)
ADLS_ACUITY_SCORE: 1

## 2022-03-18 NOTE — PROGRESS NOTES
VSS.  AOX4.  Breathing is unlabored and pt denies SOB.  Regular diet.  Pt flank pain 5/10 and relieved with oxycodone.  SBA.    GI consulted; please see note; MD ordered ACETADOTE IV; please see GI note.    Nausea and vomiting started at end of shift; zofran given and pt appeared more comfortable after vomiting.      AST and ALT down this shift; please see results trend.    LR at 100 ml/hr.    Pt refuses all alarms and any kind of mobility aids.

## 2022-03-18 NOTE — PROGRESS NOTES
Cross cover was asked by Kezia MORALES to discussed case with [facility] Control [facility] as the patient has been drinking a type of mouthwash to make sure no other treatment plan is indicated.  She does drink alcohol heavily.  Her initial AST is 9442 and her ALT is 7005 with a normal bilirubin and alkaline phosphatase level which is unusual for alcohol hepatitis alone.  Her INR is elevated at 1.38.  Her acetaminophen level was 3 with repeat check of 2.  I spoke with Poison Control Center.  They propose that due to the significantly elevated transaminases that IV NAC protocol would be recommended.  It is possible that she had an acetaminophen overdose within the past few days or is a chronic user.  -Start three-step IV NAC protocol stat  -Continue NAC protocol until AST is less than 1000 and also trend INR.  Usually they continue NAC protocol of INR is above 2, but she is already less than that

## 2022-03-18 NOTE — PROGRESS NOTES
[facility]  Hospitalist Progress Note  iTm Winslow MD 03/18/2022    Reason for Stay (Diagnosis): acute hepatitis         Assessment and Plan:      Summary of Stay: Alana Mujica is a 46 year old female with a PMH significant for eating disorder (anorexia, bulimia) and alcohol abuse with history of withdrawal who presents with abdominal pain, nausea, vomiting and generally feeling unwell.      Patient was discussed with Dr. Garcia, who was provider in ED. Chart review of ED work up was reviewed as well as chart review of Care Everywhere, previous visits and admissions.      In the ED she is afebrile with heart rate 90, pressure 103/86 and breathing comfortably on room air without hypoxia.  Lab work is remarkable for sodium 127, creatinine 0.84, calcium 10.4, anion gap 6, ALT 7005, AST 9442, ALP 92 and total bilirubin of 1 with direct bilirubin 0.5, lipase 195, pregnancy test negative, glucose 96, WBC 6.2 and hemoglobin 14.3.  Ethanol and Tylenol level unremarkable.  UA shows dark urine with a large amount of skin cells.  Bilirubin is negative.  Urine was sent for culture, COVID/influenza negative,    She continues to feel well since admit.  No abd pain today.  Was started on empiric NAC protocol yesterday evening.  Denies acetaminophen use and levels have remained low.      Pt requesting to go home today.  She is in the process of moving and has to have her house cleaned out by noon tomorrow.      Plans today  - appreciate GI input.  Anticipate discharge when OK with GI  - pt may opt to leave on her own accord of not discharged today given issues noted above.  She says she cannot stay another night in the hospital     #Acute hepatitis  - labs much improved  - likely ETOH induced; ruling out additional viral or autoimmune processes as well.  Also possible herbal supplements she has been taking contributed as well.  D/w patient  - receiving empiric NAC; to complete this evening    #Intractable  "abdominal pain and vomiting  - resolved.  Tolerating PO intake     #Hyponatremia  -resolved     #History of alcohol abuse  -No withdrawal symptoms  - cessation advised     #History of eating d/o     #Anxiety  -Not on any medicines       DVT Prophylaxis: Enoxaparin (Lovenox) SQ  Code Status: Full Code  Discharge Dispo: home  Estimated Disch Date / # of Days until Disch: as pt unwilling to stay another night in the hospital, anticipate she will leave later today whether or not she is medically cleared for discharge        Interval History (Subjective):      Says she has to go home later today.  She is in the process of moving and has to have her house cleaned out by noon tomorrow.  Tolerating diet.  Started empiric NAC last night                  Physical Exam:      Last Vital Signs:  /67 (BP Location: Left arm)   Pulse 70   Temp 98.1  F (36.7  C) (Oral)   Resp 18   Ht 1.646 m (5' 4.8\")   Wt 51.4 kg (113 lb 6.4 oz)   SpO2 98%   BMI 18.99 kg/m        Intake/Output Summary (Last 24 hours) at 3/18/2022 1221  Last data filed at 3/18/2022 1009  Gross per 24 hour   Intake 240 ml   Output 600 ml   Net -360 ml       Constitutional: Awake, alert, cooperative, no apparent distress   Respiratory: Clear to auscultation bilaterally, no crackles or wheezing   Cardiovascular: Regular rate and rhythm, normal S1 and S2, and no murmur noted   Abdomen: Normal bowel sounds, soft, non-distended, non-tender   Skin: No rashes, no cyanosis, dry to touch   Neuro: Alert and oriented x3, no weakness, numbness, memory loss   Extremities: No edema, normal range of motion   Other(s):        All other systems: Negative          Medications:      All current medications were reviewed with changes reflected in problem list.         Data:      All new lab and imaging data was reviewed.   Labs:  Recent Labs   Lab 03/18/22  0758 03/17/22  0554    127*   POTASSIUM 3.4 3.5   CHLORIDE 102 92*   CO2 26 29   ANIONGAP 7 6   GLC 93 96   BUN " 5* 7   CR 0.56 0.84   GFRESTIMATED >90 85   KODY 8.8 10.4*     Recent Labs   Lab 03/18/22  0758   WBC 4.1   HGB 12.8   HCT 38.0   MCV 96         Imaging:   Recent Results (from the past 24 hour(s))   US Abdomen or Pelvis Doppler Limited    Narrative    EXAM: US ABDOMEN OR PELVIS DOPPLER LIMITED  LOCATION: [facility]  DATE/TIME: 3/17/2022 7:38 PM    INDICATION: Elevated LFTs  COMPARISON: CT from 2/6/2020     TECHNIQUE: Real-time gray-scale sonographic imaging of the abdomen was performed including duplex spectral and color Doppler evaluation of the abdominal portal and systemic vasculature.    FINDINGS:    ABDOMINAL DUPLEX: The hepatic artery, hepatic veins, IVC, portal veins, and splenic vein are patent with flow in the normal direction.     VELOCITIES:  Main Portal: 35 cm/s, Hepatopetal  Left Portal Vein: 19 cm/s, Hepatopetal  Right Portal Vein: 22 cm/s, Hepatopetal  Hepatic Artery: 65/23 cm/s  Splenic Vein: 12 cm/s    Main portal vein measures 8 mm in maximum diameter.      Impression    IMPRESSION:  1.  Portal system is patent with flow directed towards the liver. Main portal vein is normal in diameter at 8 mm.  2.  Hepatic artery is patent with normal low resistance waveform.  3.  No ascites present.

## 2022-03-18 NOTE — PLAN OF CARE
Pt A/O x 4, VSS, pt denies headache, dizziness, N/V & SOB. Pt reports mild neck pain - administered ice pack. Pt independent. Lung sounds clear, RA. GI & Poison Control are following. Will continue with plan of care.    Per Poison Control - once AST <1,000 and INR <2.0 okay to stop IV Acetylcystein gtt. Recheck labs @ 1700 and Poison Control will be calling again for update.

## 2022-03-18 NOTE — DISCHARGE SUMMARY
Service Date: 03/18/2022  Discharge Date: 03/18/2022    PRINCIPAL FINAL DIAGNOSES:  1.  Acute hepatitis, suspect predominantly alcohol induced.  Differential diagnosis includes acute viral hepatitis versus hepatitis related to herbal medications/supplements.  2.  Nausea and vomiting.  3.  Hyponatremia.    Addendum:  Patient's discharge was delayed two days due to development of acute hyponatremia prior to anticipated discharge.  (decrease from 135 to 121 in 24 hours).  This occurred in the setting of two new medications:  NAC administration (administered in D5W IVF) for treatment of acute hepatitis and starting Effexor for treatment of anxiety.  Sodium level has now improved.  Overall I suspect acute hyponatremia was likely due to one of these two medications.  Both have been stopped.  If the patient wants to start an selective serotonin reuptake inhibitor in the future would recommend short term follow up sodium level to ensure hyponatremia does not develop again.      PAST MEDICAL HISTORY:  1.  History of alcohol dependence.  2.  History of eating disorder.  3.  History of anxiety.    PRINCIPAL PROCEDURES THIS ADMISSION:  1.  Abdominal ultrasound showing hepatic steatosis.  No acute findings.  2.  Abdominal Doppler showing a patent portal system and patent hepatic artery.  No ascites present.  3.  Gastroenterology consultation.  4.  Empiric N-acetylcysteine therapy.  5.  Urine culture growing out mixed urogenital sasha.  Felt to be contaminant and not a true infection.  6.  Influenza and COVID-19 PCR that were negative.  7.  HSV PCR that was negative.  8.  DANIEL was negative.  9.  EBV capsid IgM not detectable.  10.  Hepatitis A antibody IgM nonreactive.  11.  Hepatitis B surface antibody negative.  12.  Hepatitis B core antibody IgM negative.  13.  Hepatitis C antibody negative.  14.  Hepatitis B surface antigen negative.  15.  Mononucleosis screen negative.  16.  Acetaminophen level less than 2.    REASON FOR  ADMISSION:  Please see dictated history and physical.  In brief, Ms. Mujica is a 46-year-old female, who presented to the hospital with nausea, vomiting, abdominal pain.  The patient was found to have acute hepatitis with ALT and AST values in the 7000 and 9000 range on presentation.  Bilirubin and alkaline phosphatase were normal.  Albumin was normal.    HOSPITAL COURSE:  Acute hepatitis:  It is suspected that acute hepatitis likely and predominantly due to alcohol dependence.  Differential diagnosis included acute viral hepatitis as well as possible effects from multiple herbals and supplements that the patient takes.  She denied any acetaminophen use.  Denied any attempts at self-harm or other ingestions.  The patient was seen by gastroenterology.  She was empirically treated with N-acetylcysteine.  Labs showed significant improvement since admission.  Plan is for repeat labs later this afternoon.  If they appear to show continued improvement, likely discharge this evening.  Recommended follow up with primary care provider next week for recheck of her liver function labs as well as to follow up on pending labs at the time of discharge that were performed to further evaluate the cause of her acute hepatic dysfunction.    DISCHARGE MEDICATIONS:  2.  Calcium citrate with vitamin D.  3.  Zyrtec 10 mg daily as needed for allergies.  4.  Vitamin B12.  5.  Multivitamin daily.  6.  Valacyclovir, take 1000 mg twice daily for 1 day for onset of cold sore.    The patient was on a variety of herbals and supplements, which I recommended she discontinue given the possibility of hepatic side effects and these medications not regulated by the Food and Drug Administration.    FOLLOWUP INSTRUCTIONS:  1.  Avoid drinking any alcohol.  Recommend counseling for alcohol addiction.  2.  Recommend to not consume supplements or herbals that are not regulated by the FDA.  These supplements can have unknown effects in the body, including  the liver.      I examined the patient on day of discharge.    Tim Winslow MD        D: 2022   T: 2022   MT: [email or web address]    Name:     VIVEK BURROUGHS  MRN:      [id number]        Account:      [id number]   :      1975           Service Date: 2022                                  Discharge Date: 2022     Document: [id number]

## 2022-03-18 NOTE — UTILIZATION REVIEW
"  [facility] Utilization Review  Admission Status; Secondary Review Determination     Admission Date: 3/17/2022  5:45 AM      Under the authority of the Utilization Management Committee, the utilization review process indicated a secondary review on the above patient.  The review outcome is based on review of the medical records, discussions with staff, and applying clinical experience noted on the date of the review.        (X) Observation Status Appropriate - This patient does not meet hospital inpatient criteria and is placed in observation status. If this patient's primary payer is Medicare and was admitted as an inpatient, Condition Code 44 should be used and patient status changed to \"observation\".   () Observation Status concurrent Review           RATIONALE FOR DETERMINATION   46-year-old female with history of eating disorder, alcohol abuse, withdrawal, admitted with abdominal pain, nausea, vomiting and generally feeling unwell.  Patient is afebrile, has tachycardia, borderline blood pressures.  Patient has electrolyte abnormalities including hyponatremia, significantly elevated liver enzymes with ALT greater than 7000, AST greater than 9000.  Ethanol and Tylenol level unremarkable.  Patient has had constant abdominal pain, right mid back pain, nausea and vomiting with fatigue, last alcohol drink was on 3/14.  Right upper quadrant ultrasound showed normal gallbladder and hepatic steatosis.  Hepatitis serologies, HSV, DANIEL negative, currently n.p.o. with aggressive IV fluid hydration.  GI evaluation recommends likely alcohol induced, ruling out additional viral or autoimmune processes as well, possible herbal supplementation.  Hyponatremia is resolved, LFTs slowly improving but still in the thousands range.  Work-up negative, and LFTs already improving, patient does not want to stay in the hospital anymore, received N-acetylcysteine, does not meet criteria for inpatient stay, recommend change to " observation status, communicated to Dr. Winslow      The severity of illness, intensity of service provided, expected LOS make the care appropriate for observation status at this time.        The information on this document is developed by the utilization review team in order for the business office to ensure compliance.  This only denotes the appropriateness of proper admission status and does not reflect the quality of care rendered.         The definitions of Inpatient Status and Observation Status used in making the determination above are those provided in the CMS Coverage Manual, Chapter 1 and Chapter 6, section 70.4.      Sincerely,       Ashley Sanchez MD  Physician Advisor  Utilization Review-[place]    Phone: [phone]

## 2022-03-18 NOTE — PROGRESS NOTES
"GASTROENTEROLOGY PROGRESS NOTE     CC: Epigastric pain, nausea and headaches     SUBJECTIVE:  She is feeling better. Eating and drinking. Had an episode of nausea and chest tightness an hour into acetylcysteine. Those symptoms improved after taking antiemetics.     OBJECTIVE:  General Appearance:  Watching a video on her laptop. Not in any distress.  /67 (BP Location: Left arm)   Pulse 70   Temp 98.1  F (36.7  C) (Oral)   Resp 18   Ht 1.646 m (5' 4.8\")   Wt 51.4 kg (113 lb 6.4 oz)   SpO2 98%   BMI 18.99 kg/m    Temp (24hrs), Av.3  F (36.8  C), Min:98.1  F (36.7  C), Max:98.6  F (37  C)    Patient Vitals for the past 72 hrs:   Weight   22 1015 51.4 kg (113 lb 6.4 oz)       Intake/Output Summary (Last 24 hours) at 3/18/2022 1107  Last data filed at 3/18/2022 1009  Gross per 24 hour   Intake 243 ml   Output 600 ml   Net -357 ml        PHYSICAL EXAM  General: alert, oriented, NAD  SKIN: no suspicious lesions, rashes, jaundice, or spider angiomas  EYES: No scleral icterus  RESPIRATORY: Non labored breathing, Lungs clear  CARDIOVASCULAR:  RRR. No murmurs, clicks gallops or rub  GASTROINTESTINAL: Active bowel sounds, abdomen soft and non tender on palpation.   JOINT/EXTREMITIES: extremities normal- no gross deformities noted. No edema  NEURO:Grossly WNL.  PSYCH: no abnormal anxiety/depression     Additional Comments:  ROS, FH, SH: See initial GI consult for details.     I have reviewed the patient's new clinical lab results:     Recent Labs   Lab Test 22  0758 22  1540 22  0554 20  1432 19  0527 19  1137   WBC 4.1  --  6.2 4.5   < > 3.5*   HGB 12.8  --  14.3 14.7   < > 12.7   MCV 96  --  94 99   < > 98     --  191 169   < > 122*   INR 1.20* 1.38*  --   --   --  0.96    < > = values in this interval not displayed.     Recent Labs   Lab Test 22  0758 22  0554 20  1432   POTASSIUM 3.4 3.5 3.9   CHLORIDE 102 92* 104   CO2 26 29 30   BUN 5* 7 3* "   ANIONGAP 7 6 4     Recent Labs   Lab Test 03/18/22  0758 03/17/22  1540 03/17/22  0554 10/06/20  1837 10/06/20  1813 02/06/20  1823 08/20/19  1152   ALBUMIN 3.4 3.0* 3.8  --  3.9 4.3  --    BILITOTAL 1.3 0.9 1.0  --  1.1 0.8  --    ALT 3,691* 4,844* 7,005*  --  98* 114*  --    AST 1,841* 4,381* 9,442*  --  53* 68*  --    PROTEIN  --   --  200 * Negative  --   --  10*   LIPASE  --   --  195  --  166 322  --         Imaging and procedures:    US abdomen with Doppler 3/17/22  IMPRESSION:  1.  Portal system is patent with flow directed towards the liver. Main portal vein is normal in diameter at 8 mm.  2.  Hepatic artery is patent with normal low resistance waveform.  3.  No ascites present.    US abdomen: 3/17/22  IMPRESSION:  1.  Hepatic steatosis.  2.  Normal gallbladder. No biliary ductal dilatation.    Progress note:     Problem list pertaining to GI:  Hepatitis  Alcohol use    Assessment: This is a 46 y-o female who presented to the hospital for an evaluation of nausea, body ached, headaches and vomiting. Patient acknowledges use of alcohol 6 drinks daily. No signs of withdrawal. The labs completed at th visits showed , Total bili 1.0, D.Reymundo 0.5, AST 9,442, ALT 7,005, ALK phos 92 and lipase 195.     ALT and AST are improving on acetylcysteine. No abdominal pain or nausea this morning. Tolerating oral intake.     Reportedly, drinking alcohol to manage her anxiety. She does not want to consider antidepressant for anxiety.      Plan:  - Trend hepatic panel   - Follow-up in [facility] liver clinic in 2-4 weeks  - If the hepatic panel is continue to improve she could possibly go home.   - Stay off of alcohol.  - Recommended to seek help for alcohol addiction.     I will discuss with Dr. Thornton      Time spent: 30 minutes, greater than 50% of the visit was spent in counseling/coordination of care.     Dorothy Moseley, [facility] ([facility])  [phone]

## 2022-03-18 NOTE — PLAN OF CARE
"BP (!) 143/88 (BP Location: Left arm)   Pulse 71   Temp 98.6  F (37  C) (Oral)   Resp 18   Ht 1.646 m (5' 4.8\")   Wt 51.4 kg (113 lb 6.4 oz)   SpO2 96%   BMI 18.99 kg/m      Pt vss with high BP noted. A&O. Reg diet. IND in room. Refuses alarms. Denied pain. NAC step 3 started at 0340. Infusing at 26.8 ml/hr. IND in room. Denied nausea. A&O. Will continue to monitor.   "

## 2022-03-19 LAB
ALBUMIN SERPL-MCNC: 3.3 G/DL (ref 3.4–5)
ALP SERPL-CCNC: 71 U/L (ref 40–150)
ALT SERPL W P-5'-P-CCNC: 2569 U/L (ref 0–50)
ANION GAP SERPL CALCULATED.3IONS-SCNC: 9 MMOL/L (ref 3–14)
AST SERPL W P-5'-P-CCNC: 578 U/L (ref 0–45)
BILIRUB SERPL-MCNC: 1.1 MG/DL (ref 0.2–1.3)
BUN SERPL-MCNC: 3 MG/DL (ref 7–30)
CALCIUM SERPL-MCNC: 8.7 MG/DL (ref 8.5–10.1)
CHLORIDE BLD-SCNC: 91 MMOL/L (ref 94–109)
CO2 SERPL-SCNC: 23 MMOL/L (ref 20–32)
CREAT SERPL-MCNC: 0.46 MG/DL (ref 0.52–1.04)
ERYTHROCYTE [DISTWIDTH] IN BLOOD BY AUTOMATED COUNT: 11.6 % (ref 10–15)
GFR SERPL CREATININE-BSD FRML MDRD: >90 ML/MIN/1.73M2
GLUCOSE BLD-MCNC: 92 MG/DL (ref 70–99)
HCT VFR BLD AUTO: 37.5 % (ref 35–47)
HGB BLD-MCNC: 12.7 G/DL (ref 11.7–15.7)
MCH RBC QN AUTO: 32.5 PG (ref 26.5–33)
MCHC RBC AUTO-ENTMCNC: 33.9 G/DL (ref 31.5–36.5)
MCV RBC AUTO: 96 FL (ref 78–100)
PLATELET # BLD AUTO: 175 10E3/UL (ref 150–450)
POTASSIUM BLD-SCNC: 3.6 MMOL/L (ref 3.4–5.3)
PROT SERPL-MCNC: 7.1 G/DL (ref 6.8–8.8)
RBC # BLD AUTO: 3.91 10E6/UL (ref 3.8–5.2)
SMA IGG SER-ACNC: 3 UNITS
SODIUM SERPL-SCNC: 121 MMOL/L (ref 133–144)
SODIUM SERPL-SCNC: 121 MMOL/L (ref 133–144)
SODIUM SERPL-SCNC: 123 MMOL/L (ref 133–144)
SODIUM SERPL-SCNC: 126 MMOL/L (ref 133–144)
WBC # BLD AUTO: 6.1 10E3/UL (ref 4–11)

## 2022-03-19 PROCEDURE — 258N000003 HC RX IP 258 OP 636: Performed by: INTERNAL MEDICINE

## 2022-03-19 PROCEDURE — 250N000013 HC RX MED GY IP 250 OP 250 PS 637: Performed by: INTERNAL MEDICINE

## 2022-03-19 PROCEDURE — 80053 COMPREHEN METABOLIC PANEL: CPT | Performed by: PHYSICIAN ASSISTANT

## 2022-03-19 PROCEDURE — 99225 PR SUBSEQUENT OBSERVATION CARE,LEVEL II: CPT | Performed by: INTERNAL MEDICINE

## 2022-03-19 PROCEDURE — 96366 THER/PROPH/DIAG IV INF ADDON: CPT

## 2022-03-19 PROCEDURE — G0378 HOSPITAL OBSERVATION PER HR: HCPCS

## 2022-03-19 PROCEDURE — 36415 COLL VENOUS BLD VENIPUNCTURE: CPT | Performed by: PHYSICIAN ASSISTANT

## 2022-03-19 PROCEDURE — 82040 ASSAY OF SERUM ALBUMIN: CPT | Performed by: PHYSICIAN ASSISTANT

## 2022-03-19 PROCEDURE — 99226 PR SUBSEQUENT OBSERVATION CARE,LEVEL III: CPT | Performed by: INTERNAL MEDICINE

## 2022-03-19 PROCEDURE — 85027 COMPLETE CBC AUTOMATED: CPT | Performed by: PHYSICIAN ASSISTANT

## 2022-03-19 PROCEDURE — 250N000011 HC RX IP 250 OP 636: Performed by: INTERNAL MEDICINE

## 2022-03-19 PROCEDURE — 84295 ASSAY OF SERUM SODIUM: CPT | Performed by: INTERNAL MEDICINE

## 2022-03-19 PROCEDURE — 96361 HYDRATE IV INFUSION ADD-ON: CPT

## 2022-03-19 PROCEDURE — 36415 COLL VENOUS BLD VENIPUNCTURE: CPT | Performed by: INTERNAL MEDICINE

## 2022-03-19 RX ORDER — SODIUM CHLORIDE 9 MG/ML
INJECTION, SOLUTION INTRAVENOUS CONTINUOUS
Status: DISCONTINUED | OUTPATIENT
Start: 2022-03-19 | End: 2022-03-20

## 2022-03-19 RX ORDER — POLYETHYLENE GLYCOL 3350 17 G/17G
17 POWDER, FOR SOLUTION ORAL DAILY
Status: DISCONTINUED | OUTPATIENT
Start: 2022-03-19 | End: 2022-03-20 | Stop reason: HOSPADM

## 2022-03-19 RX ORDER — 3% SODIUM CHLORIDE 3 G/100ML
INJECTION, SOLUTION INTRAVENOUS CONTINUOUS
Status: DISCONTINUED | OUTPATIENT
Start: 2022-03-19 | End: 2022-03-19

## 2022-03-19 RX ORDER — SENNOSIDES 8.6 MG
8.6 TABLET ORAL 2 TIMES DAILY PRN
Status: DISCONTINUED | OUTPATIENT
Start: 2022-03-19 | End: 2022-03-20 | Stop reason: HOSPADM

## 2022-03-19 RX ORDER — SODIUM CHLORIDE 9 MG/ML
INJECTION, SOLUTION INTRAVENOUS CONTINUOUS
Status: DISCONTINUED | OUTPATIENT
Start: 2022-03-19 | End: 2022-03-19

## 2022-03-19 RX ADMIN — STANDARDIZED SENNA CONCENTRATE 8.6 MG: 8.6 TABLET ORAL at 23:50

## 2022-03-19 RX ADMIN — SODIUM CHLORIDE: 9 INJECTION, SOLUTION INTRAVENOUS at 14:11

## 2022-03-19 RX ADMIN — VENLAFAXINE HYDROCHLORIDE 75 MG: 75 CAPSULE, EXTENDED RELEASE ORAL at 09:43

## 2022-03-19 RX ADMIN — FAMOTIDINE 20 MG: 20 TABLET ORAL at 20:23

## 2022-03-19 RX ADMIN — STANDARDIZED SENNA CONCENTRATE 8.6 MG: 8.6 TABLET ORAL at 09:42

## 2022-03-19 RX ADMIN — SODIUM CHLORIDE: 3 INJECTION, SOLUTION INTRAVENOUS at 20:17

## 2022-03-19 RX ADMIN — SODIUM CHLORIDE: 9 INJECTION, SOLUTION INTRAVENOUS at 18:17

## 2022-03-19 RX ADMIN — FAMOTIDINE 20 MG: 20 TABLET ORAL at 09:43

## 2022-03-19 RX ADMIN — SODIUM CHLORIDE: 9 INJECTION, SOLUTION INTRAVENOUS at 23:40

## 2022-03-19 RX ADMIN — SODIUM CHLORIDE 500 ML: 9 INJECTION, SOLUTION INTRAVENOUS at 12:47

## 2022-03-19 NOTE — PROGRESS NOTES
[facility]  Hospitalist Progress Note  Tim Winslow MD 03/19/2022    Reason for Stay (Diagnosis): acute hepatitis, hyponatremia         Assessment and Plan:      Summary of Stay: Alana Mujica is a 46 year old female with a PMH significant for eating disorder (anorexia, bulimia) and alcohol abuse with history of withdrawal who presents with abdominal pain, nausea, vomiting and generally feeling unwell.      Patient was discussed with Dr. Garcia, who was provider in ED. Chart review of ED work up was reviewed as well as chart review of Care Everywhere, previous visits and admissions.      In the ED she is afebrile with heart rate 90, pressure 103/86 and breathing comfortably on room air without hypoxia.  Lab work is remarkable for sodium 127, creatinine 0.84, calcium 10.4, anion gap 6, ALT 7005, AST 9442, ALP 92 and total bilirubin of 1 with direct bilirubin 0.5, lipase 195, pregnancy test negative, glucose 96, WBC 6.2 and hemoglobin 14.3.  Ethanol and Tylenol level unremarkable.  UA shows dark urine with a large amount of skin cells.  Bilirubin is negative.  Urine was sent for culture, COVID/influenza negative,     She continues to feel well since admit.  No abd pain today.  Was treated with empiric NAC protocol 3/17-3/19.  Denies acetaminophen use     Plan was for discharge today, but labs from this AM show acute hyponatremia.  Lab recheck confirmed low sodium level.  No sx.  Sodium level normal at 135 yesterday     Plans today  - NS IVF today.   - follow sodium level q6h.  Can correct sodium fairly quickly given her hyponatremia is acute (135 yesterday)  - fluid restriction  - if sodium worsens on follow up lab draws, would stop NS IVF and treat with fluid restriction alone  - for now hold Effexor.  Possible contributing factor (SIADH)    Addendum (@ 1850)  - follow up sodium level remains at 121 after  ml bolus followed by maintenance infusion of 100cc/hr this afternooon.    Plan:  -  "stop NS IVF  - start hypertonic 3% NS at 50 ml/hr  - fluid restriction 1500 mL  - follow sodium level q4h overnight - adjust rate to increase sodium of approx 6-8 meq over the next 24 hours  - have asked nursing staff to text page results of overnight sodium levels to hospitalist x-cover pager  - I notified hospitalist x-cover provider of plan above     #Acute hepatitis  - labs much improved  - likely ETOH induced; ruling out additional viral or autoimmune processes as well.  Also possible herbal supplements she has been taking contributed as well.  D/w patient  - received empiric NAC; completed protocol     #Intractable abdominal pain and vomiting  - resolved.  Tolerating PO intake     #Hyponatremia  - sodium was 127 on admit; improved to 135 yesterday; now down to 121 today  - unclear Etiology.  Received D5W with NAC infusion - possible factor.  New medication is Effexor (started yesterday) - ? contributing (though would be rather rapid effect given med started 1 day ago)  - was moderately hyponatremic on admit, likely related to ETOH use     #History of alcohol abuse  -No withdrawal symptoms  - cessation advised     #History of eating d/o     #Anxiety  -started Effexor this admit        DVT Prophylaxis: Enoxaparin (Lovenox) SQ  Code Status: Full Code  Discharge Dispo: home  Estimated Disch Date / # of Days until Disch: 1-2 days after sodium level improved        Interval History (Subjective):      Feels well.  Was planning on discharge today; unfortunately her sodium level was acutely low on labs this AM.  No sx.  Completed NAC protocol.  LFTs continue to improve                  Physical Exam:      Last Vital Signs:  BP (!) 147/92 (BP Location: Left arm)   Pulse 72   Temp 98.4  F (36.9  C) (Oral)   Resp 20   Ht 1.646 m (5' 4.8\")   Wt 51.4 kg (113 lb 6.4 oz)   SpO2 99%   BMI 18.99 kg/m        Intake/Output Summary (Last 24 hours) at 3/19/2022 1231  Last data filed at 3/19/2022 0546  Gross per 24 hour "   Intake 914.4 ml   Output --   Net 914.4 ml       Constitutional: Awake, alert, cooperative, no apparent distress   Respiratory: Clear to auscultation bilaterally, no crackles or wheezing   Cardiovascular: Regular rate and rhythm, normal S1 and S2, and no murmur noted   Abdomen: Normal bowel sounds, soft, non-distended, non-tender   Skin: No rashes, no cyanosis, dry to touch   Neuro: Alert and oriented x3, no weakness, numbness, memory loss   Extremities: No edema, normal range of motion   Other(s):        All other systems: Negative          Medications:      All current medications were reviewed with changes reflected in problem list.         Data:      All new lab and imaging data was reviewed.   Labs:  Recent Labs   Lab 03/19/22  1142 03/19/22  0731 03/18/22  0758 03/17/22  0554   * 123* 135 127*   POTASSIUM  --  3.6 3.4 3.5   CHLORIDE  --  91* 102 92*   CO2  --  23 26 29   ANIONGAP  --  9 7 6   GLC  --  92 93 96   BUN  --  3* 5* 7   CR  --  0.46* 0.56 0.84   GFRESTIMATED  --  >90 >90 85   KODY  --  8.7 8.8 10.4*     Recent Labs   Lab 03/19/22  0731   WBC 6.1   HGB 12.7   HCT 37.5   MCV 96        Recent Labs   Lab 03/19/22  1142 03/19/22  0731 03/18/22  1650 03/18/22  0758 03/17/22  1540 03/17/22  0554   * 123*  --  135  --  127*   POTASSIUM  --  3.6  --  3.4  --  3.5   CHLORIDE  --  91*  --  102  --  92*   CO2  --  23  --  26  --  29   ANIONGAP  --  9  --  7  --  6   GLC  --  92  --  93  --  96   BUN  --  3*  --  5*  --  7   CR  --  0.46*  --  0.56  --  0.84   GFRESTIMATED  --  >90  --  >90  --  85   KODY  --  8.7  --  8.8  --  10.4*   PROTTOTAL  --  7.1 6.7* 7.1   < > 7.5   ALBUMIN  --  3.3* 3.2* 3.4   < > 3.8   BILITOTAL  --  1.1 1.0 1.3   < > 1.0   ALKPHOS  --  71 72 76   < > 92   AST  --  578* 1,061* 1,841*   < > 9,442*   ALT  --  2,569* 3,239* 3,691*   < > 7,005*    < > = values in this interval not displayed.      Imaging:   No results found for this or any previous visit (from the past  24 hour(s)).

## 2022-03-19 NOTE — PLAN OF CARE
Goal Outcome Evaluation:                  PRIMARY DIAGNOSIS: HEPATITIS  OUTPATIENT/OBSERVATION GOALS TO BE MET BEFORE DISCHARGE:  1. ADLs back to baseline: Yes     2. Activity and level of assistance: Ambulating independently.     3. Pain status: Pain free.     4. Return to near baseline physical activity: Yes          Discharge Planner Nurse   Safe discharge environment identified: Yes  Barriers to discharge: No         Please review provider order for any additional goals.   Nurse to notify provider when observation goals have been met and patient is ready for discharge.     Pt is alert and oriented, up ad otilia in the room. Pt remains on NAC step 3 until AST under 1000.

## 2022-03-19 NOTE — PLAN OF CARE
"PRIMARY DIAGNOSIS: \"GENERIC\" NURSING  OUTPATIENT/OBSERVATION GOALS TO BE MET BEFORE DISCHARGE:  1. ADLs back to baseline: Yes    2. Activity and level of assistance: Ambulating independently.    3. Pain status: Improved with use of alternative comfort measures i.e.: ice and positioning    4. Return to near baseline physical activity: Yes     Discharge Planner Nurse   Safe discharge environment identified: Yes  Barriers to discharge: Yes - pending labs       Entered by: Maribell Hardy 03/19/2022 1:34 PM     Please review provider order for any additional goals.   Nurse to notify provider when observation goals have been met and patient is ready for discharge.    Pt A/O x 4, VSS, pt denies headache, dizziness, N/V & SOB. Pt reports mild neck pain - administered ice pack. Pt independent. Lung sounds clear, RA. GI & Poison Control are following. Will continue with plan of care.     Per Poison Control - once AST <1,000 and INR <2.0 okay to stop IV Acetylcystein gtt. Recheck labs this AM. AST = 578, ALt= 2569, INR =1.19 - Gtt stopped at 1000. However, Sodium levels are low 123, 121 - MD notified - 500 mL/hr bolous ordered, Fluid Restrictions added (1800/day)  "

## 2022-03-19 NOTE — PLAN OF CARE
VSS, afeb., LS are clear, 99% on RA. Pt has good appetite, ate 100% for dinner, great fluiid intake. Pt up indep in room, Pt denies pain. Labs reported to Poison Control, ALT, AST, and INR are all down, but Poison Control wants AST below 1000, and it is still above that slightly at 1,061. PC wants pt labs drawn in AM, and to continue on step 3 of the IV Acetylcysteine. Most likely will discharge to home tomorrow.

## 2022-03-19 NOTE — PROGRESS NOTES
"PRIMARY DIAGNOSIS: \"GENERIC\" NURSING  OUTPATIENT/OBSERVATION GOALS TO BE MET BEFORE DISCHARGE:  1. ADLs back to baseline: Yes    2. Activity and level of assistance: Ambulating independently.    3. Pain status: Improved with use of alternative comfort measures i.e.: ice and positioning    4. Return to near baseline physical activity: Yes     Discharge Planner Nurse   Safe discharge environment identified: Yes  Barriers to discharge: Yes - pending labs       Entered by: Maribell Hardy 03/19/2022 1:55 PM     Please review provider order for any additional goals.   Nurse to notify provider when observation goals have been met and patient is ready for discharge.    Pt A/O x 4, VSS, pt denies headache, dizziness, N/V & SOB. Pt reports mild neck pain - administered ice pack. Pt independent. Lung sounds clear, RA. GI following. Will continue with plan of care.     Sodium levels are low 123, 121 - MD notified - 500 mL/hr bolous ordered, Fluid Restrictions added (1800/day). Recheck Na levels Q6H  "

## 2022-03-19 NOTE — PROGRESS NOTES
Pt seen and examined.      Feels well.  Appears appropriate for discharge home today.     AST and ALT continue to improve    Sodium low; suspect related to NAC infusion (base IVF was D5W).  This may be lab error as IVF was running at time of lab draw.    Will discontinue IVF and redraw sodium level    Assuming this has improved can discharge today    Addendum  - lab recheck shows sodium lower at 121.  Chloride also low  - Given acute drop in sodium no need for slow correction (sodium was 135 yesterday)  - will start NS IVF at 125 ml/hr  - fluid restriction  - recheck sodium later today  - may be related to D5W received with NAC infusion.  Also consider possibility of Effexor (though seems unlikely as this was just started yesterday)

## 2022-03-20 VITALS
DIASTOLIC BLOOD PRESSURE: 72 MMHG | BODY MASS INDEX: 18.89 KG/M2 | WEIGHT: 113.4 LBS | HEIGHT: 65 IN | HEART RATE: 81 BPM | RESPIRATION RATE: 18 BRPM | TEMPERATURE: 98.7 F | SYSTOLIC BLOOD PRESSURE: 115 MMHG | OXYGEN SATURATION: 97 %

## 2022-03-20 LAB
ALBUMIN SERPL-MCNC: 3.4 G/DL (ref 3.4–5)
ALP SERPL-CCNC: 65 U/L (ref 40–150)
ALT SERPL W P-5'-P-CCNC: 1866 U/L (ref 0–50)
ANION GAP SERPL CALCULATED.3IONS-SCNC: 5 MMOL/L (ref 3–14)
AST SERPL W P-5'-P-CCNC: 230 U/L (ref 0–45)
BILIRUB SERPL-MCNC: 0.7 MG/DL (ref 0.2–1.3)
BUN SERPL-MCNC: 5 MG/DL (ref 7–30)
CALCIUM SERPL-MCNC: 8.3 MG/DL (ref 8.5–10.1)
CHLORIDE BLD-SCNC: 98 MMOL/L (ref 94–109)
CO2 SERPL-SCNC: 23 MMOL/L (ref 20–32)
CREAT SERPL-MCNC: 0.56 MG/DL (ref 0.52–1.04)
GFR SERPL CREATININE-BSD FRML MDRD: >90 ML/MIN/1.73M2
GLUCOSE BLD-MCNC: 95 MG/DL (ref 70–99)
HCV RNA SERPL NAA+PROBE-ACNC: NOT DETECTED IU/ML
HOLD SPECIMEN: NORMAL
OSMOLALITY SERPL: 261 MMOL/KG (ref 275–295)
OSMOLALITY UR: 175 MMOL/KG (ref 100–1200)
POTASSIUM BLD-SCNC: 3.9 MMOL/L (ref 3.4–5.3)
PROT SERPL-MCNC: 6.9 G/DL (ref 6.8–8.8)
SODIUM SERPL-SCNC: 125 MMOL/L (ref 133–144)
SODIUM SERPL-SCNC: 126 MMOL/L (ref 133–144)
SODIUM SERPL-SCNC: 129 MMOL/L (ref 133–144)
SODIUM UR-SCNC: 58 MMOL/L

## 2022-03-20 PROCEDURE — 84300 ASSAY OF URINE SODIUM: CPT | Performed by: INTERNAL MEDICINE

## 2022-03-20 PROCEDURE — 250N000013 HC RX MED GY IP 250 OP 250 PS 637: Performed by: INTERNAL MEDICINE

## 2022-03-20 PROCEDURE — 83935 ASSAY OF URINE OSMOLALITY: CPT | Performed by: INTERNAL MEDICINE

## 2022-03-20 PROCEDURE — G0378 HOSPITAL OBSERVATION PER HR: HCPCS

## 2022-03-20 PROCEDURE — 84295 ASSAY OF SERUM SODIUM: CPT | Performed by: INTERNAL MEDICINE

## 2022-03-20 PROCEDURE — 96361 HYDRATE IV INFUSION ADD-ON: CPT

## 2022-03-20 PROCEDURE — 80053 COMPREHEN METABOLIC PANEL: CPT | Performed by: INTERNAL MEDICINE

## 2022-03-20 PROCEDURE — 99207 PR NO BILLABLE SERVICE THIS VISIT: CPT | Performed by: INTERNAL MEDICINE

## 2022-03-20 PROCEDURE — 83930 ASSAY OF BLOOD OSMOLALITY: CPT | Performed by: INTERNAL MEDICINE

## 2022-03-20 PROCEDURE — 36415 COLL VENOUS BLD VENIPUNCTURE: CPT | Performed by: INTERNAL MEDICINE

## 2022-03-20 RX ADMIN — STANDARDIZED SENNA CONCENTRATE 8.6 MG: 8.6 TABLET ORAL at 08:38

## 2022-03-20 NOTE — PROGRESS NOTES
Pt seen and examined today    She feels well.    Sodium has improved from 121 to 126    NS IVF infusion stopped this morning at 0800    F/u sodium level is now improved to 129    Pt appears stable for discharge today    Recommend repeat sodium level in 2-3 days with primary provider

## 2022-03-20 NOTE — PLAN OF CARE
VSS, afeb., LS are clear, 98% on RA. Pt up in room indep., good appetite, ate 100% for dinner. Pt on fluid restriction of 1200cc/day. Last Na was 121, had not gone up since last check. Pt started on 3% NS @ 50cc/hr. Both Pharmacist (Manuel) and MD (Mitra) staed that it was ok to administer this med via pts peripheral line. Pt has had no ill effects, and IV site is wnl. Na recheck will be at midnight.

## 2022-03-20 NOTE — PROGRESS NOTES
Dr. Manriquez called writer to inform her it is OK to give 3% NS PIV despite order saying through central line.

## 2022-03-20 NOTE — PROGRESS NOTES
"PRIMARY DIAGNOSIS: \"GENERIC\" NURSING  OUTPATIENT/OBSERVATION GOALS TO BE MET BEFORE DISCHARGE:  1. ADLs back to baseline: Yes    2. Activity and level of assistance: Ambulating independently.    3. Pain status: Improved with use of alternative comfort measures i.e.: ice and positioning    4. Return to near baseline physical activity: Yes     Discharge Planner Nurse   Safe discharge environment identified: Yes  Barriers to discharge: No       Entered by: Maribell Hardy 03/20/2022 12:33 PM     Please review provider order for any additional goals.   Nurse to notify provider when observation goals have been met and patient is ready for discharge.    Pt A/O x 4, VSS, pt denies headache, dizziness, N/V & SOB. Pt reports mild neck pain - administered ice pack. Pt independent. Lung sounds clear, RA. GI following. Will continue with plan of care.     Sodium levels still trending low - 125 - MD aware - IVF discontinued, Fluid Restrictions at 1500 ml/day; continue to monitor. Recheck Na levels Q6H  "

## 2022-03-20 NOTE — PROGRESS NOTES
"    Brief Hospitalist Cross cover note    Reason for the call : sodium level 126 from 121 earlier     Reason for admission :acute hepatitis, hyponatremia    CurrentSignificant findings or change:       Vital signs : /86 (BP Location: Left arm)   Pulse 73   Temp 98.4  F (36.9  C) (Oral)   Resp 16   Ht 1.646 m (5' 4.8\")   Wt 51.4 kg (113 lb 6.4 oz)   SpO2 97%   BMI 18.99 kg/m       Labs and Diagnostic Studies       Actions taken:    EMR reviewed       Assessment:hyponatremia on 3%saline @50cc/h   -- last sodium was 121 >>>126     Recommendation/Plan:    Will stop 3% saline and start 0.9% N/S , Check sodium in 6 hours       "

## 2022-03-20 NOTE — PROGRESS NOTES
Pt discharged to home at this time. Discharge instructions read, and pt verbalized understanding. Friend transporting to home.

## 2022-03-20 NOTE — PLAN OF CARE
"/88 (BP Location: Left leg, Patient Position: Fowlers, Cuff Size: Adult Regular)   Pulse 67   Temp 98.7  F (37.1  C) (Oral)   Resp 18   Ht 1.646 m (5' 4.8\")   Wt 51.4 kg (113 lb 6.4 oz)   SpO2 98%   BMI 18.99 kg/m      A/O x 4. Pt denies pain. Independent in room. NA 3% Discontinued. NA: 126, 126. NS running at 75 mL/hr. Continue with POC.  "

## 2022-03-20 NOTE — PLAN OF CARE
"PRIMARY DIAGNOSIS: \"GENERIC\" NURSING  OUTPATIENT/OBSERVATION GOALS TO BE MET BEFORE DISCHARGE:  ADLs back to baseline: Yes    Activity and level of assistance: Ambulating independently.    Pain status: Improved with use of alternative comfort measures i.e.: ice and positioning    Return to near baseline physical activity: Yes     Discharge Planner Nurse   Safe discharge environment identified: Yes  Barriers to discharge: No       Entered by: Maribell Hardy 03/20/2022 10:44 AM     Please review provider order for any additional goals.   Nurse to notify provider when observation goals have been met and patient is ready for discharge.    Pt A/O x 4, VSS, pt denies headache, dizziness, N/V & SOB. Pt reports mild neck pain - administered ice pack. Pt independent. Lung sounds clear, RA. GI following. Will continue with plan of care.     Sodium levels still trending low - 125 - MD aware - IVF discontinued, Fluid Restrictions at 1500 ml/day; continue to monitor. Recheck Na levels Q6H  "

## 2022-03-31 ENCOUNTER — OFFICE VISIT (OUTPATIENT)
Dept: PEDIATRICS | Facility: CLINIC | Age: 47
End: 2022-03-31
Payer: COMMERCIAL

## 2022-03-31 VITALS
DIASTOLIC BLOOD PRESSURE: 66 MMHG | OXYGEN SATURATION: 98 % | TEMPERATURE: 97.5 F | SYSTOLIC BLOOD PRESSURE: 110 MMHG | RESPIRATION RATE: 16 BRPM | WEIGHT: 113 LBS | HEART RATE: 114 BPM | BODY MASS INDEX: 18.92 KG/M2

## 2022-03-31 DIAGNOSIS — E87.1 HYPONATREMIA: ICD-10-CM

## 2022-03-31 DIAGNOSIS — F10.20 ALCOHOL DEPENDENCE, CONTINUOUS (H): ICD-10-CM

## 2022-03-31 DIAGNOSIS — F41.9 ANXIETY: ICD-10-CM

## 2022-03-31 DIAGNOSIS — K75.9 HEPATITIS: Primary | ICD-10-CM

## 2022-03-31 PROBLEM — N17.9 AKI (ACUTE KIDNEY INJURY) (H): Status: RESOLVED | Noted: 2018-09-14 | Resolved: 2022-03-31

## 2022-03-31 PROBLEM — K70.10 ALCOHOLIC HEPATITIS WITHOUT ASCITES (H): Status: ACTIVE | Noted: 2019-09-22

## 2022-03-31 PROBLEM — F10.10 ALCOHOL ABUSE: Status: RESOLVED | Noted: 2018-08-27 | Resolved: 2022-03-31

## 2022-03-31 PROBLEM — F10.939 ALCOHOL WITHDRAWAL (H): Status: RESOLVED | Noted: 2018-08-26 | Resolved: 2022-03-31

## 2022-03-31 LAB
ALBUMIN SERPL-MCNC: 4 G/DL (ref 3.4–5)
ALP SERPL-CCNC: 65 U/L (ref 40–150)
ALT SERPL W P-5'-P-CCNC: 183 U/L (ref 0–50)
ANION GAP SERPL CALCULATED.3IONS-SCNC: 5 MMOL/L (ref 3–14)
AST SERPL W P-5'-P-CCNC: 24 U/L (ref 0–45)
BILIRUB SERPL-MCNC: 0.5 MG/DL (ref 0.2–1.3)
BUN SERPL-MCNC: 6 MG/DL (ref 7–30)
CALCIUM SERPL-MCNC: 9.7 MG/DL (ref 8.5–10.1)
CHLORIDE BLD-SCNC: 107 MMOL/L (ref 94–109)
CHOLEST SERPL-MCNC: 222 MG/DL
CO2 SERPL-SCNC: 26 MMOL/L (ref 20–32)
CREAT SERPL-MCNC: 0.72 MG/DL (ref 0.52–1.04)
FASTING STATUS PATIENT QL REPORTED: ABNORMAL
GFR SERPL CREATININE-BSD FRML MDRD: >90 ML/MIN/1.73M2
GLUCOSE BLD-MCNC: 83 MG/DL (ref 70–99)
HDLC SERPL-MCNC: 89 MG/DL
INR PPP: 1.01 (ref 0.85–1.15)
LDLC SERPL CALC-MCNC: 109 MG/DL
NONHDLC SERPL-MCNC: 133 MG/DL
POTASSIUM BLD-SCNC: 3.9 MMOL/L (ref 3.4–5.3)
PROT SERPL-MCNC: 7.9 G/DL (ref 6.8–8.8)
SODIUM SERPL-SCNC: 138 MMOL/L (ref 133–144)
TRIGL SERPL-MCNC: 121 MG/DL

## 2022-03-31 PROCEDURE — 36415 COLL VENOUS BLD VENIPUNCTURE: CPT | Performed by: STUDENT IN AN ORGANIZED HEALTH CARE EDUCATION/TRAINING PROGRAM

## 2022-03-31 PROCEDURE — 90471 IMMUNIZATION ADMIN: CPT | Performed by: STUDENT IN AN ORGANIZED HEALTH CARE EDUCATION/TRAINING PROGRAM

## 2022-03-31 PROCEDURE — 85610 PROTHROMBIN TIME: CPT | Performed by: STUDENT IN AN ORGANIZED HEALTH CARE EDUCATION/TRAINING PROGRAM

## 2022-03-31 PROCEDURE — 80053 COMPREHEN METABOLIC PANEL: CPT | Performed by: STUDENT IN AN ORGANIZED HEALTH CARE EDUCATION/TRAINING PROGRAM

## 2022-03-31 PROCEDURE — 90746 HEPB VACCINE 3 DOSE ADULT IM: CPT | Performed by: STUDENT IN AN ORGANIZED HEALTH CARE EDUCATION/TRAINING PROGRAM

## 2022-03-31 PROCEDURE — 80061 LIPID PANEL: CPT | Performed by: STUDENT IN AN ORGANIZED HEALTH CARE EDUCATION/TRAINING PROGRAM

## 2022-03-31 PROCEDURE — 99214 OFFICE O/P EST MOD 30 MIN: CPT | Mod: 25 | Performed by: STUDENT IN AN ORGANIZED HEALTH CARE EDUCATION/TRAINING PROGRAM

## 2022-03-31 RX ORDER — NALTREXONE HYDROCHLORIDE 50 MG/1
50 TABLET, FILM COATED ORAL DAILY
Qty: 30 TABLET | Refills: 1 | Status: SHIPPED | OUTPATIENT
Start: 2022-03-31 | End: 2022-06-28

## 2022-03-31 RX ORDER — NALTREXONE HYDROCHLORIDE 50 MG/1
1 TABLET, FILM COATED ORAL DAILY
COMMUNITY
Start: 2021-11-05 | End: 2022-03-31

## 2022-03-31 RX ORDER — BUSPIRONE HYDROCHLORIDE 5 MG/1
5 TABLET ORAL 2 TIMES DAILY
Qty: 60 TABLET | Refills: 1 | Status: ON HOLD | OUTPATIENT
Start: 2022-03-31 | End: 2022-08-30

## 2022-03-31 ASSESSMENT — ANXIETY QUESTIONNAIRES
GAD7 TOTAL SCORE: 5
3. WORRYING TOO MUCH ABOUT DIFFERENT THINGS: SEVERAL DAYS
7. FEELING AFRAID AS IF SOMETHING AWFUL MIGHT HAPPEN: NOT AT ALL
GAD7 TOTAL SCORE: 5
6. BECOMING EASILY ANNOYED OR IRRITABLE: NOT AT ALL
5. BEING SO RESTLESS THAT IT IS HARD TO SIT STILL: SEVERAL DAYS
2. NOT BEING ABLE TO STOP OR CONTROL WORRYING: SEVERAL DAYS
1. FEELING NERVOUS, ANXIOUS, OR ON EDGE: SEVERAL DAYS
GAD7 TOTAL SCORE: 5
7. FEELING AFRAID AS IF SOMETHING AWFUL MIGHT HAPPEN: NOT AT ALL
4. TROUBLE RELAXING: SEVERAL DAYS

## 2022-03-31 ASSESSMENT — PATIENT HEALTH QUESTIONNAIRE - PHQ9
10. IF YOU CHECKED OFF ANY PROBLEMS, HOW DIFFICULT HAVE THESE PROBLEMS MADE IT FOR YOU TO DO YOUR WORK, TAKE CARE OF THINGS AT HOME, OR GET ALONG WITH OTHER PEOPLE: NOT DIFFICULT AT ALL
SUM OF ALL RESPONSES TO PHQ QUESTIONS 1-9: 3
SUM OF ALL RESPONSES TO PHQ QUESTIONS 1-9: 3

## 2022-03-31 NOTE — PROGRESS NOTES
Assessment & Plan     Hepatitis  Alcohol dependence  Recheck liver enzymes and INR today. Refilled naltrexone as suspect alcohol induced hepatitis. May benefit from more long term/intensive treatment at this time.  - Comprehensive metabolic panel (BMP + Alb, Alk Phos, ALT, AST, Total. Bili, TP); Future  - INR; Future  - naltrexone (DEPADE/REVIA) 50 MG tablet; Take 1 tablet (50 mg) by mouth daily  - Comprehensive metabolic panel (BMP + Alb, Alk Phos, ALT, AST, Total. Bili, TP)  - INR    Hyponatremia  Recheck sodium today. Was 129 prior to discharge. Likely multifactorial in setting of decreased solute intake, increase free water intake and alcohol, liver injury.     Anxiety  Has been on multiple medications in past. There was concern in hospital that effexor may have precipitated acute hyponatremia. Given history of eating disorder averse to medications that may increase weight. Recommend retrial of buspar start at low dose 5mg BID. Declined referral to counseling.  - busPIRone (BUSPAR) 5 MG tablet; Take 1 tablet (5 mg) by mouth 2 times daily      Return in about 6 weeks (around 5/12/2022).: annual exam is scheduled  -check Hepatitis B surface antibody post vaccine  -anxiety medication: increase buspar vs change?  -liver enzymes      Destiny Patel MD  [facility] LITO Warner is a 46 year old who presents for the following health issues     HPI     Hospital Follow-up Visit:  Hospital/Nursing Home/IP Rehab Facility: [facility]  Date of Admission: 3/17/2022  Date of Discharge: 3/20/2022  Reason(s) for Admission: Acute Hepatitis      Was your hospitalization related to COVID-19? No   Problems taking medications regularly:  None  Medication changes since discharge: None  Problems adhering to non-medication therapy:  None    Summary of hospitalization:  [facility] discharge summary reviewed  Diagnostic Tests/Treatments reviewed.  Follow up needed:  none  Other Healthcare Providers Involved in Patient s Care:         None  Update since discharge: improved.       Post Discharge Medication Reconciliation: discharge medications reconciled and changed, per note/orders.  Plan of care communicated with patient            Hepatitis: nausea, abdominal pain resolved    -liver enzymes elevated >1000 prior to discharge    -Stopped supplements except milk thistle    HypoNa:  -Has been taking over the counter NaCl tabs dissolved in 16oz water once daily  -Got electrolyte solution from [employer] that has K (potassium) and niacin  -Has been told drinks too much water in past  -Food intake: fruits, cheez its, vegetables with hummus, string cheese, light yogurt        Objective    /66   Pulse 114   Temp 97.5  F (36.4  C)   Resp 16   Wt 51.3 kg (113 lb)   SpO2 98%   BMI 18.92 kg/m    Body mass index is 18.92 kg/m .  Physical Exam   GEN: Alert and appropriately interactive for age  EYES: Eyes grossly normal to inspection, conjunctivae and sclerae normal  RESP: Breathing comfortably on room air   CV: Warm and well perfused peripheral extremities   MS: no gross musculoskeletal defects noted, no edema  NEURO: Alert and oriented. Gait normal. Speech fluent.    PSYCH: Affect normal/bright. Appearance well groomed.

## 2022-04-01 ASSESSMENT — ANXIETY QUESTIONNAIRES: GAD7 TOTAL SCORE: 5

## 2022-04-09 ENCOUNTER — HOSPITAL ENCOUNTER (EMERGENCY)
Facility: CLINIC | Age: 47
Discharge: HOME | End: 2022-04-09
Attending: EMERGENCY MEDICINE | Admitting: EMERGENCY MEDICINE
Payer: COMMERCIAL

## 2022-04-09 VITALS
SYSTOLIC BLOOD PRESSURE: 123 MMHG | HEART RATE: 127 BPM | RESPIRATION RATE: 16 BRPM | DIASTOLIC BLOOD PRESSURE: 74 MMHG | OXYGEN SATURATION: 99 % | TEMPERATURE: 98 F

## 2022-04-09 DIAGNOSIS — F10.10 ALCOHOL ABUSE: ICD-10-CM

## 2022-04-09 DIAGNOSIS — R11.2 NON-INTRACTABLE VOMITING WITH NAUSEA, UNSPECIFIED VOMITING TYPE: ICD-10-CM

## 2022-04-09 DIAGNOSIS — E88.89 ALCOHOLIC KETOSIS (H): ICD-10-CM

## 2022-04-09 DIAGNOSIS — E16.2 HYPOGLYCEMIA: ICD-10-CM

## 2022-04-09 LAB
ALBUMIN SERPL-MCNC: 4.9 G/DL (ref 3.4–5)
ALP SERPL-CCNC: 103 U/L (ref 40–150)
ALT SERPL W P-5'-P-CCNC: 75 U/L (ref 0–50)
ANION GAP SERPL CALCULATED.3IONS-SCNC: 24 MMOL/L (ref 3–14)
ANION GAP SERPL CALCULATED.3IONS-SCNC: 7 MMOL/L (ref 3–14)
AST SERPL W P-5'-P-CCNC: 56 U/L (ref 0–45)
BASOPHILS # BLD AUTO: 0.1 10E3/UL (ref 0–0.2)
BASOPHILS NFR BLD AUTO: 0 %
BILIRUB SERPL-MCNC: 0.5 MG/DL (ref 0.2–1.3)
BUN SERPL-MCNC: 10 MG/DL (ref 7–30)
BUN SERPL-MCNC: 11 MG/DL (ref 7–30)
CALCIUM SERPL-MCNC: 8.9 MG/DL (ref 8.5–10.1)
CALCIUM SERPL-MCNC: 9.5 MG/DL (ref 8.5–10.1)
CHLORIDE BLD-SCNC: 100 MMOL/L (ref 94–109)
CHLORIDE BLD-SCNC: 100 MMOL/L (ref 94–109)
CO2 SERPL-SCNC: 13 MMOL/L (ref 20–32)
CO2 SERPL-SCNC: 26 MMOL/L (ref 20–32)
CREAT SERPL-MCNC: 0.72 MG/DL (ref 0.52–1.04)
CREAT SERPL-MCNC: 0.81 MG/DL (ref 0.52–1.04)
EOSINOPHIL # BLD AUTO: 0 10E3/UL (ref 0–0.7)
EOSINOPHIL NFR BLD AUTO: 0 %
ERYTHROCYTE [DISTWIDTH] IN BLOOD BY AUTOMATED COUNT: 12.1 % (ref 10–15)
ETHANOL SERPL-MCNC: 0.11 G/DL
GFR SERPL CREATININE-BSD FRML MDRD: 89 ML/MIN/1.73M2
GFR SERPL CREATININE-BSD FRML MDRD: >90 ML/MIN/1.73M2
GLUCOSE BLD-MCNC: 281 MG/DL (ref 70–99)
GLUCOSE BLD-MCNC: 65 MG/DL (ref 70–99)
GLUCOSE BLDC GLUCOMTR-MCNC: 292 MG/DL (ref 70–99)
HCT VFR BLD AUTO: 46.4 % (ref 35–47)
HGB BLD-MCNC: 14.7 G/DL (ref 11.7–15.7)
HOLD SPECIMEN: NORMAL
IMM GRANULOCYTES # BLD: 0.1 10E3/UL
IMM GRANULOCYTES NFR BLD: 0 %
KETONES BLD-SCNC: 1.2 MMOL/L (ref 0–0.6)
KETONES BLD-SCNC: 5.3 MMOL/L (ref 0–0.6)
LIPASE SERPL-CCNC: 112 U/L (ref 73–393)
LYMPHOCYTES # BLD AUTO: 0.4 10E3/UL (ref 0.8–5.3)
LYMPHOCYTES NFR BLD AUTO: 3 %
MCH RBC QN AUTO: 31.7 PG (ref 26.5–33)
MCHC RBC AUTO-ENTMCNC: 31.7 G/DL (ref 31.5–36.5)
MCV RBC AUTO: 100 FL (ref 78–100)
MONOCYTES # BLD AUTO: 0.2 10E3/UL (ref 0–1.3)
MONOCYTES NFR BLD AUTO: 2 %
NEUTROPHILS # BLD AUTO: 13.5 10E3/UL (ref 1.6–8.3)
NEUTROPHILS NFR BLD AUTO: 95 %
NRBC # BLD AUTO: 0 10E3/UL
NRBC BLD AUTO-RTO: 0 /100
PLATELET # BLD AUTO: 313 10E3/UL (ref 150–450)
POTASSIUM BLD-SCNC: 4 MMOL/L (ref 3.4–5.3)
POTASSIUM BLD-SCNC: 4.2 MMOL/L (ref 3.4–5.3)
PROT SERPL-MCNC: 9 G/DL (ref 6.8–8.8)
RBC # BLD AUTO: 4.64 10E6/UL (ref 3.8–5.2)
SODIUM SERPL-SCNC: 133 MMOL/L (ref 133–144)
SODIUM SERPL-SCNC: 137 MMOL/L (ref 133–144)
WBC # BLD AUTO: 14.2 10E3/UL (ref 4–11)

## 2022-04-09 PROCEDURE — 96361 HYDRATE IV INFUSION ADD-ON: CPT

## 2022-04-09 PROCEDURE — 258N000003 HC RX IP 258 OP 636: Performed by: EMERGENCY MEDICINE

## 2022-04-09 PROCEDURE — 82310 ASSAY OF CALCIUM: CPT | Performed by: EMERGENCY MEDICINE

## 2022-04-09 PROCEDURE — 96375 TX/PRO/DX INJ NEW DRUG ADDON: CPT

## 2022-04-09 PROCEDURE — 83690 ASSAY OF LIPASE: CPT | Performed by: EMERGENCY MEDICINE

## 2022-04-09 PROCEDURE — 250N000013 HC RX MED GY IP 250 OP 250 PS 637: Performed by: EMERGENCY MEDICINE

## 2022-04-09 PROCEDURE — 250N000009 HC RX 250: Performed by: EMERGENCY MEDICINE

## 2022-04-09 PROCEDURE — 82010 KETONE BODYS QUAN: CPT | Performed by: EMERGENCY MEDICINE

## 2022-04-09 PROCEDURE — 36415 COLL VENOUS BLD VENIPUNCTURE: CPT | Performed by: EMERGENCY MEDICINE

## 2022-04-09 PROCEDURE — 80053 COMPREHEN METABOLIC PANEL: CPT | Performed by: EMERGENCY MEDICINE

## 2022-04-09 PROCEDURE — 96374 THER/PROPH/DIAG INJ IV PUSH: CPT

## 2022-04-09 PROCEDURE — 250N000011 HC RX IP 250 OP 636: Performed by: EMERGENCY MEDICINE

## 2022-04-09 PROCEDURE — 85025 COMPLETE CBC W/AUTO DIFF WBC: CPT | Performed by: EMERGENCY MEDICINE

## 2022-04-09 PROCEDURE — 82077 ASSAY SPEC XCP UR&BREATH IA: CPT | Performed by: EMERGENCY MEDICINE

## 2022-04-09 PROCEDURE — 99285 EMERGENCY DEPT VISIT HI MDM: CPT | Mod: 25

## 2022-04-09 PROCEDURE — 93005 ELECTROCARDIOGRAM TRACING: CPT

## 2022-04-09 RX ORDER — LORAZEPAM 2 MG/ML
0.5 INJECTION INTRAMUSCULAR ONCE
Status: COMPLETED | OUTPATIENT
Start: 2022-04-09 | End: 2022-04-09

## 2022-04-09 RX ORDER — DIAZEPAM 5 MG
10 TABLET ORAL ONCE
Status: COMPLETED | OUTPATIENT
Start: 2022-04-09 | End: 2022-04-09

## 2022-04-09 RX ORDER — FOLIC ACID 1 MG/1
1 TABLET ORAL ONCE
Status: COMPLETED | OUTPATIENT
Start: 2022-04-09 | End: 2022-04-09

## 2022-04-09 RX ORDER — METOCLOPRAMIDE 10 MG/1
10 TABLET ORAL 3 TIMES DAILY PRN
Qty: 20 TABLET | Refills: 0 | Status: SHIPPED | OUTPATIENT
Start: 2022-04-09 | End: 2022-08-25

## 2022-04-09 RX ORDER — ONDANSETRON 2 MG/ML
4 INJECTION INTRAMUSCULAR; INTRAVENOUS ONCE
Status: COMPLETED | OUTPATIENT
Start: 2022-04-09 | End: 2022-04-09

## 2022-04-09 RX ORDER — PANTOPRAZOLE SODIUM 40 MG/1
40 TABLET, DELAYED RELEASE ORAL DAILY
Qty: 30 TABLET | Refills: 0 | Status: ON HOLD | OUTPATIENT
Start: 2022-04-09 | End: 2022-06-22

## 2022-04-09 RX ORDER — GABAPENTIN 300 MG/1
CAPSULE ORAL
Qty: 10 CAPSULE | Refills: 0 | Status: ON HOLD | OUTPATIENT
Start: 2022-04-09 | End: 2022-06-22

## 2022-04-09 RX ORDER — GABAPENTIN 300 MG/1
CAPSULE ORAL
Qty: 10 CAPSULE | Refills: 0 | Status: SHIPPED | OUTPATIENT
Start: 2022-04-09 | End: 2022-04-09

## 2022-04-09 RX ORDER — DEXTROSE, SODIUM CHLORIDE, SODIUM LACTATE, POTASSIUM CHLORIDE, AND CALCIUM CHLORIDE 5; .6; .31; .03; .02 G/100ML; G/100ML; G/100ML; G/100ML; G/100ML
1000 INJECTION, SOLUTION INTRAVENOUS ONCE
Status: COMPLETED | OUTPATIENT
Start: 2022-04-09 | End: 2022-04-09

## 2022-04-09 RX ORDER — ONDANSETRON 4 MG/1
4 TABLET, ORALLY DISINTEGRATING ORAL EVERY 8 HOURS PRN
Qty: 10 TABLET | Refills: 0 | Status: SHIPPED | OUTPATIENT
Start: 2022-04-09 | End: 2022-04-12

## 2022-04-09 RX ADMIN — THIAMINE HCL TAB 100 MG 100 MG: 100 TAB at 16:01

## 2022-04-09 RX ADMIN — LORAZEPAM 0.5 MG: 2 INJECTION INTRAMUSCULAR; INTRAVENOUS at 15:06

## 2022-04-09 RX ADMIN — SODIUM CHLORIDE, POTASSIUM CHLORIDE, SODIUM LACTATE AND CALCIUM CHLORIDE 500 ML: 600; 310; 30; 20 INJECTION, SOLUTION INTRAVENOUS at 17:51

## 2022-04-09 RX ADMIN — DIAZEPAM 10 MG: 5 TABLET ORAL at 20:47

## 2022-04-09 RX ADMIN — SODIUM CHLORIDE, POTASSIUM CHLORIDE, SODIUM LACTATE AND CALCIUM CHLORIDE 1000 ML: 600; 310; 30; 20 INJECTION, SOLUTION INTRAVENOUS at 15:07

## 2022-04-09 RX ADMIN — LIDOCAINE HYDROCHLORIDE 30 ML: 20 SOLUTION ORAL; TOPICAL at 17:53

## 2022-04-09 RX ADMIN — SODIUM CHLORIDE, SODIUM LACTATE, POTASSIUM CHLORIDE, CALCIUM CHLORIDE AND DEXTROSE MONOHYDRATE 1000 ML: 5; 600; 310; 30; 20 INJECTION, SOLUTION INTRAVENOUS at 15:41

## 2022-04-09 RX ADMIN — ONDANSETRON 4 MG: 2 INJECTION INTRAMUSCULAR; INTRAVENOUS at 15:06

## 2022-04-09 RX ADMIN — FOLIC ACID 1 MG: 1 TABLET ORAL at 16:01
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
## 2022-04-09 NOTE — ED TRIAGE NOTES
Reports she drank mouthwash 12 hours ago because there's alcohol in it. That is the last drink she's had. Reports palpitations and intractable nausea and vomiting. Tachy in triage otherwise VSS on RA.

## 2022-04-10 LAB
ATRIAL RATE - MUSE: 121 BPM
DIASTOLIC BLOOD PRESSURE - MUSE: NORMAL MMHG
INTERPRETATION ECG - MUSE: NORMAL
P AXIS - MUSE: 76 DEGREES
PR INTERVAL - MUSE: 132 MS
QRS DURATION - MUSE: 68 MS
QT - MUSE: 330 MS
QTC - MUSE: 468 MS
R AXIS - MUSE: 43 DEGREES
SYSTOLIC BLOOD PRESSURE - MUSE: NORMAL MMHG
T AXIS - MUSE: 65 DEGREES
VENTRICULAR RATE- MUSE: 121 BPM

## 2022-04-10 NOTE — ED PROVIDER NOTES
46 year old female received in signout from Dr. Jackson who presents to the ED w/ alcohol intoxication, mouthwash ingestion.  Please see primary note for full details.  Prior to signout, was noted the patient elevated ketones and an anion gap metabolic acidosis.  Plan for fluids and food in the ED w/ recheck.  Ketones improved.  BMP WNL.  Patient remains tachycardic in the ED but upon chart review, she was tachycardic to the 110s on 3/31/2022 in clinic.  She is likely somewhat chronically tachycardic.  She reports she does not feel like she is in withdrawal although she was given a dose of Valium prior to discharge.  Prescriptions written for outpatient management. Recommendations given regarding follow up with PCP and return to the emergency department as needed for new or worsening symptoms.  Counseled on all results, diagnosis and disposition.  Patient understanding and agreeable to plan. Patient discharged in improved condition.                     Isaak Kirby MD  04/09/22 2047

## 2022-05-08 ENCOUNTER — OFFICE VISIT (OUTPATIENT)
Dept: URGENT CARE | Facility: URGENT CARE | Age: 47
End: 2022-05-08
Payer: COMMERCIAL

## 2022-05-08 VITALS
SYSTOLIC BLOOD PRESSURE: 138 MMHG | HEART RATE: 140 BPM | TEMPERATURE: 100 F | DIASTOLIC BLOOD PRESSURE: 66 MMHG | OXYGEN SATURATION: 98 %

## 2022-05-08 DIAGNOSIS — R30.0 DYSURIA: Primary | ICD-10-CM

## 2022-05-08 DIAGNOSIS — N39.0 URINARY TRACT INFECTION WITHOUT HEMATURIA, SITE UNSPECIFIED: ICD-10-CM

## 2022-05-08 LAB
BACTERIA #/AREA URNS HPF: ABNORMAL /HPF
CLUE CELLS: ABNORMAL
RBC #/AREA URNS AUTO: ABNORMAL /HPF
SQUAMOUS #/AREA URNS AUTO: ABNORMAL /LPF
TRICHOMONAS, WET PREP: ABNORMAL
WBC #/AREA URNS AUTO: ABNORMAL /HPF
WBC'S/HIGH POWER FIELD, WET PREP: ABNORMAL
YEAST, WET PREP: ABNORMAL

## 2022-05-08 PROCEDURE — 99213 OFFICE O/P EST LOW 20 MIN: CPT | Performed by: FAMILY MEDICINE

## 2022-05-08 PROCEDURE — 87210 SMEAR WET MOUNT SALINE/INK: CPT | Performed by: FAMILY MEDICINE

## 2022-05-08 PROCEDURE — 81015 MICROSCOPIC EXAM OF URINE: CPT | Performed by: FAMILY MEDICINE

## 2022-05-08 PROCEDURE — 87086 URINE CULTURE/COLONY COUNT: CPT | Performed by: FAMILY MEDICINE

## 2022-05-08 RX ORDER — ONDANSETRON 8 MG/1
8 TABLET, ORALLY DISINTEGRATING ORAL EVERY 12 HOURS PRN
Qty: 10 TABLET | Refills: 0 | Status: SHIPPED | OUTPATIENT
Start: 2022-05-08 | End: 2022-07-20

## 2022-05-08 RX ORDER — NITROFURANTOIN 25; 75 MG/1; MG/1
100 CAPSULE ORAL 2 TIMES DAILY
Qty: 10 CAPSULE | Refills: 0 | Status: SHIPPED | OUTPATIENT
Start: 2022-05-08 | End: 2022-05-13

## 2022-05-08 NOTE — PATIENT INSTRUCTIONS
If no improvement of symptoms in 48 hours, or if fevers/side pain, or vomiting/ unable to take pills then you should be seen again in urgent care or the hospital    We will contact you if we need to change antibiotics based on the culture    Use ibuprofen and acetaminophen for pain

## 2022-05-08 NOTE — PROGRESS NOTES
Urine SUBJECTIVE:   Alana Mujica is a 46 year old female presenting with a chief complaint of   Chief Complaint   Patient presents with     Urgent Care     UTI     Nausea, dysuria, odor x since this am.      This is a 46 year old with dysuria, +frequency, urge.  Notes change in urine odor.  Some suprapubic pressure.  No flank or back pain.  Nausea-- dry heaving this AM.  No fevers at home (temp 100 here).      She has a hx of UTI in the past.  Last Antibiotics >6 months ago.  She has had pyelonephritis in the past-- required IV antibiotics.  This feels like her hx of cystitis; she reports it does not feel like her pyelonephritis as she does not have back pain or chills/fever    Normal BM/constipation  No vaginal discharge  She took OTC pyridium this AM -- turned urine orange/red  Review of Systems    Past Medical History:   Diagnosis Date     RUPINDER (acute kidney injury) (H) 9/14/2018     Alcohol withdrawal (H) 8/26/2018     Anorexia      Anxiety      Bulimia      Chemical dependency (H) 11/24/2015    Alcohol in treatment     Depressive disorder      Genital herpes      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/15    NIL, +HR HPV. Co-test 12 months     Osteoporosis      Substance abuse (H)      Family History   Problem Relation Age of Onset     Breast Cancer Mother 68        2014/2015     Hypertension Mother      Hypertension Father      No Known Problems Maternal Grandmother      No Known Problems Maternal Grandfather      No Known Problems Paternal Grandmother      No Known Problems Paternal Grandfather      No Known Problems Brother      Unknown/Adopted No family hx of      Depression No family hx of      Anxiety Disorder No family hx of      Schizophrenia No family hx of      Bipolar Disorder No family hx of      Suicide No family hx of      Substance Abuse No family hx of      Dementia No family hx of      Manor Disease No family hx of      Parkinsonism No family hx of      Autism Spectrum Disorder No family hx of       Intellectual Disability (Mental Retardation) No family hx of      Mental Illness No family hx of      Current Outpatient Medications   Medication Sig Dispense Refill     busPIRone (BUSPAR) 5 MG tablet Take 1 tablet (5 mg) by mouth 2 times daily 60 tablet 1     calcium citrate-vitamin D (CITRACAL) 315-250 MG-UNIT TABS per tablet Take 1 tablet by mouth 2 times daily       cetirizine (ZYRTEC) 10 MG tablet Take 10 mg by mouth daily as needed for allergies (summer months)        COMPOUNDED NON-CONTROLLED SUBSTANCE (CMPD RX) - PHARMACY TO MIX COMPOUNDED MEDICATION Please compound viscous lidocaine 2% and maalox in a 1:1 ratio Please take 15 to 30 ml by mouth every 4-6 hours as needed for abdominal pain 500 mL 0     cyanocobalamin (VITAMIN B-12) 100 MCG tablet Take 100 mcg by mouth daily       Cyanocobalamin 1000 MCG/15ML LIQD Take by mouth daily Unknown dose - takes liquid and tablet formulations of vitamin B12       etonogestrel (NEXPLANON) 68 MG IMPL 1 each (68 mg) by Subdermal route once 1 each 0     gabapentin (NEURONTIN) 300 MG capsule Day 1 - 300 mg every 6 hours Day 2 - 300 mg every 8 hours Day 3 - 300 mg every 12 hours Day 4 - 300 mg one dose 10 capsule 0     metoclopramide (REGLAN) 10 MG tablet Take 1 tablet (10 mg) by mouth 3 times daily as needed (Nausea or Vomiting) 20 tablet 0     MILK THISTLE PO Take 3 capsules by mouth       multivitamin w/minerals (THERA-VIT-M) tablet Take 1 tablet by mouth daily       naltrexone (DEPADE/REVIA) 50 MG tablet Take 1 tablet (50 mg) by mouth daily 30 tablet 1     omeprazole (PRILOSEC) 20 MG DR capsule Take 20 mg by mouth       pantoprazole (PROTONIX) 40 MG EC tablet Take 1 tablet (40 mg) by mouth daily 30 tablet 0     valACYclovir (VALTREX) 500 MG tablet TAKE 2 TABLETS BY MOUTH DAILY TWICE DAILY FOR 1 DAY PER COLD SORE FLARE 4 tablet 0     Social History     Tobacco Use     Smoking status: Former Smoker     Packs/day: 0.00     Smokeless tobacco: Never Used   Substance Use  Topics     Alcohol use: Yes     Alcohol/week: 29.2 standard drinks     Types: 21 Glasses of wine, 14 Cans of beer per week       OBJECTIVE  /66   Pulse (!) 140   Temp 100  F (37.8  C) (Tympanic)   SpO2 98%     Physical Exam  Gen: well appearing, in no acute distress, well hydrated  Abdomen: mild tenderness to palpation over the suprapubic area, no rebound or guarding.  NO CVA tenderness    Labs:  No results found for this or any previous visit (from the past 24 hour(s)).        ASSESSMENT:      ICD-10-CM    1. Dysuria  R30.0 UA macro with reflex to Microscopic and Culture - Clinc Collect     Wet preparation     CANCELED: UA macro with reflex to Microscopic and Culture - Clinc Collect        Medical Decision Making:    Differential Diagnosis:  UTI: UTI and Pyelonephritis    Serious Comorbid Conditions:  Adult:  None    PLAN:  At this point symptoms likely cystitis.  No prior culture visible.  No hx of resistant bacteria.  Will start with Macrobid.  Discussed hospital precautions for fever, vomiting, no improvement in 48 hours.      Urine sent for culture    Followup:    If not improving or if condition worsens, follow up with your Primary Care Provider    There are no Patient Instructions on file for this visit.

## 2022-05-11 ENCOUNTER — ANCILLARY PROCEDURE (OUTPATIENT)
Dept: MAMMOGRAPHY | Facility: CLINIC | Age: 47
End: 2022-05-11
Payer: COMMERCIAL

## 2022-05-11 DIAGNOSIS — Z12.31 VISIT FOR SCREENING MAMMOGRAM: ICD-10-CM

## 2022-05-11 LAB — BACTERIA UR CULT: NORMAL

## 2022-05-11 PROCEDURE — 77063 BREAST TOMOSYNTHESIS BI: CPT | Mod: TC | Performed by: RADIOLOGY

## 2022-05-11 PROCEDURE — 77067 SCR MAMMO BI INCL CAD: CPT | Mod: TC | Performed by: RADIOLOGY

## 2022-05-12 ENCOUNTER — OFFICE VISIT (OUTPATIENT)
Dept: PEDIATRICS | Facility: CLINIC | Age: 47
End: 2022-05-12
Payer: COMMERCIAL

## 2022-05-12 VITALS
OXYGEN SATURATION: 99 % | DIASTOLIC BLOOD PRESSURE: 80 MMHG | HEART RATE: 99 BPM | TEMPERATURE: 97.9 F | RESPIRATION RATE: 16 BRPM | HEIGHT: 64 IN | WEIGHT: 109 LBS | BODY MASS INDEX: 18.61 KG/M2 | SYSTOLIC BLOOD PRESSURE: 110 MMHG

## 2022-05-12 DIAGNOSIS — R23.2 HOT FLASHES: ICD-10-CM

## 2022-05-12 DIAGNOSIS — Z00.00 ROUTINE GENERAL MEDICAL EXAMINATION AT A HEALTH CARE FACILITY: Primary | ICD-10-CM

## 2022-05-12 DIAGNOSIS — F10.20 ALCOHOL DEPENDENCE, CONTINUOUS (H): ICD-10-CM

## 2022-05-12 DIAGNOSIS — Z12.4 CERVICAL CANCER SCREENING: ICD-10-CM

## 2022-05-12 DIAGNOSIS — Z92.29 HISTORY OF HEPATITIS B VACCINATION: ICD-10-CM

## 2022-05-12 DIAGNOSIS — M81.0 OSTEOPOROSIS WITHOUT CURRENT PATHOLOGICAL FRACTURE, UNSPECIFIED OSTEOPOROSIS TYPE: ICD-10-CM

## 2022-05-12 LAB — FSH SERPL-ACNC: 2.5 IU/L

## 2022-05-12 PROCEDURE — G0145 SCR C/V CYTO,THINLAYER,RESCR: HCPCS | Performed by: STUDENT IN AN ORGANIZED HEALTH CARE EDUCATION/TRAINING PROGRAM

## 2022-05-12 PROCEDURE — 86706 HEP B SURFACE ANTIBODY: CPT | Performed by: STUDENT IN AN ORGANIZED HEALTH CARE EDUCATION/TRAINING PROGRAM

## 2022-05-12 PROCEDURE — 87624 HPV HI-RISK TYP POOLED RSLT: CPT | Performed by: STUDENT IN AN ORGANIZED HEALTH CARE EDUCATION/TRAINING PROGRAM

## 2022-05-12 PROCEDURE — 80053 COMPREHEN METABOLIC PANEL: CPT | Performed by: STUDENT IN AN ORGANIZED HEALTH CARE EDUCATION/TRAINING PROGRAM

## 2022-05-12 PROCEDURE — 99396 PREV VISIT EST AGE 40-64: CPT | Performed by: STUDENT IN AN ORGANIZED HEALTH CARE EDUCATION/TRAINING PROGRAM

## 2022-05-12 PROCEDURE — 83001 ASSAY OF GONADOTROPIN (FSH): CPT | Performed by: STUDENT IN AN ORGANIZED HEALTH CARE EDUCATION/TRAINING PROGRAM

## 2022-05-12 PROCEDURE — 36415 COLL VENOUS BLD VENIPUNCTURE: CPT | Performed by: STUDENT IN AN ORGANIZED HEALTH CARE EDUCATION/TRAINING PROGRAM

## 2022-05-12 SDOH — ECONOMIC STABILITY: TRANSPORTATION INSECURITY
IN THE PAST 12 MONTHS, HAS LACK OF TRANSPORTATION KEPT YOU FROM MEETINGS, WORK, OR FROM GETTING THINGS NEEDED FOR DAILY LIVING?: NO

## 2022-05-12 SDOH — HEALTH STABILITY: PHYSICAL HEALTH: ON AVERAGE, HOW MANY DAYS PER WEEK DO YOU ENGAGE IN MODERATE TO STRENUOUS EXERCISE (LIKE A BRISK WALK)?: 2 DAYS

## 2022-05-12 SDOH — ECONOMIC STABILITY: TRANSPORTATION INSECURITY
IN THE PAST 12 MONTHS, HAS THE LACK OF TRANSPORTATION KEPT YOU FROM MEDICAL APPOINTMENTS OR FROM GETTING MEDICATIONS?: NO

## 2022-05-12 SDOH — ECONOMIC STABILITY: FOOD INSECURITY: WITHIN THE PAST 12 MONTHS, YOU WORRIED THAT YOUR FOOD WOULD RUN OUT BEFORE YOU GOT MONEY TO BUY MORE.: OFTEN TRUE

## 2022-05-12 SDOH — ECONOMIC STABILITY: INCOME INSECURITY: IN THE LAST 12 MONTHS, WAS THERE A TIME WHEN YOU WERE NOT ABLE TO PAY THE MORTGAGE OR RENT ON TIME?: NO

## 2022-05-12 SDOH — ECONOMIC STABILITY: INCOME INSECURITY: HOW HARD IS IT FOR YOU TO PAY FOR THE VERY BASICS LIKE FOOD, HOUSING, MEDICAL CARE, AND HEATING?: VERY HARD

## 2022-05-12 SDOH — HEALTH STABILITY: PHYSICAL HEALTH: ON AVERAGE, HOW MANY MINUTES DO YOU ENGAGE IN EXERCISE AT THIS LEVEL?: 20 MIN

## 2022-05-12 SDOH — ECONOMIC STABILITY: FOOD INSECURITY: WITHIN THE PAST 12 MONTHS, THE FOOD YOU BOUGHT JUST DIDN'T LAST AND YOU DIDN'T HAVE MONEY TO GET MORE.: OFTEN TRUE

## 2022-05-12 ASSESSMENT — ENCOUNTER SYMPTOMS
COUGH: 0
JOINT SWELLING: 0
HEMATOCHEZIA: 0
DIARRHEA: 0
CONSTIPATION: 0
ARTHRALGIAS: 0
EYE PAIN: 0
PALPITATIONS: 0
HEADACHES: 1
DYSURIA: 0
SHORTNESS OF BREATH: 0
CHILLS: 0
NAUSEA: 0
HEARTBURN: 0
FEVER: 0
PARESTHESIAS: 0
NERVOUS/ANXIOUS: 1
BREAST MASS: 0
FREQUENCY: 0
SORE THROAT: 0
DIZZINESS: 0
ABDOMINAL PAIN: 0
WEAKNESS: 0
HEMATURIA: 0
MYALGIAS: 0

## 2022-05-12 ASSESSMENT — SOCIAL DETERMINANTS OF HEALTH (SDOH)
HOW OFTEN DO YOU ATTEND CHURCH OR RELIGIOUS SERVICES?: NEVER
DO YOU BELONG TO ANY CLUBS OR ORGANIZATIONS SUCH AS CHURCH GROUPS UNIONS, FRATERNAL OR ATHLETIC GROUPS, OR SCHOOL GROUPS?: NO
HOW OFTEN DO YOU GET TOGETHER WITH FRIENDS OR RELATIVES?: ONCE A WEEK
ARE YOU MARRIED, WIDOWED, DIVORCED, SEPARATED, NEVER MARRIED, OR LIVING WITH A PARTNER?: LIVING WITH PARTNER
IN A TYPICAL WEEK, HOW MANY TIMES DO YOU TALK ON THE PHONE WITH FAMILY, FRIENDS, OR NEIGHBORS?: TWICE A WEEK

## 2022-05-12 ASSESSMENT — LIFESTYLE VARIABLES
HOW OFTEN DO YOU HAVE A DRINK CONTAINING ALCOHOL: 4 OR MORE TIMES A WEEK
HOW MANY STANDARD DRINKS CONTAINING ALCOHOL DO YOU HAVE ON A TYPICAL DAY: 5 OR 6
HOW OFTEN DO YOU HAVE SIX OR MORE DRINKS ON ONE OCCASION: DAILY OR ALMOST DAILY
AUDIT-C TOTAL SCORE: 10
SKIP TO QUESTIONS 9-10: 0

## 2022-05-12 ASSESSMENT — PAIN SCALES - GENERAL: PAINLEVEL: NO PAIN (0)

## 2022-05-12 NOTE — PROGRESS NOTES
SUBJECTIVE:   CC: Alana Mujica is an 46 year old woman who presents for preventive health visit.       Patient has been advised of split billing requirements and indicates understanding: Yes  Healthy Habits:     Getting at least 3 servings of Calcium per day:  NO    Bi-annual eye exam:  NO    Dental care twice a year:  NO    Sleep apnea or symptoms of sleep apnea:  None    Diet:  Other    Frequency of exercise:  4-5 days/week    Duration of exercise:  15-30 minutes    Taking medications regularly:  Yes    Barriers to taking medications:  None    Medication side effects:  Not applicable    PHQ-2 Total Score: 1    Additional concerns today:  No    Had evaluation in urgent care for UTI (urinary tract infection)  -on macrobid; culture with normal urogenital sasha  -not concerned for STI (sexually transmitted infection): has same partner for 3 years    Hard to remember to take buspar     Naltrexone going ok  -work is a good distractor: work 2 full time jobs ([place] time for one) and 1 part time job  -trying to walk and get exercise    Today's PHQ-2 Score:   PHQ-2 ( 1999 [employer]) 5/12/2022   Q1: Little interest or pleasure in doing things 1   Q2: Feeling down, depressed or hopeless 0   PHQ-2 Score 1   PHQ-2 Total Score (12-17 Years)- Positive if 3 or more points; Administer PHQ-A if positive -   Q1: Little interest or pleasure in doing things Several days   Q2: Feeling down, depressed or hopeless Not at all   PHQ-2 Score 1     Abuse: Current or Past (Physical, Sexual or Emotional) - No  Do you feel safe in your environment? Yes    Social History     Tobacco Use     Smoking status: Former Smoker     Packs/day: 0.00     Smokeless tobacco: Never Used   Substance Use Topics     Alcohol use: Yes     Alcohol/week: 29.2 standard drinks     Types: 21 Glasses of wine, 14 Cans of beer per week     If you drink alcohol do you typically have >3 drinks per day or >7 drinks per week? No    Alcohol Use 5/12/2022   Prescreen: >3  drinks/day or >7 drinks/week? Yes   Prescreen: >3 drinks/day or >7 drinks/week? -   AUDIT SCORE  24     AUDIT - Alcohol Use Disorders Identification Test - Reproduced from the World Health Organization Audit 2001 (Second Edition) 5/12/2022   1.  How often do you have a drink containing alcohol? 4 or more times a week   2.  How many drinks containing alcohol do you have on a typical day when you are drinking? 5 or 6   3.  How often do you have five or more drinks on one occasion? Daily or almost daily   4.  How often during the last year have you found that you were not able to stop drinking once you had started? Monthly   5.  How often during the last year have you failed to do what was normally expected of you because of drinking? Monthly   6.  How often during the last year have you needed a first drink in the morning to get yourself going after a heavy drinking session? Monthly   7.  How often during the last year have you had a feeling of guilt or remorse after drinking? Daily or almost daily   8.  How often during the last year have you been unable to remember what happened the night before because of your drinking? Monthly   9.  Have you or someone else been injured because of your drinking? No   10. Has a relative, friend, doctor or other health care worker been concerned about your drinking or suggested you cut down? Yes, but not in the last year   TOTAL SCORE 24       Reviewed orders with patient.  Reviewed health maintenance and updated orders accordingly - Yes  Lab work is in process    Breast Cancer Screening:    FHS-7:   Breast CA Risk Assessment (FHS-7) 8/19/2021 5/11/2022 5/12/2022   Did any of your first-degree relatives have breast or ovarian cancer? Yes Yes Yes   Did any of your relatives have bilateral breast cancer? Yes No Unknown   Did any man in your family have breast cancer? No No No   Did any woman in your family have breast and ovarian cancer? No No No   Did any woman in your family have  "breast cancer before age 50 y? No No Yes   Do you have 2 or more relatives with breast and/or ovarian cancer? No No No   Do you have 2 or more relatives with breast and/or bowel cancer? No No No       Mammogram Screening: Recommended annual mammography  Pertinent mammograms are reviewed under the imaging tab.    History of abnormal Pap smear: NO - age 30-65 PAP every 5 years with negative HPV co-testing recommended  PAP / HPV Latest Ref Rng & Units 7/17/2019 11/24/2015   PAP (Historical) - NIL NIL   HPV16 NEG:Negative Negative Negative   HPV18 NEG:Negative Negative Negative   HRHPV NEG:Negative Positive(A) Positive(A)     Reviewed and updated as needed this visit by clinical staff   Tobacco  Allergies    Med Hx  Surg Hx  Fam Hx  Soc Hx          Reviewed and updated as needed this visit by Provider                       Review of Systems   Constitutional: Negative for chills and fever.   HENT: Negative for congestion, ear pain, hearing loss and sore throat.    Eyes: Positive for visual disturbance. Negative for pain.   Respiratory: Negative for cough and shortness of breath.    Cardiovascular: Negative for chest pain, palpitations and peripheral edema.   Gastrointestinal: Negative for abdominal pain, constipation, diarrhea, heartburn, hematochezia and nausea.   Breasts:  Negative for tenderness, breast mass and discharge.   Genitourinary: Negative for dysuria, frequency, genital sores, hematuria, pelvic pain, urgency, vaginal bleeding and vaginal discharge.   Musculoskeletal: Negative for arthralgias, joint swelling and myalgias.   Skin: Negative for rash.   Neurological: Positive for headaches. Negative for dizziness, weakness and paresthesias.   Psychiatric/Behavioral: Negative for mood changes. The patient is nervous/anxious.        OBJECTIVE:   /80   Pulse 99   Temp 97.9  F (36.6  C)   Resp 16   Ht 1.613 m (5' 3.5\")   Wt 49.4 kg (109 lb)   SpO2 99%   BMI 19.01 kg/m    Physical Exam  GENERAL: " "healthy, alert and no distress  EYES: Eyes grossly normal to inspection, and conjunctivae and sclerae normal  NECK: no adenopathy, no asymmetry, masses, or scars and thyroid normal to palpation  RESP: lungs clear to auscultation - no rales, rhonchi or wheezes  CV: regular rate and rhythm, normal S1 S2, no S3 or S4, no murmur, click or rub, no peripheral edema and peripheral pulses strong   (female): normal female external genitalia, normal urethral meatus, vaginal mucosa pink, moist, well rugated, and normal cervix/adnexa/uterus without masses or discharge  MS: no gross musculoskeletal defects noted, no edema  SKIN: no suspicious lesions or rashes  NEURO: Normal strength and tone, mentation intact and speech normal  PSYCH: mentation appears normal, affect normal/bright    ASSESSMENT/PLAN:       ICD-10-CM    1. Routine general medical examination at a health care facility  Z00.00    2. History of hepatitis B vaccination  Z92.29 Hepatitis B Surface Antibody     Hepatitis B Surface Antibody   3. Cervical cancer screening  Z12.4 Pap Screen with HPV - recommended age 30 - 65 years   4. Alcohol dependence, continuous (H)  F10.20 Comprehensive metabolic panel (BMP + Alb, Alk Phos, ALT, AST, Total. Bili, TP)     Comprehensive metabolic panel (BMP + Alb, Alk Phos, ALT, AST, Total. Bili, TP)   5. Osteoporosis without current pathological fracture, unspecified osteoporosis type  M81.0 DX Hip/Pelvis/Spine   6. Hot flashes  R23.2 Follicle stimulating hormone     Follicle stimulating hormone     3. Pap today. Declined STI (sexually transmitted infection) screen.  4. Continue naltrexone: may need refills. Has done AA, other treatment programs in past. Recheck liver enzymes today.      COUNSELING:  Reviewed preventive health counseling, as reflected in patient instructions    Estimated body mass index is 19.01 kg/m  as calculated from the following:    Height as of this encounter: 1.613 m (5' 3.5\").    Weight as of this " encounter: 49.4 kg (109 lb).        She reports that she has quit smoking. She smoked 0.00 packs per day. She has never used smokeless tobacco.      Counseling Resources:  ATP IV Guidelines  Pooled Cohorts Equation Calculator  Breast Cancer Risk Calculator  BRCA-Related Cancer Risk Assessment: FHS-7 Tool  FRAX Risk Assessment  ICSI Preventive Guidelines  Dietary Guidelines for Americans, 2010  USDA's MyPlate  ASA Prophylaxis  Lung CA Screening    Destiny Patel MD  [facility]

## 2022-05-12 NOTE — PATIENT INSTRUCTIONS
So nice to see you!    Schedule the DEXA - on the first floor    I'll let you know about the lab results      For acute visits - can do e-visits through [employer]        Preventive Health Recommendations  Female Ages 40 to 49    Yearly exam:   See your health care provider every year in order to  Review health changes.   Discuss preventive care.    Review your medicines if your doctor prescribed any.    Get a Pap test every three years (unless you have an abnormal result and your provider advises testing more often).    If you get Pap tests with HPV test, you only need to test every 5 years, unless you have an abnormal result. You do not need a Pap test if your uterus was removed (hysterectomy) and you have not had cancer.    You should be tested each year for STDs (sexually transmitted diseases), if you're at risk.   Ask your doctor if you should have a mammogram.    Have a colonoscopy (test for colon cancer) if someone in your family has had colon cancer or polyps before age 50.     Have a cholesterol test every 5 years.     Have a diabetes test (fasting glucose) after age 45. If you are at risk for diabetes, you should have this test every 3 years.    Shots: Get a flu shot each year. Get a tetanus shot every 10 years.     Nutrition:   Eat at least 5 servings of fruits and vegetables each day.  Eat whole-grain bread, whole-wheat pasta and brown rice instead of white grains and rice.  Get adequate Calcium and Vitamin D.      Lifestyle  Exercise at least 150 minutes a week (an average of 30 minutes a day, 5 days a week). This will help you control your weight and prevent disease.  Limit alcohol to one drink per day.  No smoking.   Wear sunscreen to prevent skin cancer.  See your dentist every six months for an exam and cleaning.  Preventive Health Recommendations  Female Ages 40 to 49    Yearly exam:   See your health care provider every year in order to  Review health changes.   Discuss preventive care.    Review  your medicines if your doctor prescribed any.    Get a Pap test every three years (unless you have an abnormal result and your provider advises testing more often).    If you get Pap tests with HPV test, you only need to test every 5 years, unless you have an abnormal result. You do not need a Pap test if your uterus was removed (hysterectomy) and you have not had cancer.    You should be tested each year for STDs (sexually transmitted diseases), if you're at risk.   Ask your doctor if you should have a mammogram.    Have a colonoscopy (test for colon cancer) if someone in your family has had colon cancer or polyps before age 50.     Have a cholesterol test every 5 years.     Have a diabetes test (fasting glucose) after age 45. If you are at risk for diabetes, you should have this test every 3 years.    Shots: Get a flu shot each year. Get a tetanus shot every 10 years.     Nutrition:   Eat at least 5 servings of fruits and vegetables each day.  Eat whole-grain bread, whole-wheat pasta and brown rice instead of white grains and rice.  Get adequate Calcium and Vitamin D.      Lifestyle  Exercise at least 150 minutes a week (an average of 30 minutes a day, 5 days a week). This will help you control your weight and prevent disease.  Limit alcohol to one drink per day.  No smoking.   Wear sunscreen to prevent skin cancer.  See your dentist every six months for an exam and cleaning.

## 2022-05-13 LAB
ALBUMIN SERPL-MCNC: 4 G/DL (ref 3.4–5)
ALP SERPL-CCNC: 68 U/L (ref 40–150)
ALT SERPL W P-5'-P-CCNC: 49 U/L (ref 0–50)
ANION GAP SERPL CALCULATED.3IONS-SCNC: 6 MMOL/L (ref 3–14)
AST SERPL W P-5'-P-CCNC: 42 U/L (ref 0–45)
BILIRUB SERPL-MCNC: 0.5 MG/DL (ref 0.2–1.3)
BUN SERPL-MCNC: 12 MG/DL (ref 7–30)
CALCIUM SERPL-MCNC: 9.7 MG/DL (ref 8.5–10.1)
CHLORIDE BLD-SCNC: 101 MMOL/L (ref 94–109)
CO2 SERPL-SCNC: 28 MMOL/L (ref 20–32)
CREAT SERPL-MCNC: 0.8 MG/DL (ref 0.52–1.04)
GFR SERPL CREATININE-BSD FRML MDRD: 90 ML/MIN/1.73M2
GLUCOSE BLD-MCNC: 94 MG/DL (ref 70–99)
HBV SURFACE AB SERPL IA-ACNC: 0.02 M[IU]/ML
POTASSIUM BLD-SCNC: 4.2 MMOL/L (ref 3.4–5.3)
PROT SERPL-MCNC: 7.6 G/DL (ref 6.8–8.8)
SODIUM SERPL-SCNC: 135 MMOL/L (ref 133–144)

## 2022-05-16 LAB
BKR LAB AP GYN ADEQUACY: NORMAL
BKR LAB AP GYN INTERPRETATION: NORMAL
BKR LAB AP HPV REFLEX: NORMAL
BKR LAB AP PREVIOUS ABNORMAL: NORMAL
PATH REPORT.COMMENTS IMP SPEC: NORMAL
PATH REPORT.COMMENTS IMP SPEC: NORMAL
PATH REPORT.RELEVANT HX SPEC: NORMAL

## 2022-05-19 ENCOUNTER — PATIENT OUTREACH (OUTPATIENT)
Dept: PEDIATRICS | Facility: CLINIC | Age: 47
End: 2022-05-19
Payer: COMMERCIAL

## 2022-05-19 LAB
HUMAN PAPILLOMA VIRUS 16 DNA: NEGATIVE
HUMAN PAPILLOMA VIRUS 18 DNA: NEGATIVE
HUMAN PAPILLOMA VIRUS FINAL DIAGNOSIS: ABNORMAL
HUMAN PAPILLOMA VIRUS OTHER HR: POSITIVE

## 2022-05-19 NOTE — LETTER
September 29, 2022      Alana Mujica  [address]  [place] [address]        Dear ,    At [facility], your health and wellness are our primary concern. That is why we are following up on your most recent positive high-risk Human Papillomavirus (HPV) test.    Please call [phone] to schedule an appointment for your recommended follow-up Colposcopy (this cannot be scheduled through [facility]) at your earliest convenience.      If you have completed the appointment outside of the [facility] system, please have the records forwarded to our office. We will update your chart for your provider to review before your next annual wellness visit.     Thank you for choosing [facility]!      Sincerely,    Your [facility] Care Team

## 2022-06-22 ENCOUNTER — HOSPITAL ENCOUNTER (INPATIENT)
Facility: CLINIC | Age: 47
LOS: 4 days | Discharge: HOME | DRG: 897 | End: 2022-06-26
Attending: EMERGENCY MEDICINE | Admitting: STUDENT IN AN ORGANIZED HEALTH CARE EDUCATION/TRAINING PROGRAM
Payer: COMMERCIAL

## 2022-06-22 ENCOUNTER — NURSE TRIAGE (OUTPATIENT)
Dept: NURSING | Facility: CLINIC | Age: 47
End: 2022-06-22

## 2022-06-22 DIAGNOSIS — N30.00 ACUTE CYSTITIS WITHOUT HEMATURIA: Primary | ICD-10-CM

## 2022-06-22 DIAGNOSIS — E83.52 HYPERCALCEMIA: ICD-10-CM

## 2022-06-22 DIAGNOSIS — F10.239 ALCOHOL DEPENDENCE WITH WITHDRAWAL WITH COMPLICATION (H): ICD-10-CM

## 2022-06-22 DIAGNOSIS — E87.1 HYPONATREMIA: ICD-10-CM

## 2022-06-22 DIAGNOSIS — E83.42 HYPOMAGNESEMIA: ICD-10-CM

## 2022-06-22 LAB
ALBUMIN SERPL-MCNC: 4 G/DL (ref 3.4–5)
ALBUMIN UR-MCNC: 100 MG/DL
ALP SERPL-CCNC: 85 U/L (ref 40–150)
ALT SERPL W P-5'-P-CCNC: 29 U/L (ref 0–50)
AMPHETAMINES UR QL SCN: NORMAL
ANION GAP SERPL CALCULATED.3IONS-SCNC: 12 MMOL/L (ref 3–14)
APPEARANCE UR: ABNORMAL
AST SERPL W P-5'-P-CCNC: 34 U/L (ref 0–45)
ATRIAL RATE - MUSE: 108 BPM
BACTERIA #/AREA URNS HPF: ABNORMAL /HPF
BARBITURATES UR QL: NORMAL
BASE EXCESS BLDV CALC-SCNC: 8.1 MMOL/L (ref -7.7–1.9)
BASOPHILS # BLD AUTO: 0 10E3/UL (ref 0–0.2)
BASOPHILS NFR BLD AUTO: 0 %
BENZODIAZ UR QL: NORMAL
BILIRUB SERPL-MCNC: 1.7 MG/DL (ref 0.2–1.3)
BILIRUB UR QL STRIP: NEGATIVE
BUN SERPL-MCNC: 10 MG/DL (ref 7–30)
CA-I BLD-MCNC: 6.4 MG/DL (ref 4.4–5.2)
CALCIUM SERPL-MCNC: 13.8 MG/DL (ref 8.5–10.1)
CANNABINOIDS UR QL SCN: NORMAL
CHLORIDE BLD-SCNC: 84 MMOL/L (ref 94–109)
CO2 SERPL-SCNC: 29 MMOL/L (ref 20–32)
COCAINE UR QL: NORMAL
COLOR UR AUTO: ABNORMAL
CREAT SERPL-MCNC: 0.81 MG/DL (ref 0.52–1.04)
DIASTOLIC BLOOD PRESSURE - MUSE: NORMAL MMHG
EOSINOPHIL # BLD AUTO: 0 10E3/UL (ref 0–0.7)
EOSINOPHIL NFR BLD AUTO: 0 %
ERYTHROCYTE [DISTWIDTH] IN BLOOD BY AUTOMATED COUNT: 12.1 % (ref 10–15)
ETHANOL SERPL-MCNC: <0.01 G/DL
GFR SERPL CREATININE-BSD FRML MDRD: 89 ML/MIN/1.73M2
GLUCOSE BLD-MCNC: 162 MG/DL (ref 70–99)
GLUCOSE UR STRIP-MCNC: NEGATIVE MG/DL
HCG SERPL QL: NEGATIVE
HCO3 BLDV-SCNC: 33 MMOL/L (ref 21–28)
HCT VFR BLD AUTO: 36.5 % (ref 35–47)
HGB BLD-MCNC: 12.8 G/DL (ref 11.7–15.7)
HGB UR QL STRIP: NEGATIVE
HYALINE CASTS: 55 /LPF
IMM GRANULOCYTES # BLD: 0.1 10E3/UL
IMM GRANULOCYTES NFR BLD: 1 %
INTERPRETATION ECG - MUSE: NORMAL
KETONES BLD-SCNC: 1.1 MMOL/L (ref 0–0.6)
KETONES UR STRIP-MCNC: 40 MG/DL
LACTATE SERPL-SCNC: 1.9 MMOL/L (ref 0.7–2)
LEUKOCYTE ESTERASE UR QL STRIP: ABNORMAL
LIPASE SERPL-CCNC: 208 U/L (ref 73–393)
LYMPHOCYTES # BLD AUTO: 0.3 10E3/UL (ref 0.8–5.3)
LYMPHOCYTES NFR BLD AUTO: 3 %
MAGNESIUM SERPL-MCNC: 1.4 MG/DL (ref 1.6–2.3)
MAGNESIUM SERPL-MCNC: 2.7 MG/DL (ref 1.6–2.3)
MCH RBC QN AUTO: 33.2 PG (ref 26.5–33)
MCHC RBC AUTO-ENTMCNC: 35.1 G/DL (ref 31.5–36.5)
MCV RBC AUTO: 95 FL (ref 78–100)
MONOCYTES # BLD AUTO: 0.5 10E3/UL (ref 0–1.3)
MONOCYTES NFR BLD AUTO: 4 %
MUCOUS THREADS #/AREA URNS LPF: PRESENT /LPF
NEUTROPHILS # BLD AUTO: 10 10E3/UL (ref 1.6–8.3)
NEUTROPHILS NFR BLD AUTO: 92 %
NITRATE UR QL: NEGATIVE
NRBC # BLD AUTO: 0 10E3/UL
NRBC BLD AUTO-RTO: 0 /100
O2/TOTAL GAS SETTING VFR VENT: 0 %
OPIATES UR QL SCN: NORMAL
OXYHGB MFR BLDV: 54 % (ref 70–75)
P AXIS - MUSE: 74 DEGREES
PCO2 BLDV: 44 MM HG (ref 40–50)
PH BLDV: 7.48 [PH] (ref 7.32–7.43)
PH UR STRIP: 6.5 [PH] (ref 5–7)
PHOSPHATE SERPL-MCNC: 4.3 MG/DL (ref 2.5–4.5)
PHOSPHATE SERPL-MCNC: 4.5 MG/DL (ref 2.5–4.5)
PLATELET # BLD AUTO: 135 10E3/UL (ref 150–450)
PO2 BLDV: 31 MM HG (ref 25–47)
POTASSIUM BLD-SCNC: 3.4 MMOL/L (ref 3.4–5.3)
PR INTERVAL - MUSE: 142 MS
PROT SERPL-MCNC: 7.8 G/DL (ref 6.8–8.8)
QRS DURATION - MUSE: 68 MS
QT - MUSE: 318 MS
QTC - MUSE: 426 MS
R AXIS - MUSE: 36 DEGREES
RBC # BLD AUTO: 3.85 10E6/UL (ref 3.8–5.2)
RBC URINE: 8 /HPF
SARS-COV-2 RNA RESP QL NAA+PROBE: NEGATIVE
SARS-COV-2 RNA RESP QL NAA+PROBE: NEGATIVE
SODIUM SERPL-SCNC: 125 MMOL/L (ref 133–144)
SP GR UR STRIP: 1.03 (ref 1–1.03)
SQUAMOUS EPITHELIAL: 25 /HPF
SYSTOLIC BLOOD PRESSURE - MUSE: NORMAL MMHG
T AXIS - MUSE: 66 DEGREES
UROBILINOGEN UR STRIP-MCNC: 2 MG/DL
VENTRICULAR RATE- MUSE: 108 BPM
WBC # BLD AUTO: 10.9 10E3/UL (ref 4–11)
WBC CLUMPS #/AREA URNS HPF: PRESENT /HPF
WBC URINE: >182 /HPF

## 2022-06-22 PROCEDURE — 258N000003 HC RX IP 258 OP 636: Performed by: PHYSICIAN ASSISTANT

## 2022-06-22 PROCEDURE — 83735 ASSAY OF MAGNESIUM: CPT | Performed by: PHYSICIAN ASSISTANT

## 2022-06-22 PROCEDURE — 99223 1ST HOSP IP/OBS HIGH 75: CPT | Mod: AI | Performed by: PHYSICIAN ASSISTANT

## 2022-06-22 PROCEDURE — C9803 HOPD COVID-19 SPEC COLLECT: HCPCS

## 2022-06-22 PROCEDURE — 81003 URINALYSIS AUTO W/O SCOPE: CPT | Performed by: EMERGENCY MEDICINE

## 2022-06-22 PROCEDURE — 36415 COLL VENOUS BLD VENIPUNCTURE: CPT | Performed by: PHYSICIAN ASSISTANT

## 2022-06-22 PROCEDURE — 250N000011 HC RX IP 250 OP 636: Performed by: PHYSICIAN ASSISTANT

## 2022-06-22 PROCEDURE — U0005 INFEC AGEN DETEC AMPLI PROBE: HCPCS | Performed by: EMERGENCY MEDICINE

## 2022-06-22 PROCEDURE — 258N000003 HC RX IP 258 OP 636: Performed by: EMERGENCY MEDICINE

## 2022-06-22 PROCEDURE — 250N000011 HC RX IP 250 OP 636: Performed by: EMERGENCY MEDICINE

## 2022-06-22 PROCEDURE — 84100 ASSAY OF PHOSPHORUS: CPT | Performed by: PHYSICIAN ASSISTANT

## 2022-06-22 PROCEDURE — 250N000009 HC RX 250: Performed by: PHYSICIAN ASSISTANT

## 2022-06-22 PROCEDURE — 96366 THER/PROPH/DIAG IV INF ADDON: CPT

## 2022-06-22 PROCEDURE — 80053 COMPREHEN METABOLIC PANEL: CPT | Performed by: EMERGENCY MEDICINE

## 2022-06-22 PROCEDURE — 83970 ASSAY OF PARATHORMONE: CPT | Performed by: PHYSICIAN ASSISTANT

## 2022-06-22 PROCEDURE — 83690 ASSAY OF LIPASE: CPT | Performed by: EMERGENCY MEDICINE

## 2022-06-22 PROCEDURE — 84703 CHORIONIC GONADOTROPIN ASSAY: CPT | Performed by: EMERGENCY MEDICINE

## 2022-06-22 PROCEDURE — 82330 ASSAY OF CALCIUM: CPT | Performed by: EMERGENCY MEDICINE

## 2022-06-22 PROCEDURE — 250N000013 HC RX MED GY IP 250 OP 250 PS 637: Performed by: PHYSICIAN ASSISTANT

## 2022-06-22 PROCEDURE — 83605 ASSAY OF LACTIC ACID: CPT | Performed by: EMERGENCY MEDICINE

## 2022-06-22 PROCEDURE — 87086 URINE CULTURE/COLONY COUNT: CPT | Performed by: EMERGENCY MEDICINE

## 2022-06-22 PROCEDURE — 99285 EMERGENCY DEPT VISIT HI MDM: CPT | Mod: 25

## 2022-06-22 PROCEDURE — 85025 COMPLETE CBC W/AUTO DIFF WBC: CPT | Performed by: EMERGENCY MEDICINE

## 2022-06-22 PROCEDURE — 96365 THER/PROPH/DIAG IV INF INIT: CPT

## 2022-06-22 PROCEDURE — 36415 COLL VENOUS BLD VENIPUNCTURE: CPT | Performed by: EMERGENCY MEDICINE

## 2022-06-22 PROCEDURE — 80307 DRUG TEST PRSMV CHEM ANLYZR: CPT | Performed by: EMERGENCY MEDICINE

## 2022-06-22 PROCEDURE — 83735 ASSAY OF MAGNESIUM: CPT | Performed by: EMERGENCY MEDICINE

## 2022-06-22 PROCEDURE — 82077 ASSAY SPEC XCP UR&BREATH IA: CPT | Performed by: EMERGENCY MEDICINE

## 2022-06-22 PROCEDURE — HZ2ZZZZ DETOXIFICATION SERVICES FOR SUBSTANCE ABUSE TREATMENT: ICD-10-PCS | Performed by: STUDENT IN AN ORGANIZED HEALTH CARE EDUCATION/TRAINING PROGRAM

## 2022-06-22 PROCEDURE — 82010 KETONE BODYS QUAN: CPT | Performed by: EMERGENCY MEDICINE

## 2022-06-22 PROCEDURE — 120N000001 HC R&B MED SURG/OB

## 2022-06-22 PROCEDURE — 93005 ELECTROCARDIOGRAM TRACING: CPT

## 2022-06-22 PROCEDURE — 82805 BLOOD GASES W/O2 SATURATION: CPT | Performed by: EMERGENCY MEDICINE

## 2022-06-22 PROCEDURE — 96375 TX/PRO/DX INJ NEW DRUG ADDON: CPT

## 2022-06-22 PROCEDURE — 96361 HYDRATE IV INFUSION ADD-ON: CPT

## 2022-06-22 RX ORDER — LORAZEPAM 2 MG/ML
1-2 INJECTION INTRAMUSCULAR EVERY 30 MIN PRN
Status: DISCONTINUED | OUTPATIENT
Start: 2022-06-22 | End: 2022-06-26 | Stop reason: HOSPADM

## 2022-06-22 RX ORDER — MAGNESIUM SULFATE HEPTAHYDRATE 40 MG/ML
4 INJECTION, SOLUTION INTRAVENOUS ONCE
Status: COMPLETED | OUTPATIENT
Start: 2022-06-22 | End: 2022-06-22

## 2022-06-22 RX ORDER — FUROSEMIDE 10 MG/ML
20 INJECTION INTRAMUSCULAR; INTRAVENOUS ONCE
Status: DISCONTINUED | OUTPATIENT
Start: 2022-06-22 | End: 2022-06-22

## 2022-06-22 RX ORDER — ONDANSETRON 2 MG/ML
4 INJECTION INTRAMUSCULAR; INTRAVENOUS EVERY 6 HOURS PRN
Status: DISCONTINUED | OUTPATIENT
Start: 2022-06-22 | End: 2022-06-26 | Stop reason: HOSPADM

## 2022-06-22 RX ORDER — LORAZEPAM 2 MG/ML
2 INJECTION INTRAMUSCULAR ONCE
Status: COMPLETED | OUTPATIENT
Start: 2022-06-22 | End: 2022-06-22

## 2022-06-22 RX ORDER — OLANZAPINE 5 MG/1
5-10 TABLET, ORALLY DISINTEGRATING ORAL EVERY 6 HOURS PRN
Status: DISCONTINUED | OUTPATIENT
Start: 2022-06-22 | End: 2022-06-24

## 2022-06-22 RX ORDER — AMOXICILLIN AND CLAVULANATE POTASSIUM 500; 125 MG/1; MG/1
1 TABLET, FILM COATED ORAL EVERY 12 HOURS SCHEDULED
Status: DISCONTINUED | OUTPATIENT
Start: 2022-06-22 | End: 2022-06-26 | Stop reason: HOSPADM

## 2022-06-22 RX ORDER — GABAPENTIN 300 MG/1
600 CAPSULE ORAL EVERY 8 HOURS
Status: COMPLETED | OUTPATIENT
Start: 2022-06-22 | End: 2022-06-24

## 2022-06-22 RX ORDER — FLUMAZENIL 0.1 MG/ML
0.2 INJECTION, SOLUTION INTRAVENOUS
Status: DISCONTINUED | OUTPATIENT
Start: 2022-06-22 | End: 2022-06-26 | Stop reason: HOSPADM

## 2022-06-22 RX ORDER — MULTIPLE VITAMINS W/ MINERALS TAB 9MG-400MCG
1 TAB ORAL DAILY
Status: DISCONTINUED | OUTPATIENT
Start: 2022-06-23 | End: 2022-06-26 | Stop reason: HOSPADM

## 2022-06-22 RX ORDER — PROCHLORPERAZINE MALEATE 5 MG
10 TABLET ORAL EVERY 6 HOURS PRN
Status: DISCONTINUED | OUTPATIENT
Start: 2022-06-22 | End: 2022-06-26 | Stop reason: HOSPADM

## 2022-06-22 RX ORDER — SODIUM CHLORIDE 9 MG/ML
INJECTION, SOLUTION INTRAVENOUS CONTINUOUS
Status: DISCONTINUED | OUTPATIENT
Start: 2022-06-22 | End: 2022-06-22

## 2022-06-22 RX ORDER — ONDANSETRON 4 MG/1
4 TABLET, ORALLY DISINTEGRATING ORAL EVERY 6 HOURS PRN
Status: DISCONTINUED | OUTPATIENT
Start: 2022-06-22 | End: 2022-06-26 | Stop reason: HOSPADM

## 2022-06-22 RX ORDER — LIDOCAINE 40 MG/G
CREAM TOPICAL
Status: DISCONTINUED | OUTPATIENT
Start: 2022-06-22 | End: 2022-06-26 | Stop reason: HOSPADM

## 2022-06-22 RX ORDER — LORAZEPAM 1 MG/1
1-2 TABLET ORAL EVERY 30 MIN PRN
Status: DISCONTINUED | OUTPATIENT
Start: 2022-06-22 | End: 2022-06-26 | Stop reason: HOSPADM

## 2022-06-22 RX ORDER — PROCHLORPERAZINE 25 MG
25 SUPPOSITORY, RECTAL RECTAL EVERY 12 HOURS PRN
Status: DISCONTINUED | OUTPATIENT
Start: 2022-06-22 | End: 2022-06-26 | Stop reason: HOSPADM

## 2022-06-22 RX ORDER — FOLIC ACID 1 MG/1
1 TABLET ORAL DAILY
Status: DISCONTINUED | OUTPATIENT
Start: 2022-06-23 | End: 2022-06-26 | Stop reason: HOSPADM

## 2022-06-22 RX ORDER — GABAPENTIN 100 MG/1
100 CAPSULE ORAL EVERY 8 HOURS
Status: DISCONTINUED | OUTPATIENT
Start: 2022-06-26 | End: 2022-06-26 | Stop reason: HOSPADM

## 2022-06-22 RX ORDER — CLONIDINE HYDROCHLORIDE 0.1 MG/1
0.1 TABLET ORAL EVERY 8 HOURS
Status: DISCONTINUED | OUTPATIENT
Start: 2022-06-22 | End: 2022-06-24

## 2022-06-22 RX ORDER — HALOPERIDOL 5 MG/ML
2.5-5 INJECTION INTRAMUSCULAR EVERY 6 HOURS PRN
Status: DISCONTINUED | OUTPATIENT
Start: 2022-06-22 | End: 2022-06-24

## 2022-06-22 RX ORDER — GABAPENTIN 300 MG/1
300 CAPSULE ORAL EVERY 8 HOURS
Status: DISCONTINUED | OUTPATIENT
Start: 2022-06-24 | End: 2022-06-26 | Stop reason: HOSPADM

## 2022-06-22 RX ADMIN — CLONIDINE HYDROCHLORIDE 0.1 MG: 0.1 TABLET ORAL at 19:51

## 2022-06-22 RX ADMIN — SODIUM CHLORIDE 1000 ML: 9 INJECTION, SOLUTION INTRAVENOUS at 13:49

## 2022-06-22 RX ADMIN — AMOXICILLIN AND CLAVULANATE POTASSIUM 1 TABLET: 500; 125 TABLET, FILM COATED ORAL at 19:52

## 2022-06-22 RX ADMIN — FAMOTIDINE 20 MG: 10 INJECTION INTRAVENOUS at 19:51

## 2022-06-22 RX ADMIN — LORAZEPAM 2 MG: 2 INJECTION INTRAMUSCULAR; INTRAVENOUS at 13:49

## 2022-06-22 RX ADMIN — MAGNESIUM SULFATE HEPTAHYDRATE 4 G: 40 INJECTION, SOLUTION INTRAVENOUS at 14:47

## 2022-06-22 RX ADMIN — GABAPENTIN 600 MG: 300 CAPSULE ORAL at 21:30

## 2022-06-22 RX ADMIN — FOLIC ACID: 5 INJECTION, SOLUTION INTRAMUSCULAR; INTRAVENOUS; SUBCUTANEOUS at 20:17

## 2022-06-22 RX ADMIN — LORAZEPAM 2 MG: 1 TABLET ORAL at 19:51

## 2022-06-22 RX ADMIN — SODIUM CHLORIDE 1000 ML: 9 INJECTION, SOLUTION INTRAVENOUS at 14:34

## 2022-06-22 ASSESSMENT — ACTIVITIES OF DAILY LIVING (ADL)
DRESSING/BATHING_DIFFICULTY: NO
TOILETING_ISSUES: NO
WALKING_OR_CLIMBING_STAIRS_DIFFICULTY: NO
ADLS_ACUITY_SCORE: 11
CHANGE_IN_FUNCTIONAL_STATUS_SINCE_ONSET_OF_CURRENT_ILLNESS/INJURY: NO
ADLS_ACUITY_SCORE: 17.25
DIFFICULTY_EATING/SWALLOWING: NO
ADLS_ACUITY_SCORE: 11
FALL_HISTORY_WITHIN_LAST_SIX_MONTHS: NO
DIFFICULTY_COMMUNICATING: NO
DOING_ERRANDS_INDEPENDENTLY_DIFFICULTY: NO
HEARING_DIFFICULTY_OR_DEAF: NO
WEAR_GLASSES_OR_BLIND: NO
ADLS_ACUITY_SCORE: 17.25
CONCENTRATING,_REMEMBERING_OR_MAKING_DECISIONS_DIFFICULTY: NO

## 2022-06-22 ASSESSMENT — ENCOUNTER SYMPTOMS
ABDOMINAL PAIN: 1
TREMORS: 1
VOMITING: 1
NAUSEA: 1
FEVER: 0
DIAPHORESIS: 1
COUGH: 0
DIARRHEA: 1

## 2022-06-22 NOTE — H&P
"[facility]    Hospitalist History and Physical    Name: Alana Mujica    MRN: [id number]  YOB: 1975    Age: 46 year old  Date of Admission:  6/22/2022  Date of Service (when I saw the patient): 06/22/22    Assessment & Plan   Alana Mujica is a 46 year old female with PMH significant for eating disorder (anorexia, bulimia), alcohol abuse with history of withdrawal, and anxiety who presented to the ED for evaluation of tremors, vomiting, and diarrhea.    ED work-up reveals: hypertensive and initially tachycardic in the 120s, sodium of 125, chloride of 84, calcium of 13.8, magnesium of 1.4, total bilirubin of 1.7 with remainder of LFTs WNL, quantitative ketones of 1.1, lactic acid of 1.9, lipase of 208, glucose of 162, pH of 7.48, bicarbonate of 33, PO2 of 31, PCO2 of 44, platelet count of 135, UA shows dark urine, 40 of ketones, 100 protein, moderate LE, >182 WBC, moderate bacteria, urine culture pending, COVID-negative, EtOH level negative, drugs of abuse screen negative, and EKG shows rate of 108 bpm and sinus tachycardia.    #Acute alcohol withdrawal  #Alcohol dependence: onset of tremor, nausea, vomiting, and diarrhea after last drink on Monday afternoon. Patient reports intermittent increased alcohol use due to increased stress from working two jobs. EtOH level negative. Multiple electrolyte abnormalities as noted below likely due to alcohol abuse and GI losses. No h/o withdrawal seizure. No current SI or HI.  - consumes approximately 8 pack of \"taller\" beers daily, sometimes more with last drink on Monday  - LACIWA protocol with scheduled gabapentin (will start at 600 mg due to patient's weight) and PRN Ativan  - ADAT  - LR + KCl at 100 ml/hour  - MVI, thiamine, and folic acid  - monitor on telemetry   - PRN antiemetics   - previously taking Naltrexone, consider resuming at discharge, reports intermittent noncompliance with taking   - patient reports previous treatment " program in 12/2021 and not currently interested in resources     #Hyponatremia: initial sodium of 125, previous hyponatremia in setting of poor oral intake  - likely due to poor PO intake and alcohol consumption  - recheck sodium in AM    #Hypercalcemia: total calcium of 13.3 with ionized calcium of 6.4, intermittently takes OTC calcium supplements.  - should improve with IVFs, no need for additional treatment at this time  - recheck ionized calcium and PTH in AM    #Starvation ketosis: quantitative ketones of 1.1 and UA shows 40 of ketones.   - suspect related to poor nutrition and PO intake  - recheck ketones in AM     #Metabolic alkalosis: initial pH of 7.48 with bicarbonate of 33 with pO2 and pCO2 WNL. Related to GI losses. Consider rechecking VBG once symptoms have improved.     #UTI: UA abnormal with 40 of ketones, 100 protein, moderate LE, >182 WBC, moderate bacteria. Per patient she was recently diagnosed with a UTI and prescribed PO Amoxicillin, unable to find any records of this. Due to abnormal UA and reports of ongoing urinary urgency/frequencuy as well suprapubic discomfort will treat with antibiotics.   - follow urine culture  - start PO Augmentin     #H/o eating disorder (anorexia, bulimia)   #Anxiety: not been consistent with taking Buspar of recent.   - continue PTA Buspar  - encourage patient to see counseling for eating disorder     #Hypomagnesemia: initial magnesium of 1.4, replace per protocol   - add on phosphorus and replace per protocol     Clinically Significant Risk Factors Present on Admission         # Hyponatremia: Na = 125 mmol/L (Ref range: 133 - 144 mmol/L) on admission, will monitor as appropriate  # Hypercalcemia: Ca = 13.8 mg/dL (Ref range: 8.5 - 10.1 mg/dL) and/or iCa = N/A on admission, will monitor as appropriate  # Hypomagnesemia: Mg = 1.4 mg/dL (Ref range: 1.6 - 2.3 mg/dL) on admission, will replace as needed          # Cachexia: Estimated body mass index is 19.01 kg/m  as  "calculated from the following:    Height as of 5/12/22: 1.613 m (5' 3.5\").    Weight as of 5/12/22: 49.4 kg (109 lb).      COVID: tested negative on 6/22/2022  DVT Prophylaxis: PCDs  Code Status: Full Code, discussed with patient   Disposition: Expected stay >2 midnights, will admit to inpatient    Primary Care Physician   Dr. Kemi Patel    Chief Complaint   Tremors, vomiting, and diarrhea    History obtained from discussion with ED provider, Dr. Marshall, chart review, and interview with patient.      History of Present Illness   Alana Mujica is a 46 year old female who presents with tremors, vomiting, and diarrhea.  The patient states she had her last drink on Monday afternoon.  She states that she felt miserable on Tuesday. She reports nausea with multiple episodes of nonbloody emesis.  She has been having approximately 3 episodes of nonbloody diarrhea per day.  She reports lower abdominal pain that is nonradiating.  She has had lightheadedness and dizziness with standing. She has felt chilled and diaphoretic.  She reports increased anxiety.  She has been feeling tremulous and unsteady at times.  Denies chest pain or shortness of breath.  Denies any recent falls.  She has attempted sips of water that come right back up but otherwise reports minimal oral intake the last couple days.  She states her symptoms are similar to previous episodes of alcohol withdrawal.  She states she has not been taking her naltrexone but did take a dose of this today as well as her BuSpar.  Denies visual or auditory hallucinations.  Denies suicidal or homicidal ideation.  Patient has previously gone to chemical dependency treatment as well as eating disorder treatment. She is not currently interested in further treatment.  She states that she consumes alcohol due to increased stress from working 2 jobs.  She states that she recently was diagnosed with a urinary tract infection and was on amoxicillin, she cannot recall which clinic " she was treated at.    Past Medical History    Past Medical History:   Diagnosis Date     RUPINDER (acute kidney injury) (H) 2018     Alcohol withdrawal (H) 2018     Anorexia      Anxiety      Bulimia      Chemical dependency (H) 2015    Alcohol in treatment     Depressive disorder      Genital herpes      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/15    NIL, +HR HPV. Co-test 12 months     Osteoporosis      Substance abuse (H)      Past Surgical History   Past Surgical History:   Procedure Laterality Date     LEEP TX, CERVICAL      greater than 5 years ago from , uncertain date     Prior to Admission Medications   Prior to Admission Medications   Prescriptions Last Dose Informant Patient Reported? Taking?   COMPOUNDED NON-CONTROLLED SUBSTANCE (CMPD RX) - PHARMACY TO MIX COMPOUNDED MEDICATION   No No   Sig: Please compound viscous lidocaine 2% and maalox in a 1:1 ratio Please take 15 to 30 ml by mouth every 4-6 hours as needed for abdominal pain   Cyanocobalamin 1000 MCG/15ML LIQD   Yes No   Sig: Take by mouth daily Unknown dose - takes liquid and tablet formulations of vitamin B12   MILK THISTLE PO   Yes No   Sig: Take 3 capsules by mouth   busPIRone (BUSPAR) 5 MG tablet   No No   Sig: Take 1 tablet (5 mg) by mouth 2 times daily   calcium citrate-vitamin D (CITRACAL) 315-250 MG-UNIT TABS per tablet   Yes No   Sig: Take 1 tablet by mouth 2 times daily   cetirizine (ZYRTEC) 10 MG tablet   Yes No   Sig: Take 10 mg by mouth daily as needed for allergies (summer months)    cyanocobalamin (VITAMIN B-12) 100 MCG tablet   Yes No   Sig: Take 100 mcg by mouth daily   etonogestrel (NEXPLANON) 68 MG IMPL   Yes No   Si each (68 mg) by Subdermal route once   gabapentin (NEURONTIN) 300 MG capsule   No No   Sig: Day 1 - 300 mg every 6 hours Day 2 - 300 mg every 8 hours Day 3 - 300 mg every 12 hours Day 4 - 300 mg one dose   metoclopramide (REGLAN) 10 MG tablet   No No   Sig: Take 1 tablet (10 mg) by mouth 3 times daily as needed  (Nausea or Vomiting)   multivitamin w/minerals (THERA-VIT-M) tablet   Yes No   Sig: Take 1 tablet by mouth daily   naltrexone (DEPADE/REVIA) 50 MG tablet   No No   Sig: Take 1 tablet (50 mg) by mouth daily   omeprazole (PRILOSEC) 20 MG DR capsule   Yes No   Sig: Take 20 mg by mouth   ondansetron (ZOFRAN ODT) 8 MG ODT tab   No No   Sig: Take 1 tablet (8 mg) by mouth every 12 hours as needed for nausea   pantoprazole (PROTONIX) 40 MG EC tablet   No No   Sig: Take 1 tablet (40 mg) by mouth daily   valACYclovir (VALTREX) 500 MG tablet   No No   Sig: TAKE 2 TABLETS BY MOUTH DAILY TWICE DAILY FOR 1 DAY PER COLD SORE FLARE      Facility-Administered Medications: None     Allergies   Allergies   Allergen Reactions     No Clinical Screening - See Comments      PN: LW CM1: CONTRAST- NKA Reaction :     Social History   Social History     Tobacco Use     Smoking status: Former Smoker     Packs/day: 0.00     Smokeless tobacco: Never Used   Substance Use Topics     Alcohol use: Yes     Alcohol/week: 29.2 standard drinks     Types: 21 Glasses of wine, 14 Cans of beer per week     Social History     Social History Narrative     Not on file       Family History   Family history reviewed with patient and is noncontributory.    Review of Systems   A Comprehensive greater than 10 system review of systems was carried out.  Pertinent positives and negatives are noted above.  Otherwise negative for contributory information.    Physical Exam   Temp: 97.8  F (36.6  C) Temp src: Temporal BP: (!) 140/98 Pulse: 98   Resp: 18 SpO2: 99 % O2 Device: None (Room air)    Vital Signs with Ranges  Temp:  [97.8  F (36.6  C)] 97.8  F (36.6  C)  Pulse:  [] 98  Resp:  [16-18] 18  BP: (132-140)/() 140/98  SpO2:  [98 %-99 %] 99 %  0 lbs 0 oz    GEN:  Alert, oriented x 3, appears comfortable, thin appearing, no overt distress  HEENT:  Normocephalic/atraumatic, no scleral icterus, no nasal discharge, mouth moist.  CV:  Regular rate and rhythm, no  murmur or JVD.  S1 + S2 noted, no S3 or S4.  LUNGS:  Clear to auscultation bilaterally without rales/rhonchi/wheezing/retractions.  Symmetric chest rise on inhalation noted.  ABD:  Active bowel sounds, soft, mild lower abdominal/suprapubic tenderness, non-distended.  No rebound/guarding/rigidity.  EXT:  No edema.  No cyanosis.  No acute joint synovitis noted.  SKIN:  Dry to touch, no exanthems noted in the visualized areas.  NEURO:  Symmetric muscle strength, sensation to touch grossly intact.  Coordination symmetric on general exam.  No new focal deficits appreciated. Mildly tremulous.   PSYCH: anxious, no SI or HI    Data   Data reviewed today:  I personally reviewed the EKG tracing showing rate of 108 bpm and sinus tachycardia.    Results for orders placed or performed during the hospital encounter of 06/22/22   Comprehensive metabolic panel     Status: Abnormal   Result Value Ref Range    Sodium 125 (L) 133 - 144 mmol/L    Potassium 3.4 3.4 - 5.3 mmol/L    Chloride 84 (L) 94 - 109 mmol/L    Carbon Dioxide (CO2) 29 20 - 32 mmol/L    Anion Gap 12 3 - 14 mmol/L    Urea Nitrogen 10 7 - 30 mg/dL    Creatinine 0.81 0.52 - 1.04 mg/dL    Calcium 13.8 (H) 8.5 - 10.1 mg/dL    Glucose 162 (H) 70 - 99 mg/dL    Alkaline Phosphatase 85 40 - 150 U/L    AST 34 0 - 45 U/L    ALT 29 0 - 50 U/L    Protein Total 7.8 6.8 - 8.8 g/dL    Albumin 4.0 3.4 - 5.0 g/dL    Bilirubin Total 1.7 (H) 0.2 - 1.3 mg/dL    GFR Estimate 89 >60 mL/min/1.73m2   Lipase     Status: Normal   Result Value Ref Range    Lipase 208 73 - 393 U/L   Lactic acid whole blood     Status: Normal   Result Value Ref Range    Lactic Acid 1.9 0.7 - 2.0 mmol/L   Ketone Beta-Hydroxybutyrate Quantitative     Status: Abnormal   Result Value Ref Range    Ketone (Beta-Hydroxybutyrate) Quantitative 1.1 (H) 0.0 - 0.6 mmol/L   Magnesium     Status: Abnormal   Result Value Ref Range    Magnesium 1.4 (L) 1.6 - 2.3 mg/dL   HCG QUALitative pregnancy (blood)     Status: Normal    Result Value Ref Range    hCG Serum Qualitative Negative Negative   Alcohol level blood     Status: Normal   Result Value Ref Range    Alcohol ethyl <0.01 <=0.01 g/dL   UA with Microscopic reflex to Culture     Status: Abnormal    Specimen: Urine, Midstream   Result Value Ref Range    Color Urine Dark Yellow (A) Colorless, Straw, Light Yellow, Yellow    Appearance Urine Slightly Cloudy (A) Clear    Glucose Urine Negative Negative mg/dL    Bilirubin Urine Negative Negative    Ketones Urine 40  (A) Negative mg/dL    Specific Gravity Urine 1.028 1.003 - 1.035    Blood Urine Negative Negative    pH Urine 6.5 5.0 - 7.0    Protein Albumin Urine 100  (A) Negative mg/dL    Urobilinogen Urine 2.0 Normal, 2.0 mg/dL    Nitrite Urine Negative Negative    Leukocyte Esterase Urine Moderate (A) Negative    Bacteria Urine Moderate (A) None Seen /HPF    WBC Clumps Urine Present (A) None Seen /HPF    Mucus Urine Present (A) None Seen /LPF    RBC Urine 8 (H) <=2 /HPF    WBC Urine >182 (H) <=5 /HPF    Squamous Epithelials Urine 25 (H) <=1 /HPF    Hyaline Casts Urine 55 (H) <=2 /LPF    Narrative    Urine Culture ordered based on laboratory criteria   Blood gas venous and oxyhgb     Status: Abnormal   Result Value Ref Range    pH Venous 7.48 (H) 7.32 - 7.43    pCO2 Venous 44 40 - 50 mm Hg    pO2 Venous 31 25 - 47 mm Hg    Bicarbonate Venous 33 (H) 21 - 28 mmol/L    FIO2 0     Oxyhemoglobin Venous 54 (L) 70 - 75 %    Base Excess/Deficit (+/-) 8.1 (H) -7.7 - 1.9 mmol/L   Asymptomatic COVID-19 Virus (Coronavirus) by PCR Nasopharyngeal     Status: Normal    Specimen: Nasopharyngeal; Swab   Result Value Ref Range    SARS CoV2 PCR Negative Negative    Narrative    Testing was performed using the Xpert Xpress SARS-CoV-2 Assay on the   Cepheid Gene-Xpert Instrument Systems. Additional information about   this Emergency Use Authorization (EUA) assay can be found via the Lab   Guide. This test should be ordered for the detection of SARS-CoV-2 in    individuals who meet SARS-CoV-2 clinical and/or epidemiological   criteria. Test performance is unknown in asymptomatic patients. This   test is for in vitro diagnostic use under the FDA EUA for   laboratories certified under CLIA to perform high complexity testing.   This test has not been FDA cleared or approved. A negative result   does not rule out the presence of PCR inhibitors in the specimen or   target RNA in concentration below the limit of detection for the   assay. The possibility of a false negative should be considered if   the patient's recent exposure or clinical presentation suggests   COVID-19. This test was validated by the [facility]. This laboratory is certified under the Clinical Laboratory Improvement Amendments of 1988 (CLIA-88) as qualified to perform high complexity laboratory testing.     Drug abuse screen 1 urine (ED)     Status: Normal   Result Value Ref Range    Amphetamines Urine Screen Negative Screen Negative    Barbiturates Urine Screen Negative Screen Negative    Benzodiazepines Urine Screen Negative Screen Negative    Cannabinoids Urine Screen Negative Screen Negative    Cocaine Urine Screen Negative Screen Negative    Opiates Urine Screen Negative Screen Negative   CBC with platelets and differential     Status: Abnormal   Result Value Ref Range    WBC Count 10.9 4.0 - 11.0 10e3/uL    RBC Count 3.85 3.80 - 5.20 10e6/uL    Hemoglobin 12.8 11.7 - 15.7 g/dL    Hematocrit 36.5 35.0 - 47.0 %    MCV 95 78 - 100 fL    MCH 33.2 (H) 26.5 - 33.0 pg    MCHC 35.1 31.5 - 36.5 g/dL    RDW 12.1 10.0 - 15.0 %    Platelet Count 135 (L) 150 - 450 10e3/uL    % Neutrophils 92 %    % Lymphocytes 3 %    % Monocytes 4 %    % Eosinophils 0 %    % Basophils 0 %    % Immature Granulocytes 1 %    NRBCs per 100 WBC 0 <1 /100    Absolute Neutrophils 10.0 (H) 1.6 - 8.3 10e3/uL    Absolute Lymphocytes 0.3 (L) 0.8 - 5.3 10e3/uL    Absolute Monocytes 0.5 0.0 - 1.3 10e3/uL     Absolute Eosinophils 0.0 0.0 - 0.7 10e3/uL    Absolute Basophils 0.0 0.0 - 0.2 10e3/uL    Absolute Immature Granulocytes 0.1 <=0.4 10e3/uL    Absolute NRBCs 0.0 10e3/uL   EKG 12-lead, tracing only     Status: None   Result Value Ref Range    Systolic Blood Pressure  mmHg    Diastolic Blood Pressure  mmHg    Ventricular Rate 108 BPM    Atrial Rate 108 BPM    AK Interval 142 ms    QRS Duration 68 ms     ms    QTc 426 ms    P Axis 74 degrees    R AXIS 36 degrees    T Axis 66 degrees    Interpretation ECG       Sinus tachycardia  Otherwise normal ECG  When compared with ECG of 09-APR-2022 15:05,  No significant change was found  Confirmed by - EMERGENCY ROOM, PHYSICIAN ([id number]),  CARIN HARRINGTON ([id number]) on 6/22/2022 2:56:23 PM     CBC with platelets differential     Status: Abnormal    Narrative    The following orders were created for panel order CBC with platelets differential.  Procedure                               Abnormality         Status                     ---------                               -----------         ------                     CBC with platelets and d...[[id number]]  Abnormal            Final result                 Please view results for these tests on the individual orders.   Urine Drugs of Abuse Screen     Status: Normal    Narrative    The following orders were created for panel order Urine Drugs of Abuse Screen.  Procedure                               Abnormality         Status                     ---------                               -----------         ------                     Drug abuse screen 1 urin...[[id number]]  Normal              Final result                 Please view results for these tests on the individual orders.     Johanna Welch PA-C  [facility] discussed the patient with Dr. Ng and he agrees with the above plan.

## 2022-06-22 NOTE — TELEPHONE ENCOUNTER
Patient calling, and states she is an alcoholic, and has recently quit drinking 'cold turkey'    She reports she was drinking an 8 pack every day.  She reports she has not had a drink in 2 days, and now has a feeling of being foggy, sweating profusely, and having some seizure like activity, staring off into the distance.  She also reports some shakiness,  Patient was advised to go to ER, and was also advised about [facility] for detox care.   Patient reports she will call er dad and have him take her to ER @ [facility] now.  Cynthia Vega RN   [facility] Nurse Advisor                           Reason for Disposition    Patient sounds very sick or weak to the triager    Additional Information    Negative: Fever > 100.4 F (38.0 C)    Negative: Headache or vomiting    Negative: Stiff neck (can't touch chin to chest)    Negative: Very strange or paranoid behavior    Negative: Alcohol use, abuse or dependence: question or problem related to    Negative: Drug abuse or dependence: question or problem related to    Negative: Difficult to awaken or acting confused (disoriented, slurred speech) and has diabetes    Negative: Difficult to awaken or acting confused (disoriented, slurred speech) and new onset    Negative: Weakness of the face, arm, or leg on one side of the body and new onset    Negative: Numbness of the face, arm, or leg on one side of the body and new onset    Negative: Loss of speech or garbled speech and new onset    Negative: Difficulty breathing and bluish (or gray) lips or face    Negative: Shock suspected (e.g., cold/pale/clammy skin, too weak to stand, low BP, rapid pulse)    Negative: Seeing or hearing or feeling things that are not there (i.e., auditory, visual, or tactile hallucinations)    Negative: Followed a head injury    Negative: Drug overdose suspected    Negative: Sounds like a life-threatening emergency to the triager    Protocols used: CONFUSION - DELIRIUM-A-OH

## 2022-06-22 NOTE — ED NOTES
"[facility]  ED Nurse Handoff Report    Alana Mujica is a 46 year old female   ED Chief complaint: No chief complaint on file.  . ED Diagnosis:   Final diagnoses:   Hypercalcemia   Hypomagnesemia   Hyponatremia   Alcohol dependence with withdrawal with complication (H)     Allergies:   Allergies   Allergen Reactions     No Clinical Screening - See Comments      PN: LW CM1: CONTRAST- NKA Reaction :       Code Status: Full Code  Activity level - Baseline/Home:  Stand by Assist. Activity Level - Current:   Assist X 1. Lift room needed: No. Bariatric: No   Needed: No   Isolation: No. Infection: Not Applicable.     Vital Signs:   Vitals:    06/22/22 1249 06/22/22 1406   BP: (!) 132/113 (!) 140/98   Pulse: (!) 127 98   Resp: 16 18   Temp: 97.8  F (36.6  C)    TempSrc: Temporal    SpO2: 98% 99%       Cardiac Rhythm:  ,      Pain level:    Patient confused: Yes. Patient Falls Risk: Yes.   Elimination Status: Has voided   Patient Report - Initial Complaint: Patient arrives with complaint of ETOH withdrawal, patient states last drink was 2 days ago.. Focused Assessment: Patient has a hx or ETOH abuse/withdrawl. Patient states she last drank 9 \"bigger beers\" 2 days ago. Pt states she has had seizures in the past from having an eating disorder.    Tests Performed: Labs, imagin. Abnormal Results:   Labs Ordered and Resulted from Time of ED Arrival to Time of ED Departure   COMPREHENSIVE METABOLIC PANEL - Abnormal       Result Value    Sodium 125 (*)     Potassium 3.4      Chloride 84 (*)     Carbon Dioxide (CO2) 29      Anion Gap 12      Urea Nitrogen 10      Creatinine 0.81      Calcium 13.8 (*)     Glucose 162 (*)     Alkaline Phosphatase 85      AST 34      ALT 29      Protein Total 7.8      Albumin 4.0      Bilirubin Total 1.7 (*)     GFR Estimate 89     KETONE BETA-HYDROXYBUTYRATE QUANTITATIVE, RAPID - Abnormal    Ketone (Beta-Hydroxybutyrate) Quantitative 1.1 (*)    MAGNESIUM - Abnormal    " Magnesium 1.4 (*)    ROUTINE UA WITH MICROSCOPIC REFLEX TO CULTURE - Abnormal    Color Urine Dark Yellow (*)     Appearance Urine Slightly Cloudy (*)     Glucose Urine Negative      Bilirubin Urine Negative      Ketones Urine 40  (*)     Specific Gravity Urine 1.028      Blood Urine Negative      pH Urine 6.5      Protein Albumin Urine 100  (*)     Urobilinogen Urine 2.0      Nitrite Urine Negative      Leukocyte Esterase Urine Moderate (*)     Bacteria Urine Moderate (*)     WBC Clumps Urine Present (*)     Mucus Urine Present (*)     RBC Urine 8 (*)     WBC Urine >182 (*)     Squamous Epithelials Urine 25 (*)     Hyaline Casts Urine 55 (*)    BLOOD GAS VENOUS WITH OXYHEMOGLOBIN - Abnormal    pH Venous 7.48 (*)     pCO2 Venous 44      pO2 Venous 31      Bicarbonate Venous 33 (*)     FIO2 0      Oxyhemoglobin Venous 54 (*)     Base Excess/Deficit (+/-) 8.1 (*)    CBC WITH PLATELETS AND DIFFERENTIAL - Abnormal    WBC Count 10.9      RBC Count 3.85      Hemoglobin 12.8      Hematocrit 36.5      MCV 95      MCH 33.2 (*)     MCHC 35.1      RDW 12.1      Platelet Count 135 (*)     % Neutrophils 92      % Lymphocytes 3      % Monocytes 4      % Eosinophils 0      % Basophils 0      % Immature Granulocytes 1      NRBCs per 100 WBC 0      Absolute Neutrophils 10.0 (*)     Absolute Lymphocytes 0.3 (*)     Absolute Monocytes 0.5      Absolute Eosinophils 0.0      Absolute Basophils 0.0      Absolute Immature Granulocytes 0.1      Absolute NRBCs 0.0     IONIZED CALCIUM - Abnormal    Calcium Ionized 6.4 (*)    LIPASE - Normal    Lipase 208     LACTIC ACID WHOLE BLOOD - Normal    Lactic Acid 1.9     HCG QUALITATIVE PREGNANCY - Normal    hCG Serum Qualitative Negative     ETHYL ALCOHOL LEVEL - Normal    Alcohol ethyl <0.01     COVID-19 VIRUS (CORONAVIRUS) BY PCR - Normal    SARS CoV2 PCR Negative     DRUG ABUSE SCREEN 1 URINE (ED) - Normal    Amphetamines Urine Screen Negative      Barbiturates Urine Screen Negative       Benzodiazepines Urine Screen Negative      Cannabinoids Urine Screen Negative      Cocaine Urine Screen Negative      Opiates Urine Screen Negative     COVID-19 VIRUS (CORONAVIRUS) BY PCR   URINE CULTURE     No orders to display   .   Treatments provided: Medications, Fluids  Family Comments:  aware  OBS brochure/video discussed/provided to patient:  No  ED Medications:   Medications   0.9% sodium chloride BOLUS (1,000 mLs Intravenous New Bag 6/22/22 1349)     Followed by   0.9% sodium chloride BOLUS (1,000 mLs Intravenous New Bag 6/22/22 1434)     Followed by   sodium chloride 0.9% infusion (has no administration in time range)   magnesium sulfate 4 g in 100 mL sterile water (premade) (4 g Intravenous New Bag 6/22/22 1447)   LORazepam (ATIVAN) injection 2 mg (2 mg Intravenous Given 6/22/22 1349)     Drips infusing:  Yes  For the majority of the shift, the patient's behavior Green. Interventions performed were None.    Sepsis treatment initiated: No     Patient tested for COVID 19 prior to admission: YES    ED Nurse Name/Phone Number: Michelle Vasquez RN,   4:24 PM    RECEIVING UNIT ED HANDOFF REVIEW    Above ED Nurse Handoff Report was reviewed: Yes  Reviewed by: Sarbjit Whyte RN on June 22, 2022 at 6:13 PM
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
## 2022-06-22 NOTE — ED TRIAGE NOTES
"Pt presents to ED with c/o alcohol withdrawal. Pt states that last drink was 2 days ago. Pt states that she drinks 8 \"bigger beers\" per day. Pt states that she has had seizures in the past due to an eating disorder. Pt is tremulous in triage. ABC intact.       "

## 2022-06-23 LAB
ALBUMIN SERPL-MCNC: 3.2 G/DL (ref 3.4–5)
ALP SERPL-CCNC: 61 U/L (ref 40–150)
ALT SERPL W P-5'-P-CCNC: 25 U/L (ref 0–50)
ANION GAP SERPL CALCULATED.3IONS-SCNC: 5 MMOL/L (ref 3–14)
AST SERPL W P-5'-P-CCNC: 28 U/L (ref 0–45)
BASOPHILS # BLD AUTO: 0.1 10E3/UL (ref 0–0.2)
BASOPHILS NFR BLD AUTO: 1 %
BILIRUB SERPL-MCNC: 1.1 MG/DL (ref 0.2–1.3)
BUN SERPL-MCNC: 5 MG/DL (ref 7–30)
CA-I BLD-MCNC: 5.1 MG/DL (ref 4.4–5.2)
CALCIUM SERPL-MCNC: 9.2 MG/DL (ref 8.5–10.1)
CHLORIDE BLD-SCNC: 103 MMOL/L (ref 94–109)
CO2 SERPL-SCNC: 27 MMOL/L (ref 20–32)
CREAT SERPL-MCNC: 0.65 MG/DL (ref 0.52–1.04)
EOSINOPHIL # BLD AUTO: 0.2 10E3/UL (ref 0–0.7)
EOSINOPHIL NFR BLD AUTO: 5 %
ERYTHROCYTE [DISTWIDTH] IN BLOOD BY AUTOMATED COUNT: 12.8 % (ref 10–15)
GFR SERPL CREATININE-BSD FRML MDRD: >90 ML/MIN/1.73M2
GLUCOSE BLD-MCNC: 91 MG/DL (ref 70–99)
HCT VFR BLD AUTO: 33.6 % (ref 35–47)
HGB BLD-MCNC: 11 G/DL (ref 11.7–15.7)
IMM GRANULOCYTES # BLD: 0 10E3/UL
IMM GRANULOCYTES NFR BLD: 0 %
KETONES BLD-SCNC: 0.8 MMOL/L (ref 0–0.6)
LYMPHOCYTES # BLD AUTO: 0.7 10E3/UL (ref 0.8–5.3)
LYMPHOCYTES NFR BLD AUTO: 14 %
MCH RBC QN AUTO: 32.6 PG (ref 26.5–33)
MCHC RBC AUTO-ENTMCNC: 32.7 G/DL (ref 31.5–36.5)
MCV RBC AUTO: 100 FL (ref 78–100)
MONOCYTES # BLD AUTO: 0.4 10E3/UL (ref 0–1.3)
MONOCYTES NFR BLD AUTO: 9 %
NEUTROPHILS # BLD AUTO: 3.4 10E3/UL (ref 1.6–8.3)
NEUTROPHILS NFR BLD AUTO: 71 %
NRBC # BLD AUTO: 0 10E3/UL
NRBC BLD AUTO-RTO: 0 /100
PLATELET # BLD AUTO: 97 10E3/UL (ref 150–450)
POTASSIUM BLD-SCNC: 3.4 MMOL/L (ref 3.4–5.3)
POTASSIUM BLD-SCNC: 3.8 MMOL/L (ref 3.4–5.3)
PROT SERPL-MCNC: 6.5 G/DL (ref 6.8–8.8)
PTH-INTACT SERPL-MCNC: 63 PG/ML
RBC # BLD AUTO: 3.37 10E6/UL (ref 3.8–5.2)
SODIUM SERPL-SCNC: 135 MMOL/L (ref 133–144)
WBC # BLD AUTO: 4.8 10E3/UL (ref 4–11)

## 2022-06-23 PROCEDURE — 85025 COMPLETE CBC W/AUTO DIFF WBC: CPT | Performed by: PHYSICIAN ASSISTANT

## 2022-06-23 PROCEDURE — 80053 COMPREHEN METABOLIC PANEL: CPT | Performed by: PHYSICIAN ASSISTANT

## 2022-06-23 PROCEDURE — 84132 ASSAY OF SERUM POTASSIUM: CPT | Performed by: INTERNAL MEDICINE

## 2022-06-23 PROCEDURE — 250N000011 HC RX IP 250 OP 636: Performed by: PHYSICIAN ASSISTANT

## 2022-06-23 PROCEDURE — 82010 KETONE BODYS QUAN: CPT | Performed by: PHYSICIAN ASSISTANT

## 2022-06-23 PROCEDURE — 250N000013 HC RX MED GY IP 250 OP 250 PS 637: Performed by: PHYSICIAN ASSISTANT

## 2022-06-23 PROCEDURE — 82040 ASSAY OF SERUM ALBUMIN: CPT | Performed by: PHYSICIAN ASSISTANT

## 2022-06-23 PROCEDURE — 36415 COLL VENOUS BLD VENIPUNCTURE: CPT | Performed by: PHYSICIAN ASSISTANT

## 2022-06-23 PROCEDURE — 82330 ASSAY OF CALCIUM: CPT | Performed by: PHYSICIAN ASSISTANT

## 2022-06-23 PROCEDURE — 36415 COLL VENOUS BLD VENIPUNCTURE: CPT | Performed by: INTERNAL MEDICINE

## 2022-06-23 PROCEDURE — 120N000001 HC R&B MED SURG/OB

## 2022-06-23 PROCEDURE — 99233 SBSQ HOSP IP/OBS HIGH 50: CPT | Performed by: INTERNAL MEDICINE

## 2022-06-23 PROCEDURE — 250N000013 HC RX MED GY IP 250 OP 250 PS 637: Performed by: INTERNAL MEDICINE

## 2022-06-23 PROCEDURE — 258N000003 HC RX IP 258 OP 636: Performed by: PHYSICIAN ASSISTANT

## 2022-06-23 RX ORDER — BUSPIRONE HYDROCHLORIDE 5 MG/1
5 TABLET ORAL 2 TIMES DAILY
Status: DISCONTINUED | OUTPATIENT
Start: 2022-06-23 | End: 2022-06-26 | Stop reason: HOSPADM

## 2022-06-23 RX ORDER — UBIDECARENONE 75 MG
100 CAPSULE ORAL DAILY
Status: DISCONTINUED | OUTPATIENT
Start: 2022-06-23 | End: 2022-06-26 | Stop reason: HOSPADM

## 2022-06-23 RX ORDER — POTASSIUM CHLORIDE 1.5 G/1.58G
40 POWDER, FOR SOLUTION ORAL ONCE
Status: COMPLETED | OUTPATIENT
Start: 2022-06-23 | End: 2022-06-23

## 2022-06-23 RX ADMIN — LORAZEPAM 1 MG: 1 TABLET ORAL at 23:58

## 2022-06-23 RX ADMIN — GABAPENTIN 600 MG: 300 CAPSULE ORAL at 21:22

## 2022-06-23 RX ADMIN — GABAPENTIN 600 MG: 300 CAPSULE ORAL at 06:54

## 2022-06-23 RX ADMIN — BUSPIRONE HYDROCHLORIDE 5 MG: 5 TABLET ORAL at 19:04

## 2022-06-23 RX ADMIN — SODIUM CHLORIDE, SODIUM LACTATE, POTASSIUM CHLORIDE, AND CALCIUM CHLORIDE: 600; 310; 30; 20 INJECTION, SOLUTION INTRAVENOUS at 06:55

## 2022-06-23 RX ADMIN — AMOXICILLIN AND CLAVULANATE POTASSIUM 1 TABLET: 500; 125 TABLET, FILM COATED ORAL at 08:20

## 2022-06-23 RX ADMIN — FAMOTIDINE 20 MG: 10 INJECTION INTRAVENOUS at 06:55

## 2022-06-23 RX ADMIN — THIAMINE HCL TAB 100 MG 100 MG: 100 TAB at 08:08

## 2022-06-23 RX ADMIN — LORAZEPAM 1 MG: 1 TABLET ORAL at 08:20

## 2022-06-23 RX ADMIN — CLONIDINE HYDROCHLORIDE 0.1 MG: 0.1 TABLET ORAL at 19:04

## 2022-06-23 RX ADMIN — FAMOTIDINE 20 MG: 10 INJECTION INTRAVENOUS at 19:04

## 2022-06-23 RX ADMIN — GABAPENTIN 600 MG: 300 CAPSULE ORAL at 13:56

## 2022-06-23 RX ADMIN — POTASSIUM CHLORIDE 40 MEQ: 1.5 POWDER, FOR SOLUTION ORAL at 11:15

## 2022-06-23 RX ADMIN — FOLIC ACID 1 MG: 1 TABLET ORAL at 08:08

## 2022-06-23 RX ADMIN — VITAM B12 100 MCG: 100 TAB at 15:05

## 2022-06-23 RX ADMIN — CLONIDINE HYDROCHLORIDE 0.1 MG: 0.1 TABLET ORAL at 11:15

## 2022-06-23 RX ADMIN — AMOXICILLIN AND CLAVULANATE POTASSIUM 1 TABLET: 500; 125 TABLET, FILM COATED ORAL at 19:04

## 2022-06-23 RX ADMIN — CLONIDINE HYDROCHLORIDE 0.1 MG: 0.1 TABLET ORAL at 03:55

## 2022-06-23 RX ADMIN — LORAZEPAM 1 MG: 1 TABLET ORAL at 15:51

## 2022-06-23 RX ADMIN — MULTIPLE VITAMINS W/ MINERALS TAB 1 TABLET: TAB at 08:08

## 2022-06-23 ASSESSMENT — ACTIVITIES OF DAILY LIVING (ADL)
ADLS_ACUITY_SCORE: 9.5
ADLS_ACUITY_SCORE: 11
ADLS_ACUITY_SCORE: 11
ADLS_ACUITY_SCORE: 9.5
ADLS_ACUITY_SCORE: 9.5
ADLS_ACUITY_SCORE: 11
ADLS_ACUITY_SCORE: 9.5

## 2022-06-23 NOTE — PLAN OF CARE
Pertinent assessments: VSS on room air. Afebrile. Tolerating clear liquids. A&Ox4. Up with SBA. Seizure precautions in place. Last drink is 2 days ago. CIWA 14, 7; PRN PO Ativan given x1.    Major Shift Events: new admit from ER.    Treatment Plan: CIWA, tele, IV fluids (banana bag), PRN Ativan, and seizure precautions.

## 2022-06-23 NOTE — PROGRESS NOTES
"[facility]    Medicine Progress Note - Hospitalist Service    Date of Admission:  6/22/2022    Assessment & Plan            Alana Mujica is a 46 year old female with PMH significant for eating disorder (anorexia, bulimia), alcohol abuse with history of withdrawal, and anxiety who presented to the ED for evaluation of tremors, vomiting, and diarrhea.    ED work-up reveals: hypertensive and initially tachycardic in the 120s, sodium of 125, chloride of 84, calcium of 13.8, magnesium of 1.4, total bilirubin of 1.7 with remainder of LFTs WNL, quantitative ketones of 1.1, lactic acid of 1.9, lipase of 208, glucose of 162, pH of 7.48, bicarbonate of 33, PO2 of 31, PCO2 of 44, platelet count of 135, UA shows dark urine, 40 of ketones, 100 protein, moderate LE, >182 WBC, moderate bacteria, urine culture pending, COVID-negative, EtOH level negative, drugs of abuse screen negative, and EKG shows rate of 108 bpm and sinus tachycardia.    #Acute alcohol withdrawal  #Alcohol dependence  #Alcohol abuse  : onset of tremor, nausea, vomiting, and diarrhea after last drink on Monday afternoon. Patient reports intermittent increased alcohol use due to increased stress from working two jobs. EtOH level negative. Multiple electrolyte abnormalities as noted below likely due to alcohol abuse and GI losses. No h/o withdrawal seizure. No current SI or HI.  - consumes approximately 8 pack of \"taller\" beers daily and she prefers Michelob, sometimes more with last drink on Monday  - CIWA protocol with scheduled gabapentin (will start at 600 mg due to patient's weight) and PRN Ativan  - ADAT  -Tolerating oral diet, advance to full liquids.  Stop IV fluids  - MVI, thiamine, and folic acid  - monitor on telemetry   - PRN antiemetics   - previously taking Naltrexone, consider resuming at discharge, reports intermittent noncompliance with taking   - patient reports previous treatment program in 12/2021 and not currently " interested in resources   -I offered chemical dependency evaluation and she politely declined this.  She mentioned that she is hoping to quit EtOH use and abuse.  She mentioned that AA approach does not work for her.  She does not want to go for outpatient rehab and definitely will not go to an inpatient approach as well.  However she wants to seek for help but not sure what other options we can offer her for now.      #Hyponatremia: -Resolved   -Stop IV fluids   - likely due to poor PO intake and alcohol consumption    #Hypercalcemia: total calcium of 13.3 with ionized calcium of 6.4, intermittently takes OTC calcium supplements.  -Resolved  - recheck ionized calcium and PTH in AM    #Starvation ketosis: quantitative ketones of 1.1 and UA shows 40 of ketones.   - suspect related to poor nutrition and PO intake  - recheck ketones in AM     #Metabolic alkalosis: initial pH of 7.48 with bicarbonate of 33 with pO2 and pCO2 WNL. Related to GI losses. Consider rechecking VBG once symptoms have improved.     #UTI: UA abnormal with 40 of ketones, 100 protein, moderate LE, >182 WBC, moderate bacteria. Per patient she was recently diagnosed with a UTI and prescribed PO Amoxicillin, unable to find any records of this. Due to abnormal UA and reports of ongoing urinary urgency/frequencuy as well suprapubic discomfort will treat with antibiotics.   - follow urine culture  -On PO Augmentin     #H/o eating disorder (anorexia, bulimia)   #Anxiety: not been consistent with taking Buspar of recent.   - continue PTA Buspar  - encourage patient to see counseling for eating disorder     #Hypomagnesemia: initial magnesium of 1.4, replace per protocol   - add on phosphorus and replace per protocol          Diet: Full Liquid Diet    DVT Prophylaxis: Pneumatic Compression Devices  Soriano Catheter: Not present  Central Lines: None  Cardiac Monitoring: ACTIVE order. Indication: Electrolyte Imbalance (24 hours)- Magnesium <1.3 mg/ml; Potassium  < =2.8 or > 5.5 mg/ml  Code Status: Full Code      Disposition Plan     Expected Discharge Date: 06/24/2022                The patient's care was discussed with the Bedside Nurse and Patient's Family.    Derik Fischer MD, MD  Hospitalist Service  [facility]  Securely message with the Vocera Web Console (learn more here)  Text page via [employer] Paging/Directory         Clinically Significant Risk Factors Present on Admission             # Hypoalbuminemia: Albumin = 3.2 g/dL (Ref range: 3.4 - 5.0 g/dL) on admission, will monitor as appropriate    # Thrombocytopenia: Plts = 97 10e3/uL (Ref range: 150 - 450 10e3/uL) on admission, will monitor for bleeding           ______________________________________________________________________    Interval History   I assume service care today.  Seen and examined.  Chart reviewed.  Case discussed with nursing service.  I following Alana laying comfortably in bed and was able to demonstrate tolerance to clear liquids.  She denies any ongoing recurrent nausea or vomiting.  Following simple verbal instructions, cooperative and interactive.  Denies any ongoing chest pain, nausea, vomiting.  No reported hallucinations.  Not agitated, not combative.  No reported bleeding tendencies.  Afebrile.    Data reviewed today: I reviewed all medications, new labs and imaging results over the last 24 hours. I personally reviewed .    Physical Exam   Vital Signs: Temp: 98.1  F (36.7  C) Temp src: Oral BP: (!) 133/90 Pulse: 72   Resp: 16 SpO2: 98 % O2 Device: None (Room air)    Weight: 119 lbs 4 oz  HEENT; Atraumatic, normocephalic, pinkish conjuctiva, pupils bilateral reactive   Skin: warm and moist, no rashes  Lungs: equal chest expansion, clear to auscultation, no wheezes, no stridor, no crackles,   Heart: normal rate, normal rhythm, no rubs or gallops.   Abdomen: normal bowel sounds, no tenderness, no peritoneal signs, no guarding  Extremities: no deformities, no  edema   Neuro; follow commands, alert and oriented x3, spontaneous speech, coherent, moves all extremities spontaneously  Psych; no hallucination, euthymic mood, not agitated        Data   Recent Labs   Lab 06/23/22  0756 06/22/22  1342   WBC 4.8 10.9   HGB 11.0* 12.8    95   PLT 97* 135*    125*   POTASSIUM 3.4 3.4   CHLORIDE 103 84*   CO2 27 29   BUN 5* 10   CR 0.65 0.81   ANIONGAP 5 12   KODY 9.2 13.8*   GLC 91 162*   ALBUMIN 3.2* 4.0   PROTTOTAL 6.5* 7.8   BILITOTAL 1.1 1.7*   ALKPHOS 61 85   ALT 25 29   AST 28 34   LIPASE  --  208     No results found for this or any previous visit (from the past 24 hour(s)).  Medications       amoxicillin-clavulanate  1 tablet Oral Q12H Count includes the [facility] (08/20)     busPIRone  5 mg Oral BID     cloNIDine  0.1 mg Oral Q8H     cyanocobalamin  100 mcg Oral Daily     famotidine  20 mg Intravenous Q12H     folic acid  1 mg Oral Daily     [START ON 6/26/2022] gabapentin  100 mg Oral Q8H     [START ON 6/24/2022] gabapentin  300 mg Oral Q8H     gabapentin  600 mg Oral Q8H     multivitamin w/minerals  1 tablet Oral Daily     sodium chloride (PF)  3 mL Intracatheter Q8H     thiamine  100 mg Oral Daily

## 2022-06-23 NOTE — PHARMACY-ADMISSION MEDICATION HISTORY
Admission medication history interview status for this patient is complete. See [facility] admission navigator for allergy information, prior to admission medications and immunization status.     Medication history interview done, indicate source(s): Patient  Medication history resources (including written lists, pill bottles, clinic record):None      Changes made to PTA medication list:  Added: Milk thistle  Changed: none  Reported as Not Taking: none  Removed: zyrtec, gabapentin, prilosec, protonix    Actions taken by pharmacist (provider contacted, etc):None     Additional medication history information:None    Medication reconciliation/reorder completed by provider prior to medication history?  n   (Y/N)     For patients on insulin therapy:   Do you use sliding scale insulin based on blood sugars?   What is your pre-meal insulin coverage?    Do you typically eat three meals a day?   How many times do you check your blood glucose per day?   How many episodes of hypoglycemia do you typically have per month?   Do you have a Continuous Glucose Monitor (CGM)?      Prior to Admission medications    Medication Sig Last Dose Taking? Auth Provider Long Term End Date   busPIRone (BUSPAR) 5 MG tablet Take 1 tablet (5 mg) by mouth 2 times daily 6/22/2022 at am Yes Destiny Patel MD Yes    calcium citrate-vitamin D (CITRACAL) 315-250 MG-UNIT TABS per tablet Take 1 tablet by mouth 2 times daily 6/22/2022 at am Yes Unknown, Entered By History     COMPOUNDED NON-CONTROLLED SUBSTANCE (CMPD RX) - PHARMACY TO MIX COMPOUNDED MEDICATION Please compound viscous lidocaine 2% and maalox in a 1:1 ratio Please take 15 to 30 ml by mouth every 4-6 hours as needed for abdominal pain  Yes Isaak Kirby MD     cyanocobalamin (VITAMIN B-12) 100 MCG tablet Take 100 mcg by mouth daily 6/22/2022 at am Yes Unknown, Entered By History     etonogestrel (NEXPLANON) 68 MG IMPL 1 each (68 mg) by Subdermal route once  Yes Sabrina Pérez  JOY Gonzalez Yes    metoclopramide (REGLAN) 10 MG tablet Take 1 tablet (10 mg) by mouth 3 times daily as needed (Nausea or Vomiting)  Yes Kelvin Jackson MD     MILK THISTLE PO Take 3 capsules by mouth daily 6/22/2022 at Unknown time Yes Reported, Patient No    multivitamin w/minerals (THERA-VIT-M) tablet Take 1 tablet by mouth daily 6/22/2022 at Unknown time Yes Unknown, Entered By History     naltrexone (DEPADE/REVIA) 50 MG tablet Take 1 tablet (50 mg) by mouth daily 6/22/2022 at Unknown time Yes Destiny Patel MD Yes    ondansetron (ZOFRAN ODT) 8 MG ODT tab Take 1 tablet (8 mg) by mouth every 12 hours as needed for nausea  Yes Colleen Brown MD     valACYclovir (VALTREX) 500 MG tablet TAKE 2 TABLETS BY MOUTH DAILY TWICE DAILY FOR 1 DAY PER COLD SORE FLARE prn Yes Ga Alaniz PA-C Yes

## 2022-06-23 NOTE — PLAN OF CARE
To Do:  End of Shift Summary  For vital signs and complete assessments, please see documentation flowsheets.     Pertinent assessments: Pt A&Ox4. VSS, Denied pain. SBA. On clear liq diet. CIWA 4, 6, and 6. No PRN's given. Tele SR HR 75.   Major Shift Events Uneventful   Treatment Plan: CIWA, tele, IV fluids (banana bag), PRN Ativan, and seizure precautions, PO Augmentin.     Bedside Nurse: Zahraa Iverson RN

## 2022-06-23 NOTE — PLAN OF CARE
Pertinent assessments: A&Ox4. Denies pain. CIWA: 8, 4, PO ativan given per protocol. Tolerating clear liquids. Seizure precautions in place. Up SBA.    Major Shift Events: K; 3.4, replaced.    Treatment Plan: CIWA, tele, seizure precautions, PO Augmentin.     Bedside Nurse: Vikki Pond RN

## 2022-06-24 LAB
BACTERIA UR CULT: NORMAL
MAGNESIUM SERPL-MCNC: 1.9 MG/DL (ref 1.6–2.3)
PHOSPHATE SERPL-MCNC: 3 MG/DL (ref 2.5–4.5)
POTASSIUM BLD-SCNC: 3.9 MMOL/L (ref 3.4–5.3)

## 2022-06-24 PROCEDURE — 84100 ASSAY OF PHOSPHORUS: CPT | Performed by: STUDENT IN AN ORGANIZED HEALTH CARE EDUCATION/TRAINING PROGRAM

## 2022-06-24 PROCEDURE — 250N000011 HC RX IP 250 OP 636: Performed by: PHYSICIAN ASSISTANT

## 2022-06-24 PROCEDURE — 83735 ASSAY OF MAGNESIUM: CPT | Performed by: STUDENT IN AN ORGANIZED HEALTH CARE EDUCATION/TRAINING PROGRAM

## 2022-06-24 PROCEDURE — 250N000013 HC RX MED GY IP 250 OP 250 PS 637: Performed by: PHYSICIAN ASSISTANT

## 2022-06-24 PROCEDURE — 36415 COLL VENOUS BLD VENIPUNCTURE: CPT | Performed by: STUDENT IN AN ORGANIZED HEALTH CARE EDUCATION/TRAINING PROGRAM

## 2022-06-24 PROCEDURE — 120N000001 HC R&B MED SURG/OB

## 2022-06-24 PROCEDURE — 99232 SBSQ HOSP IP/OBS MODERATE 35: CPT | Performed by: INTERNAL MEDICINE

## 2022-06-24 PROCEDURE — 84132 ASSAY OF SERUM POTASSIUM: CPT | Performed by: INTERNAL MEDICINE

## 2022-06-24 PROCEDURE — 250N000013 HC RX MED GY IP 250 OP 250 PS 637: Performed by: INTERNAL MEDICINE

## 2022-06-24 RX ADMIN — LORAZEPAM 1 MG: 1 TABLET ORAL at 17:01

## 2022-06-24 RX ADMIN — FAMOTIDINE 20 MG: 10 INJECTION INTRAVENOUS at 06:11

## 2022-06-24 RX ADMIN — GABAPENTIN 600 MG: 300 CAPSULE ORAL at 14:48

## 2022-06-24 RX ADMIN — FOLIC ACID 1 MG: 1 TABLET ORAL at 08:22

## 2022-06-24 RX ADMIN — CLONIDINE HYDROCHLORIDE 0.1 MG: 0.1 TABLET ORAL at 02:54

## 2022-06-24 RX ADMIN — BUSPIRONE HYDROCHLORIDE 5 MG: 5 TABLET ORAL at 20:00

## 2022-06-24 RX ADMIN — GABAPENTIN 300 MG: 300 CAPSULE ORAL at 21:54

## 2022-06-24 RX ADMIN — BUSPIRONE HYDROCHLORIDE 5 MG: 5 TABLET ORAL at 08:21

## 2022-06-24 RX ADMIN — Medication 5 MG: at 21:55

## 2022-06-24 RX ADMIN — AMOXICILLIN AND CLAVULANATE POTASSIUM 1 TABLET: 500; 125 TABLET, FILM COATED ORAL at 19:59

## 2022-06-24 RX ADMIN — FAMOTIDINE 20 MG: 10 INJECTION INTRAVENOUS at 18:41

## 2022-06-24 RX ADMIN — AMOXICILLIN AND CLAVULANATE POTASSIUM 1 TABLET: 500; 125 TABLET, FILM COATED ORAL at 08:22

## 2022-06-24 RX ADMIN — LORAZEPAM 1 MG: 1 TABLET ORAL at 01:02

## 2022-06-24 RX ADMIN — Medication 1 LOZENGE: at 14:51

## 2022-06-24 RX ADMIN — VITAM B12 100 MCG: 100 TAB at 08:21

## 2022-06-24 RX ADMIN — LORAZEPAM 1 MG: 1 TABLET ORAL at 23:36

## 2022-06-24 RX ADMIN — MULTIPLE VITAMINS W/ MINERALS TAB 1 TABLET: TAB at 08:22

## 2022-06-24 RX ADMIN — THIAMINE HCL TAB 100 MG 100 MG: 100 TAB at 08:21

## 2022-06-24 RX ADMIN — LORAZEPAM 1 MG: 1 TABLET ORAL at 08:38

## 2022-06-24 RX ADMIN — GABAPENTIN 600 MG: 300 CAPSULE ORAL at 05:52

## 2022-06-24 ASSESSMENT — ACTIVITIES OF DAILY LIVING (ADL)
ADLS_ACUITY_SCORE: 9.5

## 2022-06-24 NOTE — PLAN OF CARE
Goal Outcome Evaluation:    To Do:  End of Shift Summary  For vital signs and complete assessments, please see documentation flowsheets.     Pertinent assessments: Pt A/Ox4. VSS. Denies nausea and SOB. Reported headache but declined analgesia. CIWA 10,8 and 4. Ativan 1 mg given 2x. Up SBA. Pt refused bed alarm. Tele-SR  Major Shift Events: uneventful  Treatment Plan: ETOH withdrawal protocol, tele, seizure prec and Augmentin po.  Bedside Nurse: Maria A Riley RN

## 2022-06-24 NOTE — PROGRESS NOTES
"[id number]:    VSS ex elevated BP. Endorses headache 4/10. CIWA 12, 1mg ativan given. Complains of visual hallucinations which are new to patient, resolved after ativan. CIWA recheck 6. Encouraged PO intake. Pt crying and expressing anxiety. Supportive cares provided. Continue POC.    BP (!) 158/96 (BP Location: Right arm)   Pulse 118   Temp 97.6  F (36.4  C) (Oral)   Resp 20   Ht 1.626 m (5' 4\")   Wt 54.1 kg (119 lb 4 oz)   SpO2 95%   BMI 20.47 kg/m      "

## 2022-06-24 NOTE — PLAN OF CARE
Pertinent assessments: VSS on room air. A&Ox4. Afebrile. CIWA: 8; PO ativan given x1. Tolerating full liquids; consuming lots of water, juice, and tea this evening. Seizure precautions in place. Up with SBA.    Major Shift Events: none.    Treatment Plan: CIWA, tele, seizure precautions, and PO Augmentin.

## 2022-06-24 NOTE — PROGRESS NOTES
"[facility]    Medicine Progress Note - Hospitalist Service    Date of Admission:  6/22/2022    Assessment & Plan            Alana Mujica is a 46 year old female with PMH significant for eating disorder (anorexia, bulimia), alcohol abuse with history of withdrawal, and anxiety who presented to the ED for evaluation of tremors, vomiting, and diarrhea.    ED work-up reveals: hypertensive and initially tachycardic in the 120s, sodium of 125, chloride of 84, calcium of 13.8, magnesium of 1.4, total bilirubin of 1.7 with remainder of LFTs WNL, quantitative ketones of 1.1, lactic acid of 1.9, lipase of 208, glucose of 162, pH of 7.48, bicarbonate of 33, PO2 of 31, PCO2 of 44, platelet count of 135, UA shows dark urine, 40 of ketones, 100 protein, moderate LE, >182 WBC, moderate bacteria, urine culture pending, COVID-negative, EtOH level negative, drugs of abuse screen negative, and EKG shows rate of 108 bpm and sinus tachycardia.    #Acute alcohol withdrawal  #Alcohol dependence  #Alcohol abuse  : onset of tremor, nausea, vomiting, and diarrhea after last drink on Monday afternoon. Patient reports intermittent increased alcohol use due to increased stress from working two jobs. EtOH level negative. Multiple electrolyte abnormalities as noted below likely due to alcohol abuse and GI losses. No h/o withdrawal seizure. No current SI or HI.  - consumes approximately 8 pack of \"taller\" beers daily and she prefers Michelob, sometimes more with last drink on Monday  - WA protocol with scheduled gabapentin (will start at 600 mg due to patient's weight) and PRN Ativan  -Regular diet  -Stop clonidine  - MVI, thiamine, and folic acid  - monitor on telemetry   - PRN antiemetics   - previously taking Naltrexone, consider resuming at discharge, reports intermittent noncompliance with taking   - patient reports previous treatment program in 12/2021 and not currently interested in resources   -I offered chemical " dependency evaluation and she politely declined this.  She mentioned that she is hoping to quit EtOH use and abuse.  She mentioned that AA approach does not work for her.  She does not want to go for outpatient rehab and definitely will not go to an inpatient approach as well.  However she wants to seek for help but not sure what other options we can offer her for now.      #Hyponatremia: -Resolved   -Stop IV fluids   - likely due to poor PO intake and alcohol consumption    #Hypercalcemia: total calcium of 13.3 with ionized calcium of 6.4, intermittently takes OTC calcium supplements.  -Resolved  -PTH intact normal    #Starvation ketosis: quantitative ketones of 1.1 and UA shows 40 of ketones.   - suspect related to poor nutrition and PO intake  - recheck ketones in AM     #Metabolic alkalosis: initial pH of 7.48 with bicarbonate of 33 with pO2 and pCO2 WNL. Related to GI losses. Consider rechecking VBG once symptoms have improved.     #UTI: UA abnormal with 40 of ketones, 100 protein, moderate LE, >182 WBC, moderate bacteria. Per patient she was recently diagnosed with a UTI and prescribed PO Amoxicillin, unable to find any records of this. Due to abnormal UA and reports of ongoing urinary urgency/frequencuy as well suprapubic discomfort will treat with antibiotics.   - follow urine culture, remain no growth up-to-date  -On PO Augmentin     #H/o eating disorder (anorexia, bulimia)   #Anxiety: not been consistent with taking Buspar of recent.   - continue PTA Buspar  - encourage patient to see counseling for eating disorder     #Hypomagnesemia: initial magnesium of 1.4, replace per protocol   - add on phosphorus and replace per protocol          Diet: Regular Diet Adult    DVT Prophylaxis: Pneumatic Compression Devices  Soriano Catheter: Not present  Central Lines: None  Cardiac Monitoring: None  Code Status: Full Code      Disposition Plan      Expected Discharge Date: She is improving but still appears to be shaky  and with obvious tremors.  We will continue with ongoing alcohol withdrawal treatment at least for the next 24 hours        The patient's care was discussed with the Bedside Nurse and Patient's Family.    Derik Fischer MD, MD  Hospitalist Service  [facility]  Securely message with the Vocera Web Console (learn more here)  Text page via [facility] [employer] Paging/Directory         Clinically Significant Risk Factors Present on Admission                      ______________________________________________________________________    Interval History   Continuing service care today.  Seen and examined.  Chart reviewed.  Case discussed with nursing service.  Alana is denying any recurrence of nausea or vomiting.  She was able to tolerate fluids.  No ongoing abdominal pain.  Last night she mentioned about the vivid dreams but no ongoing hallucinations or being agitated or combative.  Afebrile.  She is not requiring any oxygen support.    Data reviewed today: I reviewed all medications, new labs and imaging results over the last 24 hours. I personally reviewed .    Physical Exam   Vital Signs: Temp: 97.8  F (36.6  C) Temp src: Oral BP: (!) 132/93 Pulse: 81   Resp: 20 SpO2: 100 % O2 Device: None (Room air)    Weight: 119 lbs 4 oz  HEENT; Atraumatic, normocephalic, pinkish conjuctiva, pupils bilateral reactive   Skin: warm and moist, no rashes  Lungs: equal chest expansion, clear to auscultation, no wheezes, no stridor, no crackles,   Heart: normal rate, normal rhythm, no rubs or gallops.   Abdomen: normal bowel sounds, no tenderness, no peritoneal signs, no guarding  Extremities: no deformities, no edema   Neuro; follow commands, alert and oriented x3, spontaneous speech, coherent, moves all extremities spontaneously  Psych; no hallucination, euthymic mood, not agitated        Data   Recent Labs   Lab 06/24/22  0642 06/23/22  1544 06/23/22  0756 06/22/22  1342   WBC  --   --  4.8 10.9   HGB  --   --  11.0*  12.8   MCV  --   --  100 95   PLT  --   --  97* 135*   NA  --   --  135 125*   POTASSIUM 3.9 3.8 3.4 3.4   CHLORIDE  --   --  103 84*   CO2  --   --  27 29   BUN  --   --  5* 10   CR  --   --  0.65 0.81   ANIONGAP  --   --  5 12   KODY  --   --  9.2 13.8*   GLC  --   --  91 162*   ALBUMIN  --   --  3.2* 4.0   PROTTOTAL  --   --  6.5* 7.8   BILITOTAL  --   --  1.1 1.7*   ALKPHOS  --   --  61 85   ALT  --   --  25 29   AST  --   --  28 34   LIPASE  --   --   --  208     No results found for this or any previous visit (from the past 24 hour(s)).  Medications       amoxicillin-clavulanate  1 tablet Oral Q12H [facility] (08/20)     busPIRone  5 mg Oral BID     cyanocobalamin  100 mcg Oral Daily     famotidine  20 mg Intravenous Q12H     folic acid  1 mg Oral Daily     [START ON 6/26/2022] gabapentin  100 mg Oral Q8H     gabapentin  300 mg Oral Q8H     gabapentin  600 mg Oral Q8H     multivitamin w/minerals  1 tablet Oral Daily     sodium chloride (PF)  3 mL Intracatheter Q8H     thiamine  100 mg Oral Daily

## 2022-06-24 NOTE — PLAN OF CARE
Goal Outcome Evaluation:        End of Shift Summary  For vital signs and complete assessments, please see documentation flowsheets.     Pertinent assessments: Up SBA in room. Denies nausea/vomiting. Refusing bed alarms. Calm and cooperative with cares. Voiding spontaneously without complaint of discomfort.    Major Shift Events: CIWA 10, 4, 4 ativan given per protocol. Shaky and tremors noted throughout shift. Advanced to regular diet this shift, tolerating well.    Treatment Plan: ETOH withdrawal protocol, seizure prec and Augmentin po.    Bedside Nurse: Liz Garcia RN

## 2022-06-25 LAB
MAGNESIUM SERPL-MCNC: 1.8 MG/DL (ref 1.6–2.3)
PHOSPHATE SERPL-MCNC: 2.4 MG/DL (ref 2.5–4.5)
POTASSIUM BLD-SCNC: 3.4 MMOL/L (ref 3.4–5.3)
POTASSIUM BLD-SCNC: 4.6 MMOL/L (ref 3.4–5.3)

## 2022-06-25 PROCEDURE — 250N000013 HC RX MED GY IP 250 OP 250 PS 637: Performed by: INTERNAL MEDICINE

## 2022-06-25 PROCEDURE — 250N000013 HC RX MED GY IP 250 OP 250 PS 637: Performed by: PHYSICIAN ASSISTANT

## 2022-06-25 PROCEDURE — 36415 COLL VENOUS BLD VENIPUNCTURE: CPT | Performed by: INTERNAL MEDICINE

## 2022-06-25 PROCEDURE — 250N000011 HC RX IP 250 OP 636: Performed by: PHYSICIAN ASSISTANT

## 2022-06-25 PROCEDURE — 99232 SBSQ HOSP IP/OBS MODERATE 35: CPT | Performed by: INTERNAL MEDICINE

## 2022-06-25 PROCEDURE — 83735 ASSAY OF MAGNESIUM: CPT | Performed by: INTERNAL MEDICINE

## 2022-06-25 PROCEDURE — 84132 ASSAY OF SERUM POTASSIUM: CPT | Performed by: INTERNAL MEDICINE

## 2022-06-25 PROCEDURE — 120N000001 HC R&B MED SURG/OB

## 2022-06-25 PROCEDURE — 84100 ASSAY OF PHOSPHORUS: CPT | Performed by: INTERNAL MEDICINE

## 2022-06-25 RX ORDER — POTASSIUM CHLORIDE 1500 MG/1
40 TABLET, EXTENDED RELEASE ORAL ONCE
Status: COMPLETED | OUTPATIENT
Start: 2022-06-25 | End: 2022-06-25

## 2022-06-25 RX ADMIN — AMOXICILLIN AND CLAVULANATE POTASSIUM 1 TABLET: 500; 125 TABLET, FILM COATED ORAL at 08:23

## 2022-06-25 RX ADMIN — BUSPIRONE HYDROCHLORIDE 5 MG: 5 TABLET ORAL at 08:23

## 2022-06-25 RX ADMIN — LORAZEPAM 2 MG: 1 TABLET ORAL at 21:49

## 2022-06-25 RX ADMIN — THIAMINE HCL TAB 100 MG 100 MG: 100 TAB at 08:23

## 2022-06-25 RX ADMIN — MULTIPLE VITAMINS W/ MINERALS TAB 1 TABLET: TAB at 08:23

## 2022-06-25 RX ADMIN — GABAPENTIN 300 MG: 300 CAPSULE ORAL at 05:48

## 2022-06-25 RX ADMIN — GABAPENTIN 300 MG: 300 CAPSULE ORAL at 14:46

## 2022-06-25 RX ADMIN — GABAPENTIN 300 MG: 300 CAPSULE ORAL at 21:44

## 2022-06-25 RX ADMIN — POTASSIUM & SODIUM PHOSPHATES POWDER PACK 280-160-250 MG 1 PACKET: 280-160-250 PACK at 12:38

## 2022-06-25 RX ADMIN — VITAM B12 100 MCG: 100 TAB at 08:23

## 2022-06-25 RX ADMIN — FAMOTIDINE 20 MG: 10 INJECTION INTRAVENOUS at 18:47

## 2022-06-25 RX ADMIN — FOLIC ACID 1 MG: 1 TABLET ORAL at 08:23

## 2022-06-25 RX ADMIN — FAMOTIDINE 20 MG: 10 INJECTION INTRAVENOUS at 06:08

## 2022-06-25 RX ADMIN — POTASSIUM & SODIUM PHOSPHATES POWDER PACK 280-160-250 MG 1 PACKET: 280-160-250 PACK at 08:42

## 2022-06-25 RX ADMIN — AMOXICILLIN AND CLAVULANATE POTASSIUM 1 TABLET: 500; 125 TABLET, FILM COATED ORAL at 21:49

## 2022-06-25 RX ADMIN — LORAZEPAM 1 MG: 1 TABLET ORAL at 22:43

## 2022-06-25 RX ADMIN — BUSPIRONE HYDROCHLORIDE 5 MG: 5 TABLET ORAL at 21:44

## 2022-06-25 RX ADMIN — LORAZEPAM 1 MG: 1 TABLET ORAL at 08:42

## 2022-06-25 RX ADMIN — POTASSIUM & SODIUM PHOSPHATES POWDER PACK 280-160-250 MG 1 PACKET: 280-160-250 PACK at 16:52

## 2022-06-25 RX ADMIN — POTASSIUM CHLORIDE 40 MEQ: 1500 TABLET, EXTENDED RELEASE ORAL at 08:42

## 2022-06-25 ASSESSMENT — ACTIVITIES OF DAILY LIVING (ADL)
ADLS_ACUITY_SCORE: 9.5

## 2022-06-25 NOTE — PROGRESS NOTES
"[facility]    Medicine Progress Note - Hospitalist Service    Date of Admission:  6/22/2022    Assessment & Plan            Alana Mujica is a 46 year old female with PMH significant for eating disorder (anorexia, bulimia), alcohol abuse with history of withdrawal, and anxiety who presented to the ED for evaluation of tremors, vomiting, and diarrhea.    ED work-up reveals: hypertensive and initially tachycardic in the 120s, sodium of 125, chloride of 84, calcium of 13.8, magnesium of 1.4, total bilirubin of 1.7 with remainder of LFTs WNL, quantitative ketones of 1.1, lactic acid of 1.9, lipase of 208, glucose of 162, pH of 7.48, bicarbonate of 33, PO2 of 31, PCO2 of 44, platelet count of 135, UA shows dark urine, 40 of ketones, 100 protein, moderate LE, >182 WBC, moderate bacteria, urine culture pending, COVID-negative, EtOH level negative, drugs of abuse screen negative, and EKG shows rate of 108 bpm and sinus tachycardia.    #Acute alcohol withdrawal  #Alcohol dependence  #Alcohol abuse  : onset of tremor, nausea, vomiting, and diarrhea after last drink on Monday afternoon. Patient reports intermittent increased alcohol use due to increased stress from working two jobs. EtOH level negative. Multiple electrolyte abnormalities as noted below likely due to alcohol abuse and GI losses. No h/o withdrawal seizure. No current SI or HI.  - consumes approximately 8 pack of \"taller\" beers daily and she prefers Michelob, sometimes more with last drink on Monday  - WA protocol with scheduled gabapentin (will start at 600 mg due to patient's weight) and PRN Ativan  -Regular diet  -Stop clonidine  - MVI, thiamine, and folic acid  -Stop telemetry monitoring  - PRN antiemetics   - previously taking Naltrexone, consider resuming at discharge, reports intermittent noncompliance with taking   - patient reports previous treatment program in 12/2021 and not currently interested in resources   -I offered " chemical dependency evaluation and she politely declined this.  She mentioned that she is hoping to quit EtOH use and abuse.  She mentioned that AA approach does not work for her.  She does not want to go for outpatient rehab and definitely will not go to an inpatient approach as well.  However she wants to seek for help but not sure what other options we can offer her for now.      #Hyponatremia: -Resolved   -Stop IV fluids   - likely due to poor PO intake and alcohol consumption    #Hypercalcemia: total calcium of 13.3 with ionized calcium of 6.4, intermittently takes OTC calcium supplements.  -Resolved  -PTH intact normal    #Starvation ketosis: quantitative ketones of 1.1 and UA shows 40 of ketones.   - suspect related to poor nutrition and PO intake  - recheck ketones in AM     #Metabolic alkalosis: initial pH of 7.48 with bicarbonate of 33 with pO2 and pCO2 WNL. Related to GI losses. Consider rechecking VBG once symptoms have improved.     #UTI: UA abnormal with 40 of ketones, 100 protein, moderate LE, >182 WBC, moderate bacteria. Per patient she was recently diagnosed with a UTI and prescribed PO Amoxicillin, unable to find any records of this. Due to abnormal UA and reports of ongoing urinary urgency/frequencuy as well suprapubic discomfort will treat with antibiotics.   - follow urine culture, remain no growth up-to-date  -On PO Augmentin     #H/o eating disorder (anorexia, bulimia)   #Anxiety: not been consistent with taking Buspar of recent.   - continue PTA Buspar  - encourage patient to see counseling for eating disorder     #Hypomagnesemia: initial magnesium of 1.4, replace per protocol   - add on phosphorus and replace per protocol          Diet: Regular Diet Adult    DVT Prophylaxis: Pneumatic Compression Devices  Soriano Catheter: Not present  Central Lines: None  Cardiac Monitoring: None  Code Status: Full Code      Disposition Plan      Expected Discharge Date: Likely in the next 24 hours can be  safely discharged home    The patient's care was discussed with the bedside nurse and patient    Al Rogerio Fischer MD, MD  Hospitalist Service  [facility]  Securely message with the Vocera Web Console (learn more here)  Text page via [employer] Paging/Directory         Clinically Significant Risk Factors Present on Admission                      ______________________________________________________________________    Interval History   Continuing service care today.  Seen and examined.  Chart reviewed.  Case discussed with nursing service.  Alana mentioned that she was able to tolerate oral diet.  Earlier had some concerns from nursing regarding visual hallucinations but Alana denies having any this morning.  Afebrile.  No reported vomiting.  Passing flatus.  Voiding freely.  Data reviewed today: I reviewed all medications, new labs and imaging results over the last 24 hours. I personally reviewed .    Physical Exam   Vital Signs: Temp: 98.7  F (37.1  C) Temp src: Oral BP: (!) 146/106 Pulse: 87   Resp: 20 SpO2: 99 % O2 Device: None (Room air)    Weight: 119 lbs 4 oz  HEENT; Atraumatic, normocephalic, pinkish conjuctiva, pupils bilateral reactive   Skin: warm and moist, no rashes  Lungs: equal chest expansion, clear to auscultation, no wheezes, no stridor, no crackles,   Heart: normal rate, normal rhythm, no rubs or gallops.   Abdomen: normal bowel sounds, no tenderness, no peritoneal signs, no guarding  Extremities: no deformities, no edema   Neuro; follow commands, alert and oriented x3, spontaneous speech, coherent, moves all extremities spontaneously  Psych; no hallucination, euthymic mood, not agitated        Data   Recent Labs   Lab 06/25/22  0738 06/24/22  0642 06/23/22  1544 06/23/22  0756 06/22/22  1342   WBC  --   --   --  4.8 10.9   HGB  --   --   --  11.0* 12.8   MCV  --   --   --  100 95   PLT  --   --   --  97* 135*   NA  --   --   --  135 125*   POTASSIUM 3.4 3.9 3.8 3.4 3.4    CHLORIDE  --   --   --  103 84*   CO2  --   --   --  27 29   BUN  --   --   --  5* 10   CR  --   --   --  0.65 0.81   ANIONGAP  --   --   --  5 12   KODY  --   --   --  9.2 13.8*   GLC  --   --   --  91 162*   ALBUMIN  --   --   --  3.2* 4.0   PROTTOTAL  --   --   --  6.5* 7.8   BILITOTAL  --   --   --  1.1 1.7*   ALKPHOS  --   --   --  61 85   ALT  --   --   --  25 29   AST  --   --   --  28 34   LIPASE  --   --   --   --  208     No results found for this or any previous visit (from the past 24 hour(s)).  Medications       amoxicillin-clavulanate  1 tablet Oral Q12H [facility] (08/20)     busPIRone  5 mg Oral BID     cyanocobalamin  100 mcg Oral Daily     famotidine  20 mg Intravenous Q12H     folic acid  1 mg Oral Daily     [START ON 6/26/2022] gabapentin  100 mg Oral Q8H     gabapentin  300 mg Oral Q8H     multivitamin w/minerals  1 tablet Oral Daily     potassium & sodium phosphates  1 packet Oral or Feeding Tube Q4H     sodium chloride (PF)  3 mL Intracatheter Q8H     thiamine  100 mg Oral Daily

## 2022-06-25 NOTE — PLAN OF CARE
Goal Outcome Evaluation:    Pertinent assessments: Up SBA in room. Denies nausea/vomiting. Refusing bed alarms. Calm and cooperative with cares. Voiding spontaneously without complaint of discomfort.    Major Shift Events: CIWA remains relatively low ativan x1 given per protocol. Shaky and tremors noted throughout shift. Advanced to regular diet this shift, tolerating well.  Complants of HA and one episode of seeing hallucinations     Treatment Plan: ETOH withdrawal protocol, seizure prec and Augmentin po.    Bedside Nurse: Geoffrey FORD RN

## 2022-06-25 NOTE — PLAN OF CARE
Goal Outcome Evaluation:        End of Shift Summary  For vital signs and complete assessments, please see documentation flowsheets.     Pertinent assessments: Up SBA in room. Denies nausea/vomiting. Calm and cooperative with cares. Voiding spontaneously. Denied hallucinations today.    Major Shift Events: Phos and potassium replaced per protocol. CIWA 8, 5 ativan given per protocol. Tolerating regular diet. Discharge in AM tomorrow pending electrolyte labs.     Treatment Plan: ETOH withdrawal protocol, seizure prec and Augmentin po.    Bedside Nurse: Liz Garcia RN

## 2022-06-25 NOTE — PLAN OF CARE
"Goal Outcome Evaluation:      End of Shift Summary  For vital signs and complete assessments, please see documentation flowsheets.     Pertinent assessments: Up SBA in room. Denies nausea/vomiting. Calm and cooperative with cares. Voiding spontaneously. Denied hallucinations today.    Major Shift Events: Phos and potassium replaced per protocol. CIWA 5. Tolerating regular diet. Discharge in AM tomorrow pending electrolyte labs.     Treatment Plan: ETOH withdrawal protocol, seizure prec and Augmentin po.    BP (!) 142/88 (BP Location: Right arm)   Pulse 80   Temp 97.8  F (36.6  C) (Oral)   Resp 16   Ht 1.626 m (5' 4\")   Wt 54.1 kg (119 lb 4 oz)   SpO2 98%   BMI 20.47 kg/m        Bedside Nurse: Liz Watters RN                "

## 2022-06-26 VITALS
OXYGEN SATURATION: 100 % | DIASTOLIC BLOOD PRESSURE: 79 MMHG | BODY MASS INDEX: 20.36 KG/M2 | TEMPERATURE: 98.4 F | RESPIRATION RATE: 20 BRPM | WEIGHT: 119.25 LBS | HEART RATE: 78 BPM | SYSTOLIC BLOOD PRESSURE: 128 MMHG | HEIGHT: 64 IN

## 2022-06-26 LAB
MAGNESIUM SERPL-MCNC: 1.9 MG/DL (ref 1.6–2.3)
PHOSPHATE SERPL-MCNC: 3.2 MG/DL (ref 2.5–4.5)
POTASSIUM BLD-SCNC: 4 MMOL/L (ref 3.4–5.3)

## 2022-06-26 PROCEDURE — 84100 ASSAY OF PHOSPHORUS: CPT | Performed by: INTERNAL MEDICINE

## 2022-06-26 PROCEDURE — 83735 ASSAY OF MAGNESIUM: CPT | Performed by: INTERNAL MEDICINE

## 2022-06-26 PROCEDURE — 250N000013 HC RX MED GY IP 250 OP 250 PS 637: Performed by: PHYSICIAN ASSISTANT

## 2022-06-26 PROCEDURE — 84132 ASSAY OF SERUM POTASSIUM: CPT | Performed by: INTERNAL MEDICINE

## 2022-06-26 PROCEDURE — 250N000011 HC RX IP 250 OP 636: Performed by: PHYSICIAN ASSISTANT

## 2022-06-26 PROCEDURE — 36415 COLL VENOUS BLD VENIPUNCTURE: CPT | Performed by: INTERNAL MEDICINE

## 2022-06-26 PROCEDURE — 250N000013 HC RX MED GY IP 250 OP 250 PS 637: Performed by: INTERNAL MEDICINE

## 2022-06-26 PROCEDURE — 99239 HOSP IP/OBS DSCHRG MGMT >30: CPT | Performed by: INTERNAL MEDICINE

## 2022-06-26 RX ORDER — LANOLIN ALCOHOL/MO/W.PET/CERES
100 CREAM (GRAM) TOPICAL DAILY
Qty: 30 TABLET | Refills: 0 | Status: SHIPPED | OUTPATIENT
Start: 2022-06-26 | End: 2022-11-26

## 2022-06-26 RX ORDER — FOLIC ACID 1 MG/1
1 TABLET ORAL DAILY
Qty: 30 TABLET | Refills: 0 | Status: SHIPPED | OUTPATIENT
Start: 2022-06-26 | End: 2022-11-26

## 2022-06-26 RX ORDER — AMOXICILLIN AND CLAVULANATE POTASSIUM 500; 125 MG/1; MG/1
1 TABLET, FILM COATED ORAL EVERY 12 HOURS
Qty: 2 TABLET | Refills: 0 | Status: SHIPPED | OUTPATIENT
Start: 2022-06-26 | End: 2022-06-28

## 2022-06-26 RX ORDER — AMOXICILLIN AND CLAVULANATE POTASSIUM 500; 125 MG/1; MG/1
1 TABLET, FILM COATED ORAL EVERY 12 HOURS
Qty: 2 TABLET | Refills: 0 | Status: SHIPPED | OUTPATIENT
Start: 2022-06-26 | End: 2022-06-26

## 2022-06-26 RX ORDER — LANOLIN ALCOHOL/MO/W.PET/CERES
100 CREAM (GRAM) TOPICAL DAILY
Qty: 30 TABLET | Refills: 0 | Status: SHIPPED | OUTPATIENT
Start: 2022-06-26 | End: 2022-06-26

## 2022-06-26 RX ORDER — GABAPENTIN 100 MG/1
100 CAPSULE ORAL EVERY 8 HOURS
Qty: 9 CAPSULE | Refills: 0 | Status: SHIPPED | OUTPATIENT
Start: 2022-06-26 | End: 2022-06-26

## 2022-06-26 RX ORDER — FOLIC ACID 1 MG/1
1 TABLET ORAL DAILY
Qty: 30 TABLET | Refills: 0 | Status: SHIPPED | OUTPATIENT
Start: 2022-06-26 | End: 2022-06-26

## 2022-06-26 RX ORDER — GABAPENTIN 100 MG/1
100 CAPSULE ORAL EVERY 8 HOURS
Qty: 9 CAPSULE | Refills: 0 | Status: SHIPPED | OUTPATIENT
Start: 2022-06-26 | End: 2022-06-28

## 2022-06-26 RX ADMIN — VITAM B12 100 MCG: 100 TAB at 08:17

## 2022-06-26 RX ADMIN — MULTIPLE VITAMINS W/ MINERALS TAB 1 TABLET: TAB at 08:17

## 2022-06-26 RX ADMIN — THIAMINE HCL TAB 100 MG 100 MG: 100 TAB at 08:17

## 2022-06-26 RX ADMIN — FOLIC ACID 1 MG: 1 TABLET ORAL at 08:17

## 2022-06-26 RX ADMIN — GABAPENTIN 300 MG: 300 CAPSULE ORAL at 06:18

## 2022-06-26 RX ADMIN — BUSPIRONE HYDROCHLORIDE 5 MG: 5 TABLET ORAL at 08:17

## 2022-06-26 RX ADMIN — AMOXICILLIN AND CLAVULANATE POTASSIUM 1 TABLET: 500; 125 TABLET, FILM COATED ORAL at 08:17

## 2022-06-26 RX ADMIN — FAMOTIDINE 20 MG: 10 INJECTION INTRAVENOUS at 06:18

## 2022-06-26 ASSESSMENT — ACTIVITIES OF DAILY LIVING (ADL)
ADLS_ACUITY_SCORE: 9.5

## 2022-06-26 NOTE — PLAN OF CARE
"Goal Outcome Evaluation:      Pt A&O x 4. VSS. Up independently. PIV SL On CIWA protocol. CIWA scores 13, 8 and 6. Total of 3 mg of Ativan given. On seizure precaution. C/o of headache. Denies nausea, and hallucination. On oral Augmentin.    /80 (BP Location: Right arm)   Pulse 76   Temp 98  F (36.7  C) (Oral)   Resp 18   Ht 1.626 m (5' 4\")   Wt 54.1 kg (119 lb 4 oz)   SpO2 100%   BMI 20.47 kg/m                    "

## 2022-06-26 NOTE — DISCHARGE SUMMARY
Pt discharged home in stable condition with significant other for transport. AVS was reviewed, all questions were answered. Information provided to patient for services available to her for therapy. Pt verbalized understanding of information. All personal belongings were sent home with the patient.

## 2022-06-26 NOTE — DISCHARGE SUMMARY
[facility]  Hospitalist Discharge Summary      Date of Admission:  6/22/2022  Date of Discharge:  6/26/2022  Discharging Provider: Derik Fischer MD, MD  Discharge Service: Hospitalist Service    Discharge Diagnoses   Acute alcohol withdrawals   Alcohol dependence  Alcohol abuse  Hyponatremia resolved  Hypercalcemia resolved  Dehydration  Starvation ketosis  Suspected UTI  Hypomagnesemia  Hypokalemia  Chronic anxiety  History of eating disorder    Follow-ups Needed After Discharge   Follow-up Appointments     Follow-up and recommended labs and tests       Follow up with primary care provider, Physician No Ref-Primary, within 7   days to evaluate medication change and for hospital follow- up.  The   following labs/tests are recommended: etoh cessation  program if desired.               Unresulted Labs Ordered in the Past 30 Days of this Admission     No orders found from 5/23/2022 to 6/23/2022.          Discharge Disposition   Discharged to home  Condition at discharge: Stable      Hospital Course          Alana is feeling much better.  Her hemodynamics continues to improve.  She demonstrated tolerance to oral diet.  Afebrile.  Not requiring oxygen support.  Denies ongoing hallucinations and she mentioned that this has resolved at least in the last 2 days.  Able to ambulate with no assistance.  Voiding freely.  Passing flatus.  No nausea or vomiting.  Denies abdominal pain.  She is hoping to be discharged in the hospital today we will continue to finish course of oral antibiotics for this presumptive UTI.  Cultures remain no growth to date.  I offered her in numerous occasions regarding alcohol cessation program on different approach but she mentioned that she would try to do it by herself as this programs and approach does not work well for her.    I will refer you to excerpts of prior progress notes as listed below for other details of her hospital stay    Alana Mujica is a 46 year  "old female with PMH significant for eating disorder (anorexia, bulimia), alcohol abuse with history of withdrawal, and anxiety who presented to the ED for evaluation of tremors, vomiting, and diarrhea.    ED work-up reveals: hypertensive and initially tachycardic in the 120s, sodium of 125, chloride of 84, calcium of 13.8, magnesium of 1.4, total bilirubin of 1.7 with remainder of LFTs WNL, quantitative ketones of 1.1, lactic acid of 1.9, lipase of 208, glucose of 162, pH of 7.48, bicarbonate of 33, PO2 of 31, PCO2 of 44, platelet count of 135, UA shows dark urine, 40 of ketones, 100 protein, moderate LE, >182 WBC, moderate bacteria, urine culture pending, COVID-negative, EtOH level negative, drugs of abuse screen negative, and EKG shows rate of 108 bpm and sinus tachycardia.    #Acute alcohol withdrawal  #Alcohol dependence  #Alcohol abuse  : onset of tremor, nausea, vomiting, and diarrhea after last drink on Monday afternoon. Patient reports intermittent increased alcohol use due to increased stress from working two jobs. EtOH level negative. Multiple electrolyte abnormalities as noted below likely due to alcohol abuse and GI losses. No h/o withdrawal seizure. No current SI or HI.  - consumes approximately 8 pack of \"taller\" beers daily and she prefers Michelob, sometimes more with last drink on Monday  - CIWA protocol with scheduled gabapentin (will start at 600 mg due to patient's weight) and PRN Ativan  -Regular diet  -Stop clonidine  - MVI, thiamine, and folic acid  -Stop telemetry monitoring  - PRN antiemetics   - previously taking Naltrexone, consider resuming at discharge, reports intermittent noncompliance with taking   - patient reports previous treatment program in 12/2021 and not currently interested in resources   -I offered chemical dependency evaluation and she politely declined this.  She mentioned that she is hoping to quit EtOH use and abuse.  She mentioned that AA approach does not work for her.  " She does not want to go for outpatient rehab and definitely will not go to an inpatient approach as well.  However she wants to seek for help but not sure what other options we can offer her for now.      #Hyponatremia: -Resolved   -Stop IV fluids   - likely due to poor PO intake and alcohol consumption    #Hypercalcemia: total calcium of 13.3 with ionized calcium of 6.4, intermittently takes OTC calcium supplements.  -Resolved  -PTH intact normal    #Starvation ketosis: quantitative ketones of 1.1 and UA shows 40 of ketones.   - suspect related to poor nutrition and PO intake  - recheck ketones in AM     #Metabolic alkalosis: initial pH of 7.48 with bicarbonate of 33 with pO2 and pCO2 WNL. Related to GI losses. Consider rechecking VBG once symptoms have improved.     #UTI: UA abnormal with 40 of ketones, 100 protein, moderate LE, >182 WBC, moderate bacteria. Per patient she was recently diagnosed with a UTI and prescribed PO Amoxicillin, unable to find any records of this. Due to abnormal UA and reports of ongoing urinary urgency/frequencuy as well suprapubic discomfort will treat with antibiotics.   - follow urine culture, remain no growth up-to-date  -On PO Augmentin     #H/o eating disorder (anorexia, bulimia)   #Anxiety: not been consistent with taking Buspar of recent.   - continue PTA Buspar  - encourage patient to see counseling for eating disorder     #Hypomagnesemia: initial magnesium of 1.4, replace per protocol   - add on phosphorus and replace per protocol         Consultations This Hospital Stay   None    Code Status   Full Code    Time Spent on this Encounter   I, Derik Fischer MD, MD, personally saw the patient today and spent greater than 30 minutes discharging this patient.       Derik Fischer MD, MD  [facility] [address] [facility]  [address] [place] [address]  Phone: [phone]  Fax:  [phone]  ______________________________________________________________________    Physical Exam   Vital Signs: Temp: 98.4  F (36.9  C) Temp src: Oral BP: 128/79 Pulse: 78   Resp: 20 SpO2: 100 % O2 Device: None (Room air)    Weight: 119 lbs 4 oz  HEENT; Atraumatic, normocephalic, pinkish conjuctiva, pupils bilateral reactive   Skin: warm and moist, no rashes  Lymphatics: no cervical or axillary lymphandenopathy  Lungs: equal chest expansion, clear to auscultation, no wheezes, no stridor, no crackles,   Heart: normal rate, normal rhythm, no rubs or gallops.   Abdomen: normal bowel sounds, no tenderness, no peritoneal signs, no guarding  Extremities: no deformities, no edema   Neuro; follow commands, alert and oriented x3, spontaneous speech, coherent, moves all extremities spontaneously  Psych; no hallucination, euthymic mood, not agitated           Primary Care Physician   Physician No Ref-Primary    Discharge Orders      Reason for your hospital stay    Admitted in the hospital for alcohol withdrawals, alcohol abuse.     Follow-up and recommended labs and tests     Follow up with primary care provider, Physician No Ref-Primary, within 7 days to evaluate medication change and for hospital follow- up.  The following labs/tests are recommended: etoh cessation  program if desired.     Activity    Your activity upon discharge: activity as tolerated     Full Code     Diet    Follow this diet upon discharge: Orders Placed This Encounter      Regular Diet Adult       Significant Results and Procedures   Most Recent 3 CBC's:Recent Labs   Lab Test 06/23/22  0756 06/22/22  1342 04/09/22  1509   WBC 4.8 10.9 14.2*   HGB 11.0* 12.8 14.7    95 100   PLT 97* 135* 313     Most Recent 3 BMP's:Recent Labs   Lab Test 06/26/22  0616 06/25/22  1248 06/25/22  0738 06/23/22  1544 06/23/22  0756 06/22/22  1342 05/12/22  1109   NA  --   --   --   --  135 125* 135   POTASSIUM 4.0 4.6 3.4   < > 3.4 3.4 4.2   CHLORIDE  --   --    --   --  103 84* 101   CO2  --   --   --   --  27 29 28   BUN  --   --   --   --  5* 10 12   CR  --   --   --   --  0.65 0.81 0.80   ANIONGAP  --   --   --   --  5 12 6   KODY  --   --   --   --  9.2 13.8* 9.7   GLC  --   --   --   --  91 162* 94    < > = values in this interval not displayed.     Most Recent 2 LFT's:Recent Labs   Lab Test 06/23/22  0756 06/22/22  1342   AST 28 34   ALT 25 29   ALKPHOS 61 85   BILITOTAL 1.1 1.7*     Most Recent 3 BNP's:No lab results found.  Most Recent TSH and T4:Recent Labs   Lab Test 12/04/18  1107   TSH 0.75     Most Recent Urinalysis:Recent Labs   Lab Test 06/22/22  1312 08/20/19  1152 07/17/19  1610   COLOR Dark Yellow*   < > Yellow   APPEARANCE Slightly Cloudy*   < > Clear   URINEGLC Negative   < > Negative   URINEBILI Negative   < > Negative   URINEKETONE 40 *   < > Negative   SG 1.028   < > 1.010   UBLD Negative   < > Negative   URINEPH 6.5   < > 7.0   PROTEIN 100 *   < > Negative   UROBILINOGEN  --   --  0.2   NITRITE Negative   < > Negative   LEUKEST Moderate*   < > Moderate*   RBCU 8*   < > O - 2   WBCU >182*   < > 10-25*    < > = values in this interval not displayed.   ,   Results for orders placed or performed in visit on 05/11/22   MA Screen Bilateral w/Barak    Narrative    BILATERAL FULL FIELD DIGITAL SCREENING MAMMOGRAM WITH TOMOSYNTHESIS    Performed on: 5/11/22    Compared to: 08/19/2021, 06/30/2020, 06/27/2019, and 02/16/2017    Technique:  This study was evaluated with the assistance of Computer-Aided   Detection.  Breast Tomosynthesis was used in interpretation.    Findings: The breasts are heterogeneously dense, which may obscure small   masses.  There is no radiographic evidence of malignancy.     IMPRESSION: ACR BI-RADS Category 1: Negative    RECOMMENDED FOLLOW-UP: Annual routine screening mammogram    The results and recommendations of this examination will be communicated   to the patient.             Discharge Medications   Current Discharge Medication  List      START taking these medications    Details   amoxicillin-clavulanate (AUGMENTIN) 500-125 MG tablet Take 1 tablet by mouth every 12 hours for 1 day  Qty: 2 tablet, Refills: 0    Associated Diagnoses: Acute cystitis without hematuria      folic acid (FOLVITE) 1 MG tablet Take 1 tablet (1 mg) by mouth daily  Qty: 30 tablet, Refills: 0    Associated Diagnoses: Alcohol dependence with withdrawal with complication (H)      gabapentin (NEURONTIN) 100 MG capsule Take 1 capsule (100 mg) by mouth every 8 hours for 9 doses  Qty: 9 capsule, Refills: 0    Associated Diagnoses: Alcohol dependence with withdrawal with complication (H)      thiamine (B-1) 100 MG tablet Take 1 tablet (100 mg) by mouth daily  Qty: 30 tablet, Refills: 0    Associated Diagnoses: Alcohol dependence with withdrawal with complication (H)         CONTINUE these medications which have NOT CHANGED    Details   busPIRone (BUSPAR) 5 MG tablet Take 1 tablet (5 mg) by mouth 2 times daily  Qty: 60 tablet, Refills: 1    Associated Diagnoses: Anxiety      calcium citrate-vitamin D (CITRACAL) 315-250 MG-UNIT TABS per tablet Take 1 tablet by mouth 2 times daily      COMPOUNDED NON-CONTROLLED SUBSTANCE (CMPD RX) - PHARMACY TO MIX COMPOUNDED MEDICATION Please compound viscous lidocaine 2% and maalox in a 1:1 ratio Please take 15 to 30 ml by mouth every 4-6 hours as needed for abdominal pain  Qty: 500 mL, Refills: 0      cyanocobalamin (VITAMIN B-12) 100 MCG tablet Take 100 mcg by mouth daily      etonogestrel (NEXPLANON) 68 MG IMPL 1 each (68 mg) by Subdermal route once  Qty: 1 each, Refills: 0      metoclopramide (REGLAN) 10 MG tablet Take 1 tablet (10 mg) by mouth 3 times daily as needed (Nausea or Vomiting)  Qty: 20 tablet, Refills: 0      MILK THISTLE PO Take 3 capsules by mouth daily      multivitamin w/minerals (THERA-VIT-M) tablet Take 1 tablet by mouth daily      naltrexone (DEPADE/REVIA) 50 MG tablet Take 1 tablet (50 mg) by mouth daily  Qty: 30  tablet, Refills: 1    Associated Diagnoses: Hepatitis      ondansetron (ZOFRAN ODT) 8 MG ODT tab Take 1 tablet (8 mg) by mouth every 12 hours as needed for nausea  Qty: 10 tablet, Refills: 0    Associated Diagnoses: Dysuria; Urinary tract infection without hematuria, site unspecified      valACYclovir (VALTREX) 500 MG tablet TAKE 2 TABLETS BY MOUTH DAILY TWICE DAILY FOR 1 DAY PER COLD SORE FLARE  Qty: 4 tablet, Refills: 0    Associated Diagnoses: Cold sore           Allergies   Allergies   Allergen Reactions     No Clinical Screening - See Comments      PN: LW CM1: CONTRAST- NKA Reaction :

## 2022-06-27 ENCOUNTER — PATIENT OUTREACH (OUTPATIENT)
Dept: CARE COORDINATION | Facility: CLINIC | Age: 47
End: 2022-06-27

## 2022-06-27 ENCOUNTER — NURSE TRIAGE (OUTPATIENT)
Dept: NURSING | Facility: CLINIC | Age: 47
End: 2022-06-27

## 2022-06-27 DIAGNOSIS — Z71.89 OTHER SPECIFIED COUNSELING: ICD-10-CM

## 2022-06-27 NOTE — PROGRESS NOTES
Clinic Care Coordination Contact  [facility]: Post-Discharge Note  SITUATION                                                      Admission:    Admission Date: 06/22/22   Reason for Admission: alcohol withdrawals, alcohol abuse  Discharge:   Discharge Date: 06/26/22  Discharge Diagnosis: alcohol withdrawals, alcohol abuse    BACKGROUND                                                      Per hospital discharge summary and inpatient provider notes:    Alana Mujica is a 46 year old female who presents with tremors, vomiting, and diarrhea.  The patient states she had her last drink on Monday afternoon.  She states that she felt miserable on Tuesday. She reports nausea with multiple episodes of nonbloody emesis.  She has been having approximately 3 episodes of nonbloody diarrhea per day.  She reports lower abdominal pain that is nonradiating.  She has had lightheadedness and dizziness with standing. She has felt chilled and diaphoretic.  She reports increased anxiety.  She has been feeling tremulous and unsteady at times.  Denies chest pain or shortness of breath.  Denies any recent falls.  She has attempted sips of water that come right back up but otherwise reports minimal oral intake the last couple days.  She states her symptoms are similar to previous episodes of alcohol withdrawal.  She states she has not been taking her naltrexone but did take a dose of this today as well as her BuSpar.  Denies visual or auditory hallucinations.  Denies suicidal or homicidal ideation.  Patient has previously gone to chemical dependency treatment as well as eating disorder treatment. She is not currently interested in further treatment.  She states that she consumes alcohol due to increased stress from working 2 jobs.  She states that she recently was diagnosed with a urinary tract infection and was on amoxicillin, she cannot recall which clinic she was treated at.    ASSESSMENT      Enrollment  Primary Care Care  "Coordination Status: Not a Candidate    Discharge Assessment  How are you doing now that you are home?: \"I'm ok, there's one medication that doesn't agree with me called gabapentin but I scheduled a follow up appointment with my PCP to go over medications\"  How are your symptoms? (Red Flag symptoms escalate to triage hotline per guidelines): Improved  Do you feel your condition is stable enough to be safe at home until your provider visit?: Yes  Does the patient have their discharge instructions? : Yes  Does the patient have questions regarding their discharge instructions? : No  Were you started on any new medications or were there changes to any of your previous medications? : Yes  Does the patient have all of their medications?: Yes  Do you have questions regarding any of your medications? : No  Do you have all of your needed medical supplies or equipment (DME)?  (i.e. oxygen tank, CPAP, cane, etc.): Yes  Discharge follow-up appointment scheduled within 14 calendar days? : Yes                  PLAN                                                      Outpatient Plan:    Follow up with primary care provider, Physician No Ref-Primary, within 7 days to evaluate medication change and for hospital follow- up.  The following labs/tests are recommended: etoh cessation  program if desired.         Future Appointments   Date Time Provider Department Center   8/9/2022  2:00 PM Destiny Patel MD [facility]         For any urgent concerns, please contact our 24 hour nurse triage line: [phone] ([phone])         Lee Ann Schmidt  Community Health Worker  [facility]  [phone]                  "

## 2022-06-27 NOTE — TELEPHONE ENCOUNTER
Caller was prescribe gabapentin as part of  CIWA for  9 doses over  3 days; After second home dose she stopped it because it made her feel hard to focus headache and vision changed to blurred . Caller does not want to continue the  protocol because  She feels it is causing side effects; Caller reports she is drinking fluids a but eating poorly; has returned to working her 2 jobs today. Caller self scheduled  ED f/u with PCP for  August .  Triage protocol reviewed    Arranged OV  for tomorrow for ED f/u   Consulted with on call  provider for [facility] Dr. Serna regarding  stopping gabapentin.  Advised that patient should continue gabapentin and be seen tomorrow as planned  Patient contacted and patient continues to attribute symptoms to gabapentin and will not ttake it anymore;will keep OV for tomorrow.  Advised to call for any new or worseninsg symptoms   Fabby Pearl RN  FNA                                   Additional Information    Negative: Weakness of the face, arm or leg on one side of the body    Negative: Followed getting substance in the eye    Negative: Foreign body or object is or was lodged in the eye    Negative: Followed an eye injury    Negative: Followed sun lamp or sun exposure (UV keratitis)    Negative: Yellow or green discharge (pus) in the eye    Negative: Pregnant    Negative: Postpartum (from 0 to 6 weeks after delivery)    Negative: Complete loss of vision in 1 or both eyes    Negative: Severe eye pain    Negative: SEVERE headache    Negative: Double vision    Negative: [1] Blurred vision or visual changes AND [2] present now AND [3] sudden onset or new (e.g., minutes, hours, days)  (Exception: seeing floaters / black specks OR previously diagnosed migraine headaches with same symptoms)    Negative: Patient sounds very sick or weak to the triager    Negative: Many floaters in the eye    Negative: [1] Jaw pain while eating AND [2] age > 50    Negative: [1] Headache AND [2] age > 50     Commented on: All Negative - See PCP Within 3 Days     Attributes to medication    Commented on: All Negative - See Physician Within 24 Hours     Attributes to  gabapentin    Protocols used: VISION LOSS OR CHANGE-A-AH

## 2022-06-28 ENCOUNTER — OFFICE VISIT (OUTPATIENT)
Dept: PEDIATRICS | Facility: CLINIC | Age: 47
End: 2022-06-28
Payer: COMMERCIAL

## 2022-06-28 VITALS
TEMPERATURE: 98.3 F | WEIGHT: 103 LBS | RESPIRATION RATE: 24 BRPM | SYSTOLIC BLOOD PRESSURE: 120 MMHG | BODY MASS INDEX: 17.58 KG/M2 | DIASTOLIC BLOOD PRESSURE: 82 MMHG | OXYGEN SATURATION: 100 % | HEART RATE: 100 BPM | HEIGHT: 64 IN

## 2022-06-28 DIAGNOSIS — F10.20 ALCOHOL DEPENDENCE, CONTINUOUS (H): Primary | ICD-10-CM

## 2022-06-28 DIAGNOSIS — E83.52 HYPERCALCEMIA: ICD-10-CM

## 2022-06-28 DIAGNOSIS — D64.9 ANEMIA, UNSPECIFIED TYPE: ICD-10-CM

## 2022-06-28 DIAGNOSIS — E44.0 MODERATE PROTEIN-CALORIE MALNUTRITION (H): ICD-10-CM

## 2022-06-28 DIAGNOSIS — D69.6 THROMBOCYTOPENIA (H): ICD-10-CM

## 2022-06-28 DIAGNOSIS — F50.9 EATING DISORDER, UNSPECIFIED TYPE: ICD-10-CM

## 2022-06-28 PROBLEM — E46 PROTEIN-CALORIE MALNUTRITION (H): Status: ACTIVE | Noted: 2022-06-28

## 2022-06-28 LAB
ALBUMIN SERPL-MCNC: 4.4 G/DL (ref 3.4–5)
ALP SERPL-CCNC: 74 U/L (ref 40–150)
ALT SERPL W P-5'-P-CCNC: 19 U/L (ref 0–50)
ANION GAP SERPL CALCULATED.3IONS-SCNC: 12 MMOL/L (ref 3–14)
AST SERPL W P-5'-P-CCNC: 23 U/L (ref 0–45)
BASOPHILS # BLD AUTO: 0 10E3/UL (ref 0–0.2)
BASOPHILS NFR BLD AUTO: 1 %
BILIRUB SERPL-MCNC: 0.6 MG/DL (ref 0.2–1.3)
BUN SERPL-MCNC: 5 MG/DL (ref 7–30)
CALCIUM SERPL-MCNC: 10.6 MG/DL (ref 8.5–10.1)
CHLORIDE BLD-SCNC: 95 MMOL/L (ref 94–109)
CO2 SERPL-SCNC: 28 MMOL/L (ref 20–32)
CREAT SERPL-MCNC: 0.9 MG/DL (ref 0.52–1.04)
EOSINOPHIL # BLD AUTO: 0 10E3/UL (ref 0–0.7)
EOSINOPHIL NFR BLD AUTO: 1 %
ERYTHROCYTE [DISTWIDTH] IN BLOOD BY AUTOMATED COUNT: 12.2 % (ref 10–15)
GFR SERPL CREATININE-BSD FRML MDRD: 78 ML/MIN/1.73M2
GLUCOSE BLD-MCNC: 139 MG/DL (ref 70–99)
HCT VFR BLD AUTO: 37.9 % (ref 35–47)
HGB BLD-MCNC: 12.9 G/DL (ref 11.7–15.7)
LYMPHOCYTES # BLD AUTO: 0.9 10E3/UL (ref 0.8–5.3)
LYMPHOCYTES NFR BLD AUTO: 20 %
MAGNESIUM SERPL-MCNC: 1.9 MG/DL (ref 1.6–2.3)
MCH RBC QN AUTO: 33.5 PG (ref 26.5–33)
MCHC RBC AUTO-ENTMCNC: 34 G/DL (ref 31.5–36.5)
MCV RBC AUTO: 98 FL (ref 78–100)
MONOCYTES # BLD AUTO: 1.1 10E3/UL (ref 0–1.3)
MONOCYTES NFR BLD AUTO: 25 %
NEUTROPHILS # BLD AUTO: 2.4 10E3/UL (ref 1.6–8.3)
NEUTROPHILS NFR BLD AUTO: 54 %
PHOSPHATE SERPL-MCNC: 3.6 MG/DL (ref 2.5–4.5)
PLATELET # BLD AUTO: 231 10E3/UL (ref 150–450)
POTASSIUM BLD-SCNC: 5.2 MMOL/L (ref 3.4–5.3)
PROT SERPL-MCNC: 8 G/DL (ref 6.8–8.8)
RBC # BLD AUTO: 3.85 10E6/UL (ref 3.8–5.2)
SODIUM SERPL-SCNC: 135 MMOL/L (ref 133–144)
WBC # BLD AUTO: 4.4 10E3/UL (ref 4–11)

## 2022-06-28 PROCEDURE — 84100 ASSAY OF PHOSPHORUS: CPT | Performed by: NURSE PRACTITIONER

## 2022-06-28 PROCEDURE — 36415 COLL VENOUS BLD VENIPUNCTURE: CPT | Performed by: NURSE PRACTITIONER

## 2022-06-28 PROCEDURE — 83735 ASSAY OF MAGNESIUM: CPT | Performed by: NURSE PRACTITIONER

## 2022-06-28 PROCEDURE — 85025 COMPLETE CBC W/AUTO DIFF WBC: CPT | Performed by: NURSE PRACTITIONER

## 2022-06-28 PROCEDURE — 99495 TRANSJ CARE MGMT MOD F2F 14D: CPT | Performed by: NURSE PRACTITIONER

## 2022-06-28 PROCEDURE — 80053 COMPREHEN METABOLIC PANEL: CPT | Performed by: NURSE PRACTITIONER

## 2022-06-28 NOTE — PROGRESS NOTES
Assessment & Plan     (F10.20) Alcohol dependence, continuous (H)  (primary encounter diagnosis)  Comment: Pt admitted 6/22-6/26 for alcohol withdrawal and starvation ketosis due to her eating disorder. CIWA protocol started and electrolyte disturbances corrected with resolution of tachycardia and tremors. Was discharged on gabapentin but stopped it shortly after discharge due to her thinking it caused blurry vision. Today she reports no signs of ETOH withdrawal other than feeling anxious and wired, although she has a difficult time describing her sx. States that her eating disorder has flared since she stopped drinking ETOH, so I am concerned about starvation and electrolyte disturbances.   Plan: CBC with platelets and differential,         Phosphorus, Magnesium, Comprehensive metabolic         panel (BMP + Alb, Alk Phos, ALT, AST, Total.         Bili, TP), Adult Mental Health          Referral, Primary Care - Care Coordination         Referral          -Stat labs including CBC, CMP, electrolytes, and ketone quant  -Asked her to eat at least 500 calories twice a day until we get these labs back and she is agreeable  -Did discuss that she absolutely should be doing an inpatient or outpatient program for her eating disorder and probably for ETOH as well, and she adamantly declines.  Reviewed risks of uncontrolled ED including death. Discussed reasons to seek care urgently.   -I think she is past the point of ETOH withdrawal, but did discuss sx that would warrant ED visit  -Referred to psych for underlying mental health issues  -CC referred to discuss other resources with pt. She is declining any other type of intervention today, and seems of sound mind to make this decision. She did previously see a therapist at [facility] who she liked.   -For now, continue folic acid, multivitamin, B1  -Will discuss re-starting naltrexone with PCP  -Follow-up with PCP 1-2 weeks to discuss mental health and plan for  "monitoring    (F50.9) Eating disorder, unspecified type      (D69.6) Thrombocytopenia (H)  resolved    (D64.9) Anemia, unspecified type  Resolved                 No follow-ups on file.    SUE Alfaro CNP  [facility] LIT[facility]O Warner is a 46 year old, presenting for the following health issues:  Hospital F/U      HPI     Post Discharge Outreach 6/27/2022   Admission Date 6/22/2022   Reason for Admission alcohol withdrawals, alcohol abuse   Discharge Date 6/26/2022   Discharge Diagnosis alcohol withdrawals, alcohol abuse   How are you doing now that you are home? \"I'm ok, there's one medication that doesn't agree with me called gabapentin but I scheduled a follow up appointment with my PCP to go over medications\"   How are your symptoms? (Red Flag symptoms escalate to triage hotline per guidelines) Improved   Do you feel your condition is stable enough to be safe at home until your provider visit? Yes   Does the patient have their discharge instructions?  Yes   Does the patient have questions regarding their discharge instructions?  No   Were you started on any new medications or were there changes to any of your previous medications?  Yes   Does the patient have all of their medications? Yes   Do you have questions regarding any of your medications?  No   Do you have all of your needed medical supplies or equipment (DME)?  (i.e. oxygen tank, CPAP, cane, etc.) Yes   Discharge follow-up appointment scheduled within 14 calendar days?  Yes       Difficult to see closeup. Feeling wired, scattered, nervous  Stopped it Monday afternoon. [place] better.     Hospital Follow-up Visit:    Hospital/Nursing Home/ Rehab Facility: [facility]  Date of Admission: 06/22/2022  Date of Discharge: 06/26/2022  Reason(s) for Admission: seizure like Sx    Was your hospitalization related to COVID-19? No   Problems taking medications regularly:  Pt stopped taking gabapentin due " "to side effects  Medication changes since discharge: Thiamine, folic acid and gabapentin  Problems adhering to non-medication therapy:  None, but would like to question medication prescribed    Summary of hospitalization:  [facility] discharge summary reviewed  Diagnostic Tests/Treatments reviewed.  Follow up needed: none  Other Healthcare Providers Involved in Patient s Care:         None  Update since discharge: fluctuating course.       Post Discharge Medication Reconciliation: discharge medications reconciled, continue medications without change.  Plan of care communicated with patient               Review of Systems   Constitutional, HEENT, cardiovascular, pulmonary, GI, , musculoskeletal, neuro, skin, endocrine and psych systems are negative, except as otherwise noted.      Objective    /82 (BP Location: Right arm, Patient Position: Chair, Cuff Size: Adult Regular)   Pulse 100   Temp 98.3  F (36.8  C) (Oral)   Resp 24   Ht 1.626 m (5' 4\")   Wt 46.7 kg (103 lb)   SpO2 100%   BMI 17.68 kg/m    Body mass index is 17.68 kg/m .  Physical Exam   CONSTITUTIONAL: Alert, well-nourished, somewhat poorly-groomed, NAD  NEURO: CN II-XII grossly intact. No nystagmus. Speech and mentation appropriate. Normal gait. No tremor  PSYCH: Bright affect. Appropriate mentation and speech.   HRRR S1 S2 No MRG. No peripheral edema  Lungs CTA. No wheeze, rhonchi, rales.  DERM: Not diaphoretic.       .  ..  "

## 2022-06-30 ENCOUNTER — TELEPHONE (OUTPATIENT)
Dept: PEDIATRICS | Facility: CLINIC | Age: 47
End: 2022-06-30

## 2022-06-30 ENCOUNTER — PATIENT OUTREACH (OUTPATIENT)
Dept: CARE COORDINATION | Facility: CLINIC | Age: 47
End: 2022-06-30

## 2022-06-30 NOTE — LETTER
[facility] CARE COORDINATION      July 1, 2022    Alana Mujica  [address]  [place] [address]      Dear Alana,    I am a clinic care coordinator who works with the [facility]. I wanted to introduce myself and provide you with my contact information for you to be able to call me with any questions or concerns. Below is a description of clinic care coordination and how I can further assist you.       The clinic care coordination team is made up of a registered nurse, , financial resource worker and community health worker who understand the health care system. The goal of clinic care coordination is to help you manage your health and improve access to the health care system. Our team works alongside your provider to assist you in determining your health and social needs. We can help you obtain health care and community resources, providing you with necessary information and education. We can work with you through any barriers and develop a care plan that helps coordinate and strengthen the communication between you and your care team.    Please feel free to contact me with any questions or concerns regarding care coordination and what we can offer.      We are focused on providing you with the highest-quality healthcare experience possible.    Sincerely,     Nathan Mccormack [facility]  Clinic Care Coordinator  [facility]  [facility]  [phone]  [email or web address]

## 2022-06-30 NOTE — TELEPHONE ENCOUNTER
Called pt and LVM with direct number for call back.  phone appt with Dr Patel 1-2 weeks for follow-up ETOH/eating disorder/hypercalcemia   Nicole Auguste, EMT at 9:37 AM on June 30, 2022  [facility]  [phone]

## 2022-06-30 NOTE — PROGRESS NOTES
Clinic Care Coordination Contact  [facility]/Voicemail       Clinical Data: Care Coordinator Outreach  Outreach attempted x 1.  Left message on patient's voicemail with call back information and requested return call.  Plan:  Care Coordinator will try to reach patient again in 1-2 business days.    Nathan Mccormack [facility]  Clinic Care Coordinator  [facility]  [facility]  [phone]  [email or web address]

## 2022-07-01 NOTE — PROGRESS NOTES
Clinic Care Coordination Contact  [facility]/Voicemail    Referral Source: PCP  Clinical Data: Care Coordinator Outreach  Outreach attempted x 2.  Left message on patient's voicemail with call back information and requested return call.  Plan: Care Coordinator will send care coordination introduction letter with care coordinator contact information and explanation of care coordination services via dought. Care Coordinator will do no further outreaches at this time.    Nathan Mccormack [facility]  Clinic Care Coordinator  [facility]  [phone]  [email or web address]

## 2022-07-01 NOTE — TELEPHONE ENCOUNTER
Attempt #2. LVM with direct number for call back.    Nicole Auguste, EMT at 8:54 AM on July 1, 2022  [facility] Health Guide  [phone]

## 2022-07-07 ENCOUNTER — VIRTUAL VISIT (OUTPATIENT)
Dept: PEDIATRICS | Facility: CLINIC | Age: 47
End: 2022-07-07
Payer: COMMERCIAL

## 2022-07-07 DIAGNOSIS — Z53.9 NO SHOW: Primary | ICD-10-CM

## 2022-07-07 NOTE — PROGRESS NOTES
Called patient at listed number x3, left voicemail x1. Unable to complete appointment.    Destiny Patel MD  Internal Medicine & Pediatrics  [facility]  She/her

## 2022-07-15 ENCOUNTER — E-VISIT (OUTPATIENT)
Dept: FAMILY MEDICINE | Facility: CLINIC | Age: 47
End: 2022-07-15
Payer: COMMERCIAL

## 2022-07-15 ENCOUNTER — E-VISIT (OUTPATIENT)
Dept: PEDIATRICS | Facility: CLINIC | Age: 47
End: 2022-07-15

## 2022-07-15 DIAGNOSIS — N39.0 ACUTE UTI (URINARY TRACT INFECTION): Primary | ICD-10-CM

## 2022-07-15 DIAGNOSIS — Z53.9 ERRONEOUS ENCOUNTER--DISREGARD: Primary | ICD-10-CM

## 2022-07-15 PROCEDURE — 99421 OL DIG E/M SVC 5-10 MIN: CPT | Performed by: PHYSICIAN ASSISTANT

## 2022-07-15 RX ORDER — NITROFURANTOIN 25; 75 MG/1; MG/1
100 CAPSULE ORAL 2 TIMES DAILY
Qty: 10 CAPSULE | Refills: 0 | Status: SHIPPED | OUTPATIENT
Start: 2022-07-15 | End: 2022-07-20

## 2022-07-15 NOTE — PATIENT INSTRUCTIONS
Dear Alana Mujica    After reviewing your responses, I've been able to diagnose you with a urinary tract infection, which is a common infection of the bladder with bacteria.  This is not a sexually transmitted infection, though urinating immediately after intercourse can help prevent infections.  Drinking lots of fluids is also helpful to clear your current infection and prevent the next one.      I have sent a prescription for antibiotics to your pharmacy to treat this infection.    It is important that you take all of your prescribed medication even if your symptoms are improving after a few doses.  Taking all of your medicine helps prevent the symptoms from returning.     If your symptoms worsen, you develop pain in your back or stomach, develop fevers, or are not improving in 5 days, please contact your primary care provider for an appointment or visit any of our convenient Walk-in or Urgent Care Centers to be seen, which can be found on our website here.    Thanks again for choosing us as your health care partner,    Cinthia Faulkner PA-C

## 2022-07-15 NOTE — TELEPHONE ENCOUNTER
Received this evisit right before leaving office. Will not be available to get results. Please help her do a virtual UC visit. Cannot complete evisit

## 2022-07-18 NOTE — PROGRESS NOTES
Alana Mujica is a 46 year old female via telephone visit with a chief complaint of sore throat.  She did not answer X 2 , left voicemail    SUE Mcnair CNP

## 2022-07-19 ENCOUNTER — VIRTUAL VISIT (OUTPATIENT)
Dept: URGENT CARE | Facility: CLINIC | Age: 47
End: 2022-07-19
Payer: COMMERCIAL

## 2022-07-19 DIAGNOSIS — J02.9 SORE THROAT: Primary | ICD-10-CM

## 2022-07-19 PROCEDURE — 99207 PR NO CHARGE LOS: CPT

## 2022-07-20 ENCOUNTER — HOSPITAL ENCOUNTER (EMERGENCY)
Facility: CLINIC | Age: 47
Discharge: HOME | End: 2022-07-20
Attending: EMERGENCY MEDICINE | Admitting: EMERGENCY MEDICINE
Payer: COMMERCIAL

## 2022-07-20 VITALS
SYSTOLIC BLOOD PRESSURE: 146 MMHG | HEART RATE: 96 BPM | OXYGEN SATURATION: 96 % | RESPIRATION RATE: 19 BRPM | TEMPERATURE: 99.1 F | DIASTOLIC BLOOD PRESSURE: 105 MMHG

## 2022-07-20 DIAGNOSIS — F10.929 ALCOHOLIC INTOXICATION WITH COMPLICATION (H): ICD-10-CM

## 2022-07-20 DIAGNOSIS — F10.20 UNCOMPLICATED ALCOHOL DEPENDENCE (H): ICD-10-CM

## 2022-07-20 DIAGNOSIS — R11.2 NON-INTRACTABLE VOMITING WITH NAUSEA, UNSPECIFIED VOMITING TYPE: ICD-10-CM

## 2022-07-20 DIAGNOSIS — E87.1 HYPONATREMIA: ICD-10-CM

## 2022-07-20 LAB
ANION GAP SERPL CALCULATED.3IONS-SCNC: 15 MMOL/L (ref 3–14)
BASOPHILS # BLD AUTO: 0.1 10E3/UL (ref 0–0.2)
BASOPHILS NFR BLD AUTO: 1 %
BUN SERPL-MCNC: 6 MG/DL (ref 7–30)
CALCIUM SERPL-MCNC: 9.3 MG/DL (ref 8.5–10.1)
CHLORIDE BLD-SCNC: 94 MMOL/L (ref 94–109)
CO2 SERPL-SCNC: 20 MMOL/L (ref 20–32)
CREAT SERPL-MCNC: 0.53 MG/DL (ref 0.52–1.04)
DEPRECATED S PYO AG THROAT QL EIA: NEGATIVE
EOSINOPHIL # BLD AUTO: 0 10E3/UL (ref 0–0.7)
EOSINOPHIL NFR BLD AUTO: 0 %
ERYTHROCYTE [DISTWIDTH] IN BLOOD BY AUTOMATED COUNT: 12.8 % (ref 10–15)
ETHANOL SERPL-MCNC: 0.35 G/DL
FLUAV RNA SPEC QL NAA+PROBE: NEGATIVE
FLUBV RNA RESP QL NAA+PROBE: NEGATIVE
GFR SERPL CREATININE-BSD FRML MDRD: >90 ML/MIN/1.73M2
GLUCOSE BLD-MCNC: 121 MG/DL (ref 70–99)
HCT VFR BLD AUTO: 39.4 % (ref 35–47)
HGB BLD-MCNC: 13.4 G/DL (ref 11.7–15.7)
HOLD SPECIMEN: NORMAL
IMM GRANULOCYTES # BLD: 0 10E3/UL
IMM GRANULOCYTES NFR BLD: 0 %
KETONES BLD-SCNC: 0.5 MMOL/L (ref 0–0.6)
LYMPHOCYTES # BLD AUTO: 0.6 10E3/UL (ref 0.8–5.3)
LYMPHOCYTES NFR BLD AUTO: 9 %
MAGNESIUM SERPL-MCNC: 2.1 MG/DL (ref 1.6–2.3)
MCH RBC QN AUTO: 32.7 PG (ref 26.5–33)
MCHC RBC AUTO-ENTMCNC: 34 G/DL (ref 31.5–36.5)
MCV RBC AUTO: 96 FL (ref 78–100)
MONOCYTES # BLD AUTO: 0.5 10E3/UL (ref 0–1.3)
MONOCYTES NFR BLD AUTO: 7 %
NEUTROPHILS # BLD AUTO: 5.2 10E3/UL (ref 1.6–8.3)
NEUTROPHILS NFR BLD AUTO: 83 %
NRBC # BLD AUTO: 0 10E3/UL
NRBC BLD AUTO-RTO: 0 /100
PLATELET # BLD AUTO: 85 10E3/UL (ref 150–450)
POTASSIUM BLD-SCNC: 4.2 MMOL/L (ref 3.4–5.3)
RBC # BLD AUTO: 4.1 10E6/UL (ref 3.8–5.2)
RSV RNA SPEC NAA+PROBE: NEGATIVE
SARS-COV-2 RNA RESP QL NAA+PROBE: NEGATIVE
SODIUM SERPL-SCNC: 129 MMOL/L (ref 133–144)
WBC # BLD AUTO: 6.4 10E3/UL (ref 4–11)

## 2022-07-20 PROCEDURE — 96375 TX/PRO/DX INJ NEW DRUG ADDON: CPT

## 2022-07-20 PROCEDURE — 96361 HYDRATE IV INFUSION ADD-ON: CPT

## 2022-07-20 PROCEDURE — 87651 STREP A DNA AMP PROBE: CPT | Performed by: EMERGENCY MEDICINE

## 2022-07-20 PROCEDURE — C9803 HOPD COVID-19 SPEC COLLECT: HCPCS

## 2022-07-20 PROCEDURE — 82010 KETONE BODYS QUAN: CPT | Performed by: EMERGENCY MEDICINE

## 2022-07-20 PROCEDURE — 87637 SARSCOV2&INF A&B&RSV AMP PRB: CPT | Performed by: EMERGENCY MEDICINE

## 2022-07-20 PROCEDURE — 80048 BASIC METABOLIC PNL TOTAL CA: CPT | Performed by: EMERGENCY MEDICINE

## 2022-07-20 PROCEDURE — 82077 ASSAY SPEC XCP UR&BREATH IA: CPT | Performed by: EMERGENCY MEDICINE

## 2022-07-20 PROCEDURE — 96374 THER/PROPH/DIAG INJ IV PUSH: CPT

## 2022-07-20 PROCEDURE — 36415 COLL VENOUS BLD VENIPUNCTURE: CPT | Performed by: EMERGENCY MEDICINE

## 2022-07-20 PROCEDURE — 258N000003 HC RX IP 258 OP 636: Performed by: EMERGENCY MEDICINE

## 2022-07-20 PROCEDURE — 99284 EMERGENCY DEPT VISIT MOD MDM: CPT | Mod: 25,CS

## 2022-07-20 PROCEDURE — 85025 COMPLETE CBC W/AUTO DIFF WBC: CPT | Performed by: EMERGENCY MEDICINE

## 2022-07-20 PROCEDURE — 83735 ASSAY OF MAGNESIUM: CPT | Performed by: EMERGENCY MEDICINE

## 2022-07-20 PROCEDURE — 250N000011 HC RX IP 250 OP 636: Performed by: EMERGENCY MEDICINE

## 2022-07-20 RX ORDER — DIAZEPAM 5 MG
5 TABLET ORAL EVERY 6 HOURS PRN
Qty: 12 TABLET | Refills: 0 | Status: SHIPPED | OUTPATIENT
Start: 2022-07-20 | End: 2022-08-25

## 2022-07-20 RX ORDER — DIAZEPAM 5 MG
5 TABLET ORAL EVERY 6 HOURS PRN
Qty: 12 TABLET | Refills: 0 | Status: SHIPPED | OUTPATIENT
Start: 2022-07-20 | End: 2022-07-20

## 2022-07-20 RX ORDER — ONDANSETRON 2 MG/ML
4 INJECTION INTRAMUSCULAR; INTRAVENOUS ONCE
Status: COMPLETED | OUTPATIENT
Start: 2022-07-20 | End: 2022-07-20

## 2022-07-20 RX ORDER — ONDANSETRON 4 MG/1
4 TABLET, ORALLY DISINTEGRATING ORAL EVERY 8 HOURS PRN
Qty: 10 TABLET | Refills: 0 | Status: SHIPPED | OUTPATIENT
Start: 2022-07-20 | End: 2022-07-20

## 2022-07-20 RX ORDER — ONDANSETRON 4 MG/1
4 TABLET, ORALLY DISINTEGRATING ORAL EVERY 8 HOURS PRN
Qty: 10 TABLET | Refills: 0 | Status: SHIPPED | OUTPATIENT
Start: 2022-07-20 | End: 2023-07-28

## 2022-07-20 RX ORDER — KETOROLAC TROMETHAMINE 15 MG/ML
10 INJECTION, SOLUTION INTRAMUSCULAR; INTRAVENOUS ONCE
Status: COMPLETED | OUTPATIENT
Start: 2022-07-20 | End: 2022-07-20

## 2022-07-20 RX ADMIN — SODIUM CHLORIDE, POTASSIUM CHLORIDE, SODIUM LACTATE AND CALCIUM CHLORIDE 1000 ML: 600; 310; 30; 20 INJECTION, SOLUTION INTRAVENOUS at 18:56

## 2022-07-20 RX ADMIN — KETOROLAC TROMETHAMINE 10 MG: 15 INJECTION, SOLUTION INTRAMUSCULAR; INTRAVENOUS at 18:56

## 2022-07-20 RX ADMIN — ONDANSETRON 4 MG: 2 INJECTION INTRAMUSCULAR; INTRAVENOUS at 18:56

## 2022-07-20 ASSESSMENT — ENCOUNTER SYMPTOMS
WEAKNESS: 1
FEVER: 1
NAUSEA: 1
DIARRHEA: 1
VOMITING: 1

## 2022-07-20 NOTE — MR AVS SNAPSHOT
After Visit Summary   6/15/2018    Alana Mujica    MRN: [id number]           Patient Information     Date Of Birth          1975        Visit Information        Provider Department      6/15/2018 3:00 PM Pati Coffey APRN CNP [facility]        Today's Diagnoses     Acute sinusitis with symptoms > 10 days    -  1    Neck pain          Care Instructions    Try Flexeril at night for 1 week, monitor symptoms & sleep. Also take antibiotics to treat a possible sinus infection & observe if this relieves any pain. Return after this time for a follow-up appointment to discuss how this worked.           Follow-ups after your visit        Follow-up notes from your care team     Return in about 1 week (around 6/22/2018) for Follow-up.      Your next 10 appointments already scheduled     Jun 22, 2018  2:20 PM CDT   Office Visit with SUE Sharpe CNP   [facility] ([facility])    [address] [place] [id number] [phone]           Bring a current list of meds and any records pertaining to this visit. For Physicals, please bring immunization records and any forms needing to be filled out. Please arrive 10 minutes early to complete paperwork.              Who to contact     If you have questions or need follow up information about today's clinic visit or your schedule please contact [facility] directly at [phone].  Normal or non-critical lab and imaging results will be communicated to you by MyChart, letter or phone within 4 business days after the clinic has received the results. If you do not hear from us within 7 days, please contact the clinic through MyChart or phone. If you have a critical or abnormal lab result, we will notify you by phone as soon as possible.  Submit refill requests through [employer] or call your pharmacy and they will forward the refill request to us. Please allow 3 business days for  your refill to be completed.          Additional Information About Your Visit        [employer] Information     [employer] gives you secure access to your electronic health record. If you see a primary care provider, you can also send messages to your care team and make appointments. If you have questions, please call your primary care clinic.  If you do not have a primary care provider, please call [phone] and they will assist you.        Care EveryWhere ID     This is your Care EveryWhere ID. This could be used by other organizations to access your [facility] medical records  [id number]        Your Vitals Were     Pulse Temperature Last Period Pulse Oximetry          75 98.5  F (36.9  C) (Oral) (LMP Unknown) 98%         Blood Pressure from Last 3 Encounters:   06/15/18 110/80   06/05/18 116/84   06/04/18 104/80    Weight from Last 3 Encounters:   06/04/18 119 lb (54 kg)   04/05/18 117 lb 1.6 oz (53.1 kg)   12/08/17 128 lb 3.2 oz (58.2 kg)              Today, you had the following     No orders found for display         Today's Medication Changes          These changes are accurate as of 6/15/18  3:44 PM.  If you have any questions, ask your nurse or doctor.               Start taking these medicines.        Dose/Directions    amoxicillin-clavulanate 875-125 MG per tablet   Commonly known as:  AUGMENTIN   Used for:  Acute sinusitis with symptoms > 10 days   Started by:  Pati Coffey APRN CNP        Dose:  1 tablet   Take 1 tablet by mouth 2 times daily   Quantity:  20 tablet   Refills:  0       cyclobenzaprine 10 MG tablet   Commonly known as:  FLEXERIL   Used for:  Neck pain   Started by:  Pati Coffey APRN CNP        Dose:  10 mg   Take 1 tablet (10 mg) by mouth nightly as needed for muscle spasms   Quantity:  10 tablet   Refills:  0            Where to get your medicines      These medications were sent to [facility]/pharmacy #[id number] - [place], [place] - [address]  [address] [address] [address]      Phone:  [phone]     amoxicillin-clavulanate 875-125 MG per tablet    cyclobenzaprine 10 MG tablet                Primary Care Provider Office Phone # Fax #    Sabrina Pérez PA-C [phone]       [address]  [facility] [address]        Goals        General    Alana will contact her insurance to determine in-network psychiatry providers. (pt-stated)     Notes - Note created  1/29/2016  9:52 AM by Peter Ochoa    As of today's date 1/29/2016 goal was unable to be assessed due to lack of contact from patient.           Equal Access to Services     [facility]: Hadii aad yamilet hadasho Soomaali, waaxda luqadaha, qaybta kaalmada adeegyada, isabel james . So [facility] [phone].    ATENCIÓN: Si habla español, tiene a wang disposición servicios gratuitos de asistencia lingüística. Llame al [phone].    We comply with applicable federal civil rights laws and [place] laws. We do not discriminate on the basis of race, color, national origin, age, disability, sex, sexual orientation, or gender identity.            Thank you!     Thank you for choosing [facility]  for your care. Our goal is always to provide you with excellent care. Hearing back from our patients is one way we can continue to improve our services. Please take a few minutes to complete the written survey that you may receive in the mail after your visit with us. Thank you!             Your Updated Medication List - Protect others around you: Learn how to safely use, store and throw away your medicines at www.disposemymeds.org.          This list is accurate as of 6/15/18  3:44 PM.  Always use your most recent med list.                   Brand Name Dispense Instructions for use Diagnosis    amoxicillin-clavulanate 875-125 MG per tablet    AUGMENTIN    20 tablet    Take 1 tablet by mouth 2 times daily    Acute sinusitis with symptoms > 10 days        butalbital-acetaminophen-caffeine -40 MG per tablet    FIORICET/ESGIC    28 tablet    Take 1 tablet by mouth every 4 hours as needed        cyclobenzaprine 10 MG tablet    FLEXERIL    10 tablet    Take 1 tablet (10 mg) by mouth nightly as needed for muscle spasms    Neck pain       LORazepam 1 MG tablet    ATIVAN    15 tablet    TAKE 1 TABLET BY MOUTH 3 TIMES A DAY AS NEEDED FOR ANXIETY    Alcohol withdrawal syndrome without complication (H)       NEXPLANON 68 MG Impl   Generic drug:  etonogestrel     1 each    1 each (68 mg) by Subdermal route once        valACYclovir 500 MG tablet    VALTREX    90 tablet    Take 1 tablet (500 mg) by mouth daily    HSV (herpes simplex virus) infection          Closure 3 Information: This tab is for additional flaps and grafts above and beyond our usual structured repairs.  Please note if you enter information here it will not currently bill and you will need to add the billing information manually.
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
## 2022-07-21 LAB — GROUP A STREP BY PCR: NOT DETECTED

## 2022-08-25 ENCOUNTER — HOSPITAL ENCOUNTER (INPATIENT)
Facility: CLINIC | Age: 47
LOS: 6 days | Discharge: HOME | DRG: 896 | End: 2022-08-31
Attending: PSYCHIATRY & NEUROLOGY | Admitting: PSYCHIATRY & NEUROLOGY
Payer: COMMERCIAL

## 2022-08-25 ENCOUNTER — HOSPITAL ENCOUNTER (EMERGENCY)
Facility: CLINIC | Age: 47
Discharge: HOME | End: 2022-08-25
Attending: EMERGENCY MEDICINE | Admitting: EMERGENCY MEDICINE
Payer: COMMERCIAL

## 2022-08-25 ENCOUNTER — TELEPHONE (OUTPATIENT)
Dept: BEHAVIORAL HEALTH | Facility: CLINIC | Age: 47
End: 2022-08-25

## 2022-08-25 VITALS
RESPIRATION RATE: 26 BRPM | OXYGEN SATURATION: 100 % | TEMPERATURE: 97.8 F | HEART RATE: 86 BPM | DIASTOLIC BLOOD PRESSURE: 84 MMHG | SYSTOLIC BLOOD PRESSURE: 120 MMHG

## 2022-08-25 DIAGNOSIS — F10.920 ALCOHOLIC INTOXICATION WITHOUT COMPLICATION (H): ICD-10-CM

## 2022-08-25 DIAGNOSIS — F41.9 ANXIETY: ICD-10-CM

## 2022-08-25 DIAGNOSIS — F10.10 ALCOHOL ABUSE: Primary | ICD-10-CM

## 2022-08-25 DIAGNOSIS — F10.10 ALCOHOL ABUSE: ICD-10-CM

## 2022-08-25 LAB
ALBUMIN SERPL BCG-MCNC: 4.3 G/DL (ref 3.5–5.2)
ALP SERPL-CCNC: 77 U/L (ref 35–104)
ALT SERPL W P-5'-P-CCNC: 32 U/L (ref 10–35)
AMPHETAMINES UR QL SCN: NORMAL
ANION GAP SERPL CALCULATED.3IONS-SCNC: 14 MMOL/L (ref 7–15)
AST SERPL W P-5'-P-CCNC: 79 U/L (ref 10–35)
BARBITURATES UR QL SCN: NORMAL
BASOPHILS # BLD AUTO: 0.1 10E3/UL (ref 0–0.2)
BASOPHILS NFR BLD AUTO: 2 %
BENZODIAZ UR QL SCN: NORMAL
BILIRUB SERPL-MCNC: 0.3 MG/DL
BUN SERPL-MCNC: 3.3 MG/DL (ref 6–20)
BZE UR QL SCN: NORMAL
CALCIUM SERPL-MCNC: 8.7 MG/DL (ref 8.6–10)
CANNABINOIDS UR QL SCN: NORMAL
CHLORIDE SERPL-SCNC: 100 MMOL/L (ref 98–107)
CREAT SERPL-MCNC: 0.69 MG/DL (ref 0.51–0.95)
DEPRECATED HCO3 PLAS-SCNC: 24 MMOL/L (ref 22–29)
EOSINOPHIL # BLD AUTO: 0.1 10E3/UL (ref 0–0.7)
EOSINOPHIL NFR BLD AUTO: 2 %
ERYTHROCYTE [DISTWIDTH] IN BLOOD BY AUTOMATED COUNT: 14.1 % (ref 10–15)
ETHANOL SERPL-MCNC: 0.35 G/DL
GFR SERPL CREATININE-BSD FRML MDRD: >90 ML/MIN/1.73M2
GLUCOSE SERPL-MCNC: 121 MG/DL (ref 70–99)
HCG UR QL: NEGATIVE
HCT VFR BLD AUTO: 35.3 % (ref 35–47)
HGB BLD-MCNC: 11.6 G/DL (ref 11.7–15.7)
IMM GRANULOCYTES # BLD: 0 10E3/UL
IMM GRANULOCYTES NFR BLD: 0 %
LYMPHOCYTES # BLD AUTO: 0.7 10E3/UL (ref 0.8–5.3)
LYMPHOCYTES NFR BLD AUTO: 27 %
MAGNESIUM SERPL-MCNC: 1.8 MG/DL (ref 1.7–2.3)
MCH RBC QN AUTO: 32.5 PG (ref 26.5–33)
MCHC RBC AUTO-ENTMCNC: 32.9 G/DL (ref 31.5–36.5)
MCV RBC AUTO: 99 FL (ref 78–100)
MONOCYTES # BLD AUTO: 0.3 10E3/UL (ref 0–1.3)
MONOCYTES NFR BLD AUTO: 12 %
NEUTROPHILS # BLD AUTO: 1.6 10E3/UL (ref 1.6–8.3)
NEUTROPHILS NFR BLD AUTO: 57 %
NRBC # BLD AUTO: 0 10E3/UL
NRBC BLD AUTO-RTO: 0 /100
OPIATES UR QL SCN: NORMAL
PHOSPHATE SERPL-MCNC: 3.4 MG/DL (ref 2.5–4.5)
PLATELET # BLD AUTO: 104 10E3/UL (ref 150–450)
POTASSIUM SERPL-SCNC: 3.9 MMOL/L (ref 3.4–5.3)
PROT SERPL-MCNC: 7.1 G/DL (ref 6.4–8.3)
RBC # BLD AUTO: 3.57 10E6/UL (ref 3.8–5.2)
SARS-COV-2 RNA RESP QL NAA+PROBE: NEGATIVE
SODIUM SERPL-SCNC: 138 MMOL/L (ref 136–145)
WBC # BLD AUTO: 2.8 10E3/UL (ref 4–11)

## 2022-08-25 PROCEDURE — 81025 URINE PREGNANCY TEST: CPT | Performed by: EMERGENCY MEDICINE

## 2022-08-25 PROCEDURE — 99285 EMERGENCY DEPT VISIT HI MDM: CPT | Mod: 25

## 2022-08-25 PROCEDURE — U0005 INFEC AGEN DETEC AMPLI PROBE: HCPCS | Performed by: EMERGENCY MEDICINE

## 2022-08-25 PROCEDURE — 83735 ASSAY OF MAGNESIUM: CPT | Performed by: EMERGENCY MEDICINE

## 2022-08-25 PROCEDURE — 80307 DRUG TEST PRSMV CHEM ANLYZR: CPT | Performed by: EMERGENCY MEDICINE

## 2022-08-25 PROCEDURE — C9803 HOPD COVID-19 SPEC COLLECT: HCPCS

## 2022-08-25 PROCEDURE — 96374 THER/PROPH/DIAG INJ IV PUSH: CPT

## 2022-08-25 PROCEDURE — 128N000004 HC R&B CD ADULT

## 2022-08-25 PROCEDURE — 80053 COMPREHEN METABOLIC PANEL: CPT | Performed by: EMERGENCY MEDICINE

## 2022-08-25 PROCEDURE — 84100 ASSAY OF PHOSPHORUS: CPT | Performed by: EMERGENCY MEDICINE

## 2022-08-25 PROCEDURE — 82077 ASSAY SPEC XCP UR&BREATH IA: CPT | Performed by: EMERGENCY MEDICINE

## 2022-08-25 PROCEDURE — 96361 HYDRATE IV INFUSION ADD-ON: CPT

## 2022-08-25 PROCEDURE — 250N000013 HC RX MED GY IP 250 OP 250 PS 637: Performed by: EMERGENCY MEDICINE

## 2022-08-25 PROCEDURE — 250N000011 HC RX IP 250 OP 636: Performed by: EMERGENCY MEDICINE

## 2022-08-25 PROCEDURE — 90791 PSYCH DIAGNOSTIC EVALUATION: CPT

## 2022-08-25 PROCEDURE — 36415 COLL VENOUS BLD VENIPUNCTURE: CPT | Performed by: EMERGENCY MEDICINE

## 2022-08-25 PROCEDURE — 85025 COMPLETE CBC W/AUTO DIFF WBC: CPT | Performed by: EMERGENCY MEDICINE

## 2022-08-25 PROCEDURE — 250N000013 HC RX MED GY IP 250 OP 250 PS 637: Performed by: PSYCHIATRY & NEUROLOGY

## 2022-08-25 PROCEDURE — 258N000003 HC RX IP 258 OP 636: Performed by: EMERGENCY MEDICINE

## 2022-08-25 RX ORDER — LORAZEPAM 2 MG/ML
1-2 INJECTION INTRAMUSCULAR EVERY 30 MIN PRN
Status: DISCONTINUED | OUTPATIENT
Start: 2022-08-25 | End: 2022-08-25 | Stop reason: HOSPADM

## 2022-08-25 RX ORDER — HALOPERIDOL 5 MG/ML
2.5-5 INJECTION INTRAMUSCULAR EVERY 6 HOURS PRN
Status: DISCONTINUED | OUTPATIENT
Start: 2022-08-25 | End: 2022-08-25

## 2022-08-25 RX ORDER — ACETAMINOPHEN 325 MG/1
650 TABLET ORAL EVERY 4 HOURS PRN
Status: DISCONTINUED | OUTPATIENT
Start: 2022-08-25 | End: 2022-08-31 | Stop reason: HOSPADM

## 2022-08-25 RX ORDER — MAGNESIUM HYDROXIDE/ALUMINUM HYDROXICE/SIMETHICONE 120; 1200; 1200 MG/30ML; MG/30ML; MG/30ML
30 SUSPENSION ORAL EVERY 4 HOURS PRN
Status: DISCONTINUED | OUTPATIENT
Start: 2022-08-25 | End: 2022-08-31 | Stop reason: HOSPADM

## 2022-08-25 RX ORDER — BUSPIRONE HYDROCHLORIDE 5 MG/1
5 TABLET ORAL 3 TIMES DAILY
Qty: 5 TABLET | Refills: 0 | Status: ON HOLD | OUTPATIENT
Start: 2022-08-25 | End: 2022-08-30

## 2022-08-25 RX ORDER — DIAZEPAM 5 MG
5-20 TABLET ORAL EVERY 30 MIN PRN
Status: DISCONTINUED | OUTPATIENT
Start: 2022-08-25 | End: 2022-08-31 | Stop reason: HOSPADM

## 2022-08-25 RX ORDER — AMOXICILLIN 250 MG
1 CAPSULE ORAL 2 TIMES DAILY PRN
Status: DISCONTINUED | OUTPATIENT
Start: 2022-08-25 | End: 2022-08-31 | Stop reason: HOSPADM

## 2022-08-25 RX ORDER — OLANZAPINE 5 MG/1
5-10 TABLET, ORALLY DISINTEGRATING ORAL EVERY 6 HOURS PRN
Status: DISCONTINUED | OUTPATIENT
Start: 2022-08-25 | End: 2022-08-25

## 2022-08-25 RX ORDER — LORAZEPAM 1 MG/1
2 TABLET ORAL ONCE
Status: COMPLETED | OUTPATIENT
Start: 2022-08-25 | End: 2022-08-25

## 2022-08-25 RX ORDER — LORAZEPAM 2 MG/ML
1 INJECTION INTRAMUSCULAR ONCE
Status: COMPLETED | OUTPATIENT
Start: 2022-08-25 | End: 2022-08-25

## 2022-08-25 RX ORDER — FOLIC ACID 1 MG/1
1 TABLET ORAL DAILY
Status: DISCONTINUED | OUTPATIENT
Start: 2022-08-26 | End: 2022-08-31 | Stop reason: HOSPADM

## 2022-08-25 RX ORDER — TRAZODONE HYDROCHLORIDE 50 MG/1
50 TABLET, FILM COATED ORAL
Status: DISCONTINUED | OUTPATIENT
Start: 2022-08-25 | End: 2022-08-31 | Stop reason: HOSPADM

## 2022-08-25 RX ORDER — MULTIPLE VITAMINS W/ MINERALS TAB 9MG-400MCG
1 TAB ORAL DAILY
Status: DISCONTINUED | OUTPATIENT
Start: 2022-08-26 | End: 2022-08-31 | Stop reason: HOSPADM

## 2022-08-25 RX ORDER — MULTIPLE VITAMINS W/ MINERALS TAB 9MG-400MCG
1 TAB ORAL DAILY
Status: DISCONTINUED | OUTPATIENT
Start: 2022-08-25 | End: 2022-08-25

## 2022-08-25 RX ORDER — FLUMAZENIL 0.1 MG/ML
0.2 INJECTION, SOLUTION INTRAVENOUS
Status: DISCONTINUED | OUTPATIENT
Start: 2022-08-25 | End: 2022-08-25 | Stop reason: HOSPADM

## 2022-08-25 RX ORDER — GABAPENTIN 100 MG/1
100 CAPSULE ORAL EVERY 6 HOURS PRN
Status: DISCONTINUED | OUTPATIENT
Start: 2022-08-25 | End: 2022-08-28 | Stop reason: ALTCHOICE

## 2022-08-25 RX ORDER — LORAZEPAM 1 MG/1
1-2 TABLET ORAL EVERY 30 MIN PRN
Status: DISCONTINUED | OUTPATIENT
Start: 2022-08-25 | End: 2022-08-25 | Stop reason: HOSPADM

## 2022-08-25 RX ORDER — FOLIC ACID 1 MG/1
1 TABLET ORAL DAILY
Status: DISCONTINUED | OUTPATIENT
Start: 2022-08-25 | End: 2022-08-25

## 2022-08-25 RX ORDER — CLONIDINE HYDROCHLORIDE 0.1 MG/1
0.1 TABLET ORAL EVERY 8 HOURS
Status: DISCONTINUED | OUTPATIENT
Start: 2022-08-25 | End: 2022-08-25

## 2022-08-25 RX ORDER — ATENOLOL 50 MG/1
50 TABLET ORAL DAILY PRN
Status: DISCONTINUED | OUTPATIENT
Start: 2022-08-25 | End: 2022-08-31 | Stop reason: HOSPADM

## 2022-08-25 RX ADMIN — ACETAMINOPHEN 650 MG: 325 TABLET, FILM COATED ORAL at 21:39

## 2022-08-25 RX ADMIN — LORAZEPAM 2 MG: 1 TABLET ORAL at 16:53

## 2022-08-25 RX ADMIN — LORAZEPAM 1 MG: 2 INJECTION INTRAMUSCULAR; INTRAVENOUS at 01:01

## 2022-08-25 RX ADMIN — DIAZEPAM 10 MG: 5 TABLET ORAL at 21:39

## 2022-08-25 RX ADMIN — SODIUM CHLORIDE 1000 ML: 9 INJECTION, SOLUTION INTRAVENOUS at 01:00

## 2022-08-25 RX ADMIN — LORAZEPAM 2 MG: 1 TABLET ORAL at 08:31

## 2022-08-25 RX ADMIN — LORAZEPAM 2 MG: 1 TABLET ORAL at 17:40

## 2022-08-25 RX ADMIN — LORAZEPAM 2 MG: 1 TABLET ORAL at 13:39

## 2022-08-25 RX ADMIN — TRAZODONE HYDROCHLORIDE 50 MG: 50 TABLET ORAL at 23:19

## 2022-08-25 ASSESSMENT — ACTIVITIES OF DAILY LIVING (ADL)
CHANGE_IN_FUNCTIONAL_STATUS_SINCE_ONSET_OF_CURRENT_ILLNESS/INJURY: NO
ADLS_ACUITY_SCORE: 17.25
ADLS_ACUITY_SCORE: 17.25
WALKING_OR_CLIMBING_STAIRS_DIFFICULTY: NO
DIFFICULTY_EATING/SWALLOWING: NO
DRESS: INDEPENDENT
TOILETING_ISSUES: NO
HYGIENE/GROOMING: INDEPENDENT
LAUNDRY: WITH SUPERVISION
ADLS_ACUITY_SCORE: 17.25
ADLS_ACUITY_SCORE: 17.25
WEAR_GLASSES_OR_BLIND: NO
DRESSING/BATHING_DIFFICULTY: NO
DOING_ERRANDS_INDEPENDENTLY_DIFFICULTY: NO
ADLS_ACUITY_SCORE: 17.25
ADLS_ACUITY_SCORE: 17.25
CONCENTRATING,_REMEMBERING_OR_MAKING_DECISIONS_DIFFICULTY: NO
ADLS_ACUITY_SCORE: 13.5
ADLS_ACUITY_SCORE: 17.25
ADLS_ACUITY_SCORE: 17.25
ORAL_HYGIENE: INDEPENDENT
ADLS_ACUITY_SCORE: 17.25
ADLS_ACUITY_SCORE: 13.5
FALL_HISTORY_WITHIN_LAST_SIX_MONTHS: NO
ADLS_ACUITY_SCORE: 17.25

## 2022-08-25 ASSESSMENT — ENCOUNTER SYMPTOMS
FEVER: 0
DIARRHEA: 1
VOMITING: 1

## 2022-08-25 NOTE — TELEPHONE ENCOUNTER
S: 10:20a, Dr. Benoit called w/ clinical on a 46/F present in the [facility] ER requesting detox.       B: The pt arrived at the [facility] ER requesting detox. The pt is requesting alcohol detox. The pt states they drink up to 4 beers a day for years, Provider notes that pt is heavy drinker and may have drank more. Pt started drinking mouthwash to handle withdrawal. Last known drink was 10:00p on 8/24/2022. BA was 0.35 at 1:01a.     Pt denies any additional substance use.    The pt s does currently have withdrawal symptoms: tremmors, pt was given ativan and it has gotten better.    Pt on CWA protocol.     The pt does not have a concern/Hx for DT s, the pt does not have a Hx/concern for seizures.     The pt has no medical concerns.     The pt can ambulated independently. The pt is eating and drinking water in the ER.    There are no MH concerns w/ admission.    The pt has not been in detox before.    No concern for aggression or HI.     Covid lab collected.   Utox collected.  Pregnancy Test needs to be ordered/collected.     CBC labs resulted- see Epic for results.    Comp labs are as follows: Sodium 138 ; Potassium 3.9 ; ALT 32 ; AST 79.     The pt denies any abdominal pain.    Recent vital signs as of 8/25/2022 at 10:15a: temp 97.8; Pulse 93; /89; Resp Rate 26, SpO2 99%.     A: Vol    R: The pt is currently on the [facility] ER awaiting bed placement.    10:56a Intake received notification [facility] ED call for update. Intake to CB.    10:56a Intake called [facility] ED Nurse (Theresa) to inquire about message left Intake. Nurse was wondering when to set up transport. Intake informed pt has not been presented to Provider and to wait for update from Intake.     11:42a Intake paged Provider (Edmundo) for review of pt. Intake awaiting response.     11:47a Intake received notification that Provider (Edmundo) is not taking admission and to contact [facility] Provider (Minerva).    11:52a Intake called Provider (Minerva) for  review of pt. Provider accepts pt to [facility]. Intake to update work lists.     12:27p Intake called [facility] CRN (Verito) to inform pt is in que for the unit. CRN informed pt will not be able to come to Unit until evening shift if they have adequate staffing. CRN will call for report. Unit to call Intake to notify ED.     12:31p Intake called [facility] ED Nurse (Theresa) to inform pt's acceptance to [facility]. Intake informed that [facility] CRN informed pt will not be able to come to Unit until evening shift if they have adequate staffing. CRN will call for report.

## 2022-08-25 NOTE — ED TRIAGE NOTES
Pt to ER with c/o needing detox, pt states 8 hours ago drank mouthwash, pt states drank that to keep from drinking alcohol,  Last ETOH was 2 weeks ago,

## 2022-08-25 NOTE — ED NOTES
"Pt called writer into room. Pt stated \"I tried to get out of bed to go to the bathroom and couldn't make it\". Pt pointed to pile of urine on the floor. Pt urinated on floor and stated \"I'm old I couldn't make it\". Writer cleaned up urine and changed bed sheet. Writer assisted pt to BR to finish urinating. Writer educated pt to use call light and call for assistance to BR.  "
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
## 2022-08-25 NOTE — ED NOTES
Triage & Transition Services, Extended Care     Alnaa Mujica  August 25, 2022    Alana is followed related to observation status, recommendations for detox admission. Please see initial DEC Crisis Assessment for complete assessment information. Medical record is reviewed. Spoke with bedside RN, Theresa. Patient currently asleep, no new issues reported. Accepted to  and will transfer this evening. Provided writer's contact information and requested call to EC if patient awakens and/or requests a visit.      Plan: Anticipate patient will transfer to  this evening. Bedside RN to contact EC staff if needs arise in the interim.     Pao Joshua, [facility], Extended Care   [phone]

## 2022-08-25 NOTE — DISCHARGE INSTRUCTIONS
Discharge Instructions  Alcohol Intoxication    You have been seen today with alcohol intoxication. This means that you have enough alcohol in your system to impair your ability to mentally and physically function, perhaps to the extent that you were unable to care for yourself.    Generally, every Emergency Department visit should have a follow-up clinic visit with either a primary or a specialty clinic/provider. Please follow-up as instructed by your emergency provider today.    You may have come to the Emergency Department because of your intoxication, or for another reason, such as because of an injury. No matter what the case is, this visit is a  red flag  regarding alcohol use, and you should consider whether your drinking pattern is a problem for you.     You may be at risk for alcohol-related problems if:    Men: you drink more than 14 drinks per week, or more than 4 drinks per occasion.    Women: you drink more than 7 drinks per week or more than 3 drinks per occasion.    You have black-outs.  You do things you regret while drinking.  You have legal problems because of drinking.  You have job problems because of drinking (you call in sick to work because of drinking).    CAGE Questions  Have you ever felt you should cut down on your drinking?  Have people annoyed you by criticizing your drinking?  Have you ever felt bad or guilty about your drinking?  Have you ever had a drink first thing in the morning to steady your nerves or get rid of a hangover (eye opener)?    If you answer yes to any of the CAGE questions, you may have a problem with alcohol.      Return to the Emergency Department if:  You become shaky or tremble when you try to stop drinking.   You have severe abdominal pain (belly pain).   You have a seizure or pass out.    You vomit (throw up) blood or have blood in your stool. This may be bright red or it may look like black coffee grounds.  You become lightheaded or faint.      For further  help, contact:   Your caregiver.    Alcoholics Anonymous (AA).    [facility]: [phone]  [facility] Central Office: [phone]   A drug or alcohol rehabilitation program.    You can get information on alcohol resources and groups by calling the number 211 or [phone] on any phone.     Seek medical care if:  You have persistent vomiting.   You have persistent pain in any part of your body.    You do not feel better after a few days.    If you were given a prescription for medicine here today, be sure to read all of the information (including the package insert) that comes with your prescription.  This will include important information about the medicine, its side effects, and any warnings that you need to know about.  The pharmacist who fills the prescription can provide more information and answer questions you may have about the medicine.  If you have questions or concerns that the pharmacist cannot address, please call or return to the Emergency Department.   Remember that you can always come back to the Emergency Department if you are not able to see your regular doctor in the amount of time listed above, if you get any new symptoms, or if there is anything that worries you.    Substance Abuse Resources    It is recommended that you abstain from all mood altering chemicals. Please contact the sober support hotline (115-990-7394) as needed; phones are answered 24 hours a day, 7 days a week.     To access substance abuse treatment you must have an assessment completed within 30 days of starting any program. Information for this to be completed and to secure funding if you have medical assistance or no insurance can be found through your [facility]'s [employer] health intake line. If you have private insurance, call the customer service number on the back of your insurance card to find an in-network substance abuse assessment. The ideal provider will be a treatment  facility, licensed in the state of [place].    For those with [place] Medicaid you will need a Rule 25 assessment: The following are phone numbers for each county. Rule 25 assessments must be completed by your current county of residence. Once approved for funding you can connect with a facility that does Rule 25 assessment.  [place] - [phone]  [place]  [phone]  [facility] [phone]  [facility] [phone]  [facility] [phone]  [facility] [phone]  [facility] [phone]  [place] - [phone]    The following facilities also offer Rule 25/chemical health assessments:    [facility]  [phone]  Mon-Friday: [address] [facility], [address]  Saturday:  [address] [facility] [address]  M-F assessments (7a-1:45p); Saturday assessments (7:45-10:45a)    [facility]  [phone]  [address] [place], [place], [address]  *by appointment only M-W; walk ins available Fridays from 10-2.    [place]  [phone] (phone consultation available 24/7)  In-person Assessments: [address] [facility], [address], [place] [address]  [address] [place] [address]  [address] [place] [address]  [address] [place] [address]    [facility]  [phone]  [address] [facility], [address], [place], [address]  *walk in and appointments available by calling    [facility]  [phone] [address] [place], [place], [address]  *by appointment only M-Th     [facility]  [phone]  [address] [place], [place], [address]  *walk ins available M-F    [facility]  [phone] [address] [place], [place], [address]  *available by appointments only    [facility] [place]  [phone] [address] [place], [place], [address]  *available by appointment only    [facility] ([facility])  [phone]  [address] [place] [address] [address], [place], [address]  *Walk in assessments also available M-F starting at 8 am.    [facility]  [phone]  [address] [facility], [place], [place], [address]  *available by appointments    [facility]  [phone]  [address] [place], [place], [address]  *walk in assessments available M-F starting at 7 am.    [facility]  [phone]  [facility]  [address], [place], [place], [address]  *Walk in assessments availble M-F starting at 8 am OR [phone]    [facility]  [address] , [place] [address]  *Walk in assessments available M-F starting at 8 am    [facility]  [phone]  [address] [place] [address]    [facility]  [phone]  [address] [place], [place], [address]  *available by appointment only    [facility]  [phone]  [address] [place] [address], [address]    [facility]  [phone]  [address], [place], [place], [address]  *available by appointment    [facility]  [phone]  [address], [place], [place], [address]  *walk ins available M-TH from 9-3    [facility]  [phone]  [address], [place], [place], [address]    [place]  [phone] [address] [place] [address]  [address] [place] [address]    [facility] (Does Rule 25 Assessments)  [email or web address]  [phone]  [address] [place] [address], [address], [place] [address]    [facility]  [email or web address]  [phone], [address], [place] [address]  [phone], [address], [place] [address]  [phone], [address] [facility], [address], [place] [address]  [phone], [address] [facility] , [address], ([facility] [place] [address]  [phone], [address]  [place] [address]    If you are intoxicated, you may be required to detox at a detox facility before starting treatment. The following are detox facilities that you can self present to. All detox facilities are able to help you complete an assessment/rule 25 prior to discharge if you choose:    [facility]: [address] [place], [place], [address]  [phone]  [facility]: [address] [place], [place], [address] [phone]  [facility]: [address] [place], [place], [address] [phone]  [facility]: [address] [place], [place], [address] [phone]  [facility]: [address] [place], [address] [phone]     Ways to help cope with sobriety:  Take prescribed medicines as scheduled  Keep follow-up appointments  Talk to others about your concerns  Get regular exercise  Practice deep breathing skills  Eat a healthy diet  Use community resources, including hotline numbers, [facility] crisis and support meetings  Stay sober and avoid places/people/things associated with substance use  Maintain a daily schedule/routine  Get at least 7-8 hours of sleep per night  Create a list 10--20 healthy activities that you can do that are enjoyable and do not involve substance use  Create daily goals (approx. 1-4 goals) per day and work to achieve them throughout the day    [facility] ([facility]): [facility] connects people seeking recovery to resources that help foster and sustain long-term recovery. Whether you are seeking resources for treatment, transportation, housing, job training, education, health care or other pathways to recovery, [facility] is a great place to start. Phone: [phone]. www.[place] (Great listing of all types of recovery and non-recovery related resources)    [facility]: https://www.[employer].org/    Alcoholics Anonymous: 1-800-ALCOHOL  HTTP://WWW.AA.ORG/  AA [place] (332-025-7114 or  http://a[place].org)  [facility] [place] (800-635-2860 or www.aastpaul.org)    Narcotics Anonymous: 744.465.6475  www.naminAlaris [place].us.    [facility]: [address] [place] [address], [address], [place], [place]  [phone]  Drop-in Hours: Monday-Friday 9-11:30 am. By appointment at other times.  [facility] is a drop-in center on the [facility] side [facility] that provides a safe space for individuals who are homeless and have a history of chemical use. Sobriety is not a requirement but drugs and alcohol are not allowed on the property.  Non-clients can access drop-in services such as Recovery and Harm Reduction Groups, referrals to case management, community activities, shower facilities, and a pool table. Individuals who are homeless and have chemical health needs may be eligible for enrollment into [facility]'s case management program. Clients and  work together to access benefits, treatment, health care, shelter, and external housing resources.     [facility] Chemical Assessment & Referral Unit: [address] [place] [address]  [phone]  Monday-Friday 8 am-5 pm  Rule 25 assessment and referral for individuals seeking treatment or counseling for chemical dependency. Must be a resident of [facility]. There is no fee for assessment. There is some funding available for treatment programs.    [facility]: [address] [facility]  [phone] or [phone]  Monday - Friday 7 am - 5 pm  Daily drop-in Rule 25 assessments and weekly mental health assessments and services. Outpatient chemical dependency treatment (with sober recovery housing), relapse prevention, case management, and employment services. Outpatient and residential treatment for women with children. Specializes in assisting individuals with a history of relapse who face multiple barriers to achieving stable recovery. No charge for most services or services can be billed through insurance. Gender-specific  services and treatment for co-occurring substance abuse and mental health concerns offered.

## 2022-08-25 NOTE — PHARMACY-ADMISSION MEDICATION HISTORY
Admission medication history interview status for this patient is complete. See [facility] admission navigator for allergy information, prior to admission medications and immunization status.     Medication history interview source(s):Patient  Medication history resources (including written lists, pill bottles, clinic record):EPIC list, sure scripts  [facility]    Changes made to PTA medication list:  Added: b complex  Deleted: lidocaine2% + maalox 1:1 compound prn abd pain (pt did not fill, too expensive), b12 100mcg daily, valium 5mg q6h prn, reglan 10mg tid prn  Changed: ----    Actions taken by pharmacist (provider contacted, etc):None     Additional medication history information:None    Medication reconciliation/reorder completed by provider prior to medication history? No, sticky note left for MD      For patients on insulin therapy:N    Prior to Admission medications    Medication Sig Last Dose Taking? Auth Provider Long Term End Date   busPIRone (BUSPAR) 5 MG tablet Take 1 tablet (5 mg) by mouth 3 times daily  Yes Tiny Benoit MD Yes    busPIRone (BUSPAR) 5 MG tablet Take 1 tablet (5 mg) by mouth 2 times daily Past Week at Unknown time Yes Destiny Patel MD Yes    calcium citrate-vitamin D (CITRACAL) 315-250 MG-UNIT TABS per tablet Take 1 tablet by mouth 2 times daily Past Week at Unknown time Yes Unknown, Entered By History     etonogestrel (NEXPLANON) 68 MG IMPL 1 each (68 mg) by Subdermal route once  at implant in place Yes Sabrina Pérez PA-C Yes    folic acid (FOLVITE) 1 MG tablet Take 1 tablet (1 mg) by mouth daily Past Week at Unknown time Yes Derik Fischer MD No    MILK THISTLE PO Take 3 capsules by mouth daily Past Week at Unknown time Yes Reported, Patient No    multivitamin w/minerals (THERA-VIT-M) tablet Take 1 tablet by mouth daily Past Week at Unknown time Yes Unknown, Entered By History     naltrexone (DEPADE/REVIA) 50 MG tablet Take 1 tablet (50 mg)  by mouth daily Past Week at Unknown time Yes Destiny Patel MD Yes    ondansetron (ZOFRAN ODT) 4 MG ODT tab Take 1 tablet (4 mg) by mouth every 8 hours as needed for nausea or vomiting  at no recent usage Yes Kelvin [place] MD     thiamine (B-1) 100 MG tablet Take 1 tablet (100 mg) by mouth daily Past Week at Unknown time Yes Derik Fischer MD     valACYclovir (VALTREX) 500 MG tablet TAKE 2 TABLETS BY MOUTH DAILY TWICE DAILY FOR 1 DAY PER COLD SORE FLARE  at no recent usage Yes Ga Alaniz PA-C Yes    vitamin B complex with vitamin C (VITAMIN  B COMPLEX) tablet Take 1 tablet by mouth daily Past Week at Unknown time Yes Unknown, Entered By History
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
## 2022-08-25 NOTE — ED NOTES
Pt A&OX3,  ordered breakfast, up to [facility] with stand by assist. PO Ativan given for CIWA 13.  Awaiting detox bed. Updated patient on plan of care. Patient verbalized understanding.

## 2022-08-26 LAB
CHOLEST SERPL-MCNC: 361 MG/DL
GGT SERPL-CCNC: 256 U/L (ref 0–40)
HBA1C MFR BLD: 4.7 % (ref 0–5.6)
HDLC SERPL-MCNC: 246 MG/DL
LDLC SERPL CALC-MCNC: 108 MG/DL
NONHDLC SERPL-MCNC: 115 MG/DL
TRIGL SERPL-MCNC: 36 MG/DL
TSH SERPL DL<=0.005 MIU/L-ACNC: 2.39 MU/L (ref 0.4–4)

## 2022-08-26 PROCEDURE — 80061 LIPID PANEL: CPT | Performed by: PSYCHIATRY & NEUROLOGY

## 2022-08-26 PROCEDURE — 128N000004 HC R&B CD ADULT

## 2022-08-26 PROCEDURE — 250N000011 HC RX IP 250 OP 636: Performed by: PSYCHIATRY & NEUROLOGY

## 2022-08-26 PROCEDURE — 82977 ASSAY OF GGT: CPT | Performed by: PSYCHIATRY & NEUROLOGY

## 2022-08-26 PROCEDURE — 99232 SBSQ HOSP IP/OBS MODERATE 35: CPT

## 2022-08-26 PROCEDURE — 99222 1ST HOSP IP/OBS MODERATE 55: CPT | Mod: AI | Performed by: PSYCHIATRY & NEUROLOGY

## 2022-08-26 PROCEDURE — 83036 HEMOGLOBIN GLYCOSYLATED A1C: CPT | Performed by: PSYCHIATRY & NEUROLOGY

## 2022-08-26 PROCEDURE — HZ2ZZZZ DETOXIFICATION SERVICES FOR SUBSTANCE ABUSE TREATMENT: ICD-10-PCS | Performed by: PSYCHIATRY & NEUROLOGY

## 2022-08-26 PROCEDURE — 250N000013 HC RX MED GY IP 250 OP 250 PS 637: Performed by: PSYCHIATRY & NEUROLOGY

## 2022-08-26 PROCEDURE — 84443 ASSAY THYROID STIM HORMONE: CPT | Performed by: PSYCHIATRY & NEUROLOGY

## 2022-08-26 PROCEDURE — 36415 COLL VENOUS BLD VENIPUNCTURE: CPT | Performed by: PSYCHIATRY & NEUROLOGY

## 2022-08-26 RX ORDER — BUSPIRONE HYDROCHLORIDE 5 MG/1
5 TABLET ORAL 2 TIMES DAILY
Status: DISCONTINUED | OUTPATIENT
Start: 2022-08-26 | End: 2022-08-30

## 2022-08-26 RX ORDER — FOLIC ACID 1 MG/1
1 TABLET ORAL DAILY
Status: DISCONTINUED | OUTPATIENT
Start: 2022-08-26 | End: 2022-08-26

## 2022-08-26 RX ORDER — TETRAHYDROZOLINE HCL 0.05 %
1 DROPS OPHTHALMIC (EYE) 4 TIMES DAILY PRN
Status: DISCONTINUED | OUTPATIENT
Start: 2022-08-26 | End: 2022-08-31 | Stop reason: HOSPADM

## 2022-08-26 RX ORDER — NALTREXONE HYDROCHLORIDE 50 MG/1
50 TABLET, FILM COATED ORAL DAILY
Status: DISCONTINUED | OUTPATIENT
Start: 2022-08-26 | End: 2022-08-31 | Stop reason: HOSPADM

## 2022-08-26 RX ORDER — PANTOPRAZOLE SODIUM 40 MG/1
40 TABLET, DELAYED RELEASE ORAL
Status: DISCONTINUED | OUTPATIENT
Start: 2022-08-26 | End: 2022-08-31 | Stop reason: HOSPADM

## 2022-08-26 RX ORDER — CALCIUM CARBONATE 500 MG/1
500 TABLET, CHEWABLE ORAL 3 TIMES DAILY PRN
Status: DISCONTINUED | OUTPATIENT
Start: 2022-08-26 | End: 2022-08-31 | Stop reason: HOSPADM

## 2022-08-26 RX ORDER — MULTIPLE VITAMINS W/ MINERALS TAB 9MG-400MCG
1 TAB ORAL DAILY
Status: DISCONTINUED | OUTPATIENT
Start: 2022-08-26 | End: 2022-08-26

## 2022-08-26 RX ORDER — ONDANSETRON 4 MG/1
4 TABLET, ORALLY DISINTEGRATING ORAL EVERY 6 HOURS PRN
Status: DISCONTINUED | OUTPATIENT
Start: 2022-08-26 | End: 2022-08-31 | Stop reason: HOSPADM

## 2022-08-26 RX ADMIN — DIAZEPAM 5 MG: 5 TABLET ORAL at 20:42

## 2022-08-26 RX ADMIN — DIAZEPAM 5 MG: 5 TABLET ORAL at 16:53

## 2022-08-26 RX ADMIN — Medication 1 TABLET: at 10:32

## 2022-08-26 RX ADMIN — DIAZEPAM 5 MG: 5 TABLET ORAL at 10:32

## 2022-08-26 RX ADMIN — ATENOLOL 50 MG: 50 TABLET ORAL at 08:44

## 2022-08-26 RX ADMIN — DIAZEPAM 10 MG: 5 TABLET ORAL at 03:09

## 2022-08-26 RX ADMIN — DIAZEPAM 10 MG: 5 TABLET ORAL at 07:21

## 2022-08-26 RX ADMIN — THIAMINE HCL TAB 100 MG 100 MG: 100 TAB at 08:43

## 2022-08-26 RX ADMIN — B-COMPLEX W/ C & FOLIC ACID TAB 1 TABLET: TAB at 10:32

## 2022-08-26 RX ADMIN — BUSPIRONE HYDROCHLORIDE 5 MG: 5 TABLET ORAL at 20:42

## 2022-08-26 RX ADMIN — ONDANSETRON 4 MG: 4 TABLET, ORALLY DISINTEGRATING ORAL at 21:24

## 2022-08-26 RX ADMIN — MULTIPLE VITAMINS W/ MINERALS TAB 1 TABLET: TAB at 08:43

## 2022-08-26 RX ADMIN — FOLIC ACID 1 MG: 1 TABLET ORAL at 08:42

## 2022-08-26 RX ADMIN — NALTREXONE HYDROCHLORIDE 50 MG: 50 TABLET, FILM COATED ORAL at 10:32

## 2022-08-26 RX ADMIN — DIAZEPAM 10 MG: 5 TABLET ORAL at 00:32

## 2022-08-26 RX ADMIN — BUSPIRONE HYDROCHLORIDE 5 MG: 5 TABLET ORAL at 10:32

## 2022-08-26 ASSESSMENT — ACTIVITIES OF DAILY LIVING (ADL)
ORAL_HYGIENE: INDEPENDENT
ADLS_ACUITY_SCORE: 13.5
LAUNDRY: WITH SUPERVISION
ADLS_ACUITY_SCORE: 13.5
DRESS: INDEPENDENT
ADLS_ACUITY_SCORE: 13.5
HYGIENE/GROOMING: INDEPENDENT
ADLS_ACUITY_SCORE: 13.5
ADLS_ACUITY_SCORE: 13.5

## 2022-08-26 NOTE — CONSULTS
"  [facility]  Internal Medicine Consult     Alana Silvestre MRN# [id number]   Age: 46 year old YOB: 1975     Date of Admission: 8/25/2022  Date of Consult: 8/26/2022    Primary Care Provider: No Ref-Primary, Physician    Requesting Service: Behavioral Health - Ga Palma MD  Reason for Consult: General Medical Evaluation      SUBJECTIVE   CC:   \"I feel OK\"   Assessment and Plan/Recommendations:     Alana silvestre is a 46 year old female with a PMHx of alcohol withdrawal syndrome, bulimia/anorexia, osteoporosis, GERD, hypercalcemia, hypomagnesemia, and hyponatremia. She was admitted to [facility] for management of withdrawal from alcohol.     # Alcohol withdrawal, hx of alcohol use disorder   Pt states she quit drinking last Friday. She had been having 4 beers a day and after she stopped, drank mouthwash for the withdrawal symptoms.   - MSSA 9 this shift.  No hx of withdrawal seizures. Not currently on AEDs.    - Continue MSSA   - Folvite, multi-vites, thiamine supplementation   - Further management per Psychiatry     # Elevated blood pressure  # Tachycardia  No known history of heart disease. Blood pressure trending down and normal rhythm and rate on exam  - monitor and notify medicine with persistent tachycardia and hypertension    # Osteoporosis  - continue PTA calcium citrate with vitamin D    # Bulimia  # Anorexia  States she is eating what is normal for her. Declines Ensure supplement.   - continue vitamin B complex with vitamin C, MVI  - management per psychiatry    # GERD   Has a history of GERD with her eating disorders. States she forgets to take it. Burning in stomach and chest worse in the setting of acute withdrawal.   - protonix 40 mg daily while inpatient and having s/s, do not need to order at discharge  - TUMS TID PRN    # Dry/red eyes- chronic  - visine drops as needed    Currently, medically stable and internal medicine will sign off. Please contact if future " questions or concerns arise. Thank you for the opportunity to be a part of this patient's care.      SUE Ferreira CNP  Internal Medicine [facility] Hospitalist  Page job code 8444 (3B), 1162 (3A), or 1139 ([facility] and 4A)  Text paging via [employer] is appreciated  August 26, 2022         HPI:   Pt ambulating on unit with walker. States she feels she is doing well. Uses the walker for shakiness and states she gets nervous and weak when people stare at her. She endorses some GERD s/s that can be treated while here.     Medical co-morbidities stable at this point with no further management while inpatient.        Past Medical History:     Past Medical History:   Diagnosis Date     RUPINDER (acute kidney injury) (H) 9/14/2018     Alcohol withdrawal (H) 8/26/2018     Anorexia      Anxiety      Bulimia      Chemical dependency (H) 11/24/2015    Alcohol in treatment     Depressive disorder      Genital herpes      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/15    NIL, +HR HPV. Co-test 12 months     Osteoporosis      Substance abuse (H)         Reviewed and updated in [employer].     Past Surgical History:      Past Surgical History:   Procedure Laterality Date     LEEP TX, CERVICAL      greater than 5 years ago from 2015, uncertain date         Social History:     Social History     Tobacco Use     Smoking status: Former Smoker     Packs/day: 0.00     Smokeless tobacco: Never Used   Vaping Use     Vaping Use: Former   Substance Use Topics     Alcohol use: Not Currently     Alcohol/week: 35.0 standard drinks     Types: 21 Glasses of wine, 14 Cans of beer per week     Comment: 06/27/2022     Drug use: No        Family History:     Family History   Problem Relation Age of Onset     Breast Cancer Mother 68        2014/2015     Hypertension Mother      Hypertension Father      No Known Problems Maternal Grandmother      No Known Problems Maternal Grandfather      No Known Problems Paternal Grandmother      No Known Problems Paternal Grandfather      No Known  "Problems Brother      Unknown/Adopted No family hx of      Depression No family hx of      Anxiety Disorder No family hx of      Schizophrenia No family hx of      Bipolar Disorder No family hx of      Suicide No family hx of      Substance Abuse No family hx of      Dementia No family hx of      Laurel Disease No family hx of      Parkinsonism No family hx of      Autism Spectrum Disorder No family hx of      Intellectual Disability (Mental Retardation) No family hx of      Mental Illness No family hx of          Allergies:     Allergies   Allergen Reactions     No Clinical Screening - See Comments      PN: LW CM1: CONTRAST- NKA Reaction :         Medications:   Reviewed. Please see MAR     Review of Systems:   10 point ROS of systems including Constitutional, Eyes, Respiratory, Cardiovascular, Gastroenterology, Genitourinary, Integumentary, Muscularskeletal, Psychiatric were all negative except for pertinent positives noted in my HPI.    OBJECTIVE   Physical Exam:   Vitals were reviewed  Blood pressure (!) 143/99, pulse (!) 122, temperature 98  F (36.7  C), temperature source Temporal, resp. rate 16, height 1.65 m (5' 4.96\"), weight 46.7 kg (103 lb), SpO2 98 %, not currently breastfeeding.  General: Alert and oriented x3, mild distress 3  HEENT: Anicteric sclera, MMM  Cardiovascular: RRR, S1S2. No murmur noted  Lungs: CTAB without wheezing or crackles   GI: Abdomen soft, non-tender with bowel sounds present. No guarding or rebound   Vascular: No peripheral edema, distal pulses palpable  Neurologic: No focal deficits, CN II-XII grossly intact  Skin: No jaundice, rashes, or lesions        Data:        Lab Results   Component Value Date     08/25/2022     12/19/2020    Lab Results   Component Value Date    CHLORIDE 100 08/25/2022    CHLORIDE 94 07/20/2022    CHLORIDE 104 12/19/2020    Lab Results   Component Value Date    BUN 3.3 08/25/2022    BUN 6 07/20/2022    BUN 3 12/19/2020      Lab Results "   Component Value Date    POTASSIUM 3.9 08/25/2022    POTASSIUM 4.2 07/20/2022    POTASSIUM 3.9 12/19/2020    Lab Results   Component Value Date    CO2 24 08/25/2022    CO2 20 07/20/2022    CO2 30 12/19/2020    Lab Results   Component Value Date    CR 0.69 08/25/2022    CR 0.69 12/19/2020        Lab Results   Component Value Date    WBC 2.8 (L) 08/25/2022    HGB 11.6 (L) 08/25/2022    HCT 35.3 08/25/2022    MCV 99 08/25/2022     (L) 08/25/2022     Lab Results   Component Value Date    WBC 2.8 (L) 08/25/2022

## 2022-08-26 NOTE — PROGRESS NOTES
Pt scored 11, 13 and 9  For MSSA and Valium 10 mg prn given x 3 this shift. Pt appeared to be sleeping  About 5.25 hours this shift.   Pt was tachycardic with pulse ranging from 126-94. Atenolol was not available in pyxis when pulse was 126. When pharmacy tubed medication HR did not meet order parameters to be given. No other concerns reported or noted. Will continue to monitor.

## 2022-08-26 NOTE — PLAN OF CARE
"Goal Outcome Evaluation:    Plan of Care Reviewed With: patient        Pt admitted for alcohol withdrawal MSSA 9 & 7 received valium 5 mg  X 1 this shift.  She has been visible on the unit, social with peers and denies any needs or concerns.  She reported feeling a little light headed after receiving atenolol for elevated pulse.  She is using a wheeled walker and has a bell at bedside and will request assistance as needed.  She has an eating disorder and I placed an order for a nutritional consult as patient appears malnourished, reported some weight loss and would like more food options. She reports that she is a very picky eater and her appetite is low but more likely  related to ED then alcohol withdrawal. She reports good fluid intake and has a pitcher of water at bedside. Her affect is tense, anxiety 4/10 declined gabapentin \"I do not like the way it makes me feel.\"  She denies depression and is hopeful to discharge before Monday to get back to work.  Discharge plan is pending.   "

## 2022-08-26 NOTE — PROGRESS NOTES
Met with pt for discharge planning.  Pt reports a plan to return home and go back to work.  She is interested in finding non 12-step based support groups and a therapist.  Will assist Pt with finding programs and introduce Pt to a tool to find a therapist.  Pt also reports hx of eating disorder.  Pt reports hoping for discharge by Sunday evening so she can return to work on Monday.

## 2022-08-26 NOTE — DISCHARGE INSTRUCTIONS
"Behavioral Discharge Planning and Instructions  THANK YOU FOR CHOOSING THE [address]  [phone]    Summary: You were admitted to [facility] on 8/25/22 for detoxification from alcohol.  A medical exam was performed that included lab work. You have met with a  and opted to pursue individual therapy and SMART Recovery.  Please take care and make your recovery a priority!    Main Diagnosis: Per Dr. Ga Palma MD;  303.90 (F10.20) Alcohol Use Disorder Severe    Recommendation:  If you need more support to maintain sobriety please contact Behavioral Access @ [phone] to schedule a substance use disorder assessment and follow the recommendations of that assessment.    You have been provided information on SMART Recovery.  To find a therapist go to psychologytoday.com and click on \"find a therapist\".  This site allows you to search for a therapist based on a number of variables including location, insurance, and issues.    Disposition: Home    Primary Provider:  Kailee Caputo  [address] [place] [address] [place] [address]  [phone]  Follow-up: Wednesday, September 7 @ 2:15 PM    Psychiatry Follow-up: Patient indicated she wants to research out providers    Major Treatments, Procedures and Findings:  You have withdrawn from alcohol using the MSS protocol with Valium.  You have met with a  to develop a treatment plan for discharge.  You have had labs drawn and those results have been reviewed with you. Please take a copy of your lab work with you to your next primary care physician appointment.    Symptoms to Report:  If you experience more anxiety, confusion, sleeplessness, deep sadness or thoughts of suicide, notify your treatment team or notify your primary care physician. IF ANY OF THE SYMPTOMS YOU ARE EXPERIENCING ARE A MEDICAL EMERGENCY CALL 911 IMMEDIATELY.     Lifestyle Adjustment: Adjust your lifestyle to get enough sleep, relaxation, " "exercise and  good nutrition. Continue to develop healthy coping skills to decrease stress and promote a sober living environment. Do not use alcohol, illegal drugs or addictive medications other than what is currently prescribed. AA, NA, and  Sponsor are excellent resources for support.     General Medication Instructions:   See your medication sheet(s) for instructions.   Take all medicines as directed.  Make no changes unless your doctor suggests them.     DISCHARGE RESOURCES:  Facts about COVID19 at www.cdc.gov/COVID19 and www.[place].gov/covid19    Keeping hands clean is one of the most important steps we can take to avoid getting sick and spreading germs to others.  Please wash your hands frequently and lather with soap for at least 20 seconds!    Recovery apps for your phone to locate current in person and zoom recovery meetings  [place] [place] - meeting rashel  AA  - meeting rashel  Meeting guide - meeting rashel  Quick NA meeting - meeting rashel  [employer]- has various apps    Great Pod casts for nutrition and wellness  Listen on Apple Podcasts  Dishing Up Nutrition   [employer]   Nutrition       Understand the connection between what you eat and how you feel. Hosted by licensed nutritionists and dietitians from [employer] we share practical, real-life solutions for healthier living through nutrition.     Resources:   Resources for on line recovery meetings:  *due to covid-19 AA/NA meetings are being held online*  AA meetings can be found online; search for them at: http://aa-intergroup.org/directory.php  AA meetings via ZOOM for [place] area can be found online at: https://a[place].org/find-a-meeting/holiday-closings/  NA meetings via ZOOM for [place] area can be found online at: https://sites.[employer].com/view/[place]regionofnarcoticsanonymous/home?authuser=2  Www.[employer]  has online resources for meeting and recovery care including Podcast \"Let's Talk:Addiction & Recovery " Podcasts  Www.[place]Inkvite.org   -SMART Recovery - self management for addiction recovery:  www.smartrecovery.org    -Pathways ~ A Health Crisis Resource & Support Center: 147.782.2394.  -[facility] [phone]   -[facility] [phone] or 050-198-2900.  -Suicide Awareness Voices of Education (SAVE) (www.save.org): 580-006-KFLS (5660)  -National Suicide Prevention Line (www.mentalhealthmn.org): 195-526-EHRH (2681)  -National [place] on Mental Illness (www.[place].silvia.org): 583.990.3545 or 734-106-7393.  -Igxu8jwjf: text the word LIFE to 03725 for immediate support and crisis intervention  -Mental Health Consumer/Survivor Network of [place] (www.mhcsn.net): 335.529.5466 or 311-289-9453  -Mental Health Association of [place] (www.mentalhealth.org): 708.538.9117 or 110-229-0524     -Substance Abuse and Mental Health Services (www.samhsa.gov)  -Harm Reduction Coalition (www. Harmreduction.org)  -www.prescribetoprevent.org or http://prescribetoprevent.org/video  -Poison control 8-431-642-5660   **[place] Opioid Prevention Coalition: www.opioidcoalition.org    Sober Support Group Information:  AA/NA & Sponsor/Support  -Alcoholics Anonymous (www.alcoholics-anonymous.org): for local information 24 hours/day  -AA Intergroup service office in [place] (http://www.aastpaul.org/) 122.948.2355  -AA Intergroup service office in [facility]: 713.138.8448. (http://www.aamin[place].org/)  -Narcotics Anonymous (www.nam[place].org) (477) 931-7597   **Sober Fun Activities: www.soberVigilant Technologyactivities.[employer]/[facility]//[facility] Recovery Connection (MRC)  MRC connects people seeking recovery to resources that help foster and sustain long-term recovery.  Whether you are seeking resources for treatment, transportation, housing, job training, education, health care or other pathways to recovery, [facility] is a great place to start.    Phone: 127.895.8803.  www.[place]In[place]ite.[employer]Soils (Great  listing of all types of recovery and non-recovery related resources)    Any follow up concerns:  Nursing questions call the Unit -[facility] [phone]  Medical Record call [phone]  Outpatient Behavioral Intake call [phone]  LP+ Wait List/Bed Availability call [phone]    The entire treatment team has appreciated the opportunity to work with you.  We wish you the best in the future and with your lifelong recovery goals. Please bring this discharge folder with you to all follow up appointments.  It contains your lab results, diagnosis, medication list and discharge recommendations.    THANK YOU FOR CHOOSING THE [facility]

## 2022-08-26 NOTE — PLAN OF CARE
Behavioral Team Discussion: (8/26/2022)    Continued Stay Criteria/Rationale: Patient admitted for alcohol withdrawal and Use Disorder.  Plan: The following services will be provided to the patient; psychiatric assessment, medication management, therapeutic milieu, individual and group support, and skills groups.   Participants: 3A Provider: Dr. Ga Palma MD; 3A RN: Jonathan Galeas RN; 3A CM's: Katerin Hutchinson .  Summary/Recommendation: Providers will assess today for treatment recommendations, discharge planning, and aftercare plans. CM will meet with pt for discharge planning.   Medical/Physical: none noted  Precautions:   Behavioral Orders   Procedures     Code 1 - Restrict to Unit     Fall precautions     Routine Programming     As clinically indicated     Status 15     Every 15 minutes.     Withdrawal precautions     Rationale for change in precautions or plan: N/A  Progress: Initial.    ASAM Dimension Scale Ratings:  Dimension 1: 3 Client tolerates and sourav with withdrawal discomfort poorly. Client has severe intoxication, such that the client endangers self or others, or intoxication has not abated with less intensive levels of services. Client displays severe signs and symptoms; or risk of severe, but manageable withdrawal; or withdrawal worsening despite detox at less intensive level.  Dimension 2: 1 Client tolerates and sourav with physical discomfort and is able to get the services that the client needs.  Dimension 3: 2 Client has difficulty with impulse control and lacks coping skills. Client has thoughts of suicide or harm to others without means; however, the thoughts may interfere with participation in some treatment activities. Client has difficulty functioning in significant life areas. Client has moderate symptoms of emotional, behavioral, or cognitive problems. Client is able to participate in most treatment activities.  Dimension 4: 2 Client displays verbal compliance, but lacks consistent  behaviors; has low motivation for change; and is passively involved in treatment.  Dimension 5: 3 Client has poor recognition and understanding of relapse and recidivism issues and displays moderately high vulnerability for further substance use or mental health problems. Client has few coping skills and rarely applies coping skills.  Dimension 6: 3 Client is not engaged in structured, meaningful activity and the client's peers, family, significant other, and living environment are unsupportive, or there is significant criminal justice system involvement.

## 2022-08-26 NOTE — PROGRESS NOTES
08/25/22 2030   Patient Belongings   Did you bring any home meds/supplements to the hospital?  Yes   Disposition of meds  Sent to security/pharmacy per site process   Patient Belongings other (see comments)   Belongings Search Yes   Clothing Search Yes   Second Staff Deepa Zelaya     Storage bin: purse, leather bag, plastic bag, restricted clothing, towel, washcloth, hat, bag of makeup items, Ziploc with toiletry items, Ziploc with glue on nails, bottle of vitamin gummies, box of electrolyte supplement,     Box in med room: phone,  cord, key on fob    Security env # 52217: [place] 's lic, [employer] card, Visa card    Security env # 45058: omeprozole 20 mg    Security env # 33061: Busperine 5 mg    ADMISSION:  I am responsible for any personal items that are not sent to the safe or pharmacy. [facility] is not responsible for loss, theft or damage of any property in my possession.    Patient Signature _______________________________________ Date/Time _____________________    Staff Signature _________________________________________ Date/Time _____________________    2nd Staff person, if patient is unable/unwilling to sign    _____________________________________________________ Date/Time _____________________    DISCHARGE:    All personal items have been returned to me.    Patient Signature __________________________________________ Date/Time _____________________    Staff Signature ___________________________________________ Date/Time _____________________

## 2022-08-26 NOTE — PLAN OF CARE
"Problem: Alcohol Withdrawal  Goal: Alcohol Withdrawal Symptom Control  Outcome: Ongoing, Progressing         S = Situation:   Alana Mujica is a 46 year old year old female with a chief complaint of Alcohol use disorder    Voluntary admission to [facility] for Alcohol withdrawal and detox.    B  = Background:   Substance use history: Alcohol  Mental health history: None reported  Medical history: None  Legal history: Voluntary  Treatment history: no previous history detox/ treatment reported  Living situation: home with significant other.  Recent life stressors: None reported.     A  =  Assessment:   During admission interview, pt affect was flat and blunted. She reported headache 5/10 but denied all other concerns.    MSSA  13  Valium 10 mg  And tylenol were given .    /89   Pulse 105   Temp 97.5  F (36.4  C) (Temporal)   Resp 18   Ht 1.65 m (5' 4.96\")   Wt 46.7 kg (103 lb)   SpO2 98%   BMI 17.16 kg/m       R =   Request or Recommendation:   Patient's alcohol withdrawal will be monitored and treated using MSSA with valium.  Pt will meet with psychiatry, internal medicine, and case management tomorrow.  At the time of admission, pt reports discharge plan is to detox and get back to work.      "

## 2022-08-26 NOTE — PHARMACY-ADMISSION MEDICATION HISTORY
Please see Admission Medication History note completed by a pharmacist on 08/25/2022 under previous encounter at [facility] for information regarding prior to admission medications.    Nicole Arriola, PharmD, [facility]  Behavioral Health Pharmacist  [facility] Ascom: *[phone] or *[phone]
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
## 2022-08-27 PROCEDURE — 250N000013 HC RX MED GY IP 250 OP 250 PS 637

## 2022-08-27 PROCEDURE — 250N000011 HC RX IP 250 OP 636: Performed by: PSYCHIATRY & NEUROLOGY

## 2022-08-27 PROCEDURE — 250N000013 HC RX MED GY IP 250 OP 250 PS 637: Performed by: PSYCHIATRY & NEUROLOGY

## 2022-08-27 PROCEDURE — H2032 ACTIVITY THERAPY, PER 15 MIN: HCPCS

## 2022-08-27 PROCEDURE — 128N000004 HC R&B CD ADULT

## 2022-08-27 RX ADMIN — DIAZEPAM 5 MG: 5 TABLET ORAL at 16:15

## 2022-08-27 RX ADMIN — PANTOPRAZOLE SODIUM 40 MG: 40 TABLET, DELAYED RELEASE ORAL at 09:08

## 2022-08-27 RX ADMIN — TETRAHYDROZOLINE HCL 1 DROP: 0.05 LIQUID OPHTHALMIC at 12:51

## 2022-08-27 RX ADMIN — DIAZEPAM 5 MG: 5 TABLET ORAL at 09:08

## 2022-08-27 RX ADMIN — Medication 1 TABLET: at 20:06

## 2022-08-27 RX ADMIN — DIAZEPAM 10 MG: 5 TABLET ORAL at 00:00

## 2022-08-27 RX ADMIN — TRAZODONE HYDROCHLORIDE 50 MG: 50 TABLET ORAL at 00:02

## 2022-08-27 RX ADMIN — DIAZEPAM 5 MG: 5 TABLET ORAL at 12:50

## 2022-08-27 RX ADMIN — BUSPIRONE HYDROCHLORIDE 5 MG: 5 TABLET ORAL at 09:08

## 2022-08-27 RX ADMIN — ONDANSETRON 4 MG: 4 TABLET, ORALLY DISINTEGRATING ORAL at 04:35

## 2022-08-27 RX ADMIN — GABAPENTIN 100 MG: 100 CAPSULE ORAL at 04:35

## 2022-08-27 RX ADMIN — ONDANSETRON 4 MG: 4 TABLET, ORALLY DISINTEGRATING ORAL at 09:08

## 2022-08-27 RX ADMIN — BUSPIRONE HYDROCHLORIDE 5 MG: 5 TABLET ORAL at 20:06

## 2022-08-27 RX ADMIN — ALUMINUM HYDROXIDE, MAGNESIUM HYDROXIDE, AND SIMETHICONE 30 ML: 200; 200; 20 SUSPENSION ORAL at 20:06

## 2022-08-27 RX ADMIN — CALCIUM CARBONATE (ANTACID) CHEW TAB 500 MG 500 MG: 500 CHEW TAB at 19:13

## 2022-08-27 RX ADMIN — ONDANSETRON 4 MG: 4 TABLET, ORALLY DISINTEGRATING ORAL at 16:15

## 2022-08-27 ASSESSMENT — ACTIVITIES OF DAILY LIVING (ADL)
ADLS_ACUITY_SCORE: 13.5
ADLS_ACUITY_SCORE: 13.5
LAUNDRY: WITH SUPERVISION
ADLS_ACUITY_SCORE: 13.5
ADLS_ACUITY_SCORE: 13.5
HYGIENE/GROOMING: INDEPENDENT
ADLS_ACUITY_SCORE: 13.5
DRESS: STREET CLOTHES;INDEPENDENT
ADLS_ACUITY_SCORE: 13.5
DRESS: INDEPENDENT
ORAL_HYGIENE: INDEPENDENT
HYGIENE/GROOMING: HANDWASHING;INDEPENDENT
ADLS_ACUITY_SCORE: 13.5
ORAL_HYGIENE: INDEPENDENT

## 2022-08-27 NOTE — PLAN OF CARE
Goal Outcome Evaluation:    Plan of Care Reviewed With: patient         Pt admitted for alcohol withdrawal. MSSA 9 & 8 requiring valium 5 mg X 2 this shift.  She has been up and talking on the phone most of the shift. She reports her appetite is good, although she eats small meals and has an eating disorder. She reported an emesis shortly after breakfast and was given Zofran.  Her morning vitamins were held and no further emesis this shift. Her heart rate was elevated this am, reported anxiety 8/10, deconditioned and likely dehydrated. She denies SOB, chest pain and we discussed atenolol but she declined as it was felt it cause some dizziness yesterday.  Will continue to monitor and treat alcohol withdrawal, encourage fluids and moved to a room closer to the desk.  She was also placed in a medical bed due to mobility issues. She denies depression and is hopeful to discharge to home when OOD.

## 2022-08-27 NOTE — PLAN OF CARE
Problem: Alcohol Withdrawal  Goal: Alcohol Withdrawal Symptom Control  Outcome: Ongoing, Progressing   Goal Outcome Evaluation:        Pt is on MSSA for alcohol withdrawal. Pt scored 9 and 6.   Pt received prn valium 10mg at midnight.  At 0400, BP 97/65. No valium given per order. Pt received  Gabapentin and zofran for anxiety and nausea.     Pt slept on and off for 4.5 hours.

## 2022-08-27 NOTE — PROGRESS NOTES
"CLINICAL NUTRITION SERVICES - ASSESSMENT NOTE     Nutrition Prescription    Malnutrition Status:    Severe malnutrition in the context of acute on chronic illness.    Recommendations already ordered by Registered Dietitian (RD):  1. Please allow write-in options of chicken Caesar salad and Chicken spinach salad (balsamic dressing).   2. Raw veggies and hummus @10AM snack.  3. Cottage cheese with canteloupe and grapes @2PM snack.  4. BLIND WEIGHTS ONLY.  5. NO nutrition facts labels on any supps/snacks/beverages etc.  6. Tequila Mobile 1x/day @Breakfast. Please allow patient to order more PRN.    Future/Additional Recommendations:  Monitor PO intake, wt trends, supplement/snack acceptance.     REASON FOR ASSESSMENT  Alana Silvestre is a/an 46 year old female assessed by the dietitian for Patient/Family Request -- Weight loss, Malnourished, very selective diet    PMH  Alana silvestre is a 46 year old female with a PMHx of alcohol withdrawal syndrome, bulimia/anorexia, osteoporosis, GERD, hypercalcemia, hypomagnesemia, and hyponatremia. She was admitted to [facility] for management of withdrawal from alcohol.     NUTRITION HISTORY  Spoke to Alana over the phone as she was sleeping when RDN visited her earlier. She reports restricting some intake here due to disliking the food. She reports being picky/a very particular eater at home and that the food \"scares\" her here. She does report liking fruit, salads, raw hummus, string cheese, cottage cheese etc. She was open to all interventions given. Reports her eating disorder was worse in the past.    CURRENT NUTRITION ORDERS  Diet: Regular  Intake/Tolerance: poor PO per nursing and patient report     LABS  Labs reviewed 8/27    MEDICATIONS  Medications reviewed  Caltrate  Folic acid 1mg  Thera-vit-M  Protonix  Thiamine 100mg  Vitamin B complex with Vitamin C  Zofran  Senna    ANTHROPOMETRICS  Height: 165 cm (5' 4.961\")  Most Recent Weight: 46.7 kg (103 lb)    IBW: 56.7 " kg  %IBW: 82%  BMI (kg/m2): 17.16kg/m  Weight History: 24.7kg (8.6%) weight loss x5 months.  Wt Readings from Last 20 Encounters:   08/25/22 46.7 kg (103 lb)   06/28/22 46.7 kg (103 lb)   06/23/22 54.1 kg (119 lb 4 oz)   05/12/22 49.4 kg (109 lb)   03/31/22 51.3 kg (113 lb)   03/17/22 51.4 kg (113 lb 6.4 oz)   10/06/20 52.2 kg (115 lb)   02/06/20 54 kg (119 lb)   07/24/19 54 kg (119 lb)   07/17/19 53.1 kg (117 lb)   07/02/19 55.4 kg (122 lb 1.6 oz)   06/24/19 54.7 kg (120 lb 9.6 oz)   05/16/19 54 kg (119 lb)   05/02/19 52.2 kg (115 lb 1.3 oz)   02/22/19 53.1 kg (117 lb)   12/04/18 53.1 kg (117 lb)   09/14/18 53.5 kg (118 lb)   08/26/18 53.3 kg (117 lb 8 oz)   06/22/18 54 kg (119 lb)   06/04/18 54 kg (119 lb)     Dosing Weight: 46.7 Kg (current wt)    ASSESSED NUTRITION NEEDS  Estimated Energy Needs: 6495-7791 kcals/day (30 - 35 kcals/kg )  Justification: Repletion and Underweight  Estimated Protein Needs: 56-70 grams protein/day (1.2 - 1.5 grams of pro/kg)  Justification: Repletion  Estimated Fluid Needs: 0244-8767 mL/day (1 mL/kcal)   Justification: Maintenance    PHYSICAL FINDINGS  See malnutrition section below.    MALNUTRITION  % Intake: < 75% for > 7 days (moderate)  % Weight Loss: > 10% in 6 months (severe) -- (8.6%) weight loss x5 months.  Subcutaneous Fat Loss: appears severe, visually  Muscle Loss: appears severe, visually  Fluid Accumulation/Edema: None noted  Malnutrition Diagnosis: Severe malnutrition in the context of acute on chronic illness.     NUTRITION DIAGNOSIS  Inadequate oral intake related to altered mental health, alcohol abuse, eating disorder as evidenced by patient report.    INTERVENTIONS  Implementation  Nutrition Education: Unable to complete due to pt sleeping.  Modify composition of meals/snacks  Multivitamin/mineral supplement therapy      Goals  Patient to consume % of nutritionally adequate meal trays TID, or the equivalent with supplements/snacks.      Monitoring/Evaluation  Progress toward goals will be monitored and evaluated per protocol.  Rosita Jo, MPH, RDN, LD  Behavioral Clinical Dietitian  Mental Health Pager (M-F): [phone]  On Call Pager (weekends only): [phone]

## 2022-08-27 NOTE — PLAN OF CARE
"  Problem: Substance Withdrawal  Goal: Substance Withdrawal  Description: Signs and symptoms of listed problems will be absent or manageable.  Outcome: Ongoing, Progressing     Problem: Substance Withdrawal  Intervention: Social and Therapeutic Interv (Substance Withdrawal)  Recent Flowsheet Documentation  Taken 8/26/2022 2130 by Shweta Julio, RN  Social and Therapeutic Interventions (Substance Withdrawal):   encourage socialization with peers   encourage effective boundaries with peers   encourage participation in therapeutic groups and milieu activities   Goal Outcome Evaluation:    Plan of Care Reviewed With: patient      Patient bed resting the duration of the shift. Patient's gait remains unsteady and patient continues to be tremulous. Affect flat, blunted. Mood is anxious. Denies SI, SIB, HI, or hallucinations.   Continues to be monitored for alcohol withdrawal. MSSA scores 8 and 10.   VSS, denies pain. Appetite poor. Ate very little at dinner and reported an emesis after attempting to eat dinner. RN noted some undigested food. Patient given a basin at bedside. Order obtained for prn Zofran for nausea. Patient also offered regular ginger ale and patient asked \"is it diet?\" Continue to encourage fluids and encourage to use call bell to ask for assistance.   Scheduled calcium supplement held due to nausea.  Patient also offered snack and refused snacks.   Patient reports plan is to discharge when out of detox and go back to work on Monday.   PRNS given: Valium 5 mg times 2 for alcohol withdrawal symptoms. PRN Zofran 4 mg times one for nausea.  Patient denies any additional concerns.  BP (!) 131/96 (BP Location: Right arm, Patient Position: Sitting)   Pulse 90   Temp 97.1  F (36.2  C) (Temporal)   Resp 16   Ht 1.65 m (5' 4.96\")   Wt 46.7 kg (103 lb)   SpO2 99%   BMI 17.16 kg/m         "

## 2022-08-27 NOTE — PROGRESS NOTES
08/27/22 1800   Group Therapy Session   Time Session Began 1645   Time Session Ended 1730   Total Time patient participated (minutes) 45   Total # Attendees 4   Group Type life skill;expressive therapy   Group Topic Covered cognitive therapy techniques;coping skills/lifestyle management;relationship   Group Session Detail DBT FAST skill & Song examples   Patient Response/Contribution cooperative with task   Patient Response Detail Actively participated in discussion following 3 song examples where patients identified lyrics that resonated or did not match the FAST DBT skill for interpersonal effectiveness.  Shared from their experience and of how they interpreted the songwriters intentions and how healthy they were.  Engaged participation.  Angelica referred to having some previous experience with DBT and earnestly contributed to group discussion.

## 2022-08-28 PROCEDURE — 128N000004 HC R&B CD ADULT

## 2022-08-28 PROCEDURE — 250N000013 HC RX MED GY IP 250 OP 250 PS 637: Performed by: PSYCHIATRY & NEUROLOGY

## 2022-08-28 PROCEDURE — H2035 A/D TX PROGRAM, PER HOUR: HCPCS | Mod: HQ

## 2022-08-28 PROCEDURE — 250N000013 HC RX MED GY IP 250 OP 250 PS 637

## 2022-08-28 RX ORDER — HYDROXYZINE HYDROCHLORIDE 25 MG/1
25 TABLET, FILM COATED ORAL EVERY 4 HOURS PRN
Status: DISCONTINUED | OUTPATIENT
Start: 2022-08-28 | End: 2022-08-31 | Stop reason: HOSPADM

## 2022-08-28 RX ADMIN — THIAMINE HCL TAB 100 MG 100 MG: 100 TAB at 09:12

## 2022-08-28 RX ADMIN — NALTREXONE HYDROCHLORIDE 50 MG: 50 TABLET, FILM COATED ORAL at 12:46

## 2022-08-28 RX ADMIN — B-COMPLEX W/ C & FOLIC ACID TAB 1 TABLET: TAB at 12:46

## 2022-08-28 RX ADMIN — ACETAMINOPHEN 650 MG: 325 TABLET, FILM COATED ORAL at 14:03

## 2022-08-28 RX ADMIN — Medication 1 TABLET: at 09:11

## 2022-08-28 RX ADMIN — MULTIPLE VITAMINS W/ MINERALS TAB 1 TABLET: TAB at 12:46

## 2022-08-28 RX ADMIN — ACETAMINOPHEN 650 MG: 325 TABLET, FILM COATED ORAL at 20:11

## 2022-08-28 RX ADMIN — Medication 1 TABLET: at 20:11

## 2022-08-28 RX ADMIN — PANTOPRAZOLE SODIUM 40 MG: 40 TABLET, DELAYED RELEASE ORAL at 09:11

## 2022-08-28 RX ADMIN — FOLIC ACID 1 MG: 1 TABLET ORAL at 09:12

## 2022-08-28 RX ADMIN — ALUMINUM HYDROXIDE, MAGNESIUM HYDROXIDE, AND SIMETHICONE 30 ML: 200; 200; 20 SUSPENSION ORAL at 20:11

## 2022-08-28 RX ADMIN — GABAPENTIN 100 MG: 100 CAPSULE ORAL at 03:53

## 2022-08-28 RX ADMIN — BUSPIRONE HYDROCHLORIDE 5 MG: 5 TABLET ORAL at 20:11

## 2022-08-28 RX ADMIN — ACETAMINOPHEN 650 MG: 325 TABLET, FILM COATED ORAL at 03:53

## 2022-08-28 RX ADMIN — DIAZEPAM 5 MG: 5 TABLET ORAL at 09:12

## 2022-08-28 RX ADMIN — HYDROXYZINE HYDROCHLORIDE 25 MG: 25 TABLET, FILM COATED ORAL at 21:41

## 2022-08-28 RX ADMIN — BUSPIRONE HYDROCHLORIDE 5 MG: 5 TABLET ORAL at 09:12

## 2022-08-28 ASSESSMENT — ACTIVITIES OF DAILY LIVING (ADL)
ORAL_HYGIENE: INDEPENDENT
ADLS_ACUITY_SCORE: 13.5
DRESS: STREET CLOTHES;INDEPENDENT
ADLS_ACUITY_SCORE: 13.5
HYGIENE/GROOMING: HANDWASHING;INDEPENDENT
ADLS_ACUITY_SCORE: 13.5
DRESS: INDEPENDENT
ADLS_ACUITY_SCORE: 13.5
LAUNDRY: WITH SUPERVISION
ADLS_ACUITY_SCORE: 13.5
ORAL_HYGIENE: INDEPENDENT
HYGIENE/GROOMING: HANDWASHING;INDEPENDENT
ADLS_ACUITY_SCORE: 13.5

## 2022-08-28 NOTE — PLAN OF CARE
"  Problem: Alcohol Withdrawal  Goal: Alcohol Withdrawal Symptom Control  Outcome: Ongoing, Progressing     Problem: Acute Neurologic Deterioration (Alcohol Withdrawal)  Goal: Optimal Neurologic Function  Outcome: Ongoing, Progressing     Problem: Suicidal Behavior  Goal: Suicidal Behavior is Absent or Managed  Outcome: Ongoing, Progressing   Goal Outcome Evaluation:    Plan of Care Reviewed With: patient      Patient visible in milieu. Affect flat, blunted. Mood is calm. Denies SI, SIB, HI, or hallucinations.   Continues to be monitored for alcohol withdrawal. MSSA scores 9 and 7.   VSS, elevated pulse times one. Appetite decreased, intake fair. Reported nausea, denied any emesis. Denies pain.  Continues to ambulated with rolling walker. Continues to utilize an adjustable medical bed for safety.   Patient is continuing to state she wants to discharge as soon as she is no longer in alcohol withdrawal.   PRNS given: Valium 5 mg times one, zofran 4 mg times one, and milk of magnesia times one.   Patient denies any additional concerns.   /75 (BP Location: Left arm)   Pulse 98   Temp 97.3  F (36.3  C) (Temporal)   Resp 16   Ht 1.65 m (5' 4.96\")   Wt 46.7 kg (103 lb)   SpO2 96%   BMI 17.16 kg/m              "

## 2022-08-28 NOTE — PLAN OF CARE
08/28/22 1415   Group Therapy Session   Group Attendance attended group session   Time Session Began 13:15   Time Session Ended 14:05   Total Time patient participated (minutes) 50   Total # Attendees 7   Group Type psychotherapy   Group Topic Covered Positive reflections and ways to prevent relapse by identifying triggers, processing them and using help to address them   Group Session Detail Psychotherapy group to facilitate coping skills to help patients explore effective ways to prevent relapse by identifying and paying attention to triggering circumstances/events. Reviewed environmental triggers as well as historical triggers. Pts identified areas of struggle. Used group support and feedback to provide re-direction, corrective interpretations/thoughts- aimed at maintaining positive and healthy response to a triggering event. Identified scenarios involving self-medication and provided redirections and emphasis on need to always follow the doctor's prescription. Emphasized ways to build positive relationships and to establish appropriate boundaries with friends or social networks that may be triggering.   Patient Response/Contribution Participated fully. Reported that she is working towards alcoholism prevention   Patient Response Detail Pt discussed motivations for wanting to explore ways to avoid relapse.

## 2022-08-28 NOTE — PROGRESS NOTES
PA reported that patient requested to take a shower and felt steady enough to complete this. After her shower she reported feeling a bit more shaky, dizzy and had a headache. She was walked to her room with walker, given tylenol and is resting with a a bell at bedside.

## 2022-08-28 NOTE — PLAN OF CARE
Problem: Behavioral Health Plan of Care  Goal: Optimal Comfort and Wellbeing  Outcome: Ongoing, Progressing    Behavioral  Pt expressed concerns about being able to go home by Monday as she has to work. Pt advised to talk to staff in the AM;  no behavioral concerns noted;     Medical  Pt continues in alcohol withdrawal; MSSA 5; no valium given this shift; Pt has received a total of 80 mg of valium since admission. Pt last received valium on 8/27 @ 1615.  No new medical concerns noted.

## 2022-08-28 NOTE — PLAN OF CARE
"Goal Outcome Evaluation:    Plan of Care Reviewed With: patient        Pt admitted for alcohol withdrawal.  MSSA 9 & 7 requiring valium 5 mg x1 this shift.  Pt reports that she is feel more steady on her feet but is still encouraged to use the walker. She is disheleved but too unsteady to take a shower.  She was set up and encouraged to clean up in her bathroom, change clothes and perform oral care. She continues to require a medical bed due to poor mobility, has a bell at bedside as needed.  She continues to selectively eat only certain items at meal times, denies nausea and reports that she is eating \"enough.\" She denies depression, affect is tense, anxiety 8/10 and reports gabapentin makes her feel worse \"more shaky.\" She reports adequate sleep but NOC staff report only 2 hours. Discussed poor sleep and anxiety with Dr. Gomez and gabapentin discontinued and Vistaril was added.   "

## 2022-08-29 PROCEDURE — 250N000013 HC RX MED GY IP 250 OP 250 PS 637: Performed by: PSYCHIATRY & NEUROLOGY

## 2022-08-29 PROCEDURE — 128N000004 HC R&B CD ADULT

## 2022-08-29 PROCEDURE — 99231 SBSQ HOSP IP/OBS SF/LOW 25: CPT | Performed by: PSYCHIATRY & NEUROLOGY

## 2022-08-29 PROCEDURE — 250N000013 HC RX MED GY IP 250 OP 250 PS 637

## 2022-08-29 PROCEDURE — 250N000011 HC RX IP 250 OP 636: Performed by: PSYCHIATRY & NEUROLOGY

## 2022-08-29 RX ADMIN — B-COMPLEX W/ C & FOLIC ACID TAB 1 TABLET: TAB at 12:35

## 2022-08-29 RX ADMIN — Medication 1 TABLET: at 08:54

## 2022-08-29 RX ADMIN — FOLIC ACID 1 MG: 1 TABLET ORAL at 08:54

## 2022-08-29 RX ADMIN — BUSPIRONE HYDROCHLORIDE 5 MG: 5 TABLET ORAL at 20:32

## 2022-08-29 RX ADMIN — DIAZEPAM 10 MG: 5 TABLET ORAL at 13:11

## 2022-08-29 RX ADMIN — ONDANSETRON 4 MG: 4 TABLET, ORALLY DISINTEGRATING ORAL at 16:57

## 2022-08-29 RX ADMIN — MULTIPLE VITAMINS W/ MINERALS TAB 1 TABLET: TAB at 12:35

## 2022-08-29 RX ADMIN — HYDROXYZINE HYDROCHLORIDE 25 MG: 25 TABLET, FILM COATED ORAL at 08:58

## 2022-08-29 RX ADMIN — THIAMINE HCL TAB 100 MG 100 MG: 100 TAB at 08:54

## 2022-08-29 RX ADMIN — ACETAMINOPHEN 650 MG: 325 TABLET, FILM COATED ORAL at 08:54

## 2022-08-29 RX ADMIN — TRAZODONE HYDROCHLORIDE 50 MG: 50 TABLET ORAL at 22:15

## 2022-08-29 RX ADMIN — PANTOPRAZOLE SODIUM 40 MG: 40 TABLET, DELAYED RELEASE ORAL at 08:54

## 2022-08-29 RX ADMIN — BUSPIRONE HYDROCHLORIDE 5 MG: 5 TABLET ORAL at 08:54

## 2022-08-29 RX ADMIN — NALTREXONE HYDROCHLORIDE 50 MG: 50 TABLET, FILM COATED ORAL at 12:35

## 2022-08-29 RX ADMIN — Medication 1 TABLET: at 20:32

## 2022-08-29 ASSESSMENT — ACTIVITIES OF DAILY LIVING (ADL)
HYGIENE/GROOMING: INDEPENDENT
ADLS_ACUITY_SCORE: 13.5
ORAL_HYGIENE: INDEPENDENT
ADLS_ACUITY_SCORE: 13.5
ADLS_ACUITY_SCORE: 13.5
DRESS: INDEPENDENT
ADLS_ACUITY_SCORE: 13.5
LAUNDRY: WITH SUPERVISION

## 2022-08-29 NOTE — PLAN OF CARE
Problem: Substance Withdrawal  Goal: Substance Withdrawal  Description: Signs and symptoms of listed problems will be absent or manageable.  Outcome: Ongoing, Progressing     Behavioral  Pt pleasant and cooperative upon approach;  eating 25 - 50% of meals and hydrating with encouragement;  Pt denied SI, SIB, HI, and hallucinations; endorses anxiety 6/10.     Medical  Pt continues in alcohol withdrawal; MSSA  6 and 10; pt has required 95 mg of valium since admission;     Pt hypertensive, tremulous, and diaphoretic. 164/101- after administration of valium 136/96    Plan  Continue to monitor; discharge date TBD.

## 2022-08-29 NOTE — PLAN OF CARE
Problem: Behavioral Health Plan of Care  Goal: Optimal Comfort and Wellbeing  Outcome: Ongoing, Progressing    Behavioral  Pt restless overnight; no behavioral concerns noted;     Medical  Pt continues in alcohol withdrawal; MSSA  6; no valium given this shift; Pt has received a total of 85 mg of valium since admission. Pt last received valium 8/28 @ 0912.    No new medical concerns noted.

## 2022-08-29 NOTE — PROGRESS NOTES
[facility], [place]   Psychiatric Progress Note        Interim history   This is a 46 year old female with alcohol dependence..Pt seen in rounds.   The patient's care was discussed with the treatment team during the daily team meeting and/or staff's chart notes were reviewed.  Staff report patient has been visible in the milieu,  no acute eventsovernight.     Patient's mood is anxious  Energy Level:moderate  Sleep:improving  Appetite:fair   improving motivation interest   Denied anySuicidal/homicidal ideation/plan intent.  Denied psychosis  No prior suicde attempts  No access to gun  Pt is in alcohol withdrawal still being monitered every 4 hrs for it,   Pt mssa score are monitered  Tolerating meds and has no side effects.              Medications:     Current Facility-Administered Medications   Medication     acetaminophen (TYLENOL) tablet 650 mg     alum & mag hydroxide-simethicone (MAALOX) suspension 30 mL     atenolol (TENORMIN) tablet 50 mg     busPIRone (BUSPAR) tablet 5 mg     calcium carbonate (TUMS) chewable tablet 500 mg     calcium citrate-vitamin D (CITRACAL) 315-250 MG-UNIT per tablet 1 tablet     diazepam (VALIUM) tablet 5-20 mg     folic acid (FOLVITE) tablet 1 mg     hydrOXYzine (ATARAX) tablet 25 mg     multivitamin w/minerals (THERA-VIT-M) tablet 1 tablet     naltrexone (DEPADE/REVIA) tablet 50 mg     ondansetron (ZOFRAN ODT) ODT tab 4 mg     pantoprazole (PROTONIX) EC tablet 40 mg     senna-docusate (SENOKOT-S/PERICOLACE) 8.6-50 MG per tablet 1 tablet     tetrahydrozoline (VISINE) 0.05 % ophthalmic solution 1 drop     thiamine (B-1) tablet 100 mg     traZODone (DESYREL) tablet 50 mg     vitamin B complex with vitamin C (STRESS TAB) tablet 1 tablet             Allergies:     Allergies   Allergen Reactions     No Clinical Screening - See Comments      PN: LW CM1: CONTRAST- NKA Reaction :            Psychiatric Examination:   Blood pressure (!) 164/101, pulse 88,  "temperature 97.6  F (36.4  C), temperature source Temporal, resp. rate 16, height 1.65 m (5' 4.96\"), weight 46.7 kg (103 lb), SpO2 99 %, not currently breastfeeding.  Weight is 103 lbs 0 oz  Body mass index is 17.16 kg/m .    Appearance:  awake, alert and adequately groomed  Attitude:  cooperative  Eye Contact:  good  Mood:  anxious  Affect:  appropriate and in normal range and mood congruent  Speech:  clear, coherent rate /rhythm are good  Psychomotor Behavior:  no evidence of tardive dyskinesia, dystonia, or tics and intact station, gait and muscle tone  Throught Process:  logical  Associations:  no loose associations  Thought Content:  no evidence of suicidal ideation or homicidal ideation, no evidence of psychotic thought, no auditory hallucinations present and no visual hallucinations present  Insight:  limited  Judgement:  limited  Oriented to:  time, person, and place  Attention Span and Concentration:  intact  Recent and Remote Memory:  intact  Language fund of knowledge are adequate         Labs:     No results found for: NTBNPI, NTBNP  Lab Results   Component Value Date    WBC 2.8 (L) 08/25/2022    HGB 11.6 (L) 08/25/2022    HCT 35.3 08/25/2022    MCV 99 08/25/2022     (L) 08/25/2022     Lab Results   Component Value Date    TSH 2.39 08/26/2022         DX   Alcohol use disorder severe  Alcohol withdrawal severe  Eating disorder NOS   PLAN   Alcohol intoxication/withdrawal, presently is on MSSA protocol with Valium. Continue the same MSSA protocol as ordered. Continue thiamine 100 mg p.o. daily, M.V.I. one p.o. daily and folate 1 mg p.o. Daily  Will continue mssa protocal to detox off alcohol on valium,  Pt is c/o of termor , agitation poor sleep and poor appetite, he has sweats, feels shakey  On mssa client scored scored 8 today and 5 needed needed 5 mg po as of yet , total dose since admission was 20    MSSA    Eating Disturbances: ate and enjoyed all of it or not applicable  Tremor: 2  Sleep " Disturbance: slept through the night or not applicable  Clouding of Sensorium: no evidence  Hallucinations: 0 - none  Quality of Contact: 0 - awareness of examiner and people around him/her  Agitation: 0 - normal activity  Paroxysmal Sweats: 0 - no observed sweating  Temperature: 99.5 or below  Pulse: 3 - 90 to 99  Total MSSA Score: 6    Laboratory/Imaging: reviewed with patient   Consults: internal medicine consult reviewed  Patient will be treated in therapeutic milieu with appropriate individual and group therapies as described.  PDMP CHECKED     Supportive psychotheraoy provided, jeremiah talked about recovery enviroment, relapse prevention, triggers to use.  Discussed with patient many issues of addiction,triggers, relapse, and establishing a solid recovery program.  Asked pt to be med complinat   Medical diagnoses to be addressed this admission:    Plan:  Assessment and Plan/Recommendations:      Alana silvestre is a 46 year old female with a PMHx of alcohol withdrawal syndrome, bulimia/anorexia, osteoporosis, GERD, hypercalcemia, hypomagnesemia, and hyponatremia. She was admitted to [facility] 3A for management of withdrawal from alcohol.      # Alcohol withdrawal, hx of alcohol use disorder   Pt states she quit drinking last Friday. She had been having 4 beers a day and after she stopped, drank mouthwash for the withdrawal symptoms.   - MSSA 9 this shift.  No hx of withdrawal seizures. Not currently on AEDs.    - Continue MSSA   - Folvite, multi-vites, thiamine supplementation   - Further management per Psychiatry      # Elevated blood pressure  # Tachycardia  No known history of heart disease. Blood pressure trending down and normal rhythm and rate on exam  - monitor and notify medicine with persistent tachycardia and hypertension     # Osteoporosis  - continue PTA calcium citrate with vitamin D     # Bulimia  # Anorexia  States she is eating what is normal for her. Declines Ensure supplement.   - continue vitamin B  complex with vitamin C, MVI  - management per psychiatry     # GERD   Has a history of GERD with her eating disorders. States she forgets to take it. Burning in stomach and chest worse in the setting of acute withdrawal.   - protonix 40 mg daily while inpatient and having s/s, do not need to order at discharge  - TUMS TID PRN     # Dry/red eyes- chronic  - visine drops as needed     Currently, medically stable and internal medicine will sign off. Please contact if future questions or concerns arise. Thank you for the opportunity to be a part of this patient's care.            Legal Status: voluntary    Safety Assessment:   Checks:  15 min  Precautions: withdrawal precautions  Pt has not required locked seclusion or restraints in the past 24 hours to maintain safety, please refer to RN documentation for further details.  Discussed with patient many issues of addiction,triggers, relapse, and establishing a solid recovery program.  Able to give informed consent:  YES   Discussed Risks/Benefits/Side Effects/Alternatives: YES    After discussion of the indications, risks, benefits, alternatives and consequences of no treatment, the patient elects to complete detox and just detox

## 2022-08-29 NOTE — PLAN OF CARE
"  Problem: Alcohol Withdrawal  Goal: Alcohol Withdrawal Symptom Control  Outcome: Ongoing, Progressing     Problem: Substance Misuse (Alcohol Withdrawal)  Goal: Readiness for Change Identified  Outcome: Ongoing, Progressing  Intervention: Partner to Facilitate Behavior Change  Recent Flowsheet Documentation  Taken 8/28/2022 1738 by Shweta Julio RN  Supportive Measures:    active listening utilized    counseling provided    self-care encouraged    self-reflection promoted    verbalization of feelings encouraged    positive reinforcement provided     Problem: Substance Withdrawal  Intervention: Social and Therapeutic Interv (Substance Withdrawal)  Recent Flowsheet Documentation  Taken 8/28/2022 1738 by Shweta Julio RN  Social and Therapeutic Interventions (Substance Withdrawal):    encourage socialization with peers    encourage effective boundaries with peers    encourage participation in therapeutic groups and milieu activities   Patient isolative to room. Affect flat, blunted. Mood is anxious. Denies SI, SIB, HI, or hallucinations.  Continues to be monitored for alcohol withdrawal. MSSA scores 5 and 3.   VSS, appetite fair, intake fair. Reporting a headache pain 7/10.  Patient is wanting to discharge to home tomorrow. Would like to follow up with a therapist and a non-12 step program.   Patient's dizziness from earlier day is improved. Still using a walker for steadiness in the unit.   PRNS: Maalox for GI upset. Tylenol 650 mg for headache. Hydroxyzine 25 mg for anxiety.  Patient denies any additional concerns.  /69   Pulse 78   Temp 97.3  F (36.3  C) (Temporal)   Resp 16   Ht 1.65 m (5' 4.96\")   Wt 46.7 kg (103 lb)   SpO2 96%   BMI 17.16 kg/m       "

## 2022-08-30 PROCEDURE — 250N000013 HC RX MED GY IP 250 OP 250 PS 637: Performed by: PSYCHIATRY & NEUROLOGY

## 2022-08-30 PROCEDURE — 250N000013 HC RX MED GY IP 250 OP 250 PS 637

## 2022-08-30 PROCEDURE — 128N000004 HC R&B CD ADULT

## 2022-08-30 PROCEDURE — H2032 ACTIVITY THERAPY, PER 15 MIN: HCPCS

## 2022-08-30 PROCEDURE — 99231 SBSQ HOSP IP/OBS SF/LOW 25: CPT | Performed by: PSYCHIATRY & NEUROLOGY

## 2022-08-30 RX ORDER — BUSPIRONE HYDROCHLORIDE 5 MG/1
5 TABLET ORAL 3 TIMES DAILY
Qty: 60 TABLET | Refills: 1 | Status: SHIPPED | OUTPATIENT
Start: 2022-08-30 | End: 2022-11-26

## 2022-08-30 RX ORDER — BUSPIRONE HYDROCHLORIDE 5 MG/1
5 TABLET ORAL 3 TIMES DAILY
Status: DISCONTINUED | OUTPATIENT
Start: 2022-08-30 | End: 2022-08-31 | Stop reason: HOSPADM

## 2022-08-30 RX ORDER — PANTOPRAZOLE SODIUM 40 MG/1
40 TABLET, DELAYED RELEASE ORAL
Qty: 30 TABLET | Refills: 0 | Status: SHIPPED | OUTPATIENT
Start: 2022-08-31 | End: 2022-11-26

## 2022-08-30 RX ADMIN — Medication 1 TABLET: at 20:23

## 2022-08-30 RX ADMIN — TETRAHYDROZOLINE HCL 1 DROP: 0.05 LIQUID OPHTHALMIC at 11:19

## 2022-08-30 RX ADMIN — FOLIC ACID 1 MG: 1 TABLET ORAL at 08:43

## 2022-08-30 RX ADMIN — PANTOPRAZOLE SODIUM 40 MG: 40 TABLET, DELAYED RELEASE ORAL at 08:43

## 2022-08-30 RX ADMIN — HYDROXYZINE HYDROCHLORIDE 25 MG: 25 TABLET, FILM COATED ORAL at 02:56

## 2022-08-30 RX ADMIN — Medication 1 TABLET: at 08:43

## 2022-08-30 RX ADMIN — BUSPIRONE HYDROCHLORIDE 5 MG: 5 TABLET ORAL at 20:23

## 2022-08-30 RX ADMIN — B-COMPLEX W/ C & FOLIC ACID TAB 1 TABLET: TAB at 11:19

## 2022-08-30 RX ADMIN — TRAZODONE HYDROCHLORIDE 50 MG: 50 TABLET ORAL at 22:35

## 2022-08-30 RX ADMIN — NALTREXONE HYDROCHLORIDE 50 MG: 50 TABLET, FILM COATED ORAL at 11:19

## 2022-08-30 RX ADMIN — THIAMINE HCL TAB 100 MG 100 MG: 100 TAB at 08:43

## 2022-08-30 RX ADMIN — BUSPIRONE HYDROCHLORIDE 5 MG: 5 TABLET ORAL at 08:45

## 2022-08-30 RX ADMIN — ACETAMINOPHEN 650 MG: 325 TABLET, FILM COATED ORAL at 22:39

## 2022-08-30 RX ADMIN — BUSPIRONE HYDROCHLORIDE 5 MG: 5 TABLET ORAL at 13:54

## 2022-08-30 RX ADMIN — TETRAHYDROZOLINE HCL 1 DROP: 0.05 LIQUID OPHTHALMIC at 20:23

## 2022-08-30 RX ADMIN — MULTIPLE VITAMINS W/ MINERALS TAB 1 TABLET: TAB at 11:19

## 2022-08-30 RX ADMIN — ACETAMINOPHEN 650 MG: 325 TABLET, FILM COATED ORAL at 11:19

## 2022-08-30 ASSESSMENT — ACTIVITIES OF DAILY LIVING (ADL)
ADLS_ACUITY_SCORE: 13.5

## 2022-08-30 NOTE — PROGRESS NOTES
[facility], [place]   Psychiatric Progress Note        Interim history   This is a 46 year old female with alcohol dependence..Pt seen in rounds.   The patient's care was discussed with the treatment team during the daily team meeting and/or staff's chart notes were reviewed.  Staff report patient has been visible in the milieu,  no acute eventsovernight.     Patient's mood is anxious  Energy Level:moderate  Sleep:improving  Appetite:fair   improving motivation interest   Denied anySuicidal/homicidal ideation/plan intent.  Denied psychosis  No prior suicde attempts  No access to gun  Pt is in alcohol withdrawal still being monitered every 4 hrs for it,   Pt mssa score are monitered  Tolerating meds and has no side effects.              Medications:     Current Facility-Administered Medications   Medication     acetaminophen (TYLENOL) tablet 650 mg     alum & mag hydroxide-simethicone (MAALOX) suspension 30 mL     atenolol (TENORMIN) tablet 50 mg     busPIRone (BUSPAR) tablet 5 mg     calcium carbonate (TUMS) chewable tablet 500 mg     calcium citrate-vitamin D (CITRACAL) 315-250 MG-UNIT per tablet 1 tablet     diazepam (VALIUM) tablet 5-20 mg     folic acid (FOLVITE) tablet 1 mg     hydrOXYzine (ATARAX) tablet 25 mg     multivitamin w/minerals (THERA-VIT-M) tablet 1 tablet     naltrexone (DEPADE/REVIA) tablet 50 mg     ondansetron (ZOFRAN ODT) ODT tab 4 mg     pantoprazole (PROTONIX) EC tablet 40 mg     senna-docusate (SENOKOT-S/PERICOLACE) 8.6-50 MG per tablet 1 tablet     tetrahydrozoline (VISINE) 0.05 % ophthalmic solution 1 drop     thiamine (B-1) tablet 100 mg     traZODone (DESYREL) tablet 50 mg     vitamin B complex with vitamin C (STRESS TAB) tablet 1 tablet             Allergies:     Allergies   Allergen Reactions     No Clinical Screening - See Comments      PN: LW CM1: CONTRAST- NKA Reaction :            Psychiatric Examination:   Blood pressure 135/85, pulse 99, temperature  "97.6  F (36.4  C), temperature source Temporal, resp. rate 16, height 1.65 m (5' 4.96\"), weight 46.7 kg (103 lb), SpO2 96 %, not currently breastfeeding.  Weight is 103 lbs 0 oz  Body mass index is 17.16 kg/m .    Appearance:  awake, alert and adequately groomed  Attitude:  cooperative  Eye Contact:  good  Mood:  anxious  Affect:  appropriate and in normal range and mood congruent  Speech:  clear, coherent rate /rhythm are good  Psychomotor Behavior:  no evidence of tardive dyskinesia, dystonia, or tics and intact station, gait and muscle tone  Throught Process:  logical  Associations:  no loose associations  Thought Content:  no evidence of suicidal ideation or homicidal ideation, no evidence of psychotic thought, no auditory hallucinations present and no visual hallucinations present  Insight:  limited  Judgement:  limited  Oriented to:  time, person, and place  Attention Span and Concentration:  intact  Recent and Remote Memory:  intact  Language fund of knowledge are adequate         Labs:     No results found for: NTBNPI, NTBNP  Lab Results   Component Value Date    WBC 2.8 (L) 08/25/2022    HGB 11.6 (L) 08/25/2022    HCT 35.3 08/25/2022    MCV 99 08/25/2022     (L) 08/25/2022     Lab Results   Component Value Date    TSH 2.39 08/26/2022         DX   Alcohol use disorder severe  Alcohol withdrawal severe  Eating disorder NOS   PLAN   Alcohol intoxication/withdrawal, presently is on MSSA protocol with Valium. Continue the same MSSA protocol as ordered. Continue thiamine 100 mg p.o. daily, M.V.I. one p.o. daily and folate 1 mg p.o. Daily  Will continue mssa protocal to detox off alcohol on valium,  Pt is c/o of termor , agitation poor sleep and poor appetite, he has sweats, feels shakey  On mssa client scored scored 4 today and 5 needed needed 0 mg po as of yet , total dose since admission was 65mg    MSSA    Eating Disturbances: ate and enjoyed all of it or not applicable  Tremor: 1 - not visibly apparent " but can be felt by the examiner placing his fingertip slightly against the patient's fingertips  Sleep Disturbance: slept through the night or not applicable  Clouding of Sensorium: no evidence  Hallucinations: 0 - none  Quality of Contact: 0 - awareness of examiner and people around him/her  Agitation: 0 - normal activity  Paroxysmal Sweats: 0 - no observed sweating  Temperature: 99.5 or below  Pulse: 2 - 80 to 89  Total MSSA Score: 4    Laboratory/Imaging: reviewed with patient   Consults: internal medicine consult reviewed  Patient will be treated in therapeutic milieu with appropriate individual and group therapies as described.  PDMP CHECKED     Supportive psychotheraoy provided, jeremiah talked about recovery enviroment, relapse prevention, triggers to use.  Discussed with patient many issues of addiction,triggers, relapse, and establishing a solid recovery program.  Asked pt to be med complinat   Medical diagnoses to be addressed this admission:    Plan:  Assessment and Plan/Recommendations:      Alana silvestre is a 46 year old female with a PMHx of alcohol withdrawal syndrome, bulimia/anorexia, osteoporosis, GERD, hypercalcemia, hypomagnesemia, and hyponatremia. She was admitted to [facility] 3A for management of withdrawal from alcohol.      # Alcohol withdrawal, hx of alcohol use disorder   Pt states she quit drinking last Friday. She had been having 4 beers a day and after she stopped, drank mouthwash for the withdrawal symptoms.   - MSSA 9 this shift.  No hx of withdrawal seizures. Not currently on AEDs.    - Continue MSSA   - Folvite, multi-vites, thiamine supplementation   - Further management per Psychiatry      # Elevated blood pressure  # Tachycardia  No known history of heart disease. Blood pressure trending down and normal rhythm and rate on exam  - monitor and notify medicine with persistent tachycardia and hypertension     # Osteoporosis  - continue PTA calcium citrate with vitamin D     # Bulimia  #  Anorexia  States she is eating what is normal for her. Declines Ensure supplement.   - continue vitamin B complex with vitamin C, MVI  - management per psychiatry     # GERD   Has a history of GERD with her eating disorders. States she forgets to take it. Burning in stomach and chest worse in the setting of acute withdrawal.   - protonix 40 mg daily while inpatient and having s/s, do not need to order at discharge  - TUMS TID PRN     # Dry/red eyes- chronic  - visine drops as needed     Currently, medically stable and internal medicine will sign off. Please contact if future questions or concerns arise. Thank you for the opportunity to be a part of this patient's care.            Legal Status: voluntary    Safety Assessment:   Checks:  15 min  Precautions: withdrawal precautions  Pt has not required locked seclusion or restraints in the past 24 hours to maintain safety, please refer to RN documentation for further details.  Discussed with patient many issues of addiction,triggers, relapse, and establishing a solid recovery program.  Able to give informed consent:  YES   Discussed Risks/Benefits/Side Effects/Alternatives: YES    After discussion of the indications, risks, benefits, alternatives and consequences of no treatment, the patient elects to complete detox and just detox

## 2022-08-30 NOTE — PLAN OF CARE
"  Problem: Alcohol Withdrawal  Goal: Alcohol Withdrawal Symptom Control  Outcome: Ongoing, Progressing   Goal Outcome Evaluation:    The patient appeared asleep at the beginning of the shift.  At 0256 Pt  came to the nursing station \"stated I am unable to sleep appeared agitated  Prn Hydroxyzine 25 mg given . Pt MSSA wa 5 and 8 refused 5 mg of Valium offered VS Pulse 86, Resp 14, BP 96/67, Temp 97.3 02 sat was 98 slept 6 hours during the shift. Pt is on discharge today.Will continue to monitor.                        "

## 2022-08-30 NOTE — PLAN OF CARE
"  Problem: Substance Misuse (Alcohol Withdrawal)  Goal: Readiness for Change Identified  Intervention: Partner to Facilitate Behavior Change  Recent Flowsheet Documentation  Taken 8/29/2022 1855 by Shweta Julio RN  Supportive Measures:   active listening utilized   positive reinforcement provided   self-care encouraged   verbalization of feelings encouraged     Problem: Behavioral Health Plan of Care  Goal: Develops/Participates in Therapeutic Deputy to Support Successful Transition  Intervention: Foster Therapeutic Deputy  Recent Flowsheet Documentation  Taken 8/29/2022 1855 by Shweta Julio RN  Trust Relationship/Rapport:   care explained   choices provided   emotional support provided   empathic listening provided   questions answered   questions encouraged   reassurance provided   thoughts/feelings acknowledged   Goal Outcome Evaluation:    Plan of Care Reviewed With: patient   Patient intermittently visible in the milieu, social with peers. Affect flat, mood is calm. Denies SI, SIB, HI, or hallucinations.   Continues to be monitored for alcohol withdrawal. MSSA scores 6 and 6.  VSS, denies pain, appetite fair, intake poor.  Patient is hopeful to be able to discharge tomorrow. She states she thinks she didn't eat enough before lunch and that triggered her shakiness.  PRNS: Trazodone 50 mg times one for sleep  Patient denies any additional concerns.  /79   Pulse 111   Temp 98.4  F (36.9  C) (Temporal)   Resp 16   Ht 1.65 m (5' 4.96\")   Wt 46.7 kg (103 lb)   SpO2 99%   BMI 17.16 kg/m                    "

## 2022-08-31 VITALS
DIASTOLIC BLOOD PRESSURE: 79 MMHG | SYSTOLIC BLOOD PRESSURE: 108 MMHG | HEART RATE: 117 BPM | RESPIRATION RATE: 16 BRPM | OXYGEN SATURATION: 99 % | HEIGHT: 65 IN | TEMPERATURE: 97.3 F | BODY MASS INDEX: 17.16 KG/M2 | WEIGHT: 103 LBS

## 2022-08-31 PROCEDURE — 250N000013 HC RX MED GY IP 250 OP 250 PS 637

## 2022-08-31 PROCEDURE — 250N000013 HC RX MED GY IP 250 OP 250 PS 637: Performed by: PSYCHIATRY & NEUROLOGY

## 2022-08-31 PROCEDURE — 99239 HOSP IP/OBS DSCHRG MGMT >30: CPT | Performed by: PSYCHIATRY & NEUROLOGY

## 2022-08-31 RX ORDER — TRAZODONE HYDROCHLORIDE 50 MG/1
50 TABLET, FILM COATED ORAL
Qty: 30 TABLET | Refills: 0 | Status: SHIPPED | OUTPATIENT
Start: 2022-08-31 | End: 2022-11-26

## 2022-08-31 RX ADMIN — MULTIPLE VITAMINS W/ MINERALS TAB 1 TABLET: TAB at 12:58

## 2022-08-31 RX ADMIN — Medication 1 TABLET: at 08:51

## 2022-08-31 RX ADMIN — ACETAMINOPHEN 650 MG: 325 TABLET, FILM COATED ORAL at 08:51

## 2022-08-31 RX ADMIN — TETRAHYDROZOLINE HCL 1 DROP: 0.05 LIQUID OPHTHALMIC at 08:51

## 2022-08-31 RX ADMIN — PANTOPRAZOLE SODIUM 40 MG: 40 TABLET, DELAYED RELEASE ORAL at 08:51

## 2022-08-31 RX ADMIN — NALTREXONE HYDROCHLORIDE 50 MG: 50 TABLET, FILM COATED ORAL at 12:57

## 2022-08-31 RX ADMIN — BUSPIRONE HYDROCHLORIDE 5 MG: 5 TABLET ORAL at 13:00

## 2022-08-31 RX ADMIN — B-COMPLEX W/ C & FOLIC ACID TAB 1 TABLET: TAB at 12:57

## 2022-08-31 RX ADMIN — BUSPIRONE HYDROCHLORIDE 5 MG: 5 TABLET ORAL at 08:51

## 2022-08-31 RX ADMIN — FOLIC ACID 1 MG: 1 TABLET ORAL at 08:51

## 2022-08-31 RX ADMIN — THIAMINE HCL TAB 100 MG 100 MG: 100 TAB at 08:51

## 2022-08-31 ASSESSMENT — ACTIVITIES OF DAILY LIVING (ADL)
ADLS_ACUITY_SCORE: 13.5

## 2022-08-31 NOTE — PLAN OF CARE
Goal Outcome Evaluation:    Plan of Care Reviewed With: patient     Overall Patient Progress: improving    Patient continues in alcohol detox.    She has been visible in the milieu this evening.  She participated in group and watched educational recovery videos.    She ate 100% of her dinner.    She denied SI/SIB/HI.    She complained of a headache 6/10 pain, improved with tylenol 650 mg 4/10 pain. Headache returned at bedtime, 6/10 pain, tylenol 650 mg administered.    MSSA: 4 & 4    PRNs administered this shift: trazodone 50 mg, tylenol 650 mg x2

## 2022-08-31 NOTE — DISCHARGE SUMMARY
Alana Mujica MRN# [id number]   Age: 46 year old YOB: 1975     Date of Admission:  8/25/2022  Date of Discharge:  8/31/2022  Admitting Physician:  Ga Palma MD  Discharge Physician:  Munir Wyatt MD      DISCHARGE  DX    Alcohol use disorder severe    Eating disorder NOS  R/o malnutriton          Event Leading to Hospitalization:     See Admission note by admitting provider for patient encounter. for additional details.          Hospital Course:   PATIENT was admitted to [facility]with attending  under Ga Albarran MD, please review the detailed admit note on 8/26/22   The patient was placed under status 15 (15 minute checks) to ensure patient safety.   MSSA protocol was initiated due to the patient's history of alcohol abuse and concern for withdrawal symptoms.  CBC, BMP and utox obtained.    All outpatient medications were continued    PATIENTdid participate in groups and was visible in the milieu.     The patient's symptoms of alcohol withdrawal  improved.     Patients energy motivation , sleep appetite improved.  Pt completed detox . It was un eventful.      Discussed with patient medications for craving.  Spoke with patient about triggers coping skills relapse prevention.    CONSULTS DONE DURING PATIENTS HOSPITALIZATION.  Patient was seen by medicine on date8/26/22    This as per their medical consult    Assessment and Plan/Recommendations:      Alana mujica is a 46 year old female with a PMHx of alcohol withdrawal syndrome, bulimia/anorexia, osteoporosis, GERD, hypercalcemia, hypomagnesemia, and hyponatremia. She was admitted to [facility] for management of withdrawal from alcohol.      # Alcohol withdrawal, hx of alcohol use disorder   Pt states she quit drinking last Friday. She had been having 4 beers a day and after she stopped, drank mouthwash for the withdrawal symptoms.   - MSSA 9 this shift.  No hx of withdrawal seizures. Not currently on AEDs.    -  Continue MSSA   - Folvite, multi-vites, thiamine supplementation   - Further management per Psychiatry      # Elevated blood pressure  # Tachycardia  No known history of heart disease. Blood pressure trending down and normal rhythm and rate on exam  - monitor and notify medicine with persistent tachycardia and hypertension     # Osteoporosis  - continue PTA calcium citrate with vitamin D     # Bulimia  # Anorexia  States she is eating what is normal for her. Declines Ensure supplement.   - continue vitamin B complex with vitamin C, MVI  - management per psychiatry     # GERD   Has a history of GERD with her eating disorders. States she forgets to take it. Burning in stomach and chest worse in the setting of acute withdrawal.   - protonix 40 mg daily while inpatient and having s/s, do not need to order at discharge  - TUMS TID PRN     # Dry/red eyes- chronic  - visine drops as needed     Currently, medically stable and internal medicine will sign off. Please contact if future questions or concerns arise. Thank you for the opportunity to be a part of this patient's care.       Malnutrition Status:    Severe malnutrition in the context of acute on chronic illness.     Recommendations already ordered by Registered Dietitian (RD):  1. Please allow write-in options of chicken Caesar salad and Chicken spinach salad (balsamic dressing).   2. Raw veggies and hummus @10AM snack.  3. Cottage cheese with canteloupe and grapes @2PM snack.  4. BLIND WEIGHTS ONLY.  5. NO nutrition facts labels on any supps/snacks/beverages etc.  6. hive01 1x/day @Breakfast. Please allow patient to order more PRN.     Future/Additional Recommendations:  Monitor PO intake, wt trends, supplement/snack acceptance.            Pt was seen by cm  As per recommendations from cm    Met with pt for discharge planning.  Pt reports a plan to return home and go back to work.  She is interested in finding non 12-step based support groups and a  therapist.  Will assist Pt with finding programs and introduce Pt to a tool to find a therapist.  Pt also reports hx of eating disorder.  Pt reports hoping for discharge by Sunday evening so she can return to work on Monday.         Labs:reviewed with patient     No results found for this or any previous visit (from the past 48 hour(s)).      Recent Results (from the past 240 hour(s))   Comprehensive metabolic panel    Collection Time: 08/25/22  1:01 AM   Result Value Ref Range    Sodium 138 136 - 145 mmol/L    Potassium 3.9 3.4 - 5.3 mmol/L    Creatinine 0.69 0.51 - 0.95 mg/dL    Urea Nitrogen 3.3 (L) 6.0 - 20.0 mg/dL    Chloride 100 98 - 107 mmol/L    Carbon Dioxide (CO2) 24 22 - 29 mmol/L    Anion Gap 14 7 - 15 mmol/L    Glucose 121 (H) 70 - 99 mg/dL    Calcium 8.7 8.6 - 10.0 mg/dL    Protein Total 7.1 6.4 - 8.3 g/dL    Albumin 4.3 3.5 - 5.2 g/dL    Bilirubin Total 0.3 <=1.2 mg/dL    Alkaline Phosphatase 77 35 - 104 U/L    AST 79 (H) 10 - 35 U/L    ALT 32 10 - 35 U/L    GFR Estimate >90 >60 mL/min/1.73m2   Alcohol ethyl    Collection Time: 08/25/22  1:01 AM   Result Value Ref Range    Alcohol ethyl 0.35 (HH) <=0.00 g/dL   CBC with platelets and differential    Collection Time: 08/25/22  1:01 AM   Result Value Ref Range    WBC Count 2.8 (L) 4.0 - 11.0 10e3/uL    RBC Count 3.57 (L) 3.80 - 5.20 10e6/uL    Hemoglobin 11.6 (L) 11.7 - 15.7 g/dL    Hematocrit 35.3 35.0 - 47.0 %    MCV 99 78 - 100 fL    MCH 32.5 26.5 - 33.0 pg    MCHC 32.9 31.5 - 36.5 g/dL    RDW 14.1 10.0 - 15.0 %    Platelet Count 104 (L) 150 - 450 10e3/uL    % Neutrophils 57 %    % Lymphocytes 27 %    % Monocytes 12 %    % Eosinophils 2 %    % Basophils 2 %    % Immature Granulocytes 0 %    NRBCs per 100 WBC 0 <1 /100    Absolute Neutrophils 1.6 1.6 - 8.3 10e3/uL    Absolute Lymphocytes 0.7 (L) 0.8 - 5.3 10e3/uL    Absolute Monocytes 0.3 0.0 - 1.3 10e3/uL    Absolute Eosinophils 0.1 0.0 - 0.7 10e3/uL    Absolute Basophils 0.1 0.0 - 0.2 10e3/uL     Absolute Immature Granulocytes 0.0 <=0.4 10e3/uL    Absolute NRBCs 0.0 10e3/uL   Magnesium    Collection Time: 08/25/22  1:01 AM   Result Value Ref Range    Magnesium 1.8 1.7 - 2.3 mg/dL   Phosphorus    Collection Time: 08/25/22  1:01 AM   Result Value Ref Range    Phosphorus 3.4 2.5 - 4.5 mg/dL   Drug abuse screen 1 urine (ED)    Collection Time: 08/25/22  4:59 AM   Result Value Ref Range    Amphetamines Urine Screen Negative Screen Negative    Barbituates Urine Screen Negative Screen Negative    Benzodiazepine Urine Screen Negative Screen Negative    Cannabinoids Urine Screen Negative Screen Negative    Cocaine Urine Screen Negative Screen Negative    Opiates Urine Screen Negative Screen Negative   HCG qualitative urine    Collection Time: 08/25/22  4:59 AM   Result Value Ref Range    hCG Urine Qualitative Negative Negative   Asymptomatic COVID-19 Virus (Coronavirus) by PCR Nasopharyngeal    Collection Time: 08/25/22  8:49 AM    Specimen: Nasopharyngeal; Swab   Result Value Ref Range    SARS CoV2 PCR Negative Negative   GGT    Collection Time: 08/26/22  7:15 AM   Result Value Ref Range     (H) 0 - 40 U/L   Hemoglobin A1c    Collection Time: 08/26/22  7:15 AM   Result Value Ref Range    Hemoglobin A1C 4.7 0.0 - 5.6 %   Lipid panel    Collection Time: 08/26/22  7:15 AM   Result Value Ref Range    Cholesterol 361 (H) <200 mg/dL    Triglycerides 36 <150 mg/dL    Direct Measure  >=50 mg/dL    LDL Cholesterol Calculated 108 (H) <=100 mg/dL    Non HDL Cholesterol 115 <130 mg/dL   TSH with free T4 reflex and/or T3 as indicated    Collection Time: 08/26/22  7:15 AM   Result Value Ref Range    TSH 2.39 0.40 - 4.00 mU/L            Because this patient meets criteria for an Alcohol Use Disorder, I performed the following brief intervention on the date of this note:              1) Expressed concern that the patient is drinking at unhealthy levels known to increase their risk of alcohol related problems               2) Gave feedback linking alcohol use and health, including personalized feedback explaining how alcohol use can interact with their medical and/or psychiatric problems, and with prescribed medications.              3) Advised patient to abstain.    PT counseled on nicotine cessation and nicotine replacement provided    Discussed with patient many issues of addiction,triggers, relapse, and establishing a solid recovery program.    DISCHARGE MENTAL STATUS EXAMINATION:  The patient is alert, oriented x3.  Good fund of knowledge.  Good use of language.  Recent and remote memory, language, fund of knowledge are all adequate.  Euthymic mood congruent affect  Speech normal rate/rhythm linear tp no loose asso,The patient does not have any active suicidal or homicidal ideation.  Does not have any auditory or visual hallucination.  Fair insight/judgment At this time, the patient was stable to be discharged.        Pt was not determined to not be a danger to himself or others. At the current time of discharge, the patient does not meet criteria for involuntary hospitalization. On the day of discharge, the patient reports that they do not have suicidal or homicidal ideation and would never hurt themselves or others. Steps taken to minimize risk include: assessing patient s behavior and thought process daily during hospital stay, discharging patient with adequate plan for follow up for mental and physical health and discussing safety plan of returning to the hospital should the patient ever have thoughts of harming themselves or others. Therefore, based on all available evidence including the factors cited above, the patient does not appear to be at imminent risk for self-harm, and is appropriate for outpatient level of care.     Educated about side effects/risk vs benefits /alternative including non treatment.Pt consented to be on medication.     .Total time spent on discharge summary more than 35 min  More than  20 min   planning, coordination of care, medication reconciliation and performance of physical exam on day of discharge.Care was coordinated with unit RN and unit therapist         Review of your medicines      START taking      Dose / Directions   pantoprazole 40 MG EC tablet  Commonly known as: PROTONIX      Dose: 40 mg  Take 1 tablet (40 mg) by mouth every morning (before breakfast)  Quantity: 30 tablet  Refills: 0        CONTINUE these medicines which may have CHANGED, or have new prescriptions. If we are uncertain of the size of tablets/capsules you have at home, strength may be listed as something that might have changed.      Dose / Directions   busPIRone 5 MG tablet  Commonly known as: BUSPAR  This may have changed:     when to take this    Another medication with the same name was removed. Continue taking this medication, and follow the directions you see here.  Used for: Anxiety      Dose: 5 mg  Take 1 tablet (5 mg) by mouth 3 times daily  Quantity: 60 tablet  Refills: 1        CONTINUE these medicines which have NOT CHANGED      Dose / Directions   calcium citrate-vitamin D 315-250 MG-UNIT Tabs per tablet  Commonly known as: CITRACAL      Dose: 1 tablet  Take 1 tablet by mouth 2 times daily  Refills: 0     etonogestrel 68 MG Impl  Commonly known as: NEXPLANON      Dose: 1 each  1 each (68 mg) by Subdermal route once  Quantity: 1 each  Refills: 0     folic acid 1 MG tablet  Commonly known as: FOLVITE  Used for: Alcohol dependence with withdrawal with complication (H)      Dose: 1 mg  Take 1 tablet (1 mg) by mouth daily  Quantity: 30 tablet  Refills: 0     MILK THISTLE PO      Dose: 3 capsule  Take 3 capsules by mouth daily  Refills: 0     multivitamin w/minerals tablet      Dose: 1 tablet  Take 1 tablet by mouth daily  Refills: 0     naltrexone 50 MG tablet  Commonly known as: DEPADE/REVIA  Used for: Hepatitis      Dose: 50 mg  Take 1 tablet (50 mg) by mouth daily  Quantity: 30 tablet  Refills: 1     ondansetron 4  "MG ODT tab  Commonly known as: Zofran ODT      Dose: 4 mg  Take 1 tablet (4 mg) by mouth every 8 hours as needed for nausea or vomiting  Quantity: 10 tablet  Refills: 0     thiamine 100 MG tablet  Commonly known as: B-1  Used for: Alcohol dependence with withdrawal with complication (H)      Dose: 100 mg  Take 1 tablet (100 mg) by mouth daily  Quantity: 30 tablet  Refills: 0     vitamin B complex with vitamin C tablet      Dose: 1 tablet  Take 1 tablet by mouth daily  Refills: 0        STOP taking    valACYclovir 500 MG tablet  Commonly known as: VALTREX              Where to get your medicines      These medications were sent to [id number] - [place], [place] - [address] [facility]  [address], [place] [address]    Phone: [phone]     busPIRone 5 MG tablet    pantoprazole 40 MG EC tablet          Disposition: home     Facts about COVID19 at www.cdc.gov/COVID19 and www.[place].gov/covid19     Keeping hands clean is one of the most important steps we can take to avoid getting sick and spreading germs to others.  Please wash your hands frequently and lather with soap for at least 20 seconds!     Medical Follow-Up:   Primary Provider: OLIVIA Caputo  [address] [facility] [place] [address] [phone]  Follow-up: Wednesday, September 7 @ 2:15 PM       Treatment Follow-Up:[facility]   .        \"Much or all of the text in this note was generated through the use of Dragon Dictate voice to text software. Errors in spelling or words which appear to be out of contact are unintentional, may be present due having escaped editing\"     "

## 2022-08-31 NOTE — PLAN OF CARE
Problem: Alcohol Withdrawal  Goal: Alcohol Withdrawal Symptom Control  8/31/2022 0548 by Aamir Redmond, RN  Outcome: Ongoing, Progressing  8/31/2022 0525 by Aamir Redmond RN  Outcome: Ongoing, Progressing   Goal Outcome Evaluation:        Pt was monitored on MSSA with valium for alcohol detox.  Has not needed valium since 8/29 1311. Scored a 4 this shift and was taken out of detox.     Slept fine, up x1 for a drink of water.

## 2022-08-31 NOTE — PROGRESS NOTES
" 08/30/22 2000   Group Therapy Session   Time Session Began 1645   Time Session Ended 1730   Total Time patient participated (minutes) 45   Total # Attendees 6   Group Type expressive therapy   Group Topic Covered cognitive therapy techniques;coping skills/lifestyle management;disease of addiction/choices in recovery   Group Session Detail Behavior Chains and Music; Triggers discussion   Patient Response/Contribution cooperative with task   Patient Response Detail MT opened with a Welcome song, which was well received and build rapport with the patients.  Patients were invited to map out a chain analysis of what led them to detox while listening to the song \"Ball and Chain\" by Social Distortion.   The group then engaged in a discussion of their results and the topic of \"what do you do when everything is a trigger?\"  Engaged, cooperative participation.        "

## 2022-08-31 NOTE — PLAN OF CARE
Patient discharged to home. Reports being picked up by father. Patient escorted off the unit by psych associate, Jonathan.       All patient belongings from room, locked bin and security were sent with patient.  This RN reviewed discharge instructions, teachings, patient's labs, and discharge recommendations with patient.  This RN reviewed patient's prescribed medications being sent home with patient from pharmacy.  Patient verbalizes and demonstrates understanding of all teachings.  Patient denied thoughts of harm towards self or others.  Pt denies any current medical issues at this time.  Patient denies any further questions and is now discharged at this time.

## 2022-09-01 ENCOUNTER — TELEPHONE (OUTPATIENT)
Dept: PEDIATRICS | Facility: CLINIC | Age: 47
End: 2022-09-01

## 2022-09-08 ENCOUNTER — OFFICE VISIT (OUTPATIENT)
Dept: PEDIATRICS | Facility: CLINIC | Age: 47
End: 2022-09-08
Payer: COMMERCIAL

## 2022-09-08 VITALS
HEIGHT: 64 IN | BODY MASS INDEX: 17.58 KG/M2 | SYSTOLIC BLOOD PRESSURE: 100 MMHG | RESPIRATION RATE: 12 BRPM | WEIGHT: 103 LBS | HEART RATE: 98 BPM | OXYGEN SATURATION: 99 % | TEMPERATURE: 98.3 F | DIASTOLIC BLOOD PRESSURE: 50 MMHG

## 2022-09-08 DIAGNOSIS — F10.20 ALCOHOL DEPENDENCE, CONTINUOUS (H): ICD-10-CM

## 2022-09-08 DIAGNOSIS — F41.9 ANXIETY: ICD-10-CM

## 2022-09-08 DIAGNOSIS — Z09 HOSPITAL DISCHARGE FOLLOW-UP: Primary | ICD-10-CM

## 2022-09-08 PROCEDURE — 90686 IIV4 VACC NO PRSV 0.5 ML IM: CPT | Performed by: INTERNAL MEDICINE

## 2022-09-08 PROCEDURE — 90472 IMMUNIZATION ADMIN EACH ADD: CPT | Performed by: INTERNAL MEDICINE

## 2022-09-08 PROCEDURE — 90677 PCV20 VACCINE IM: CPT | Performed by: INTERNAL MEDICINE

## 2022-09-08 PROCEDURE — 99213 OFFICE O/P EST LOW 20 MIN: CPT | Mod: 25 | Performed by: INTERNAL MEDICINE

## 2022-09-08 PROCEDURE — 90471 IMMUNIZATION ADMIN: CPT | Performed by: INTERNAL MEDICINE

## 2022-09-08 NOTE — PATIENT INSTRUCTIONS
Please re-start the buspar at 5 mg (1 tab) twice a day.   After a week, increase to 10 mg (2 tabs) in the morning and 5 mg (1 5 tab) in the evening.   After another week, increase to 10 mg (2 tabs) twice a day.     Please try to eat regularly.     Continue taking naltrexone daily.

## 2022-09-08 NOTE — PROGRESS NOTES
Assessment & Plan     Hospital discharge follow-up    Alcohol dependence, continuous (H)  Would like to be abstinent, has been drinking now for a couple of days since discharge. Encouraged her to continue taking naltrexone. Offered social work support for assistance in finding acceptable treatment for CD.  Not using other substances. I will also inquire about the CD integrated primary care clinic (Dr. Mendosa) to see if this is a good option for her.   - Primary Care - Care Coordination Referral; Future    Anxiety  Encouraged her to continue taking buspar and naltrexone - stopping this when drinking may increase instability and anxiety symptoms.  She struggles with three times daily dosing - recommend starting back at 5 mg twice daily, then increase to 10 mg am and 5 mg pm for a week, then increase to 10 mg twice daily.   - Primary Care - Care Coordination Referral; Future                 Return in about 4 weeks (around 10/6/2022) for Follow up.    Marichuy Lares MD  [facility] LITO Warner is a 46 year old, presenting for the following health issues:  Hospital F/U      hospitals       Hospital Follow-up Visit:    Hospital/Nursing Home/IP Rehab Facility: [facility]  Date of Admission: 08/25/2022  Date of Discharge: 08/31/2022  Reason(s) for Admission: Alcohol dependance disorder/Eating disorder     Was your hospitalization related to COVID-19? No   Problems taking medications regularly:  None  Medication changes since discharge: None  Problems adhering to non-medication therapy:  None    Summary of hospitalization:  [facility] hospital discharge summary reviewed  Diagnostic Tests/Treatments reviewed.  Follow up needed: none  Other Healthcare Providers Involved in Patient s Care:         None  Update since discharge: fluctuating course.         Post Medication Reconciliation Status:        She has been home for about a week, was on buspar  which she stopped because she didn't feel right. She wondered if it made her blood pressure too low.     She was feeling spacey, so she stopped the buspar a couple of days ago, with no change in how she feels.  Her stress level went up since stopping it, but the spacey feeling has not gotten better.      Does stay hydrated well, sometimes wonders if she drinks too much water.     Been trying to eat regularly, but doesn't have an appetite.  Eats some beef jerky, but not able to eat much else. Eats a couple of times per day.    Has had eating disorders most of her adult life, certainly since early college years - mostly restriction, also compulsive exercise, has some purging (but recently mostly restriction).     Has been as low as 70#, currently about 103#.  Feels comfortable at this weight, would like to have more muscle mass. Has been through eating disorder treatment multiple times, most recently around 2014.     Alcohol started to become a problem about 20 years ago, about 6 years after college. She has used it to treat anxiety.     Has tried marijuana, but never used regularly, no recent use.   Smoked cigarettes in treatment, but not a regular smoker.     Has been to dual programs before (CD, eating disorder).   AA doesn't make sense to her.   Feels she doesn't have time or money to do any outpatient treatment at this time.     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     Had some alcohol yesterday. Would like to be sober. Has looked into options for now, but nothing seems to fit in her schedule or budget.    Started naltrexone in the hospital, also stopped this when she started drinking again a day or two ago.     Planning to start virtual therapy at this time for anxiety/mental health.       Plan of care communicated with patient                 Review of Systems   Constitutional, HEENT, cardiovascular, pulmonary, gi and gu systems are negative, except as otherwise noted.      Objective    /50  "(BP Location: Right arm, Patient Position: Sitting, Cuff Size: Adult Regular)   Pulse 98   Temp 98.3  F (36.8  C) (Temporal)   Resp 12   Ht 1.626 m (5' 4\")   Wt 46.7 kg (103 lb)   SpO2 99%   BMI 17.68 kg/m    Body mass index is 17.68 kg/m .  Physical Exam   GENERAL: healthy, alert and no distress  EYES: Eyes grossly normal to inspection, PERRL and conjunctivae and sclerae normal  NECK: no adenopathy, no asymmetry, masses, or scars and thyroid normal to palpation  RESP: lungs clear to auscultation - no rales, rhonchi or wheezes  CV: regular rate and rhythm, normal S1 S2, no S3 or S4, no murmur, click or rub, no peripheral edema and peripheral pulses strong  MS: no gross musculoskeletal defects noted, no edema  SKIN: no suspicious lesions or rashes  NEURO: Normal strength and tone, mentation intact and speech normal  PSYCH: mentation appears normal, affect normal/bright                    "

## 2022-09-12 ENCOUNTER — PATIENT OUTREACH (OUTPATIENT)
Dept: CARE COORDINATION | Facility: CLINIC | Age: 47
End: 2022-09-12

## 2022-09-12 NOTE — PROGRESS NOTES
Clinic Care Coordination Contact  [facility]       Clinical Data: Care Coordinator Outreach  Outreach attempted x 1. Spoke with pt briefly who was not available to talk at this time. Pt requested a call back tomorrow at 1pm.   Plan: [facility] scheduled pt for phone assessment with Care Coordination 9/13/2022 at 1:00PM as requested. Care Coordinator will try to reach patient again in 1-2 business days.    [facility]  Clinic Care Coordinator  [facility]  [phone]  [email or web address]

## 2022-09-13 ENCOUNTER — PATIENT OUTREACH (OUTPATIENT)
Dept: NURSING | Facility: CLINIC | Age: 47
End: 2022-09-13

## 2022-09-13 NOTE — LETTER
[facility] CARE COORDINATION  [address] [place] [address]    September 13, 2022    Alana Mujica  [address] [address]      Dear Alana,        I am a clinic care coordinator who works with Marichuy Lares MD with the [facility]. I wanted to introduce myself and provide you with my contact information for you to be able to call me with any questions or concerns. Below is a description of clinic care coordination and how I can further assist you.       The clinic care coordination team is made up of a registered nurse, , financial resource worker and community health worker who understand the health care system. The goal of clinic care coordination is to help you manage your health and improve access to the health care system. Our team works alongside your provider to assist you in determining your health and social needs. We can help you obtain health care and community resources, providing you with necessary information and education. We can work with you through any barriers and develop a care plan that helps coordinate and strengthen the communication between you and your care team.    Please feel free to contact me with any questions or concerns regarding care coordination and what we can offer.      We are focused on providing you with the highest-quality healthcare experience possible.    Sincerely,     ALVARO Ramsey  Clinic Care Coordinator  [facility] [place]  [phone]  [email or web address]

## 2022-09-13 NOTE — PROGRESS NOTES
Clinic Care Coordination Contact  [facility]/Voicemail       Clinical Data: Care Coordinator Outreach  Outreach attempted x 1.  Left message on patient's voicemail with call back information and requested return call.  Plan: Care Coordinator will try to reach patient again in 1-2 business days.    Nathan Mccormack [facility]  Clinic Care Coordinator  [facility]  [facility]  [phone]  [email or web address]

## 2022-09-13 NOTE — PROGRESS NOTES
Clinic Care Coordination Contact  [facility]/Voicemail    Referral Source: PCP  Clinical Data: Pt referred for CD resources. Pt has an intake with Addiction Med 9/21/2022.     Care Coordinator Outreach  Outreach attempted x 2.  Left message on patient's voicemail with call back information and requested return call.  Plan: Care Coordinator will send care coordination introduction letter with care coordinator contact information and explanation of care coordination services via Mailcloudt. Care Coordinator will do no further outreaches at this time.    Nathan Mccormack [facility]  Clinic Care Coordinator  [facility]  [phone]  [email or web address]

## 2022-09-21 ENCOUNTER — VIRTUAL VISIT (OUTPATIENT)
Dept: ADDICTION MEDICINE | Facility: CLINIC | Age: 47
End: 2022-09-21
Payer: COMMERCIAL

## 2022-09-21 DIAGNOSIS — F10.20 ALCOHOL USE DISORDER, SEVERE, DEPENDENCE (H): Primary | ICD-10-CM

## 2022-09-21 DIAGNOSIS — F41.1 GAD (GENERALIZED ANXIETY DISORDER): ICD-10-CM

## 2022-09-21 DIAGNOSIS — F50.20 BULIMIA: ICD-10-CM

## 2022-09-21 PROCEDURE — 99204 OFFICE O/P NEW MOD 45 MIN: CPT | Mod: 95 | Performed by: NURSE PRACTITIONER

## 2022-09-21 RX ORDER — NALTREXONE HYDROCHLORIDE 50 MG/1
50 TABLET, FILM COATED ORAL DAILY
Qty: 30 TABLET | Refills: 1 | Status: SHIPPED | OUTPATIENT
Start: 2022-09-21 | End: 2022-11-16

## 2022-09-21 ASSESSMENT — ANXIETY QUESTIONNAIRES
GAD7 TOTAL SCORE: 10
7. FEELING AFRAID AS IF SOMETHING AWFUL MIGHT HAPPEN: NOT AT ALL
IF YOU CHECKED OFF ANY PROBLEMS ON THIS QUESTIONNAIRE, HOW DIFFICULT HAVE THESE PROBLEMS MADE IT FOR YOU TO DO YOUR WORK, TAKE CARE OF THINGS AT HOME, OR GET ALONG WITH OTHER PEOPLE: SOMEWHAT DIFFICULT
5. BEING SO RESTLESS THAT IT IS HARD TO SIT STILL: SEVERAL DAYS
4. TROUBLE RELAXING: MORE THAN HALF THE DAYS
GAD7 TOTAL SCORE: 10
7. FEELING AFRAID AS IF SOMETHING AWFUL MIGHT HAPPEN: NOT AT ALL
2. NOT BEING ABLE TO STOP OR CONTROL WORRYING: MORE THAN HALF THE DAYS
6. BECOMING EASILY ANNOYED OR IRRITABLE: NOT AT ALL
8. IF YOU CHECKED OFF ANY PROBLEMS, HOW DIFFICULT HAVE THESE MADE IT FOR YOU TO DO YOUR WORK, TAKE CARE OF THINGS AT HOME, OR GET ALONG WITH OTHER PEOPLE?: SOMEWHAT DIFFICULT
3. WORRYING TOO MUCH ABOUT DIFFERENT THINGS: MORE THAN HALF THE DAYS
GAD7 TOTAL SCORE: 10
1. FEELING NERVOUS, ANXIOUS, OR ON EDGE: NEARLY EVERY DAY

## 2022-09-21 ASSESSMENT — PATIENT HEALTH QUESTIONNAIRE - PHQ9
SUM OF ALL RESPONSES TO PHQ QUESTIONS 1-9: 12
10. IF YOU CHECKED OFF ANY PROBLEMS, HOW DIFFICULT HAVE THESE PROBLEMS MADE IT FOR YOU TO DO YOUR WORK, TAKE CARE OF THINGS AT HOME, OR GET ALONG WITH OTHER PEOPLE: SOMEWHAT DIFFICULT
SUM OF ALL RESPONSES TO PHQ QUESTIONS 1-9: 12

## 2022-09-21 NOTE — PATIENT INSTRUCTIONS
It was nice to talk with you today, Alana!    Start naltrexone take 1 tablet (50 mg) daily.    A referral was placed for a chemical health assessment, please call [phone] to schedule.    3. Follow up with your primary care provider. Please reach out if you feel you could benefit from addiction medicine services in the future.       Alcohol Use Disorder  The diagnosis of alcohol use disorder is made using the DSM-V criteria for Substance Use Disorders - https://bit.ly/3DBJRId (link to Psychology Today report on these criteria). The FDA has 3 medications approved for alcohol use disorder - naltrexone (Vivitrol injection), acamprosate (Campral), and disulfiram (Antabuse).    For your reading purposes - a neurotransmitter is a chemical released in the brain that provides a signal directing the activity of nerves. The result is a predictable response based on the type of neurotransmitter. For example, serotonin, dopamine and norepinephrine (closely related to adrenaline) are all neurotransmitters.    Naltrexone   Naltrexone blocks the opioid receptors in the brain. It is basically naloxone (Narcan) in pill form. Why does an opioid blocker help with alcohol use? Our opioid receptors are part of the reward signaling during alcohol consumption, closely associated with dopamine release. Dopamine is a neurotransmitter synonymous with  reward . ALL substances that can cause addiction release dopamine at high levels. So, when naltrexone blocks opioid receptors, we feel less reward when consuming alcohol. This also leads to less craving BEFORE drinking alcohol, since craving is also associated with these neurotransmitters. The result is A) Fewer days drinking (higher chance of abstinence) and B) Less alcohol consumed if/when drinking occurs. This is prescribed 1 pill, 1 time per day. Please tell your provider if you have any liver damage, as this can change our management plan with naltrexone.    Naltrexone blocks the  opioid receptor, so pain medications or illicit opioids will NOT provide the expected response. If you have an injury or a surgery, naltrexone will block the pain medications, so you need to talk to your provider to account for this.
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
## 2022-09-21 NOTE — PROGRESS NOTES
[facility] - Addiction Medicine       Rooming information:    Last use approximently 9/18/22 alcohol   Any other recent substance use:     Denies    NICOTINE-No      Side effects related to medications pt would like to discuss with provider (constipation, dry mouth, HA, GI upset, sedation?) N/A NOT TAKING    Narcan currently available: N/A    Primary care provider: Marichuy Lares MD     Mental health provider: none (follow up on MH referral if needed)    Any housing, insurance deficits?: No    Contact information up to date? Yes    3rd Party Involvement None (please obtain RANDI if pt would like to include)     This video/telephone visit will be conducted via a call between you and your physician/provider. We have found that certain health care needs can be provided without the need for an in-person physical exam. This service lets us provide the care you need with a video /telephone conversation. If a prescription is necessary we can send it directly to your pharmacy. If lab work is needed we can place an order for that and you can then stop by our lab to have the test done at a later time.    Just as we bill insurance for in-person visits, we also bill insurance for video/telephone visits. If you have questions about your insurance coverage, we recommend that you speak with your insurance company.    Patient has given verbal consent for video/Telephone visit? Yes    Patient would like a video visit, please connect/call : [facility], if unable to connect call [phone]  Reason for visit: Intake   Anything the provider should be aware of for today's appointment: No    Patient verified allergies, medications and pharmacy via telephone.     TRAM-7 scores:    TRAM-7 SCORE 3/31/2022 9/21/2022   Total Score 5 (mild anxiety) 10 (moderate anxiety)   Total Score 5 10       PHQ-9 scores:   PHQ-9 SCORE 3/31/2022 9/21/2022   PHQ-9 Total Score [facility] 3 (Minimal depression) 12 (Moderate depression)   PHQ-9 Total Score 3  12       Patient states they are ready for visit.    Angelica Santiago, JENNI September 21, 2022 1:37 PM    Angelica Santiago CMA  September 21, 2022  1:36 PM

## 2022-09-21 NOTE — PROGRESS NOTES
MH&A Post-Appointment Chart -check      Medications ordered this visit were e-scribed.  Verified by order class [x] yes  [] no    List Medications: Naltrexone 50mg  Medication changes or discontinuations were communicated to patient's pharmacy: [] yes  [x] no    UA collected [] yes  [] no  [x] n/a-virtual     Outside referrals / labs, etc support staff to follow up: [] yes  [x] no   Future appointment was made: [] yes  [x] no  [] n/a  Future Appointments 9/21/2022 - 3/20/2023              Date Visit Type Length Department Provider     10/20/2022  7:30 AM OFFICE VISIT 30 min EA IM/Marichuy Samaniego MD    Location Instructions:     [facility] is located at [address], just west of the [address] exit off of [address]. Free parking is available; from either [address] [facility] or [address], access the lot by turning onto [address].                    Dictation completed at time of chart check: [x] yes  [] no    I have checked the documentation for today s encounters and the above information has been reviewed and completed.      Angelica Santiago CMA on September 21, 2022 at 3:26 PM

## 2022-09-22 NOTE — LETTER
My Depression Action Plan  Name: Alana Mujica   Date of Birth 1975  Date: 6/4/2018    My doctor: Sabrina Pérez   My clinic: [facility]  [id number] [address] [phone]          GREEN    ZONE   Good Control    What it looks like:     Things are going generally well. You have normal up s and down s. You may even feel depressed from time to time, but bad moods usually last less than a day.   What you need to do:  1. Continue to care for yourself (see self care plan)  2. Check your depression survival kit and update it as needed  3. Follow your physician s recommendations including any medication.  4. Do not stop taking medication unless you consult with your physician first.           YELLOW         ZONE Getting Worse    What it looks like:     Depression is starting to interfere with your life.     It may be hard to get out of bed; you may be starting to isolate yourself from others.    Symptoms of depression are starting to last most all day and this has happened for several days.     You may have suicidal thoughts but they are not constant.   What you need to do:     1. Call your care team, your response to treatment will improve if you keep your care team informed of your progress. Yellow periods are signs an adjustment may need to be made.     2. Continue your self-care, even if you have to fake it!    3. Talk to someone in your support network    4. Open up your depression survival kit           RED    ZONE Medical Alert - Get Help    What it looks like:     Depression is seriously interfering with your life.     You may experience these or other symptoms: You can t get out of bed most days, can t work or engage in other necessary activities, you have trouble taking care of basic hygiene, or basic responsibilities, thoughts of suicide or death that will not go away, self-injurious behavior.     What you need to do:  1. Call your care team  and request a same-day appointment. If they are not available (weekends or after hours) call your local crisis line, emergency room or 911.            Depression Self Care Plan / Survival Kit    Self-Care for Depression  Here s the deal. Your body and mind are really not as separate as most people think.  What you do and think affects how you feel and how you feel influences what you do and think. This means if you do things that people who feel good do, it will help you feel better.  Sometimes this is all it takes.  There is also a place for medication and therapy depending on how severe your depression is, so be sure to consult with your medical provider and/ or Behavioral Health Consultant if your symptoms are worsening or not improving.     In order to better manage my stress, I will:    Exercise  Get some form of exercise, every day. This will help reduce pain and release endorphins, the  feel good  chemicals in your brain. This is almost as good as taking antidepressants!  This is not the same as joining a gym and then never going! (they count on that by the way ) It can be as simple as just going for a walk or doing some gardening, anything that will get you moving.      Hygiene   Maintain good hygiene (Get out of bed in the morning, Make your bed, Brush your teeth, Take a shower, and Get dressed like you were going to work, even if you are unemployed).  If your clothes don't fit try to get ones that do.    Diet  I will strive to eat foods that are good for me, drink plenty of water, and avoid excessive sugar, caffeine, alcohol, and other mood-altering substances.  Some foods that are helpful in depression are: complex carbohydrates, B vitamins, flaxseed, fish or fish oil, fresh fruits and vegetables.    Psychotherapy  I agree to participate in Individual Therapy (if recommended).    Medication  If prescribed medications, I agree to take them.  Missing doses can result in serious side effects.  I understand  that drinking alcohol, or other illicit drug use, may cause potential side effects.  I will not stop my medication abruptly without first discussing it with my provider.    Staying Connected With Others  I will stay in touch with my friends, family members, and my primary care provider/team.    Use your imagination  Be creative.  We all have a creative side; it doesn t matter if it s oil painting, sand castles, or mud pies! This will also kick up the endorphins.    Witness Beauty  (AKA stop and smell the roses) Take a look outside, even in mid-winter. Notice colors, textures. Watch the squirrels and birds.     Service to others  Be of service to others.  There is always someone else in need.  By helping others we can  get out of ourselves  and remember the really important things.  This also provides opportunities for practicing all the other parts of the program.    Humor  Laugh and be silly!  Adjust your TV habits for less news and crime-drama and more comedy.    Control your stress  Try breathing deep, massage therapy, biofeedback, and meditation. Find time to relax each day.     My support system    Clinic Contact:  Phone number:    Contact 1:  Phone number:    Contact 2:  Phone number:    Druze/:  Phone number:    Therapist:  Phone number:    Local crisis center:    Phone number:    Other community support:  Phone number:       Satisfactory

## 2022-09-30 ENCOUNTER — HOSPITAL ENCOUNTER (OUTPATIENT)
Dept: CT IMAGING | Facility: CLINIC | Age: 47
Discharge: HOME | End: 2022-09-30
Attending: NURSE PRACTITIONER | Admitting: NURSE PRACTITIONER
Payer: COMMERCIAL

## 2022-09-30 ENCOUNTER — OFFICE VISIT (OUTPATIENT)
Dept: PEDIATRICS | Facility: CLINIC | Age: 47
End: 2022-09-30
Payer: COMMERCIAL

## 2022-09-30 ENCOUNTER — OFFICE VISIT (OUTPATIENT)
Dept: PEDIATRICS | Facility: CLINIC | Age: 47
End: 2022-09-30
Attending: NURSE PRACTITIONER

## 2022-09-30 VITALS
HEART RATE: 83 BPM | DIASTOLIC BLOOD PRESSURE: 83 MMHG | WEIGHT: 115 LBS | RESPIRATION RATE: 18 BRPM | SYSTOLIC BLOOD PRESSURE: 124 MMHG | TEMPERATURE: 98.5 F | OXYGEN SATURATION: 97 % | BODY MASS INDEX: 19.74 KG/M2

## 2022-09-30 VITALS
DIASTOLIC BLOOD PRESSURE: 76 MMHG | WEIGHT: 115.7 LBS | SYSTOLIC BLOOD PRESSURE: 114 MMHG | TEMPERATURE: 97.6 F | OXYGEN SATURATION: 96 % | HEART RATE: 90 BPM | HEIGHT: 64 IN | BODY MASS INDEX: 19.75 KG/M2

## 2022-09-30 DIAGNOSIS — R10.32 LLQ ABDOMINAL PAIN: ICD-10-CM

## 2022-09-30 DIAGNOSIS — R14.0 ABDOMINAL BLOATING: ICD-10-CM

## 2022-09-30 DIAGNOSIS — R10.9 ABDOMINAL CRAMPING: ICD-10-CM

## 2022-09-30 DIAGNOSIS — R19.7 DIARRHEA, UNSPECIFIED TYPE: ICD-10-CM

## 2022-09-30 DIAGNOSIS — R10.31 RLQ ABDOMINAL PAIN: ICD-10-CM

## 2022-09-30 DIAGNOSIS — R14.0 ABDOMINAL BLOATING: Primary | ICD-10-CM

## 2022-09-30 DIAGNOSIS — K62.5 RECTAL BLEED: ICD-10-CM

## 2022-09-30 DIAGNOSIS — F50.9 EATING DISORDER, UNSPECIFIED TYPE: ICD-10-CM

## 2022-09-30 DIAGNOSIS — F10.20 ALCOHOL DEPENDENCE, CONTINUOUS (H): Primary | ICD-10-CM

## 2022-09-30 LAB
ALBUMIN SERPL BCG-MCNC: 4.4 G/DL (ref 3.5–5.2)
ALP SERPL-CCNC: 60 U/L (ref 35–104)
ALT SERPL W P-5'-P-CCNC: 14 U/L (ref 10–35)
ANION GAP SERPL CALCULATED.3IONS-SCNC: 9 MMOL/L (ref 7–15)
AST SERPL W P-5'-P-CCNC: 26 U/L (ref 10–35)
BASOPHILS # BLD AUTO: 0.1 10E3/UL (ref 0–0.2)
BASOPHILS NFR BLD AUTO: 2 %
BILIRUB SERPL-MCNC: 0.2 MG/DL
BUN SERPL-MCNC: 17 MG/DL (ref 6–20)
CALCIUM SERPL-MCNC: 10.2 MG/DL (ref 8.6–10)
CHLORIDE SERPL-SCNC: 98 MMOL/L (ref 98–107)
CREAT SERPL-MCNC: 0.93 MG/DL (ref 0.51–0.95)
CRP SERPL-MCNC: <3 MG/L
DEPRECATED HCO3 PLAS-SCNC: 27 MMOL/L (ref 22–29)
EOSINOPHIL # BLD AUTO: 0.2 10E3/UL (ref 0–0.7)
EOSINOPHIL NFR BLD AUTO: 3 %
ERYTHROCYTE [DISTWIDTH] IN BLOOD BY AUTOMATED COUNT: 14.2 % (ref 10–15)
GFR SERPL CREATININE-BSD FRML MDRD: 75 ML/MIN/1.73M2
GLUCOSE SERPL-MCNC: 92 MG/DL (ref 70–99)
HCT VFR BLD AUTO: 38.4 % (ref 35–47)
HGB BLD-MCNC: 12.3 G/DL (ref 11.7–15.7)
IMM GRANULOCYTES # BLD: 0 10E3/UL
IMM GRANULOCYTES NFR BLD: 0 %
LYMPHOCYTES # BLD AUTO: 1.2 10E3/UL (ref 0.8–5.3)
LYMPHOCYTES NFR BLD AUTO: 19 %
MCH RBC QN AUTO: 33.4 PG (ref 26.5–33)
MCHC RBC AUTO-ENTMCNC: 32 G/DL (ref 31.5–36.5)
MCV RBC AUTO: 104 FL (ref 78–100)
MONOCYTES # BLD AUTO: 0.7 10E3/UL (ref 0–1.3)
MONOCYTES NFR BLD AUTO: 11 %
NEUTROPHILS # BLD AUTO: 4.1 10E3/UL (ref 1.6–8.3)
NEUTROPHILS NFR BLD AUTO: 65 %
NRBC # BLD AUTO: 0 10E3/UL
NRBC BLD AUTO-RTO: 0 /100
PLATELET # BLD AUTO: 319 10E3/UL (ref 150–450)
POTASSIUM SERPL-SCNC: 4.6 MMOL/L (ref 3.4–5.3)
PROT SERPL-MCNC: 7.2 G/DL (ref 6.4–8.3)
RBC # BLD AUTO: 3.68 10E6/UL (ref 3.8–5.2)
SODIUM SERPL-SCNC: 134 MMOL/L (ref 136–145)
WBC # BLD AUTO: 6.3 10E3/UL (ref 4–11)

## 2022-09-30 PROCEDURE — 36415 COLL VENOUS BLD VENIPUNCTURE: CPT | Performed by: NURSE PRACTITIONER

## 2022-09-30 PROCEDURE — 99207 REFERRAL TO ACUTE AND DIAGNOSTIC SERVICES: CPT | Performed by: NURSE PRACTITIONER

## 2022-09-30 PROCEDURE — 99214 OFFICE O/P EST MOD 30 MIN: CPT | Performed by: NURSE PRACTITIONER

## 2022-09-30 PROCEDURE — 85025 COMPLETE CBC W/AUTO DIFF WBC: CPT | Performed by: NURSE PRACTITIONER

## 2022-09-30 PROCEDURE — 74177 CT ABD & PELVIS W/CONTRAST: CPT

## 2022-09-30 PROCEDURE — 258N000003 HC RX IP 258 OP 636: Performed by: NURSE PRACTITIONER

## 2022-09-30 PROCEDURE — 250N000011 HC RX IP 250 OP 636: Performed by: NURSE PRACTITIONER

## 2022-09-30 PROCEDURE — 80053 COMPREHEN METABOLIC PANEL: CPT | Performed by: NURSE PRACTITIONER

## 2022-09-30 PROCEDURE — 86140 C-REACTIVE PROTEIN: CPT | Performed by: NURSE PRACTITIONER

## 2022-09-30 RX ORDER — IOPAMIDOL 755 MG/ML
500 INJECTION, SOLUTION INTRAVASCULAR ONCE
Status: COMPLETED | OUTPATIENT
Start: 2022-09-30 | End: 2022-09-30

## 2022-09-30 RX ADMIN — SODIUM CHLORIDE 55 ML: 9 INJECTION, SOLUTION INTRAVENOUS at 10:24

## 2022-09-30 RX ADMIN — IOPAMIDOL 55 ML: 755 INJECTION, SOLUTION INTRAVENOUS at 10:24

## 2022-09-30 ASSESSMENT — PAIN SCALES - GENERAL: PAINLEVEL: MILD PAIN (2)

## 2022-09-30 NOTE — PROGRESS NOTES
"  Assessment & Plan     Abdominal bloating  RLQ abdominal pain  LLQ abdominal pain  Abdominal cramping  Rectal bleed  Diarrhea, unspecified type  Sent to [facility] for stat CT scan and labs to r/o acute abnormalities such as colitis, diverticulitis, etc. Next steps would be possible endoscopy/colonoscopy/stool studies but would return back to clinic to discuss that. She has fam hx of IBD.  - Referral to Acute and Diagnostic Services (Day of diagnostic / First order acute); Future             Return today (on 9/30/2022) for Routine Visit.    Sona Dia NP  [facility] LITO Warner is a 46 year old, presenting for the following health issues:  Diarrhea      History of Present Illness       Reason for visit:  Blood and blood clots and mucus in bowel movement. Severe pain When eposides  Symptom onset:  1-2 weeks ago  Symptoms include:  Blood,  mucus, blood clots and painful lower abdominal pain  Symptom intensity:  Severe    She eats 0-1 servings of fruits and vegetables daily.She consumes 0 sweetened beverage(s) daily.She exercises with enough effort to increase her heart rate 60 or more minutes per day.  She exercises with enough effort to increase her heart rate 6 days per week.   She is taking medications regularly.     Diarrhea - blood in stool  Onset/Duration: 2 weeks - worsening  Description:       Consistency of stool: waxing and waning - soft to loose       Blood in stool: YES- now having clots and mucus       Number of loose stools past 24 hours: 2  Progression of Symptoms: worsening  Accompanying signs and symptoms:       Fever: No       Nausea/Vomiting: No       Abdominal pain: YES - lower abdominal, + bloating       Weight loss: No       Episodes of constipation: No - but states she feels \"plugged up\"  History   Ill contacts: No  Recent use of antibiotics: No  Recent travels: No  Recent medication-new or changes(Rx or OTC): No  Grandmother had ulcerative colitis  Thinks she may " "have hemorrhoid  States had colonoscopy at 43  Precipitating or alleviating factors: None  Therapies tried and outcome: rolaids with gas relief - not helping    Colonoscopy 2018-pt tells me was normal unable to view   Hx of hemorrhoids where she will have bloody when wiping    A lot of fluids or eating   Walking   Will get a lot of lower abdominal cramping   Bloated  Then has diarrhea and bloody clot diarrhea and mucus  No fevers or chills   No N/V  Decreased appetite  3-4 x per day    Grandma with ulcerative colitis  Father with diverticulitis    Beef jerkey at 4 am    No GERD symptoms  No hematemeiss    Review of Systems   Constitutional, HEENT, cardiovascular, pulmonary, gi and gu systems are negative, except as otherwise noted.      Objective    /76 (BP Location: Right arm, Cuff Size: Adult Regular)   Pulse 90   Temp 97.6  F (36.4  C) (Tympanic)   Ht 1.626 m (5' 4\")   Wt 52.5 kg (115 lb 11.2 oz)   LMP  (LMP Unknown)   SpO2 96%   BMI 19.86 kg/m    Body mass index is 19.86 kg/m .  Physical Exam                       "

## 2022-09-30 NOTE — PATIENT INSTRUCTIONS
Results for orders placed or performed during the hospital encounter of 09/30/22   CT Abdomen Pelvis w Contrast     Status: None (Preliminary result)    Narrative    CT ABDOMEN AND PELVIS WITH CONTRAST  9/30/2022 10:30 AM    HISTORY:  Abdominal bloating. Rectal bleed. Right lower quadrant  abdominal pain. Left lower quadrant abdominal pain. Abdominal  cramping. Diarrhea, unspecified type.    TECHNIQUE: CT scan obtained of the abdomen, and pelvis with IV  contrast. 55mL Isovue-370 IV injected. Radiation dose for this scan  was reduced using automated exposure control, adjustment of the mA  and/or kV according to patient size, or iterative reconstruction  technique.    COMPARISON: CT abdomen and pelvis on 2/6/2020    FINDINGS:    Lower chest: Mild basilar pulmonary opacities, likely atelectasis.    Abdomen/pelvis:    Hepatobiliary: No suspicious focal hepatic lesion. The gallbladder is  unremarkable.    Pancreas: No main pancreatic ductal dilatation or definite solid  pancreatic mass.    Spleen: No splenomegaly.    Adrenal glands: No adrenal nodules.    Kidneys: No radiodense kidney/ureteral stones or hydronephrosis in the  kidney.    Bowel: No abnormally dilated bowel loops. The appendix is visualized  and appears normal.    Peritoneum: No significant free fluid in the abdomen or pelvis. No  free peritoneal portal venous gas.    Pelvic organs: Mild diffuse urinary bladder wall thickening,  nonspecific, can be seen with chronic cystitis.    Vascular: Unremarkable.    Lymph nodes: No significant abdominopelvic lymphadenopathy.    Bones and soft tissue: No suspicious osseous lesion.      Impression    IMPRESSION: No acute pathology in the abdomen and pelvis.     Results for orders placed or performed in visit on 09/30/22   Comprehensive metabolic panel     Status: Abnormal   Result Value Ref Range    Sodium 134 (L) 136 - 145 mmol/L    Potassium 4.6 3.4 - 5.3 mmol/L    Chloride 98 98 - 107 mmol/L    Carbon Dioxide (CO2)  27 22 - 29 mmol/L    Anion Gap 9 7 - 15 mmol/L    Urea Nitrogen 17.0 6.0 - 20.0 mg/dL    Creatinine 0.93 0.51 - 0.95 mg/dL    Calcium 10.2 (H) 8.6 - 10.0 mg/dL    Glucose 92 70 - 99 mg/dL    Alkaline Phosphatase 60 35 - 104 U/L    AST 26 10 - 35 U/L    ALT 14 10 - 35 U/L    Protein Total 7.2 6.4 - 8.3 g/dL    Albumin 4.4 3.5 - 5.2 g/dL    Bilirubin Total 0.2 <=1.2 mg/dL    GFR Estimate 75 >60 mL/min/1.73m2   CRP inflammation     Status: Normal   Result Value Ref Range    CRP Inflammation <3.00 <5.00 mg/L   CBC with platelets and differential     Status: Abnormal   Result Value Ref Range    WBC Count 6.3 4.0 - 11.0 10e3/uL    RBC Count 3.68 (L) 3.80 - 5.20 10e6/uL    Hemoglobin 12.3 11.7 - 15.7 g/dL    Hematocrit 38.4 35.0 - 47.0 %     (H) 78 - 100 fL    MCH 33.4 (H) 26.5 - 33.0 pg    MCHC 32.0 31.5 - 36.5 g/dL    RDW 14.2 10.0 - 15.0 %    Platelet Count 319 150 - 450 10e3/uL    % Neutrophils 65 %    % Lymphocytes 19 %    % Monocytes 11 %    % Eosinophils 3 %    % Basophils 2 %    % Immature Granulocytes 0 %    NRBCs per 100 WBC 0 <1 /100    Absolute Neutrophils 4.1 1.6 - 8.3 10e3/uL    Absolute Lymphocytes 1.2 0.8 - 5.3 10e3/uL    Absolute Monocytes 0.7 0.0 - 1.3 10e3/uL    Absolute Eosinophils 0.2 0.0 - 0.7 10e3/uL    Absolute Basophils 0.1 0.0 - 0.2 10e3/uL    Absolute Immature Granulocytes 0.0 <=0.4 10e3/uL    Absolute NRBCs 0.0 10e3/uL   CBC with platelets differential     Status: Abnormal    Narrative    The following orders were created for panel order CBC with platelets differential.  Procedure                               Abnormality         Status                     ---------                               -----------         ------                     CBC with platelets and d...[[id number]]  Abnormal            Final result                 Please view results for these tests on the individual orders.     There is no acute changes on the CT to explain your pain or bleeding.  Please follow up with  your primary care provider for a recheck in Gastroenterology.

## 2022-09-30 NOTE — PROGRESS NOTES
Assessment & Plan     Abdominal bloating  - Referral to Acute and Diagnostic Services (Day of diagnostic / First order acute)  - CBC with platelets differential; Future  - Comprehensive metabolic panel; Future  - CRP inflammation; Future  - IV access  - CT Abdomen Pelvis w Contrast; Future  - sodium chloride (PF) 0.9% PF flush 3 mL  - CBC with platelets differential  - Comprehensive metabolic panel  - CRP inflammation    Rectal bleed  - Referral to Acute and Diagnostic Services (Day of diagnostic / First order acute)  - CBC with platelets differential; Future  - Comprehensive metabolic panel; Future  - CRP inflammation; Future  - IV access  - CT Abdomen Pelvis w Contrast; Future  - sodium chloride (PF) 0.9% PF flush 3 mL  - CBC with platelets differential  - Comprehensive metabolic panel  - CRP inflammation    RLQ abdominal pain  - Referral to Acute and Diagnostic Services (Day of diagnostic / First order acute)  - CBC with platelets differential; Future  - Comprehensive metabolic panel; Future  - CRP inflammation; Future  - IV access  - CT Abdomen Pelvis w Contrast; Future  - sodium chloride (PF) 0.9% PF flush 3 mL  - CBC with platelets differential  - Comprehensive metabolic panel  - CRP inflammation    LLQ abdominal pain  - Referral to Acute and Diagnostic Services (Day of diagnostic / First order acute)  - CBC with platelets differential; Future  - Comprehensive metabolic panel; Future  - CRP inflammation; Future  - IV access  - CT Abdomen Pelvis w Contrast; Future  - sodium chloride (PF) 0.9% PF flush 3 mL  - CBC with platelets differential  - Comprehensive metabolic panel  - CRP inflammation    Abdominal cramping  - Referral to Acute and Diagnostic Services (Day of diagnostic / First order acute)  - CBC with platelets differential; Future  - Comprehensive metabolic panel; Future  - CRP inflammation; Future  - IV access  - CT Abdomen Pelvis w Contrast; Future  - sodium chloride (PF) 0.9% PF flush 3 mL  - CBC  with platelets differential  - Comprehensive metabolic panel  - CRP inflammation    Diarrhea, unspecified type  - Referral to Acute and Diagnostic Services (Day of diagnostic / First order acute)  - CBC with platelets differential; Future  - Comprehensive metabolic panel; Future  - CRP inflammation; Future  - IV access  - CT Abdomen Pelvis w Contrast; Future  - sodium chloride (PF) 0.9% PF flush 3 mL  - CBC with platelets differential  - Comprehensive metabolic panel  - CRP inflammation    Alcohol dependence    Eating disorder      Patient Instructions     Results for orders placed or performed during the hospital encounter of 09/30/22   CT Abdomen Pelvis w Contrast     Status: None (Preliminary result)    Narrative    CT ABDOMEN AND PELVIS WITH CONTRAST  9/30/2022 10:30 AM    HISTORY:  Abdominal bloating. Rectal bleed. Right lower quadrant  abdominal pain. Left lower quadrant abdominal pain. Abdominal  cramping. Diarrhea, unspecified type.    TECHNIQUE: CT scan obtained of the abdomen, and pelvis with IV  contrast. 55mL Isovue-370 IV injected. Radiation dose for this scan  was reduced using automated exposure control, adjustment of the mA  and/or kV according to patient size, or iterative reconstruction  technique.    COMPARISON: CT abdomen and pelvis on 2/6/2020    FINDINGS:    Lower chest: Mild basilar pulmonary opacities, likely atelectasis.    Abdomen/pelvis:    Hepatobiliary: No suspicious focal hepatic lesion. The gallbladder is  unremarkable.    Pancreas: No main pancreatic ductal dilatation or definite solid  pancreatic mass.    Spleen: No splenomegaly.    Adrenal glands: No adrenal nodules.    Kidneys: No radiodense kidney/ureteral stones or hydronephrosis in the  kidney.    Bowel: No abnormally dilated bowel loops. The appendix is visualized  and appears normal.    Peritoneum: No significant free fluid in the abdomen or pelvis. No  free peritoneal portal venous gas.    Pelvic organs: Mild diffuse urinary  bladder wall thickening,  nonspecific, can be seen with chronic cystitis.    Vascular: Unremarkable.    Lymph nodes: No significant abdominopelvic lymphadenopathy.    Bones and soft tissue: No suspicious osseous lesion.      Impression    IMPRESSION: No acute pathology in the abdomen and pelvis.     Results for orders placed or performed in visit on 09/30/22   Comprehensive metabolic panel     Status: Abnormal   Result Value Ref Range    Sodium 134 (L) 136 - 145 mmol/L    Potassium 4.6 3.4 - 5.3 mmol/L    Chloride 98 98 - 107 mmol/L    Carbon Dioxide (CO2) 27 22 - 29 mmol/L    Anion Gap 9 7 - 15 mmol/L    Urea Nitrogen 17.0 6.0 - 20.0 mg/dL    Creatinine 0.93 0.51 - 0.95 mg/dL    Calcium 10.2 (H) 8.6 - 10.0 mg/dL    Glucose 92 70 - 99 mg/dL    Alkaline Phosphatase 60 35 - 104 U/L    AST 26 10 - 35 U/L    ALT 14 10 - 35 U/L    Protein Total 7.2 6.4 - 8.3 g/dL    Albumin 4.4 3.5 - 5.2 g/dL    Bilirubin Total 0.2 <=1.2 mg/dL    GFR Estimate 75 >60 mL/min/1.73m2   CRP inflammation     Status: Normal   Result Value Ref Range    CRP Inflammation <3.00 <5.00 mg/L   CBC with platelets and differential     Status: Abnormal   Result Value Ref Range    WBC Count 6.3 4.0 - 11.0 10e3/uL    RBC Count 3.68 (L) 3.80 - 5.20 10e6/uL    Hemoglobin 12.3 11.7 - 15.7 g/dL    Hematocrit 38.4 35.0 - 47.0 %     (H) 78 - 100 fL    MCH 33.4 (H) 26.5 - 33.0 pg    MCHC 32.0 31.5 - 36.5 g/dL    RDW 14.2 10.0 - 15.0 %    Platelet Count 319 150 - 450 10e3/uL    % Neutrophils 65 %    % Lymphocytes 19 %    % Monocytes 11 %    % Eosinophils 3 %    % Basophils 2 %    % Immature Granulocytes 0 %    NRBCs per 100 WBC 0 <1 /100    Absolute Neutrophils 4.1 1.6 - 8.3 10e3/uL    Absolute Lymphocytes 1.2 0.8 - 5.3 10e3/uL    Absolute Monocytes 0.7 0.0 - 1.3 10e3/uL    Absolute Eosinophils 0.2 0.0 - 0.7 10e3/uL    Absolute Basophils 0.1 0.0 - 0.2 10e3/uL    Absolute Immature Granulocytes 0.0 <=0.4 10e3/uL    Absolute NRBCs 0.0 10e3/uL   CBC with  "platelets differential     Status: Abnormal    Narrative    The following orders were created for panel order CBC with platelets differential.  Procedure                               Abnormality         Status                     ---------                               -----------         ------                     CBC with platelets and d...[[id number]]  Abnormal            Final result                 Please view results for these tests on the individual orders.     There is no acute changes on the CT to explain your pain or bleeding.  Please follow up with your primary care provider for a recheck in Gastroenterology.        Return in about 2 days (around 10/2/2022) for with regular provider if symptoms persist.    SUE Valenzuela CNP  [facility] EFRAIN Warner is a 46 year old, presenting  urgently to [facility] by referral from Sona Dia NP for the following health issues:  Rectal Problem (Blood in stool X 2 weeks, Bright red blood) and Abdominal Pain (RLQ/LLQ pain X 2 weeks)      HPI     Abdominal/Flank Pain  Onset/Duration: X 2 weeks  Description:   Character: Fullness and Cramping  Location: right lower quadrant left lower quadrant  Radiation: None  Intensity: 8/10- episodic 3-4 episodes of cramping a day and during BM.  Pain level currently 3/10    Progression of Symptoms:  worsening  Accompanying Signs & Symptoms:  Fever/Chills: no   Gas/Bloating: YES  Nausea: YES- during episodes of cramping only  Vomiting: no   Diarrhea: YES- slight  Constipation: YES- feels that way, \"feels like intestines are going to fall out of rectum\" Last BM this AM, 05:00 AM, \"stringing, soft and long\"  Dysuria or Hematuria: no   History:   Trauma: no   Previous similar pain: no   Previous tests done: no   Previous Abdominal Surgery: no   Precipitating factors:   Does the pain change with:     Food: YES- increased pressure in lower abdomen    Bowel Movement: YES- increased pressure    " Urination: no    Other factors:  YES- suspected hernia at first.  Therapies tried and outcome: Rolaids with gas relief, this hasn't helped.    When did you eat last: 09:00 water.  04:00 AM today, beef jerky.     Referred for CT to r/o appy, diverticulitis or colitis as a cause of diarrhea with rectal bleeding.      PMH eating disorder and she endorses restricting and binging.   Thinks she is only eating 5-800 calories daily.      Continuous drinking - binging frequently.  Admits to drinking 4-6 drinks daily.        Review of Systems   Constitutional, HEENT, cardiovascular, pulmonary, GI, , musculoskeletal, neuro, skin, endocrine and psych systems are negative, except as otherwise noted.      Objective    /83 (BP Location: Left arm, Patient Position: Chair, Cuff Size: Adult Regular)   Pulse 83   Temp 98.5  F (36.9  C) (Oral)   Resp 18   Wt 52.2 kg (115 lb)   LMP  (LMP Unknown)   SpO2 97%   BMI 19.74 kg/m    Body mass index is 19.74 kg/m .  Physical Exam   GENERAL: healthy, alert and no distress  EYES: Eyes grossly normal to inspection, PERRL and conjunctivae and sclerae normal  HENT: ear canals and TM's normal, nose and mouth without ulcers or lesions  NECK: no adenopathy, no asymmetry, masses, or scars and thyroid normal to palpation  RESP: lungs clear to auscultation - no rales, rhonchi or wheezes  CV: regular rate and rhythm, normal S1 S2, no S3 or S4, no murmur, click or rub, no peripheral edema and peripheral pulses strong  ABDOMEN: soft, tenderness generalized.  no hepatosplenomegaly, no masses and bowel sounds normal  MS: no gross musculoskeletal defects noted, no edema  SKIN: no suspicious lesions or rashes

## 2022-10-02 ENCOUNTER — E-VISIT (OUTPATIENT)
Dept: PEDIATRICS | Facility: CLINIC | Age: 47
End: 2022-10-02
Payer: COMMERCIAL

## 2022-10-02 DIAGNOSIS — K62.5 RECTAL BLEED: ICD-10-CM

## 2022-10-02 DIAGNOSIS — R14.0 ABDOMINAL BLOATING: Primary | ICD-10-CM

## 2022-10-02 PROCEDURE — 99421 OL DIG E/M SVC 5-10 MIN: CPT | Performed by: STUDENT IN AN ORGANIZED HEALTH CARE EDUCATION/TRAINING PROGRAM

## 2022-10-03 NOTE — PATIENT INSTRUCTIONS
Thank you for choosing us for your care.    I placed an order for an upper endoscopy and colonoscopy. [facility] will call you to coordinate your care as prescribed by the provider.  If you don t hear from a representative within 2 business days, please call [phone]    You should schedule an appointment in the clinic to discuss the results after these procedure are completed.    You can schedule an appointment right here in [facility], or call [phone].      Destiny Patel MD  Internal Medicine & Pediatrics  [facility] [place]  She/her

## 2022-10-03 NOTE — TELEPHONE ENCOUNTER
Provider E-Visit time total (minutes): 4      Upper & lower endoscopy ordered as priority.  Recommend patient have clinic appointment to discuss results when completed.      Destiny Patel MD  Internal Medicine & Pediatrics  [facility]  She/her    
left/medial

## 2022-10-07 ENCOUNTER — TELEPHONE (OUTPATIENT)
Dept: GASTROENTEROLOGY | Facility: CLINIC | Age: 47
End: 2022-10-07

## 2022-10-25 ENCOUNTER — OFFICE VISIT (OUTPATIENT)
Dept: OBGYN | Facility: CLINIC | Age: 47
End: 2022-10-25
Payer: COMMERCIAL

## 2022-10-25 VITALS
WEIGHT: 117 LBS | BODY MASS INDEX: 19.97 KG/M2 | DIASTOLIC BLOOD PRESSURE: 70 MMHG | HEIGHT: 64 IN | SYSTOLIC BLOOD PRESSURE: 102 MMHG

## 2022-10-25 DIAGNOSIS — R87.810 CERVICAL HIGH RISK HPV (HUMAN PAPILLOMAVIRUS) TEST POSITIVE: Primary | ICD-10-CM

## 2022-10-25 PROCEDURE — 57456 ENDOCERV CURETTAGE W/SCOPE: CPT | Performed by: OBSTETRICS & GYNECOLOGY

## 2022-10-25 PROCEDURE — 87491 CHLMYD TRACH DNA AMP PROBE: CPT | Performed by: OBSTETRICS & GYNECOLOGY

## 2022-10-25 PROCEDURE — 88305 TISSUE EXAM BY PATHOLOGIST: CPT | Performed by: PATHOLOGY

## 2022-10-25 PROCEDURE — 87591 N.GONORRHOEAE DNA AMP PROB: CPT | Performed by: OBSTETRICS & GYNECOLOGY

## 2022-10-25 NOTE — PROGRESS NOTES
Colposcopy Note    Past History:     No LMP recorded (lmp unknown). Patient has had an implant.  Patient is not pregnant. Declined UPT due to Nexplanon.    Previous Abnormal Pap Hx:  LEEP before  (unsure of date)  11/24/15 NIL, +HR HPV. Co-test 12 months  18 NIL Pap, Neg HPV (abstracted)  19 NIL Pap, +HR HPV, not 16/18. Plan cotest in 1 year per provider.  2021 ASCUS pap, Neg HPV. Plan: per  Pap and co-testing (pap and HPV) 2022 (Care Everywhere)  22 NIL Pap, + HR HPV (neg 16/18).    Indications:  Encounter Diagnosis   Name Primary?     Cervical high risk HPV (human papillomavirus) test positive Yes       PROCEDURE:  The procedure was explained, and informed consent obtained. The patient was placed in the dorsal lithotomy position. A vaginal speculum was placed. A GC/Chlamydia culture was obtained. Dilute acetic acid was applied to cervix. The exocervix was visualized. The transformation zone was visualized satisfactorily. Colposcopy was done with white light and with the Morgan light. Endocervical curettage was done.  No AWE ectoCx lesion seen so no ectioCx biopsy was done. Colposcopy of vagina revealed: normal. The patient tolerated the procedure well. At the completion of the procedure, only scant bleeding was present. Hemostasis was achieved with Monsel's solution.    FINDINGS:  Normal colposcopic exam.    Physical Exam  Genitourinary:          No AWE seen.    ASSESSMENT:  Encounter Diagnosis   Name Primary?     Cervical high risk HPV (human papillomavirus) test positive Yes       PLAN:  If Bx shows IVELISSE 1 or less, follow-up with Ob/Gyn per [facility] Guideline in 1 year for Pap & HPV cotest at next annual exam.     Procedure Planned:   If HGSIL, consider LEEP.    Cruzito Ellington MD

## 2022-10-25 NOTE — NURSING NOTE
"Chief Complaint   Patient presents with     Colposcopy     +Other HPV        Initial /70 (BP Location: Right arm, Patient Position: Sitting, Cuff Size: Adult Regular)   Ht 1.626 m (5' 4\")   Wt 53.1 kg (117 lb)   LMP  (LMP Unknown)   BMI 20.08 kg/m   Estimated body mass index is 20.08 kg/m  as calculated from the following:    Height as of this encounter: 1.626 m (5' 4\").    Weight as of this encounter: 53.1 kg (117 lb).  BP completed using cuff size: regular    Questioned patient about current smoking habits.  Pt. has never smoked.          The following HM Due: NONE    Nathan Haywood CMA                "

## 2022-10-27 ENCOUNTER — PATIENT OUTREACH (OUTPATIENT)
Dept: OBGYN | Facility: CLINIC | Age: 47
End: 2022-10-27

## 2022-10-27 DIAGNOSIS — R87.810 CERVICAL HIGH RISK HPV (HUMAN PAPILLOMAVIRUS) TEST POSITIVE: ICD-10-CM

## 2022-10-27 LAB
C TRACH DNA SPEC QL NAA+PROBE: NEGATIVE
N GONORRHOEA DNA SPEC QL NAA+PROBE: NEGATIVE
PATH REPORT.COMMENTS IMP SPEC: NORMAL
PATH REPORT.COMMENTS IMP SPEC: NORMAL
PATH REPORT.FINAL DX SPEC: NORMAL
PATH REPORT.GROSS SPEC: NORMAL
PATH REPORT.MICROSCOPIC SPEC OTHER STN: NORMAL
PATH REPORT.RELEVANT HX SPEC: NORMAL
PHOTO IMAGE: NORMAL

## 2022-10-28 NOTE — RESULT ENCOUNTER NOTE
Please advise patient her colposcopic biopsies from her cervix showed no dysplasia nor cervical cancer.  She does not need anything else done at this time.  She only needs to follow up in 1 year for her annual exam, and at that time a Pap with HPV DNA test will be done again from her cervix to check to see if the cervix cells are normal and the HPV virus has been eradicated.  If so she will resume routine screening 3 years later.  She can let me know if she has any other questions.    Cruzito Ellington MD

## 2022-11-15 ENCOUNTER — TELEPHONE (OUTPATIENT)
Dept: PEDIATRICS | Facility: CLINIC | Age: 47
End: 2022-11-15

## 2022-11-15 DIAGNOSIS — F10.20 ALCOHOL USE DISORDER, SEVERE, DEPENDENCE (H): ICD-10-CM

## 2022-11-15 NOTE — TELEPHONE ENCOUNTER
Pt requesting refill of:          See [facility] encounter.    Geraldine Smith on 11/15/2022 at 2:56 PM

## 2022-11-16 RX ORDER — NALTREXONE HYDROCHLORIDE 50 MG/1
50 TABLET, FILM COATED ORAL DAILY
Qty: 30 TABLET | Refills: 1 | Status: ON HOLD | OUTPATIENT
Start: 2022-11-16 | End: 2023-04-19

## 2022-11-16 NOTE — TELEPHONE ENCOUNTER
This is refilled. Please notify patient that she will need to hold this prior to her colonoscopy for 3 days.    Yessica Griggs MD  Internal Medicine-Pediatrics

## 2022-11-26 ENCOUNTER — OFFICE VISIT (OUTPATIENT)
Dept: URGENT CARE | Facility: URGENT CARE | Age: 47
End: 2022-11-26
Payer: COMMERCIAL

## 2022-11-26 VITALS
WEIGHT: 117 LBS | BODY MASS INDEX: 20.08 KG/M2 | SYSTOLIC BLOOD PRESSURE: 118 MMHG | HEART RATE: 85 BPM | OXYGEN SATURATION: 97 % | DIASTOLIC BLOOD PRESSURE: 72 MMHG | RESPIRATION RATE: 14 BRPM | TEMPERATURE: 99.3 F

## 2022-11-26 DIAGNOSIS — J01.10 ACUTE NON-RECURRENT FRONTAL SINUSITIS: ICD-10-CM

## 2022-11-26 DIAGNOSIS — R11.2 NAUSEA AND VOMITING, UNSPECIFIED VOMITING TYPE: Primary | ICD-10-CM

## 2022-11-26 LAB
ANION GAP SERPL CALCULATED.3IONS-SCNC: 6 MMOL/L (ref 3–14)
BUN SERPL-MCNC: 16 MG/DL (ref 7–30)
CALCIUM SERPL-MCNC: 10.3 MG/DL (ref 8.5–10.1)
CHLORIDE BLD-SCNC: 103 MMOL/L (ref 94–109)
CO2 SERPL-SCNC: 31 MMOL/L (ref 20–32)
CREAT SERPL-MCNC: 0.7 MG/DL (ref 0.52–1.04)
GFR SERPL CREATININE-BSD FRML MDRD: >90 ML/MIN/1.73M2
GLUCOSE BLD-MCNC: 111 MG/DL (ref 70–99)
POTASSIUM BLD-SCNC: 4.7 MMOL/L (ref 3.4–5.3)
SODIUM SERPL-SCNC: 140 MMOL/L (ref 133–144)

## 2022-11-26 PROCEDURE — 36415 COLL VENOUS BLD VENIPUNCTURE: CPT | Performed by: PHYSICIAN ASSISTANT

## 2022-11-26 PROCEDURE — 80048 BASIC METABOLIC PNL TOTAL CA: CPT | Performed by: PHYSICIAN ASSISTANT

## 2022-11-26 PROCEDURE — 99214 OFFICE O/P EST MOD 30 MIN: CPT | Performed by: PHYSICIAN ASSISTANT

## 2022-11-26 NOTE — PROGRESS NOTES
Assessment & Plan     Nausea and vomiting, unspecified vomiting type  - Basic metabolic panel  (Ca, Cl, CO2, Creat, Gluc, K, Na, BUN)    Acute non-recurrent frontal sinusitis  - amoxicillin-clavulanate (AUGMENTIN) 875-125 MG tablet  Dispense: 20 tablet; Refill: 0       Pt presents with cough, congestion and nausea that began 8 days ago. She has been using cold/flu  OTC medications, zofran and netipot. She will begin Augmentin BID for 10 days. She will continue zofran as needed for nausea. Pt is concerned about electrolytes due to vomiting and requested BMP to be drawn, BMP is normal, discussed results with patient. She will follow up if symptoms worsen.       Nicole Roberson PA-C  [facility] Warner is a 46 year old female who presents to clinic today for the following health issues:  Chief Complaint   Patient presents with     Sick      Dry Cough, congestions , HA ,,nausea , vomiting x 8 days -- used a nedipod today at 8am and Zofran, also some sneha seltzer type med too ---- PT states it may be a sinus infection     HPI      URI Adult    Onset of symptoms was 8 day(s) ago.  Course of illness is worsening.    Severity moderate  Current and Associated symptoms: chills, stuffy nose, cough - productive, shortness of breath, facial pain/pressure, headache, nausea and vomiting  Treatment measures tried include Tylenol/Ibuprofen and netipot.  Predisposing factors include None.  Pt has been using zofran for nausea    Past Medical History:   Diagnosis Date     RUPINDER (acute kidney injury) (H) 9/14/2018     Alcohol withdrawal (H) 8/26/2018     Anorexia      Anxiety      Bulimia      Chemical dependency (H) 11/24/2015    Alcohol in treatment     Depressive disorder      Genital herpes      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/15    NIL, +HR HPV. Co-test 12 months     Osteoporosis      Substance abuse (H)      Current Outpatient Medications   Medication     amoxicillin-clavulanate (AUGMENTIN) 875-125  MG tablet     calcium citrate-vitamin D (CITRACAL) 315-250 MG-UNIT TABS per tablet     etonogestrel (NEXPLANON) 68 MG IMPL     MILK THISTLE PO     multivitamin w/minerals (THERA-VIT-M) tablet     naltrexone (DEPADE/REVIA) 50 MG tablet     ondansetron (ZOFRAN ODT) 4 MG ODT tab     vitamin B complex with vitamin C (STRESS TAB) tablet     Current Facility-Administered Medications   Medication     sodium chloride (PF) 0.9% PF flush 3 mL     Social History     Socioeconomic History     Marital status: Single     Spouse name: Not on file     Number of children: Not on file     Years of education: Not on file     Highest education level: Not on file   Occupational History     Not on file   Tobacco Use     Smoking status: Former     Packs/day: 0.00     Types: Cigarettes     Smokeless tobacco: Never   Vaping Use     Vaping Use: Former   Substance and Sexual Activity     Alcohol use: Not Currently     Comment: 9/18/22     Drug use: No     Sexual activity: Yes     Partners: Male     Birth control/protection: Implant   Other Topics Concern     Parent/sibling w/ CABG, MI or angioplasty before 65F 55M? Not Asked   Social History Narrative     Not on file     Social Determinants of Health     Financial Resource Strain: High Risk     Difficulty of Paying Living Expenses: Very hard   Food Insecurity: Food Insecurity Present     Worried About Running Out of Food in the Last Year: Often true     Ran Out of Food in the Last Year: Often true   Transportation Needs: No Transportation Needs     Lack of Transportation (Medical): No     Lack of Transportation (Non-Medical): No   Physical Activity: Insufficiently Active     Days of Exercise per Week: 2 days     Minutes of Exercise per Session: 20 min   Stress: Stress Concern Present     Feeling of Stress : Very much   Social Connections: Moderately Isolated     Frequency of Communication with Friends and Family: Twice a week     Frequency of Social Gatherings with Friends and Family: Once a  week     Attends Voodoo Services: Never     Active Member of Clubs or Organizations: No     Attends Club or Organization Meetings: Not on file     Marital Status: Living with partner   Intimate Partner Violence: Not on file   Housing Stability: Low Risk      Unable to Pay for Housing in the Last Year: No     Number of Places Lived in the Last Year: 1     Unstable Housing in the Last Year: No         Review of Systems  Detailed as above       Objective    /72 (BP Location: Right arm, Patient Position: Chair, Cuff Size: Adult Regular)   Pulse 85   Temp 99.3  F (37.4  C) (Oral)   Resp 14   Wt 53.1 kg (117 lb)   LMP  (LMP Unknown)   SpO2 97%   BMI 20.08 kg/m    Physical Exam  HENT:      Right Ear: Tympanic membrane normal.      Left Ear: Tympanic membrane normal.      Nose: Congestion present.      Right Sinus: Frontal sinus tenderness present. No maxillary sinus tenderness.      Left Sinus: Frontal sinus tenderness present. No maxillary sinus tenderness.      Mouth/Throat:      Pharynx: Posterior oropharyngeal erythema present. No oropharyngeal exudate.   Cardiovascular:      Rate and Rhythm: Normal rate and regular rhythm.      Heart sounds: Normal heart sounds.   Pulmonary:      Effort: Pulmonary effort is normal. No respiratory distress.      Breath sounds: Normal breath sounds.   Musculoskeletal:         General: Normal range of motion.   Skin:     General: Skin is warm and dry.   Neurological:      Mental Status: She is alert.   Psychiatric:         Mood and Affect: Mood normal.            Results for orders placed or performed in visit on 11/26/22   Basic metabolic panel  (Ca, Cl, CO2, Creat, Gluc, K, Na, BUN)     Status: Abnormal   Result Value Ref Range    Sodium 140 133 - 144 mmol/L    Potassium 4.7 3.4 - 5.3 mmol/L    Chloride 103 94 - 109 mmol/L    Carbon Dioxide (CO2) 31 20 - 32 mmol/L    Anion Gap 6 3 - 14 mmol/L    Urea Nitrogen 16 7 - 30 mg/dL    Creatinine 0.70 0.52 - 1.04 mg/dL     Calcium 10.3 (H) 8.5 - 10.1 mg/dL    Glucose 111 (H) 70 - 99 mg/dL    GFR Estimate >90 >60 mL/min/1.73m2

## 2022-11-29 ENCOUNTER — ANESTHESIA EVENT (OUTPATIENT)
Dept: GASTROENTEROLOGY | Facility: CLINIC | Age: 47
End: 2022-11-29
Payer: COMMERCIAL

## 2022-11-30 ENCOUNTER — HOSPITAL ENCOUNTER (OUTPATIENT)
Facility: CLINIC | Age: 47
Discharge: HOME | End: 2022-11-30
Attending: COLON & RECTAL SURGERY | Admitting: COLON & RECTAL SURGERY
Payer: COMMERCIAL

## 2022-11-30 ENCOUNTER — ANESTHESIA (OUTPATIENT)
Dept: GASTROENTEROLOGY | Facility: CLINIC | Age: 47
End: 2022-11-30
Payer: COMMERCIAL

## 2022-11-30 VITALS
WEIGHT: 117 LBS | HEIGHT: 64 IN | HEART RATE: 77 BPM | DIASTOLIC BLOOD PRESSURE: 85 MMHG | SYSTOLIC BLOOD PRESSURE: 114 MMHG | OXYGEN SATURATION: 98 % | BODY MASS INDEX: 19.97 KG/M2 | RESPIRATION RATE: 21 BRPM

## 2022-11-30 DIAGNOSIS — Z12.11 SPECIAL SCREENING FOR MALIGNANT NEOPLASMS, COLON: Primary | ICD-10-CM

## 2022-11-30 LAB — COLONOSCOPY: NORMAL

## 2022-11-30 PROCEDURE — 258N000003 HC RX IP 258 OP 636

## 2022-11-30 PROCEDURE — 88305 TISSUE EXAM BY PATHOLOGIST: CPT | Mod: 26 | Performed by: PATHOLOGY

## 2022-11-30 PROCEDURE — 250N000011 HC RX IP 250 OP 636

## 2022-11-30 PROCEDURE — 999N000010 HC STATISTIC ANES STAT CODE-CRNA PER MINUTE: Performed by: COLON & RECTAL SURGERY

## 2022-11-30 PROCEDURE — 250N000009 HC RX 250

## 2022-11-30 PROCEDURE — 370N000017 HC ANESTHESIA TECHNICAL FEE, PER MIN: Performed by: COLON & RECTAL SURGERY

## 2022-11-30 PROCEDURE — 45380 COLONOSCOPY AND BIOPSY: CPT | Performed by: COLON & RECTAL SURGERY

## 2022-11-30 PROCEDURE — 88305 TISSUE EXAM BY PATHOLOGIST: CPT | Mod: TC | Performed by: COLON & RECTAL SURGERY

## 2022-11-30 RX ORDER — PROPOFOL 10 MG/ML
INJECTION, EMULSION INTRAVENOUS PRN
Status: DISCONTINUED | OUTPATIENT
Start: 2022-11-30 | End: 2022-11-30

## 2022-11-30 RX ORDER — LIDOCAINE 40 MG/G
CREAM TOPICAL
Status: DISCONTINUED | OUTPATIENT
Start: 2022-11-30 | End: 2022-11-30 | Stop reason: HOSPADM

## 2022-11-30 RX ORDER — LIDOCAINE HYDROCHLORIDE 20 MG/ML
INJECTION, SOLUTION INFILTRATION; PERINEURAL PRN
Status: DISCONTINUED | OUTPATIENT
Start: 2022-11-30 | End: 2022-11-30

## 2022-11-30 RX ORDER — SODIUM CHLORIDE, SODIUM LACTATE, POTASSIUM CHLORIDE, CALCIUM CHLORIDE 600; 310; 30; 20 MG/100ML; MG/100ML; MG/100ML; MG/100ML
INJECTION, SOLUTION INTRAVENOUS CONTINUOUS PRN
Status: DISCONTINUED | OUTPATIENT
Start: 2022-11-30 | End: 2022-11-30

## 2022-11-30 RX ORDER — PROPOFOL 10 MG/ML
INJECTION, EMULSION INTRAVENOUS CONTINUOUS PRN
Status: DISCONTINUED | OUTPATIENT
Start: 2022-11-30 | End: 2022-11-30

## 2022-11-30 RX ORDER — ONDANSETRON 2 MG/ML
4 INJECTION INTRAMUSCULAR; INTRAVENOUS
Status: DISCONTINUED | OUTPATIENT
Start: 2022-11-30 | End: 2022-11-30 | Stop reason: HOSPADM

## 2022-11-30 RX ADMIN — PROPOFOL 50 MG: 10 INJECTION, EMULSION INTRAVENOUS at 08:54

## 2022-11-30 RX ADMIN — PROPOFOL 50 MG: 10 INJECTION, EMULSION INTRAVENOUS at 08:55

## 2022-11-30 RX ADMIN — SODIUM CHLORIDE, POTASSIUM CHLORIDE, SODIUM LACTATE AND CALCIUM CHLORIDE: 600; 310; 30; 20 INJECTION, SOLUTION INTRAVENOUS at 08:54

## 2022-11-30 RX ADMIN — PROPOFOL 100 MCG/KG/MIN: 10 INJECTION, EMULSION INTRAVENOUS at 08:54

## 2022-11-30 RX ADMIN — PROPOFOL 125 MCG/KG/MIN: 10 INJECTION, EMULSION INTRAVENOUS at 08:55

## 2022-11-30 RX ADMIN — LIDOCAINE HYDROCHLORIDE 50 MG: 20 INJECTION, SOLUTION INFILTRATION; PERINEURAL at 08:54

## 2022-11-30 ASSESSMENT — ACTIVITIES OF DAILY LIVING (ADL): ADLS_ACUITY_SCORE: 17.25

## 2022-11-30 ASSESSMENT — LIFESTYLE VARIABLES: TOBACCO_USE: 0

## 2022-11-30 NOTE — H&P
History and Physical reviewed  Proceed with colonoscopy under MAC for evaluation of rectal bleeding     CHRISTIN THOMAS MD

## 2022-11-30 NOTE — ANESTHESIA PREPROCEDURE EVALUATION
Anesthesia Pre-Procedure Evaluation    Patient: Alana Mujica   MRN: [id number] : 1975        Procedure : Procedure(s):  COLONOSCOPY, WITH POLYPECTOMY AND BIOPSY          Past Medical History:   Diagnosis Date     RUPINDER (acute kidney injury) (H) 2018     Alcohol withdrawal (H) 2018     Anorexia      Anxiety      Bulimia      Chemical dependency (H) 2015    Alcohol in treatment     Depressive disorder      Genital herpes      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/15    NIL, +HR HPV. Co-test 12 months     Osteoporosis      Substance abuse (H)       Past Surgical History:   Procedure Laterality Date     LEEP TX, CERVICAL      greater than 5 years ago from , uncertain date      No Known Allergies   Social History     Tobacco Use     Smoking status: Former     Packs/day: 0.00     Types: Cigarettes     Smokeless tobacco: Never   Substance Use Topics     Alcohol use: Not Currently     Comment: 22      Wt Readings from Last 1 Encounters:   22 53.1 kg (117 lb)        Anesthesia Evaluation   Pt has had prior anesthetic.     No history of anesthetic complications       ROS/MED HX  ENT/Pulmonary:    (-) tobacco use, asthma and sleep apnea   Neurologic:       Cardiovascular:       METS/Exercise Tolerance:     Hematologic:       Musculoskeletal:       GI/Hepatic:     (+) GERD, Asymptomatic on medication, hepatitis liver disease,     Renal/Genitourinary:     (+) renal disease,     Endo:       Psychiatric/Substance Use:       Infectious Disease:       Malignancy:       Other:            Physical Exam    Airway        Mallampati: II   TM distance: > 3 FB   Neck ROM: full   Mouth opening: > 3 cm    Respiratory Devices and Support         Dental  no notable dental history         Cardiovascular   cardiovascular exam normal          Pulmonary   pulmonary exam normal                OUTSIDE LABS:  CBC:   Lab Results   Component Value Date    WBC 6.3 2022    WBC 2.8 (L) 2022    HGB 12.3 2022    HGB  11.6 (L) 08/25/2022    HCT 38.4 09/30/2022    HCT 35.3 08/25/2022     09/30/2022     (L) 08/25/2022     BMP:   Lab Results   Component Value Date     11/26/2022     (L) 09/30/2022    POTASSIUM 4.7 11/26/2022    POTASSIUM 4.6 09/30/2022    CHLORIDE 103 11/26/2022    CHLORIDE 98 09/30/2022    CO2 31 11/26/2022    CO2 27 09/30/2022    BUN 16 11/26/2022    BUN 17.0 09/30/2022    CR 0.70 11/26/2022    CR 0.93 09/30/2022     (H) 11/26/2022    GLC 92 09/30/2022     COAGS:   Lab Results   Component Value Date    INR 1.01 03/31/2022     POC:   Lab Results   Component Value Date     (H) 05/03/2019    HCG Negative 08/25/2022    HCGS Negative 06/22/2022     HEPATIC:   Lab Results   Component Value Date    ALBUMIN 4.4 09/30/2022    PROTTOTAL 7.2 09/30/2022    ALT 14 09/30/2022    AST 26 09/30/2022     (H) 08/26/2022    ALKPHOS 60 09/30/2022    BILITOTAL 0.2 09/30/2022     OTHER:   Lab Results   Component Value Date    LACT 1.9 06/22/2022    A1C 4.7 08/26/2022    KODY 10.3 (H) 11/26/2022    PHOS 3.4 08/25/2022    MAG 1.8 08/25/2022    LIPASE 208 06/22/2022    TSH 2.39 08/26/2022    CRP <3.00 09/30/2022    SED 9 11/17/2017       Anesthesia Plan    ASA Status:  2      Anesthesia Type: MAC.   Induction: Intravenous.           Consents    Anesthesia Plan(s) and associated risks, benefits, and realistic alternatives discussed. Questions answered and patient/representative(s) expressed understanding.    - Discussed:     - Discussed with:  Patient         Postoperative Care    Pain management: IV analgesics, Oral pain medications.        Comments:                Aubree Quiroga

## 2022-11-30 NOTE — ANESTHESIA POSTPROCEDURE EVALUATION
Patient: Alana Mujica    Procedure: Procedure(s):  COLONOSCOPY, WITH POLYPECTOMY AND BIOPSY       Anesthesia Type:  MAC    Note:  Disposition: Outpatient   Postop Pain Control: Uneventful            Sign Out: Well controlled pain   PONV: No   Neuro/Psych: Uneventful            Sign Out: Acceptable/Baseline neuro status   Airway/Respiratory: Uneventful            Sign Out: Acceptable/Baseline resp. status   CV/Hemodynamics: Uneventful            Sign Out: Acceptable CV status; No obvious hypovolemia; No obvious fluid overload   Other NRE: NONE   DID A NON-ROUTINE EVENT OCCUR?            Last vitals:  Vitals Value Taken Time   /85 11/30/22 0950   Temp     Pulse 79 11/30/22 0955   Resp 25 11/30/22 0956   SpO2 99 % 11/30/22 0954   Vitals shown include unvalidated device data.    Electronically Signed By: Aubree Quiroga  November 30, 2022  1:54 PM

## 2022-12-01 LAB
PATH REPORT.COMMENTS IMP SPEC: NORMAL
PATH REPORT.FINAL DX SPEC: NORMAL
PATH REPORT.GROSS SPEC: NORMAL
PATH REPORT.MICROSCOPIC SPEC OTHER STN: NORMAL
PATH REPORT.RELEVANT HX SPEC: NORMAL
PHOTO IMAGE: NORMAL

## 2022-12-05 NOTE — RESULT ENCOUNTER NOTE
Proctitis identified, as suspected.  Recommend a trial of mesalamine enemas   Will forward to pharmacy, as discussed with patient.

## 2023-04-17 ENCOUNTER — HOSPITAL ENCOUNTER (OUTPATIENT)
Facility: CLINIC | Age: 48
Setting detail: OBSERVATION
Discharge: HOME | End: 2023-04-19
Attending: EMERGENCY MEDICINE | Admitting: STUDENT IN AN ORGANIZED HEALTH CARE EDUCATION/TRAINING PROGRAM
Payer: COMMERCIAL

## 2023-04-17 ENCOUNTER — APPOINTMENT (OUTPATIENT)
Dept: CT IMAGING | Facility: CLINIC | Age: 48
End: 2023-04-17
Attending: EMERGENCY MEDICINE
Payer: COMMERCIAL

## 2023-04-17 ENCOUNTER — OFFICE VISIT (OUTPATIENT)
Dept: URGENT CARE | Facility: URGENT CARE | Age: 48
End: 2023-04-17
Payer: COMMERCIAL

## 2023-04-17 ENCOUNTER — APPOINTMENT (OUTPATIENT)
Dept: GENERAL RADIOLOGY | Facility: CLINIC | Age: 48
End: 2023-04-17
Attending: EMERGENCY MEDICINE
Payer: COMMERCIAL

## 2023-04-17 VITALS
OXYGEN SATURATION: 100 % | HEART RATE: 89 BPM | DIASTOLIC BLOOD PRESSURE: 84 MMHG | SYSTOLIC BLOOD PRESSURE: 138 MMHG | RESPIRATION RATE: 16 BRPM | TEMPERATURE: 98.7 F

## 2023-04-17 DIAGNOSIS — F10.20 ALCOHOL DEPENDENCE, CONTINUOUS (H): Primary | ICD-10-CM

## 2023-04-17 DIAGNOSIS — F10.20 ALCOHOL USE DISORDER, SEVERE, DEPENDENCE (H): ICD-10-CM

## 2023-04-17 DIAGNOSIS — F10.939 ALCOHOL WITHDRAWAL, WITH UNSPECIFIED COMPLICATION (H): ICD-10-CM

## 2023-04-17 DIAGNOSIS — E87.1 HYPONATREMIA: ICD-10-CM

## 2023-04-17 DIAGNOSIS — F10.10 ALCOHOL ABUSE: Primary | ICD-10-CM

## 2023-04-17 DIAGNOSIS — M54.50 ACUTE BILATERAL LOW BACK PAIN WITHOUT SCIATICA: ICD-10-CM

## 2023-04-17 LAB
ALBUMIN SERPL BCG-MCNC: 4.9 G/DL (ref 3.5–5.2)
ALBUMIN SERPL-MCNC: 3.8 G/DL (ref 3.4–5)
ALBUMIN UR-MCNC: NEGATIVE MG/DL
ALBUMIN UR-MCNC: NEGATIVE MG/DL
ALP SERPL-CCNC: 90 U/L (ref 40–150)
ALP SERPL-CCNC: 97 U/L (ref 35–104)
ALT SERPL W P-5'-P-CCNC: 249 U/L (ref 0–50)
ALT SERPL W P-5'-P-CCNC: 261 U/L (ref 10–35)
ANION GAP SERPL CALCULATED.3IONS-SCNC: 15 MMOL/L (ref 7–15)
ANION GAP SERPL CALCULATED.3IONS-SCNC: 9 MMOL/L (ref 3–14)
APPEARANCE UR: CLEAR
APPEARANCE UR: CLEAR
AST SERPL W P-5'-P-CCNC: 236 U/L (ref 10–35)
AST SERPL W P-5'-P-CCNC: 281 U/L (ref 0–45)
BASOPHILS # BLD AUTO: 0 10E3/UL (ref 0–0.2)
BASOPHILS # BLD AUTO: 0 10E3/UL (ref 0–0.2)
BASOPHILS NFR BLD AUTO: 1 %
BASOPHILS NFR BLD AUTO: 1 %
BILIRUB SERPL-MCNC: 0.8 MG/DL
BILIRUB SERPL-MCNC: 1 MG/DL (ref 0.2–1.3)
BILIRUB UR QL STRIP: NEGATIVE
BILIRUB UR QL STRIP: NEGATIVE
BUN SERPL-MCNC: 3.8 MG/DL (ref 6–20)
BUN SERPL-MCNC: 6 MG/DL (ref 7–30)
CALCIUM SERPL-MCNC: 9.2 MG/DL (ref 8.5–10.1)
CALCIUM SERPL-MCNC: 9.4 MG/DL (ref 8.6–10)
CHLORIDE BLD-SCNC: 86 MMOL/L (ref 94–109)
CHLORIDE SERPL-SCNC: 84 MMOL/L (ref 98–107)
CO2 SERPL-SCNC: 30 MMOL/L (ref 20–32)
COLOR UR AUTO: NORMAL
COLOR UR AUTO: YELLOW
CREAT SERPL-MCNC: 0.51 MG/DL (ref 0.51–0.95)
CREAT SERPL-MCNC: 0.8 MG/DL (ref 0.52–1.04)
DEPRECATED HCO3 PLAS-SCNC: 26 MMOL/L (ref 22–29)
EOSINOPHIL # BLD AUTO: 0.1 10E3/UL (ref 0–0.7)
EOSINOPHIL # BLD AUTO: 0.1 10E3/UL (ref 0–0.7)
EOSINOPHIL NFR BLD AUTO: 1 %
EOSINOPHIL NFR BLD AUTO: 2 %
ERYTHROCYTE [DISTWIDTH] IN BLOOD BY AUTOMATED COUNT: 11.5 % (ref 10–15)
ERYTHROCYTE [DISTWIDTH] IN BLOOD BY AUTOMATED COUNT: 11.8 % (ref 10–15)
ETHANOL SERPL-MCNC: 0.04 G/DL
GFR SERPL CREATININE-BSD FRML MDRD: 90 ML/MIN/1.73M2
GFR SERPL CREATININE-BSD FRML MDRD: >90 ML/MIN/1.73M2
GLUCOSE BLD-MCNC: 117 MG/DL (ref 70–99)
GLUCOSE SERPL-MCNC: 103 MG/DL (ref 70–99)
GLUCOSE UR STRIP-MCNC: NEGATIVE MG/DL
GLUCOSE UR STRIP-MCNC: NEGATIVE MG/DL
HCT VFR BLD AUTO: 34.7 % (ref 35–47)
HCT VFR BLD AUTO: 34.9 % (ref 35–47)
HGB BLD-MCNC: 12 G/DL (ref 11.7–15.7)
HGB BLD-MCNC: 12.3 G/DL (ref 11.7–15.7)
HGB UR QL STRIP: NEGATIVE
HGB UR QL STRIP: NEGATIVE
IMM GRANULOCYTES # BLD: 0 10E3/UL
IMM GRANULOCYTES NFR BLD: 0 %
KETONES UR STRIP-MCNC: NEGATIVE MG/DL
KETONES UR STRIP-MCNC: NEGATIVE MG/DL
LEUKOCYTE ESTERASE UR QL STRIP: NEGATIVE
LEUKOCYTE ESTERASE UR QL STRIP: NEGATIVE
LYMPHOCYTES # BLD AUTO: 0.7 10E3/UL (ref 0.8–5.3)
LYMPHOCYTES # BLD AUTO: 0.9 10E3/UL (ref 0.8–5.3)
LYMPHOCYTES NFR BLD AUTO: 12 %
LYMPHOCYTES NFR BLD AUTO: 18 %
MCH RBC QN AUTO: 32 PG (ref 26.5–33)
MCH RBC QN AUTO: 32.5 PG (ref 26.5–33)
MCHC RBC AUTO-ENTMCNC: 34.6 G/DL (ref 31.5–36.5)
MCHC RBC AUTO-ENTMCNC: 35.2 G/DL (ref 31.5–36.5)
MCV RBC AUTO: 92 FL (ref 78–100)
MCV RBC AUTO: 93 FL (ref 78–100)
MONOCYTES # BLD AUTO: 0.5 10E3/UL (ref 0–1.3)
MONOCYTES # BLD AUTO: 0.7 10E3/UL (ref 0–1.3)
MONOCYTES NFR BLD AUTO: 11 %
MONOCYTES NFR BLD AUTO: 11 %
NEUTROPHILS # BLD AUTO: 3.3 10E3/UL (ref 1.6–8.3)
NEUTROPHILS # BLD AUTO: 4.3 10E3/UL (ref 1.6–8.3)
NEUTROPHILS NFR BLD AUTO: 69 %
NEUTROPHILS NFR BLD AUTO: 74 %
NITRATE UR QL: NEGATIVE
NITRATE UR QL: NEGATIVE
NRBC # BLD AUTO: 0 10E3/UL
NRBC BLD AUTO-RTO: 0 /100
PH UR STRIP: 6 [PH] (ref 5–7)
PH UR STRIP: 7 [PH] (ref 5–7)
PLATELET # BLD AUTO: 136 10E3/UL (ref 150–450)
PLATELET # BLD AUTO: 144 10E3/UL (ref 150–450)
POTASSIUM BLD-SCNC: 3.6 MMOL/L (ref 3.4–5.3)
POTASSIUM SERPL-SCNC: 3.3 MMOL/L (ref 3.4–5.3)
PROT SERPL-MCNC: 7.3 G/DL (ref 6.8–8.8)
PROT SERPL-MCNC: 7.6 G/DL (ref 6.4–8.3)
RBC # BLD AUTO: 3.75 10E6/UL (ref 3.8–5.2)
RBC # BLD AUTO: 3.78 10E6/UL (ref 3.8–5.2)
RBC URINE: 0 /HPF
SODIUM SERPL-SCNC: 125 MMOL/L (ref 133–144)
SODIUM SERPL-SCNC: 125 MMOL/L (ref 136–145)
SP GR UR STRIP: 1.01 (ref 1–1.03)
SP GR UR STRIP: <=1.005 (ref 1–1.03)
SQUAMOUS EPITHELIAL: <1 /HPF
TROPONIN T SERPL HS-MCNC: 10 NG/L
UROBILINOGEN UR STRIP-ACNC: 0.2 E.U./DL
UROBILINOGEN UR STRIP-MCNC: NORMAL MG/DL
WBC # BLD AUTO: 4.9 10E3/UL (ref 4–11)
WBC # BLD AUTO: 5.8 10E3/UL (ref 4–11)
WBC URINE: <1 /HPF

## 2023-04-17 PROCEDURE — 250N000013 HC RX MED GY IP 250 OP 250 PS 637: Performed by: EMERGENCY MEDICINE

## 2023-04-17 PROCEDURE — 96360 HYDRATION IV INFUSION INIT: CPT | Mod: 59

## 2023-04-17 PROCEDURE — 84100 ASSAY OF PHOSPHORUS: CPT | Performed by: HOSPITALIST

## 2023-04-17 PROCEDURE — 250N000011 HC RX IP 250 OP 636: Performed by: EMERGENCY MEDICINE

## 2023-04-17 PROCEDURE — C9113 INJ PANTOPRAZOLE SODIUM, VIA: HCPCS | Performed by: EMERGENCY MEDICINE

## 2023-04-17 PROCEDURE — 70450 CT HEAD/BRAIN W/O DYE: CPT

## 2023-04-17 PROCEDURE — 99223 1ST HOSP IP/OBS HIGH 75: CPT | Mod: AI | Performed by: HOSPITALIST

## 2023-04-17 PROCEDURE — 99285 EMERGENCY DEPT VISIT HI MDM: CPT | Mod: 25

## 2023-04-17 PROCEDURE — 83735 ASSAY OF MAGNESIUM: CPT | Performed by: HOSPITALIST

## 2023-04-17 PROCEDURE — 82077 ASSAY SPEC XCP UR&BREATH IA: CPT | Performed by: EMERGENCY MEDICINE

## 2023-04-17 PROCEDURE — 81001 URINALYSIS AUTO W/SCOPE: CPT | Performed by: EMERGENCY MEDICINE

## 2023-04-17 PROCEDURE — 85025 COMPLETE CBC W/AUTO DIFF WBC: CPT | Performed by: EMERGENCY MEDICINE

## 2023-04-17 PROCEDURE — 250N000009 HC RX 250: Performed by: EMERGENCY MEDICINE

## 2023-04-17 PROCEDURE — 84155 ASSAY OF PROTEIN SERUM: CPT | Performed by: EMERGENCY MEDICINE

## 2023-04-17 PROCEDURE — 82550 ASSAY OF CK (CPK): CPT | Performed by: HOSPITALIST

## 2023-04-17 PROCEDURE — 93005 ELECTROCARDIOGRAM TRACING: CPT

## 2023-04-17 PROCEDURE — 81003 URINALYSIS AUTO W/O SCOPE: CPT | Performed by: PHYSICIAN ASSISTANT

## 2023-04-17 PROCEDURE — 96365 THER/PROPH/DIAG IV INF INIT: CPT

## 2023-04-17 PROCEDURE — 96361 HYDRATE IV INFUSION ADD-ON: CPT

## 2023-04-17 PROCEDURE — 96366 THER/PROPH/DIAG IV INF ADDON: CPT

## 2023-04-17 PROCEDURE — 80053 COMPREHEN METABOLIC PANEL: CPT | Performed by: PHYSICIAN ASSISTANT

## 2023-04-17 PROCEDURE — 120N000001 HC R&B MED SURG/OB

## 2023-04-17 PROCEDURE — 258N000003 HC RX IP 258 OP 636: Performed by: EMERGENCY MEDICINE

## 2023-04-17 PROCEDURE — C9803 HOPD COVID-19 SPEC COLLECT: HCPCS

## 2023-04-17 PROCEDURE — 74177 CT ABD & PELVIS W/CONTRAST: CPT

## 2023-04-17 PROCEDURE — 36415 COLL VENOUS BLD VENIPUNCTURE: CPT | Performed by: EMERGENCY MEDICINE

## 2023-04-17 PROCEDURE — 85025 COMPLETE CBC W/AUTO DIFF WBC: CPT | Performed by: PHYSICIAN ASSISTANT

## 2023-04-17 PROCEDURE — 36415 COLL VENOUS BLD VENIPUNCTURE: CPT | Performed by: PHYSICIAN ASSISTANT

## 2023-04-17 PROCEDURE — 84484 ASSAY OF TROPONIN QUANT: CPT | Performed by: EMERGENCY MEDICINE

## 2023-04-17 PROCEDURE — 96375 TX/PRO/DX INJ NEW DRUG ADDON: CPT

## 2023-04-17 PROCEDURE — 99215 OFFICE O/P EST HI 40 MIN: CPT | Performed by: PHYSICIAN ASSISTANT

## 2023-04-17 PROCEDURE — 71046 X-RAY EXAM CHEST 2 VIEWS: CPT

## 2023-04-17 PROCEDURE — 83690 ASSAY OF LIPASE: CPT | Performed by: HOSPITALIST

## 2023-04-17 RX ORDER — DIAZEPAM 5 MG
5 TABLET ORAL ONCE
Status: COMPLETED | OUTPATIENT
Start: 2023-04-17 | End: 2023-04-17

## 2023-04-17 RX ORDER — ONDANSETRON 4 MG/1
4 TABLET, ORALLY DISINTEGRATING ORAL ONCE
Status: COMPLETED | OUTPATIENT
Start: 2023-04-17 | End: 2023-04-17

## 2023-04-17 RX ORDER — DIAZEPAM 10 MG/2ML
10 INJECTION, SOLUTION INTRAMUSCULAR; INTRAVENOUS ONCE
Status: COMPLETED | OUTPATIENT
Start: 2023-04-17 | End: 2023-04-17

## 2023-04-17 RX ORDER — IOPAMIDOL 755 MG/ML
500 INJECTION, SOLUTION INTRAVASCULAR ONCE
Status: COMPLETED | OUTPATIENT
Start: 2023-04-17 | End: 2023-04-17

## 2023-04-17 RX ADMIN — DIAZEPAM 5 MG: 5 TABLET ORAL at 21:01

## 2023-04-17 RX ADMIN — PANTOPRAZOLE SODIUM 80 MG: 40 INJECTION, POWDER, FOR SOLUTION INTRAVENOUS at 23:28

## 2023-04-17 RX ADMIN — ONDANSETRON 4 MG: 4 TABLET, ORALLY DISINTEGRATING ORAL at 21:01

## 2023-04-17 RX ADMIN — SODIUM CHLORIDE 60 ML: 9 INJECTION, SOLUTION INTRAVENOUS at 21:23

## 2023-04-17 RX ADMIN — FOLIC ACID: 5 INJECTION, SOLUTION INTRAMUSCULAR; INTRAVENOUS; SUBCUTANEOUS at 23:44

## 2023-04-17 RX ADMIN — IOPAMIDOL 81 ML: 755 INJECTION, SOLUTION INTRAVENOUS at 21:23

## 2023-04-17 RX ADMIN — SODIUM CHLORIDE 1000 ML: 9 INJECTION, SOLUTION INTRAVENOUS at 21:01

## 2023-04-17 RX ADMIN — DIAZEPAM 10 MG: 5 INJECTION INTRAMUSCULAR; INTRAVENOUS at 23:29

## 2023-04-17 ASSESSMENT — ENCOUNTER SYMPTOMS
SEIZURES: 0
FEVER: 0
TREMORS: 1
BACK PAIN: 1
COUGH: 0
NECK PAIN: 1
HEADACHES: 1
MYALGIAS: 1

## 2023-04-17 ASSESSMENT — ACTIVITIES OF DAILY LIVING (ADL)
ADLS_ACUITY_SCORE: 17.25
ADLS_ACUITY_SCORE: 17.25

## 2023-04-17 NOTE — LETTER
[facility] [facility]  [address] [place] [address]  Phone: [phone]  Fax: [phone]    April 19, 2023        Alana Mujica  [address]  [place] [address]          To whom it may concern:    RE: Alana Mujica    Patient was hospitalized from 4/17 to 4/19.  She should be able to return back to work on 4/24.    Please contact me for questions or concerns.      Sincerely,        German Ng

## 2023-04-18 LAB
ALBUMIN SERPL BCG-MCNC: 3.8 G/DL (ref 3.5–5.2)
ALP SERPL-CCNC: 77 U/L (ref 35–104)
ALT SERPL W P-5'-P-CCNC: 193 U/L (ref 10–35)
ANION GAP SERPL CALCULATED.3IONS-SCNC: 8 MMOL/L (ref 7–15)
AST SERPL W P-5'-P-CCNC: 158 U/L (ref 10–35)
ATRIAL RATE - MUSE: 94 BPM
BASOPHILS # BLD AUTO: 0 10E3/UL (ref 0–0.2)
BASOPHILS NFR BLD AUTO: 1 %
BILIRUB SERPL-MCNC: 1 MG/DL
BUN SERPL-MCNC: 4.5 MG/DL (ref 6–20)
CALCIUM SERPL-MCNC: 8.6 MG/DL (ref 8.6–10)
CHLORIDE SERPL-SCNC: 102 MMOL/L (ref 98–107)
CK SERPL-CCNC: 115 U/L (ref 26–192)
CREAT SERPL-MCNC: 0.69 MG/DL (ref 0.51–0.95)
DEPRECATED HCO3 PLAS-SCNC: 26 MMOL/L (ref 22–29)
DIASTOLIC BLOOD PRESSURE - MUSE: NORMAL MMHG
EOSINOPHIL # BLD AUTO: 0.2 10E3/UL (ref 0–0.7)
EOSINOPHIL NFR BLD AUTO: 5 %
ERYTHROCYTE [DISTWIDTH] IN BLOOD BY AUTOMATED COUNT: 12.3 % (ref 10–15)
GFR SERPL CREATININE-BSD FRML MDRD: >90 ML/MIN/1.73M2
GLUCOSE SERPL-MCNC: 87 MG/DL (ref 70–99)
HCT VFR BLD AUTO: 33.7 % (ref 35–47)
HGB BLD-MCNC: 11 G/DL (ref 11.7–15.7)
IMM GRANULOCYTES # BLD: 0 10E3/UL
IMM GRANULOCYTES NFR BLD: 1 %
INR PPP: 0.97 (ref 0.85–1.15)
INTERPRETATION ECG - MUSE: NORMAL
LIPASE SERPL-CCNC: 72 U/L (ref 13–60)
LYMPHOCYTES # BLD AUTO: 0.8 10E3/UL (ref 0.8–5.3)
LYMPHOCYTES NFR BLD AUTO: 21 %
MAGNESIUM SERPL-MCNC: 1.6 MG/DL (ref 1.7–2.3)
MCH RBC QN AUTO: 31.7 PG (ref 26.5–33)
MCHC RBC AUTO-ENTMCNC: 32.6 G/DL (ref 31.5–36.5)
MCV RBC AUTO: 97 FL (ref 78–100)
MONOCYTES # BLD AUTO: 0.6 10E3/UL (ref 0–1.3)
MONOCYTES NFR BLD AUTO: 14 %
NEUTROPHILS # BLD AUTO: 2.4 10E3/UL (ref 1.6–8.3)
NEUTROPHILS NFR BLD AUTO: 58 %
NRBC # BLD AUTO: 0 10E3/UL
NRBC BLD AUTO-RTO: 0 /100
P AXIS - MUSE: 51 DEGREES
PHOSPHATE SERPL-MCNC: 2.3 MG/DL (ref 2.5–4.5)
PLATELET # BLD AUTO: 115 10E3/UL (ref 150–450)
POTASSIUM SERPL-SCNC: 4.1 MMOL/L (ref 3.4–5.3)
PR INTERVAL - MUSE: 146 MS
PROT SERPL-MCNC: 6.1 G/DL (ref 6.4–8.3)
QRS DURATION - MUSE: 72 MS
QT - MUSE: 380 MS
QTC - MUSE: 475 MS
R AXIS - MUSE: 39 DEGREES
RBC # BLD AUTO: 3.47 10E6/UL (ref 3.8–5.2)
SARS-COV-2 RNA RESP QL NAA+PROBE: NEGATIVE
SODIUM SERPL-SCNC: 136 MMOL/L (ref 136–145)
SYSTOLIC BLOOD PRESSURE - MUSE: NORMAL MMHG
T AXIS - MUSE: 54 DEGREES
VENTRICULAR RATE- MUSE: 94 BPM
WBC # BLD AUTO: 4 10E3/UL (ref 4–11)

## 2023-04-18 PROCEDURE — 96361 HYDRATE IV INFUSION ADD-ON: CPT

## 2023-04-18 PROCEDURE — 85025 COMPLETE CBC W/AUTO DIFF WBC: CPT | Performed by: HOSPITALIST

## 2023-04-18 PROCEDURE — 96376 TX/PRO/DX INJ SAME DRUG ADON: CPT

## 2023-04-18 PROCEDURE — 258N000003 HC RX IP 258 OP 636: Performed by: HOSPITALIST

## 2023-04-18 PROCEDURE — G0378 HOSPITAL OBSERVATION PER HR: HCPCS

## 2023-04-18 PROCEDURE — 85610 PROTHROMBIN TIME: CPT | Performed by: HOSPITALIST

## 2023-04-18 PROCEDURE — C9113 INJ PANTOPRAZOLE SODIUM, VIA: HCPCS | Performed by: HOSPITALIST

## 2023-04-18 PROCEDURE — U0003 INFECTIOUS AGENT DETECTION BY NUCLEIC ACID (DNA OR RNA); SEVERE ACUTE RESPIRATORY SYNDROME CORONAVIRUS 2 (SARS-COV-2) (CORONAVIRUS DISEASE [COVID-19]), AMPLIFIED PROBE TECHNIQUE, MAKING USE OF HIGH THROUGHPUT TECHNOLOGIES AS DESCRIBED BY CMS-2020-01-R: HCPCS | Performed by: HOSPITALIST

## 2023-04-18 PROCEDURE — 250N000013 HC RX MED GY IP 250 OP 250 PS 637: Performed by: HOSPITALIST

## 2023-04-18 PROCEDURE — 250N000011 HC RX IP 250 OP 636: Performed by: HOSPITALIST

## 2023-04-18 PROCEDURE — 80053 COMPREHEN METABOLIC PANEL: CPT | Performed by: HOSPITALIST

## 2023-04-18 PROCEDURE — 36415 COLL VENOUS BLD VENIPUNCTURE: CPT | Performed by: HOSPITALIST

## 2023-04-18 PROCEDURE — 99233 SBSQ HOSP IP/OBS HIGH 50: CPT | Performed by: STUDENT IN AN ORGANIZED HEALTH CARE EDUCATION/TRAINING PROGRAM

## 2023-04-18 RX ORDER — MULTIPLE VITAMINS W/ MINERALS TAB 9MG-400MCG
1 TAB ORAL DAILY
Status: DISCONTINUED | OUTPATIENT
Start: 2023-04-18 | End: 2023-04-19 | Stop reason: HOSPADM

## 2023-04-18 RX ORDER — ACETAMINOPHEN 650 MG/1
650 SUPPOSITORY RECTAL EVERY 6 HOURS PRN
Status: DISCONTINUED | OUTPATIENT
Start: 2023-04-18 | End: 2023-04-19 | Stop reason: HOSPADM

## 2023-04-18 RX ORDER — GABAPENTIN 300 MG/1
600 CAPSULE ORAL EVERY 8 HOURS
Status: DISCONTINUED | OUTPATIENT
Start: 2023-04-21 | End: 2023-04-19 | Stop reason: HOSPADM

## 2023-04-18 RX ORDER — ACETAMINOPHEN 325 MG/1
650 TABLET ORAL EVERY 6 HOURS PRN
Status: DISCONTINUED | OUTPATIENT
Start: 2023-04-18 | End: 2023-04-19 | Stop reason: HOSPADM

## 2023-04-18 RX ORDER — LACTOBACILLUS RHAMNOSUS GG 10B CELL
1 CAPSULE ORAL DAILY
COMMUNITY
End: 2023-07-28

## 2023-04-18 RX ORDER — CALCIUM CARBONATE 500 MG/1
1000 TABLET, CHEWABLE ORAL 2 TIMES DAILY PRN
Status: DISCONTINUED | OUTPATIENT
Start: 2023-04-18 | End: 2023-04-19 | Stop reason: HOSPADM

## 2023-04-18 RX ORDER — AMOXICILLIN 250 MG
2 CAPSULE ORAL 2 TIMES DAILY PRN
Status: DISCONTINUED | OUTPATIENT
Start: 2023-04-18 | End: 2023-04-19 | Stop reason: HOSPADM

## 2023-04-18 RX ORDER — DIAZEPAM 10 MG/2ML
5-10 INJECTION, SOLUTION INTRAMUSCULAR; INTRAVENOUS EVERY 30 MIN PRN
Status: DISCONTINUED | OUTPATIENT
Start: 2023-04-18 | End: 2023-04-19 | Stop reason: HOSPADM

## 2023-04-18 RX ORDER — SODIUM CHLORIDE 9 MG/ML
INJECTION, SOLUTION INTRAVENOUS CONTINUOUS
Status: DISCONTINUED | OUTPATIENT
Start: 2023-04-18 | End: 2023-04-19 | Stop reason: HOSPADM

## 2023-04-18 RX ORDER — FOLIC ACID 1 MG/1
1 TABLET ORAL DAILY
Status: DISCONTINUED | OUTPATIENT
Start: 2023-04-18 | End: 2023-04-19 | Stop reason: HOSPADM

## 2023-04-18 RX ORDER — LIDOCAINE 40 MG/G
CREAM TOPICAL
Status: DISCONTINUED | OUTPATIENT
Start: 2023-04-18 | End: 2023-04-19 | Stop reason: HOSPADM

## 2023-04-18 RX ORDER — GABAPENTIN 600 MG/1
1200 TABLET ORAL ONCE
Status: COMPLETED | OUTPATIENT
Start: 2023-04-18 | End: 2023-04-18

## 2023-04-18 RX ORDER — DIAZEPAM 10 MG
10 TABLET ORAL EVERY 30 MIN PRN
Status: DISCONTINUED | OUTPATIENT
Start: 2023-04-18 | End: 2023-04-19 | Stop reason: HOSPADM

## 2023-04-18 RX ORDER — OLANZAPINE 5 MG/1
5-10 TABLET, ORALLY DISINTEGRATING ORAL EVERY 6 HOURS PRN
Status: DISCONTINUED | OUTPATIENT
Start: 2023-04-18 | End: 2023-04-19 | Stop reason: HOSPADM

## 2023-04-18 RX ORDER — AMOXICILLIN 250 MG
1 CAPSULE ORAL 2 TIMES DAILY PRN
Status: DISCONTINUED | OUTPATIENT
Start: 2023-04-18 | End: 2023-04-19 | Stop reason: HOSPADM

## 2023-04-18 RX ORDER — CLONIDINE HYDROCHLORIDE 0.1 MG/1
0.1 TABLET ORAL EVERY 8 HOURS
Status: DISCONTINUED | OUTPATIENT
Start: 2023-04-18 | End: 2023-04-19 | Stop reason: HOSPADM

## 2023-04-18 RX ORDER — HALOPERIDOL 5 MG/ML
2.5-5 INJECTION INTRAMUSCULAR EVERY 6 HOURS PRN
Status: DISCONTINUED | OUTPATIENT
Start: 2023-04-18 | End: 2023-04-19 | Stop reason: HOSPADM

## 2023-04-18 RX ORDER — FLUMAZENIL 0.1 MG/ML
0.2 INJECTION, SOLUTION INTRAVENOUS
Status: DISCONTINUED | OUTPATIENT
Start: 2023-04-18 | End: 2023-04-19 | Stop reason: HOSPADM

## 2023-04-18 RX ORDER — GABAPENTIN 300 MG/1
300 CAPSULE ORAL EVERY 8 HOURS
Status: DISCONTINUED | OUTPATIENT
Start: 2023-04-23 | End: 2023-04-19 | Stop reason: HOSPADM

## 2023-04-18 RX ORDER — GABAPENTIN 300 MG/1
900 CAPSULE ORAL EVERY 8 HOURS
Status: DISCONTINUED | OUTPATIENT
Start: 2023-04-18 | End: 2023-04-19 | Stop reason: HOSPADM

## 2023-04-18 RX ORDER — GABAPENTIN 100 MG/1
100 CAPSULE ORAL EVERY 8 HOURS
Status: DISCONTINUED | OUTPATIENT
Start: 2023-04-25 | End: 2023-04-19 | Stop reason: HOSPADM

## 2023-04-18 RX ADMIN — FOLIC ACID 1 MG: 1 TABLET ORAL at 08:27

## 2023-04-18 RX ADMIN — ACETAMINOPHEN 650 MG: 325 TABLET, FILM COATED ORAL at 12:10

## 2023-04-18 RX ADMIN — GABAPENTIN 900 MG: 300 CAPSULE ORAL at 08:28

## 2023-04-18 RX ADMIN — GABAPENTIN 900 MG: 300 CAPSULE ORAL at 15:55

## 2023-04-18 RX ADMIN — GABAPENTIN 900 MG: 300 CAPSULE ORAL at 23:26

## 2023-04-18 RX ADMIN — PANTOPRAZOLE SODIUM 40 MG: 40 INJECTION, POWDER, FOR SOLUTION INTRAVENOUS at 08:31

## 2023-04-18 RX ADMIN — SODIUM CHLORIDE: 900 INJECTION INTRAVENOUS at 15:55

## 2023-04-18 RX ADMIN — SODIUM CHLORIDE: 900 INJECTION INTRAVENOUS at 23:27

## 2023-04-18 RX ADMIN — DIAZEPAM 10 MG: 10 TABLET ORAL at 20:24

## 2023-04-18 RX ADMIN — DIAZEPAM 10 MG: 10 TABLET ORAL at 17:40

## 2023-04-18 RX ADMIN — ACETAMINOPHEN 650 MG: 325 TABLET, FILM COATED ORAL at 17:39

## 2023-04-18 RX ADMIN — DIAZEPAM 10 MG: 10 TABLET ORAL at 12:17

## 2023-04-18 RX ADMIN — MULTIPLE VITAMINS W/ MINERALS TAB 1 TABLET: TAB at 08:28

## 2023-04-18 RX ADMIN — THIAMINE HCL TAB 100 MG 100 MG: 100 TAB at 08:27

## 2023-04-18 RX ADMIN — SODIUM CHLORIDE: 900 INJECTION INTRAVENOUS at 06:45

## 2023-04-18 RX ADMIN — GABAPENTIN 1200 MG: 600 TABLET, FILM COATED ORAL at 00:18

## 2023-04-18 RX ADMIN — CLONIDINE HYDROCHLORIDE 0.1 MG: 0.1 TABLET ORAL at 21:29

## 2023-04-18 ASSESSMENT — ACTIVITIES OF DAILY LIVING (ADL)
ADLS_ACUITY_SCORE: 17.25
ADLS_ACUITY_SCORE: 16.25
ADLS_ACUITY_SCORE: 16.25
ADLS_ACUITY_SCORE: 17.25
ADLS_ACUITY_SCORE: 16.25
ADLS_ACUITY_SCORE: 17.25

## 2023-04-18 NOTE — ED PROVIDER NOTES
History     Chief Complaint:  Alcohol Problem    The history is provided by the patient.      Alana Mujica is a 47 year old female with a history of alcohol withdrawal, alcoholic hepatitis, protein calorie malnutrition, and anxiety who presents with alcohol problem. The patient reports she drank a significant amount of mouthwash 3 days ago and drank a few beers today after developing tremors. States that she went to urgent care today after having a headache, back pain, neck pain, and myalgias for the past week. At urgent care today, she was found to have low sodium as well as elevated liver enzymes and was told to come to the emergency department. Notes that she has not been eating much food recently. Denies fever, cough, suicidal ideation, homicidal ideation, and history of withdrawal seizures.     Independent Historian:   None - Patient Only    ROS:  Review of Systems   Constitutional: Negative for fever.   Respiratory: Negative for cough.    Musculoskeletal: Positive for back pain, myalgias and neck pain.   Neurological: Positive for tremors and headaches. Negative for seizures.   Psychiatric/Behavioral: Negative for suicidal ideas.   All other systems reviewed and are negative.    Allergies:  No Known Allergies     Medications:    Naltrexone  Ondansetron  Nexplanon    Past Medical History:    Acute kidney injury  Alcohol withdrawal  Anorexia  Anxiety  Bulimia   Chemical dependency   Depression  Genital herpes  Osteoporosis  Substance abuse  Adjustment disorder  Alcoholic hepatitis   GERD  Hypercalcemia  Hypomagnesemia   Hyponatremia  Protein calorie malnutrition  PTSD  Generalized tonic-clonic seizure, not alcohol withdrawal  Migraine    Past Surgical History:    LEEP procedure   [place] teethe extraction  Nasal septoplasty     Family History:    Brother- alcohol abuse, anxiety, depression, drug abuse, mental disorder  Mother- breast cancer    Social History:  Presents alone  Presents via private vehicle      Physical Exam     Patient Vitals for the past 24 hrs:   BP Temp Temp src Pulse Resp SpO2   04/18/23 0011 94/84 -- -- -- -- --   04/18/23 0000 94/84 -- -- 104 -- --   04/17/23 2330 126/89 -- -- 98 -- 98 %   04/17/23 2303 (!) 143/96 -- -- 97 -- 99 %   04/17/23 1817 (!) 184/108 99.5  F (37.5  C) Temporal 97 16 100 %      Physical Exam  General: Alert, appears well-developed and well-nourished. Cooperative.     In mild distress  HEENT:  Head:  Atraumatic  Ears:  External ears are normal  Mouth/Throat:  Oropharynx is without erythema or exudate and mucous membranes are dry.   Eyes:   Conjunctivae normal and EOM are normal. No scleral icterus.  CV:  Normal rate, regular rhythm, normal heart sounds and radial pulses are 2+ and symmetric.  No murmur.  Resp:  Breath sounds are clear bilaterally    Non-labored, no retractions or accessory muscle use  GI:  Abdomen is soft, no distension, no tenderness. No rebound or guarding.  No CVA tenderness bilaterally  MS:  Normal range of motion. No edema.    Normal strength in all 4 extremities.     Back atraumatic.    No midline cervical, thoracic, or lumbar tenderness  Skin:  Warm and dry.  No rash or lesions noted.  Neuro: Alert.  Tongue fasciculation and bilateral upper extremity tremor.  Normal strength.  GCS: 15  Psych:  Normal mood and affect.  Denies SI/HI    Emergency Department Course   ECG  ECG taken at 0027, ECG read at 0043  Normal sinus rhythm   No change as compared to prior, dated 6/22/22.  Rate 94 bpm. SD interval 146 ms. QRS duration 72 ms. QT/QTc 380/475 ms. P-R-T axes 51 39 54.     Imaging:  XR Chest 2 Views   Final Result   IMPRESSION: No evidence of acute cardiopulmonary disease. Healed bilateral rib fractures again noted.      CT Abdomen Pelvis w Contrast   Final Result   IMPRESSION:    1.  Likely distal esophagitis with possible associated ulcer, consider direct visualization with endoscopy.   2.  Diffuse hepatic steatosis.   3.  Marked bladder distention  with trace bilateral hydronephrosis, correlate with bladder outlet obstruction/urinary retention.      CT Head w/o Contrast   Final Result   IMPRESSION:   1.  No CT evidence for acute intracranial process.   2.  Brain atrophy and presumed chronic microvascular ischemic changes as above.         Report per radiology    Laboratory:  Labs Ordered and Resulted from Time of ED Arrival to Time of ED Departure   COMPREHENSIVE METABOLIC PANEL - Abnormal       Result Value    Sodium 125 (*)     Potassium 3.3 (*)     Chloride 84 (*)     Carbon Dioxide (CO2) 26      Anion Gap 15      Urea Nitrogen 3.8 (*)     Creatinine 0.51      Calcium 9.4      Glucose 103 (*)     Alkaline Phosphatase 97       (*)      (*)     Protein Total 7.6      Albumin 4.9      Bilirubin Total 0.8      GFR Estimate >90     ETHYL ALCOHOL LEVEL - Abnormal    Alcohol ethyl 0.04 (*)    CBC WITH PLATELETS AND DIFFERENTIAL - Abnormal    WBC Count 4.9      RBC Count 3.78 (*)     Hemoglobin 12.3      Hematocrit 34.9 (*)     MCV 92      MCH 32.5      MCHC 35.2      RDW 11.8      Platelet Count 144 (*)     % Neutrophils 69      % Lymphocytes 18      % Monocytes 11      % Eosinophils 1      % Basophils 1      % Immature Granulocytes 0      NRBCs per 100 WBC 0      Absolute Neutrophils 3.3      Absolute Lymphocytes 0.9      Absolute Monocytes 0.5      Absolute Eosinophils 0.1      Absolute Basophils 0.0      Absolute Immature Granulocytes 0.0      Absolute NRBCs 0.0     LIPASE - Abnormal    Lipase 72 (*)    MAGNESIUM - Abnormal    Magnesium 1.6 (*)    PHOSPHORUS - Abnormal    Phosphorus 2.3 (*)    TROPONIN T, HIGH SENSITIVITY - Normal    Troponin T, High Sensitivity 10     ROUTINE UA WITH MICROSCOPIC REFLEX TO CULTURE - Normal    Color Urine Straw      Appearance Urine Clear      Glucose Urine Negative      Bilirubin Urine Negative      Ketones Urine Negative      Specific Gravity Urine 1.011      Blood Urine Negative      pH Urine 7.0      Protein  Albumin Urine Negative      Urobilinogen Urine Normal      Nitrite Urine Negative      Leukocyte Esterase Urine Negative      RBC Urine 0      WBC Urine <1      Squamous Epithelials Urine <1     CK TOTAL - Normal         INR - Normal    INR 0.97     COVID-19 VIRUS (CORONAVIRUS) BY PCR - Normal    SARS CoV2 PCR Negative        Emergency Department Course & Assessments:     Interventions:  Medications   cloNIDine (CATAPRES) tablet 0.1 mg (0.1 mg Oral Not Given 4/18/23 0011)   OLANZapine zydis (zyPREXA) ODT tab 5-10 mg (has no administration in time range)     Or   haloperidol lactate (HALDOL) injection 2.5-5 mg (has no administration in time range)   flumazenil (ROMAZICON) injection 0.2 mg (has no administration in time range)   melatonin tablet 5 mg (has no administration in time range)   gabapentin (NEURONTIN) capsule 900 mg (has no administration in time range)   gabapentin (NEURONTIN) capsule 600 mg (has no administration in time range)   gabapentin (NEURONTIN) capsule 300 mg (has no administration in time range)   gabapentin (NEURONTIN) capsule 100 mg (has no administration in time range)   diazepam (VALIUM) tablet 10 mg (has no administration in time range)     Or   diazepam (VALIUM) injection 5-10 mg (has no administration in time range)   thiamine (B-1) tablet 100 mg (has no administration in time range)   folic acid (FOLVITE) tablet 1 mg (has no administration in time range)   multivitamin w/minerals (THERA-VIT-M) tablet 1 tablet (has no administration in time range)   lidocaine 1 % 0.1-1 mL (has no administration in time range)   lidocaine (LMX4) cream (has no administration in time range)   sodium chloride (PF) 0.9% PF flush 3 mL ( Intracatheter Canceled Entry 4/18/23 0012)   sodium chloride (PF) 0.9% PF flush 3 mL (has no administration in time range)   melatonin tablet 1 mg (has no administration in time range)   sodium chloride 0.9% infusion (has no administration in time range)   acetaminophen  (TYLENOL) tablet 650 mg (has no administration in time range)     Or   acetaminophen (TYLENOL) Suppository 650 mg (has no administration in time range)   senna-docusate (SENOKOT-S/PERICOLACE) 8.6-50 MG per tablet 1 tablet (has no administration in time range)     Or   senna-docusate (SENOKOT-S/PERICOLACE) 8.6-50 MG per tablet 2 tablet (has no administration in time range)   pantoprazole (PROTONIX) IV push injection 40 mg (has no administration in time range)   0.9% sodium chloride BOLUS (0 mLs Intravenous Stopped 4/17/23 2256)   ondansetron (ZOFRAN ODT) ODT tab 4 mg (4 mg Oral $Given 4/17/23 2101)   diazepam (VALIUM) tablet 5 mg (5 mg Oral $Given 4/17/23 2101)   iopamidol (ISOVUE-370) solution 500 mL (81 mLs Intravenous $Given 4/17/23 2123)   CT scan flush (60 mLs Intravenous $Given 4/17/23 2123)   diazepam (VALIUM) injection 10 mg (10 mg Intravenous $Given 4/17/23 2329)   sodium chloride 0.9 % 1,000 mL with Infuvite Adult 10 mL, thiamine 100 mg, folic acid 1 mg infusion ( Intravenous $New Bag 4/17/23 2344)   pantoprazole (PROTONIX) IV push injection 80 mg (80 mg Intravenous $Given 4/17/23 2328)   gabapentin (NEURONTIN) tablet 1,200 mg (1,200 mg Oral $Given 4/18/23 0018)      Assessments:  2304 I obtained history and examined the patient as noted above.     Independent Interpretation (X-rays, CTs, rhythm strip):  I reviewed head CT which showed no intracranial hemorrhage.    Consultations/Discussion of Management or Tests:  2322 I spoke with Dr. Medina of the hospitalist service regarding admission.    Social Determinants of Health affecting care:   Healthcare Access/Compliance and Education/Literacy    Disposition:  The patient was admitted to the hospital under the care of Dr. Medina.     Impression & Plan    Medical Decision Making:  Alana Mujica is a 47 year old female who presents for evaluation of alcohol abuse.  She is minimally  intoxicated here in ED by blood work.  Blood work concerning for  hyponatremia with a sodium of 125.  After 1 L of IV fluids with normal saline her sodium continues to remain at 125.  Given persistent hyponatremia patient may require admission for further treatment of hyponatremia.  Additionally patient continues to have tongue fasciculations and tremors consistent with ongoing alcohol withdrawal.  She has not had any reported alcohol withdrawal seizures.  She did receive both IV fluids and Valium while under my care in the emergency department.  There are LFT elevations consistent with known alcohol abuse.  No significant evidence of hepatitis or alcoholic ketoacidosis.      She appears on exam to be going through acute alcohol withdrawal.  We discussed this and decided on treatment w/ benzodiazepines, see medications given in ED above.     There are no signs of co-ingestion including acetaminophen, drugs, medications, volatile alcohols.     CT imaging was ordered from triage as patient was having global discomfort.  CT imaging of the head and abdomen pelvis revealed no sinister intracranial or intra-abdominal pathologies.  There was some noted distal esophagitis likely associated with an ulcer which seems consistent with the patient's history of severe alcohol abuse.  She was given a dose of Protonix.    Admit to medicine for further cares given history, hyponatremia, tachycardia and withdrawal here. Likely will be a prolonged withdrawal course given this and do not feel patient can safely manage at home nor a more ambulatory setting like detox.     Diagnosis:    ICD-10-CM    1. Hyponatremia  E87.1       2. Alcohol withdrawal, with unspecified complication (H)  F10.939           Scribe Disclosure:  ELIZABETH Zararadha Arroyo, am serving as a scribe at 11:07 PM on 4/17/2023 to document services personally performed by Tim Julio MD based on my observations and the provider's statements to me.     4/17/2023   Tim Julio MD White, [place], MD  04/18/23 8120

## 2023-04-18 NOTE — UTILIZATION REVIEW
"  Admission Status; Secondary Review Determination         Under the authority of the Utilization Management Committee, the utilization review process indicated a secondary review on the above patient.  The review outcome is based on review of the medical records, discussions with staff, and applying clinical experience noted on the date of the review.          (x) Observation Status Appropriate - This patient does not meet hospital inpatient criteria and is placed in observation status. If this patient's primary payer is Medicare and was admitted as an inpatient, Condition Code 44 should be used and patient status changed to \"observation\".     RATIONALE FOR DETERMINATION   47-year-old woman with a history of alcoholism presented to the emergency department with alcohol withdrawal symptoms after a recent binge. She had electrolyte abnormalities, elevated lipase, abnormal liver function tests, and mild thrombocytopenia. She had no respiratory or urinary symptoms but experienced nausea, vomiting, and diarrhea. She had hand tremors but no significant history of seizures during withdrawal. She was admitted for inpatient treatment, placed on a regular diet with cardiac telemetry and vital signs monitored per CIWA. She was treated with gabapentin, diazepam, and electrolyte replacement, and vitamin therapy. Her electrolyte derangements were the likely cause of muscle aches, weakness, and cramps, and replacement was done per protocol.   Patient CIWA scores are low and not meeting inpatient criteria for withdrawal, no other clear indication for prolonged inpatient hospital stay/care.    The severity of illness, intensity of service provided, expected LOS and risk for adverse outcome make the care appropriate for further observation; however, doesn't meet criteria for hospital inpatient admission. Dr Ng notified of this determination.    This document was produced using voice recognition software.      The information on " this document is developed by the utilization review team in order for the business office to ensure compliance.  This only denotes the appropriateness of proper admission status and does not reflect the quality of care rendered.         The definitions of Inpatient Status and Observation Status used in making the determination above are those provided in the CMS Coverage Manual, Chapter 1 and Chapter 6, section 70.4.      Sincerely,     SENTHIL SHARMA MD    System Medical Director  Utilization Management  [facility].

## 2023-04-18 NOTE — H&P
"[facility]    History and Physical  Hospitalist     Date of Admission:  4/17/2023  Date of Service (when I saw the patient): 04/18/23  Provider: Hadley Medina MD      Chief Complaint   Hands tremor, leg cramps and body aches after alcohol binge    History is obtained from the patient, electronic health record and emergency department physician    History of Present Illness   Alana Mujica is a 47 year old female who presents with hand tremor, leg cramps, body aches after alcohol binge.  She was seen in urgent care and sent to the hospital for further treatment and follow-up.  In the urgent care she was found with significant hyponatremia, hypokalemia and hypochloremia.  The patient has a longstanding history of alcoholism, she had been binging in the last few days.  She states that for 3 days she drunk mouthwash. For one day she had beers, then stop.  She has also severe insomnia, she has not slept in the last 12 days.  She report of one-time seizure around 12 years ago but not during the many times she has had alcohol withdrawal.  She reports of having nausea, throwing up and few diarrhea in the preceding days.  No fever, urinary symptoms, respiratory symptoms or chest pain.  In the emergency department she has been started on normal saline, vitamin therapy and diazepam.  At the moment of my visit she is feeling much better.  I have discussed her case with Dr. Julio, emergency department physician, who is requesting admission.  Vital signs:  Temp: 99.5  F (37.5  C) Temp src: Temporal BP: 94/84 Pulse: 97   Resp: 16 SpO2: 99 % O2 Device: None (Room air)        Estimated body mass index is 20.08 kg/m  as calculated from the following:    Height as of 11/30/22: 1.626 m (5' 4\").    Weight as of 11/30/22: 53.1 kg (117 lb).  Lab work-up:  CBC with differential shows normal hemoglobin, hematocrit 34.8, platelet 144, normal differential.  Chemistry shows sodium 125, potassium 3.3, chloride " 84.  Normal renal function  , ,  Lipase 72, CK8 115, glycemia 103.  UA is normal  COVID-19 PCR test in process.    EKG sinus tachycardia in the monitor, heart rate 112.    Past Medical History    I have reviewed this patient's medical history and updated it with pertinent information if needed.   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 Co-test 12 months     Osteoporosis      Substance abuse (H)          Assessment & Plan   Alana Mujica is a 47 year old female who presents with longstanding history of alcoholism, presented to emergency department with evidence of alcohol withdrawal after recent binge.  She has significant electrolyte abnormalities including moderate to severe hyponatremia, hypokalemia, hypochloremia.  Other findings are elevated lipase, abnormal LFTs and mild thrombocytopenia.  No fever, respiratory symptoms or urinary symptoms.  She admits of some nausea, vomiting and few episodes of diarrhea in the last days.  She has gross hand tremors.  Denies significant history of seizures during withdrawal.    Severe alcohol abuse/dependence.  Alcohol withdrawal syndrome after recent binge.  Admit to inpatient.  Regular diet.  Cardiac telemetry.  Vitals per CIWA.  Fall precaution.  Treatment per alcohol withdrawal protocol with gabapentin, diazepam and electrolyte replacement and vitamin therapy.  PTA naltrexone should be resumed tomorrow after reconciliation    Electrolyte derangement with moderate hyponatremia/hypokalemia/hypochloremia.  Likely main reason for muscle aches, weakness and cramps.  Replacement per protocol with close follow-up of lites.    Acute alcoholic pancreatitis.  Mild lipase elevation, no epigastric pain,  nausea or vomit at the moment of admission.  Gastric protection with PPI.  Follow-up lipase.    Acute alcoholic hepatitis.  Mild elevation of enzymes, normal INR.  Anticipate improvement in the absence of alcohol    Mild thrombocytopenia, suspect alcohol toxicity.  Should improve over the next few days.    History of depression/anxiety.  Not on any specific medication listed PTA    History of eating disorder/malnutrition/osteoporosis.  Nutritional consult requested.    Severe insomnia.  Diazepam has been indicated for withdrawal (wait to see if she is able to sleep tonight otherwise in the morning we should reconsider)      Clinically Significant Risk Factors Present on Admission         # Hypokalemia: Lowest K = 3.3 mmol/L in last 2 days, will replace as needed  # Hyponatremia: Lowest Na = 125 mmol/L in last 2 days, will monitor as appropriate                       Code Status   Full Code    Primary Care Physician   Marichuy Lares      Past Surgical History   I have reviewed this patient's surgical history and updated it with pertinent information if needed.  Past Surgical History:   Procedure Laterality Date     COLONOSCOPY N/A 2022    Procedure: COLONOSCOPY, WITH POLYPECTOMY AND BIOPSY;  Surgeon: Maya Holcomb MD;  Location:  GI     LEEP TX, CERVICAL      greater than 5 years ago from , uncertain date       Prior to Admission Medications   Prior to Admission Medications   Prescriptions Last Dose Informant Patient Reported? Taking?   MILK THISTLE PO   Yes No   Sig: Take 3 capsules by mouth daily   calcium citrate-vitamin D (CITRACAL) 315-250 MG-UNIT TABS per tablet   Yes No   Sig: Take 1 tablet by mouth 2 times daily   etonogestrel (NEXPLANON) 68 MG IMPL   Yes No   Si each (68 mg) by Subdermal route once   multivitamin w/minerals (THERA-VIT-M) tablet   Yes No   Sig: Take 1 tablet by mouth daily   naltrexone (DEPADE/REVIA) 50 MG tablet   No No   Sig: Take 1 tablet (50 mg) by mouth daily    ondansetron (ZOFRAN ODT) 4 MG ODT tab   No No   Sig: Take 1 tablet (4 mg) by mouth every 8 hours as needed for nausea or vomiting   vitamin B complex with vitamin C (STRESS TAB) tablet   Yes No   Sig: Take 1 tablet by mouth daily      Facility-Administered Medications Last Administration Doses Remaining   sodium chloride (PF) 0.9% PF flush 3 mL 9/30/2022 10:03 AM         Allergies   No Known Allergies    Social History   I have personally reviewed the social history with the patient showing.  Social History     Tobacco Use     Smoking status: Former     Packs/day: 0.00     Types: Cigarettes     Smokeless tobacco: Never   Vaping Use     Vaping status: Former   Substance Use Topics     Alcohol use: Not Currently     Comment: 9/18/22       Family History   I have reviewed this patient's family history and it is not contributory to the admission .       Review of Systems   Except as noted in the HPI, a 12-system Review of Systems was found to be negative.      Physical Exam   Vital Signs with Ranges  Temp:  [98.7  F (37.1  C)-99.5  F (37.5  C)] 99.5  F (37.5  C)  Pulse:  [89-97] 97  Resp:  [16] 16  BP: (138-184)/() 143/96  SpO2:  [99 %-100 %] 99 %  0 lbs 0 oz    GEN:  Alert, oriented x 3, appears comfortable, NAD.  HEENT:  Normocephalic/atraumatic, no scleral icterus, no nasal discharge, mouth moist.  CV: Sinus tachycardia, no murmur or JVD.  S1 + S2 noted, no S3 or S4.  LUNGS:  Clear to auscultation bilaterally without rales/rhonchi/wheezing/retractions.  Symmetric chest rise on inhalation noted.  ABD:  Active bowel sounds, soft, non-tender/non-distended.  No rebound/guarding/rigidity.  EXT:  No edema or cyanosis.  No joint synovitis noted.  SKIN:  Dry to touch, no exanthems noted in the visualized areas.  Neuro: Hands tremor.       Data   I personally reviewed the EKG tracing showing Sinus tachycardia.  Results for orders placed or performed during the hospital encounter of 04/17/23 (from the past 24 hour(s))    CBC with platelets differential    Narrative    The following orders were created for panel order CBC with platelets differential.  Procedure                               Abnormality         Status                     ---------                               -----------         ------                     CBC with platelets and d...[[id number]]  Abnormal            Final result                 Please view results for these tests on the individual orders.   Comprehensive metabolic panel   Result Value Ref Range    Sodium 125 (L) 136 - 145 mmol/L    Potassium 3.3 (L) 3.4 - 5.3 mmol/L    Chloride 84 (L) 98 - 107 mmol/L    Carbon Dioxide (CO2) 26 22 - 29 mmol/L    Anion Gap 15 7 - 15 mmol/L    Urea Nitrogen 3.8 (L) 6.0 - 20.0 mg/dL    Creatinine 0.51 0.51 - 0.95 mg/dL    Calcium 9.4 8.6 - 10.0 mg/dL    Glucose 103 (H) 70 - 99 mg/dL    Alkaline Phosphatase 97 35 - 104 U/L     (H) 10 - 35 U/L     (H) 10 - 35 U/L    Protein Total 7.6 6.4 - 8.3 g/dL    Albumin 4.9 3.5 - 5.2 g/dL    Bilirubin Total 0.8 <=1.2 mg/dL    GFR Estimate >90 >60 mL/min/1.73m2   Ethyl Alcohol Level   Result Value Ref Range    Alcohol ethyl 0.04 (H) <=0.01 g/dL   Troponin T, High Sensitivity   Result Value Ref Range    Troponin T, High Sensitivity 10 <=14 ng/L   CBC with platelets and differential   Result Value Ref Range    WBC Count 4.9 4.0 - 11.0 10e3/uL    RBC Count 3.78 (L) 3.80 - 5.20 10e6/uL    Hemoglobin 12.3 11.7 - 15.7 g/dL    Hematocrit 34.9 (L) 35.0 - 47.0 %    MCV 92 78 - 100 fL    MCH 32.5 26.5 - 33.0 pg    MCHC 35.2 31.5 - 36.5 g/dL    RDW 11.8 10.0 - 15.0 %    Platelet Count 144 (L) 150 - 450 10e3/uL    % Neutrophils 69 %    % Lymphocytes 18 %    % Monocytes 11 %    % Eosinophils 1 %    % Basophils 1 %    % Immature Granulocytes 0 %    NRBCs per 100 WBC 0 <1 /100    Absolute Neutrophils 3.3 1.6 - 8.3 10e3/uL    Absolute Lymphocytes 0.9 0.8 - 5.3 10e3/uL    Absolute Monocytes 0.5 0.0 - 1.3 10e3/uL    Absolute Eosinophils  0.1 0.0 - 0.7 10e3/uL    Absolute Basophils 0.0 0.0 - 0.2 10e3/uL    Absolute Immature Granulocytes 0.0 <=0.4 10e3/uL    Absolute NRBCs 0.0 10e3/uL   CK total   Result Value Ref Range     26 - 192 U/L   Lipase   Result Value Ref Range    Lipase 72 (H) 13 - 60 U/L   CT Head w/o Contrast    Narrative    EXAM: CT HEAD W/O CONTRAST  LOCATION: [facility]  DATE/TIME: 4/17/2023 9:28 PM CDT    INDICATION: severe ha  COMPARISON: 10/06/2020  TECHNIQUE: Routine CT Head without IV contrast. Multiplanar reformats. Dose reduction techniques were used.    FINDINGS:  INTRACRANIAL CONTENTS: No intracranial hemorrhage, extraaxial collection, or mass effect.  No CT evidence of acute infarct. Mild presumed chronic small vessel ischemic changes. Mild generalized volume loss. No hydrocephalus.     VISUALIZED ORBITS/SINUSES/MASTOIDS: No intraorbital abnormality. No paranasal sinus mucosal disease. No middle ear or mastoid effusion.    BONES/SOFT TISSUES: No acute abnormality.      Impression    IMPRESSION:  1.  No CT evidence for acute intracranial process.  2.  Brain atrophy and presumed chronic microvascular ischemic changes as above.   CT Abdomen Pelvis w Contrast    Narrative    EXAM: CT ABDOMEN PELVIS W CONTRAST  LOCATION: [facility]  DATE/TIME: 4/17/2023 9:34 PM CDT    INDICATION: abd pain  COMPARISON: CT 09/30/2022  TECHNIQUE: CT scan of the abdomen and pelvis was performed following injection of IV contrast. Multiplanar reformats were obtained. Dose reduction techniques were used.  CONTRAST: 81mL Isovue 370    FINDINGS:   LOWER CHEST: Submucosal edema in the distal thoracic esophagus with newly visualized mucosal defect in the left aspect of the distal thoracic esophagus (series 3 image 21). Mucosal hyperenhancement noted in this area.    HEPATOBILIARY: Diffuse hepatic steatosis. Gallbladder is decompressed but otherwise unremarkable.    PANCREAS: Normal.    SPLEEN:  Normal.    ADRENAL GLANDS: Normal.    KIDNEYS/BLADDER: Trace bilateral hydroureteronephrosis without nephroureterolithiasis visualized. The urinary bladder is markedly distended without wall thickening or surrounding fat stranding.    BOWEL: No obstruction or inflammatory change. Normal appendix.    LYMPH NODES: No suspicious lymphadenopathy.    VASCULATURE: Unremarkable.    PELVIC ORGANS: Normal.    MUSCULOSKELETAL: No acute bony abnormality. Healed left rib fractures.      Impression    IMPRESSION:   1.  Likely distal esophagitis with possible associated ulcer, consider direct visualization with endoscopy.  2.  Diffuse hepatic steatosis.  3.  Marked bladder distention with trace bilateral hydronephrosis, correlate with bladder outlet obstruction/urinary retention.   XR Chest 2 Views    Narrative    EXAM: XR CHEST 2 VIEWS  LOCATION: [facility]  DATE/TIME: 4/17/2023 9:52 PM CDT    INDICATION: Chest pain  COMPARISON: Chest radiograph 12/19/2020      Impression    IMPRESSION: No evidence of acute cardiopulmonary disease. Healed bilateral rib fractures again noted.   UA with Microscopic reflex to Culture    Specimen: Urine, Midstream   Result Value Ref Range    Color Urine Straw Colorless, Straw, Light Yellow, Yellow    Appearance Urine Clear Clear    Glucose Urine Negative Negative mg/dL    Bilirubin Urine Negative Negative    Ketones Urine Negative Negative mg/dL    Specific Gravity Urine 1.011 1.003 - 1.035    Blood Urine Negative Negative    pH Urine 7.0 5.0 - 7.0    Protein Albumin Urine Negative Negative mg/dL    Urobilinogen Urine Normal Normal, 2.0 mg/dL    Nitrite Urine Negative Negative    Leukocyte Esterase Urine Negative Negative    RBC Urine 0 <=2 /HPF    WBC Urine <1 <=5 /HPF    Squamous Epithelials Urine <1 <=1 /HPF    Narrative    Urine Culture not indicated   INR   Result Value Ref Range    INR 0.97 0.85 - 1.15       Securely message with the Vocera Web Console (learn more  here)  Text page via [facility] Paging/Directory        Disclaimer: This note consists of symbols derived from keyboarding, dictation and/or voice recognition software. As a result, there may be errors in the script that have gone undetected. Please consider this when interpreting information found in this chart.

## 2023-04-18 NOTE — ED NOTES
[facility]  ED Nurse Handoff Report    Alana Mujica is a 47 year old female   ED Chief complaint: Alcohol Problem  . ED Diagnosis:   Final diagnoses:   Hyponatremia   Alcohol withdrawal, with unspecified complication (H)     Allergies: No Known Allergies    Code Status: Full Code  Activity level - Baseline/Home:  Independent. Activity Level - Current:   Assist X 1. Lift room needed: No. Bariatric: No   Needed: No   Isolation: No. Infection: Not Applicable.     Vital Signs:   Vitals:    04/17/23 1817 04/17/23 2303   BP: (!) 184/108 (!) 143/96   Pulse: 97 97   Resp: 16    Temp: 99.5  F (37.5  C)    TempSrc: Temporal    SpO2: 100% 99%       Cardiac Rhythm:  ,      Pain level:    Patient confused: No. Patient Falls Risk: Yes.   Elimination Status: Has voided   Patient Report - Initial Complaint: Hyponatremia. Focused Assessment:  Alana Mujica is a 47 year old female who presents with alcohol problem. The patient reports she drank a significant amount of mouthwash 3 days ago and drank a few beers today after developing tremors. States that she went to urgent care today after having a headache, back pain, neck pain, and myalgias for the past week. At urgent care today, she was found to have low sodium as well as elevated liver enzymes and was told to come to the emergency department. Notes that she has not been eating much food recently. Denies fever, cough, suicidal ideation, homicidal ideation, and history of withdrawal seizures.   Tests Performed:   Labs Ordered and Resulted from Time of ED Arrival to Time of ED Departure   COMPREHENSIVE METABOLIC PANEL - Abnormal       Result Value    Sodium 125 (*)     Potassium 3.3 (*)     Chloride 84 (*)     Carbon Dioxide (CO2) 26      Anion Gap 15      Urea Nitrogen 3.8 (*)     Creatinine 0.51      Calcium 9.4      Glucose 103 (*)     Alkaline Phosphatase 97       (*)      (*)     Protein Total 7.6      Albumin 4.9      Bilirubin Total  0.8      GFR Estimate >90     ETHYL ALCOHOL LEVEL - Abnormal    Alcohol ethyl 0.04 (*)    CBC WITH PLATELETS AND DIFFERENTIAL - Abnormal    WBC Count 4.9      RBC Count 3.78 (*)     Hemoglobin 12.3      Hematocrit 34.9 (*)     MCV 92      MCH 32.5      MCHC 35.2      RDW 11.8      Platelet Count 144 (*)     % Neutrophils 69      % Lymphocytes 18      % Monocytes 11      % Eosinophils 1      % Basophils 1      % Immature Granulocytes 0      NRBCs per 100 WBC 0      Absolute Neutrophils 3.3      Absolute Lymphocytes 0.9      Absolute Monocytes 0.5      Absolute Eosinophils 0.1      Absolute Basophils 0.0      Absolute Immature Granulocytes 0.0      Absolute NRBCs 0.0     TROPONIN T, HIGH SENSITIVITY - Normal    Troponin T, High Sensitivity 10     ROUTINE UA WITH MICROSCOPIC REFLEX TO CULTURE - Normal    Color Urine Straw      Appearance Urine Clear      Glucose Urine Negative      Bilirubin Urine Negative      Ketones Urine Negative      Specific Gravity Urine 1.011      Blood Urine Negative      pH Urine 7.0      Protein Albumin Urine Negative      Urobilinogen Urine Normal      Nitrite Urine Negative      Leukocyte Esterase Urine Negative      RBC Urine 0      WBC Urine <1      Squamous Epithelials Urine <1       . Abnormal Results:   XR Chest 2 Views   Final Result   IMPRESSION: No evidence of acute cardiopulmonary disease. Healed bilateral rib fractures again noted.      CT Abdomen Pelvis w Contrast   Final Result   IMPRESSION:    1.  Likely distal esophagitis with possible associated ulcer, consider direct visualization with endoscopy.   2.  Diffuse hepatic steatosis.   3.  Marked bladder distention with trace bilateral hydronephrosis, correlate with bladder outlet obstruction/urinary retention.      CT Head w/o Contrast   Final Result   IMPRESSION:   1.  No CT evidence for acute intracranial process.   2.  Brain atrophy and presumed chronic microvascular ischemic changes as above.        .   Treatments provided:  See MAR  Family Comments: none  OBS brochure/video discussed/provided to patient:  N/A  ED Medications:   Medications   sodium chloride 0.9 % 1,000 mL with Infuvite Adult 10 mL, thiamine 100 mg, folic acid 1 mg infusion (has no administration in time range)   0.9% sodium chloride BOLUS (0 mLs Intravenous Stopped 4/17/23 2256)   ondansetron (ZOFRAN ODT) ODT tab 4 mg (4 mg Oral $Given 4/17/23 2101)   diazepam (VALIUM) tablet 5 mg (5 mg Oral $Given 4/17/23 2101)   iopamidol (ISOVUE-370) solution 500 mL (81 mLs Intravenous $Given 4/17/23 2123)   CT scan flush (60 mLs Intravenous $Given 4/17/23 2123)   diazepam (VALIUM) injection 10 mg (10 mg Intravenous $Given 4/17/23 2329)   pantoprazole (PROTONIX) IV push injection 80 mg (80 mg Intravenous $Given 4/17/23 2328)     Drips infusing:  No  For the majority of the shift, the patient's behavior Green. Interventions performed were none.    Sepsis treatment initiated: No     Patient tested for COVID 19 prior to admission: NO    ED Nurse Name/Phone Number: Zohra Momin RN,   11:34 PM    RECEIVING UNIT ED HANDOFF REVIEW    Above ED Nurse Handoff Report was reviewed: Yes  Reviewed by: Yola Bruno RN on April 18, 2023 at 4:25 PM

## 2023-04-18 NOTE — CONSULTS
Care Management Discharge Note    Discharge Date: 04/19/2023       Discharge Disposition: Home     Private pay costs discussed: Not applicable  Handoff Referral Completed: yes    Additional Information:    SW consulted for rule 25. SW does not complete rule 25 at the hospital. If pt is interested in CD treatment or rule 25, please consult CD. No SW needs identified at this time. Please alert SW if needs arise.     LUCI Keene, FLYNN  Inpatient Care Coordination  Emergency Room /Float  [phone]   Elizabeth Leblanc, [facility]

## 2023-04-18 NOTE — PROGRESS NOTES
[facility]    Medicine Progress Note - Hospitalist Service    Date of Admission:  4/17/2023    Assessment & Plan     47-year-old female with longstanding history of alcohol abuse was seen at urgent care for hand tremors, leg cramps and body aches after alcohol binge and was transferred to ER for further evaluation.  She has been having nausea and vomiting with some diarrhea and for few days now.  She was found to be hyponatremic and hypokalemic.    1. Alcohol withdrawal.    Started on CIWA protocol with gabapentin clonidine, multivitamins and as needed Valium.    Resume naltrexone on discharge on discharge.    2. Alcoholic hepatitis.    AST//193 bilirubin is 1 and INR is 0.97.     Diet: Combination Diet Regular Diet Adult    DVT Prophylaxis: Pneumatic Compression Devices  Soriano Catheter: Not present  Lines: None     Cardiac Monitoring: ACTIVE order. Indication: Electrolyte Imbalance (24 hours)- Magnesium <1.3 mg/ml; Potassium < =2.8 or > 5.5 mg/ml  Code Status: Full Code      Clinically Significant Risk Factors Present on Admission        # Hypokalemia: Lowest K = 3.3 mmol/L in last 2 days, will replace as needed  # Hyponatremia: Lowest Na = 125 mmol/L in last 2 days, will monitor as appropriate    # Hypomagnesemia: Lowest Mg = 1.6 mg/dL in last 2 days, will replace as needed     # Thrombocytopenia: Lowest platelets = 115 in last 2 days, will monitor for bleeding                Disposition Plan      Expected Discharge Date: 04/19/2023                  German Ng MD  Hospitalist Service  [facility]  Securely message with [employer] (more info)  Text page via [employer] Paging/Directory   ______________________________________________________________________    Interval History   Has mild tremors and no hallucinations.  Continues with nausea and headache.    Physical Exam   Vital Signs: Temp: 98.3  F (36.8  C) Temp src: Axillary BP: 99/65 Pulse: 77   Resp: 21 SpO2: 98 % O2  Device: None (Room air)    Weight: 0 lbs 0 oz    Alert and awake.  Bilateral tremors and tongue fasciculations.  Chest: Clear to auscultation.  Heart: S1 and S2 normal.  Abdomen is soft and nontender.  Extremities: No edema.    Medical Decision Making       MANAGEMENT DISCUSSED with the following over the past 24 hours: Patient and RN       Data   Imaging results reviewed over the past 24 hrs:   Recent Results (from the past 24 hour(s))   CT Head w/o Contrast    Narrative    EXAM: CT HEAD W/O CONTRAST  LOCATION: [facility]  DATE/TIME: 4/17/2023 9:28 PM CDT    INDICATION: severe ha  COMPARISON: 10/06/2020  TECHNIQUE: Routine CT Head without IV contrast. Multiplanar reformats. Dose reduction techniques were used.    FINDINGS:  INTRACRANIAL CONTENTS: No intracranial hemorrhage, extraaxial collection, or mass effect.  No CT evidence of acute infarct. Mild presumed chronic small vessel ischemic changes. Mild generalized volume loss. No hydrocephalus.     VISUALIZED ORBITS/SINUSES/MASTOIDS: No intraorbital abnormality. No paranasal sinus mucosal disease. No middle ear or mastoid effusion.    BONES/SOFT TISSUES: No acute abnormality.      Impression    IMPRESSION:  1.  No CT evidence for acute intracranial process.  2.  Brain atrophy and presumed chronic microvascular ischemic changes as above.   CT Abdomen Pelvis w Contrast    Narrative    EXAM: CT ABDOMEN PELVIS W CONTRAST  LOCATION: [facility]  DATE/TIME: 4/17/2023 9:34 PM CDT    INDICATION: abd pain  COMPARISON: CT 09/30/2022  TECHNIQUE: CT scan of the abdomen and pelvis was performed following injection of IV contrast. Multiplanar reformats were obtained. Dose reduction techniques were used.  CONTRAST: 81mL Isovue 370    FINDINGS:   LOWER CHEST: Submucosal edema in the distal thoracic esophagus with newly visualized mucosal defect in the left aspect of the distal thoracic esophagus (series 3 image 21). Mucosal  hyperenhancement noted in this area.    HEPATOBILIARY: Diffuse hepatic steatosis. Gallbladder is decompressed but otherwise unremarkable.    PANCREAS: Normal.    SPLEEN: Normal.    ADRENAL GLANDS: Normal.    KIDNEYS/BLADDER: Trace bilateral hydroureteronephrosis without nephroureterolithiasis visualized. The urinary bladder is markedly distended without wall thickening or surrounding fat stranding.    BOWEL: No obstruction or inflammatory change. Normal appendix.    LYMPH NODES: No suspicious lymphadenopathy.    VASCULATURE: Unremarkable.    PELVIC ORGANS: Normal.    MUSCULOSKELETAL: No acute bony abnormality. Healed left rib fractures.      Impression    IMPRESSION:   1.  Likely distal esophagitis with possible associated ulcer, consider direct visualization with endoscopy.  2.  Diffuse hepatic steatosis.  3.  Marked bladder distention with trace bilateral hydronephrosis, correlate with bladder outlet obstruction/urinary retention.   XR Chest 2 Views    Narrative    EXAM: XR CHEST 2 VIEWS  LOCATION: [facility]  DATE/TIME: 4/17/2023 9:52 PM CDT    INDICATION: Chest pain  COMPARISON: Chest radiograph 12/19/2020      Impression    IMPRESSION: No evidence of acute cardiopulmonary disease. Healed bilateral rib fractures again noted.     Recent Labs   Lab 04/18/23  0746 04/18/23  0002 04/17/23  2049 04/17/23  1309   WBC 4.0  --  4.9 5.8   HGB 11.0*  --  12.3 12.0   MCV 97  --  92 93   *  --  144* 136*   INR  --  0.97  --   --      --  125* 125*   POTASSIUM 4.1  --  3.3* 3.6   CHLORIDE 102  --  84* 86*   CO2 26  --  26 30   BUN 4.5*  --  3.8* 6*   CR 0.69  --  0.51 0.80   ANIONGAP 8  --  15 9   KODY 8.6  --  9.4 9.2   GLC 87  --  103* 117*   ALBUMIN 3.8  --  4.9 3.8   PROTTOTAL 6.1*  --  7.6 7.3   BILITOTAL 1.0  --  0.8 1.0   ALKPHOS 77  --  97 90   *  --  261* 249*   *  --  236* 281*   LIPASE  --   --  72*  --

## 2023-04-18 NOTE — ED NOTES
Report received, assumed care of patient patient resting in bed, states still has a headache of 6/10.  Denies other complaints, awaiting admission, vital signs stable.

## 2023-04-18 NOTE — PLAN OF CARE
"End of Shift Summary  For vital signs and complete assessments, please see documentation flowsheets.     Pertinent assessments: AOx4, VSS. CIWA score 10. Given Valium 10mg. Some tremors, headache, brain fog. SBA for shakiness. Last drink was yesterday. Pt reports long history of anorexia, bulmia. Last BM 5 days ago \"because I don't eat\". Pt reports bilateral flank pain and abdominal discomfort, given Tylenol. Telemetry.     Major Shift Events: admitted to unit  Treatment Plan: observation, CIWA, monitor electrolytes     Bedside Nurse: Yola Bruno RN        Plan of Care Reviewed With: patient    Overall Patient Progress: no change           "

## 2023-04-18 NOTE — PHARMACY-ADMISSION MEDICATION HISTORY
Pharmacist Admission Medication History    Admission medication history is complete. The information provided in this note is only as accurate as the sources available at the time of the update.    Medication reconciliation/reorder completed by provider prior to medication history? Yes    Information Source(s): Patient via in-person    Pertinent Information:-    Changes made to PTA medication list:    Added: probiotic    Deleted: None    Changed: None    Medication Affordability:  Not including over the counter (OTC) medications, was there a time in the past 12 months when you did not take your medications as prescribed because of cost?: No    Allergies reviewed with patient and updates made in EHR: yes    Medication History Completed By: Lucy Venegas RPH 4/18/2023 10:44 AM    Prior to Admission medications    Medication Sig Last Dose Taking? Auth Provider Long Term End Date   calcium citrate-vitamin D (CITRACAL) 315-250 MG-UNIT TABS per tablet Take 1 tablet by mouth 2 times daily 4/17/2023 Yes Unknown, Entered By History     lactobacillus rhamnosus, GG, (CULTURELL) capsule Take 1 capsule by mouth daily 4/17/2023 Yes Unknown, Entered By History     MILK THISTLE PO Take 3 capsules by mouth daily 4/17/2023 Yes Reported, Patient No    multivitamin w/minerals (THERA-VIT-M) tablet Take 1 tablet by mouth daily 4/17/2023 Yes Unknown, Entered By History     vitamin B complex with vitamin C (STRESS TAB) tablet Take 1 tablet by mouth daily 4/17/2023 Yes Unknown, Entered By History     etonogestrel (NEXPLANON) 68 MG IMPL 1 each (68 mg) by Subdermal route once   OestreichSabrina PA-C Yes    naltrexone (DEPADE/REVIA) 50 MG tablet Take 1 tablet (50 mg) by mouth daily  Patient not taking: Reported on 4/18/2023 Not Taking  Yessica Griggs MD Yes    ondansetron (ZOFRAN ODT) 4 MG ODT tab Take 1 tablet (4 mg) by mouth every 8 hours as needed for nausea or vomiting   Kelvin Jackson MD     valACYclovir (VALTREX)  500 MG tablet TAKE 2 TABLETS BY MOUTH DAILY TWICE DAILY FOR 1 DAY PER COLD SORE FLARE   Ga Alaniz PA-C Yes 8/30/22

## 2023-04-18 NOTE — ED NOTES
Rapid Assessment Note    History:   Alana Mujica is a 47 year old female who presents with a headache, back pain, and general myalgias for the last 7 days. The patient reports that last week, she developed pain in her posterior head and back along with a burning sensation in all of her muscles. She decided to go to urgent care today and was found to have low sodium and told to present to the ED. Presently, she notes having a history of alcohol abuse and typically gets headaches when she is going through withdrawal, however they aren't usually this severe. She has been drinking recently and typically has 6 beers a day. She also notes drinking mouthwash 3 days ago. She denies any fever or cough and has had no recent sick contacts. She notes severe HA, abd pain, and chest pain    Exam:   General:  Alert, interactive  Cardiovascular:  Well perfused, tachy  Lungs: Normal respiratory effort, CTAB  Abd- diffuse upper abd TTP  Neuro:  Moving all 4 extremities  Skin:  Warm, dry  Psych:  shaky      Plan of Care:   I evaluated the patient and developed an initial plan of care. I discussed this plan and explained that I, or one of my partners, would be returning to complete the evaluation.     I, Martina Melendez, am serving as a scribe to document services personally performed by Jame Ellington MD, based on my observations and the provider's statements to me.    4/17/2023  [employer]    Portions of this medical record were completed by a scribe. UPON MY REVIEW AND AUTHENTICATION BY ELECTRONIC SIGNATURE, this confirms (a) I performed the applicable clinical services, and (b) the record is accurate.        Jame Ellington MD  04/17/23 2033

## 2023-04-18 NOTE — PROGRESS NOTES
"SUBJECTIVE:   Alana Mujica is a 47 year old female who  presents today for dark urine, shakiness, alcoholism.  Patient ingested 1/2 of a \"large bottle of mouthwash 6 days ago.  IN addition intake of 6-12 alcholoic beverages daily.  Would like to know if there is organ damage from posisoning and would like to detox.     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 Co-test 12 months     Osteoporosis      Substance abuse (H)      Current Outpatient Medications   Medication Sig Dispense Refill     calcium citrate-vitamin D (CITRACAL) 315-250 MG-UNIT TABS per tablet Take 1 tablet by mouth 2 times daily       etonogestrel (NEXPLANON) 68 MG IMPL 1 each (68 mg) by Subdermal route once 1 each 0     MILK THISTLE PO Take 3 capsules by mouth daily       multivitamin w/minerals (THERA-VIT-M) tablet Take 1 tablet by mouth daily       naltrexone (DEPADE/REVIA) 50 MG tablet Take 1 tablet (50 mg) by mouth daily 30 tablet 1     ondansetron (ZOFRAN ODT) 4 MG ODT tab Take 1 tablet (4 mg) by mouth every 8 hours as needed for nausea or vomiting 10 tablet 0     vitamin B complex with vitamin C (STRESS TAB) tablet Take 1 tablet by mouth daily       Social History     Tobacco Use     Smoking status: Former     Packs/day: 0.00     Types: Cigarettes     Smokeless tobacco: Never   Vaping Use     Vaping status: Former   Substance Use Topics     Alcohol use: Not Currently     Comment: 9/18/22       ROS:   Review of systems negative except as stated above.    OBJECTIVE:  /84 (BP Location: Right arm)   Pulse 89   Temp 98.7  F (37.1  C)   Resp 16   SpO2 100%   GENERAL APPEARANCE: anxious, alert and mild distress  RESP: lungs clear to auscultation - no rales, rhonchi or " wheezes  CV: regular rates and rhythm, normal S1 S2, no murmur noted  SKIN: no suspicious lesions or rashes  Results for orders placed or performed during the hospital encounter of 04/17/23   XR Chest 2 Views     Status: None    Narrative    EXAM: XR CHEST 2 VIEWS  LOCATION: [facility]  DATE/TIME: 4/17/2023 9:52 PM CDT    INDICATION: Chest pain  COMPARISON: Chest radiograph 12/19/2020      Impression    IMPRESSION: No evidence of acute cardiopulmonary disease. Healed bilateral rib fractures again noted.   CT Abdomen Pelvis w Contrast     Status: None    Narrative    EXAM: CT ABDOMEN PELVIS W CONTRAST  LOCATION: [facility]  DATE/TIME: 4/17/2023 9:34 PM CDT    INDICATION: abd pain  COMPARISON: CT 09/30/2022  TECHNIQUE: CT scan of the abdomen and pelvis was performed following injection of IV contrast. Multiplanar reformats were obtained. Dose reduction techniques were used.  CONTRAST: 81mL Isovue 370    FINDINGS:   LOWER CHEST: Submucosal edema in the distal thoracic esophagus with newly visualized mucosal defect in the left aspect of the distal thoracic esophagus (series 3 image 21). Mucosal hyperenhancement noted in this area.    HEPATOBILIARY: Diffuse hepatic steatosis. Gallbladder is decompressed but otherwise unremarkable.    PANCREAS: Normal.    SPLEEN: Normal.    ADRENAL GLANDS: Normal.    KIDNEYS/BLADDER: Trace bilateral hydroureteronephrosis without nephroureterolithiasis visualized. The urinary bladder is markedly distended without wall thickening or surrounding fat stranding.    BOWEL: No obstruction or inflammatory change. Normal appendix.    LYMPH NODES: No suspicious lymphadenopathy.    VASCULATURE: Unremarkable.    PELVIC ORGANS: Normal.    MUSCULOSKELETAL: No acute bony abnormality. Healed left rib fractures.      Impression    IMPRESSION:   1.  Likely distal esophagitis with possible associated ulcer, consider direct visualization with endoscopy.  2.   Diffuse hepatic steatosis.  3.  Marked bladder distention with trace bilateral hydronephrosis, correlate with bladder outlet obstruction/urinary retention.   CT Head w/o Contrast     Status: None    Narrative    EXAM: CT HEAD W/O CONTRAST  LOCATION: [facility]  DATE/TIME: 4/17/2023 9:28 PM CDT    INDICATION: severe ha  COMPARISON: 10/06/2020  TECHNIQUE: Routine CT Head without IV contrast. Multiplanar reformats. Dose reduction techniques were used.    FINDINGS:  INTRACRANIAL CONTENTS: No intracranial hemorrhage, extraaxial collection, or mass effect.  No CT evidence of acute infarct. Mild presumed chronic small vessel ischemic changes. Mild generalized volume loss. No hydrocephalus.     VISUALIZED ORBITS/SINUSES/MASTOIDS: No intraorbital abnormality. No paranasal sinus mucosal disease. No middle ear or mastoid effusion.    BONES/SOFT TISSUES: No acute abnormality.      Impression    IMPRESSION:  1.  No CT evidence for acute intracranial process.  2.  Brain atrophy and presumed chronic microvascular ischemic changes as above.   Comprehensive metabolic panel     Status: Abnormal   Result Value Ref Range    Sodium 125 (L) 136 - 145 mmol/L    Potassium 3.3 (L) 3.4 - 5.3 mmol/L    Chloride 84 (L) 98 - 107 mmol/L    Carbon Dioxide (CO2) 26 22 - 29 mmol/L    Anion Gap 15 7 - 15 mmol/L    Urea Nitrogen 3.8 (L) 6.0 - 20.0 mg/dL    Creatinine 0.51 0.51 - 0.95 mg/dL    Calcium 9.4 8.6 - 10.0 mg/dL    Glucose 103 (H) 70 - 99 mg/dL    Alkaline Phosphatase 97 35 - 104 U/L     (H) 10 - 35 U/L     (H) 10 - 35 U/L    Protein Total 7.6 6.4 - 8.3 g/dL    Albumin 4.9 3.5 - 5.2 g/dL    Bilirubin Total 0.8 <=1.2 mg/dL    GFR Estimate >90 >60 mL/min/1.73m2   Ethyl Alcohol Level     Status: Abnormal   Result Value Ref Range    Alcohol ethyl 0.04 (H) <=0.01 g/dL   Troponin T, High Sensitivity     Status: Normal   Result Value Ref Range    Troponin T, High Sensitivity 10 <=14 ng/L   CBC with platelets and  differential     Status: Abnormal   Result Value Ref Range    WBC Count 4.9 4.0 - 11.0 10e3/uL    RBC Count 3.78 (L) 3.80 - 5.20 10e6/uL    Hemoglobin 12.3 11.7 - 15.7 g/dL    Hematocrit 34.9 (L) 35.0 - 47.0 %    MCV 92 78 - 100 fL    MCH 32.5 26.5 - 33.0 pg    MCHC 35.2 31.5 - 36.5 g/dL    RDW 11.8 10.0 - 15.0 %    Platelet Count 144 (L) 150 - 450 10e3/uL    % Neutrophils 69 %    % Lymphocytes 18 %    % Monocytes 11 %    % Eosinophils 1 %    % Basophils 1 %    % Immature Granulocytes 0 %    NRBCs per 100 WBC 0 <1 /100    Absolute Neutrophils 3.3 1.6 - 8.3 10e3/uL    Absolute Lymphocytes 0.9 0.8 - 5.3 10e3/uL    Absolute Monocytes 0.5 0.0 - 1.3 10e3/uL    Absolute Eosinophils 0.1 0.0 - 0.7 10e3/uL    Absolute Basophils 0.0 0.0 - 0.2 10e3/uL    Absolute Immature Granulocytes 0.0 <=0.4 10e3/uL    Absolute NRBCs 0.0 10e3/uL   UA with Microscopic reflex to Culture     Status: Normal    Specimen: Urine, Midstream   Result Value Ref Range    Color Urine Straw Colorless, Straw, Light Yellow, Yellow    Appearance Urine Clear Clear    Glucose Urine Negative Negative mg/dL    Bilirubin Urine Negative Negative    Ketones Urine Negative Negative mg/dL    Specific Gravity Urine 1.011 1.003 - 1.035    Blood Urine Negative Negative    pH Urine 7.0 5.0 - 7.0    Protein Albumin Urine Negative Negative mg/dL    Urobilinogen Urine Normal Normal, 2.0 mg/dL    Nitrite Urine Negative Negative    Leukocyte Esterase Urine Negative Negative    RBC Urine 0 <=2 /HPF    WBC Urine <1 <=5 /HPF    Squamous Epithelials Urine <1 <=1 /HPF    Narrative    Urine Culture not indicated   CK total     Status: Normal   Result Value Ref Range     26 - 192 U/L   Lipase     Status: Abnormal   Result Value Ref Range    Lipase 72 (H) 13 - 60 U/L   INR     Status: Normal   Result Value Ref Range    INR 0.97 0.85 - 1.15   Asymptomatic COVID-19 Virus (Coronavirus) by PCR Nasopharyngeal     Status: Normal    Specimen: Nasopharyngeal; Swab   Result Value Ref  Range    SARS CoV2 PCR Negative Negative    Narrative    Testing was performed using the Xpert Xpress SARS-CoV-2 Assay on the Cepheid Gene-Xpert Instrument Systems. Additional information about this Emergency Use Authorization (EUA) assay can be found via the Lab Guide. This test should be ordered for the detection of SARS-CoV-2 in individuals who meet SARS-CoV-2 clinical and/or epidemiological criteria as well as from individuals without symptoms or other reasons to suspect COVID-19. Test performance for asymptomatic patients has only been established in anterior nasal swab specimens. This test is for in vitro diagnostic use under the FDA EUA for laboratories certified under CLIA to perform high complexity testing. This test has not been FDA cleared or approved. A negative result does not rule out the presence of PCR inhibitors in the specimen or target RNA concentration below the limit of detection for the assay. The possibility of a false negative should be considered if the patient's recent exposure or clinical presentation suggests COVID-19. This test was validated by the [facility]. This laboratory is certified under the Clinical Laboratory Improvement Amendments (CLIA) as qualified to perform high complexity laboratory testing.     Magnesium     Status: Abnormal   Result Value Ref Range    Magnesium 1.6 (L) 1.7 - 2.3 mg/dL   Phosphorus     Status: Abnormal   Result Value Ref Range    Phosphorus 2.3 (L) 2.5 - 4.5 mg/dL   Comprehensive metabolic panel     Status: Abnormal   Result Value Ref Range    Sodium 136 136 - 145 mmol/L    Potassium 4.1 3.4 - 5.3 mmol/L    Chloride 102 98 - 107 mmol/L    Carbon Dioxide (CO2) 26 22 - 29 mmol/L    Anion Gap 8 7 - 15 mmol/L    Urea Nitrogen 4.5 (L) 6.0 - 20.0 mg/dL    Creatinine 0.69 0.51 - 0.95 mg/dL    Calcium 8.6 8.6 - 10.0 mg/dL    Glucose 87 70 - 99 mg/dL    Alkaline Phosphatase 77 35 - 104 U/L     (H) 10 - 35 U/L     (H)  10 - 35 U/L    Protein Total 6.1 (L) 6.4 - 8.3 g/dL    Albumin 3.8 3.5 - 5.2 g/dL    Bilirubin Total 1.0 <=1.2 mg/dL    GFR Estimate >90 >60 mL/min/1.73m2   CBC with platelets and differential     Status: Abnormal   Result Value Ref Range    WBC Count 4.0 4.0 - 11.0 10e3/uL    RBC Count 3.47 (L) 3.80 - 5.20 10e6/uL    Hemoglobin 11.0 (L) 11.7 - 15.7 g/dL    Hematocrit 33.7 (L) 35.0 - 47.0 %    MCV 97 78 - 100 fL    MCH 31.7 26.5 - 33.0 pg    MCHC 32.6 31.5 - 36.5 g/dL    RDW 12.3 10.0 - 15.0 %    Platelet Count 115 (L) 150 - 450 10e3/uL    % Neutrophils 58 %    % Lymphocytes 21 %    % Monocytes 14 %    % Eosinophils 5 %    % Basophils 1 %    % Immature Granulocytes 1 %    NRBCs per 100 WBC 0 <1 /100    Absolute Neutrophils 2.4 1.6 - 8.3 10e3/uL    Absolute Lymphocytes 0.8 0.8 - 5.3 10e3/uL    Absolute Monocytes 0.6 0.0 - 1.3 10e3/uL    Absolute Eosinophils 0.2 0.0 - 0.7 10e3/uL    Absolute Basophils 0.0 0.0 - 0.2 10e3/uL    Absolute Immature Granulocytes 0.0 <=0.4 10e3/uL    Absolute NRBCs 0.0 10e3/uL   EKG 12-lead, tracing only     Status: None   Result Value Ref Range    Systolic Blood Pressure  mmHg    Diastolic Blood Pressure  mmHg    Ventricular Rate 94 BPM    Atrial Rate 94 BPM    NE Interval 146 ms    QRS Duration 72 ms     ms    QTc 475 ms    P Axis 51 degrees    R AXIS 39 degrees    T Axis 54 degrees    Interpretation ECG       Sinus rhythm  Normal ECG  When compared with ECG of 22-JUN-2022 12:56,  No significant change was found  Confirmed by - EMERGENCY ROOM, PHYSICIAN ([id number]),  CARIN HARRINGTON ([id number]) on 4/18/2023 7:29:10 AM     CBC with platelets differential     Status: Abnormal    Narrative    The following orders were created for panel order CBC with platelets differential.  Procedure                               Abnormality         Status                     ---------                               -----------         ------                     CBC with platelets and d...[[id number]]   Abnormal            Final result                 Please view results for these tests on the individual orders.   CBC with platelets differential     Status: Abnormal    Narrative    The following orders were created for panel order CBC with platelets differential.  Procedure                               Abnormality         Status                     ---------                               -----------         ------                     CBC with platelets and d...[[id number]]  Abnormal            Final result                 Please view results for these tests on the individual orders.   Results for orders placed or performed in visit on 04/17/23   UA macro with reflex to Microscopic and Culture - Clinc Collect     Status: Normal    Specimen: Urine, Clean Catch   Result Value Ref Range    Color Urine Yellow Colorless, Straw, Light Yellow, Yellow    Appearance Urine Clear Clear    Glucose Urine Negative Negative mg/dL    Bilirubin Urine Negative Negative    Ketones Urine Negative Negative mg/dL    Specific Gravity Urine <=1.005 1.003 - 1.035    Blood Urine Negative Negative    pH Urine 6.0 5.0 - 7.0    Protein Albumin Urine Negative Negative mg/dL    Urobilinogen Urine 0.2 0.2, 1.0 E.U./dL    Nitrite Urine Negative Negative    Leukocyte Esterase Urine Negative Negative    Narrative    Microscopic not indicated   Comprehensive metabolic panel (BMP + Alb, Alk Phos, ALT, AST, Total. Bili, TP)     Status: Abnormal   Result Value Ref Range    Sodium 125 (L) 133 - 144 mmol/L    Potassium 3.6 3.4 - 5.3 mmol/L    Chloride 86 (L) 94 - 109 mmol/L    Carbon Dioxide (CO2) 30 20 - 32 mmol/L    Anion Gap 9 3 - 14 mmol/L    Urea Nitrogen 6 (L) 7 - 30 mg/dL    Creatinine 0.80 0.52 - 1.04 mg/dL    Calcium 9.2 8.5 - 10.1 mg/dL    Glucose 117 (H) 70 - 99 mg/dL    Alkaline Phosphatase 90 40 - 150 U/L     (H) 0 - 45 U/L     (H) 0 - 50 U/L    Protein Total 7.3 6.8 - 8.8 g/dL    Albumin 3.8 3.4 - 5.0 g/dL    Bilirubin Total 1.0  0.2 - 1.3 mg/dL    GFR Estimate 90 >60 mL/min/1.73m2    Narrative    Verified by repeat analysis     CBC with platelets and differential     Status: Abnormal   Result Value Ref Range    WBC Count 5.8 4.0 - 11.0 10e3/uL    RBC Count 3.75 (L) 3.80 - 5.20 10e6/uL    Hemoglobin 12.0 11.7 - 15.7 g/dL    Hematocrit 34.7 (L) 35.0 - 47.0 %    MCV 93 78 - 100 fL    MCH 32.0 26.5 - 33.0 pg    MCHC 34.6 31.5 - 36.5 g/dL    RDW 11.5 10.0 - 15.0 %    Platelet Count 136 (L) 150 - 450 10e3/uL    % Neutrophils 74 %    % Lymphocytes 12 %    % Monocytes 11 %    % Eosinophils 2 %    % Basophils 1 %    Absolute Neutrophils 4.3 1.6 - 8.3 10e3/uL    Absolute Lymphocytes 0.7 (L) 0.8 - 5.3 10e3/uL    Absolute Monocytes 0.7 0.0 - 1.3 10e3/uL    Absolute Eosinophils 0.1 0.0 - 0.7 10e3/uL    Absolute Basophils 0.0 0.0 - 0.2 10e3/uL    Narrative    Verified by repeat analysis     CBC with platelets and differential     Status: Abnormal    Narrative    The following orders were created for panel order CBC with platelets and differential.  Procedure                               Abnormality         Status                     ---------                               -----------         ------                     CBC with platelets and d...[[id number]]  Abnormal            Final result                 Please view results for these tests on the individual orders.           ASSESSMENT:   (F10.20) Alcohol dependence, continuous (H)  (primary encounter diagnosis)  (E87.1) Hyponatremia  Plan: Comprehensive metabolic panel (BMP + Alb, Alk         Phos, ALT, AST, Total. Bili, TP), CBC with         platelets and differential      (M54.50) Acute bilateral low back pain without sciatica  Plan: UA macro with reflex to Microscopic and Culture        - Clinc Collect, Comprehensive metabolic panel         (BMP + Alb, Alk Phos, ALT, AST, Total. Bili,         TP), CBC with platelets and differential      Patient is receptive to assessment at [facility] and will  take private vehicle driven by father

## 2023-04-19 ENCOUNTER — MYC MEDICAL ADVICE (OUTPATIENT)
Dept: PEDIATRICS | Facility: CLINIC | Age: 48
End: 2023-04-19
Payer: COMMERCIAL

## 2023-04-19 VITALS
SYSTOLIC BLOOD PRESSURE: 124 MMHG | BODY MASS INDEX: 19.47 KG/M2 | TEMPERATURE: 97.6 F | RESPIRATION RATE: 16 BRPM | WEIGHT: 113.4 LBS | HEART RATE: 74 BPM | OXYGEN SATURATION: 98 % | DIASTOLIC BLOOD PRESSURE: 80 MMHG

## 2023-04-19 DIAGNOSIS — F10.20 ALCOHOL USE DISORDER, SEVERE, DEPENDENCE (H): ICD-10-CM

## 2023-04-19 PROCEDURE — 99239 HOSP IP/OBS DSCHRG MGMT >30: CPT | Performed by: STUDENT IN AN ORGANIZED HEALTH CARE EDUCATION/TRAINING PROGRAM

## 2023-04-19 PROCEDURE — G0378 HOSPITAL OBSERVATION PER HR: HCPCS

## 2023-04-19 PROCEDURE — 250N000011 HC RX IP 250 OP 636: Performed by: HOSPITALIST

## 2023-04-19 PROCEDURE — 258N000003 HC RX IP 258 OP 636: Performed by: HOSPITALIST

## 2023-04-19 PROCEDURE — 96376 TX/PRO/DX INJ SAME DRUG ADON: CPT

## 2023-04-19 PROCEDURE — C9113 INJ PANTOPRAZOLE SODIUM, VIA: HCPCS | Performed by: HOSPITALIST

## 2023-04-19 PROCEDURE — 96361 HYDRATE IV INFUSION ADD-ON: CPT

## 2023-04-19 PROCEDURE — 250N000013 HC RX MED GY IP 250 OP 250 PS 637: Performed by: HOSPITALIST

## 2023-04-19 PROCEDURE — 250N000013 HC RX MED GY IP 250 OP 250 PS 637: Performed by: INTERNAL MEDICINE

## 2023-04-19 RX ORDER — GABAPENTIN 300 MG/1
CAPSULE ORAL
Qty: 42 CAPSULE | Refills: 0 | Status: SHIPPED | OUTPATIENT
Start: 2023-04-19 | End: 2023-04-24

## 2023-04-19 RX ORDER — PANTOPRAZOLE SODIUM 40 MG/1
40 TABLET, DELAYED RELEASE ORAL
Qty: 7 TABLET | Refills: 0 | Status: SHIPPED | OUTPATIENT
Start: 2023-04-20 | End: 2023-05-31

## 2023-04-19 RX ORDER — NALTREXONE HYDROCHLORIDE 50 MG/1
50 TABLET, FILM COATED ORAL DAILY
Qty: 30 TABLET | Refills: 1 | Status: SHIPPED | OUTPATIENT
Start: 2023-04-19 | End: 2024-09-23

## 2023-04-19 RX ORDER — PANTOPRAZOLE SODIUM 40 MG/1
40 TABLET, DELAYED RELEASE ORAL
Status: DISCONTINUED | OUTPATIENT
Start: 2023-04-20 | End: 2023-04-19 | Stop reason: HOSPADM

## 2023-04-19 RX ADMIN — PANTOPRAZOLE SODIUM 40 MG: 40 INJECTION, POWDER, FOR SOLUTION INTRAVENOUS at 08:00

## 2023-04-19 RX ADMIN — CALCIUM CARBONATE (ANTACID) CHEW TAB 500 MG 1000 MG: 500 CHEW TAB at 08:12

## 2023-04-19 RX ADMIN — ACETAMINOPHEN 650 MG: 325 TABLET, FILM COATED ORAL at 05:20

## 2023-04-19 RX ADMIN — GABAPENTIN 900 MG: 300 CAPSULE ORAL at 08:00

## 2023-04-19 RX ADMIN — SODIUM CHLORIDE: 900 INJECTION INTRAVENOUS at 09:29

## 2023-04-19 RX ADMIN — ACETAMINOPHEN 650 MG: 325 TABLET, FILM COATED ORAL at 11:46

## 2023-04-19 RX ADMIN — CLONIDINE HYDROCHLORIDE 0.1 MG: 0.1 TABLET ORAL at 05:20

## 2023-04-19 RX ADMIN — FOLIC ACID 1 MG: 1 TABLET ORAL at 08:00

## 2023-04-19 RX ADMIN — MULTIPLE VITAMINS W/ MINERALS TAB 1 TABLET: TAB at 08:00

## 2023-04-19 RX ADMIN — THIAMINE HCL TAB 100 MG 100 MG: 100 TAB at 08:00

## 2023-04-19 RX ADMIN — GABAPENTIN 900 MG: 300 CAPSULE ORAL at 14:46

## 2023-04-19 ASSESSMENT — ACTIVITIES OF DAILY LIVING (ADL)
ADLS_ACUITY_SCORE: 16.25

## 2023-04-19 NOTE — PROGRESS NOTES
"Care Management Discharge Note    Discharge Date: 04/19/2023       Discharge Disposition:  Home    Discharge Services:  OP MH    Discharge DME:  None    Discharge Transportation:  Dad    Private pay costs discussed: Not applicable    Handoff Referral Completed: No    Additional Information:  Per the pt's request, Sw met with her and provided her with OP MH resources.  She said that she has been to CD treatment in the past, but they \"don't get to the root cause\" and she wants to do MH treatment instead.  Her dad will pick her up today between [id number] to go home.  Pt had no additional Sw needs or concerns.      LUCI Aquino, [facility]  Inpatient Care Coordination  [facility]  [phone]  "

## 2023-04-19 NOTE — PLAN OF CARE
Goal Outcome Evaluation:      Plan of Care Reviewed With: patient     To Do:  End of Shift Summary  For vital signs and complete assessments, please see documentation flowsheets.     Pertinent assessments: Pt A/Ox4. VSS. CIWA 8, Valium 10 mg po given. Rechecked 4. Tylenol given for headache. Tele- SR w/ peaked T wave Up SBA.   Major Shift Events: None  Treatment Plan: ETOH withdrawal protocol, symptom management and monitor electrolyte.  Bedside Nurse: Maria A Riley RN

## 2023-04-19 NOTE — PLAN OF CARE
Goal Outcome Evaluation:    End of Shift Summary  For vital signs and complete assessments, please see documentation flowsheets.     Pertinent assessments:   A&Ox4. VSS on room air. CIWA score 6, 5. Tremors, mild nausea and HA noted. Tylenol given for HA, w/ relief. Good appetite, ate 100% of lunch. Up SBA.     Major Shift Events: None  Treatment Plan: Patient is discharging home today after 1630 when transportation is available. Discharge instructions given to patient. Medications to be picked up at patients pharmacy after discharge.

## 2023-04-19 NOTE — DISCHARGE SUMMARY
[facility]  Hospitalist Discharge Summary      Date of Admission:  4/17/2023  Date of Discharge:  4/19/2023  Discharging Provider: German Ng MD  Discharge Service: Hospitalist Service    Discharge Diagnoses       Alcohol withdrawal.    Alcoholic hepatitis    Alcoholic gastritis    Hypokalemia.    Hypomagnesemia.    Follow-ups Needed After Discharge   Follow-up Appointments     Follow-up and recommended labs and tests       Follow up with primary care provider, Marichuy Lares, within 7 days for   hospital follow- up.  The following labs/tests are recommended: BMP,   Magnesium and Phosphorus.    Follow-up with psychiatry.             Discharge Disposition   Discharged to home  Condition at discharge: Stable    Hospital Course   47-year-old female with longstanding history of alcohol abuse was seen at urgent care for hand tremors, leg cramps and body aches after alcohol binge and was transferred to ER for further evaluation.  She has been having nausea and vomiting with some diarrhea and for few days now.  She was found to be hyponatremic and hypokalemic.     1. Alcohol withdrawal.    Started on CIWA protocol with gabapentin clonidine, multivitamins and as needed Valium.    Resume naltrexone on discharge on discharge.     2. Alcoholic hepatitis.    AST//193 bilirubin is 1 and INR is 0.97.    Consultations This Hospital Stay   NUTRITION SERVICES ADULT IP CONSULT  CARE MANAGEMENT / SOCIAL WORK IP CONSULT    Code Status   Full Code    Time Spent on this Encounter   I, German Ng MD, personally saw the patient today and spent greater than 30 minutes discharging this patient.       German Ng MD  [facility] 5 MEDICAL SURGICAL  [address] [facility] [address]  Phone: [phone]  Fax: [phone]  ______________________________________________________________________    Physical Exam   Vital Signs: Temp: 97.6  F (36.4  C) Temp src: Oral BP: 124/80 Pulse: 74    Resp: 16 SpO2: 98 % O2 Device: None (Room air)    Weight: 113 lbs 6.4 oz  Alert awake and x3.  No tremors or tongue fasciculations.  No hallucinations.    Chest clear to auscultation.  Abdomen is soft and nontender.         Primary Care Physician   Marichuy Lares    Discharge Orders      Primary Care - Care Coordination Referral      Reason for your hospital stay    Alcohol withdrawl     Activity    Your activity upon discharge: activity as tolerated     Follow-up and recommended labs and tests     Follow up with primary care provider, Marichuy Lares, within 7 days for hospital follow- up.  The following labs/tests are recommended: BMP, Magnesium and Phosphorus.    Follow-up with psychiatry.     Diet    Follow this diet upon discharge: Orders Placed This Encounter      Combination Diet Regular Diet Adult       Significant Results and Procedures   Most Recent 3 CBC's:Recent Labs   Lab Test 04/18/23  0746 04/17/23 2049 04/17/23  1309   WBC 4.0 4.9 5.8   HGB 11.0* 12.3 12.0   MCV 97 92 93   * 144* 136*     Most Recent 3 BMP's:Recent Labs   Lab Test 04/18/23  0746 04/17/23 2049 04/17/23  1309    125* 125*   POTASSIUM 4.1 3.3* 3.6   CHLORIDE 102 84* 86*   CO2 26 26 30   BUN 4.5* 3.8* 6*   CR 0.69 0.51 0.80   ANIONGAP 8 15 9   KODY 8.6 9.4 9.2   GLC 87 103* 117*     Most Recent 2 LFT's:Recent Labs   Lab Test 04/18/23  0746 04/17/23 2049   * 236*   * 261*   ALKPHOS 77 97   BILITOTAL 1.0 0.8   ,   Results for orders placed or performed during the hospital encounter of 04/17/23   XR Chest 2 Views    Narrative    EXAM: XR CHEST 2 VIEWS  LOCATION: [facility]  DATE/TIME: 4/17/2023 9:52 PM CDT    INDICATION: Chest pain  COMPARISON: Chest radiograph 12/19/2020      Impression    IMPRESSION: No evidence of acute cardiopulmonary disease. Healed bilateral rib fractures again noted.   CT Abdomen Pelvis w Contrast    Narrative    EXAM: CT ABDOMEN PELVIS W CONTRAST  LOCATION: [facility]  DATE/TIME: 4/17/2023 9:34 PM CDT    INDICATION: abd pain  COMPARISON: CT 09/30/2022  TECHNIQUE: CT scan of the abdomen and pelvis was performed following injection of IV contrast. Multiplanar reformats were obtained. Dose reduction techniques were used.  CONTRAST: 81mL Isovue 370    FINDINGS:   LOWER CHEST: Submucosal edema in the distal thoracic esophagus with newly visualized mucosal defect in the left aspect of the distal thoracic esophagus (series 3 image 21). Mucosal hyperenhancement noted in this area.    HEPATOBILIARY: Diffuse hepatic steatosis. Gallbladder is decompressed but otherwise unremarkable.    PANCREAS: Normal.    SPLEEN: Normal.    ADRENAL GLANDS: Normal.    KIDNEYS/BLADDER: Trace bilateral hydroureteronephrosis without nephroureterolithiasis visualized. The urinary bladder is markedly distended without wall thickening or surrounding fat stranding.    BOWEL: No obstruction or inflammatory change. Normal appendix.    LYMPH NODES: No suspicious lymphadenopathy.    VASCULATURE: Unremarkable.    PELVIC ORGANS: Normal.    MUSCULOSKELETAL: No acute bony abnormality. Healed left rib fractures.      Impression    IMPRESSION:   1.  Likely distal esophagitis with possible associated ulcer, consider direct visualization with endoscopy.  2.  Diffuse hepatic steatosis.  3.  Marked bladder distention with trace bilateral hydronephrosis, correlate with bladder outlet obstruction/urinary retention.   CT Head w/o Contrast    Narrative    EXAM: CT HEAD W/O CONTRAST  LOCATION: [facility]  DATE/TIME: 4/17/2023 9:28 PM CDT    INDICATION: severe ha  COMPARISON: 10/06/2020  TECHNIQUE: Routine CT Head without IV contrast. Multiplanar reformats. Dose reduction techniques were used.    FINDINGS:  INTRACRANIAL CONTENTS: No intracranial hemorrhage, extraaxial collection, or mass effect.  No CT evidence of acute infarct. Mild presumed chronic small vessel ischemic changes. Mild  generalized volume loss. No hydrocephalus.     VISUALIZED ORBITS/SINUSES/MASTOIDS: No intraorbital abnormality. No paranasal sinus mucosal disease. No middle ear or mastoid effusion.    BONES/SOFT TISSUES: No acute abnormality.      Impression    IMPRESSION:  1.  No CT evidence for acute intracranial process.  2.  Brain atrophy and presumed chronic microvascular ischemic changes as above.       Discharge Medications   Current Discharge Medication List      START taking these medications    Details   gabapentin (NEURONTIN) 300 MG capsule Take 3 capsules (900 mg) by mouth 3 times daily for 2 days, THEN 2 capsules (600 mg) 3 times daily for 2 days, THEN 1 capsule (300 mg) 3 times daily for 2 days, THEN 1 capsule (300 mg) 2 times daily for 2 days, THEN 1 capsule (300 mg) At Bedtime for 2 days.  Qty: 42 capsule, Refills: 0    Associated Diagnoses: Alcohol use disorder, severe, dependence (H)      pantoprazole (PROTONIX) 40 MG EC tablet Take 1 tablet (40 mg) by mouth every morning (before breakfast)  Qty: 7 tablet, Refills: 0    Associated Diagnoses: Alcohol abuse         CONTINUE these medications which have CHANGED    Details   naltrexone (DEPADE/REVIA) 50 MG tablet Take 1 tablet (50 mg) by mouth daily  Qty: 30 tablet, Refills: 1    Associated Diagnoses: Alcohol use disorder, severe, dependence (H)         CONTINUE these medications which have NOT CHANGED    Details   calcium citrate-vitamin D (CITRACAL) 315-250 MG-UNIT TABS per tablet Take 1 tablet by mouth 2 times daily      lactobacillus rhamnosus, GG, (CULTURELL) capsule Take 1 capsule by mouth daily      MILK THISTLE PO Take 3 capsules by mouth daily      multivitamin w/minerals (THERA-VIT-M) tablet Take 1 tablet by mouth daily      vitamin B complex with vitamin C (STRESS TAB) tablet Take 1 tablet by mouth daily      etonogestrel (NEXPLANON) 68 MG IMPL 1 each (68 mg) by Subdermal route once  Qty: 1 each, Refills: 0      ondansetron (ZOFRAN ODT) 4 MG ODT tab Take 1  tablet (4 mg) by mouth every 8 hours as needed for nausea or vomiting  Qty: 10 tablet, Refills: 0           Allergies   No Known Allergies

## 2023-04-20 ENCOUNTER — TELEPHONE (OUTPATIENT)
Dept: CARE COORDINATION | Facility: CLINIC | Age: 48
End: 2023-04-20
Payer: COMMERCIAL

## 2023-04-20 ENCOUNTER — PATIENT OUTREACH (OUTPATIENT)
Dept: CARE COORDINATION | Facility: CLINIC | Age: 48
End: 2023-04-20
Payer: COMMERCIAL

## 2023-04-20 NOTE — TELEPHONE ENCOUNTER
Alana is requesting a call to discuss questons she has about taking her Gabapentin which was prescribed while she was in the hospital (4/17/23 - 4/19/23.)  She states she is having side effects including dizziness and feeling foggy.  She doesn't understand the instructions she was given for the medication which state the following on her AVS:    gabapentin 300 MG capsule  Also known as: NEURONTIN  Start taking on: April 19, 2023  INSTRUCTIONS: Take 3 capsules (900 mg) by  mouth 3 times daily for 2 days, THEN 2 capsules  (600 mg) 3 times daily for 2 days, THEN 1 capsule  (300 mg) 3 times daily for 2 days, THEN 1 capsule  (300 mg) 2 times daily for 2 days, THEN 1 capsule  (300 mg) At Bedtime for 2 days.  LAST TAKEN: 900 mg on April 19, 2023 2:46 PM  Signed by: German Ng MD    Thanks!    ALVARO Pham   Care Coordination Team  [phone]
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
## 2023-04-20 NOTE — TELEPHONE ENCOUNTER
Received call back from patient. Reviewed medication instructions below with patient. Patient verbalized understanding. RN advised fogginess is known side effect of medication, will hopefully reduce as patient is tapering off medication. Patient agreed. No further questions.     Luan BIRMINGHAM RN 4/20/2023 at 3:27 PM

## 2023-04-20 NOTE — LETTER
[facility] CARE COORDINATION  April 25, 2023    Alana Mujica  [address]  [place] [address]      Dear Alana,        I am a  clinic care coordinator who works with Marichuy Lares MD with the [facility]. I wanted to thank you for spending the time to talk with me.  Below is a description of clinic care coordination and how I can further assist you.       The clinic care coordination team is made up of a registered nurse, , financial resource worker and community health worker who understand the health care system. The goal of clinic care coordination is to help you manage your health and improve access to the health care system. Our team works alongside your provider to assist you in determining your health and social needs. We can help you obtain health care and community resources, providing you with necessary information and education. We can work with you through any barriers and develop a care plan that helps coordinate and strengthen the communication between you and your care team.  Our services are voluntary and are offered without charge to you personally.    Please feel free to contact me with any questions or concerns regarding care coordination and what we can offer.      We are focused on providing you with the highest-quality healthcare experience possible.    Sincerely,     ALVARO Pham   Care Coordination Team  [phone]      Enclosed: I have enclosed a copy of the Patient Centered Plan of Care. This has helpful information and goals that we have talked about. Please keep this in an easy to access place to use as needed.

## 2023-04-24 RX ORDER — GABAPENTIN 300 MG/1
CAPSULE ORAL
Qty: 20 CAPSULE | Refills: 0 | Status: SHIPPED | OUTPATIENT
Start: 2023-04-24 | End: 2023-05-31

## 2023-04-24 RX ORDER — GABAPENTIN 300 MG/1
CAPSULE ORAL
Qty: 20 CAPSULE | Refills: 0 | Status: SHIPPED | OUTPATIENT
Start: 2023-04-24 | End: 2023-04-24

## 2023-04-24 NOTE — TELEPHONE ENCOUNTER
"Pending Prescriptions:                       Disp   Refills    gabapentin (NEURONTIN) 300 MG capsule     42 cap*0            Sig: Take 3 capsules (900 mg) by mouth 3 times daily           for 2 days, THEN 2 capsules (600 mg) 3 times           daily for 2 days, THEN 1 capsule (300 mg) 3 times           daily for 2 days, THEN 1 capsule (300 mg) 2 times           daily for 2 days, THEN 1 capsule (300 mg) At           Bedtime for 2 days.    Pt states they may have accidentally threw away medication and are unable to find medication. Pt states they \"have at least 19. 2 days of 2 capsules starting today, then 1 capsule 3 times for 2 days, then 1 capsule 2 times a day for 2 days then 1 capsule at bedtime for 2 days\"       BRUNA Billy    "

## 2023-04-25 ASSESSMENT — ACTIVITIES OF DAILY LIVING (ADL): DEPENDENT_IADLS:: INDEPENDENT
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
## 2023-04-27 ENCOUNTER — OFFICE VISIT (OUTPATIENT)
Dept: FAMILY MEDICINE | Facility: CLINIC | Age: 48
End: 2023-04-27
Payer: COMMERCIAL

## 2023-04-27 VITALS
WEIGHT: 111 LBS | BODY MASS INDEX: 18.95 KG/M2 | RESPIRATION RATE: 16 BRPM | TEMPERATURE: 98.5 F | HEIGHT: 64 IN | SYSTOLIC BLOOD PRESSURE: 124 MMHG | OXYGEN SATURATION: 100 % | HEART RATE: 84 BPM | DIASTOLIC BLOOD PRESSURE: 85 MMHG

## 2023-04-27 DIAGNOSIS — D69.6 THROMBOCYTOPENIA (H): ICD-10-CM

## 2023-04-27 DIAGNOSIS — Z09 HOSPITAL DISCHARGE FOLLOW-UP: Primary | ICD-10-CM

## 2023-04-27 DIAGNOSIS — F41.1 GAD (GENERALIZED ANXIETY DISORDER): ICD-10-CM

## 2023-04-27 DIAGNOSIS — F10.10 ALCOHOL ABUSE: ICD-10-CM

## 2023-04-27 PROBLEM — E46 PROTEIN-CALORIE MALNUTRITION (H): Status: RESOLVED | Noted: 2022-06-28 | Resolved: 2023-04-27

## 2023-04-27 PROCEDURE — 99495 TRANSJ CARE MGMT MOD F2F 14D: CPT | Performed by: FAMILY MEDICINE

## 2023-04-27 RX ORDER — BUPROPION HYDROCHLORIDE 150 MG/1
150 TABLET ORAL EVERY MORNING
Qty: 30 TABLET | Refills: 1 | Status: SHIPPED | OUTPATIENT
Start: 2023-04-27 | End: 2023-05-31

## 2023-04-27 ASSESSMENT — ANXIETY QUESTIONNAIRES
5. BEING SO RESTLESS THAT IT IS HARD TO SIT STILL: SEVERAL DAYS
GAD7 TOTAL SCORE: 9
8. IF YOU CHECKED OFF ANY PROBLEMS, HOW DIFFICULT HAVE THESE MADE IT FOR YOU TO DO YOUR WORK, TAKE CARE OF THINGS AT HOME, OR GET ALONG WITH OTHER PEOPLE?: SOMEWHAT DIFFICULT
7. FEELING AFRAID AS IF SOMETHING AWFUL MIGHT HAPPEN: NOT AT ALL
7. FEELING AFRAID AS IF SOMETHING AWFUL MIGHT HAPPEN: NOT AT ALL
3. WORRYING TOO MUCH ABOUT DIFFERENT THINGS: SEVERAL DAYS
IF YOU CHECKED OFF ANY PROBLEMS ON THIS QUESTIONNAIRE, HOW DIFFICULT HAVE THESE PROBLEMS MADE IT FOR YOU TO DO YOUR WORK, TAKE CARE OF THINGS AT HOME, OR GET ALONG WITH OTHER PEOPLE: SOMEWHAT DIFFICULT
6. BECOMING EASILY ANNOYED OR IRRITABLE: NOT AT ALL
1. FEELING NERVOUS, ANXIOUS, OR ON EDGE: NEARLY EVERY DAY
4. TROUBLE RELAXING: NEARLY EVERY DAY
GAD7 TOTAL SCORE: 9
2. NOT BEING ABLE TO STOP OR CONTROL WORRYING: SEVERAL DAYS
GAD7 TOTAL SCORE: 9

## 2023-04-27 ASSESSMENT — PAIN SCALES - GENERAL: PAINLEVEL: MODERATE PAIN (4)

## 2023-04-27 ASSESSMENT — PATIENT HEALTH QUESTIONNAIRE - PHQ9
SUM OF ALL RESPONSES TO PHQ QUESTIONS 1-9: 7
SUM OF ALL RESPONSES TO PHQ QUESTIONS 1-9: 7
10. IF YOU CHECKED OFF ANY PROBLEMS, HOW DIFFICULT HAVE THESE PROBLEMS MADE IT FOR YOU TO DO YOUR WORK, TAKE CARE OF THINGS AT HOME, OR GET ALONG WITH OTHER PEOPLE: SOMEWHAT DIFFICULT

## 2023-04-27 ASSESSMENT — ENCOUNTER SYMPTOMS
PALPITATIONS: 0
TREMORS: 1
SHORTNESS OF BREATH: 0
CONSTITUTIONAL NEGATIVE: 1
HEADACHES: 0

## 2023-04-27 NOTE — PROGRESS NOTES
Assessment and Plan    (Z09) Hospital discharge follow-up  (primary encounter diagnosis)  Comment:   Plan:     (D69.6) Thrombocytopenia (H)  Comment: recent labs reviewed  Plan:     (F41.1) TRAM (generalized anxiety disorder)  Comment: advised this may be activating, but least risk of weight gain and may help with EtOH.  Activating side effects often pass.  Plan: buPROPion (WELLBUTRIN XL) 150 MG 24 hr tablet            (F10.10) Alcohol abuse  Comment: see above  Plan: buPROPion (WELLBUTRIN XL) 150 MG 24 hr tablet              RTC in 2w    Tim Santiago MD      Olimpia Warner is a 47 year old, presenting for the following health issues:  Hospital F/U        4/27/2023     7:05 AM   Additional Questions   Roomed by Minerva Aguilera [facility]         4/27/2023     7:05 AM   Patient Reported Additional Medications   Patient reports taking the following new medications see hospital note     HPI         4/25/2023     9:41 AM   Post Discharge Outreach   Admission Date 4/17/2023   Reason for Admission was seen at urgent care for hand tremors, leg cramps and body aches after alcohol binge and was transferred to ER for further evaluation   Discharge Date 4/19/2023   Discharge Diagnosis Alcohol Withdrawal, Alcohol Hepatitis   How are you doing now that you are home? Ok.  Concerend abot syntomos fro taking Gabapentin.  Routed message to EA Triage to call her regarding this   How are your symptoms? (Red Flag symptoms escalate to triage hotline per guidelines) Improved   Do you feel your condition is stable enough to be safe at home until your provider visit? Yes   Does the patient have their discharge instructions?  Yes   Does the patient have questions regarding their discharge instructions?  Yes (see comment)   Were you started on any new medications or were there changes to any of your previous medications?  Yes   Does the patient have all of their medications? Yes   Do you have questions regarding any of your medications?  Yes  (see comment)   Do you have all of your needed medical supplies or equipment (DME)?  (i.e. oxygen tank, CPAP, cane, etc.) Yes   Discharge follow-up appointment scheduled within 14 calendar days?  Yes   Discharge Follow Up Appointment Date 4/27/2023   Discharge Follow Up Appointment Scheduled with? Primary Care Provider     Hospital Follow-up Visit:    Hospital/Nursing Home/IP Rehab Facility: [facility]  Date of Admission: 4/17/2023  Date of Discharge: 4/19/2023  Reason(s) for Admission: Alcohol withdrawal.    Alcoholic hepatitis    Alcoholic gastritis    Hypokalemia.    Hypomagnesemia.     Was your hospitalization related to COVID-19? No   Problems taking medications regularly:  None  Medication changes since discharge: START gabapentin, pantoprazole. CONTINUE naltrexone  Problems adhering to non-medication therapy:  None    Summary of hospitalization:  [facility] discharge summary reviewed  Diagnostic Tests/Treatments reviewed.  Follow up needed: none  Other Healthcare Providers Involved in Patient s Care:         None  Update since discharge: improved. Didn't get gabapentin right away, had been without for several days. Did eventually get it refilled and started taking it, however this was to be a taper Rx and she will continue to taper off.  Is using naltrexone, started when discharged.    Has been drinking - states has been drinking 2-3 beers daily, which she notes is down from her peak.  [facility] Also has done treatment in the past for an eating disorder.  Is not interested in outpatient treatment at this time.  Is due to start therapy soon.   Is not currently engaged with AA.  Notes that she feels that her drinking is linked to her anxiety.    Had been using escitalopram, but didn't like the HA and is concerned about potential weight gain.    Plan of care communicated with patient       Review of Systems   Constitutional: Negative.    Eyes:  "Negative for visual disturbance.   Respiratory: Negative for shortness of breath.    Cardiovascular: Negative for chest pain, palpitations and peripheral edema.   Neurological: Positive for tremors. Negative for headaches.            Objective    /85 (BP Location: Right arm, Patient Position: Sitting, Cuff Size: Adult Regular)   Pulse 84   Temp 98.5  F (36.9  C) (Tympanic)   Resp 16   Ht 1.626 m (5' 4\")   Wt 50.3 kg (111 lb)   SpO2 100%   BMI 19.05 kg/m    Body mass index is 19.05 kg/m .  Physical Exam  Vitals and nursing note reviewed.   Constitutional:       Appearance: Normal appearance.   Skin:     General: Skin is warm and dry.   Neurological:      General: No focal deficit present.      Mental Status: She is alert and oriented to person, place, and time.   Psychiatric:         Mood and Affect: Mood normal.         Behavior: Behavior normal.                            "

## 2023-05-23 ENCOUNTER — PATIENT OUTREACH (OUTPATIENT)
Dept: CARE COORDINATION | Facility: CLINIC | Age: 48
End: 2023-05-23
Payer: COMMERCIAL

## 2023-05-25 ENCOUNTER — OFFICE VISIT (OUTPATIENT)
Dept: URGENT CARE | Facility: URGENT CARE | Age: 48
End: 2023-05-25
Payer: COMMERCIAL

## 2023-05-25 VITALS
HEART RATE: 80 BPM | DIASTOLIC BLOOD PRESSURE: 109 MMHG | SYSTOLIC BLOOD PRESSURE: 172 MMHG | TEMPERATURE: 98 F | OXYGEN SATURATION: 98 % | RESPIRATION RATE: 20 BRPM

## 2023-05-25 DIAGNOSIS — Z76.0 ENCOUNTER FOR MEDICATION REFILL: Primary | ICD-10-CM

## 2023-05-25 PROCEDURE — 99213 OFFICE O/P EST LOW 20 MIN: CPT | Performed by: FAMILY MEDICINE

## 2023-05-25 RX ORDER — HYDROXYZINE HYDROCHLORIDE 25 MG/1
25 TABLET, FILM COATED ORAL 3 TIMES DAILY PRN
Qty: 21 TABLET | Refills: 0 | Status: SHIPPED | OUTPATIENT
Start: 2023-05-25 | End: 2023-05-31

## 2023-05-25 NOTE — PROGRESS NOTES
URGENT CARE    ASSESSMENT AND PLAN:      ICD-10-CM    1. Encounter for medication refill  Z76.0 hydrOXYzine (ATARAX) 25 MG tablet          Prescription for gabapentin or Ativan was not refilled at this time and feel that this would be appropriately managed by PCP.  Small dose of Atarax was given for as needed medication for anxiety as she has had this before as well and reports helping symptoms.  She was encouraged to make an appointment with [facility] and PCP for further evaluation.  Seeking AA meetings for sobriety was encouraged.    Reviewed alarm signs and symptoms needing urgent evaluation.     Patient verbalized understanding and is agreeable to plan. The patient was discharged ambulatory and in stable condition.        Subjective     Alana Mujica is a 47 year old who presents for occasion refill following discharge from outpatient treatment center for alcohol abuse.  Patient stayed from 5/19 - 5/25/23.  Patient is to make an outpatient follow-up appointment with PCP and with [facility] for further evaluation.  She is here today for medication refill in regards to Ativan, gabapentin, and hydroxyzine use.  Patient is currently on naltrexone and declines alcohol use today since discharge.    Patient was discharged on gabapentin taper dose for alcohol abuse withdrawal and no longer has medication available.  She did follow-up with a PCP provider following admission on 4/27/23 with movement towards Wellbutrin.      Review of Systems  All systems reviewed and negative except per HPI.        Objective    BP (!) 172/109   Pulse 80   Temp 98  F (36.7  C) (Tympanic)   Resp 20   SpO2 98%     Physical Exam   GENERAL: healthy, alert and no distress  NECK: no adenopathy, no asymmetry, masses, or scars and thyroid normal to palpation  RESP: lungs clear to auscultation - no rales, rhonchi or wheezes  CV: regular rate and rhythm, normal S1 S2, no murmur, click or rub, no peripheral edema and peripheral pulses  strong  PSYCH: affect flat

## 2023-05-27 ENCOUNTER — HOSPITAL ENCOUNTER (EMERGENCY)
Facility: CLINIC | Age: 48
Discharge: HOME | End: 2023-05-27
Attending: EMERGENCY MEDICINE | Admitting: EMERGENCY MEDICINE
Payer: COMMERCIAL

## 2023-05-27 ENCOUNTER — APPOINTMENT (OUTPATIENT)
Dept: CT IMAGING | Facility: CLINIC | Age: 48
End: 2023-05-27
Attending: EMERGENCY MEDICINE
Payer: COMMERCIAL

## 2023-05-27 VITALS
OXYGEN SATURATION: 98 % | DIASTOLIC BLOOD PRESSURE: 72 MMHG | SYSTOLIC BLOOD PRESSURE: 129 MMHG | RESPIRATION RATE: 16 BRPM | HEART RATE: 72 BPM | TEMPERATURE: 97.7 F

## 2023-05-27 DIAGNOSIS — E86.0 DEHYDRATION: ICD-10-CM

## 2023-05-27 DIAGNOSIS — R51.9 ACUTE NONINTRACTABLE HEADACHE, UNSPECIFIED HEADACHE TYPE: ICD-10-CM

## 2023-05-27 LAB
ALBUMIN SERPL BCG-MCNC: 4.6 G/DL (ref 3.5–5.2)
ALBUMIN UR-MCNC: 10 MG/DL
ALP SERPL-CCNC: 84 U/L (ref 35–104)
ALT SERPL W P-5'-P-CCNC: 82 U/L (ref 10–35)
AMORPH CRY #/AREA URNS HPF: ABNORMAL /HPF
ANION GAP SERPL CALCULATED.3IONS-SCNC: 17 MMOL/L (ref 7–15)
APPEARANCE UR: ABNORMAL
AST SERPL W P-5'-P-CCNC: 32 U/L (ref 10–35)
BACTERIA #/AREA URNS HPF: ABNORMAL /HPF
BASOPHILS # BLD MANUAL: 0 10E3/UL (ref 0–0.2)
BASOPHILS NFR BLD MANUAL: 0 %
BILIRUB DIRECT SERPL-MCNC: <0.2 MG/DL (ref 0–0.3)
BILIRUB SERPL-MCNC: 0.6 MG/DL
BILIRUB UR QL STRIP: NEGATIVE
BUN SERPL-MCNC: 4.5 MG/DL (ref 6–20)
CALCIUM SERPL-MCNC: 10 MG/DL (ref 8.6–10)
CHLORIDE SERPL-SCNC: 89 MMOL/L (ref 98–107)
COLOR UR AUTO: YELLOW
CREAT SERPL-MCNC: 0.69 MG/DL (ref 0.51–0.95)
DEPRECATED HCO3 PLAS-SCNC: 23 MMOL/L (ref 22–29)
EOSINOPHIL # BLD MANUAL: 0 10E3/UL (ref 0–0.7)
EOSINOPHIL NFR BLD MANUAL: 0 %
ERYTHROCYTE [DISTWIDTH] IN BLOOD BY AUTOMATED COUNT: 12.8 % (ref 10–15)
GFR SERPL CREATININE-BSD FRML MDRD: >90 ML/MIN/1.73M2
GLUCOSE SERPL-MCNC: 118 MG/DL (ref 70–99)
GLUCOSE UR STRIP-MCNC: NEGATIVE MG/DL
HCT VFR BLD AUTO: 35.6 % (ref 35–47)
HGB BLD-MCNC: 12 G/DL (ref 11.7–15.7)
HGB UR QL STRIP: NEGATIVE
HOLD SPECIMEN: NORMAL
HOLD SPECIMEN: NORMAL
HYALINE CASTS: 1 /LPF
KETONES UR STRIP-MCNC: NEGATIVE MG/DL
LEUKOCYTE ESTERASE UR QL STRIP: NEGATIVE
LIPASE SERPL-CCNC: 37 U/L (ref 13–60)
LYMPHOCYTES # BLD MANUAL: 1.2 10E3/UL (ref 0.8–5.3)
LYMPHOCYTES NFR BLD MANUAL: 18 %
MCH RBC QN AUTO: 32.6 PG (ref 26.5–33)
MCHC RBC AUTO-ENTMCNC: 33.7 G/DL (ref 31.5–36.5)
MCV RBC AUTO: 97 FL (ref 78–100)
MONOCYTES # BLD MANUAL: 1 10E3/UL (ref 0–1.3)
MONOCYTES NFR BLD MANUAL: 16 %
MUCOUS THREADS #/AREA URNS LPF: PRESENT /LPF
NEUTROPHILS # BLD MANUAL: 4.2 10E3/UL (ref 1.6–8.3)
NEUTROPHILS NFR BLD MANUAL: 66 %
NITRATE UR QL: NEGATIVE
PH UR STRIP: 7.5 [PH] (ref 5–7)
PLAT MORPH BLD: NORMAL
PLATELET # BLD AUTO: 322 10E3/UL (ref 150–450)
POTASSIUM SERPL-SCNC: 4.4 MMOL/L (ref 3.4–5.3)
PROT SERPL-MCNC: 8.2 G/DL (ref 6.4–8.3)
RBC # BLD AUTO: 3.68 10E6/UL (ref 3.8–5.2)
RBC MORPH BLD: NORMAL
RBC URINE: 1 /HPF
SODIUM SERPL-SCNC: 129 MMOL/L (ref 136–145)
SP GR UR STRIP: 1.01 (ref 1–1.03)
SQUAMOUS EPITHELIAL: 3 /HPF
UROBILINOGEN UR STRIP-MCNC: NORMAL MG/DL
WBC # BLD AUTO: 6.4 10E3/UL (ref 4–11)
WBC URINE: 3 /HPF

## 2023-05-27 PROCEDURE — 36415 COLL VENOUS BLD VENIPUNCTURE: CPT | Performed by: EMERGENCY MEDICINE

## 2023-05-27 PROCEDURE — 96375 TX/PRO/DX INJ NEW DRUG ADDON: CPT

## 2023-05-27 PROCEDURE — 83690 ASSAY OF LIPASE: CPT | Performed by: EMERGENCY MEDICINE

## 2023-05-27 PROCEDURE — 258N000003 HC RX IP 258 OP 636: Performed by: EMERGENCY MEDICINE

## 2023-05-27 PROCEDURE — 250N000011 HC RX IP 250 OP 636: Performed by: EMERGENCY MEDICINE

## 2023-05-27 PROCEDURE — 96374 THER/PROPH/DIAG INJ IV PUSH: CPT | Mod: 59

## 2023-05-27 PROCEDURE — 82248 BILIRUBIN DIRECT: CPT | Performed by: EMERGENCY MEDICINE

## 2023-05-27 PROCEDURE — 99285 EMERGENCY DEPT VISIT HI MDM: CPT | Mod: 25

## 2023-05-27 PROCEDURE — 85007 BL SMEAR W/DIFF WBC COUNT: CPT | Performed by: EMERGENCY MEDICINE

## 2023-05-27 PROCEDURE — 80053 COMPREHEN METABOLIC PANEL: CPT | Performed by: EMERGENCY MEDICINE

## 2023-05-27 PROCEDURE — 70450 CT HEAD/BRAIN W/O DYE: CPT

## 2023-05-27 PROCEDURE — 85027 COMPLETE CBC AUTOMATED: CPT | Performed by: EMERGENCY MEDICINE

## 2023-05-27 PROCEDURE — 96361 HYDRATE IV INFUSION ADD-ON: CPT

## 2023-05-27 PROCEDURE — 93005 ELECTROCARDIOGRAM TRACING: CPT

## 2023-05-27 PROCEDURE — 81001 URINALYSIS AUTO W/SCOPE: CPT | Performed by: EMERGENCY MEDICINE

## 2023-05-27 RX ORDER — ONDANSETRON 2 MG/ML
4 INJECTION INTRAMUSCULAR; INTRAVENOUS EVERY 30 MIN PRN
Status: DISCONTINUED | OUTPATIENT
Start: 2023-05-27 | End: 2023-05-27 | Stop reason: HOSPADM

## 2023-05-27 RX ORDER — ONDANSETRON 4 MG/1
4 TABLET, ORALLY DISINTEGRATING ORAL EVERY 8 HOURS PRN
Qty: 10 TABLET | Refills: 0 | Status: SHIPPED | OUTPATIENT
Start: 2023-05-27 | End: 2023-05-30

## 2023-05-27 RX ORDER — KETOROLAC TROMETHAMINE 15 MG/ML
10 INJECTION, SOLUTION INTRAMUSCULAR; INTRAVENOUS ONCE
Status: COMPLETED | OUTPATIENT
Start: 2023-05-27 | End: 2023-05-27

## 2023-05-27 RX ADMIN — SODIUM CHLORIDE 1000 ML: 9 INJECTION, SOLUTION INTRAVENOUS at 07:28

## 2023-05-27 RX ADMIN — KETOROLAC TROMETHAMINE 10 MG: 15 INJECTION, SOLUTION INTRAMUSCULAR; INTRAVENOUS at 07:33

## 2023-05-27 RX ADMIN — ONDANSETRON 4 MG: 2 INJECTION INTRAMUSCULAR; INTRAVENOUS at 07:33

## 2023-05-27 ASSESSMENT — ACTIVITIES OF DAILY LIVING (ADL)
ADLS_ACUITY_SCORE: 16.25
ADLS_ACUITY_SCORE: 17.25

## 2023-05-27 NOTE — DISCHARGE INSTRUCTIONS
Keep pushing fluids and start to eat regular meals.  Recheck your sodium in the next week.  Use Zofran as needed for nausea.  Return if symptoms worsen

## 2023-05-27 NOTE — ED PROVIDER NOTES
History     Chief Complaint:  Headache       HPI   Alana Mujica is a 47 year old female with a past history of alcohol abuse, anorexia, and TRAM. The patient states that she has been in alcohol detox for the 5 days and got out on Thursday. She was in the ED before she went in with body aches and a headache. She went in to urgent care right when she got out because she had a headache and wanted to get her gabapentin and hydroxyzine refilled. She states that she has had no appetite and has not been sleeping well for the past few days. She also still has a headache and muscle aches. She wonders if her electrolytes are off. She denies any cough, congestion, sore throat, or vomiting. She tried to schedule with her primary but cannot get in until late June. She has not drank any alcohol for the past 7 days.      Independent Historian:   None - Patient Only    Review of External Notes:   none      Medications:    buPROPion   etonogestrel   gabapentin   hydrOXYzine   ondansetron   pantoprazole     Past Medical History:    RUPINDER  Alcohol withdrawal  Anorexia  Bulimia  Depression  TRAM  GERD  Alcoholic hepatitis  Hypomagnesemia  Hyponatremia  Thrmobocytopenia  Genital herpes  HPV  Osteoporosis  Substance abuse  HTN  Anemia  PTSD    Past Surgical History:    Cervical leep   Nasal septoplasty  [place] teeth extraction    Physical Exam     Patient Vitals for the past 24 hrs:   BP Temp Temp src Pulse Resp SpO2   05/27/23 0826 (!) 140/90 98  F (36.7  C) Temporal 80 18 92 %   05/27/23 0608 (!) 136/94 97.8  F (36.6  C) Temporal 114 20 99 %        Physical Exam  Constitutional:       Appearance: She is well-developed.   HENT:      Right Ear: External ear normal.      Left Ear: External ear normal.      Mouth/Throat:      Mouth: Mucous membranes are moist.      Pharynx: Oropharynx is clear. No oropharyngeal exudate or posterior oropharyngeal erythema.   Eyes:      General: No scleral icterus.     Conjunctiva/sclera: Conjunctivae  normal.      Pupils: Pupils are equal, round, and reactive to light.   Cardiovascular:      Rate and Rhythm: Normal rate and regular rhythm.      Heart sounds: Normal heart sounds. No murmur heard.     No friction rub. No gallop.   Pulmonary:      Effort: Pulmonary effort is normal. No respiratory distress.      Breath sounds: Normal breath sounds. No wheezing or rales.   Abdominal:      General: Bowel sounds are normal. There is no distension.      Palpations: Abdomen is soft. There is no mass.      Tenderness: There is no abdominal tenderness.   Musculoskeletal:         General: Normal range of motion.      Cervical back: Normal range of motion and neck supple. No rigidity or tenderness.   Skin:     General: Skin is warm and dry.      Capillary Refill: Capillary refill takes less than 2 seconds.      Findings: No rash.   Neurological:      General: No focal deficit present.      Mental Status: She is alert.      Cranial Nerves: No cranial nerve deficit.      Motor: No weakness.           Emergency Department Course     ECG:  This ECG was taken at 0732, and signed at 0738  Normal sinus rhythm  Normal ECG  Vent. rate 76, CA interval 152, QRS Duration 76, QT/QTc 376/423, P-R-T axes 58 47 49      Imaging:  CT Head w/o Contrast   Final Result   IMPRESSION:   1.  No acute intracranial process.               Report per radiology    Laboratory:  Labs Ordered and Resulted from Time of ED Arrival to Time of ED Departure   COMPREHENSIVE METABOLIC PANEL - Abnormal       Result Value    Sodium 129 (*)     Potassium 4.4      Chloride 89 (*)     Carbon Dioxide (CO2) 23      Anion Gap 17 (*)     Urea Nitrogen 4.5 (*)     Creatinine 0.69      Calcium 10.0      Glucose 118 (*)     Alkaline Phosphatase 84      AST 32      ALT 82 (*)     Protein Total 8.2      Albumin 4.6      Bilirubin Total 0.6      GFR Estimate >90     CBC WITH PLATELETS AND DIFFERENTIAL - Abnormal    WBC Count 6.4      RBC Count 3.68 (*)     Hemoglobin 12.0       Hematocrit 35.6      MCV 97      MCH 32.6      MCHC 33.7      RDW 12.8      Platelet Count 322     ROUTINE UA WITH MICROSCOPIC REFLEX TO CULTURE - Abnormal    Color Urine Yellow      Appearance Urine Slightly Cloudy (*)     Glucose Urine Negative      Bilirubin Urine Negative      Ketones Urine Negative      Specific Gravity Urine 1.010      Blood Urine Negative      pH Urine 7.5 (*)     Protein Albumin Urine 10 (*)     Urobilinogen Urine Normal      Nitrite Urine Negative      Leukocyte Esterase Urine Negative      Bacteria Urine Few (*)     Mucus Urine Present (*)     Amorphous Crystals Urine Few (*)     RBC Urine 1      WBC Urine 3      Squamous Epithelials Urine 3 (*)     Hyaline Casts Urine 1     LIPASE - Normal    Lipase 37     BILIRUBIN DIRECT - Normal    Bilirubin Direct <0.20     DIFFERENTIAL    % Neutrophils 66      % Lymphocytes 18      % Monocytes 16      % Eosinophils 0      % Basophils 0      Absolute Neutrophils 4.2      Absolute Lymphocytes 1.2      Absolute Monocytes 1.0      Absolute Eosinophils 0.0      Absolute Basophils 0.0      RBC Morphology Confirmed RBC Indices      Platelet Assessment        Value: Automated Count Confirmed. Platelet morphology is normal.      Emergency Department Course & Assessments:    Interventions:  Medications   ondansetron (ZOFRAN) injection 4 mg (4 mg Intravenous $Given 5/27/23 0733)   0.9% sodium chloride BOLUS (1,000 mLs Intravenous $New Bag 5/27/23 0728)   ketorolac (TORADOL) injection 10 mg (10 mg Intravenous $Given 5/27/23 0733)        Assessments:  0715 Obtained the patients history and performed initial exam  0956 Rechecked the patient and updated her on findings, she is eating and drinking, feels better    Independent Interpretation (X-rays, CTs, rhythm strip):  None    Social Determinants of Health affecting care:   None    Disposition:  The patient was discharged to home.     Impression & Plan      Medical Decision Making:  Patient presents today for the  above symptoms.  She had recently finished alcohol rehab and is currently sober.  Her potassium was normal but sodium is 129.  She has been a lot lower in the past.  Per her history, she has been eating and drinking poorly.  She was given a liter of fluids and she was seen tolerating p.o. without any difficulty.  We did have a long discussion regarding eating better now that she is back at home.  She is agreeing to stay off the alcohol and is really motivated to work on mental health.  She does have referral and will be obtaining an appointment next week.  Her headache appears to be nonemergent at this point.  I did improve with Toradol so I think there is probably a tension component.  She said she does work in front of a computer all day with her neck  in a very stiff position.  I encouraged her to continue with her sobriety and she can always use ibuprofen as needed.  She is agreeable with the follow-up plan.  Return precautions reviewed with the patient.  Patient is discharged in stable and improved condition.  She agrees that she will follow-up with her doctor for sodium level check next week.    Diagnosis:    ICD-10-CM    1. Dehydration  E86.0       2. Acute nonintractable headache, unspecified headache type  R51.9            Discharge Medications:  New Prescriptions    ONDANSETRON (ZOFRAN ODT) 4 MG ODT TAB    Take 1 tablet (4 mg) by mouth every 8 hours as needed for nausea        Scribe Disclosure:  Bert JOHNSON, am serving as a scribe at 7:18 AM on 5/27/2023 to document services personally performed by Jame Ellington MD based on my observations and the provider's statements to me.     5/27/2023   Jame Ellington MD Cheng, Wenlan, MD  05/27/23 1031

## 2023-05-30 LAB
ATRIAL RATE - MUSE: 76 BPM
DIASTOLIC BLOOD PRESSURE - MUSE: NORMAL MMHG
INTERPRETATION ECG - MUSE: NORMAL
P AXIS - MUSE: 58 DEGREES
PR INTERVAL - MUSE: 152 MS
QRS DURATION - MUSE: 76 MS
QT - MUSE: 376 MS
QTC - MUSE: 423 MS
R AXIS - MUSE: 47 DEGREES
SYSTOLIC BLOOD PRESSURE - MUSE: NORMAL MMHG
T AXIS - MUSE: 49 DEGREES
VENTRICULAR RATE- MUSE: 76 BPM

## 2023-05-31 ENCOUNTER — OFFICE VISIT (OUTPATIENT)
Dept: PEDIATRICS | Facility: CLINIC | Age: 48
End: 2023-05-31
Payer: COMMERCIAL

## 2023-05-31 VITALS
HEIGHT: 63 IN | RESPIRATION RATE: 16 BRPM | TEMPERATURE: 98.7 F | OXYGEN SATURATION: 98 % | DIASTOLIC BLOOD PRESSURE: 80 MMHG | HEART RATE: 92 BPM | WEIGHT: 105.7 LBS | BODY MASS INDEX: 18.73 KG/M2 | SYSTOLIC BLOOD PRESSURE: 120 MMHG

## 2023-05-31 DIAGNOSIS — E87.1 HYPONATREMIA: Primary | ICD-10-CM

## 2023-05-31 DIAGNOSIS — F41.1 GAD (GENERALIZED ANXIETY DISORDER): ICD-10-CM

## 2023-05-31 DIAGNOSIS — G44.219 EPISODIC TENSION-TYPE HEADACHE, NOT INTRACTABLE: ICD-10-CM

## 2023-05-31 PROCEDURE — 36415 COLL VENOUS BLD VENIPUNCTURE: CPT | Performed by: PEDIATRICS

## 2023-05-31 PROCEDURE — 80053 COMPREHEN METABOLIC PANEL: CPT | Performed by: PEDIATRICS

## 2023-05-31 PROCEDURE — 99214 OFFICE O/P EST MOD 30 MIN: CPT | Performed by: PEDIATRICS

## 2023-05-31 RX ORDER — HYDROXYZINE HYDROCHLORIDE 25 MG/1
25 TABLET, FILM COATED ORAL 3 TIMES DAILY PRN
Qty: 90 TABLET | Refills: 0 | Status: SHIPPED | OUTPATIENT
Start: 2023-05-31 | End: 2023-08-03

## 2023-05-31 RX ORDER — PROPRANOLOL HYDROCHLORIDE 20 MG/1
20 TABLET ORAL 2 TIMES DAILY
Qty: 180 TABLET | Refills: 0 | Status: SHIPPED | OUTPATIENT
Start: 2023-05-31 | End: 2023-08-31

## 2023-05-31 ASSESSMENT — ANXIETY QUESTIONNAIRES
2. NOT BEING ABLE TO STOP OR CONTROL WORRYING: MORE THAN HALF THE DAYS
4. TROUBLE RELAXING: MORE THAN HALF THE DAYS
GAD7 TOTAL SCORE: 11
3. WORRYING TOO MUCH ABOUT DIFFERENT THINGS: MORE THAN HALF THE DAYS
GAD7 TOTAL SCORE: 11
1. FEELING NERVOUS, ANXIOUS, OR ON EDGE: MORE THAN HALF THE DAYS
6. BECOMING EASILY ANNOYED OR IRRITABLE: MORE THAN HALF THE DAYS
7. FEELING AFRAID AS IF SOMETHING AWFUL MIGHT HAPPEN: NOT AT ALL
IF YOU CHECKED OFF ANY PROBLEMS ON THIS QUESTIONNAIRE, HOW DIFFICULT HAVE THESE PROBLEMS MADE IT FOR YOU TO DO YOUR WORK, TAKE CARE OF THINGS AT HOME, OR GET ALONG WITH OTHER PEOPLE: SOMEWHAT DIFFICULT
5. BEING SO RESTLESS THAT IT IS HARD TO SIT STILL: SEVERAL DAYS
8. IF YOU CHECKED OFF ANY PROBLEMS, HOW DIFFICULT HAVE THESE MADE IT FOR YOU TO DO YOUR WORK, TAKE CARE OF THINGS AT HOME, OR GET ALONG WITH OTHER PEOPLE?: SOMEWHAT DIFFICULT
7. FEELING AFRAID AS IF SOMETHING AWFUL MIGHT HAPPEN: NOT AT ALL
GAD7 TOTAL SCORE: 11

## 2023-05-31 ASSESSMENT — PATIENT HEALTH QUESTIONNAIRE - PHQ9
10. IF YOU CHECKED OFF ANY PROBLEMS, HOW DIFFICULT HAVE THESE PROBLEMS MADE IT FOR YOU TO DO YOUR WORK, TAKE CARE OF THINGS AT HOME, OR GET ALONG WITH OTHER PEOPLE: SOMEWHAT DIFFICULT
SUM OF ALL RESPONSES TO PHQ QUESTIONS 1-9: 7
SUM OF ALL RESPONSES TO PHQ QUESTIONS 1-9: 7

## 2023-05-31 ASSESSMENT — PAIN SCALES - GENERAL: PAINLEVEL: EXTREME PAIN (9)

## 2023-05-31 NOTE — PROGRESS NOTES
Assessment & Plan     Hyponatremia  Repeat labs today. Patient seem hydrated, however, does not seem to be eating much.   - Comprehensive metabolic panel (BMP + Alb, Alk Phos, ALT, AST, Total. Bili, TP); Future  - Comprehensive metabolic panel (BMP + Alb, Alk Phos, ALT, AST, Total. Bili, TP)    Episodic tension-type headache, not intractable  Likely from stress, dehydration, low sodium, possible delayed withdrawal.  She can use to to 3000mg daily tynlenol. They don't seem migraine, but may get some benefit from propranolol (using this for anxiety).   - propranolol (INDERAL) 20 MG tablet; Take 1 tablet (20 mg) by mouth 2 times daily    TRAM (generalized anxiety disorder)  Patient does not want wellbutrin as previously prescribed.  Doesn't like gabapentin.  Did not seem interested in possible selective serotonin reuptake inhibitor, but knew someone on propranolol and was very interested in this. Has follow up with PCP in 2 months. She is planning to follow up with [facility] or [facility] and she has already been calling both these places.   - propranolol (INDERAL) 20 MG tablet; Take 1 tablet (20 mg) by mouth 2 times daily           MED REC REQUIRED  Post Medication Reconciliation Status: discharge medications reconciled and changed, per note/orders  Patient Instructions   Headaches:    You can use up to 3000mg Tylenol (1000mg three times per day).     Anxiety:    Ok to use hydroxyzine up to three times per day (as needed).       START (new med): propanolol 20mg twice daily.    Keep your follow up with Dr. Amanda Borrero MD  [facility] [place]    Olimpia Warner is a 47 year old, presenting for the following health issues:  Hospital F/U (ETOH withdrawal, Dehydration, Headache)        5/31/2023     3:24 PM   Additional Questions   Roomed by Nely SILVERIO     \[facility] [place] [facility]\""         Hospital Follow-up Visit:    Hospital/Nursing Home/IP Rehab Facility: [facility]  Date of Admission: 5/27/23  "AND 4/17  Date of Discharge: 5/27/23 4/19  Reason(s) for Admission: ETOH, Dehydration, headache, withdrawal     Was your hospitalization related to COVID-19? No   Problems taking medications regularly:  Hasnt been taking regular  Medication changes since discharge: None  Problems adhering to non-medication therapy:  Not been doing any therapy     Summary of hospitalization:  [facility] discharge summary reviewed  Diagnostic Tests/Treatments reviewed.  Follow up needed: none  Other Healthcare Providers Involved in Patient s Care:         None  Update since discharge: fluctuating course. Patient feeling bad today.  She got fluids in EMERGENCY DEPARTMENT. She got the torodol x 1 and then headache back within an hour. No improvement since EMERGENCY DEPARTMENT visit.  Not sleeping well. She feels that she is still going through withdrawal.  She is anxious and irritable.     She was getting ativan and gabapentin in detox. She was discharged with gabapentin and discharge, but she doesn't like it by herself.     She is currently taking prn hydroxyzine. Valtrex for an outbreak, oral.    She is also taking probiotics for her ulcerative colitis. Takes electroly powder, MVI, fish oil. Sodium tablets. Allergy medications.     Currently NOT taking wellbutrin since being in treatment.  She is nervous about restarting because she gets headaches with it.     SH: Last drink: 5/19/23 1pm.  Release from rehab on 5/25/23.  Patient wanted to do 1:1 therapy after rehab. She was given a referral for intensive outpatient treatment. They were considering DBT/EMDR. She was contacted about outpatient treatment.     Patient is looking into [facility] and [facility] - she has been told to call daily for any cancellations.     Plan of care communicated with patient           Review of Systems         Objective    /80   Pulse 92   Temp 98.7  F (37.1  C) (Tympanic)   Resp 16   Ht 1.6 m (5' 2.99\")   Wt 47.9 kg (105 lb 11.2 oz)   " SpO2 98%   BMI 18.73 kg/m    Body mass index is 18.73 kg/m .  Physical Exam                       Answers for HPI/ROS submitted by the patient on 5/31/2023  If you checked off any problems, how difficult have these problems made it for you to do your work, take care of things at home, or get along with other people?: Somewhat difficult  PHQ9 TOTAL SCORE: 7  TRAM 7 TOTAL SCORE: 11

## 2023-05-31 NOTE — PATIENT INSTRUCTIONS
Headaches:    You can use up to 3000mg Tylenol (1000mg three times per day).     Anxiety:    Ok to use hydroxyzine up to three times per day (as needed).       START (new med): propanolol 20mg twice daily.    Keep your follow up with Dr. Patel

## 2023-06-01 ENCOUNTER — PATIENT OUTREACH (OUTPATIENT)
Dept: CARE COORDINATION | Facility: CLINIC | Age: 48
End: 2023-06-01
Payer: COMMERCIAL

## 2023-06-01 ENCOUNTER — NURSE TRIAGE (OUTPATIENT)
Dept: NURSING | Facility: CLINIC | Age: 48
End: 2023-06-01
Payer: COMMERCIAL

## 2023-06-01 LAB
ALBUMIN SERPL BCG-MCNC: 4.7 G/DL (ref 3.5–5.2)
ALP SERPL-CCNC: 69 U/L (ref 35–104)
ALT SERPL W P-5'-P-CCNC: 40 U/L (ref 10–35)
ANION GAP SERPL CALCULATED.3IONS-SCNC: 16 MMOL/L (ref 7–15)
AST SERPL W P-5'-P-CCNC: 24 U/L (ref 10–35)
BILIRUB SERPL-MCNC: 0.3 MG/DL
BUN SERPL-MCNC: 5.8 MG/DL (ref 6–20)
CALCIUM SERPL-MCNC: 10.2 MG/DL (ref 8.6–10)
CHLORIDE SERPL-SCNC: 90 MMOL/L (ref 98–107)
CREAT SERPL-MCNC: 0.81 MG/DL (ref 0.51–0.95)
DEPRECATED HCO3 PLAS-SCNC: 24 MMOL/L (ref 22–29)
GFR SERPL CREATININE-BSD FRML MDRD: 88 ML/MIN/1.73M2
GLUCOSE SERPL-MCNC: 82 MG/DL (ref 70–99)
POTASSIUM SERPL-SCNC: 4.3 MMOL/L (ref 3.4–5.3)
PROT SERPL-MCNC: 8 G/DL (ref 6.4–8.3)
SODIUM SERPL-SCNC: 130 MMOL/L (ref 136–145)

## 2023-06-01 ASSESSMENT — ACTIVITIES OF DAILY LIVING (ADL): DEPENDENT_IADLS:: INDEPENDENT

## 2023-06-01 NOTE — PROGRESS NOTES
Clinic Care Coordination Contact  Follow Up Progress Note     Enrollment Date: 4/20/2023     Assessment: [facility] outreached to pt on this date. Pt states she has not yet connected with CD resources. Pt confirms she has MyChart messages but has not yet reviewed them. Pt is currently working to get apt to establish with therapy.     Pt shares she is having headaches during withdrawal. Labs drawn in ED indicated dehydration. Pt recently saw a different provider who prescribed medications for antianxiety. Pt is hopeful these meds will help for the anxiety as well as the headaches.     Pt not interested in AA- does not find a benefit from the steps. Pt is aware of local AA meetings and was able to recall the dates/times/locations if she wants to. Pt may want to consider alternatives to AA- will review previously sent MyChart and connect with [facility] again if needed.     No new needs.     Care Gaps:    Health Maintenance Due   Topic Date Due     COVID-19 Vaccine (3 - Pfizer series) 07/04/2021     HEPATITIS B IMMUNIZATION (2 of 3 - 19+ 3-dose series) 04/28/2022     MAMMO SCREENING  05/11/2023     YEARLY PREVENTIVE VISIT  05/12/2023           Care Plan: Mental Health     Problem: Mental Health Symptoms Need Improvement     Goal: Receive mental health and chemical health support     Start Date: 4/20/2023 Expected End Date: 7/28/2023    Priority: High    Note:     Barriers: Traditional chemical health treatment has not worked for me  Strengths: I am motivated to take care of my health.  Patient expressed understanding of goal: Yes  Action steps to achieve this goal:  1. I will establish and work with a therapist. Have first appointment scheduled May 4 at 7 pm.    2. I will consider connecting with services through the [facility].   Care Coordinator will send me information about this,    3. I will let care coordination know if I need additional resources and support.                            Patient is actively working to  accomplish goals and patient's goals remain appropriate and relevant at this time.     Intervention/Education provided during outreach: Care Coordination supports.     Outreach Frequency: monthly    Complex Care Plan:  Patient is not due for Complex Care Plan to be updated.  Care Plan updated and mailed to patient: No    Annual Assessment due before next Outreach: No  Scheduled: Not Applicable    Plan: Pt to follow up with scheduling MH. Care Coordinator will follow up in 4 weeks.     Nathan Mccormack [facility]  Clinic Care Coordinator  [facility]  [phone]  [email or web address]

## 2023-06-01 NOTE — TELEPHONE ENCOUNTER
Patient is calling and states that she was seen yesterday by Swapna Pereira.  Alana is calling for lab results today.  [facility] advised that labs are still in process.      Reason for Disposition    Information only question and nurse able to answer    Additional Information    Negative: Nursing judgment    Negative: Nursing judgment    Negative: Nursing judgment    Negative: Nursing judgment    Protocols used: INFORMATION ONLY CALL - NO TRIAGE-A-[place]

## 2023-06-02 ENCOUNTER — ANCILLARY PROCEDURE (OUTPATIENT)
Dept: MAMMOGRAPHY | Facility: CLINIC | Age: 48
End: 2023-06-02
Attending: INTERNAL MEDICINE
Payer: COMMERCIAL

## 2023-06-02 DIAGNOSIS — Z12.31 VISIT FOR SCREENING MAMMOGRAM: ICD-10-CM

## 2023-06-02 PROCEDURE — 77067 SCR MAMMO BI INCL CAD: CPT | Mod: TC | Performed by: RADIOLOGY

## 2023-06-02 PROCEDURE — 77063 BREAST TOMOSYNTHESIS BI: CPT | Mod: TC | Performed by: RADIOLOGY

## 2023-06-17 ENCOUNTER — HEALTH MAINTENANCE LETTER (OUTPATIENT)
Age: 48
End: 2023-06-17

## 2023-07-14 ENCOUNTER — PATIENT OUTREACH (OUTPATIENT)
Dept: CARE COORDINATION | Facility: CLINIC | Age: 48
End: 2023-07-14
Payer: COMMERCIAL

## 2023-07-14 ASSESSMENT — ACTIVITIES OF DAILY LIVING (ADL): DEPENDENT_IADLS:: INDEPENDENT

## 2023-07-14 NOTE — PROGRESS NOTES
Clinic Care Coordination Contact  [facility]       Clinical Data: Care Coordinator Outreach  Outreach attempted x 1.  Spoke with pt briefly. Pt was unable to talk but asked for a call back at a later time.   Plan: Care Coordinator will try to reach patient again in 1-2 business days.    Nathan Mccormack [facility]  Clinic Care Coordinator  [facility]  [phone]  [email or web address]
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
## 2023-07-14 NOTE — LETTER
[facility]  [address]  [place] [address]    August 15, 2023    Alana Mujica  [address] [address]      Dear Alana,    I have been unsuccessful in reaching you since our last contact. At this time the Care Coordination team will make no further attempts to reach you, however this does not change your ability to continue receiving care from your providers at your primary care clinic. If you need additional support from a care coordinator in the future please contact your clinic.    All of us at [facility] are invested in your health and are here to assist you in meeting your goals.     Sincerely,    Nathan Mccormack \[facility]\""  Clinic Care Coordinator  [facility]  [phone]  [email or web address]

## 2023-07-17 NOTE — PROGRESS NOTES
Clinic Care Coordination Contact  [facility]/Voicemail    Referral Source: IP Handoff  Clinical Data: Care Coordinator Outreach  Outreach attempted x 2.  Left message on patient's voicemail with call back information and requested return call.  Plan: Care Coordinator will try to reach patient again in 10 business days.    [facility]  Clinic Care Coordinator  [facility]  [phone]  [email or web address]

## 2023-07-28 ENCOUNTER — OFFICE VISIT (OUTPATIENT)
Dept: PEDIATRICS | Facility: CLINIC | Age: 48
End: 2023-07-28
Payer: COMMERCIAL

## 2023-07-28 VITALS
BODY MASS INDEX: 19.72 KG/M2 | OXYGEN SATURATION: 100 % | WEIGHT: 111.3 LBS | HEART RATE: 76 BPM | DIASTOLIC BLOOD PRESSURE: 81 MMHG | TEMPERATURE: 97.8 F | HEIGHT: 63 IN | RESPIRATION RATE: 16 BRPM | SYSTOLIC BLOOD PRESSURE: 125 MMHG

## 2023-07-28 DIAGNOSIS — F41.9 ANXIETY: ICD-10-CM

## 2023-07-28 DIAGNOSIS — G44.209 TENSION HEADACHE: ICD-10-CM

## 2023-07-28 DIAGNOSIS — B00.1 COLD SORE: ICD-10-CM

## 2023-07-28 DIAGNOSIS — Z12.4 CERVICAL CANCER SCREENING: ICD-10-CM

## 2023-07-28 DIAGNOSIS — K52.9 COLITIS: ICD-10-CM

## 2023-07-28 DIAGNOSIS — Z00.00 ROUTINE GENERAL MEDICAL EXAMINATION AT A HEALTH CARE FACILITY: Primary | ICD-10-CM

## 2023-07-28 DIAGNOSIS — Z11.59 NEED FOR HEPATITIS B SCREENING TEST: ICD-10-CM

## 2023-07-28 DIAGNOSIS — M81.0 OSTEOPOROSIS WITHOUT CURRENT PATHOLOGICAL FRACTURE, UNSPECIFIED OSTEOPOROSIS TYPE: ICD-10-CM

## 2023-07-28 DIAGNOSIS — G47.00 INSOMNIA, UNSPECIFIED TYPE: ICD-10-CM

## 2023-07-28 DIAGNOSIS — J34.89 RHINORRHEA: ICD-10-CM

## 2023-07-28 LAB
ALBUMIN SERPL BCG-MCNC: 4.9 G/DL (ref 3.5–5.2)
ALP SERPL-CCNC: 59 U/L (ref 35–104)
ALT SERPL W P-5'-P-CCNC: 34 U/L (ref 0–50)
ANION GAP SERPL CALCULATED.3IONS-SCNC: 14 MMOL/L (ref 7–15)
AST SERPL W P-5'-P-CCNC: 28 U/L (ref 0–45)
BILIRUB SERPL-MCNC: 0.5 MG/DL
BUN SERPL-MCNC: 7.8 MG/DL (ref 6–20)
CALCIUM SERPL-MCNC: 10.3 MG/DL (ref 8.6–10)
CHLORIDE SERPL-SCNC: 94 MMOL/L (ref 98–107)
CREAT SERPL-MCNC: 0.88 MG/DL (ref 0.51–0.95)
DEPRECATED HCO3 PLAS-SCNC: 23 MMOL/L (ref 22–29)
GFR SERPL CREATININE-BSD FRML MDRD: 80 ML/MIN/1.73M2
GLUCOSE SERPL-MCNC: 92 MG/DL (ref 70–99)
HBV SURFACE AB SERPL IA-ACNC: 1.25 M[IU]/ML
HBV SURFACE AB SERPL IA-ACNC: NONREACTIVE M[IU]/ML
MAGNESIUM SERPL-MCNC: 1.9 MG/DL (ref 1.7–2.3)
PHOSPHATE SERPL-MCNC: 4.6 MG/DL (ref 2.5–4.5)
POTASSIUM SERPL-SCNC: 5.3 MMOL/L (ref 3.4–5.3)
PROT SERPL-MCNC: 7.7 G/DL (ref 6.4–8.3)
SODIUM SERPL-SCNC: 131 MMOL/L (ref 136–145)

## 2023-07-28 PROCEDURE — 87624 HPV HI-RISK TYP POOLED RSLT: CPT | Performed by: STUDENT IN AN ORGANIZED HEALTH CARE EDUCATION/TRAINING PROGRAM

## 2023-07-28 PROCEDURE — 99396 PREV VISIT EST AGE 40-64: CPT | Performed by: STUDENT IN AN ORGANIZED HEALTH CARE EDUCATION/TRAINING PROGRAM

## 2023-07-28 PROCEDURE — G0145 SCR C/V CYTO,THINLAYER,RESCR: HCPCS | Performed by: STUDENT IN AN ORGANIZED HEALTH CARE EDUCATION/TRAINING PROGRAM

## 2023-07-28 PROCEDURE — 86706 HEP B SURFACE ANTIBODY: CPT | Performed by: STUDENT IN AN ORGANIZED HEALTH CARE EDUCATION/TRAINING PROGRAM

## 2023-07-28 PROCEDURE — 84100 ASSAY OF PHOSPHORUS: CPT | Performed by: STUDENT IN AN ORGANIZED HEALTH CARE EDUCATION/TRAINING PROGRAM

## 2023-07-28 PROCEDURE — 80053 COMPREHEN METABOLIC PANEL: CPT | Performed by: STUDENT IN AN ORGANIZED HEALTH CARE EDUCATION/TRAINING PROGRAM

## 2023-07-28 PROCEDURE — 36415 COLL VENOUS BLD VENIPUNCTURE: CPT | Performed by: STUDENT IN AN ORGANIZED HEALTH CARE EDUCATION/TRAINING PROGRAM

## 2023-07-28 PROCEDURE — 99214 OFFICE O/P EST MOD 30 MIN: CPT | Mod: 25 | Performed by: STUDENT IN AN ORGANIZED HEALTH CARE EDUCATION/TRAINING PROGRAM

## 2023-07-28 PROCEDURE — 83735 ASSAY OF MAGNESIUM: CPT | Performed by: STUDENT IN AN ORGANIZED HEALTH CARE EDUCATION/TRAINING PROGRAM

## 2023-07-28 RX ORDER — BUDESONIDE 3 MG/1
6 CAPSULE, COATED PELLETS ORAL EVERY MORNING
Qty: 30 CAPSULE | Refills: 1 | Status: SHIPPED | OUTPATIENT
Start: 2023-07-28 | End: 2023-09-14

## 2023-07-28 RX ORDER — BUPROPION HYDROCHLORIDE 150 MG/1
150 TABLET ORAL EVERY MORNING
COMMUNITY
End: 2023-08-25

## 2023-07-28 RX ORDER — FLUTICASONE PROPIONATE 50 MCG
1 SPRAY, SUSPENSION (ML) NASAL DAILY
Qty: 11.1 ML | Refills: 3 | Status: SHIPPED | OUTPATIENT
Start: 2023-07-28

## 2023-07-28 RX ORDER — OMEGA-3 FATTY ACIDS/FISH OIL 300-1000MG
CAPSULE ORAL
COMMUNITY

## 2023-07-28 RX ORDER — TRAZODONE HYDROCHLORIDE 50 MG/1
50 TABLET, FILM COATED ORAL AT BEDTIME
Qty: 90 TABLET | Refills: 4 | Status: SHIPPED | OUTPATIENT
Start: 2023-07-28 | End: 2024-01-22

## 2023-07-28 RX ORDER — VALACYCLOVIR HYDROCHLORIDE 1 G/1
TABLET, FILM COATED ORAL
Qty: 8 TABLET | Refills: 11 | Status: SHIPPED | OUTPATIENT
Start: 2023-07-28 | End: 2023-07-31

## 2023-07-28 RX ORDER — SODIUM CHLORIDE 1 G/1
1 TABLET ORAL 3 TIMES DAILY
COMMUNITY

## 2023-07-28 SDOH — ECONOMIC STABILITY: FOOD INSECURITY: WITHIN THE PAST 12 MONTHS, THE FOOD YOU BOUGHT JUST DIDN'T LAST AND YOU DIDN'T HAVE MONEY TO GET MORE.: SOMETIMES TRUE

## 2023-07-28 SDOH — HEALTH STABILITY: PHYSICAL HEALTH: ON AVERAGE, HOW MANY DAYS PER WEEK DO YOU ENGAGE IN MODERATE TO STRENUOUS EXERCISE (LIKE A BRISK WALK)?: 1 DAY

## 2023-07-28 SDOH — ECONOMIC STABILITY: INCOME INSECURITY: IN THE LAST 12 MONTHS, WAS THERE A TIME WHEN YOU WERE NOT ABLE TO PAY THE MORTGAGE OR RENT ON TIME?: PATIENT REFUSED

## 2023-07-28 SDOH — HEALTH STABILITY: PHYSICAL HEALTH: ON AVERAGE, HOW MANY MINUTES DO YOU ENGAGE IN EXERCISE AT THIS LEVEL?: 10 MIN

## 2023-07-28 SDOH — ECONOMIC STABILITY: FOOD INSECURITY: WITHIN THE PAST 12 MONTHS, YOU WORRIED THAT YOUR FOOD WOULD RUN OUT BEFORE YOU GOT MONEY TO BUY MORE.: SOMETIMES TRUE

## 2023-07-28 SDOH — ECONOMIC STABILITY: INCOME INSECURITY: HOW HARD IS IT FOR YOU TO PAY FOR THE VERY BASICS LIKE FOOD, HOUSING, MEDICAL CARE, AND HEATING?: SOMEWHAT HARD

## 2023-07-28 ASSESSMENT — ENCOUNTER SYMPTOMS
PALPITATIONS: 0
CONSTIPATION: 0
DIARRHEA: 0
NERVOUS/ANXIOUS: 1
HEARTBURN: 0
MYALGIAS: 1
HEMATOCHEZIA: 0
SHORTNESS OF BREATH: 0
FREQUENCY: 1
DIZZINESS: 1
FEVER: 0
NAUSEA: 1
COUGH: 0
DYSURIA: 0
ABDOMINAL PAIN: 1
BREAST MASS: 0
SORE THROAT: 0
HEMATURIA: 0
EYE PAIN: 0
HEADACHES: 1
WEAKNESS: 0
JOINT SWELLING: 0
ARTHRALGIAS: 0
PARESTHESIAS: 0
CHILLS: 0

## 2023-07-28 ASSESSMENT — ANXIETY QUESTIONNAIRES
GAD7 TOTAL SCORE: 7
6. BECOMING EASILY ANNOYED OR IRRITABLE: SEVERAL DAYS
GAD7 TOTAL SCORE: 7
3. WORRYING TOO MUCH ABOUT DIFFERENT THINGS: SEVERAL DAYS
IF YOU CHECKED OFF ANY PROBLEMS ON THIS QUESTIONNAIRE, HOW DIFFICULT HAVE THESE PROBLEMS MADE IT FOR YOU TO DO YOUR WORK, TAKE CARE OF THINGS AT HOME, OR GET ALONG WITH OTHER PEOPLE: SOMEWHAT DIFFICULT
1. FEELING NERVOUS, ANXIOUS, OR ON EDGE: NEARLY EVERY DAY
2. NOT BEING ABLE TO STOP OR CONTROL WORRYING: SEVERAL DAYS
7. FEELING AFRAID AS IF SOMETHING AWFUL MIGHT HAPPEN: NOT AT ALL
4. TROUBLE RELAXING: SEVERAL DAYS
5. BEING SO RESTLESS THAT IT IS HARD TO SIT STILL: NOT AT ALL

## 2023-07-28 ASSESSMENT — PATIENT HEALTH QUESTIONNAIRE - PHQ9
10. IF YOU CHECKED OFF ANY PROBLEMS, HOW DIFFICULT HAVE THESE PROBLEMS MADE IT FOR YOU TO DO YOUR WORK, TAKE CARE OF THINGS AT HOME, OR GET ALONG WITH OTHER PEOPLE: SOMEWHAT DIFFICULT
SUM OF ALL RESPONSES TO PHQ QUESTIONS 1-9: 11
SUM OF ALL RESPONSES TO PHQ QUESTIONS 1-9: 11

## 2023-07-28 ASSESSMENT — SOCIAL DETERMINANTS OF HEALTH (SDOH)
HOW OFTEN DO YOU GET TOGETHER WITH FRIENDS OR RELATIVES?: PATIENT DECLINED
IN A TYPICAL WEEK, HOW MANY TIMES DO YOU TALK ON THE PHONE WITH FAMILY, FRIENDS, OR NEIGHBORS?: TWICE A WEEK
HOW OFTEN DO YOU ATTEND CHURCH OR RELIGIOUS SERVICES?: NEVER
ARE YOU MARRIED, WIDOWED, DIVORCED, SEPARATED, NEVER MARRIED, OR LIVING WITH A PARTNER?: LIVING WITH PARTNER
DO YOU BELONG TO ANY CLUBS OR ORGANIZATIONS SUCH AS CHURCH GROUPS UNIONS, FRATERNAL OR ATHLETIC GROUPS, OR SCHOOL GROUPS?: NO

## 2023-07-28 ASSESSMENT — LIFESTYLE VARIABLES
SKIP TO QUESTIONS 9-10: 0
AUDIT-C TOTAL SCORE: -1
HOW OFTEN DO YOU HAVE SIX OR MORE DRINKS ON ONE OCCASION: PATIENT DECLINED
HOW MANY STANDARD DRINKS CONTAINING ALCOHOL DO YOU HAVE ON A TYPICAL DAY: PATIENT DECLINED
HOW OFTEN DO YOU HAVE A DRINK CONTAINING ALCOHOL: PATIENT DECLINED

## 2023-07-28 ASSESSMENT — PAIN SCALES - GENERAL: PAINLEVEL: NO PAIN (0)

## 2023-07-28 NOTE — CONFIDENTIAL NOTE
Patient had multiple extensive concerns today that could not be fully address at a physical (ulcerative colitis diagnosis?, vitamin questions, TMJ pain, anxiety, bone density, rhinorrhea). Recommend follow up visit to further address.    Destiny Patel MD  Internal Medicine & Pediatrics  [facility]  She/her

## 2023-07-28 NOTE — PATIENT INSTRUCTIONS
Try Flonase (fluticasone) spray            SHINGLES VACCINE:  If you are over 50 I recommend the Shingrix vaccine. Two doses of Shingrix provides more than 90% protection against shingles and postherpetic neuralgia (PHN), a type of chronic pain that is the most common complication of shingles.   - even if you've had the older shingles vaccine (Zostavax) it's okay to get the new vaccine.   - even if you've had shingles before the vaccine can be helpful.    Typically the best (and cheapest!) place to get this vaccine is our pharmacy downstairs. The vaccine is a two shot series - I recommend getting the second shot 2-6 months after the first dose.    You may have a sore arm and feel mild flu-like symptoms for a day or two after the vaccine. Most people do not need to adjust their regular activities. It's okay to take Tylenol or ibuprofen if you have side effects.           Restart Wellbutrin (bupropion) 150mg and follow up with me in a few weeks      Preventive Health Recommendations  Female Ages 40 to 49    Yearly exam:   See your health care provider every year in order to  Review health changes.   Discuss preventive care.    Review your medicines if your doctor prescribed any.    Get a Pap test every three years (unless you have an abnormal result and your provider advises testing more often).    If you get Pap tests with HPV test, you only need to test every 5 years, unless you have an abnormal result. You do not need a Pap test if your uterus was removed (hysterectomy) and you have not had cancer.    You should be tested each year for STDs (sexually transmitted diseases), if you're at risk.   Ask your doctor if you should have a mammogram.    Have a colonoscopy (test for colon cancer) if someone in your family has had colon cancer or polyps before age 50.     Have a cholesterol test every 5 years.     Have a diabetes test (fasting glucose) after age 45. If you are at risk for diabetes, you should have this  test every 3 years.    Shots: Get a flu shot each year. Get a tetanus shot every 10 years.     Nutrition:   Eat at least 5 servings of fruits and vegetables each day.  Eat whole-grain bread, whole-wheat pasta and brown rice instead of white grains and rice.  Get adequate Calcium and Vitamin D.      Lifestyle  Exercise at least 150 minutes a week (an average of 30 minutes a day, 5 days a week). This will help you control your weight and prevent disease.  Limit alcohol to one drink per day.  No smoking.   Wear sunscreen to prevent skin cancer.  See your dentist every six months for an exam and cleaning.    Crisis resources  988: nationwide suicide and mental health crisis counseling    211: [facility] free and confidential health and human services information for people in [place]. Can help with mental health, eviction resources, alcohol and drug resources, food and housing instability.    Text HOME to 046164 from anywhere in the [place], anytime. Crisis Text Line is here for any crisis. A live, trained Crisis Counselor receives the text and responds, all from our secure online platform. The volunteer Crisis Counselor will help you move from a hot moment to a cool moment.    [facility] ([facility]): a calming, living room-style environment. The shared, open layout is equipped with comfortable recliners, self-serve refreshment stations, and sensory rooms to put patients at ease while our care teams address their mental health needs.    [facility]:   24/7 Suicide Hotline - 8-503-478-8390  Non-emergent Mental Health Hotline - 1-416.509.8292  www.barbie.org

## 2023-07-28 NOTE — PROGRESS NOTES
SUBJECTIVE:   CC: Alana is an 47 year old who presents for preventive health visit.       7/28/2023     7:25 AM   Additional Questions   Roomed by [facility]   Accompanied by self         7/28/2023     7:25 AM   Patient Reported Additional Medications   Patient reports taking the following new medications none       Healthy Habits:     Getting at least 3 servings of Calcium per day:  NO    Bi-annual eye exam:  Yes    Dental care twice a year:  Yes    Sleep apnea or symptoms of sleep apnea:  None    Diet:  Other    Frequency of exercise:  1 day/week    Duration of exercise:  Less than 15 minutes    Taking medications regularly:  Yes    Medication side effects:  Not applicable    Additional concerns today:  Yes    History osteoporosis  -did not like side effects of fosamax    Runny nose  -previous nose surgery   -was told may have polyps    Sleep  -used to use trazodone    Wondering about restarting Wellbutrin (bupropion)    Headache at night  -usually when electrolytes are off  -takes NaCl, K (potassium), and liquid IV      Had colonoscopy that showed proctitis  -was given topical medication  -recently having more diarrhea and undigested food in bowel movements  -not seeing a gastroenterology doctor      Today's PHQ-9 Score:       7/28/2023     7:17 AM   PHQ-9 SCORE   PHQ-9 Total Score MyChart 11 (Moderate depression)   PHQ-9 Total Score 11         Social History     Tobacco Use     Smoking status: Former     Packs/day: 0.00     Types: Cigarettes     Smokeless tobacco: Never   Substance Use Topics     Alcohol use: Not Currently     Comment: 9/18/22 7/28/2023     7:21 AM   Alcohol Use   Prescreen: >3 drinks/day or >7 drinks/week? No     Reviewed orders with patient.  Reviewed health maintenance and updated orders accordingly - Yes    Breast Cancer Screening:    FHS-7:       8/19/2021     1:14 PM 5/11/2022     1:53 PM 5/12/2022    10:35 AM 6/2/2023     2:59 PM 7/28/2023     7:24 AM   Breast CA Risk Assessment  (FHS-7)   Did any of your first-degree relatives have breast or ovarian cancer? Yes Yes Yes Yes Yes   Did any of your relatives have bilateral breast cancer? Yes No Unknown No Unknown   Did any man in your family have breast cancer? No No No No No   Did any woman in your family have breast and ovarian cancer? No No No No Yes   Did any woman in your family have breast cancer before age 50 y? No No Yes No Yes   Do you have 2 or more relatives with breast and/or ovarian cancer? No No No No Unknown   Do you have 2 or more relatives with breast and/or bowel cancer? No No No No Unknown         Pertinent mammograms are reviewed under the imaging tab.    History of abnormal Pap smear: YES - updated in Problem List and Health Maintenance accordingly      Latest Ref Rng & Units 5/12/2022    10:44 AM 7/17/2019     4:45 PM 7/17/2019     4:10 PM   PAP / HPV   PAP  Negative for Intraepithelial Lesion or Malignancy (NILM)      PAP (Historical)    NIL    HPV 16 DNA Negative Negative  Negative     HPV 18 DNA Negative Negative  Negative     Other HR HPV Negative Positive  Positive       Reviewed and updated as needed this visit by clinical staff   Tobacco  Allergies               Reviewed and updated as needed this visit by Provider                     Review of Systems   Constitutional:  Negative for chills and fever.   HENT:  Negative for congestion, ear pain, hearing loss and sore throat.    Eyes:  Negative for pain and visual disturbance.   Respiratory:  Negative for cough and shortness of breath.    Cardiovascular:  Negative for chest pain, palpitations and peripheral edema.   Gastrointestinal:  Positive for abdominal pain and nausea. Negative for constipation, diarrhea, heartburn and hematochezia.   Breasts:  Negative for tenderness, breast mass and discharge.   Genitourinary:  Positive for frequency and urgency. Negative for dysuria, genital sores, hematuria, pelvic pain, vaginal bleeding and vaginal discharge.  "  Musculoskeletal:  Positive for myalgias. Negative for arthralgias and joint swelling.   Skin:  Negative for rash.   Neurological:  Positive for dizziness and headaches. Negative for weakness and paresthesias.   Psychiatric/Behavioral:  Negative for mood changes. The patient is nervous/anxious.      OBJECTIVE:   /81 (BP Location: Right arm, Patient Position: Sitting, Cuff Size: Adult Small)   Pulse 76   Temp 97.8  F (36.6  C) (Temporal)   Resp 16   Ht 1.592 m (5' 2.68\")   Wt 50.5 kg (111 lb 4.8 oz)   SpO2 100%   BMI 19.92 kg/m    Physical Exam  GENERAL: healthy, alert and no distress  EYES: Eyes grossly normal to inspection, and conjunctivae and sclerae normal  HENT: ear canals and TM's normal, nose and mouth without ulcers or lesions  NECK: no adenopathy, no asymmetry, masses, or scars and thyroid normal to palpation  RESP: lungs clear to auscultation - no rales, rhonchi or wheezes  CV: regular rate and rhythm, normal S1 S2, no S3 or S4, no murmur, click or rub, no peripheral edema  ABDOMEN: soft, nontender  MS: no gross musculoskeletal defects noted, no edema  SKIN: no suspicious lesions or rashes  NEURO: Normal strength and tone, mentation intact and speech normal  PSYCH: mentation appears normal, affect normal/bright      ASSESSMENT/PLAN:       ICD-10-CM    1. Routine general medical examination at a health care facility  Z00.00       2. Cervical cancer screening  Z12.4 Pap Screen with HPV - recommended age 30 - 65 years      3. Osteoporosis without current pathological fracture, unspecified osteoporosis type  M81.0 DX Hip/Pelvis/Spine      4. Rhinorrhea  J34.89 Adult ENT  Referral     fluticasone (FLONASE) 50 MCG/ACT nasal spray      5. Insomnia, unspecified type  G47.00 traZODone (DESYREL) 50 MG tablet      6. Cold sore  B00.1 valACYclovir (VALTREX) 1000 mg tablet      7. Tension headache  G44.209 Comprehensive metabolic panel (BMP + Alb, Alk Phos, ALT, AST, Total. Bili, TP)     " Magnesium     Phosphorus      8. Need for hepatitis B screening test  Z11.59 Hepatitis B Surface Antibody      9. Anxiety  F41.9       10. Colitis  K52.9 budesonide (ENTOCORT EC) 3 MG EC capsule     Adult GI  Referral - Consult Only        9. History anxiety/depression. Worsening anxiety tends to trigger alcohol use in past. Recommend resume Wellbutrin (bupropion) -history eating disorder and hesitant about medications that could cause weight gain such as selective serotonin reuptake inhibitors. Has 150mg tablets at home - recommend resume and follow up with me.  10. Patient seems to be having flare of colitis. Recommend enteric steroid and establish care with [facility].    COUNSELING:  Reviewed preventive health counseling, as reflected in patient instructions        She reports that she has quit smoking. Her smoking use included cigarettes. She has never used smokeless tobacco.          Destiny Patel MD  [facility]

## 2023-07-31 ENCOUNTER — MYC MEDICAL ADVICE (OUTPATIENT)
Dept: PEDIATRICS | Facility: CLINIC | Age: 48
End: 2023-07-31
Payer: COMMERCIAL

## 2023-07-31 DIAGNOSIS — B00.1 COLD SORE: ICD-10-CM

## 2023-07-31 RX ORDER — VALACYCLOVIR HYDROCHLORIDE 1 G/1
TABLET, FILM COATED ORAL
Qty: 4 TABLET | Refills: 11 | Status: SHIPPED | OUTPATIENT
Start: 2023-07-31 | End: 2024-09-23

## 2023-08-01 ENCOUNTER — ANCILLARY PROCEDURE (OUTPATIENT)
Dept: BONE DENSITY | Facility: CLINIC | Age: 48
End: 2023-08-01
Attending: STUDENT IN AN ORGANIZED HEALTH CARE EDUCATION/TRAINING PROGRAM
Payer: COMMERCIAL

## 2023-08-01 DIAGNOSIS — M81.0 OSTEOPOROSIS WITHOUT CURRENT PATHOLOGICAL FRACTURE, UNSPECIFIED OSTEOPOROSIS TYPE: ICD-10-CM

## 2023-08-01 DIAGNOSIS — F41.9 ANXIETY: Primary | ICD-10-CM

## 2023-08-01 LAB
BKR LAB AP GYN ADEQUACY: NORMAL
BKR LAB AP GYN INTERPRETATION: NORMAL
BKR LAB AP HPV REFLEX: NORMAL
BKR LAB AP PREVIOUS ABNL DX: NORMAL
BKR LAB AP PREVIOUS ABNORMAL: NORMAL
PATH REPORT.COMMENTS IMP SPEC: NORMAL
PATH REPORT.COMMENTS IMP SPEC: NORMAL
PATH REPORT.RELEVANT HX SPEC: NORMAL

## 2023-08-01 PROCEDURE — 77080 DXA BONE DENSITY AXIAL: CPT | Performed by: INTERNAL MEDICINE

## 2023-08-02 LAB
HUMAN PAPILLOMA VIRUS 16 DNA: NEGATIVE
HUMAN PAPILLOMA VIRUS 18 DNA: NEGATIVE
HUMAN PAPILLOMA VIRUS FINAL DIAGNOSIS: NORMAL
HUMAN PAPILLOMA VIRUS OTHER HR: NEGATIVE

## 2023-08-03 RX ORDER — HYDROXYZINE HYDROCHLORIDE 25 MG/1
TABLET, FILM COATED ORAL
Qty: 90 TABLET | Refills: 0 | Status: SHIPPED | OUTPATIENT
Start: 2023-08-03 | End: 2023-09-05

## 2023-08-03 NOTE — PROGRESS NOTES
Recommend cotest in 3 years.    Destiny Patel MD  Internal Medicine & Pediatrics  [facility]  She/her

## 2023-08-05 PROBLEM — E83.42 HYPOMAGNESEMIA: Status: RESOLVED | Noted: 2022-06-22 | Resolved: 2023-08-05

## 2023-08-05 PROBLEM — E83.52 HYPERCALCEMIA: Status: RESOLVED | Noted: 2022-06-22 | Resolved: 2023-08-05

## 2023-08-05 PROBLEM — F10.10 ALCOHOL ABUSE: Status: RESOLVED | Noted: 2022-08-25 | Resolved: 2023-08-05

## 2023-08-05 PROBLEM — E87.1 HYPONATREMIA: Status: RESOLVED | Noted: 2022-06-22 | Resolved: 2023-08-05

## 2023-08-11 ENCOUNTER — MYC MEDICAL ADVICE (OUTPATIENT)
Dept: PEDIATRICS | Facility: CLINIC | Age: 48
End: 2023-08-11
Payer: COMMERCIAL

## 2023-08-14 NOTE — TELEPHONE ENCOUNTER
Hepatitis B vaccine scheduled on 08/22 at 0830.  Patient advised of this through My chart.  URBANO Gerber RN

## 2023-08-15 ASSESSMENT — ACTIVITIES OF DAILY LIVING (ADL): DEPENDENT_IADLS:: INDEPENDENT

## 2023-08-15 NOTE — PROGRESS NOTES
Clinic Care Coordination Contact  [facility]/Voicemail    Referral Source: IP Handoff  Clinical Data: Care Coordinator Outreach  Outreach attempted x 3.  [employer] message previously sent has not been read or replied to. No further calls or engagement from pt.   Plan: Care Coordinator will send disenrollment letter with care coordinator contact information via [employer]. Care Coordinator will do no further outreaches at this time.    Nathan Mccormack [facility]  Clinic Care Coordinator  [facility]  [phone]  [email or web address]

## 2023-08-22 ENCOUNTER — ALLIED HEALTH/NURSE VISIT (OUTPATIENT)
Dept: PEDIATRICS | Facility: CLINIC | Age: 48
End: 2023-08-22
Payer: COMMERCIAL

## 2023-08-22 DIAGNOSIS — Z23 ENCOUNTER FOR IMMUNIZATION: Primary | ICD-10-CM

## 2023-08-22 PROCEDURE — 99207 PR NO CHARGE NURSE ONLY: CPT

## 2023-08-22 PROCEDURE — 90471 IMMUNIZATION ADMIN: CPT

## 2023-08-22 PROCEDURE — 90746 HEPB VACCINE 3 DOSE ADULT IM: CPT

## 2023-08-22 NOTE — PROGRESS NOTES
Prior to immunization administration, verified patients identity using patient s name and date of birth. Please see Immunization Activity for additional information.     Screening Questionnaire for Adult Immunization    Are you sick today?   No   Do you have allergies to medications, food, a vaccine component or latex?   No   Have you ever had a serious reaction after receiving a vaccination?   No   Do you have a long-term health problem with heart, lung, kidney, or metabolic disease (e.g., diabetes), asthma, a blood disorder, no spleen, complement component deficiency, a cochlear implant, or a spinal fluid leak?  Are you on long-term aspirin therapy?   No   Do you have cancer, leukemia, HIV/AIDS, or any other immune system problem?   No   Do you have a parent, brother, or sister with an immune system problem?   No   In the past 3 months, have you taken medications that affect  your immune system, such as prednisone, other steroids, or anticancer drugs; drugs for the treatment of rheumatoid arthritis, Crohn s disease, or psoriasis; or have you had radiation treatments?   No   Have you had a seizure, or a brain or other nervous system problem?   No   During the past year, have you received a transfusion of blood or blood    products, or been given immune (gamma) globulin or antiviral drug?   No   For women: Are you pregnant or is there a chance you could become       pregnant during the next month?   No   Have you received any vaccinations in the past 4 weeks?   No     Immunization questionnaire answers were all negative.    I have reviewed the following standing orders:   This patient is due and qualifies for the Hepatitis B vaccine.    Click here for Hepatitis B Standing Order    I have reviewed the vaccines inclusion and exclusion criteria; No concerns regarding eligibility.     Patient instructed to remain in clinic for 15 minutes afterwards, and to report any adverse reactions.     Screening performed by Lulu  JENNI Orlando on 8/22/2023 at 8:41 AM.

## 2023-08-25 DIAGNOSIS — F39 MOOD DISORDER (H): Primary | ICD-10-CM

## 2023-08-25 NOTE — TELEPHONE ENCOUNTER
Pending Prescriptions:                       Disp   Refills    buPROPion (WELLBUTRIN XL) 150 MG 24 hr ta*                    Sig: Take 1 tablet (150 mg) by mouth every morning

## 2023-08-29 NOTE — TELEPHONE ENCOUNTER
Routing refill request to provider for review/approval because:  Medication is reported/historical  Positive pregnancy test in last 12 months  Ella Kinney RN, BSN  [facility]

## 2023-08-30 RX ORDER — BUPROPION HYDROCHLORIDE 150 MG/1
150 TABLET ORAL EVERY MORNING
Qty: 90 TABLET | Refills: 0 | Status: SHIPPED | OUTPATIENT
Start: 2023-08-30 | End: 2023-11-20

## 2023-09-01 DIAGNOSIS — F41.9 ANXIETY: ICD-10-CM

## 2023-09-03 DIAGNOSIS — F39 MOOD DISORDER (H): ICD-10-CM

## 2023-09-05 RX ORDER — HYDROXYZINE HYDROCHLORIDE 25 MG/1
TABLET, FILM COATED ORAL
Qty: 90 TABLET | Refills: 0 | Status: SHIPPED | OUTPATIENT
Start: 2023-09-05 | End: 2023-10-11

## 2023-09-06 RX ORDER — BUPROPION HYDROCHLORIDE 150 MG/1
150 TABLET ORAL EVERY MORNING
Qty: 90 TABLET | Refills: 0 | OUTPATIENT
Start: 2023-09-06

## 2023-09-06 NOTE — TELEPHONE ENCOUNTER
LOV was on 7/28/23. Duplicate.     buPROPion (WELLBUTRIN XL) 150 MG 24 hr tablet 90 tablet 0 8/30/2023  No   Sig - Route: Take 1 tablet (150 mg) by mouth every morning - Oral     ROSE Mcknight  Patient [facility] [place] (PAL)  [facility]  Ph. [phone] / Fax. [phone]

## 2023-09-14 ENCOUNTER — VIRTUAL VISIT (OUTPATIENT)
Dept: PEDIATRICS | Facility: CLINIC | Age: 48
End: 2023-09-14
Payer: COMMERCIAL

## 2023-09-14 DIAGNOSIS — G44.219 EPISODIC TENSION-TYPE HEADACHE, NOT INTRACTABLE: ICD-10-CM

## 2023-09-14 DIAGNOSIS — K52.9 COLITIS: ICD-10-CM

## 2023-09-14 DIAGNOSIS — F39 MOOD DISORDER (H): ICD-10-CM

## 2023-09-14 DIAGNOSIS — F43.22 ADJUSTMENT DISORDER WITH ANXIOUS MOOD: Primary | ICD-10-CM

## 2023-09-14 DIAGNOSIS — K62.5 RECTAL BLEED: ICD-10-CM

## 2023-09-14 PROCEDURE — 99213 OFFICE O/P EST LOW 20 MIN: CPT | Mod: VID | Performed by: STUDENT IN AN ORGANIZED HEALTH CARE EDUCATION/TRAINING PROGRAM

## 2023-09-14 RX ORDER — BUDESONIDE 3 MG/1
6 CAPSULE, COATED PELLETS ORAL EVERY MORNING
Qty: 30 CAPSULE | Refills: 1 | Status: SHIPPED | OUTPATIENT
Start: 2023-09-14 | End: 2023-11-20

## 2023-09-14 NOTE — PROGRESS NOTES
Alana is a 47 year old who is being evaluated via a billable video visit.        Assessment & Plan     Adjustment disorder with anxious mood  Mood disorder (H)  In shared decision making with patient will increase bupropion to 300mg - has 150mg tablets at home. Will MyChart message patient in 1 month and can stay at 300mg but it worsening anxiety would go back to 150 and try buspar.    Rectal bleed  Colitis  Has appointment with [facility].  - budesonide (ENTOCORT EC) 3 MG EC capsule; Take 2 capsules (6 mg) by mouth every morning    Episodic tension-type headache, not intractable  Improving.                     Destiny Patel MD  [facility]    Subjective   Alana is a 47 year old, presenting for the following health issues:  No chief complaint on file.      HPI     Headache  -improved if remembers to drink water and take electrolytes      Bone health  -takes vitamin D and ca supplement    Gastroenterology   -has appointment with [facility]  -budesonide (Pulmicort) helping a lot  -having a lot of gas  -eating a lot of cauliflower, brussel sprouts, other veg          Objective           Vitals:  No vitals were obtained today due to virtual visit.    Physical Exam   GENERAL: Healthy, alert and no distress  EYES: Eyes grossly normal to inspection.  No discharge or erythema, or obvious scleral/conjunctival abnormalities.  RESP: No audible wheeze, cough, or visible cyanosis.  No visible retractions or increased work of breathing.    SKIN: Visible skin clear. No significant rash, abnormal pigmentation or lesions.  NEURO: Cranial nerves grossly intact.  Mentation and speech appropriate for age.  PSYCH: Mentation appears normal, affect normal/bright, judgement and insight intact, normal speech and appearance well-groomed.                Video-Visit Details    Type of service:  Video Visit   Video Start Time: 1:10 PM  Video End Time: 1:25PM    Originating Location (pt. Location):  Home    Distant Location (provider location):  On-site  Platform used for Video Visit: Tessa

## 2023-09-14 NOTE — PATIENT INSTRUCTIONS
SHINGLES VACCINE:  If you are over 50 I recommend the Shingrix vaccine. Two doses of Shingrix provides more than 90% protection against shingles and postherpetic neuralgia (PHN), a type of chronic pain that is the most common complication of shingles.   - even if you've had the older shingles vaccine (Zostavax) it's okay to get the new vaccine.   - even if you've had shingles before the vaccine can be helpful.    Typically the best (and cheapest!) place to get this vaccine is our pharmacy downstairs. The vaccine is a two shot series - I recommend getting the second shot 2-6 months after the first dose.    You may have a sore arm and feel mild flu-like symptoms for a day or two after the vaccine. Most people do not need to adjust their regular activities. It's okay to take Tylenol or ibuprofen if you have side effects.

## 2023-09-22 ENCOUNTER — TRANSFERRED RECORDS (OUTPATIENT)
Dept: HEALTH INFORMATION MANAGEMENT | Facility: CLINIC | Age: 48
End: 2023-09-22
Payer: COMMERCIAL

## 2023-09-22 LAB
ALT SERPL-CCNC: 32 IU/L (ref 0–32)
AST SERPL-CCNC: 28 IU/L (ref 0–40)
CREATININE (EXTERNAL): 0.99 MG/DL (ref 0.57–1)
GFR ESTIMATED (EXTERNAL): 70 ML/MIN/1.73
GLUCOSE (EXTERNAL): 95 MG/DL (ref 70–99)
POTASSIUM (EXTERNAL): 4.3 MMOL/L (ref 3.5–5.2)

## 2023-10-03 ENCOUNTER — TRANSFERRED RECORDS (OUTPATIENT)
Dept: HEALTH INFORMATION MANAGEMENT | Facility: CLINIC | Age: 48
End: 2023-10-03
Payer: COMMERCIAL

## 2023-10-07 DIAGNOSIS — F41.9 ANXIETY: ICD-10-CM

## 2023-10-11 RX ORDER — HYDROXYZINE HYDROCHLORIDE 25 MG/1
TABLET, FILM COATED ORAL
Qty: 90 TABLET | Refills: 5 | Status: SHIPPED | OUTPATIENT
Start: 2023-10-11 | End: 2024-09-23

## 2023-10-11 NOTE — TELEPHONE ENCOUNTER
Prescription approved per [facility] Refill Protocol.  Luan BIRMINGHAM RN 10/11/2023 at 2:24 PM

## 2023-10-26 ENCOUNTER — TRANSFERRED RECORDS (OUTPATIENT)
Dept: HEALTH INFORMATION MANAGEMENT | Facility: CLINIC | Age: 48
End: 2023-10-26
Payer: COMMERCIAL

## 2023-11-20 ENCOUNTER — TELEPHONE (OUTPATIENT)
Dept: PEDIATRICS | Facility: CLINIC | Age: 48
End: 2023-11-20

## 2023-11-20 ENCOUNTER — VIRTUAL VISIT (OUTPATIENT)
Dept: PEDIATRICS | Facility: CLINIC | Age: 48
End: 2023-11-20
Payer: COMMERCIAL

## 2023-11-20 DIAGNOSIS — R53.83 FATIGUE, UNSPECIFIED TYPE: Primary | ICD-10-CM

## 2023-11-20 PROCEDURE — 99214 OFFICE O/P EST MOD 30 MIN: CPT | Mod: VID | Performed by: PEDIATRICS

## 2023-11-20 NOTE — TELEPHONE ENCOUNTER
LVM x1- per ov notes from 11/20/23:  Swapna Borrero MD P Ea Support Team C (Ma-Tc)  Schedule non fasting lab only per orders    Patient due for labs next week. Anyone can assist scheduling this appointment.

## 2023-11-20 NOTE — PROGRESS NOTES
Alana is a 47 year old who is being evaluated via a billable video visit.      How would you like to obtain your AVS? MyChart  If the video visit is dropped, the invitation should be resent by: Text to cell phone: [phone]  Will anyone else be joining your video visit? No          Assessment & Plan     (R53.83) Fatigue, unspecified type  (primary encounter diagnosis)  Comment: likely multifactorial.  Given her symptoms I am most concerned about thyroid. She does continue to drink heavily though and with weight gain, these is some concern for ascites.  If labwork normal, then I have recommended an in person appt with PCP for further evaluation. Patient again advised that drinking needs to stop.   Plan: TSH with free T4 reflex, Comprehensive         metabolic panel (BMP + Alb, Alk Phos, ALT, AST,        Total. Bili, TP), CBC with platelets and         differential, Vitamin D Deficiency, ESR:         Erythrocyte sedimentation rate, Rheumatoid         factor, Iron and iron binding capacity,         Ferritin, CRP, inflammation                         Swapna Borrero MD  [facility]    Subjective   Alana is a 47 year old, presenting for the following health issues:  No chief complaint on file.      HPI       PCP Dr. Patel    Appt notes:  Extreme fatigue, weight gain in belly area, itchiness just in abdominal. Sleeping hours wise okay, but never experienced such fatigue and itchiness. Started est. 3 weeks.     Recent labs from [facility] (ulcerative proctitis and etoh use) 2 months ago - normal CMP, CRP, celiac, CBC    Today,     Patient states weight has changed from 113 to 123 (summer to now, about 3 months). She states she is not eating anything different. Extreme fatigue and joint pain. Mostly shoulder pain. She feels weight gain is in stomach area. No swelling. Pain worse in the morning.      No diet changes in diet. Still drinking etoh - about 3 per day. Patient has tried AA in the past, but  "doesn't \"care for it\".     No headaches.     Patient dose has nexplanon, wondering if menopause. She tried clear choice FSH test over the counter. But she does not think its accurate.     Mostly sitting for jobs or standing.    Sleep - getting about 7-8 hours of sleep. Wakes 2-3 times to go to the bathroom. She is tired during the day and feels like she would like to take a nap.     No new medications in the past few months. She has not been taking Wellbutrin, budesonide, propranolol    She has in person appt with [facility] soon for follow up.    She is taking viviscal for hair growth.  She is not taking ibuprofen or tylenol. She is trying turmeric.   She is still taking Trazodone for sleep. This has been helping her sleep.                           Review of Systems         Objective           Vitals:  No vitals were obtained today due to virtual visit.    Physical Exam   GENERAL: Healthy, alert and no distress  EYES: Eyes grossly normal to inspection.  No discharge or erythema, or obvious scleral/conjunctival abnormalities.  RESP: No audible wheeze, cough, or visible cyanosis.  No visible retractions or increased work of breathing.    SKIN: Visible skin clear. No significant rash, abnormal pigmentation or lesions.  NEURO: Cranial nerves grossly intact.  Mentation and speech appropriate for age.  PSYCH: Mentation appears normal, affect normal/bright, judgement and insight intact, normal speech and appearance well-groomed.                Video-Visit Details    Type of service:  Video Visit   Video Start Time: 11:00am  Video End Time:11:20 AM    Originating Location (pt. Location): Home    Distant Location (provider location):  Off-site  Platform used for Video Visit: Tessa"

## 2023-11-20 NOTE — PATIENT INSTRUCTIONS
We will start by checking labs.  The clinic will call you to arrange the lab visit.    If this is all normal, I recommend an in person appt for an exam with your PCP, Dr. Patel.  You could consider a sleep study in the future as well.     Swapna Borrero MD  Internal Medicine/Pediatrics  [facility]

## 2023-11-21 ENCOUNTER — LAB (OUTPATIENT)
Dept: LAB | Facility: CLINIC | Age: 48
End: 2023-11-21
Payer: COMMERCIAL

## 2023-11-21 DIAGNOSIS — R53.83 FATIGUE, UNSPECIFIED TYPE: ICD-10-CM

## 2023-11-21 LAB
ALBUMIN SERPL BCG-MCNC: 4.3 G/DL (ref 3.5–5.2)
ALP SERPL-CCNC: 55 U/L (ref 40–150)
ALT SERPL W P-5'-P-CCNC: 13 U/L (ref 0–50)
ANION GAP SERPL CALCULATED.3IONS-SCNC: 11 MMOL/L (ref 7–15)
AST SERPL W P-5'-P-CCNC: 20 U/L (ref 0–45)
BASOPHILS # BLD AUTO: 0.1 10E3/UL (ref 0–0.2)
BASOPHILS NFR BLD AUTO: 1 %
BILIRUB SERPL-MCNC: 0.6 MG/DL
BUN SERPL-MCNC: 10.7 MG/DL (ref 6–20)
CALCIUM SERPL-MCNC: 9.3 MG/DL (ref 8.6–10)
CHLORIDE SERPL-SCNC: 102 MMOL/L (ref 98–107)
CREAT SERPL-MCNC: 0.86 MG/DL (ref 0.51–0.95)
CRP SERPL-MCNC: <3 MG/L
DEPRECATED HCO3 PLAS-SCNC: 24 MMOL/L (ref 22–29)
EGFRCR SERPLBLD CKD-EPI 2021: 82 ML/MIN/1.73M2
EOSINOPHIL # BLD AUTO: 0.2 10E3/UL (ref 0–0.7)
EOSINOPHIL NFR BLD AUTO: 4 %
ERYTHROCYTE [DISTWIDTH] IN BLOOD BY AUTOMATED COUNT: 11.2 % (ref 10–15)
ERYTHROCYTE [SEDIMENTATION RATE] IN BLOOD BY WESTERGREN METHOD: 8 MM/HR (ref 0–20)
FERRITIN SERPL-MCNC: 139 NG/ML (ref 6–175)
GLUCOSE SERPL-MCNC: 95 MG/DL (ref 70–99)
HCT VFR BLD AUTO: 44.4 % (ref 35–47)
HGB BLD-MCNC: 14.3 G/DL (ref 11.7–15.7)
IMM GRANULOCYTES # BLD: 0 10E3/UL
IMM GRANULOCYTES NFR BLD: 0 %
IRON BINDING CAPACITY (ROCHE): 276 UG/DL (ref 240–430)
IRON SATN MFR SERPL: 54 % (ref 15–46)
IRON SERPL-MCNC: 150 UG/DL (ref 37–145)
LYMPHOCYTES # BLD AUTO: 1.4 10E3/UL (ref 0.8–5.3)
LYMPHOCYTES NFR BLD AUTO: 28 %
MCH RBC QN AUTO: 32.6 PG (ref 26.5–33)
MCHC RBC AUTO-ENTMCNC: 32.2 G/DL (ref 31.5–36.5)
MCV RBC AUTO: 101 FL (ref 78–100)
MONOCYTES # BLD AUTO: 0.5 10E3/UL (ref 0–1.3)
MONOCYTES NFR BLD AUTO: 10 %
NEUTROPHILS # BLD AUTO: 2.9 10E3/UL (ref 1.6–8.3)
NEUTROPHILS NFR BLD AUTO: 57 %
PLATELET # BLD AUTO: 258 10E3/UL (ref 150–450)
POTASSIUM SERPL-SCNC: 4.6 MMOL/L (ref 3.4–5.3)
PROT SERPL-MCNC: 7 G/DL (ref 6.4–8.3)
RBC # BLD AUTO: 4.39 10E6/UL (ref 3.8–5.2)
RHEUMATOID FACT SERPL-ACNC: <10 IU/ML
SODIUM SERPL-SCNC: 137 MMOL/L (ref 135–145)
TSH SERPL DL<=0.005 MIU/L-ACNC: 2.71 UIU/ML (ref 0.3–4.2)
VIT D+METAB SERPL-MCNC: 47 NG/ML (ref 20–50)
WBC # BLD AUTO: 5.1 10E3/UL (ref 4–11)

## 2023-11-21 PROCEDURE — 83540 ASSAY OF IRON: CPT

## 2023-11-21 PROCEDURE — 80053 COMPREHEN METABOLIC PANEL: CPT

## 2023-11-21 PROCEDURE — 86140 C-REACTIVE PROTEIN: CPT

## 2023-11-21 PROCEDURE — 85025 COMPLETE CBC W/AUTO DIFF WBC: CPT

## 2023-11-21 PROCEDURE — 36415 COLL VENOUS BLD VENIPUNCTURE: CPT

## 2023-11-21 PROCEDURE — 86431 RHEUMATOID FACTOR QUANT: CPT

## 2023-11-21 PROCEDURE — 82306 VITAMIN D 25 HYDROXY: CPT

## 2023-11-21 PROCEDURE — 83550 IRON BINDING TEST: CPT

## 2023-11-21 PROCEDURE — 85652 RBC SED RATE AUTOMATED: CPT

## 2023-11-21 PROCEDURE — 82728 ASSAY OF FERRITIN: CPT

## 2023-11-21 PROCEDURE — 84443 ASSAY THYROID STIM HORMONE: CPT

## 2023-11-27 ENCOUNTER — TRANSFERRED RECORDS (OUTPATIENT)
Dept: HEALTH INFORMATION MANAGEMENT | Facility: CLINIC | Age: 48
End: 2023-11-27
Payer: COMMERCIAL

## 2023-11-27 DIAGNOSIS — R71.8 ELEVATED MCV: Primary | ICD-10-CM

## 2023-11-27 DIAGNOSIS — R53.83 OTHER FATIGUE: ICD-10-CM

## 2023-11-29 ENCOUNTER — LAB (OUTPATIENT)
Dept: LAB | Facility: CLINIC | Age: 48
End: 2023-11-29
Payer: COMMERCIAL

## 2023-11-29 DIAGNOSIS — R53.83 OTHER FATIGUE: ICD-10-CM

## 2023-11-29 DIAGNOSIS — R71.8 ELEVATED MCV: ICD-10-CM

## 2023-11-29 LAB
FOLATE SERPL-MCNC: 39.1 NG/ML (ref 4.6–34.8)
VIT B12 SERPL-MCNC: 669 PG/ML (ref 232–1245)

## 2023-11-29 PROCEDURE — 82607 VITAMIN B-12: CPT

## 2023-11-29 PROCEDURE — 82746 ASSAY OF FOLIC ACID SERUM: CPT

## 2023-11-29 PROCEDURE — 36415 COLL VENOUS BLD VENIPUNCTURE: CPT

## 2023-11-30 ENCOUNTER — MYC MEDICAL ADVICE (OUTPATIENT)
Dept: PEDIATRICS | Facility: CLINIC | Age: 48
End: 2023-11-30
Payer: COMMERCIAL

## 2023-12-03 ENCOUNTER — LAB (OUTPATIENT)
Dept: LAB | Facility: CLINIC | Age: 48
End: 2023-12-03
Attending: PEDIATRICS
Payer: COMMERCIAL

## 2023-12-04 ENCOUNTER — OFFICE VISIT (OUTPATIENT)
Dept: PEDIATRICS | Facility: CLINIC | Age: 48
End: 2023-12-04
Payer: COMMERCIAL

## 2023-12-04 VITALS
OXYGEN SATURATION: 100 % | TEMPERATURE: 98.3 F | SYSTOLIC BLOOD PRESSURE: 101 MMHG | HEART RATE: 80 BPM | WEIGHT: 124.6 LBS | RESPIRATION RATE: 16 BRPM | DIASTOLIC BLOOD PRESSURE: 69 MMHG | BODY MASS INDEX: 22.3 KG/M2

## 2023-12-04 DIAGNOSIS — K76.0 FATTY LIVER: ICD-10-CM

## 2023-12-04 DIAGNOSIS — R63.5 WEIGHT GAIN: Primary | ICD-10-CM

## 2023-12-04 LAB
FSH SERPL IRP2-ACNC: 7.1 MIU/ML
INR PPP: 0.94 (ref 0.85–1.15)
LIPASE SERPL-CCNC: 36 U/L (ref 13–60)
T3FREE SERPL-MCNC: 3.5 PG/ML (ref 2–4.4)
T4 FREE SERPL-MCNC: 1 NG/DL (ref 0.9–1.7)

## 2023-12-04 PROCEDURE — 86038 ANTINUCLEAR ANTIBODIES: CPT | Performed by: STUDENT IN AN ORGANIZED HEALTH CARE EDUCATION/TRAINING PROGRAM

## 2023-12-04 PROCEDURE — 84481 FREE ASSAY (FT-3): CPT | Performed by: STUDENT IN AN ORGANIZED HEALTH CARE EDUCATION/TRAINING PROGRAM

## 2023-12-04 PROCEDURE — 83001 ASSAY OF GONADOTROPIN (FSH): CPT | Performed by: STUDENT IN AN ORGANIZED HEALTH CARE EDUCATION/TRAINING PROGRAM

## 2023-12-04 PROCEDURE — 99214 OFFICE O/P EST MOD 30 MIN: CPT | Performed by: STUDENT IN AN ORGANIZED HEALTH CARE EDUCATION/TRAINING PROGRAM

## 2023-12-04 PROCEDURE — 83690 ASSAY OF LIPASE: CPT | Performed by: STUDENT IN AN ORGANIZED HEALTH CARE EDUCATION/TRAINING PROGRAM

## 2023-12-04 PROCEDURE — 36415 COLL VENOUS BLD VENIPUNCTURE: CPT | Performed by: STUDENT IN AN ORGANIZED HEALTH CARE EDUCATION/TRAINING PROGRAM

## 2023-12-04 PROCEDURE — 85610 PROTHROMBIN TIME: CPT | Performed by: STUDENT IN AN ORGANIZED HEALTH CARE EDUCATION/TRAINING PROGRAM

## 2023-12-04 PROCEDURE — 84439 ASSAY OF FREE THYROXINE: CPT | Performed by: STUDENT IN AN ORGANIZED HEALTH CARE EDUCATION/TRAINING PROGRAM

## 2023-12-04 ASSESSMENT — PAIN SCALES - GENERAL: PAINLEVEL: NO PAIN (0)

## 2023-12-04 NOTE — COMMUNITY RESOURCES LIST (ENGLISH)
12/04/2023   [facility]  N/A  For questions about this resource list or additional care needs, please contact your primary care clinic or care manager.  Phone: [phone]   Email: N/A   Address: [address] [place] [address]   Hours: N/A        Financial Stability       Rent and mortgage payment assistance  1  [facility] Community Development Agency (CDA) Distance: 0.95 miles      In-Person   [address] [place] [address]  Language: English  Hours: Mon - Fri 9:00 AM - 4:00 PM  Fees: Free   Phone: (596) 680-5618 Email: info@[facility].mn. Website: http://www.[place]port[place] Wins.org/     2  [address] [place] [address] Distance: 5.69 miles      In-Person, Phone/Virtual   [address] [place] [address]  Language: English  Hours: Mon - Thu 9:00 AM - 4:00 PM  Fees: Free   Phone: (574) 729-4136 Email: info@[employer].org Website: https://[employer].org/          Food and Nutrition       Food pantry  3  [employer] Distance: 1.93 miles      Delivery, Pickup   [address] [place] [address]  Language: English  Hours: Mon - Wed 10:00 AM - 3:00 PM , Tue 5:30 PM - 7:30 PM , Thu 5:30 PM - 7:30 PM , Thu - Fri 10:00 AM - 12:00 PM  Fees: Free   Phone: (981) 119-6578 Website: http://www.MotobuykerspanCardiio.org     4  [employer] Distance: 3.87 miles      In-Person   [address] [place] [address]  Language: English  Hours: Tue 10:00 AM - 12:00 PM  Fees: Free   Phone: (551) 518-4268 Ext.234 Website: http://www.otc.org     SNAP application assistance  5  [address] [place] [address] Distance: 6.55 miles      In-Person   [address] [place] [address]  Language: English  Hours: Mon 8:00 AM - 4:00 PM , Tue 8:00 AM - 7:00 PM , Wed - Thu 8:00 AM - 4:00 PM  Fees: Free   Phone: (365) 981-1125 Email: info@[employer] Website:  https://[facility].org/resources/resource-centers/     6  Community Action Partnership (CAP) of [employer] [place] [facility] Distance: 7.04 miles      In-Person   [address] [place] [address]  Language: English, Iranian  Hours: Mon - Fri 8:00 AM - 8:00 PM  Fees: Free   Phone: (817) 313-5915 Email: info@[facility] Media.org Website: http://www.capagenJubilater Interactive Media.org     Soup kitchen or free meals  7  [employer] [facility] Distance: 2.73 miles      Pickup   [address] [place] [address]  Language: English, Iranian  Hours: Mon - Thu 5:30 PM - 6:30 PM  Fees: Free   Phone: (516) 976-7445 Email: etelvina@[employer] Website: http://Endpoint Clinical/Register My InfoÂ®/?page_id=5168     8  [facility] [place] [place] Distance: 5.4 miles      Pickup   [address] [place] [address]  Language: English  Hours: Thu 3:00 PM - 6:00 PM  Fees: Free   Phone: (916) 861-7505 Email: clint@[employer]kosh[employer]co.org Website: http://[place]-.org/          Important Numbers & Websites       Emergency Services   911  [facility] Services   311  Poison Control   (317) 665-1177  Suicide Prevention Lifeline   (884) 254-2864 (TALK)  Child Abuse Hotline   (718) 122-9315 (4-A-Child)  Sexual Assault Hotline   (157) 946-1386 (HOPE)  National Runaway Safeline   (480) 746-6318 (RUNAWAY)  All-Options Talkline   (710) 721-7959  Substance Abuse Referral   (178) 548-2018 (HELP)

## 2023-12-04 NOTE — PROGRESS NOTES
Assessment & Plan     Weight gain  Patient having constellation of symptoms including abdominal fullness, weight gain, fatigue and impaired sleep with multiple episodes of nighttime wakening for urination and lack of restful sleep. Discussed that alcohol could be contributing to all her symptoms. Lower suspicion for thyroid disease or autoimmune process given normal recent labs. Recommend labs as per below in shared decision making with patient and pelvic ultrasound given recent vaginal spotting. If normal, recommend alcohol abstinence and bring in list of her supplements to next visit to see if they could be contributing. Had deviated septum fixed with resolution of snoring so less concerned for obstructive sleep apnea but could consider sleep study.  - Anti Nuclear Ai IgG by IFA with Reflex; Future  - INR; Future  - Follicle stimulating hormone; Future  - US Pelvic Complete with Transvaginal; Future  - T4, free; Future  - T3, Free; Future  - Lipase; Future  - Anti Nuclear Ai IgG by IFA with Reflex  - INR  - Follicle stimulating hormone  - T4, free  - T3, Free  - Lipase    Fatty liver  - US Abdomen Limited; Future    Follow-up  -nexplanon replacement  -supplements    Destiny Patel MD  [facility] LITO Warner is a 47 year old, presenting for the following health issues:  Weight Problem and Arthritis        12/4/2023     8:06 AM   Additional Questions   Roomed by [facility]   Accompanied by self         12/4/2023     8:06 AM   Patient Reported Additional Medications   Patient reports taking the following new medications none       History of Present Illness       Reason for visit:  Not sure blood work is good but still feel bad    She eats 0-1 servings of fruits and vegetables daily.She consumes 0 sweetened beverage(s) daily.She exercises with enough effort to increase her heart rate 9 or less minutes per day.  She exercises with enough effort to increase her heart rate 3 or less days  per week.   She is taking medications regularly.     Wt Readings from Last 4 Encounters:   12/04/23 56.5 kg (124 lb 9.6 oz)   07/28/23 50.5 kg (111 lb 4.8 oz)   05/31/23 47.9 kg (105 lb 11.2 oz)   04/27/23 50.3 kg (111 lb)         Patient is here to discuss weight gain, states she has not had a change in her diet. Recent blood work done on 11/29/2023. Would like to test for possible thyroid or autoimmune disease due to constant fatigue and weight gain.     Discuss if pt is now considered menopause.   -has nexplanon in place  -mother went through in late 50s    Takes several supplements  -liver detox, chong, turmeric, cinnamon, berberi    Drinks 4-5 beers daily    Review of Systems   Musculoskeletal:  Positive for arthritis.            Objective    /69 (BP Location: Right arm, Patient Position: Sitting, Cuff Size: Adult Regular)   Pulse 80   Temp 98.3  F (36.8  C) (Tympanic)   Resp 16   Wt 56.5 kg (124 lb 9.6 oz)   SpO2 100%   BMI 22.30 kg/m    Body mass index is 22.3 kg/m .  Physical Exam   GENERAL: healthy, alert and no distress  EYES: Eyes grossly normal to inspection, and conjunctivae and sclerae normal  ABDOMEN: soft, nontender, no hepatosplenomegaly, no masses and no fluid wave appreciated

## 2023-12-05 LAB — ANA SER QL IF: NEGATIVE

## 2023-12-18 ENCOUNTER — HOSPITAL ENCOUNTER (OUTPATIENT)
Dept: ULTRASOUND IMAGING | Facility: CLINIC | Age: 48
Discharge: HOME | End: 2023-12-18
Attending: STUDENT IN AN ORGANIZED HEALTH CARE EDUCATION/TRAINING PROGRAM
Payer: COMMERCIAL

## 2023-12-18 DIAGNOSIS — R63.5 WEIGHT GAIN: ICD-10-CM

## 2023-12-18 DIAGNOSIS — K76.0 FATTY LIVER: ICD-10-CM

## 2023-12-18 PROCEDURE — 76830 TRANSVAGINAL US NON-OB: CPT

## 2023-12-18 PROCEDURE — 76705 ECHO EXAM OF ABDOMEN: CPT

## 2023-12-22 ENCOUNTER — TRANSFERRED RECORDS (OUTPATIENT)
Dept: HEALTH INFORMATION MANAGEMENT | Facility: CLINIC | Age: 48
End: 2023-12-22
Payer: COMMERCIAL

## 2024-01-15 ENCOUNTER — OFFICE VISIT (OUTPATIENT)
Dept: PEDIATRICS | Facility: CLINIC | Age: 49
End: 2024-01-15
Payer: COMMERCIAL

## 2024-01-15 VITALS
RESPIRATION RATE: 20 BRPM | DIASTOLIC BLOOD PRESSURE: 77 MMHG | HEART RATE: 82 BPM | SYSTOLIC BLOOD PRESSURE: 121 MMHG | BODY MASS INDEX: 22.37 KG/M2 | WEIGHT: 125 LBS | OXYGEN SATURATION: 94 %

## 2024-01-15 DIAGNOSIS — Z30.46 ENCOUNTER FOR REMOVAL AND REINSERTION OF NEXPLANON: Primary | ICD-10-CM

## 2024-01-15 PROBLEM — Z97.5 NEXPLANON IN PLACE: Status: ACTIVE | Noted: 2024-01-15

## 2024-01-15 PROCEDURE — 99212 OFFICE O/P EST SF 10 MIN: CPT | Mod: 25 | Performed by: STUDENT IN AN ORGANIZED HEALTH CARE EDUCATION/TRAINING PROGRAM

## 2024-01-15 PROCEDURE — 11983 REMOVE/INSERT DRUG IMPLANT: CPT | Performed by: STUDENT IN AN ORGANIZED HEALTH CARE EDUCATION/TRAINING PROGRAM

## 2024-01-15 RX ORDER — FLUOCINONIDE 0.5 MG/G
CREAM TOPICAL
COMMUNITY
Start: 2023-12-23

## 2024-01-15 ASSESSMENT — PATIENT HEALTH QUESTIONNAIRE - PHQ9
10. IF YOU CHECKED OFF ANY PROBLEMS, HOW DIFFICULT HAVE THESE PROBLEMS MADE IT FOR YOU TO DO YOUR WORK, TAKE CARE OF THINGS AT HOME, OR GET ALONG WITH OTHER PEOPLE: SOMEWHAT DIFFICULT
SUM OF ALL RESPONSES TO PHQ QUESTIONS 1-9: 9
SUM OF ALL RESPONSES TO PHQ QUESTIONS 1-9: 9

## 2024-01-15 NOTE — COMMUNITY RESOURCES LIST (ENGLISH)
01/15/2024   [facility]  N/A  For questions about this resource list or additional care needs, please contact your primary care clinic or care manager.  Phone: [phone]   Email: N/A   Address: [address] [place] [address]   Hours: N/A        Financial Stability       Rent and mortgage payment assistance  1  [facility] Community Development Agency (CDA) Distance: 0.95 miles      In-Person   [address] [facility] [place] [address]  Language: English  Hours: Mon - Fri 9:00 AM - 4:00 PM  Fees: Free   Phone: (739) 839-4953 Email: info@[facility] Hospital[facility].mn. Website: http://www.[facility] HospitalTinychat.org/     2  [address] [place] [address] Distance: 5.69 miles      In-Person, Phone/Virtual   [address] [address]  Language: English  Hours: Mon - Thu 9:00 AM - 4:00 PM  Fees: Free   Phone: (438) 411-5886 Email: info@[employer].org Website: https://Next Glass.org/     Utility payment assistance  3  [address] [facility] Distance: 5.69 miles      In-Person, Phone/Virtual   [address] [address]  Language: English  Hours: Mon - Thu 9:00 AM - 4:00 PM  Fees: Free   Phone: (195) 339-9355 Email: info@[employer].org Website: https://[employer].org/     4  [address] [place] [address] Distance: 6.55 miles      In-Person, Phone/Virtual   [address] [place] [address]  Language: English  Hours: Mon 8:00 AM - 4:00 PM , Tue 8:00 AM - 7:00 PM , Wed - Thu 8:00 AM - 4:00 PM  Fees: Free   Phone: (204) 786-8399 Email: info@[employer] Website: https://Next Glass.org/resources/resource-centers/          Food and Nutrition       Food pantry  5  [facility] Distance: 1.93 miles      [facility], Pickup   [address] [address]  Language: English  Hours: Mon - Wed 10:00 AM - 3:00 PM , Tue 5:30 PM - 7:30 PM , Thu 5:30 PM - 7:30 PM , Thu - Fri  10:00 AM - 12:00 PM  Fees: Free   Phone: (652) 307-4267 Website: http://www.theRed Hot LabsndZounds Hearing Aidspantry.org     6  [employer] Distance: 3.87 miles      In-Person   [address] [place] [address]  Language: English  Hours: Tue 10:00 AM - 12:00 PM  Fees: Free   Phone: (628) 416-7732 Ext.234 Website: http://www.[facility].org     SNAP application assistance  7  [address] [place] [address] Distance: 6.55 miles      In-Person   [address] [place] [address]  Language: English  Hours: Mon 8:00 AM - 4:00 PM , Tue 8:00 AM - 7:00 PM , Wed - Thu 8:00 AM - 4:00 PM  Fees: Free   Phone: (173) 252-6174 Email: info@[address] [place] [address].[facility] Website: https://[address] [place] [address].org/resources/resource-centers/     8  Community Action Partnership (CAP) [facility] Distance: 7.04 miles      In-Person   [address] [place] [address]  Language: English, Kyrgyz  Hours: Mon - Fri 8:00 AM - 8:00 PM  Fees: Free   Phone: (234) 650-2359 Email: info@[facility].org Website: http://www.[facility]agen[facility].org     Soup kitchen or free meals  9  [employer] by the [facility] Distance: 2.73 miles      Pickup   [address] [place] [address]  Language: English, Kyrgyz  Hours: Mon - Thu 5:30 PM - 6:30 PM  Fees: Free   Phone: (825) 353-7553 Email: etelvina@easter.org Website: http://[facility] [employer].org/wordpress/?page_id=5168     10  [facility] [place] [employer] [place] Distance: 5.4 miles      Pickup   [address] [place] [place] [address]  Language: English  Hours: Thu 3:00 PM - 6:00 PM  Fees: Free   Phone: (212) 464-3125 Email: clint@hope-pc.org Website: http://[place]-.org/          Important Numbers & Websites       Emergency Services   [address]  [place] [address]  Poison Control   (684) 355-2307  Suicide Prevention Lifeline   (611) 718-2008 (TALK)  Child Abuse Hotline   (532) 736-3357 (4-A-Child)  Sexual Assault Hotline   (537) 641-4990  (HOPE)  National Runaway Safeline   (697) 815-5448 (RUNAWAY)  All-Options Talkline   (280) 834-8065  Substance Abuse Referral   (278) 559-6632 (HELP)

## 2024-01-15 NOTE — PROGRESS NOTES
Assessment & Plan     Encounter for removal and reinsertion of Nexplanon  Discussed reasons for removal with patient, who consented to procedure. Risks and benefits reviewed, and consent obtained. Skin prepped with iodine and anesthetized with 1ml of 1% lidocaine with epinephrine. Under sterile procedure a small incision was made with an 11 blade and Nexplanon device was grasped and removed with hemostat. Device in its entirety was removed. Reviewed available methods of birth control; patient elected to proceed with Nexplanon placement. Consent was reviewed and signed by patient and provider, risks and medication side effects were reviewed. The patient's non-dominant arm (left) was used. Landmarks were identified. Using sterile technique, the device was inserted 10cm from medial epicondyle on upper arm just below triceps groove. Pressure was applied to insertion site and hemostasis was obtained. It was confirmed that device was fully inserted and both patient and myself were able to palpate inserted Nexplanon. Bandage and compression wrap applied, and patient was educated in aftercare. Patient tolerated the procedure well with minimal blood loss.   - etonogestrel (NEXPLANON) subdermal implant 68 mg  - Remove Contraceptive Implant - Nexplanon/Implanon  - Insert Contraceptive Implant - Nexplanon/Implanon                 Destiny Patel MD  [facility] LITO Warner is a 48 year old, presenting for the following health issues:  Contraception        1/15/2024     9:12 AM   Additional Questions   Roomed by Silvana Choi   Accompanied by N/A       History of Present Illness       Reason for visit:  Birth control replacement    She eats 4 or more servings of fruits and vegetables daily.She consumes 0 sweetened beverage(s) daily.She exercises with enough effort to increase her heart rate 20 to 29 minutes per day.  She exercises with enough effort to increase her heart rate 4 days per week.   She  is taking medications regularly.                 Review of Systems         Objective    /77 (BP Location: Right arm, Patient Position: Sitting, Cuff Size: Adult Regular)   Pulse 82   Resp 20   Wt 56.7 kg (125 lb)   SpO2 94%   BMI 22.37 kg/m    Body mass index is 22.37 kg/m .  Physical Exam   GEN: Alert and appropriately interactive for age  EYES: Eyes grossly normal to inspection, conjunctivae and sclerae normal  RESP: Breathing comfortably on room air   CV: Warm and well perfused peripheral extremities   MS: no gross musculoskeletal defects noted, no edema  NEURO: Alert and oriented. Gait normal. Speech fluent.    PSYCH: Affect normal/bright. Appearance well groomed.

## 2024-01-16 ENCOUNTER — TRANSFERRED RECORDS (OUTPATIENT)
Dept: HEALTH INFORMATION MANAGEMENT | Facility: CLINIC | Age: 49
End: 2024-01-16
Payer: COMMERCIAL

## 2024-01-22 ENCOUNTER — MYC REFILL (OUTPATIENT)
Dept: PEDIATRICS | Facility: CLINIC | Age: 49
End: 2024-01-22
Payer: COMMERCIAL

## 2024-01-22 DIAGNOSIS — G47.00 INSOMNIA, UNSPECIFIED TYPE: ICD-10-CM

## 2024-01-23 RX ORDER — TRAZODONE HYDROCHLORIDE 50 MG/1
50 TABLET, FILM COATED ORAL AT BEDTIME
Qty: 90 TABLET | Refills: 3 | Status: SHIPPED | OUTPATIENT
Start: 2024-01-23 | End: 2024-05-20

## 2024-03-01 ENCOUNTER — MYC MEDICAL ADVICE (OUTPATIENT)
Dept: PEDIATRICS | Facility: CLINIC | Age: 49
End: 2024-03-01
Payer: COMMERCIAL

## 2024-03-04 ENCOUNTER — E-VISIT (OUTPATIENT)
Dept: PEDIATRICS | Facility: CLINIC | Age: 49
End: 2024-03-04
Payer: COMMERCIAL

## 2024-03-04 DIAGNOSIS — G44.201 ACUTE INTRACTABLE TENSION-TYPE HEADACHE: Primary | ICD-10-CM

## 2024-03-04 PROCEDURE — 99207 PR NON-BILLABLE SERV PER CHARTING: CPT | Performed by: STUDENT IN AN ORGANIZED HEALTH CARE EDUCATION/TRAINING PROGRAM

## 2024-03-05 ENCOUNTER — OFFICE VISIT (OUTPATIENT)
Dept: PEDIATRICS | Facility: CLINIC | Age: 49
End: 2024-03-05
Payer: COMMERCIAL

## 2024-03-05 ENCOUNTER — TELEPHONE (OUTPATIENT)
Dept: PEDIATRICS | Facility: CLINIC | Age: 49
End: 2024-03-05

## 2024-03-05 ENCOUNTER — HOSPITAL ENCOUNTER (OUTPATIENT)
Dept: CT IMAGING | Facility: CLINIC | Age: 49
Discharge: HOME | End: 2024-03-05
Attending: FAMILY MEDICINE
Payer: COMMERCIAL

## 2024-03-05 VITALS
HEIGHT: 63 IN | TEMPERATURE: 97.8 F | BODY MASS INDEX: 20.84 KG/M2 | RESPIRATION RATE: 18 BRPM | HEART RATE: 79 BPM | WEIGHT: 117.6 LBS | DIASTOLIC BLOOD PRESSURE: 70 MMHG | OXYGEN SATURATION: 100 % | SYSTOLIC BLOOD PRESSURE: 108 MMHG

## 2024-03-05 VITALS
OXYGEN SATURATION: 99 % | SYSTOLIC BLOOD PRESSURE: 118 MMHG | HEART RATE: 73 BPM | DIASTOLIC BLOOD PRESSURE: 74 MMHG | TEMPERATURE: 97.9 F

## 2024-03-05 DIAGNOSIS — R51.9 ACUTE INTRACTABLE HEADACHE, UNSPECIFIED HEADACHE TYPE: Primary | ICD-10-CM

## 2024-03-05 DIAGNOSIS — R51.9 BILATERAL HEADACHE: ICD-10-CM

## 2024-03-05 DIAGNOSIS — G43.109 MIGRAINE WITH AURA AND WITHOUT STATUS MIGRAINOSUS, NOT INTRACTABLE: Primary | ICD-10-CM

## 2024-03-05 DIAGNOSIS — H53.9 VISUAL DISTURBANCE: ICD-10-CM

## 2024-03-05 LAB
ANION GAP SERPL CALCULATED.3IONS-SCNC: 14 MMOL/L (ref 7–15)
BASOPHILS # BLD AUTO: 0 10E3/UL (ref 0–0.2)
BASOPHILS NFR BLD AUTO: 1 %
BUN SERPL-MCNC: 5.5 MG/DL (ref 6–20)
CALCIUM SERPL-MCNC: 9 MG/DL (ref 8.6–10)
CHLORIDE SERPL-SCNC: 96 MMOL/L (ref 98–107)
CREAT SERPL-MCNC: 0.7 MG/DL (ref 0.51–0.95)
CRP SERPL-MCNC: <3 MG/L
DEPRECATED HCO3 PLAS-SCNC: 19 MMOL/L (ref 22–29)
EGFRCR SERPLBLD CKD-EPI 2021: >90 ML/MIN/1.73M2
EOSINOPHIL # BLD AUTO: 0.1 10E3/UL (ref 0–0.7)
EOSINOPHIL NFR BLD AUTO: 1 %
ERYTHROCYTE [DISTWIDTH] IN BLOOD BY AUTOMATED COUNT: 12.3 % (ref 10–15)
ERYTHROCYTE [SEDIMENTATION RATE] IN BLOOD BY WESTERGREN METHOD: 8 MM/HR (ref 0–30)
GLUCOSE SERPL-MCNC: 93 MG/DL (ref 70–99)
HCT VFR BLD AUTO: 36.6 % (ref 35–47)
HGB BLD-MCNC: 12.2 G/DL (ref 11.7–15.7)
IMM GRANULOCYTES # BLD: 0 10E3/UL
IMM GRANULOCYTES NFR BLD: 0 %
LYMPHOCYTES # BLD AUTO: 1.4 10E3/UL (ref 0.8–5.3)
LYMPHOCYTES NFR BLD AUTO: 21 %
MAGNESIUM SERPL-MCNC: 1.9 MG/DL (ref 1.7–2.3)
MCH RBC QN AUTO: 31.4 PG (ref 26.5–33)
MCHC RBC AUTO-ENTMCNC: 33.3 G/DL (ref 31.5–36.5)
MCV RBC AUTO: 94 FL (ref 78–100)
MONOCYTES # BLD AUTO: 0.9 10E3/UL (ref 0–1.3)
MONOCYTES NFR BLD AUTO: 13 %
NEUTROPHILS # BLD AUTO: 4.2 10E3/UL (ref 1.6–8.3)
NEUTROPHILS NFR BLD AUTO: 64 %
PLATELET # BLD AUTO: 323 10E3/UL (ref 150–450)
POTASSIUM SERPL-SCNC: 4.2 MMOL/L (ref 3.4–5.3)
RBC # BLD AUTO: 3.88 10E6/UL (ref 3.8–5.2)
SODIUM SERPL-SCNC: 129 MMOL/L (ref 135–145)
WBC # BLD AUTO: 6.6 10E3/UL (ref 4–11)

## 2024-03-05 PROCEDURE — 250N000011 HC RX IP 250 OP 636: Performed by: FAMILY MEDICINE

## 2024-03-05 PROCEDURE — 99207 REFERRAL TO ACUTE AND DIAGNOSTIC SERVICES: CPT | Performed by: PHYSICIAN ASSISTANT

## 2024-03-05 PROCEDURE — 70450 CT HEAD/BRAIN W/O DYE: CPT

## 2024-03-05 PROCEDURE — 70496 CT ANGIOGRAPHY HEAD: CPT

## 2024-03-05 PROCEDURE — 85025 COMPLETE CBC W/AUTO DIFF WBC: CPT | Performed by: FAMILY MEDICINE

## 2024-03-05 PROCEDURE — 86140 C-REACTIVE PROTEIN: CPT | Performed by: FAMILY MEDICINE

## 2024-03-05 PROCEDURE — 250N000009 HC RX 250: Performed by: FAMILY MEDICINE

## 2024-03-05 PROCEDURE — 99215 OFFICE O/P EST HI 40 MIN: CPT | Performed by: FAMILY MEDICINE

## 2024-03-05 PROCEDURE — 80048 BASIC METABOLIC PNL TOTAL CA: CPT | Performed by: FAMILY MEDICINE

## 2024-03-05 PROCEDURE — 36415 COLL VENOUS BLD VENIPUNCTURE: CPT | Performed by: FAMILY MEDICINE

## 2024-03-05 PROCEDURE — 85652 RBC SED RATE AUTOMATED: CPT | Performed by: FAMILY MEDICINE

## 2024-03-05 PROCEDURE — 83735 ASSAY OF MAGNESIUM: CPT | Performed by: FAMILY MEDICINE

## 2024-03-05 RX ORDER — IOPAMIDOL 755 MG/ML
67 INJECTION, SOLUTION INTRAVASCULAR ONCE
Status: COMPLETED | OUTPATIENT
Start: 2024-03-05 | End: 2024-03-05

## 2024-03-05 RX ORDER — SUMATRIPTAN 25 MG/1
25 TABLET, FILM COATED ORAL
Qty: 20 TABLET | Refills: 0 | Status: SHIPPED | OUTPATIENT
Start: 2024-03-05 | End: 2024-09-23

## 2024-03-05 RX ADMIN — IOPAMIDOL 67 ML: 755 INJECTION, SOLUTION INTRAVENOUS at 14:50

## 2024-03-05 RX ADMIN — SODIUM CHLORIDE 100 ML: 9 INJECTION, SOLUTION INTRAVENOUS at 14:50

## 2024-03-05 ASSESSMENT — PAIN SCALES - GENERAL: PAINLEVEL: EXTREME PAIN (9)

## 2024-03-05 NOTE — TELEPHONE ENCOUNTER
Patient submitted evisit for headache/nausea.  Can she come in to see Marisabel today for in person appointment?    Destiny Patel MD  Internal Medicine & Pediatrics  [facility] [place]  She/her

## 2024-03-05 NOTE — PROGRESS NOTES
Assessment & Plan     Acute intractable headache, unspecified headache type  Persistent headache with visual disturbances x one week. Patient referred to [facility] for further evaluation.  - Referral to Acute and Diagnostic Services (Day of diagnostic / First order acute); Future    Visual disturbance  - Referral to Acute and Diagnostic Services (Day of diagnostic / First order acute); Future    Subjective   Alana is a 48 year old, presenting for the following health issues:  Headache  HPI   Pain History:  When did you first notice your pain? 1 week   Have you seen anyone else for your pain? No  How has your pain affected your ability to work? Affecting vision and concentration  Where in your body do you have pain? Headache  Onset/Duration: 1 week  Description  Location: bilateral in the occipital area   Character: squeezing pain, constant pain  Frequency:  constant  Duration:  constant  Wake with headaches: YES  Able to do daily activities when headache present: YES- with difficulty  Intensity:  severe--9/10   Progression of Symptoms:  constant  Accompanying signs and symptoms:  Stiff neck: YES-pain  Neck or upper back pain: YES--posterior neck. Feels like tension  Sinus or URI symptoms no   Fever: No  Nausea or vomiting: YES- nausea  Dizziness: YES  Numbness/tingling: No  Weakness: unsure  Visual changes: blurriness bilaterally. Unusual for patient   History  Head trauma: No  Family history of migraines: No  Remote history of migraines--not frequent. This feels different  Daily pain medication use: YES  Previous tests for headaches: MRI years ago  Neurologist evaluation: YES  Precipitating or Alleviating factors (light/sound/sleep/caffeine): symptoms began after dental procedure last week.  Therapies tried and outcome: Ibuprofen (Advil, Motrin), and Naproxyn (Aleve)              Outcome - not effective  Frequent/daily pain medication use: No    Review of Systems  Constitutional, HEENT, cardiovascular, pulmonary, gi  "and gu systems are negative, except as otherwise noted.      Objective    /70 (BP Location: Right arm, Patient Position: Sitting, Cuff Size: Adult Regular)   Pulse 79   Temp 97.8  F (36.6  C) (Tympanic)   Resp 18   Ht 1.6 m (5' 3\")   Wt 53.3 kg (117 lb 9.6 oz)   SpO2 100%   BMI 20.83 kg/m    Body mass index is 20.83 kg/m .  Physical Exam   GENERAL: alert and no distress  EYES: Eyes grossly normal to inspection, PERRL and conjunctivae and sclerae normal  HENT: mouth without ulcers or lesions  NECK: no adenopathy, full ROM, no carotid bruits  RESP: lungs clear to auscultation - no rales, rhonchi or wheezes  CV: regular rate and rhythm, normal S1 S2, no S3 or S4    No results found for any visits on 03/05/24.        Signed Electronically by: Adiel Neumann PA-C    "

## 2024-03-05 NOTE — PROGRESS NOTES
{PROVIDER CHARTING PREFERENCE:894142}    Subjective   Alana is a 48 year old, presenting for the following health issues:  Headache    HPI     Headache  Onset/Duration: 7 days  Description:   Location: bilateral in the occipital area   Character: squeezing and constant pain  Frequency:  constant  Duration:  constant  Wake with headaches:  YES  Able to do daily activities when headache present:  YES- with difficulty  Intensity: severe, 9/10  Progression of Symptoms:  constant  Accompanying Signs & Symptoms:  Stiff neck: YES  Neck or upper back pain: YES- posterior neck. Feels like tension           Sinus or URI symptoms: no  Fever: no  Nausea or vomiting: YES- nausea  Dizziness: YES  Numbness/tingling: no  Weakness: unsure  Visual changes: YES- blurriness bilaterally  History:   Head trauma: no  Family history of migraines: no  Daily pain medication use: YES           Previous tests for headaches: YES- MRI years ago           Neurologist evaluations: YES  Precipitating or alleviating factors:   Does light make it worse: no  Does sound make it worse: YES  Does sleep help: no  Therapies Tried and outcome: Ibuprofen (Advil, Motrin) and Naproxyn (Aleve)      {ROS Picklists (Optional):466510}      Objective    /74 (BP Location: Right arm, Patient Position: Sitting, Cuff Size: Adult Small)   Pulse 73   Temp 97.9  F (36.6  C)   SpO2 99%   There is no height or weight on file to calculate BMI.  Physical Exam   {Exam List (Optional):878757}    {Diagnostic Test Results (Optional):219596}        Signed Electronically by: James Terry MD  {Email feedback regarding this note to [email or web address]   :131111}
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
## 2024-03-05 NOTE — PATIENT INSTRUCTIONS
Thank you for choosing us for your care. I think an in-clinic visit would be best next steps based on your symptoms. Please schedule a clinic appointment; you won t be charged for this eVisit.        I will have someone from the clinic call to help schedule with myself or one of my co-workers Adiel Neumann, or you can schedule an appointment right here in [facility], or call [phone].

## 2024-03-05 NOTE — PATIENT INSTRUCTIONS
Take the sumatriptan medication to help relieve the suspected migraine headache      Can continue to use Tylenol 500 mg and/or ibuprofen 400 mg every 6 hours as needed for discomfort      See no improvement within 2 days please call your doctors office        If symptoms worsen please seek help right away

## 2024-03-07 ENCOUNTER — TELEPHONE (OUTPATIENT)
Dept: PEDIATRICS | Facility: CLINIC | Age: 49
End: 2024-03-07
Payer: COMMERCIAL

## 2024-03-07 ENCOUNTER — MYC MEDICAL ADVICE (OUTPATIENT)
Dept: PEDIATRICS | Facility: CLINIC | Age: 49
End: 2024-03-07
Payer: COMMERCIAL

## 2024-03-07 DIAGNOSIS — G43.109 MIGRAINE WITH AURA AND WITHOUT STATUS MIGRAINOSUS, NOT INTRACTABLE: Primary | ICD-10-CM

## 2024-03-07 NOTE — TELEPHONE ENCOUNTER
Prior Authorization Retail Medication Request    Medication/Dose: Nurtec 75 mg  Diagnosis and ICD code (if different than what is on RX):    New/renewal/insurance change PA/secondary ins. PA:  Previously Tried and Failed:    Rationale:      Insurance   Primary: [employer]  Insurance ID:  [id number]    Secondary (if applicable): [facility]UC[facility]  Insurance ID:  [id number]    Pharmacy Information (if different than what is on RX)  Name:  [facility]   Phone:  [phone]  Fax:[phone]    Thanks,   Yelena Gil, T  [facility]

## 2024-03-07 NOTE — TELEPHONE ENCOUNTER
Prescription signed.    Destiny Patel MD  Internal Medicine & Pediatrics  [facility]  She/her

## 2024-03-07 NOTE — TELEPHONE ENCOUNTER
See Mychart message.        Good Morning,     Taking faithfully to 8 tablets a day.they mentioned another one to try.   If that doesn't next plans.   Sorry my head hurts.     Thank you,     Alana Murillo has had several recent apts for this. Notes from last visit.     No acute process identified on CT imaging of the head to explain patient's current symptoms.  Overall my initial impression with this focal headache in addition to noted blurred vision is that of an ocular migraine.  I will prescribe her a triptan at this time , should symptoms not improve in next 2 days advise calling primary care clinic. Continue tylenol and ibuprofen as needed     Routing to PCP for guidance

## 2024-03-07 NOTE — TELEPHONE ENCOUNTER
Could try nurtec if patient would like. Would recommend checking with insurance. I can send in prescription if she'd like to try.    Destiny Patel MD  Internal Medicine & Pediatrics  [facility]  She/her

## 2024-03-07 NOTE — TELEPHONE ENCOUNTER
Pt would like RX sent. She did call insurance and it sounded like a prior auth might be required but pt does not care about that at this time.

## 2024-03-11 RX ORDER — RIZATRIPTAN BENZOATE 10 MG/1
10 TABLET, ORALLY DISINTEGRATING ORAL
Qty: 15 TABLET | Refills: 11 | Status: SHIPPED | OUTPATIENT
Start: 2024-03-11 | End: 2024-03-11

## 2024-03-11 RX ORDER — RIZATRIPTAN BENZOATE 10 MG/1
10 TABLET, ORALLY DISINTEGRATING ORAL
Qty: 15 TABLET | Refills: 11 | Status: SHIPPED | OUTPATIENT
Start: 2024-03-11 | End: 2024-09-23

## 2024-03-11 NOTE — TELEPHONE ENCOUNTER
Patient wrote back in an evisit.  I sent in new prescriptions for rizatriptan and tizanidine (Zanaflex) (migraine medication and muscle relaxant).    If patient having persistent symptoms recommend in person visit.    Destiny Patel MD  Internal Medicine & Pediatrics  [facility] [place]  She/her

## 2024-03-17 ENCOUNTER — MEDICAL CORRESPONDENCE (OUTPATIENT)
Dept: HEALTH INFORMATION MANAGEMENT | Facility: CLINIC | Age: 49
End: 2024-03-17
Payer: COMMERCIAL

## 2024-03-17 ENCOUNTER — HOSPITAL ENCOUNTER (EMERGENCY)
Facility: CLINIC | Age: 49
Discharge: HOME | End: 2024-03-17
Attending: EMERGENCY MEDICINE | Admitting: EMERGENCY MEDICINE
Payer: COMMERCIAL

## 2024-03-17 VITALS
DIASTOLIC BLOOD PRESSURE: 82 MMHG | TEMPERATURE: 98.3 F | WEIGHT: 120.15 LBS | BODY MASS INDEX: 21.29 KG/M2 | RESPIRATION RATE: 18 BRPM | HEIGHT: 63 IN | OXYGEN SATURATION: 100 % | SYSTOLIC BLOOD PRESSURE: 124 MMHG | HEART RATE: 69 BPM

## 2024-03-17 DIAGNOSIS — R51.9 ACUTE INTRACTABLE HEADACHE, UNSPECIFIED HEADACHE TYPE: ICD-10-CM

## 2024-03-17 LAB
HOLD SPECIMEN: NORMAL

## 2024-03-17 PROCEDURE — 96374 THER/PROPH/DIAG INJ IV PUSH: CPT

## 2024-03-17 PROCEDURE — 96361 HYDRATE IV INFUSION ADD-ON: CPT

## 2024-03-17 PROCEDURE — 258N000003 HC RX IP 258 OP 636: Performed by: EMERGENCY MEDICINE

## 2024-03-17 PROCEDURE — 96375 TX/PRO/DX INJ NEW DRUG ADDON: CPT

## 2024-03-17 PROCEDURE — 99284 EMERGENCY DEPT VISIT MOD MDM: CPT | Mod: 25

## 2024-03-17 PROCEDURE — 250N000011 HC RX IP 250 OP 636: Performed by: EMERGENCY MEDICINE

## 2024-03-17 RX ORDER — BUTALBITAL, ACETAMINOPHEN AND CAFFEINE 50; 325; 40 MG/1; MG/1; MG/1
1 TABLET ORAL EVERY 6 HOURS PRN
Qty: 10 TABLET | Refills: 0 | Status: SHIPPED | OUTPATIENT
Start: 2024-03-17

## 2024-03-17 RX ORDER — METOCLOPRAMIDE HYDROCHLORIDE 5 MG/ML
10 INJECTION INTRAMUSCULAR; INTRAVENOUS ONCE
Status: COMPLETED | OUTPATIENT
Start: 2024-03-17 | End: 2024-03-17

## 2024-03-17 RX ORDER — DEXAMETHASONE SODIUM PHOSPHATE 10 MG/ML
10 INJECTION, SOLUTION INTRAMUSCULAR; INTRAVENOUS ONCE
Status: COMPLETED | OUTPATIENT
Start: 2024-03-17 | End: 2024-03-17

## 2024-03-17 RX ORDER — DIPHENHYDRAMINE HYDROCHLORIDE 50 MG/ML
12.5 INJECTION INTRAMUSCULAR; INTRAVENOUS ONCE
Status: COMPLETED | OUTPATIENT
Start: 2024-03-17 | End: 2024-03-17

## 2024-03-17 RX ORDER — BUTALBITAL, ACETAMINOPHEN AND CAFFEINE 50; 325; 40 MG/1; MG/1; MG/1
1 TABLET ORAL EVERY 6 HOURS PRN
Qty: 10 TABLET | Refills: 0 | Status: SHIPPED | OUTPATIENT
Start: 2024-03-17 | End: 2024-03-17

## 2024-03-17 RX ORDER — KETOROLAC TROMETHAMINE 15 MG/ML
15 INJECTION, SOLUTION INTRAMUSCULAR; INTRAVENOUS ONCE
Status: COMPLETED | OUTPATIENT
Start: 2024-03-17 | End: 2024-03-17

## 2024-03-17 RX ADMIN — DEXAMETHASONE SODIUM PHOSPHATE 10 MG: 10 INJECTION, SOLUTION INTRAMUSCULAR; INTRAVENOUS at 08:50

## 2024-03-17 RX ADMIN — DIPHENHYDRAMINE HYDROCHLORIDE 12.5 MG: 50 INJECTION INTRAMUSCULAR; INTRAVENOUS at 08:50

## 2024-03-17 RX ADMIN — METOCLOPRAMIDE HYDROCHLORIDE 10 MG: 5 INJECTION INTRAMUSCULAR; INTRAVENOUS at 08:50

## 2024-03-17 RX ADMIN — KETOROLAC TROMETHAMINE 15 MG: 15 INJECTION, SOLUTION INTRAMUSCULAR; INTRAVENOUS at 08:50

## 2024-03-17 RX ADMIN — SODIUM CHLORIDE 1000 ML: 9 INJECTION, SOLUTION INTRAVENOUS at 08:51

## 2024-03-17 ASSESSMENT — COLUMBIA-SUICIDE SEVERITY RATING SCALE - C-SSRS
1. IN THE PAST MONTH, HAVE YOU WISHED YOU WERE DEAD OR WISHED YOU COULD GO TO SLEEP AND NOT WAKE UP?: NO
6. HAVE YOU EVER DONE ANYTHING, STARTED TO DO ANYTHING, OR PREPARED TO DO ANYTHING TO END YOUR LIFE?: NO
2. HAVE YOU ACTUALLY HAD ANY THOUGHTS OF KILLING YOURSELF IN THE PAST MONTH?: NO

## 2024-03-17 ASSESSMENT — ACTIVITIES OF DAILY LIVING (ADL)
ADLS_ACUITY_SCORE: 18.25
ADLS_ACUITY_SCORE: 17.25
ADLS_ACUITY_SCORE: 18.25
ADLS_ACUITY_SCORE: 18.25

## 2024-03-17 NOTE — ED TRIAGE NOTES
Pt arrives to the ED due to having headache since the end of Feb. Pt states that she recently had a CT scan that was negative. Started on sumatriptan but states that is not helping. States some bilateral eye blurriness and intermittent dizziness.     Triage Assessment (Adult)       Row Name 03/17/24 0756          Triage Assessment    Airway WDL WDL        Respiratory WDL    Respiratory WDL WDL        Skin Circulation/Temperature WDL    Skin Circulation/Temperature WDL WDL        Cardiac WDL    Cardiac WDL WDL        Peripheral/Neurovascular WDL    Peripheral Neurovascular WDL WDL        Cognitive/Neuro/Behavioral WDL    Cognitive/Neuro/Behavioral WDL WDL

## 2024-03-17 NOTE — ED PROVIDER NOTES
History     Chief Complaint:  Headache     The history is provided by the patient.      Alana Mujica is a 48 year old female with history of hypertension who presents to the ED for evaluation of a headache. The patient reports that she developed a headache gradually on February 27th after having dental work, but she thought it was a migraine so she decided to wait it out. Her pain continued to worsen and she thought there was an infection from the dental work, so she was put on amoxil with no relief. She states that her headache is posterior, constant, and radiates forward. She also endorses bilateral blurred, foggy vision, some nausea, bilateral leg and arm weakness, and dizziness, but had no issues walking. Her headache is worsened with bright lights and noise. She states that she was recently evaluated for the headache and got a CT scan, which was negative. She was prescribed sumatriptan, ibuprofen, and acetaminophen, which provided no relief. Patient was then prescribed rizatriptan and a muscle relaxer, and still had no relief. Patient states that during her CT scan, she rated her headache a 9/10 and she felt the throbbing pain in her chest. She denies dark spots in her vision, fever, sore throat, cough, vomiting, abdominal pain, vertigo, or carbon monoxide exposure. Patient has had headaches before, but they have never been this bad an she has never had vision changes with them. She last took her sumatriptan at 0235, along with ibuprofen and acetaminophen. Adds that she has had nerve blocks in her occipital area. Patient notes history of alcohol issues and states that before she was prescribed any medications, she drank more to help the pain.patient does not have a neurologist.     Independent Historian:    None     Review of External Notes:  I reviewed the primary care office visit from 3/5/2024 when patient was seen for her headache and underwent CT/CTA of the head and neck which was unremarkable.  She  "was prescribed triptan at that time.    Medications:    Arginine  Atarax   Depade  Nurtec  Maxalt   Imitrex  Zanaflex  Trazodone  Valtrex  Catapres  Zofran  Ativan   Prilosec  Protonix  Inderal   Wellbutrin     Past Medical History:    Acute kidney injury  Alcohol use disorder   Anorexia nervosa   Anxiety  Bulimia nervosa   Chemical dependency  Depression  Genital herpes  HPV  Osteoporosis  Substance abuse   Alcoholic hepatitis  GERD  Thrombocytopenia   PTSD  Eating disorder  Recurrent sinus infections  Insomnia   Chronic sinusitis  HSV-2 infection   Hypertension   Anemia   Subjunctive visual disturbance of both eyes  Diplopia   Generalized tonic-clonic seizure   Hypercalcemia   ASC-US   Migraines   Ortho stasis   Metabolic alkalosis   Hypotension   Hyposmolality   Bradycardia     Past Surgical History:    Colonoscopy, with polypectomy and biopsy  LEEP, cervical    Physical Exam   Patient Vitals for the past 24 hrs:   BP Temp Temp src Pulse Resp SpO2 Height Weight   03/17/24 1057 124/82 -- -- 69 18 100 % -- --   03/17/24 0756 -- -- -- -- -- 98 % -- --   03/17/24 0755 (!) 145/97 98.3  F (36.8  C) Oral 92 18 -- 1.6 m (5' 3\") 54.5 kg (120 lb 2.4 oz)      Physical Exam  Vitals and nursing note reviewed.   Constitutional:       General: She is not in acute distress.     Appearance: She is not ill-appearing.   HENT:      Head: Normocephalic and atraumatic.      Right Ear: External ear normal.      Left Ear: External ear normal.      Nose: Nose normal.      Mouth/Throat:      Mouth: Mucous membranes are moist.   Eyes:      Extraocular Movements: Extraocular movements intact.      Conjunctiva/sclera: Conjunctivae normal.      Pupils: Pupils are equal, round, and reactive to light.   Cardiovascular:      Rate and Rhythm: Normal rate and regular rhythm.      Heart sounds: No murmur heard.  Pulmonary:      Effort: Pulmonary effort is normal. No respiratory distress.      Breath sounds: Normal breath sounds. No wheezing, " rhonchi or rales.   Abdominal:      General: Abdomen is flat. Bowel sounds are normal. There is no distension.      Palpations: Abdomen is soft.      Tenderness: There is no abdominal tenderness. There is no guarding or rebound.   Musculoskeletal:         General: No deformity or signs of injury.      Cervical back: Normal range of motion and neck supple. No rigidity.   Skin:     General: Skin is warm and dry.      Findings: No rash.   Neurological:      Mental Status: She is alert and oriented to person, place, and time.      Cranial Nerves: No cranial nerve deficit.      Sensory: No sensory deficit.      Motor: No weakness.      Gait: Gait normal.   Psychiatric:         Behavior: Behavior normal.           Emergency Department Course   Emergency Department Course & Assessments:  Interventions:  Medications   sodium chloride 0.9% BOLUS 1,000 mL (0 mLs Intravenous Stopped 3/17/24 1052)   metoclopramide (REGLAN) injection 10 mg (10 mg Intravenous $Given 3/17/24 0850)   diphenhydrAMINE (BENADRYL) injection 12.5 mg (12.5 mg Intravenous $Given 3/17/24 0850)   ketorolac (TORADOL) injection 15 mg (15 mg Intravenous $Given 3/17/24 0850)   dexAMETHasone PF (DECADRON) injection 10 mg (10 mg Intravenous $Given 3/17/24 0850)      Assessments/Consultations/Discussion of Management or Tests:  ED Course as of 24 1515   Sun Mar 17, 2024   0824 I obtained history and examined the patient as noted above.    1037 I rechecked and updated the patient.      Social Determinants of Health affecting care:  None     Disposition:  The patient was discharged.    Impression & Plan    Medical Decision Makin-year-old female presenting with ongoing headache for the past 2 and half weeks.  There are no red flags to suggest an intracranial bleed or mass.  She has a normal neurologic exam.  She had a CT as an outpatient that showed no evidence of bleed, mass, dissection, aneurysm.  She has no fevers or meningeal signs to suggest an  infectious etiology.  I have low suspicion for pseudotumor cerebri.  No history of toxic exposures or sick contacts.  I do not feel that she needs additional neuroimaging emergently today in the ED.  She was given a migraine cocktail but did not have much relief.  Given that she is well-appearing with normal vital signs and a normal neurologic exam, I feel that she is appropriate for outpatient follow-up.  I will put in a referral for outpatient neurology appointment and we can try Fioricet as an alternative option for headache control.  We did discuss return precautions.    Diagnosis:    ICD-10-CM    1. Acute intractable headache, unspecified headache type  R51.9          Discharge Medications:  Discharge Medication List as of 3/17/2024 10:53 AM        START taking these medications    Details   butalbital-acetaminophen-caffeine (ESGIC) -40 MG tablet Take 1 tablet by mouth every 6 hours as needed for headaches, Disp-10 tablet, R-0, Local Print            Scribe Disclosure:  RITIKA JOHNSON, am serving as a scribe at 8:04 AM on 3/17/2024 to document services personally performed by Chandu Thakur MD based on my observations and the provider's statements to me.    3/17/2024   Chandu Thakur MD Goodwin, Shaun M, MD  03/17/24 5521

## 2024-03-18 ENCOUNTER — TRANSFERRED RECORDS (OUTPATIENT)
Dept: HEALTH INFORMATION MANAGEMENT | Facility: CLINIC | Age: 49
End: 2024-03-18
Payer: COMMERCIAL

## 2024-03-20 NOTE — TELEPHONE ENCOUNTER
Prior Authorization Approval    Medication: RIMEGEPANT SULFATE 75 MG PO TBDP  Authorization Effective Date: 2/19/2024  Authorization Expiration Date: 3/20/2025  Approved Dose/Quantity: 16/30   Reference #: [id number]   Insurance Company: [employer] Non-Specialty PA's - Phone [phone] Fax [phone]  Which Pharmacy is filling the prescription: [facility] [place] [place] - [address] [facility]   Pharmacy Notified: y  Patient Notified: y - pharmacy to notify    E-voucher auto applied and covered the copay. If the benefit runs out on this card, PA may be needed through the secondary.

## 2024-03-25 ENCOUNTER — LAB (OUTPATIENT)
Dept: LAB | Facility: CLINIC | Age: 49
End: 2024-03-25
Payer: COMMERCIAL

## 2024-03-25 DIAGNOSIS — G44.201 ACUTE INTRACTABLE TENSION-TYPE HEADACHE: ICD-10-CM

## 2024-03-25 PROCEDURE — 99000 SPECIMEN HANDLING OFFICE-LAB: CPT

## 2024-03-25 PROCEDURE — 36415 COLL VENOUS BLD VENIPUNCTURE: CPT

## 2024-03-25 PROCEDURE — 82607 VITAMIN B-12: CPT

## 2024-03-25 PROCEDURE — 84425 ASSAY OF VITAMIN B-1: CPT | Mod: 90

## 2024-03-26 LAB — VIT B12 SERPL-MCNC: 1025 PG/ML (ref 232–1245)

## 2024-03-30 LAB — VIT B1 PYROPHOSHATE BLD-SCNC: 168 NMOL/L

## 2024-04-25 ENCOUNTER — TRANSFERRED RECORDS (OUTPATIENT)
Dept: HEALTH INFORMATION MANAGEMENT | Facility: CLINIC | Age: 49
End: 2024-04-25
Payer: COMMERCIAL

## 2024-04-29 ENCOUNTER — NURSE TRIAGE (OUTPATIENT)
Dept: PEDIATRICS | Facility: CLINIC | Age: 49
End: 2024-04-29
Payer: COMMERCIAL

## 2024-04-29 NOTE — TELEPHONE ENCOUNTER
"Incoming call from patient.    S-(situation): Pain in R foot over 5th metatarsal that started 4/21.    B-(background): Hx gout (this doesn't feel like that), neuropathy (per pt), osteoporosis. Hx fracture to this part of the foot \"years ago.\"    A-(assessment): At first she thought pain was from wearing a certain shoe (flats), but it has persisted. 5/10 pain. Top of foot is tender. Pain shoots to middle top of foot. Top of foot is a little red.  -Hasn't tried ice, heat, tylenol/ibuprofen.  -Denies known recent injury to foot, swelling, breaks in skin, leg pain, rash, worsening numbness, fever    R-(recommendations): See in Office Within 2 Weeks. Appointment scheduled with PCP 5/3.    Reason for Disposition   MILD pain (e.g., does not interfere with normal activities) and present > 7 days    Additional Information   Negative: Followed an ankle or foot injury   Negative: Toe pain is main symptom   Negative: Ankle pain is main symptom   Negative: Entire foot is cool or blue in comparison to other foot   Negative: Purple or black skin on foot or toe   Negative: Red area or streak and fever   Negative: Swollen foot and fever   Negative: Patient sounds very sick or weak to the triager   Negative: SEVERE pain (e.g., excruciating, unable to do any normal activities)   Negative: Looks like a boil, infected sore, deep ulcer, or other infected rash (spreading redness, pus)   Negative: Swollen foot  (Exceptions: Localized bump from bunions, calluses, insect bite, sting.)   Negative: Weakness (i.e., loss of strength) of new-onset in foot or toes (Exceptions: Not truly weak, foot feels weak because of pain; weakness present > 2 weeks.)   Negative: Numbness (i.e., loss of sensation) in foot or toes (Exception: Just tingling; numbness present > 2 weeks.)   Negative: MODERATE pain (e.g., interferes with normal activities, limping) and present > 3 days   Negative: Weakness or numbness in foot or toes and present > 2 weeks   Negative: " Tingling in feet and new or increased   Negative: Pain in the big toe joint   Negative: Patient wants to be seen    Protocols used: Foot Pain-A-[place]

## 2024-05-03 ENCOUNTER — PATIENT OUTREACH (OUTPATIENT)
Dept: CARE COORDINATION | Facility: CLINIC | Age: 49
End: 2024-05-03

## 2024-05-03 ENCOUNTER — OFFICE VISIT (OUTPATIENT)
Dept: PEDIATRICS | Facility: CLINIC | Age: 49
End: 2024-05-03
Payer: COMMERCIAL

## 2024-05-03 ENCOUNTER — ANCILLARY PROCEDURE (OUTPATIENT)
Dept: GENERAL RADIOLOGY | Facility: CLINIC | Age: 49
End: 2024-05-03
Attending: STUDENT IN AN ORGANIZED HEALTH CARE EDUCATION/TRAINING PROGRAM
Payer: COMMERCIAL

## 2024-05-03 ENCOUNTER — TRANSFERRED RECORDS (OUTPATIENT)
Dept: HEALTH INFORMATION MANAGEMENT | Facility: CLINIC | Age: 49
End: 2024-05-03

## 2024-05-03 VITALS
HEART RATE: 79 BPM | WEIGHT: 124.8 LBS | DIASTOLIC BLOOD PRESSURE: 86 MMHG | OXYGEN SATURATION: 100 % | BODY MASS INDEX: 22.11 KG/M2 | RESPIRATION RATE: 18 BRPM | TEMPERATURE: 98.3 F | SYSTOLIC BLOOD PRESSURE: 126 MMHG | HEIGHT: 63 IN

## 2024-05-03 DIAGNOSIS — Z12.31 VISIT FOR SCREENING MAMMOGRAM: ICD-10-CM

## 2024-05-03 DIAGNOSIS — M79.671 RIGHT FOOT PAIN: Primary | ICD-10-CM

## 2024-05-03 DIAGNOSIS — M79.671 RIGHT FOOT PAIN: ICD-10-CM

## 2024-05-03 PROCEDURE — 73630 X-RAY EXAM OF FOOT: CPT | Mod: TC | Performed by: RADIOLOGY

## 2024-05-03 PROCEDURE — 99213 OFFICE O/P EST LOW 20 MIN: CPT | Performed by: STUDENT IN AN ORGANIZED HEALTH CARE EDUCATION/TRAINING PROGRAM

## 2024-05-03 ASSESSMENT — ANXIETY QUESTIONNAIRES
5. BEING SO RESTLESS THAT IT IS HARD TO SIT STILL: NOT AT ALL
7. FEELING AFRAID AS IF SOMETHING AWFUL MIGHT HAPPEN: NOT AT ALL
IF YOU CHECKED OFF ANY PROBLEMS ON THIS QUESTIONNAIRE, HOW DIFFICULT HAVE THESE PROBLEMS MADE IT FOR YOU TO DO YOUR WORK, TAKE CARE OF THINGS AT HOME, OR GET ALONG WITH OTHER PEOPLE: NOT DIFFICULT AT ALL
4. TROUBLE RELAXING: NOT AT ALL
GAD7 TOTAL SCORE: 0
2. NOT BEING ABLE TO STOP OR CONTROL WORRYING: NOT AT ALL
1. FEELING NERVOUS, ANXIOUS, OR ON EDGE: NOT AT ALL
6. BECOMING EASILY ANNOYED OR IRRITABLE: NOT AT ALL
GAD7 TOTAL SCORE: 0
3. WORRYING TOO MUCH ABOUT DIFFERENT THINGS: NOT AT ALL

## 2024-05-03 ASSESSMENT — PATIENT HEALTH QUESTIONNAIRE - PHQ9
SUM OF ALL RESPONSES TO PHQ QUESTIONS 1-9: 1
10. IF YOU CHECKED OFF ANY PROBLEMS, HOW DIFFICULT HAVE THESE PROBLEMS MADE IT FOR YOU TO DO YOUR WORK, TAKE CARE OF THINGS AT HOME, OR GET ALONG WITH OTHER PEOPLE: NOT DIFFICULT AT ALL
SUM OF ALL RESPONSES TO PHQ QUESTIONS 1-9: 1

## 2024-05-03 ASSESSMENT — PAIN SCALES - GENERAL: PAINLEVEL: MILD PAIN (3)

## 2024-05-03 NOTE — PROGRESS NOTES
"  Assessment & Plan     Right foot pain  Metatarsalgia over right 5th metatarsal. No evidence of stress fracture on X-ray. Discussed use of Voltaren (diclofenac) gel, metatarsal pads, hard soled and wide toed shoes. If not improving recommend podiatry evaluation.  - XR Foot Right G/E 3 Views; Future    Visit for screening mammogram  Due in a few months.  - MA SCREENING DIGITAL BILAT - Future  (s+30); Future                  Subjective   Alana is a 48 year old, presenting for the following health issues:  Musculoskeletal Problem        5/3/2024    12:54 PM   Additional Questions   Roomed by Silvana Choi   Accompanied by N/A     History of Present Illness       Reason for visit:  Foot pain    [facility] - sees Yelena midlevel                    Objective    /86 (BP Location: Right arm, Patient Position: Sitting, Cuff Size: Adult Regular)   Pulse 79   Temp 98.3  F (36.8  C) (Tympanic)   Resp 18   Ht 1.6 m (5' 3\")   Wt 56.6 kg (124 lb 12.8 oz)   LMP  (LMP Unknown)   SpO2 100%   BMI 22.11 kg/m    Body mass index is 22.11 kg/m .  Physical Exam   GENERAL: alert and no distress  MS: no gross musculoskeletal defects noted, no edema            Signed Electronically by: Deirdre E. Milligan, MD    "

## 2024-05-20 ENCOUNTER — MYC MEDICAL ADVICE (OUTPATIENT)
Dept: PEDIATRICS | Facility: CLINIC | Age: 49
End: 2024-05-20
Payer: COMMERCIAL

## 2024-05-20 DIAGNOSIS — G47.00 INSOMNIA, UNSPECIFIED TYPE: ICD-10-CM

## 2024-05-20 RX ORDER — TRAZODONE HYDROCHLORIDE 50 MG/1
50-100 TABLET, FILM COATED ORAL AT BEDTIME
Qty: 180 TABLET | Refills: 3 | Status: SHIPPED | OUTPATIENT
Start: 2024-05-20

## 2024-05-31 ENCOUNTER — PATIENT OUTREACH (OUTPATIENT)
Dept: CARE COORDINATION | Facility: CLINIC | Age: 49
End: 2024-05-31
Payer: COMMERCIAL

## 2024-06-17 ENCOUNTER — ANCILLARY PROCEDURE (OUTPATIENT)
Dept: MAMMOGRAPHY | Facility: CLINIC | Age: 49
End: 2024-06-17
Attending: STUDENT IN AN ORGANIZED HEALTH CARE EDUCATION/TRAINING PROGRAM
Payer: COMMERCIAL

## 2024-06-17 DIAGNOSIS — Z12.31 VISIT FOR SCREENING MAMMOGRAM: ICD-10-CM

## 2024-06-17 PROCEDURE — 77067 SCR MAMMO BI INCL CAD: CPT | Mod: TC | Performed by: RADIOLOGY

## 2024-06-17 PROCEDURE — 77063 BREAST TOMOSYNTHESIS BI: CPT | Mod: TC | Performed by: RADIOLOGY

## 2024-06-24 ENCOUNTER — TRANSFERRED RECORDS (OUTPATIENT)
Dept: HEALTH INFORMATION MANAGEMENT | Facility: CLINIC | Age: 49
End: 2024-06-24
Payer: COMMERCIAL

## 2024-06-28 ENCOUNTER — PATIENT OUTREACH (OUTPATIENT)
Dept: CARE COORDINATION | Facility: CLINIC | Age: 49
End: 2024-06-28
Payer: COMMERCIAL

## 2024-09-23 ENCOUNTER — OFFICE VISIT (OUTPATIENT)
Dept: PEDIATRICS | Facility: CLINIC | Age: 49
End: 2024-09-23
Payer: COMMERCIAL

## 2024-09-23 VITALS
RESPIRATION RATE: 16 BRPM | HEART RATE: 79 BPM | HEIGHT: 62 IN | BODY MASS INDEX: 21.7 KG/M2 | WEIGHT: 117.9 LBS | TEMPERATURE: 97.8 F | SYSTOLIC BLOOD PRESSURE: 112 MMHG | OXYGEN SATURATION: 99 % | DIASTOLIC BLOOD PRESSURE: 79 MMHG

## 2024-09-23 DIAGNOSIS — F10.20 ALCOHOL USE DISORDER, SEVERE, DEPENDENCE (H): ICD-10-CM

## 2024-09-23 DIAGNOSIS — Z00.00 ROUTINE GENERAL MEDICAL EXAMINATION AT A HEALTH CARE FACILITY: Primary | ICD-10-CM

## 2024-09-23 DIAGNOSIS — B00.1 COLD SORE: ICD-10-CM

## 2024-09-23 LAB
ALBUMIN SERPL BCG-MCNC: 4.8 G/DL (ref 3.5–5.2)
ALP SERPL-CCNC: 47 U/L (ref 40–150)
ALT SERPL W P-5'-P-CCNC: 9 U/L (ref 0–50)
ANION GAP SERPL CALCULATED.3IONS-SCNC: 12 MMOL/L (ref 7–15)
AST SERPL W P-5'-P-CCNC: 20 U/L (ref 0–45)
BILIRUB SERPL-MCNC: 0.3 MG/DL
BUN SERPL-MCNC: 19.6 MG/DL (ref 6–20)
CALCIUM SERPL-MCNC: 10.1 MG/DL (ref 8.8–10.4)
CHLORIDE SERPL-SCNC: 101 MMOL/L (ref 98–107)
CREAT SERPL-MCNC: 0.81 MG/DL (ref 0.51–0.95)
EGFRCR SERPLBLD CKD-EPI 2021: 88 ML/MIN/1.73M2
ERYTHROCYTE [DISTWIDTH] IN BLOOD BY AUTOMATED COUNT: 11.8 % (ref 10–15)
FERRITIN SERPL-MCNC: 169 NG/ML (ref 6–175)
GLUCOSE SERPL-MCNC: 98 MG/DL (ref 70–99)
HCO3 SERPL-SCNC: 26 MMOL/L (ref 22–29)
HCT VFR BLD AUTO: 40.5 % (ref 35–47)
HGB BLD-MCNC: 13.5 G/DL (ref 11.7–15.7)
MCH RBC QN AUTO: 32.8 PG (ref 26.5–33)
MCHC RBC AUTO-ENTMCNC: 33.3 G/DL (ref 31.5–36.5)
MCV RBC AUTO: 99 FL (ref 78–100)
PLATELET # BLD AUTO: 252 10E3/UL (ref 150–450)
POTASSIUM SERPL-SCNC: 4.9 MMOL/L (ref 3.4–5.3)
PROT SERPL-MCNC: 7.6 G/DL (ref 6.4–8.3)
RBC # BLD AUTO: 4.11 10E6/UL (ref 3.8–5.2)
SODIUM SERPL-SCNC: 139 MMOL/L (ref 135–145)
WBC # BLD AUTO: 5.2 10E3/UL (ref 4–11)

## 2024-09-23 PROCEDURE — 85027 COMPLETE CBC AUTOMATED: CPT | Performed by: STUDENT IN AN ORGANIZED HEALTH CARE EDUCATION/TRAINING PROGRAM

## 2024-09-23 PROCEDURE — 82728 ASSAY OF FERRITIN: CPT | Performed by: STUDENT IN AN ORGANIZED HEALTH CARE EDUCATION/TRAINING PROGRAM

## 2024-09-23 PROCEDURE — 99396 PREV VISIT EST AGE 40-64: CPT | Mod: 25 | Performed by: STUDENT IN AN ORGANIZED HEALTH CARE EDUCATION/TRAINING PROGRAM

## 2024-09-23 PROCEDURE — 80053 COMPREHEN METABOLIC PANEL: CPT | Performed by: STUDENT IN AN ORGANIZED HEALTH CARE EDUCATION/TRAINING PROGRAM

## 2024-09-23 PROCEDURE — 36415 COLL VENOUS BLD VENIPUNCTURE: CPT | Performed by: STUDENT IN AN ORGANIZED HEALTH CARE EDUCATION/TRAINING PROGRAM

## 2024-09-23 PROCEDURE — 90471 IMMUNIZATION ADMIN: CPT | Performed by: STUDENT IN AN ORGANIZED HEALTH CARE EDUCATION/TRAINING PROGRAM

## 2024-09-23 PROCEDURE — 90746 HEPB VACCINE 3 DOSE ADULT IM: CPT | Performed by: STUDENT IN AN ORGANIZED HEALTH CARE EDUCATION/TRAINING PROGRAM

## 2024-09-23 RX ORDER — NALTREXONE HYDROCHLORIDE 50 MG/1
50 TABLET, FILM COATED ORAL DAILY
Qty: 30 TABLET | Refills: 1 | Status: SHIPPED | OUTPATIENT
Start: 2024-09-23

## 2024-09-23 RX ORDER — VALACYCLOVIR HYDROCHLORIDE 1 G/1
TABLET, FILM COATED ORAL
Qty: 4 TABLET | Refills: 11 | Status: SHIPPED | OUTPATIENT
Start: 2024-09-23

## 2024-09-23 ASSESSMENT — PAIN SCALES - GENERAL: PAINLEVEL: NO PAIN (0)

## 2024-09-23 NOTE — PATIENT INSTRUCTIONS
Neuropathy  -ask neurologist about EMG      Patient Education   Preventive Care Advice   This is general advice given by our system to help you stay healthy. However, your care team may have specific advice just for you. Please talk to your care team about your preventive care needs.  Nutrition  Eat 5 or more servings of fruits and vegetables each day.  Try wheat bread, brown rice and whole grain pasta (instead of white bread, rice, and pasta).  Get enough calcium and vitamin D. Check the label on foods and aim for 100% of the RDA (recommended daily allowance).  Lifestyle  Exercise at least 150 minutes each week  (30 minutes a day, 5 days a week).  Do muscle strengthening activities 2 days a week. These help control your weight and prevent disease.  No smoking.  Wear sunscreen to prevent skin cancer.  Have a dental exam and cleaning every 6 months.  Yearly exams  See your health care team every year to talk about:  Any changes in your health.  Any medicines your care team has prescribed.  Preventive care, family planning, and ways to prevent chronic diseases.  Shots (vaccines)   HPV shots (up to age 26), if you've never had them before.  Hepatitis B shots (up to age 59), if you've never had them before.  COVID-19 shot: Get this shot when it's due.  Flu shot: Get a flu shot every year.  Tetanus shot: Get a tetanus shot every 10 years.  Pneumococcal, hepatitis A, and RSV shots: Ask your care team if you need these based on your risk.  Shingles shot (for age 50 and up)  General health tests  Diabetes screening:  Starting at age 35, Get screened for diabetes at least every 3 years.  If you are younger than age 35, ask your care team if you should be screened for diabetes.  Cholesterol test: At age 39, start having a cholesterol test every 5 years, or more often if advised.  Bone density scan (DEXA): At age 50, ask your care team if you should have this scan for osteoporosis (brittle bones).  Hepatitis C: Get tested at  least once in your life.  STIs (sexually transmitted infections)  Before age 24: Ask your care team if you should be screened for STIs.  After age 24: Get screened for STIs if you're at risk. You are at risk for STIs (including HIV) if:  You are sexually active with more than one person.  You don't use condoms every time.  You or a partner was diagnosed with a sexually transmitted infection.  If you are at risk for HIV, ask about PrEP medicine to prevent HIV.  Get tested for HIV at least once in your life, whether you are at risk for HIV or not.  Cancer screening tests  Cervical cancer screening: If you have a cervix, begin getting regular cervical cancer screening tests starting at age 21.  Breast cancer scan (mammogram): If you've ever had breasts, begin having regular mammograms starting at age 40. This is a scan to check for breast cancer.  Colon cancer screening: It is important to start screening for colon cancer at age 45.  Have a colonoscopy test every 10 years (or more often if you're at risk) Or, ask your provider about stool tests like a FIT test every year or Cologuard test every 3 years.  To learn more about your testing options, visit:   .  For help making a decision, visit:   https://bit.ly/ss93281.  Prostate cancer screening test: If you have a prostate, ask your care team if a prostate cancer screening test (PSA) at age 55 is right for you.  Lung cancer screening: If you are a current or former smoker ages 50 to 80, ask your care team if ongoing lung cancer screenings are right for you.  For informational purposes only. Not to replace the advice of your health care provider. Copyright   2023 [facility]. All rights reserved. Clinically reviewed by the [facility]itions [facility]. [employer] 183828 - REV 01/24.  9 Ways to Cut Back on Drinking  Maybe you've found yourself drinking more alcohol than you'd prefer. If you want to cut back, here are some ideas to try.    Think  "before you drink.  Do you really want a drink, or is it just a habit? If you're used to having a drink at a certain time, try doing something else then.     Look for substitutes.  Find some no-alcohol drinks that you enjoy, like flavored seltzer water, tea with honey, or tonic with a slice of lime. Or try alcohol-free beer or \"virgin\" cocktails (without the alcohol).     Drink more water.  Use water to quench your thirst. Drink a glass of water before you have any alcohol. Have another glass along with every drink or between drinks.     Shrink your drink.  For example, have a bottle of beer instead of a pint. Use a smaller glass for wine. Choose drinks with lower alcohol content (ABV%). Or use less liquor and more mixer in cocktails.     Slow down.  It's easy to drink quickly and without thinking about it. Pay attention, and make each drink last longer.     Do the math.  Total up how much you spend on alcohol each month. How much is that a year? If you cut back, what could you do with the money you save?     Take a break.  Choose a day or two each week when you won't drink at all. Notice how you feel on those days, physically and emotionally. How did you sleep? Do you feel better? Over time, add more break days.     Count calories.  Would you like to lose some weight? For some people that's a good motivator for cutting back. Figure out how many calories are in each drink. How many does that add up to in a day? In a week? In a month?     Practice saying no.  Be ready when someone offers you a drink. Try: \"Thanks, I've had enough.\" Or \"Thanks, but I'm cutting back.\" Or \"No, thanks. I feel better when I drink less.\"   Current as of: November 15, 2023  Content Version: 14.1    0707-6930 [employer].   Care instructions adapted under license by your healthcare professional. If you have questions about a medical condition or this instruction, always ask your healthcare professional. [employer] " disclaims any warranty or liability for your use of this information.

## 2024-09-23 NOTE — PROGRESS NOTES
Preventive Care Visit  [facility] Deirdre E. Milligan, MD, Internal Medicine - Pediatrics  Sep 23, 2024      Assessment & Plan     Routine general medical examination at a health care facility  If normal labs recommend plantar fasciitis exercises for foot cramping and discuss EMG with neurologist to evaluate for possible neuropathy.  - Comprehensive metabolic panel (BMP + Alb, Alk Phos, ALT, AST, Total. Bili, TP); Future  - CBC with platelets; Future  - Ferritin; Future  - Comprehensive metabolic panel (BMP + Alb, Alk Phos, ALT, AST, Total. Bili, TP)  - CBC with platelets  - Ferritin    Cold sore  PRN use.  - valACYclovir (VALTREX) 1000 mg tablet; TAKE 2 TABLETS BY MOUTH DAILY TWICE DAILY FOR 1 DAY PER COLD SORE FLARE    Alcohol use disorder, severe, dependence (H)  Not taking consistently.  - naltrexone (DEPADE/REVIA) 50 MG tablet; Take 1 tablet (50 mg) by mouth daily.            Counseling  Appropriate preventive services were addressed with this patient via screening, questionnaire, or discussion as appropriate for fall prevention, nutrition, physical activity, Tobacco-use cessation, social engagement, weight loss and cognition.  Checklist reviewing preventive services available has been given to the patient.  Reviewed patient's diet, addressing concerns and/or questions.   She is at risk for lack of exercise and has been provided with information to increase physical activity for the benefit of her well-being.   The patient reports drinking more than 3 alcoholic drinks per day and/or more than 7 drhnks per week. The patient was counseled and given information about possible harmful effects of excessive alcohol intake.        Olimpia Warner is a 48 year old, presenting for the following:  Physical        9/23/2024     7:55 AM   Additional Questions   Roomed by [facility]   Accompanied by self        Health Care Directive  Patient does not have a Health Care Directive or Living Will: Discussed  advance care planning with patient; however, patient declined at this time.    HPI    Was having bad headache   -tried chiropractor, massage  -saw neurologist: [facility] and they offered some Cymbalta (duloxetine) and physical therapy     -cymbalta did not help    -also got nerve blocks      Having cramping in feet  -worse in the morning  -when putting shoes on  -takes multivitamins and electrolytes                9/23/2024   General Health   How would you rate your overall physical health? Good   Feel stress (tense, anxious, or unable to sleep) To some extent      (!) STRESS CONCERN      9/23/2024   Nutrition   Three or more servings of calcium each day? (!) NO   Diet: Regular (no restrictions)   How many servings of fruit and vegetables per day? (!) 0-1   How many sweetened beverages each day? 0-1            9/23/2024   Exercise   Days per week of moderate/strenous exercise 3 days            9/23/2024   Social Factors   Frequency of gathering with friends or relatives Twice a week   Worry food won't last until get money to buy more Patient declined   Food not last or not have enough money for food? Patient declined   Do you have housing? (Housing is defined as stable permanent housing and does not include staying ouside in a car, in a tent, in an abandoned building, in an overnight shelter, or couch-surfing.) Patient declined   Are you worried about losing your housing? Patient declined   Lack of transportation? Patient declined   Unable to get utilities (heat,electricity)? Patient declined            9/23/2024   Fall Risk   Fallen 2 or more times in the past year? No   Trouble with walking or balance? No             9/23/2024   Dental   Dentist two times every year? Yes            9/23/2024   TB Screening   Were you born outside of the [place]? No              Today's PHQ-2 Score:       1/15/2024     8:49 AM   PHQ-2 ( 1999 [employer])   Q1: Little interest or pleasure in doing things 1   Q2: Feeling  down, depressed or hopeless 1   PHQ-2 Score 2   Q1: Little interest or pleasure in doing things Several days   Q2: Feeling down, depressed or hopeless Several days   PHQ-2 Score 2         9/23/2024   Substance Use   Alcohol more than 3/day or more than 7/wk Yes   How often do you have a drink containing alcohol 4 or more times a week   How many alcohol drinks on typical day 3 or 4   How often do you have 5+ drinks at one occasion Weekly   Audit 2/3 Score 4   How often not able to stop drinking once started Never   How often failed to do what normally expected Less than monthly   How often needed first drink in am after a heavy drinking session Never   How often feeling of guilt or remorse after drinking Never   How often unable to remember what happened the night before Never   Have you or someone else been injured because of your drinking No   Has anyone been concerned or suggested you cut down on drinking No   TOTAL SCORE - AUDIT 9   Do you use any other substances recreationally? No        Social History     Tobacco Use    Smoking status: Former     Types: Cigarettes     Passive exposure: Never    Smokeless tobacco: Never   Vaping Use    Vaping status: Former   Substance Use Topics    Alcohol use: Not Currently     Comment: 9/18/22    Drug use: No           6/17/2024   LAST FHS-7 RESULTS   1st degree relative breast or ovarian cancer Yes   Any relative bilateral breast cancer Yes   Any male have breast cancer No   Any ONE woman have BOTH breast AND ovarian cancer No   Any woman with breast cancer before 50yrs No   2 or more relatives with breast AND/OR ovarian cancer No   2 or more relatives with breast AND/OR bowel cancer No                   9/23/2024   STI Screening   New sexual partner(s) since last STI/HIV test? No        History of abnormal Pap smear: YES - reflected in Problem List and Health Maintenance accordingly        Latest Ref Rng & Units 7/28/2023     7:54 AM 5/12/2022    10:44 AM 7/17/2019      "4:45 PM   PAP / HPV   PAP  Negative for Intraepithelial Lesion or Malignancy (NILM)  Negative for Intraepithelial Lesion or Malignancy (NILM)     HPV 16 DNA Negative Negative  Negative  Negative    HPV 18 DNA Negative Negative  Negative  Negative    Other HR HPV Negative Negative  Positive  Positive      ASCVD Risk   The ASCVD Risk score (Chrissy SARAVIA, et al., 2019) failed to calculate for the following reasons:    The valid HDL cholesterol range is 20 to 100 mg/dL    The valid total cholesterol range is 130 to 320 mg/dL            9/23/2024   Contraception/Family Planning   Questions about contraception or family planning No           Reviewed and updated as needed this visit by Provider                             Objective    Exam  /79 (BP Location: Right arm, Patient Position: Sitting, Cuff Size: Adult Regular)   Pulse 79   Temp 97.8  F (36.6  C) (Temporal)   Resp 16   Ht 1.583 m (5' 2.32\")   Wt 53.5 kg (117 lb 14.4 oz)   SpO2 99%   BMI 21.34 kg/m     Estimated body mass index is 21.34 kg/m  as calculated from the following:    Height as of this encounter: 1.583 m (5' 2.32\").    Weight as of this encounter: 53.5 kg (117 lb 14.4 oz).    Physical Exam  GENERAL: alert and no distress  EYES: Eyes grossly normal to inspection, and conjunctivae and sclerae normal  HENT: ear canals and TM's normal, nose and mouth without ulcers or lesions  NECK: no adenopathy, no asymmetry, masses, or scars  RESP: lungs clear to auscultation - no rales, rhonchi or wheezes  CV: regular rate and rhythm, normal S1 S2, no S3 or S4, no murmur, click or rub, no peripheral edema  ABDOMEN: soft, nontender, no hepatosplenomegaly, no masses   MS: no gross musculoskeletal defects noted, no edema  SKIN: no suspicious lesions or rashes  NEURO: Normal strength and tone, mentation intact and speech normal  PSYCH: mentation appears normal, affect normal/bright        Signed Electronically by: Deirdre E. Milligan, MD    "

## 2024-09-28 DIAGNOSIS — B00.1 COLD SORE: ICD-10-CM

## 2024-09-30 RX ORDER — VALACYCLOVIR HYDROCHLORIDE 1 G/1
TABLET, FILM COATED ORAL
Qty: 4 TABLET | Refills: 11 | OUTPATIENT
Start: 2024-09-30

## 2024-10-22 ENCOUNTER — TRANSFERRED RECORDS (OUTPATIENT)
Dept: HEALTH INFORMATION MANAGEMENT | Facility: CLINIC | Age: 49
End: 2024-10-22
Payer: COMMERCIAL

## 2024-10-25 ENCOUNTER — MYC MEDICAL ADVICE (OUTPATIENT)
Dept: PEDIATRICS | Facility: CLINIC | Age: 49
End: 2024-10-25
Payer: COMMERCIAL

## 2024-10-25 ENCOUNTER — E-VISIT (OUTPATIENT)
Dept: PEDIATRICS | Facility: CLINIC | Age: 49
End: 2024-10-25
Payer: COMMERCIAL

## 2024-10-25 DIAGNOSIS — Z78.0 MENOPAUSE: Primary | ICD-10-CM

## 2024-10-25 PROCEDURE — 99207 PR NON-BILLABLE SERV PER CHARTING: CPT | Performed by: STUDENT IN AN ORGANIZED HEALTH CARE EDUCATION/TRAINING PROGRAM

## 2024-11-19 ENCOUNTER — MYC MEDICAL ADVICE (OUTPATIENT)
Dept: PEDIATRICS | Facility: CLINIC | Age: 49
End: 2024-11-19
Payer: COMMERCIAL

## 2024-11-20 ENCOUNTER — E-VISIT (OUTPATIENT)
Dept: URGENT CARE | Facility: CLINIC | Age: 49
End: 2024-11-20
Payer: COMMERCIAL

## 2024-11-20 DIAGNOSIS — N39.0 ACUTE UTI (URINARY TRACT INFECTION): Primary | ICD-10-CM

## 2024-11-20 RX ORDER — NITROFURANTOIN 25; 75 MG/1; MG/1
100 CAPSULE ORAL 2 TIMES DAILY
Qty: 10 CAPSULE | Refills: 0 | Status: SHIPPED | OUTPATIENT
Start: 2024-11-20 | End: 2024-11-25

## 2024-11-20 NOTE — PATIENT INSTRUCTIONS
Dear Alana Mujica    After reviewing your responses, I've been able to diagnose you with a urinary tract infection, which is a common infection of the bladder with bacteria.  This is not a sexually transmitted infection, though urinating immediately after intercourse can help prevent infections.  Drinking lots of fluids is also helpful to clear your current infection and prevent the next one.      I have sent a prescription for antibiotics to your pharmacy to treat this infection.    It is important that you take all of your prescribed medication even if your symptoms are improving after a few doses.  Taking all of your medicine helps prevent the symptoms from returning.     If your symptoms worsen, you develop pain in your back or stomach, develop fevers, or are not improving in 5 days, please contact your primary care provider for an appointment or visit any of our convenient Walk-in or Urgent Care Centers to be seen, which can be found on our website here.    Thanks again for choosing us as your health care partner,    Eve Love PA-C

## 2024-11-20 NOTE — TELEPHONE ENCOUNTER
RN recommended E-visit (per clinic policy) in order to address patient request for urinary symptoms.  Lin Cevallos RN

## 2024-12-30 ENCOUNTER — TRANSFERRED RECORDS (OUTPATIENT)
Dept: HEALTH INFORMATION MANAGEMENT | Facility: CLINIC | Age: 49
End: 2024-12-30
Payer: COMMERCIAL

## 2025-01-14 ENCOUNTER — MYC MEDICAL ADVICE (OUTPATIENT)
Dept: PEDIATRICS | Facility: CLINIC | Age: 50
End: 2025-01-14
Payer: COMMERCIAL

## 2025-01-14 NOTE — TELEPHONE ENCOUNTER
From [facility] 8/1/2023 from Dr Milligan:     Dear Alana,     Here are your recent results:     Your bone density test showed mild thinning (osteopenia) of your hips and spine.  This can increase your risk of breaking a bone.  However, your risk is not high enough to require medication at this point.  You should exercise regularly and make sure you are getting calcium (1000-1200mg/d) and vitamin D (7246-3470 units/d).  You should repeat the test in 3 years.         Lin QUINTERO RN on 1/14/2025 at 12:37 PM

## 2025-01-18 ENCOUNTER — OFFICE VISIT (OUTPATIENT)
Dept: URGENT CARE | Facility: URGENT CARE | Age: 50
End: 2025-01-18
Payer: COMMERCIAL

## 2025-01-18 VITALS
WEIGHT: 125 LBS | RESPIRATION RATE: 16 BRPM | TEMPERATURE: 97.5 F | BODY MASS INDEX: 22.63 KG/M2 | SYSTOLIC BLOOD PRESSURE: 111 MMHG | OXYGEN SATURATION: 98 % | DIASTOLIC BLOOD PRESSURE: 80 MMHG | HEART RATE: 96 BPM

## 2025-01-18 DIAGNOSIS — R10.84 ABDOMINAL PAIN, GENERALIZED: ICD-10-CM

## 2025-01-18 DIAGNOSIS — R35.0 URINARY FREQUENCY: Primary | ICD-10-CM

## 2025-01-18 LAB
ALBUMIN SERPL-MCNC: 4.2 G/DL (ref 3.4–5)
ALBUMIN UR-MCNC: NEGATIVE MG/DL
ALP SERPL-CCNC: 44 U/L (ref 40–150)
ALT SERPL W P-5'-P-CCNC: 22 U/L (ref 0–50)
ANION GAP SERPL CALCULATED.3IONS-SCNC: 8 MMOL/L (ref 3–14)
APPEARANCE UR: CLEAR
AST SERPL W P-5'-P-CCNC: 29 U/L (ref 0–45)
BASOPHILS # BLD AUTO: 0 10E3/UL (ref 0–0.2)
BASOPHILS NFR BLD AUTO: 1 %
BILIRUB SERPL-MCNC: 1 MG/DL (ref 0.2–1.3)
BILIRUB UR QL STRIP: NEGATIVE
BUN SERPL-MCNC: 13 MG/DL (ref 7–30)
CALCIUM SERPL-MCNC: 10 MG/DL (ref 8.5–10.1)
CHLORIDE BLD-SCNC: 98 MMOL/L (ref 94–109)
CLUE CELLS: ABNORMAL
CO2 SERPL-SCNC: 30 MMOL/L (ref 20–32)
COLOR UR AUTO: YELLOW
CREAT SERPL-MCNC: 1 MG/DL (ref 0.52–1.04)
EGFRCR SERPLBLD CKD-EPI 2021: 68 ML/MIN/1.73M2
EOSINOPHIL # BLD AUTO: 0.1 10E3/UL (ref 0–0.7)
EOSINOPHIL NFR BLD AUTO: 1 %
ERYTHROCYTE [DISTWIDTH] IN BLOOD BY AUTOMATED COUNT: 12.2 % (ref 10–15)
GLUCOSE BLD-MCNC: 103 MG/DL (ref 70–99)
GLUCOSE UR STRIP-MCNC: NEGATIVE MG/DL
HCT VFR BLD AUTO: 40 % (ref 35–47)
HGB BLD-MCNC: 14 G/DL (ref 11.7–15.7)
HGB UR QL STRIP: NEGATIVE
IMM GRANULOCYTES # BLD: 0 10E3/UL
IMM GRANULOCYTES NFR BLD: 0 %
KETONES UR STRIP-MCNC: 15 MG/DL
LEUKOCYTE ESTERASE UR QL STRIP: NEGATIVE
LYMPHOCYTES # BLD AUTO: 1.1 10E3/UL (ref 0.8–5.3)
LYMPHOCYTES NFR BLD AUTO: 18 %
MCH RBC QN AUTO: 32.1 PG (ref 26.5–33)
MCHC RBC AUTO-ENTMCNC: 35 G/DL (ref 31.5–36.5)
MCV RBC AUTO: 92 FL (ref 78–100)
MONOCYTES # BLD AUTO: 0.9 10E3/UL (ref 0–1.3)
MONOCYTES NFR BLD AUTO: 15 %
NEUTROPHILS # BLD AUTO: 3.9 10E3/UL (ref 1.6–8.3)
NEUTROPHILS NFR BLD AUTO: 65 %
NITRATE UR QL: NEGATIVE
PH UR STRIP: 5.5 [PH] (ref 5–7)
PLATELET # BLD AUTO: 326 10E3/UL (ref 150–450)
POTASSIUM BLD-SCNC: 5.6 MMOL/L (ref 3.4–5.3)
PROT SERPL-MCNC: 7.9 G/DL (ref 6.8–8.8)
RBC # BLD AUTO: 4.36 10E6/UL (ref 3.8–5.2)
SODIUM SERPL-SCNC: 136 MMOL/L (ref 135–145)
SP GR UR STRIP: 1.01 (ref 1–1.03)
TRICHOMONAS, WET PREP: ABNORMAL
UROBILINOGEN UR STRIP-ACNC: 0.2 E.U./DL
WBC # BLD AUTO: 6.1 10E3/UL (ref 4–11)
WBC'S/HIGH POWER FIELD, WET PREP: ABNORMAL
YEAST, WET PREP: ABNORMAL

## 2025-01-18 PROCEDURE — 80053 COMPREHEN METABOLIC PANEL: CPT | Performed by: FAMILY MEDICINE

## 2025-01-18 PROCEDURE — 99214 OFFICE O/P EST MOD 30 MIN: CPT | Performed by: FAMILY MEDICINE

## 2025-01-18 PROCEDURE — 87086 URINE CULTURE/COLONY COUNT: CPT | Performed by: FAMILY MEDICINE

## 2025-01-18 PROCEDURE — 85025 COMPLETE CBC W/AUTO DIFF WBC: CPT | Performed by: FAMILY MEDICINE

## 2025-01-18 PROCEDURE — 81003 URINALYSIS AUTO W/O SCOPE: CPT | Performed by: FAMILY MEDICINE

## 2025-01-18 PROCEDURE — 87210 SMEAR WET MOUNT SALINE/INK: CPT | Performed by: FAMILY MEDICINE

## 2025-01-18 PROCEDURE — 36415 COLL VENOUS BLD VENIPUNCTURE: CPT | Performed by: FAMILY MEDICINE

## 2025-01-18 NOTE — PROGRESS NOTES
SUBJECTIVE:  Chief Complaint   Patient presents with    Urinary Frequency     1 week, abdominal pressure/bloated, lower back pain     Alana Mujica is a 49 year old female who presents with a chief complaint of abdominal pressure/bloating and urinary frequency for 1 week.    More lower abdominal pressure for the past 1 week, endorsed back pain.  Poor appetite, no nausea or vomiting.  Last BM yesterday, small amount.  Did try stool softener and magnesium already, had small amount of stool but continue with symptoms.  States that lower abdomen is more bloated.    More urinary frequency, has been drinking more water.      Had surgery for eyelids last week, has been taking tylenol and ibuprofen.  Was given RX narcotic but has not taken anything.  Had follow up with surgeon and commented about GI symptoms and told that was not due to surgery or anesthesia (local).    Past Medical History:   Diagnosis Date    RUPINDER (acute kidney injury) 9/14/2018    Alcohol withdrawal (H) 8/26/2018    Anorexia     Anxiety     Bulimia     Chemical dependency (H) 11/24/2015    Alcohol in treatment    Depressive disorder     Genital herpes     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/15    NIL, +HR HPV. Co-test 12 months    Osteoporosis     Substance abuse (H)      Current Outpatient Medications   Medication Sig Dispense Refill    arginine 500 MG capsule Take 500 mg by mouth daily      etonogestrel (NEXPLANON) 68 MG IMPL 1 each by Subdermal route once. 1 each 0    multivitamin w/minerals (THERA-VIT-M) tablet Take 1 tablet by mouth daily      omega 3 1000 MG CAPS       traZODone (DESYREL) 50 MG tablet Take 1-2 tablets ( mg) by mouth at bedtime 180 tablet 3    valACYclovir (VALTREX) 1000 mg tablet TAKE 2 TABLETS BY MOUTH DAILY TWICE DAILY FOR 1 DAY PER COLD SORE FLARE 4 tablet 11    vitamin B complex with vitamin C (STRESS TAB) tablet Take 1 tablet by mouth daily      butalbital-acetaminophen-caffeine (ESGIC) -40 MG tablet Take 1 tablet by mouth every 6  hours as needed for headaches 10 tablet 0    fluocinonide (LIDEX) 0.05 % external cream APPLY THREE TIMES DAILY AS NEEDED TOPICALLY TO ITCHY RASH ON ABDOMEN FOR UP TO 3 WEEKS AT A TIME (Patient not taking: Reported on 1/18/2025)      fluticasone (FLONASE) 50 MCG/ACT nasal spray Spray 1 spray into both nostrils daily (Patient not taking: Reported on 1/18/2025) 11.1 mL 3    Homeopathic Products (LIVER SUPPORT SL)  (Patient not taking: Reported on 1/18/2025)      naltrexone (DEPADE/REVIA) 50 MG tablet Take 1 tablet (50 mg) by mouth daily. (Patient not taking: Reported on 1/18/2025) 30 tablet 1    sodium chloride 1 GM tablet Take 1 g by mouth 3 times daily (Patient not taking: Reported on 1/18/2025)      tiZANidine (ZANAFLEX) 4 MG tablet Take 1 tablet (4 mg) by mouth 3 times daily (Patient not taking: Reported on 1/18/2025) 30 tablet 5    UNABLE TO FIND MEDICATION NAME: benfotiamine 300 (Patient not taking: Reported on 1/18/2025)       Social History     Tobacco Use    Smoking status: Former     Types: Cigarettes     Passive exposure: Never    Smokeless tobacco: Never   Substance Use Topics    Alcohol use: Not Currently     Comment: 9/18/22       ROS:  Review of systems negative except as stated above.    EXAM:   /80 (BP Location: Right arm, Patient Position: Sitting, Cuff Size: Adult Regular)   Pulse 96   Temp 97.5  F (36.4  C) (Tympanic)   Resp 16   Wt 56.7 kg (125 lb)   SpO2 98%   BMI 22.63 kg/m    GENERAL APPEARANCE: healthy, alert and no distress  PSYCH:alert, affect bright    Results for orders placed or performed in visit on 01/18/25   UA Macroscopic with reflex to Microscopic and Culture - Clinic Collect     Status: Abnormal    Specimen: Urine, Clean Catch   Result Value Ref Range    Color Urine Yellow Colorless, Straw, Light Yellow, Yellow    Appearance Urine Clear Clear    Glucose Urine Negative Negative mg/dL    Bilirubin Urine Negative Negative    Ketones Urine 15 (A) Negative mg/dL    Specific  Gravity Urine 1.010 1.003 - 1.035    Blood Urine Negative Negative    pH Urine 5.5 5.0 - 7.0    Protein Albumin Urine Negative Negative mg/dL    Urobilinogen Urine 0.2 0.2, 1.0 E.U./dL    Nitrite Urine Negative Negative    Leukocyte Esterase Urine Negative Negative    Narrative    Microscopic not indicated   Comprehensive metabolic panel     Status: Abnormal   Result Value Ref Range    Sodium 136 135 - 145 mmol/L    Potassium 5.6 (H) 3.4 - 5.3 mmol/L    Chloride 98 94 - 109 mmol/L    Carbon Dioxide (CO2) 30 20 - 32 mmol/L    Anion Gap 8 3 - 14 mmol/L    Urea Nitrogen 13 7 - 30 mg/dL    Creatinine 1.00 0.52 - 1.04 mg/dL    GFR Estimate 68 >60 mL/min/1.73m2    Calcium 10.0 8.5 - 10.1 mg/dL    Glucose 103 (H) 70 - 99 mg/dL    Alkaline Phosphatase 44 40 - 150 U/L    AST 29 0 - 45 U/L    ALT 22 0 - 50 U/L    Protein Total 7.9 6.8 - 8.8 g/dL    Albumin 4.2 3.4 - 5.0 g/dL    Bilirubin Total 1.0 0.2 - 1.3 mg/dL    Narrative    Result confirmed by repeat test.    CBC with platelets and differential     Status: None   Result Value Ref Range    WBC Count 6.1 4.0 - 11.0 10e3/uL    RBC Count 4.36 3.80 - 5.20 10e6/uL    Hemoglobin 14.0 11.7 - 15.7 g/dL    Hematocrit 40.0 35.0 - 47.0 %    MCV 92 78 - 100 fL    MCH 32.1 26.5 - 33.0 pg    MCHC 35.0 31.5 - 36.5 g/dL    RDW 12.2 10.0 - 15.0 %    Platelet Count 326 150 - 450 10e3/uL    % Neutrophils 65 %    % Lymphocytes 18 %    % Monocytes 15 %    % Eosinophils 1 %    % Basophils 1 %    % Immature Granulocytes 0 %    Absolute Neutrophils 3.9 1.6 - 8.3 10e3/uL    Absolute Lymphocytes 1.1 0.8 - 5.3 10e3/uL    Absolute Monocytes 0.9 0.0 - 1.3 10e3/uL    Absolute Eosinophils 0.1 0.0 - 0.7 10e3/uL    Absolute Basophils 0.0 0.0 - 0.2 10e3/uL    Absolute Immature Granulocytes 0.0 <=0.4 10e3/uL   Wet prep - Clinic Collect     Status: Abnormal    Specimen: Vagina; Swab   Result Value Ref Range    Trichomonas Absent Absent    Yeast Absent Absent    Clue Cells Absent Absent    WBCs/high power  field 1+ (A) None   CBC with platelets and differential     Status: None    Narrative    The following orders were created for panel order CBC with platelets and differential.  Procedure                               Abnormality         Status                     ---------                               -----------         ------                     CBC with platelets and d...[[id number]]                      Final result                 Please view results for these tests on the individual orders.         ASSESSMENT/PLAN:  (R35.0) Urinary frequency  (primary encounter diagnosis)  Plan: UA Macroscopic with reflex to Microscopic and         Culture - Clinic Collect, Wet prep - Clinic         Collect, Urine Culture Aerobic Bacterial - lab         collect            (R10.84) Abdominal pain, generalized  Plan: CBC with platelets and differential,         Comprehensive metabolic panel            Reassurance given, unclear etiology for symptom presentation though suspect that may have gut slowing due to recent surgical stressor.  Vitals stable and patient is non-toxic appearing.  Labs obtained and reviewed with patient.  Will obtain full urine culture due to urinary frequency, will treat if true positive for bacterial infection.  Discussed that may require further imaging if symptoms persist, this will need to be thru primary provider.  To ER if any acute worsening of symptoms.    Follow up with primary provider in 2-3 days    gInacio Ibanez MD  January 18, 2025 10:50 AM

## 2025-01-19 LAB — BACTERIA UR CULT: NORMAL

## 2025-01-22 NOTE — PROGRESS NOTES
Assessment & Plan     Abdominal pain, generalized  Pt reports abdominal distention and bloating and cramping. Pt reports that the discomfort is constant and nothing takes it away. Pt has tried laxatives, probiotic and increase water intake to help with the discomfort with no relief.  Pt reports BM that are sparse due to limited food intake with small snacks throughout the day. Patient endorses discomfort upon palpation to lower right and left quadrant of abdominal area.   - Comprehensive metabolic panel (BMP + Alb, Alk Phos, ALT, AST, Total. Bili, TP); Future  - UA with Microscopic reflex to Culture - lab collect; Future  - CT Abdomen Pelvis w Contrast; Future  - Comprehensive metabolic panel (BMP + Alb, Alk Phos, ALT, AST, Total. Bili, TP)  - UA with Microscopic reflex to Culture - lab collect  - UA Microscopic with Reflex to Culture    Hematochezia  Pt reports that she noticed black tarry stools on 1/19/2025 and has had it everyday up until today. Pt has been taking Pepto Bismol.   - CT Abdomen Pelvis w Contrast; Future                Subjective   Alana is a 49 year old, presenting for the following health issues: This is a follow-up to an urgent care visit on 1/18/2025.  Patient presented to urgent care with a chief complaint of abdominal pressure/bloating and urinary frequency x 1 week.  Patient endorsed back pain, poor appetite and lower abdominal bloating.  Her urinalysis was negative for UTI but noted for ketonuria.  Her CMP showed slight elevated potassium (5.6) and patient was instructed to not take electrolyte supplements but to drink more water.    Pt reports that she noticed black tarry stools on 1/19/2025 and has had it everyday up until today. Pt has been taking Pepto Bismol. Pt has a hx of eating disorder of anorexia/bulemia. Pt reports small snacks throughout the day. Pt reports abdominal distention and bloating and cramping. Pt reports that the discomfort is constant and nothing takes it away. Pt  has tried laxatives, probiotic and increase water intake to help with the discomfort with no relief.  Pt reports BM that are sparse due to limited food intake with small snacks throughout the day. Patient endorses discomfort upon palpation to lower right and left quadrant of abdominal area.  Pt has a history of being as thin as 70 pounds and had stopped menstruation. Pt started on birth control (Nexplanon) to establish menstruation to no avail. Pt does not recall LMP. Pt reports urinary frequency but endorses drinking more water. Pt experiencing night sweats. No fevers. Pt is monogamous and hasn't been intimate since October.    Ordered CMP to recheck her electrolyte status and kidney and liver function.    No chief complaint on file.    HPI               Review of Systems  Constitutional, HEENT, cardiovascular, pulmonary, gi and gu systems are negative, except as otherwise noted.      Objective    There were no vitals taken for this visit.  There is no height or weight on file to calculate BMI.  Physical Exam   GENERAL: alert and no distress  HENT: ear canals and TM's normal, nose and mouth without ulcers or lesions  NECK: no adenopathy, no asymmetry, masses, or scars  RESP: lungs clear to auscultation - no rales, rhonchi or wheezes  CV: regular rate and rhythm, normal S1 S2, no S3 or S4, no murmur, click or rub, no peripheral edema  ABDOMEN: tenderness RLQ and LLQ and bowel sounds normal  MS: no gross musculoskeletal defects noted, no edema  SKIN: no suspicious lesions or rashes  NEURO: Normal strength and tone, mentation intact and speech normal  PSYCH: mentation appears normal, affect normal/bright            Signed Electronically by: SUE Bazan CNP

## 2025-01-23 ENCOUNTER — OFFICE VISIT (OUTPATIENT)
Dept: INTERNAL MEDICINE | Facility: CLINIC | Age: 50
End: 2025-01-23
Payer: COMMERCIAL

## 2025-01-23 VITALS
TEMPERATURE: 97.2 F | WEIGHT: 133.7 LBS | HEART RATE: 91 BPM | RESPIRATION RATE: 16 BRPM | BODY MASS INDEX: 24.2 KG/M2 | OXYGEN SATURATION: 98 %

## 2025-01-23 DIAGNOSIS — K92.1 HEMATOCHEZIA: ICD-10-CM

## 2025-01-23 DIAGNOSIS — R10.84 ABDOMINAL PAIN, GENERALIZED: Primary | ICD-10-CM

## 2025-01-23 LAB
ALBUMIN SERPL BCG-MCNC: 4.7 G/DL (ref 3.5–5.2)
ALBUMIN UR-MCNC: NEGATIVE MG/DL
ALP SERPL-CCNC: 57 U/L (ref 40–150)
ALT SERPL W P-5'-P-CCNC: 15 U/L (ref 0–50)
ANION GAP SERPL CALCULATED.3IONS-SCNC: 11 MMOL/L (ref 7–15)
APPEARANCE UR: CLEAR
AST SERPL W P-5'-P-CCNC: 18 U/L (ref 0–45)
BACTERIA #/AREA URNS HPF: ABNORMAL /HPF
BILIRUB SERPL-MCNC: 0.2 MG/DL
BILIRUB UR QL STRIP: NEGATIVE
BUN SERPL-MCNC: 8.9 MG/DL (ref 6–20)
CALCIUM SERPL-MCNC: 10.2 MG/DL (ref 8.8–10.4)
CHLORIDE SERPL-SCNC: 91 MMOL/L (ref 98–107)
COLOR UR AUTO: YELLOW
CREAT SERPL-MCNC: 0.68 MG/DL (ref 0.51–0.95)
EGFRCR SERPLBLD CKD-EPI 2021: >90 ML/MIN/1.73M2
GLUCOSE SERPL-MCNC: 90 MG/DL (ref 70–99)
GLUCOSE UR STRIP-MCNC: NEGATIVE MG/DL
HCO3 SERPL-SCNC: 26 MMOL/L (ref 22–29)
HGB UR QL STRIP: NEGATIVE
KETONES UR STRIP-MCNC: NEGATIVE MG/DL
LEUKOCYTE ESTERASE UR QL STRIP: NEGATIVE
NITRATE UR QL: NEGATIVE
PH UR STRIP: 6.5 [PH] (ref 5–7)
POTASSIUM SERPL-SCNC: 4.2 MMOL/L (ref 3.4–5.3)
PROT SERPL-MCNC: 7.5 G/DL (ref 6.4–8.3)
RBC #/AREA URNS AUTO: ABNORMAL /HPF
SODIUM SERPL-SCNC: 128 MMOL/L (ref 135–145)
SP GR UR STRIP: <=1.005 (ref 1–1.03)
SQUAMOUS #/AREA URNS AUTO: ABNORMAL /LPF
UROBILINOGEN UR STRIP-ACNC: 0.2 E.U./DL
WBC #/AREA URNS AUTO: ABNORMAL /HPF

## 2025-01-23 ASSESSMENT — ANXIETY QUESTIONNAIRES
5. BEING SO RESTLESS THAT IT IS HARD TO SIT STILL: NOT AT ALL
7. FEELING AFRAID AS IF SOMETHING AWFUL MIGHT HAPPEN: NOT AT ALL
3. WORRYING TOO MUCH ABOUT DIFFERENT THINGS: NOT AT ALL
2. NOT BEING ABLE TO STOP OR CONTROL WORRYING: NOT AT ALL
GAD7 TOTAL SCORE: 0
8. IF YOU CHECKED OFF ANY PROBLEMS, HOW DIFFICULT HAVE THESE MADE IT FOR YOU TO DO YOUR WORK, TAKE CARE OF THINGS AT HOME, OR GET ALONG WITH OTHER PEOPLE?: NOT DIFFICULT AT ALL
GAD7 TOTAL SCORE: 0
IF YOU CHECKED OFF ANY PROBLEMS ON THIS QUESTIONNAIRE, HOW DIFFICULT HAVE THESE PROBLEMS MADE IT FOR YOU TO DO YOUR WORK, TAKE CARE OF THINGS AT HOME, OR GET ALONG WITH OTHER PEOPLE: NOT DIFFICULT AT ALL
6. BECOMING EASILY ANNOYED OR IRRITABLE: NOT AT ALL
7. FEELING AFRAID AS IF SOMETHING AWFUL MIGHT HAPPEN: NOT AT ALL
4. TROUBLE RELAXING: NOT AT ALL
1. FEELING NERVOUS, ANXIOUS, OR ON EDGE: NOT AT ALL
GAD7 TOTAL SCORE: 0

## 2025-01-23 ASSESSMENT — PATIENT HEALTH QUESTIONNAIRE - PHQ9
10. IF YOU CHECKED OFF ANY PROBLEMS, HOW DIFFICULT HAVE THESE PROBLEMS MADE IT FOR YOU TO DO YOUR WORK, TAKE CARE OF THINGS AT HOME, OR GET ALONG WITH OTHER PEOPLE: NOT DIFFICULT AT ALL
SUM OF ALL RESPONSES TO PHQ QUESTIONS 1-9: 0
SUM OF ALL RESPONSES TO PHQ QUESTIONS 1-9: 0

## 2025-01-23 ASSESSMENT — PAIN SCALES - GENERAL: PAINLEVEL_OUTOF10: MODERATE PAIN (4)

## 2025-01-23 NOTE — PROGRESS NOTES
{PROVIDER CHARTING PREFERENCE:795898}    Olimpia Warner is a 49 year old, presenting for the following health issues:  Abdominal Pain (Pressure in lower abdominal and back pain., no appetite, very black stool)        1/23/2025     1:56 PM   Additional Questions   Roomed by Dee Dee Cristobal CMA   Accompanied by Self         1/23/2025     1:56 PM   Patient Reported Additional Medications   Patient reports taking the following new medications no     History of Present Illness       Reason for visit:  Urgenr care visit saturday    She eats 0-1 servings of fruits and vegetables daily.She consumes 0 sweetened beverage(s) daily.She exercises with enough effort to increase her heart rate 30 to 60 minutes per day.  She exercises with enough effort to increase her heart rate 5 days per week.   She is taking medications regularly.       {MA/LPN/RN Pre-Provider Visit Orders- hCG/UA/Strep (Optional):270723}  {SUPERLIST (Optional):809930}  {additonal problems for provider to add (Optional):744473}    {ROS Picklists (Optional):554187}      Objective    Pulse 91   Temp 97.2  F (36.2  C) (Oral)   Resp 16   Wt 60.6 kg (133 lb 11.2 oz)   SpO2 98%   BMI 24.20 kg/m    Body mass index is 24.2 kg/m .  Physical Exam   {Exam List (Optional):110894}    {Diagnostic Test Results (Optional):289231}        Signed Electronically by: SUE Bazan CNP  {Email feedback regarding this note to [email or web address]   :[id number]}

## 2025-02-01 ENCOUNTER — LAB (OUTPATIENT)
Dept: LAB | Facility: CLINIC | Age: 50
End: 2025-02-01
Payer: COMMERCIAL

## 2025-02-01 ENCOUNTER — HOSPITAL ENCOUNTER (OUTPATIENT)
Dept: CT IMAGING | Facility: CLINIC | Age: 50
Discharge: HOME | End: 2025-02-01
Payer: COMMERCIAL

## 2025-02-01 DIAGNOSIS — R10.84 ABDOMINAL PAIN, GENERALIZED: ICD-10-CM

## 2025-02-01 DIAGNOSIS — K92.1 HEMATOCHEZIA: ICD-10-CM

## 2025-02-01 DIAGNOSIS — E87.1 HYPONATREMIA: ICD-10-CM

## 2025-02-01 LAB
ANION GAP SERPL CALCULATED.3IONS-SCNC: 9 MMOL/L (ref 7–15)
BUN SERPL-MCNC: 10.5 MG/DL (ref 6–20)
CALCIUM SERPL-MCNC: 9.1 MG/DL (ref 8.8–10.4)
CHLORIDE SERPL-SCNC: 100 MMOL/L (ref 98–107)
CREAT SERPL-MCNC: 0.78 MG/DL (ref 0.51–0.95)
EGFRCR SERPLBLD CKD-EPI 2021: >90 ML/MIN/1.73M2
GLUCOSE SERPL-MCNC: 97 MG/DL (ref 70–99)
HCO3 SERPL-SCNC: 25 MMOL/L (ref 22–29)
POTASSIUM SERPL-SCNC: 4.8 MMOL/L (ref 3.4–5.3)
SODIUM SERPL-SCNC: 134 MMOL/L (ref 135–145)

## 2025-02-01 PROCEDURE — 82310 ASSAY OF CALCIUM: CPT

## 2025-02-01 PROCEDURE — 250N000009 HC RX 250

## 2025-02-01 PROCEDURE — 74177 CT ABD & PELVIS W/CONTRAST: CPT

## 2025-02-01 PROCEDURE — 250N000011 HC RX IP 250 OP 636

## 2025-02-01 PROCEDURE — 36415 COLL VENOUS BLD VENIPUNCTURE: CPT

## 2025-02-01 PROCEDURE — 80048 BASIC METABOLIC PNL TOTAL CA: CPT

## 2025-02-01 RX ORDER — IOPAMIDOL 755 MG/ML
66 INJECTION, SOLUTION INTRAVASCULAR ONCE
Status: COMPLETED | OUTPATIENT
Start: 2025-02-01 | End: 2025-02-01

## 2025-02-01 RX ADMIN — IOPAMIDOL 66 ML: 755 INJECTION, SOLUTION INTRAVENOUS at 10:09

## 2025-02-01 RX ADMIN — SODIUM CHLORIDE 60 ML: 9 INJECTION, SOLUTION INTRAVENOUS at 10:09

## 2025-03-03 ENCOUNTER — TRANSFERRED RECORDS (OUTPATIENT)
Dept: HEALTH INFORMATION MANAGEMENT | Facility: CLINIC | Age: 50
End: 2025-03-03
Payer: COMMERCIAL

## 2025-03-27 ENCOUNTER — OFFICE VISIT (OUTPATIENT)
Dept: OBGYN | Facility: CLINIC | Age: 50
End: 2025-03-27
Payer: COMMERCIAL

## 2025-03-27 VITALS
WEIGHT: 128.1 LBS | BODY MASS INDEX: 23.57 KG/M2 | HEIGHT: 62 IN | SYSTOLIC BLOOD PRESSURE: 124 MMHG | DIASTOLIC BLOOD PRESSURE: 78 MMHG

## 2025-03-27 DIAGNOSIS — N95.1 SYMPTOMATIC MENOPAUSAL OR FEMALE CLIMACTERIC STATES: Primary | ICD-10-CM

## 2025-03-27 LAB
ESTRADIOL SERPL-MCNC: 21 PG/ML
FSH SERPL IRP2-ACNC: 21.6 MIU/ML
TSH SERPL DL<=0.005 MIU/L-ACNC: 2.18 UIU/ML (ref 0.3–4.2)

## 2025-03-27 RX ORDER — MINOXIDIL 2.5 MG/1
2.5 TABLET ORAL DAILY
COMMUNITY

## 2025-03-27 NOTE — PROGRESS NOTES
"Chief Complaint   Patient presents with    Menopausal Sx       Subjective:  Alana Mujica is a 49-year-old nulliparous female presents to discuss some menopausal symptoms.  She indicates she is experiencing some night sweats changes on her skin and loss of hair.  Insomnia is also mentioned.    Of note the patient has a Nexplanon in place.  This was initially placed in 2019 and then removed and replaced in January 2021.  She is amenorrheic and very much would like to continue this form of both contraception and cycle control.    We discussed the nature of menopause and its clinical and technical definitions and diagnosis.      REVIEW OF SYSTEMS:  General: as above    Health Maintenance   Topic Date Due    LUNG CANCER SCREENING  12/09/2023    COVID-19 Vaccine (3 - 2024-25 season) 09/01/2024    MAMMO SCREENING  06/17/2025    TRAM ASSESSMENT  07/23/2025    PHQ-9  07/23/2025    YEARLY PREVENTIVE VISIT  09/23/2025    PAP FOLLOW-UP  07/28/2026    HPV FOLLOW-UP  07/28/2026    LIPID  08/26/2027    DIABETES SCREENING  02/01/2028    DTAP/TDAP/TD IMMUNIZATION (6 - Td or Tdap) 10/25/2028    ADVANCE CARE PLANNING  09/23/2029    COLORECTAL CANCER SCREENING  11/30/2032    HEPATITIS C SCREENING  Completed    HIV SCREENING  Completed    DEPRESSION ACTION PLAN  Completed    PHQ-2 (once per calendar year)  Completed    INFLUENZA VACCINE  Completed    Pneumococcal Vaccine: 50+ Years  Completed    ZOSTER IMMUNIZATION  Completed    HEPATITIS B IMMUNIZATION  Completed    HPV IMMUNIZATION  Aged Out    MENINGITIS IMMUNIZATION  Aged Out    URINE DRUG SCREEN  Discontinued    PAP  Discontinued       No Known Allergies    Objective:  Vitals: /78   Ht 1.575 m (5' 2\")   Wt 58.1 kg (128 lb 1.6 oz)   BMI 23.43 kg/m    BMI= Body mass index is 23.43 kg/m .  Deferred    Assessment/Plan:  1. Symptomatic menopausal or female climacteric states (Primary)  We discussed the definitions and goals of menopausal treatment.  Given the presence of a " Nexplanon we mutually agreed to check some hormone levels that would help us guide replacement therapy.    We will check a estradiol, FSH and TSH given her symptoms and then based on these results consider estrogen replacement therapy        Vamshi Gonzales MD

## 2025-05-04 ENCOUNTER — ANESTHESIA (OUTPATIENT)
Dept: SURGERY | Facility: CLINIC | Age: 50
End: 2025-05-04
Payer: COMMERCIAL

## 2025-05-04 ENCOUNTER — APPOINTMENT (OUTPATIENT)
Dept: CT IMAGING | Facility: CLINIC | Age: 50
End: 2025-05-04
Attending: EMERGENCY MEDICINE
Payer: COMMERCIAL

## 2025-05-04 ENCOUNTER — HOSPITAL ENCOUNTER (EMERGENCY)
Facility: CLINIC | Age: 50
Discharge: ANOTHER HOSPITAL | End: 2025-05-04
Attending: EMERGENCY MEDICINE | Admitting: EMERGENCY MEDICINE
Payer: COMMERCIAL

## 2025-05-04 ENCOUNTER — ANESTHESIA EVENT (OUTPATIENT)
Dept: SURGERY | Facility: CLINIC | Age: 50
End: 2025-05-04
Payer: COMMERCIAL

## 2025-05-04 ENCOUNTER — HOSPITAL ENCOUNTER (OUTPATIENT)
Facility: CLINIC | Age: 50
Setting detail: OBSERVATION
Discharge: HOME | End: 2025-05-05
Attending: STUDENT IN AN ORGANIZED HEALTH CARE EDUCATION/TRAINING PROGRAM | Admitting: SURGERY
Payer: COMMERCIAL

## 2025-05-04 VITALS
HEART RATE: 87 BPM | OXYGEN SATURATION: 95 % | BODY MASS INDEX: 23.33 KG/M2 | HEIGHT: 62 IN | SYSTOLIC BLOOD PRESSURE: 116 MMHG | DIASTOLIC BLOOD PRESSURE: 79 MMHG | WEIGHT: 126.76 LBS | TEMPERATURE: 97.7 F | RESPIRATION RATE: 11 BRPM

## 2025-05-04 DIAGNOSIS — S02.19XA CLOSED FRACTURE OF TEMPORAL BONE, INITIAL ENCOUNTER (H): ICD-10-CM

## 2025-05-04 DIAGNOSIS — D72.829 LEUKOCYTOSIS, UNSPECIFIED TYPE: ICD-10-CM

## 2025-05-04 DIAGNOSIS — F10.20 SEVERE ALCOHOL USE DISORDER (H): ICD-10-CM

## 2025-05-04 DIAGNOSIS — S03.00XA TMJ (DISLOCATION OF TEMPOROMANDIBULAR JOINT), INITIAL ENCOUNTER: ICD-10-CM
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
## 2025-05-04 DIAGNOSIS — H53.2 DIPLOPIA: ICD-10-CM

## 2025-05-04 DIAGNOSIS — H53.149 PHOTOPHOBIA: ICD-10-CM

## 2025-05-04 DIAGNOSIS — S02.609A: ICD-10-CM

## 2025-05-04 DIAGNOSIS — F10.930 ALCOHOL WITHDRAWAL SYNDROME WITHOUT COMPLICATION (H): ICD-10-CM

## 2025-05-04 DIAGNOSIS — R51.9 NONINTRACTABLE HEADACHE, UNSPECIFIED CHRONICITY PATTERN, UNSPECIFIED HEADACHE TYPE: ICD-10-CM
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
## 2025-05-04 DIAGNOSIS — H92.20 HEMORRHAGE OF EAR CANAL: ICD-10-CM

## 2025-05-04 DIAGNOSIS — S03.03XA DISLOCATION OF JAW, BILATERAL, INITIAL ENCOUNTER: ICD-10-CM

## 2025-05-04 DIAGNOSIS — E87.1 HYPONATREMIA: ICD-10-CM

## 2025-05-04 DIAGNOSIS — F10.939 ALCOHOL WITHDRAWAL, WITH UNSPECIFIED COMPLICATION (H): ICD-10-CM

## 2025-05-04 DIAGNOSIS — R11.2 NAUSEA AND VOMITING: ICD-10-CM

## 2025-05-04 DIAGNOSIS — M81.0 OSTEOPOROSIS: ICD-10-CM

## 2025-05-04 DIAGNOSIS — S02.609A: Primary | ICD-10-CM

## 2025-05-04 DIAGNOSIS — F10.91 HISTORY OF ALCOHOL WITHDRAWAL SYNDROME: ICD-10-CM

## 2025-05-04 LAB
ALBUMIN SERPL BCG-MCNC: 4.1 G/DL (ref 3.5–5.2)
ALBUMIN SERPL BCG-MCNC: 5.1 G/DL (ref 3.5–5.2)
ALP SERPL-CCNC: 61 U/L (ref 40–150)
ALP SERPL-CCNC: 82 U/L (ref 40–150)
ALT SERPL W P-5'-P-CCNC: 32 U/L (ref 0–50)
ALT SERPL W P-5'-P-CCNC: 43 U/L (ref 0–50)
ANION GAP SERPL CALCULATED.3IONS-SCNC: 12 MMOL/L (ref 7–15)
ANION GAP SERPL CALCULATED.3IONS-SCNC: 15 MMOL/L (ref 7–15)
APTT PPP: 26 SECONDS (ref 22–38)
APTT PPP: 31 SECONDS (ref 22–38)
AST SERPL W P-5'-P-CCNC: 29 U/L (ref 0–45)
AST SERPL W P-5'-P-CCNC: 36 U/L (ref 0–45)
BASOPHILS # BLD AUTO: 0 10E3/UL (ref 0–0.2)
BASOPHILS # BLD AUTO: 0 10E3/UL (ref 0–0.2)
BASOPHILS NFR BLD AUTO: 0 %
BASOPHILS NFR BLD AUTO: 0 %
BILIRUB DIRECT SERPL-MCNC: 0.32 MG/DL (ref 0–0.3)
BILIRUB SERPL-MCNC: 0.8 MG/DL
BILIRUB SERPL-MCNC: 1 MG/DL
BUN SERPL-MCNC: 7.4 MG/DL (ref 6–20)
BUN SERPL-MCNC: 7.9 MG/DL (ref 6–20)
CALCIUM SERPL-MCNC: 10.7 MG/DL (ref 8.8–10.4)
CALCIUM SERPL-MCNC: 9.5 MG/DL (ref 8.8–10.4)
CHLORIDE SERPL-SCNC: 85 MMOL/L (ref 98–107)
CHLORIDE SERPL-SCNC: 91 MMOL/L (ref 98–107)
CREAT SERPL-MCNC: 0.82 MG/DL (ref 0.51–0.95)
CREAT SERPL-MCNC: 0.97 MG/DL (ref 0.51–0.95)
EGFRCR SERPLBLD CKD-EPI 2021: 70 ML/MIN/1.73M2
EGFRCR SERPLBLD CKD-EPI 2021: 86 ML/MIN/1.73M2
EOSINOPHIL # BLD AUTO: 0 10E3/UL (ref 0–0.7)
EOSINOPHIL # BLD AUTO: 0.1 10E3/UL (ref 0–0.7)
EOSINOPHIL NFR BLD AUTO: 0 %
EOSINOPHIL NFR BLD AUTO: 1 %
ERYTHROCYTE [DISTWIDTH] IN BLOOD BY AUTOMATED COUNT: 11.7 % (ref 10–15)
ERYTHROCYTE [DISTWIDTH] IN BLOOD BY AUTOMATED COUNT: 11.9 % (ref 10–15)
ETHANOL SERPL-MCNC: 0.09 G/DL
GLUCOSE SERPL-MCNC: 123 MG/DL (ref 70–99)
GLUCOSE SERPL-MCNC: 131 MG/DL (ref 70–99)
HCO3 SERPL-SCNC: 23 MMOL/L (ref 22–29)
HCO3 SERPL-SCNC: 25 MMOL/L (ref 22–29)
HCT VFR BLD AUTO: 34.6 % (ref 35–47)
HCT VFR BLD AUTO: 35.5 % (ref 35–47)
HGB BLD-MCNC: 12.2 G/DL (ref 11.7–15.7)
HGB BLD-MCNC: 13 G/DL (ref 11.7–15.7)
HOLD SPECIMEN: NORMAL
IMM GRANULOCYTES # BLD: 0.1 10E3/UL
IMM GRANULOCYTES # BLD: 0.1 10E3/UL
IMM GRANULOCYTES NFR BLD: 1 %
IMM GRANULOCYTES NFR BLD: 1 %
INR PPP: 0.97 (ref 0.85–1.15)
INR PPP: 1.06 (ref 0.85–1.15)
LYMPHOCYTES # BLD AUTO: 0.6 10E3/UL (ref 0.8–5.3)
LYMPHOCYTES # BLD AUTO: 0.7 10E3/UL (ref 0.8–5.3)
LYMPHOCYTES NFR BLD AUTO: 4 %
LYMPHOCYTES NFR BLD AUTO: 5 %
MAGNESIUM SERPL-MCNC: 1.1 MG/DL (ref 1.7–2.3)
MAGNESIUM SERPL-MCNC: 1.2 MG/DL (ref 1.7–2.3)
MCH RBC QN AUTO: 31.9 PG (ref 26.5–33)
MCH RBC QN AUTO: 32.4 PG (ref 26.5–33)
MCHC RBC AUTO-ENTMCNC: 35.3 G/DL (ref 31.5–36.5)
MCHC RBC AUTO-ENTMCNC: 36.6 G/DL (ref 31.5–36.5)
MCV RBC AUTO: 89 FL (ref 78–100)
MCV RBC AUTO: 91 FL (ref 78–100)
MONOCYTES # BLD AUTO: 0.9 10E3/UL (ref 0–1.3)
MONOCYTES # BLD AUTO: 0.9 10E3/UL (ref 0–1.3)
MONOCYTES NFR BLD AUTO: 6 %
MONOCYTES NFR BLD AUTO: 6 %
NEUTROPHILS # BLD AUTO: 12.3 10E3/UL (ref 1.6–8.3)
NEUTROPHILS # BLD AUTO: 13.3 10E3/UL (ref 1.6–8.3)
NEUTROPHILS NFR BLD AUTO: 88 %
NEUTROPHILS NFR BLD AUTO: 89 %
NRBC # BLD AUTO: 0 10E3/UL
NRBC # BLD AUTO: 0 10E3/UL
NRBC BLD AUTO-RTO: 0 /100
NRBC BLD AUTO-RTO: 0 /100
PHOSPHATE SERPL-MCNC: 4.1 MG/DL (ref 2.5–4.5)
PHOSPHATE SERPL-MCNC: 4.4 MG/DL (ref 2.5–4.5)
PLATELET # BLD AUTO: 268 10E3/UL (ref 150–450)
PLATELET # BLD AUTO: 282 10E3/UL (ref 150–450)
POTASSIUM SERPL-SCNC: 4.2 MMOL/L (ref 3.4–5.3)
POTASSIUM SERPL-SCNC: 4.6 MMOL/L (ref 3.4–5.3)
PROT SERPL-MCNC: 6.4 G/DL (ref 6.4–8.3)
PROT SERPL-MCNC: 8.3 G/DL (ref 6.4–8.3)
PROTHROMBIN TIME: 13 SECONDS (ref 11.8–14.8)
PROTHROMBIN TIME: 13.8 SECONDS (ref 11.8–14.8)
RBC # BLD AUTO: 3.82 10E6/UL (ref 3.8–5.2)
RBC # BLD AUTO: 4.01 10E6/UL (ref 3.8–5.2)
SODIUM SERPL-SCNC: 123 MMOL/L (ref 135–145)
SODIUM SERPL-SCNC: 128 MMOL/L (ref 135–145)
WBC # BLD AUTO: 14 10E3/UL (ref 4–11)
WBC # BLD AUTO: 15 10E3/UL (ref 4–11)

## 2025-05-04 PROCEDURE — 250N000011 HC RX IP 250 OP 636

## 2025-05-04 PROCEDURE — 96375 TX/PRO/DX INJ NEW DRUG ADDON: CPT

## 2025-05-04 PROCEDURE — 83735 ASSAY OF MAGNESIUM: CPT | Performed by: STUDENT IN AN ORGANIZED HEALTH CARE EDUCATION/TRAINING PROGRAM

## 2025-05-04 PROCEDURE — 85004 AUTOMATED DIFF WBC COUNT: CPT | Performed by: STUDENT IN AN ORGANIZED HEALTH CARE EDUCATION/TRAINING PROGRAM

## 2025-05-04 PROCEDURE — 96376 TX/PRO/DX INJ SAME DRUG ADON: CPT

## 2025-05-04 PROCEDURE — 82077 ASSAY SPEC XCP UR&BREATH IA: CPT | Performed by: EMERGENCY MEDICINE

## 2025-05-04 PROCEDURE — 70450 CT HEAD/BRAIN W/O DYE: CPT

## 2025-05-04 PROCEDURE — 85610 PROTHROMBIN TIME: CPT | Performed by: STUDENT IN AN ORGANIZED HEALTH CARE EDUCATION/TRAINING PROGRAM

## 2025-05-04 PROCEDURE — 999N000141 HC STATISTIC PRE-PROCEDURE NURSING ASSESSMENT: Performed by: STUDENT IN AN ORGANIZED HEALTH CARE EDUCATION/TRAINING PROGRAM

## 2025-05-04 PROCEDURE — 258N000003 HC RX IP 258 OP 636: Performed by: STUDENT IN AN ORGANIZED HEALTH CARE EDUCATION/TRAINING PROGRAM

## 2025-05-04 PROCEDURE — 250N000011 HC RX IP 250 OP 636: Performed by: NURSE ANESTHETIST, CERTIFIED REGISTERED

## 2025-05-04 PROCEDURE — 250N000013 HC RX MED GY IP 250 OP 250 PS 637: Performed by: STUDENT IN AN ORGANIZED HEALTH CARE EDUCATION/TRAINING PROGRAM

## 2025-05-04 PROCEDURE — C1713 ANCHOR/SCREW BN/BN,TIS/BN: HCPCS | Performed by: STUDENT IN AN ORGANIZED HEALTH CARE EDUCATION/TRAINING PROGRAM

## 2025-05-04 PROCEDURE — 250N000009 HC RX 250: Performed by: NURSE ANESTHETIST, CERTIFIED REGISTERED

## 2025-05-04 PROCEDURE — 85025 COMPLETE CBC W/AUTO DIFF WBC: CPT | Performed by: EMERGENCY MEDICINE

## 2025-05-04 PROCEDURE — 250N000011 HC RX IP 250 OP 636: Mod: JZ | Performed by: EMERGENCY MEDICINE

## 2025-05-04 PROCEDURE — 80048 BASIC METABOLIC PNL TOTAL CA: CPT | Performed by: EMERGENCY MEDICINE

## 2025-05-04 PROCEDURE — 710N000010 HC RECOVERY PHASE 1, LEVEL 2, PER MIN: Performed by: STUDENT IN AN ORGANIZED HEALTH CARE EDUCATION/TRAINING PROGRAM

## 2025-05-04 PROCEDURE — 370N000017 HC ANESTHESIA TECHNICAL FEE, PER MIN: Performed by: STUDENT IN AN ORGANIZED HEALTH CARE EDUCATION/TRAINING PROGRAM

## 2025-05-04 PROCEDURE — 83735 ASSAY OF MAGNESIUM: CPT

## 2025-05-04 PROCEDURE — 84155 ASSAY OF PROTEIN SERUM: CPT

## 2025-05-04 PROCEDURE — 84100 ASSAY OF PHOSPHORUS: CPT | Performed by: STUDENT IN AN ORGANIZED HEALTH CARE EDUCATION/TRAINING PROGRAM

## 2025-05-04 PROCEDURE — 85730 THROMBOPLASTIN TIME PARTIAL: CPT | Performed by: STUDENT IN AN ORGANIZED HEALTH CARE EDUCATION/TRAINING PROGRAM

## 2025-05-04 PROCEDURE — 70486 CT MAXILLOFACIAL W/O DYE: CPT

## 2025-05-04 PROCEDURE — 85610 PROTHROMBIN TIME: CPT | Performed by: EMERGENCY MEDICINE

## 2025-05-04 PROCEDURE — 99291 CRITICAL CARE FIRST HOUR: CPT | Mod: 25

## 2025-05-04 PROCEDURE — 36415 COLL VENOUS BLD VENIPUNCTURE: CPT | Performed by: STUDENT IN AN ORGANIZED HEALTH CARE EDUCATION/TRAINING PROGRAM

## 2025-05-04 PROCEDURE — 93005 ELECTROCARDIOGRAM TRACING: CPT

## 2025-05-04 PROCEDURE — 96375 TX/PRO/DX INJ NEW DRUG ADDON: CPT | Performed by: STUDENT IN AN ORGANIZED HEALTH CARE EDUCATION/TRAINING PROGRAM

## 2025-05-04 PROCEDURE — 99285 EMERGENCY DEPT VISIT HI MDM: CPT | Performed by: STUDENT IN AN ORGANIZED HEALTH CARE EDUCATION/TRAINING PROGRAM

## 2025-05-04 PROCEDURE — 96374 THER/PROPH/DIAG INJ IV PUSH: CPT | Mod: 59

## 2025-05-04 PROCEDURE — 70480 CT ORBIT/EAR/FOSSA W/O DYE: CPT

## 2025-05-04 PROCEDURE — 272N000001 HC OR GENERAL SUPPLY STERILE: Performed by: STUDENT IN AN ORGANIZED HEALTH CARE EDUCATION/TRAINING PROGRAM

## 2025-05-04 PROCEDURE — 72125 CT NECK SPINE W/O DYE: CPT

## 2025-05-04 PROCEDURE — 36415 COLL VENOUS BLD VENIPUNCTURE: CPT | Performed by: EMERGENCY MEDICINE

## 2025-05-04 PROCEDURE — 71260 CT THORAX DX C+: CPT | Mod: XU

## 2025-05-04 PROCEDURE — 258N000003 HC RX IP 258 OP 636: Performed by: NURSE ANESTHETIST, CERTIFIED REGISTERED

## 2025-05-04 PROCEDURE — 93005 ELECTROCARDIOGRAM TRACING: CPT | Performed by: STUDENT IN AN ORGANIZED HEALTH CARE EDUCATION/TRAINING PROGRAM

## 2025-05-04 PROCEDURE — 96374 THER/PROPH/DIAG INJ IV PUSH: CPT | Performed by: STUDENT IN AN ORGANIZED HEALTH CARE EDUCATION/TRAINING PROGRAM

## 2025-05-04 PROCEDURE — 93010 ELECTROCARDIOGRAM REPORT: CPT | Performed by: STUDENT IN AN ORGANIZED HEALTH CARE EDUCATION/TRAINING PROGRAM

## 2025-05-04 PROCEDURE — 84100 ASSAY OF PHOSPHORUS: CPT

## 2025-05-04 PROCEDURE — 120N000002 HC R&B MED SURG/OB UMMC

## 2025-05-04 PROCEDURE — 85730 THROMBOPLASTIN TIME PARTIAL: CPT | Performed by: EMERGENCY MEDICINE

## 2025-05-04 PROCEDURE — 250N000025 HC SEVOFLURANE, PER MIN: Performed by: STUDENT IN AN ORGANIZED HEALTH CARE EDUCATION/TRAINING PROGRAM

## 2025-05-04 PROCEDURE — 99285 EMERGENCY DEPT VISIT HI MDM: CPT | Mod: 25 | Performed by: STUDENT IN AN ORGANIZED HEALTH CARE EDUCATION/TRAINING PROGRAM

## 2025-05-04 PROCEDURE — 250N000011 HC RX IP 250 OP 636: Performed by: EMERGENCY MEDICINE

## 2025-05-04 PROCEDURE — 250N000011 HC RX IP 250 OP 636: Performed by: STUDENT IN AN ORGANIZED HEALTH CARE EDUCATION/TRAINING PROGRAM

## 2025-05-04 PROCEDURE — 360N000077 HC SURGERY LEVEL 4, PER MIN: Performed by: STUDENT IN AN ORGANIZED HEALTH CARE EDUCATION/TRAINING PROGRAM

## 2025-05-04 PROCEDURE — 96361 HYDRATE IV INFUSION ADD-ON: CPT | Performed by: STUDENT IN AN ORGANIZED HEALTH CARE EDUCATION/TRAINING PROGRAM

## 2025-05-04 RX ORDER — MULTIPLE VITAMINS W/ MINERALS TAB 9MG-400MCG
1 TAB ORAL DAILY
Status: DISCONTINUED | OUTPATIENT
Start: 2025-05-05 | End: 2025-05-05 | Stop reason: ALTCHOICE

## 2025-05-04 RX ORDER — AMPICILLIN AND SULBACTAM 2; 1 G/1; G/1
3 INJECTION, POWDER, FOR SOLUTION INTRAMUSCULAR; INTRAVENOUS
Status: COMPLETED | OUTPATIENT
Start: 2025-05-04 | End: 2025-05-04

## 2025-05-04 RX ORDER — THIAMINE HYDROCHLORIDE 100 MG/ML
250 INJECTION, SOLUTION INTRAMUSCULAR; INTRAVENOUS DAILY
Status: DISCONTINUED | OUTPATIENT
Start: 2025-05-07 | End: 2025-05-04 | Stop reason: HOSPADM

## 2025-05-04 RX ORDER — NALOXONE HYDROCHLORIDE 0.4 MG/ML
0.4 INJECTION, SOLUTION INTRAMUSCULAR; INTRAVENOUS; SUBCUTANEOUS
Status: DISCONTINUED | OUTPATIENT
Start: 2025-05-04 | End: 2025-05-05 | Stop reason: HOSPADM

## 2025-05-04 RX ORDER — ONDANSETRON 2 MG/ML
4 INJECTION INTRAMUSCULAR; INTRAVENOUS EVERY 30 MIN PRN
Status: DISCONTINUED | OUTPATIENT
Start: 2025-05-04 | End: 2025-05-05 | Stop reason: DRUGHIGH

## 2025-05-04 RX ORDER — DIAZEPAM 10 MG/2ML
5-10 INJECTION, SOLUTION INTRAMUSCULAR; INTRAVENOUS EVERY 30 MIN PRN
Status: DISCONTINUED | OUTPATIENT
Start: 2025-05-04 | End: 2025-05-05 | Stop reason: HOSPADM

## 2025-05-04 RX ORDER — LIDOCAINE HYDROCHLORIDE 20 MG/ML
INJECTION, SOLUTION INFILTRATION; PERINEURAL PRN
Status: DISCONTINUED | OUTPATIENT
Start: 2025-05-04 | End: 2025-05-05

## 2025-05-04 RX ORDER — HYDROMORPHONE HCL IN WATER/PF 6 MG/30 ML
0.2 PATIENT CONTROLLED ANALGESIA SYRINGE INTRAVENOUS
Status: DISCONTINUED | OUTPATIENT
Start: 2025-05-04 | End: 2025-05-05

## 2025-05-04 RX ORDER — DEXAMETHASONE SODIUM PHOSPHATE 4 MG/ML
INJECTION, SOLUTION INTRA-ARTICULAR; INTRALESIONAL; INTRAMUSCULAR; INTRAVENOUS; SOFT TISSUE PRN
Status: DISCONTINUED | OUTPATIENT
Start: 2025-05-04 | End: 2025-05-05

## 2025-05-04 RX ORDER — THIAMINE HYDROCHLORIDE 100 MG/ML
500 INJECTION, SOLUTION INTRAMUSCULAR; INTRAVENOUS 3 TIMES DAILY
Status: DISCONTINUED | OUTPATIENT
Start: 2025-05-04 | End: 2025-05-04 | Stop reason: HOSPADM

## 2025-05-04 RX ORDER — HALOPERIDOL 5 MG/ML
2.5-5 INJECTION INTRAMUSCULAR EVERY 6 HOURS PRN
Status: DISCONTINUED | OUTPATIENT
Start: 2025-05-04 | End: 2025-05-05 | Stop reason: HOSPADM

## 2025-05-04 RX ORDER — CLONIDINE HYDROCHLORIDE 0.1 MG/1
0.1 TABLET ORAL EVERY 8 HOURS
Status: DISCONTINUED | OUTPATIENT
Start: 2025-05-05 | End: 2025-05-05

## 2025-05-04 RX ORDER — DIAZEPAM 10 MG/2ML
5-10 INJECTION, SOLUTION INTRAMUSCULAR; INTRAVENOUS EVERY 30 MIN PRN
Status: DISCONTINUED | OUTPATIENT
Start: 2025-05-04 | End: 2025-05-05

## 2025-05-04 RX ORDER — CLONIDINE HYDROCHLORIDE 0.1 MG/1
0.1 TABLET ORAL EVERY 8 HOURS
Status: DISCONTINUED | OUTPATIENT
Start: 2025-05-04 | End: 2025-05-04

## 2025-05-04 RX ORDER — IOPAMIDOL 755 MG/ML
63 INJECTION, SOLUTION INTRAVASCULAR ONCE
Status: COMPLETED | OUTPATIENT
Start: 2025-05-04 | End: 2025-05-04

## 2025-05-04 RX ORDER — FLUMAZENIL 0.1 MG/ML
0.2 INJECTION, SOLUTION INTRAVENOUS
Status: DISCONTINUED | OUTPATIENT
Start: 2025-05-04 | End: 2025-05-05 | Stop reason: HOSPADM

## 2025-05-04 RX ORDER — FLUTICASONE PROPIONATE 50 MCG
1 SPRAY, SUSPENSION (ML) NASAL DAILY PRN
COMMUNITY

## 2025-05-04 RX ORDER — CIPROFLOXACIN HYDROCHLORIDE 3.5 MG/ML
4 SOLUTION/ DROPS TOPICAL 2 TIMES DAILY
Status: DISCONTINUED | OUTPATIENT
Start: 2025-05-04 | End: 2025-05-05 | Stop reason: HOSPADM

## 2025-05-04 RX ORDER — FOLIC ACID 1 MG/1
1 TABLET ORAL DAILY
Status: DISCONTINUED | OUTPATIENT
Start: 2025-05-05 | End: 2025-05-05

## 2025-05-04 RX ORDER — GABAPENTIN 600 MG/1
300 TABLET ORAL
Status: DISCONTINUED | OUTPATIENT
Start: 2025-05-04 | End: 2025-05-05 | Stop reason: HOSPADM

## 2025-05-04 RX ORDER — SODIUM CHLORIDE, SODIUM GLUCONATE, SODIUM ACETATE, POTASSIUM CHLORIDE AND MAGNESIUM CHLORIDE 526; 502; 368; 37; 30 MG/100ML; MG/100ML; MG/100ML; MG/100ML; MG/100ML
INJECTION, SOLUTION INTRAVENOUS CONTINUOUS PRN
Status: DISCONTINUED | OUTPATIENT
Start: 2025-05-04 | End: 2025-05-05

## 2025-05-04 RX ORDER — OXYCODONE HYDROCHLORIDE 5 MG/1
5 TABLET ORAL EVERY 4 HOURS PRN
Status: DISCONTINUED | OUTPATIENT
Start: 2025-05-04 | End: 2025-05-05

## 2025-05-04 RX ORDER — MORPHINE SULFATE 4 MG/ML
4 INJECTION, SOLUTION INTRAMUSCULAR; INTRAVENOUS
Status: COMPLETED | OUTPATIENT
Start: 2025-05-04 | End: 2025-05-04

## 2025-05-04 RX ORDER — DEXAMETHASONE SODIUM PHOSPHATE 10 MG/ML
10 INJECTION, SOLUTION INTRAMUSCULAR; INTRAVENOUS ONCE
Status: DISCONTINUED | OUTPATIENT
Start: 2025-05-04 | End: 2025-05-05 | Stop reason: HOSPADM

## 2025-05-04 RX ORDER — OXYCODONE HYDROCHLORIDE 10 MG/1
10 TABLET ORAL EVERY 4 HOURS PRN
Status: DISCONTINUED | OUTPATIENT
Start: 2025-05-04 | End: 2025-05-05

## 2025-05-04 RX ORDER — LIDOCAINE 40 MG/G
CREAM TOPICAL
Status: DISCONTINUED | OUTPATIENT
Start: 2025-05-04 | End: 2025-05-05 | Stop reason: HOSPADM

## 2025-05-04 RX ORDER — PROCHLORPERAZINE MALEATE 5 MG/1
10 TABLET ORAL EVERY 6 HOURS PRN
Status: DISCONTINUED | OUTPATIENT
Start: 2025-05-04 | End: 2025-05-05 | Stop reason: HOSPADM

## 2025-05-04 RX ORDER — THIAMINE HYDROCHLORIDE 100 MG/ML
100 INJECTION, SOLUTION INTRAMUSCULAR; INTRAVENOUS DAILY
Status: DISCONTINUED | OUTPATIENT
Start: 2025-05-04 | End: 2025-05-05 | Stop reason: DRUGHIGH

## 2025-05-04 RX ORDER — PROPOFOL 10 MG/ML
INJECTION, EMULSION INTRAVENOUS PRN
Status: DISCONTINUED | OUTPATIENT
Start: 2025-05-04 | End: 2025-05-05

## 2025-05-04 RX ORDER — ONDANSETRON 2 MG/ML
4 INJECTION INTRAMUSCULAR; INTRAVENOUS ONCE
Status: COMPLETED | OUTPATIENT
Start: 2025-05-04 | End: 2025-05-04

## 2025-05-04 RX ORDER — FENTANYL CITRATE 50 UG/ML
INJECTION, SOLUTION INTRAMUSCULAR; INTRAVENOUS PRN
Status: DISCONTINUED | OUTPATIENT
Start: 2025-05-04 | End: 2025-05-05

## 2025-05-04 RX ORDER — AMPICILLIN AND SULBACTAM 1; .5 G/1; G/1
1.5 INJECTION, POWDER, FOR SOLUTION INTRAMUSCULAR; INTRAVENOUS SEE ADMIN INSTRUCTIONS
Status: DISCONTINUED | OUTPATIENT
Start: 2025-05-04 | End: 2025-05-05 | Stop reason: HOSPADM

## 2025-05-04 RX ORDER — ACETAMINOPHEN 500 MG
1000 TABLET ORAL ONCE
Status: DISCONTINUED | OUTPATIENT
Start: 2025-05-04 | End: 2025-05-05 | Stop reason: DRUGHIGH

## 2025-05-04 RX ORDER — OXYMETAZOLINE HYDROCHLORIDE 0.05 G/100ML
SPRAY NASAL PRN
Status: DISCONTINUED | OUTPATIENT
Start: 2025-05-04 | End: 2025-05-05

## 2025-05-04 RX ORDER — DIVALPROEX SODIUM 250 MG/1
250 TABLET, DELAYED RELEASE ORAL ONCE
Status: COMPLETED | OUTPATIENT
Start: 2025-05-04 | End: 2025-05-04

## 2025-05-04 RX ORDER — KETOROLAC TROMETHAMINE 15 MG/ML
15 INJECTION, SOLUTION INTRAMUSCULAR; INTRAVENOUS ONCE
Status: COMPLETED | OUTPATIENT
Start: 2025-05-04 | End: 2025-05-04

## 2025-05-04 RX ORDER — MAGNESIUM SULFATE HEPTAHYDRATE 40 MG/ML
4 INJECTION, SOLUTION INTRAVENOUS ONCE
Status: COMPLETED | OUTPATIENT
Start: 2025-05-04 | End: 2025-05-04

## 2025-05-04 RX ORDER — ACETAMINOPHEN 325 MG/1
975 TABLET ORAL 3 TIMES DAILY
Status: DISCONTINUED | OUTPATIENT
Start: 2025-05-04 | End: 2025-05-05

## 2025-05-04 RX ORDER — NALOXONE HYDROCHLORIDE 0.4 MG/ML
0.2 INJECTION, SOLUTION INTRAMUSCULAR; INTRAVENOUS; SUBCUTANEOUS
Status: DISCONTINUED | OUTPATIENT
Start: 2025-05-04 | End: 2025-05-05 | Stop reason: HOSPADM

## 2025-05-04 RX ORDER — SODIUM CHLORIDE, SODIUM LACTATE, POTASSIUM CHLORIDE, CALCIUM CHLORIDE 600; 310; 30; 20 MG/100ML; MG/100ML; MG/100ML; MG/100ML
INJECTION, SOLUTION INTRAVENOUS CONTINUOUS PRN
Status: DISCONTINUED | OUTPATIENT
Start: 2025-05-04 | End: 2025-05-05

## 2025-05-04 RX ORDER — DIAZEPAM 5 MG/1
10 TABLET ORAL EVERY 30 MIN PRN
Status: DISCONTINUED | OUTPATIENT
Start: 2025-05-04 | End: 2025-05-05 | Stop reason: HOSPADM

## 2025-05-04 RX ORDER — MULTIPLE VITAMINS W/ MINERALS TAB 9MG-400MCG
1 TAB ORAL DAILY
Status: DISCONTINUED | OUTPATIENT
Start: 2025-05-04 | End: 2025-05-05

## 2025-05-04 RX ORDER — ONDANSETRON 4 MG/1
4 TABLET, ORALLY DISINTEGRATING ORAL EVERY 6 HOURS PRN
Status: DISCONTINUED | OUTPATIENT
Start: 2025-05-04 | End: 2025-05-05 | Stop reason: HOSPADM

## 2025-05-04 RX ORDER — ONDANSETRON 2 MG/ML
4 INJECTION INTRAMUSCULAR; INTRAVENOUS EVERY 6 HOURS PRN
Status: DISCONTINUED | OUTPATIENT
Start: 2025-05-04 | End: 2025-05-05 | Stop reason: HOSPADM

## 2025-05-04 RX ORDER — AMOXICILLIN 250 MG
1-2 CAPSULE ORAL 2 TIMES DAILY
Status: DISCONTINUED | OUTPATIENT
Start: 2025-05-05 | End: 2025-05-05

## 2025-05-04 RX ORDER — SODIUM CHLORIDE, SODIUM LACTATE, POTASSIUM CHLORIDE, CALCIUM CHLORIDE 600; 310; 30; 20 MG/100ML; MG/100ML; MG/100ML; MG/100ML
1000 INJECTION, SOLUTION INTRAVENOUS CONTINUOUS
Status: DISCONTINUED | OUTPATIENT
Start: 2025-05-04 | End: 2025-05-05

## 2025-05-04 RX ORDER — SODIUM CHLORIDE, SODIUM LACTATE, POTASSIUM CHLORIDE, CALCIUM CHLORIDE 600; 310; 30; 20 MG/100ML; MG/100ML; MG/100ML; MG/100ML
INJECTION, SOLUTION INTRAVENOUS CONTINUOUS
Status: DISCONTINUED | OUTPATIENT
Start: 2025-05-04 | End: 2025-05-05 | Stop reason: HOSPADM

## 2025-05-04 RX ORDER — ONDANSETRON 2 MG/ML
4 INJECTION INTRAMUSCULAR; INTRAVENOUS EVERY 6 HOURS PRN
Status: DISCONTINUED | OUTPATIENT
Start: 2025-05-04 | End: 2025-05-04 | Stop reason: HOSPADM

## 2025-05-04 RX ORDER — FOLIC ACID 5 MG/ML
1 INJECTION, SOLUTION INTRAMUSCULAR; INTRAVENOUS; SUBCUTANEOUS DAILY
Status: DISCONTINUED | OUTPATIENT
Start: 2025-05-04 | End: 2025-05-05 | Stop reason: DRUGHIGH

## 2025-05-04 RX ORDER — LIDOCAINE 40 MG/G
CREAM TOPICAL
Status: DISCONTINUED | OUTPATIENT
Start: 2025-05-04 | End: 2025-05-04 | Stop reason: HOSPADM

## 2025-05-04 RX ORDER — HYDROMORPHONE HCL IN WATER/PF 6 MG/30 ML
0.4 PATIENT CONTROLLED ANALGESIA SYRINGE INTRAVENOUS
Status: DISCONTINUED | OUTPATIENT
Start: 2025-05-04 | End: 2025-05-05

## 2025-05-04 RX ORDER — OLANZAPINE 5 MG/1
5-10 TABLET, ORALLY DISINTEGRATING ORAL EVERY 6 HOURS PRN
Status: DISCONTINUED | OUTPATIENT
Start: 2025-05-04 | End: 2025-05-05 | Stop reason: HOSPADM

## 2025-05-04 RX ORDER — POLYETHYLENE GLYCOL 3350 17 G/17G
17 POWDER, FOR SOLUTION ORAL DAILY PRN
Status: DISCONTINUED | OUTPATIENT
Start: 2025-05-04 | End: 2025-05-05 | Stop reason: HOSPADM

## 2025-05-04 RX ORDER — METHOCARBAMOL 750 MG/1
750 TABLET, FILM COATED ORAL 3 TIMES DAILY
Status: DISCONTINUED | OUTPATIENT
Start: 2025-05-04 | End: 2025-05-05 | Stop reason: HOSPADM

## 2025-05-04 RX ORDER — ACETAMINOPHEN 325 MG/1
650 TABLET ORAL
Status: DISCONTINUED | OUTPATIENT
Start: 2025-05-04 | End: 2025-05-05 | Stop reason: HOSPADM

## 2025-05-04 RX ORDER — DIAZEPAM 5 MG/1
10 TABLET ORAL EVERY 30 MIN PRN
Status: DISCONTINUED | OUTPATIENT
Start: 2025-05-04 | End: 2025-05-05

## 2025-05-04 RX ADMIN — FOLIC ACID 1 MG: 5 INJECTION, SOLUTION INTRAMUSCULAR; INTRAVENOUS; SUBCUTANEOUS at 19:48

## 2025-05-04 RX ADMIN — THIAMINE HYDROCHLORIDE 100 MG: 100 INJECTION, SOLUTION INTRAMUSCULAR; INTRAVENOUS at 19:17

## 2025-05-04 RX ADMIN — PHENYLEPHRINE HYDROCHLORIDE 100 MCG: 10 INJECTION INTRAVENOUS at 23:33

## 2025-05-04 RX ADMIN — SODIUM CHLORIDE, SODIUM GLUCONATE, SODIUM ACETATE, POTASSIUM CHLORIDE AND MAGNESIUM CHLORIDE: 526; 502; 368; 37; 30 INJECTION, SOLUTION INTRAVENOUS at 23:00

## 2025-05-04 RX ADMIN — SODIUM CHLORIDE, SODIUM LACTATE, POTASSIUM CHLORIDE, AND CALCIUM CHLORIDE 1000 ML: .6; .31; .03; .02 INJECTION, SOLUTION INTRAVENOUS at 18:21

## 2025-05-04 RX ADMIN — DIVALPROEX SODIUM 250 MG: 250 TABLET, DELAYED RELEASE ORAL at 20:22

## 2025-05-04 RX ADMIN — NOREPINEPHRINE BITARTRATE 6.8 MCG: 1 INJECTION, SOLUTION, CONCENTRATE INTRAVENOUS at 23:41

## 2025-05-04 RX ADMIN — PHENYLEPHRINE HYDROCHLORIDE 0.5 MCG/KG/MIN: 10 INJECTION INTRAVENOUS at 23:33

## 2025-05-04 RX ADMIN — FENTANYL CITRATE 50 MCG: 50 INJECTION INTRAMUSCULAR; INTRAVENOUS at 23:53

## 2025-05-04 RX ADMIN — OXYMETAZOLINE HYDROCHLORIDE 2 SPRAY: 0.05 SPRAY NASAL at 22:58

## 2025-05-04 RX ADMIN — CIPROFLOXACIN HYDROCHLORIDE 4 DROP: 3 SOLUTION/ DROPS OPHTHALMIC at 19:51

## 2025-05-04 RX ADMIN — FENTANYL CITRATE 100 MCG: 50 INJECTION INTRAMUSCULAR; INTRAVENOUS at 23:01

## 2025-05-04 RX ADMIN — HYDROMORPHONE HYDROCHLORIDE 1 MG: 1 INJECTION, SOLUTION INTRAMUSCULAR; INTRAVENOUS; SUBCUTANEOUS at 18:41

## 2025-05-04 RX ADMIN — ONDANSETRON 4 MG: 2 INJECTION, SOLUTION INTRAMUSCULAR; INTRAVENOUS at 15:52

## 2025-05-04 RX ADMIN — THIAMINE HYDROCHLORIDE 500 MG: 100 INJECTION, SOLUTION INTRAMUSCULAR; INTRAVENOUS at 15:08

## 2025-05-04 RX ADMIN — SUCCINYLCHOLINE CHLORIDE 120 MG: 20 INJECTION, SOLUTION INTRAMUSCULAR; INTRAVENOUS; PARENTERAL at 23:06

## 2025-05-04 RX ADMIN — MORPHINE SULFATE 4 MG: 4 INJECTION INTRAVENOUS at 15:53

## 2025-05-04 RX ADMIN — DEXAMETHASONE SODIUM PHOSPHATE 10 MG: 4 INJECTION, SOLUTION INTRAMUSCULAR; INTRAVENOUS at 23:44

## 2025-05-04 RX ADMIN — SODIUM CHLORIDE, SODIUM LACTATE, POTASSIUM CHLORIDE, AND CALCIUM CHLORIDE: .6; .31; .03; .02 INJECTION, SOLUTION INTRAVENOUS at 23:00

## 2025-05-04 RX ADMIN — ONDANSETRON 4 MG: 2 INJECTION, SOLUTION INTRAMUSCULAR; INTRAVENOUS at 18:39

## 2025-05-04 RX ADMIN — MORPHINE SULFATE 4 MG: 4 INJECTION INTRAVENOUS at 13:35

## 2025-05-04 RX ADMIN — MORPHINE SULFATE 4 MG: 4 INJECTION INTRAVENOUS at 14:16

## 2025-05-04 RX ADMIN — AMPICILLIN SODIUM AND SULBACTAM SODIUM 3 G: 2; 1 INJECTION, POWDER, FOR SOLUTION INTRAMUSCULAR; INTRAVENOUS at 22:04

## 2025-05-04 RX ADMIN — MIDAZOLAM 2 MG: 1 INJECTION INTRAMUSCULAR; INTRAVENOUS at 22:53

## 2025-05-04 RX ADMIN — DIAZEPAM 5 MG: 5 INJECTION INTRAMUSCULAR; INTRAVENOUS at 19:26

## 2025-05-04 RX ADMIN — ONDANSETRON 4 MG: 2 INJECTION, SOLUTION INTRAMUSCULAR; INTRAVENOUS at 19:55

## 2025-05-04 RX ADMIN — IOPAMIDOL 63 ML: 755 INJECTION, SOLUTION INTRAVENOUS at 13:43

## 2025-05-04 RX ADMIN — PHENYLEPHRINE HYDROCHLORIDE 100 MCG: 10 INJECTION INTRAVENOUS at 23:15

## 2025-05-04 RX ADMIN — DEXTROSE AND SODIUM CHLORIDE 1000 ML: 5; 900 INJECTION, SOLUTION INTRAVENOUS at 20:19

## 2025-05-04 RX ADMIN — REMIFENTANIL HYDROCHLORIDE 0.08 MCG/KG/MIN: 1 INJECTION, POWDER, LYOPHILIZED, FOR SOLUTION INTRAVENOUS at 23:03

## 2025-05-04 RX ADMIN — MAGNESIUM SULFATE HEPTAHYDRATE 4 G: 40 INJECTION, SOLUTION INTRAVENOUS at 21:29

## 2025-05-04 RX ADMIN — LIDOCAINE HYDROCHLORIDE 60 MG: 20 INJECTION, SOLUTION INFILTRATION; PERINEURAL at 23:02

## 2025-05-04 RX ADMIN — ONDANSETRON 4 MG: 2 INJECTION, SOLUTION INTRAMUSCULAR; INTRAVENOUS at 13:34

## 2025-05-04 RX ADMIN — PROPOFOL 120 MG: 10 INJECTION, EMULSION INTRAVENOUS at 23:03

## 2025-05-04 RX ADMIN — ACETAMINOPHEN 975 MG: 325 TABLET ORAL at 21:31

## 2025-05-04 RX ADMIN — KETOROLAC TROMETHAMINE 15 MG: 15 INJECTION, SOLUTION INTRAMUSCULAR; INTRAVENOUS at 19:57

## 2025-05-04 ASSESSMENT — COLUMBIA-SUICIDE SEVERITY RATING SCALE - C-SSRS
6. HAVE YOU EVER DONE ANYTHING, STARTED TO DO ANYTHING, OR PREPARED TO DO ANYTHING TO END YOUR LIFE?: NO
2. HAVE YOU ACTUALLY HAD ANY THOUGHTS OF KILLING YOURSELF IN THE PAST MONTH?: NO
1. IN THE PAST MONTH, HAVE YOU WISHED YOU WERE DEAD OR WISHED YOU COULD GO TO SLEEP AND NOT WAKE UP?: NO
1. IN THE PAST MONTH, HAVE YOU WISHED YOU WERE DEAD OR WISHED YOU COULD GO TO SLEEP AND NOT WAKE UP?: NO
2. HAVE YOU ACTUALLY HAD ANY THOUGHTS OF KILLING YOURSELF IN THE PAST MONTH?: NO
6. HAVE YOU EVER DONE ANYTHING, STARTED TO DO ANYTHING, OR PREPARED TO DO ANYTHING TO END YOUR LIFE?: NO

## 2025-05-04 ASSESSMENT — LIFESTYLE VARIABLES
HEADACHE, FULLNESS IN HEAD: SEVERE
AGITATION: NORMAL ACTIVITY
PAROXYSMAL SWEATS: NO SWEAT VISIBLE
TOTAL SCORE: 10
TOBACCO_USE: 1
AGITATION: NORMAL ACTIVITY
NAUSEA AND VOMITING: NO NAUSEA AND NO VOMITING
PAROXYSMAL SWEATS: NO SWEAT VISIBLE
ANXIETY: NO ANXIETY, AT EASE
AUDITORY DISTURBANCES: VERY MILD HARSHNESS OR ABILITY TO FRIGHTEN
AUDITORY DISTURBANCES: NOT PRESENT
VISUAL DISTURBANCES: NOT PRESENT
NAUSEA AND VOMITING: 3
TOTAL SCORE: 0
HEADACHE, FULLNESS IN HEAD: NOT PRESENT
ORIENTATION AND CLOUDING OF SENSORIUM: ORIENTED AND CAN DO SERIAL ADDITIONS
TREMOR: NO TREMOR
TREMOR: NO TREMOR
ANXIETY: NO ANXIETY, AT EASE
VISUAL DISTURBANCES: NOT PRESENT
ORIENTATION AND CLOUDING OF SENSORIUM: CANNOT DO SERIAL ADDITIONS OR IS UNCERTAIN ABOUT DATE

## 2025-05-04 ASSESSMENT — ACTIVITIES OF DAILY LIVING (ADL)
ADLS_ACUITY_SCORE: 54
ADLS_ACUITY_SCORE: 57
ADLS_ACUITY_SCORE: 54
ADLS_ACUITY_SCORE: 57
ADLS_ACUITY_SCORE: 54

## 2025-05-04 NOTE — ED PROVIDER NOTES
[facility] [facility]ERGENCY DEPARTMENT ([facility])    5/04/25       ED PROVIDER NOTE   History     Chief Complaint   Patient presents with    Fall     HPI  Alana Mujica is a 49 year old female with a history of hypertension, alcohol use disorder, alcohol withdrawal, and osteoporosis who presents to the ED for evaluation of fall.  Patient was seen at [facility] ED after a fall last night.  Somehow managed to fracture bilateral mandibular condyles with associated TMJ dislocation and also has bilateral temporal bone fractures.  Had some hemotympanum, no active bleeding.    The patient arrives complains of some moderate pain in her face, complains of significant headache that has not being improved with medication that is helping her jaw pain.  No new neurologic concerns such as numbness weakness.    She is not on blood thinners.      Per chart review, patient was seen for this mechanical fall and alcohol intoxication earlier today at [facility] ED at 1:30 PM. Patient was concerning for possible fracture vs intracranial hemorrhage risk vs concussion, possible C-spine and rib wall fracture vs soft tissue injury. Placed in C-collar on presentation. Imaging showed bilateral jaw fractures with dislocations, likely external auditory canal fractures, and no acute intracranial, C-spine or chest trauma. The patient was given Zofran and Thiamine, started on CIWA protocol and C-spine was cleared. Patient was transferred here for further evaluation of complicated jaw and likely temporal bone fractures.    EXAM: CT FACIAL BONES WITHOUT CONTRAST, CT CERVICAL SPINE W/O CONTRAST, CT HEAD W/O CONTRAST   DATE/TIME: 5/4/2025 2:02 PM CDT  IMPRESSION:     HEAD CT:  1. No acute intracranial abnormality.     FACIAL BONE CT:  1. Comminuted bilateral mandibular condyle fractures with associated temporomandibular joint dislocations.     2. Subtle mineralization/bony irregularity involving the left greater than right bony external auditory canal  suggesting fractures in these regions. Temporal bone CT is recommended for further evaluation.  This  CERVICAL SPINE CT:  1. No acute traumatic injury of the cervical spine.      EXAM: CT TEMPORAL W/O CONTRAST  DATE: 5/4/2025  IMPRESSION:  1.  Mildly displaced fractures with fragmentation involving the bilateral pori acustici externi of the extra auditory canals.   2.  Incompletely imaged bilateral mandibular fractures/dislocations, better characterized on same day maxillofacial CT.    Past Medical History  Past Medical History:   Diagnosis Date    RUPINDER (acute kidney injury) 9/14/2018    Alcohol withdrawal (H) 8/26/2018    Anorexia     Anxiety     Bulimia (H)     Chemical dependency (H) 11/24/2015    Alcohol in treatment    Depressive disorder     Genital herpes     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/15    NIL, +HR HPV. Co-test 12 months    Osteoporosis     Substance abuse (H)      Past Surgical History:   Procedure Laterality Date    COLONOSCOPY N/A 11/30/2022    Procedure: COLONOSCOPY, WITH POLYPECTOMY AND BIOPSY;  Surgeon: Maya Holcomb MD;  Location:  GI    LEEP TX, CERVICAL      greater than 5 years ago from 2015, uncertain date     estradiol (ESTRACE) 0.5 MG tablet  etonogestrel (NEXPLANON) 68 MG IMPL  fluticasone (FLONASE) 50 MCG/ACT nasal spray  Homeopathic Products (LIVER SUPPORT SL)  minoxidil (LONITEN) 2.5 MG tablet  multivitamin w/minerals (THERA-VIT-M) tablet  omega 3 1000 MG CAPS  traZODone (DESYREL) 50 MG tablet  valACYclovir (VALTREX) 1000 mg tablet  vitamin B complex with vitamin C (STRESS TAB) tablet      No Known Allergies  Family History  Family History   Problem Relation Age of Onset    Breast Cancer Mother 68        2014/2015    Hypertension Mother     Hypertension Father     Ulcerative Colitis Maternal Grandmother     No Known Problems Maternal Grandfather     No Known Problems Paternal Grandmother     No Known Problems Paternal Grandfather     No Known Problems Brother     Unknown/Adopted No family  "hx of     Depression No family hx of     Anxiety Disorder No family hx of     Schizophrenia No family hx of     Bipolar Disorder No family hx of     Suicide No family hx of     Substance Abuse No family hx of     Dementia No family hx of     Eliseo Disease No family hx of     Parkinsonism No family hx of     Autism Spectrum Disorder No family hx of     Intellectual Disability No family hx of     Mental Illness No family hx of      Social History   Social History     Tobacco Use    Smoking status: Former     Types: Cigarettes     Passive exposure: Never    Smokeless tobacco: Never   Vaping Use    Vaping status: Former   Substance Use Topics    Alcohol use: Not Currently     Comment: 9/18/22    Drug use: No      A medically appropriate review of systems was performed with pertinent positives and negatives noted in the HPI, and all other systems negative.    Physical Exam   BP: 133/84  Pulse: 92  Temp: 98.8  F (37.1  C)  Resp: 16  Height: 157.5 cm (5' 2\")  SpO2: 97 %  Physical Exam  Vital Signs Reviewed  Gen: Well nourished, well developed, resting comfortably, no acute distress  HEENT: NC, PERRL, EOMI, MMM.  Face is swollen.  Dried blood bilateral ears.  Neck: Supple, FROM  CV: Regular Rate  Lungs/Chest: Normal Effort  Abd: Non-distended  MSK/Back: FROM, no visible deformity  Neuro: A&Ox3, GCS 15, CN II-XII unremarkable. Strength and sensation globally intact.  Skin: Warm, Dry, Intact, no visible lesions     ED Course, Procedures, & Data      Procedures            EKG Interpretation:      Interpreted by James Briceno MD  Time reviewed: 1800  Symptoms at time of EKG: screening   Rhythm: normal sinus   Rate: normal  Axis: normal  Ectopy: none  Conduction: normal  ST Segments/ T Waves: No ST-T wave changes  Q Waves: none  Clinical Impression: normal EKG           Results for orders placed or performed during the hospital encounter of 05/04/25   INR     Status: Normal   Result Value Ref Range    INR 1.06 0.85 - " 1.15    PT 13.8 11.8 - 14.8 Seconds   Partial thromboplastin time     Status: Normal   Result Value Ref Range    aPTT 26 22 - 38 Seconds   Comprehensive metabolic panel     Status: Abnormal   Result Value Ref Range    Sodium 128 (L) 135 - 145 mmol/L    Potassium 4.6 3.4 - 5.3 mmol/L    Carbon Dioxide (CO2) 25 22 - 29 mmol/L    Anion Gap 12 7 - 15 mmol/L    Urea Nitrogen 7.4 6.0 - 20.0 mg/dL    Creatinine 0.97 (H) 0.51 - 0.95 mg/dL    GFR Estimate 70 >60 mL/min/1.73m2    Calcium 9.5 8.8 - 10.4 mg/dL    Chloride 91 (L) 98 - 107 mmol/L    Glucose 123 (H) 70 - 99 mg/dL    Alkaline Phosphatase 61 40 - 150 U/L    AST 29 0 - 45 U/L    ALT 32 0 - 50 U/L    Protein Total 6.4 6.4 - 8.3 g/dL    Albumin 4.1 3.5 - 5.2 g/dL    Bilirubin Total 0.8 <=1.2 mg/dL   [place] Draw     Status: None (In process)    Narrative    The following orders were created for panel order [place] Draw.  Procedure                               Abnormality         Status                     ---------                               -----------         ------                     Extra Red Top Tube[[id number]]                              In process                   Please view results for these tests on the individual orders.   CBC with platelets and differential     Status: Abnormal   Result Value Ref Range    WBC Count 14.0 (H) 4.0 - 11.0 10e3/uL    RBC Count 3.82 3.80 - 5.20 10e6/uL    Hemoglobin 12.2 11.7 - 15.7 g/dL    Hematocrit 34.6 (L) 35.0 - 47.0 %    MCV 91 78 - 100 fL    MCH 31.9 26.5 - 33.0 pg    MCHC 35.3 31.5 - 36.5 g/dL    RDW 11.9 10.0 - 15.0 %    Platelet Count 268 150 - 450 10e3/uL    % Neutrophils 88 %    % Lymphocytes 5 %    % Monocytes 6 %    % Eosinophils 0 %    % Basophils 0 %    % Immature Granulocytes 1 %    NRBCs per 100 WBC 0 <1 /100    Absolute Neutrophils 12.3 (H) 1.6 - 8.3 10e3/uL    Absolute Lymphocytes 0.7 (L) 0.8 - 5.3 10e3/uL    Absolute Monocytes 0.9 0.0 - 1.3 10e3/uL    Absolute Eosinophils 0.0 0.0 - 0.7 10e3/uL     Absolute Basophils 0.0 0.0 - 0.2 10e3/uL    Absolute Immature Granulocytes 0.1 <=0.4 10e3/uL    Absolute NRBCs 0.0 10e3/uL   Magnesium     Status: Abnormal   Result Value Ref Range    Magnesium 1.1 (L) 1.7 - 2.3 mg/dL   Phosphorus     Status: Normal   Result Value Ref Range    Phosphorus 4.1 2.5 - 4.5 mg/dL   CBC with platelets differential     Status: Abnormal    Narrative    The following orders were created for panel order CBC with platelets differential.  Procedure                               Abnormality         Status                     ---------                               -----------         ------                     CBC with platelets and ...[[id number]]  Abnormal            Final result                 Please view results for these tests on the individual orders.   Results for orders placed or performed during the hospital encounter of 05/04/25   CT Head w/o Contrast     Status: None    Narrative    EXAM: CT FACIAL BONES WITHOUT CONTRAST, CT CERVICAL SPINE W/O CONTRAST, CT HEAD W/O CONTRAST  LOCATION: [facility]  DATE/TIME: 5/4/2025 2:02 PM CDT    INDICATION: Headache, facial pain, neck pain after trauma  COMPARISON: None available at time dictation  TECHNIQUE:   1) Routine CT Head without IV contrast. Multiplanar reformats. Dose reduction techniques were used.  2) Routine CT Facial Bones without IV contrast. Multiplanar reformats. Dose reduction techniques were used.  3) Routine CT Cervical Spine without IV contrast. Multiplanar reformats. Dose reduction techniques were used.    FINDINGS:  HEAD CT:   INTRACRANIAL CONTENTS: No acute intracranial hemorrhage. No CT evidence of acute infarct. Normal ventricles without hydrocephalus. No extra-axial fluid collections. Patent basal cisterns.    OSSEOUS STRUCTURES/SOFT TISSUES: The calvarium and skull base are unremarkable.    FACIAL BONE CT:   OSSEOUS STRUCTURES/SOFT TISSUES: Comminuted bilateral mandibular condyle fractures with  associated temporomandibular joint dislocations. No evidence for dental trauma or periapical abscess. The mastoid air cells and middle ear cavities are well aerated,   however there is subtle mineralization/bony irregularity involving the left greater than right bony external auditory canal suggesting fractures in these regions.    ORBITAL CONTENTS: The orbital structures are unremarkable.    SINUSES: No paranasal sinus mucosal disease.     CERVICAL SPINE CT:  VERTEBRA: The spine is imaged from the skull base through T2. Straightening of the usual cervical lordosis without significant spondylolisthesis.. No evidence of acute fracture. No aggressive osseous lesion.      CANAL/FORAMINA: Multilevel degenerative changes without high-grade spinal canal stenosis or high-grade neural foraminal narrowing.     PARASPINAL: No prevertebral or paravertebral soft tissue swelling.  Visualized lungs are clear. The thyroid gland is unremarkable.      Impression    IMPRESSION:    HEAD CT:  1. No acute intracranial abnormality.    FACIAL BONE CT:  1. Comminuted bilateral mandibular condyle fractures with associated temporomandibular joint dislocations.    2. Subtle mineralization/bony irregularity involving the left greater than right bony external auditory canal suggesting fractures in these regions. Temporal bone CT is recommended for further evaluation.  This  CERVICAL SPINE CT:  1. No acute traumatic injury of the cervical spine.     - - - - - - - - - - - - - - - - - - - - - - - - - - - - - - - - - - - - - - - - - - - - - - - - - - - - - - - - - - - - - - - - - - - - - - - - - - - -   The results above were discussed with DR OTOOLE on 5/4/2025 2:15 PM CDT by Dr. Mk Goldberg.  - - - - - - - - - - - - - - - - - - - - - - - - - - - - - - - - - - - - - - - - - - - - - - - - - - - - - - - - - - - - - - - - - - - - - - - - - - -    CT Cervical Spine w/o Contrast     Status: None    Narrative    EXAM: CT FACIAL BONES WITHOUT  CONTRAST, CT CERVICAL SPINE W/O CONTRAST, CT HEAD W/O CONTRAST  LOCATION: [facility]  DATE/TIME: 5/4/2025 2:02 PM CDT    INDICATION: Headache, facial pain, neck pain after trauma  COMPARISON: None available at time dictation  TECHNIQUE:   1) Routine CT Head without IV contrast. Multiplanar reformats. Dose reduction techniques were used.  2) Routine CT Facial Bones without IV contrast. Multiplanar reformats. Dose reduction techniques were used.  3) Routine CT Cervical Spine without IV contrast. Multiplanar reformats. Dose reduction techniques were used.    FINDINGS:  HEAD CT:   INTRACRANIAL CONTENTS: No acute intracranial hemorrhage. No CT evidence of acute infarct. Normal ventricles without hydrocephalus. No extra-axial fluid collections. Patent basal cisterns.    OSSEOUS STRUCTURES/SOFT TISSUES: The calvarium and skull base are unremarkable.    FACIAL BONE CT:   OSSEOUS STRUCTURES/SOFT TISSUES: Comminuted bilateral mandibular condyle fractures with associated temporomandibular joint dislocations. No evidence for dental trauma or periapical abscess. The mastoid air cells and middle ear cavities are well aerated,   however there is subtle mineralization/bony irregularity involving the left greater than right bony external auditory canal suggesting fractures in these regions.    ORBITAL CONTENTS: The orbital structures are unremarkable.    SINUSES: No paranasal sinus mucosal disease.     CERVICAL SPINE CT:  VERTEBRA: The spine is imaged from the skull base through T2. Straightening of the usual cervical lordosis without significant spondylolisthesis.. No evidence of acute fracture. No aggressive osseous lesion.      CANAL/FORAMINA: Multilevel degenerative changes without high-grade spinal canal stenosis or high-grade neural foraminal narrowing.     PARASPINAL: No prevertebral or paravertebral soft tissue swelling.  Visualized lungs are clear. The thyroid gland is unremarkable.      Impression     IMPRESSION:    HEAD CT:  1. No acute intracranial abnormality.    FACIAL BONE CT:  1. Comminuted bilateral mandibular condyle fractures with associated temporomandibular joint dislocations.    2. Subtle mineralization/bony irregularity involving the left greater than right bony external auditory canal suggesting fractures in these regions. Temporal bone CT is recommended for further evaluation.  This  CERVICAL SPINE CT:  1. No acute traumatic injury of the cervical spine.     - - - - - - - - - - - - - - - - - - - - - - - - - - - - - - - - - - - - - - - - - - - - - - - - - - - - - - - - - - - - - - - - - - - - - - - - - - - -   The results above were discussed with DR OTOOLE on 5/4/2025 2:15 PM CDT by Dr. Mk Goldberg.  - - - - - - - - - - - - - - - - - - - - - - - - - - - - - - - - - - - - - - - - - - - - - - - - - - - - - - - - - - - - - - - - - - - - - - - - - - -    CT Facial Bones without Contrast     Status: None    Narrative    EXAM: CT FACIAL BONES WITHOUT CONTRAST, CT CERVICAL SPINE W/O CONTRAST, CT HEAD W/O CONTRAST  LOCATION: [facility]  DATE/TIME: 5/4/2025 2:02 PM CDT    INDICATION: Headache, facial pain, neck pain after trauma  COMPARISON: None available at time dictation  TECHNIQUE:   1) Routine CT Head without IV contrast. Multiplanar reformats. Dose reduction techniques were used.  2) Routine CT Facial Bones without IV contrast. Multiplanar reformats. Dose reduction techniques were used.  3) Routine CT Cervical Spine without IV contrast. Multiplanar reformats. Dose reduction techniques were used.    FINDINGS:  HEAD CT:   INTRACRANIAL CONTENTS: No acute intracranial hemorrhage. No CT evidence of acute infarct. Normal ventricles without hydrocephalus. No extra-axial fluid collections. Patent basal cisterns.    OSSEOUS STRUCTURES/SOFT TISSUES: The calvarium and skull base are unremarkable.    FACIAL BONE CT:   OSSEOUS STRUCTURES/SOFT TISSUES: Comminuted bilateral mandibular  condyle fractures with associated temporomandibular joint dislocations. No evidence for dental trauma or periapical abscess. The mastoid air cells and middle ear cavities are well aerated,   however there is subtle mineralization/bony irregularity involving the left greater than right bony external auditory canal suggesting fractures in these regions.    ORBITAL CONTENTS: The orbital structures are unremarkable.    SINUSES: No paranasal sinus mucosal disease.     CERVICAL SPINE CT:  VERTEBRA: The spine is imaged from the skull base through T2. Straightening of the usual cervical lordosis without significant spondylolisthesis.. No evidence of acute fracture. No aggressive osseous lesion.      CANAL/FORAMINA: Multilevel degenerative changes without high-grade spinal canal stenosis or high-grade neural foraminal narrowing.     PARASPINAL: No prevertebral or paravertebral soft tissue swelling.  Visualized lungs are clear. The thyroid gland is unremarkable.      Impression    IMPRESSION:    HEAD CT:  1. No acute intracranial abnormality.    FACIAL BONE CT:  1. Comminuted bilateral mandibular condyle fractures with associated temporomandibular joint dislocations.    2. Subtle mineralization/bony irregularity involving the left greater than right bony external auditory canal suggesting fractures in these regions. Temporal bone CT is recommended for further evaluation.  This  CERVICAL SPINE CT:  1. No acute traumatic injury of the cervical spine.     - - - - - - - - - - - - - - - - - - - - - - - - - - - - - - - - - - - - - - - - - - - - - - - - - - - - - - - - - - - - - - - - - - - - - - - - - - - -   The results above were discussed with DR OTOOLE on 5/4/2025 2:15 PM CDT by Dr. Mk Goldberg.  - - - - - - - - - - - - - - - - - - - - - - - - - - - - - - - - - - - - - - - - - - - - - - - - - - - - - - - - - - - - - - - - - - - - - - - - - - -    Chest CT w IV contrast only, TRAUMA / DISSECTION     Status: None     Narrative    EXAM: CT CHEST W CONTRAST  LOCATION: [facility]  DATE: 5/4/2025    INDICATION: chest pain after trauma  COMPARISON: Chest radiograph 417/23, CT 12/19/2020  TECHNIQUE: CT chest with IV contrast. Multiplanar reformats were obtained. Dose reduction techniques were used.    CONTRAST: 63mL isovue 370    FINDINGS:   LUNGS AND PLEURA: No evidence of pulmonary laceration or contusion. Mild bibasilar hypoventilatory changes without focal airspace consolidation, pleural effusion or pneumothorax.    MEDIASTINUM/AXILLAE: No evidence of acute injury. No suspicious lymphadenopathy. No pericardial effusion.    CORONARY ARTERY CALCIFICATION: None.    UPPER ABDOMEN: Unremarkable.    MUSCULOSKELETAL: No acute bony abnormalities.      Impression    IMPRESSION:   1.  No evidence of acute trauma in the chest.   CT Temporal Bones wo Contrast     Status: None    Narrative    EXAM: CT TEMPORAL W/O CONTRAST  LOCATION: [facility]  DATE: 5/4/2025    INDICATION: Same day maxillofacial CT  COMPARISON: None.  TECHNIQUE:  Routine without contrast including dedicated thin section multiplanar reformats of each temporal bone. Dose reduction techniques were used.    FINDINGS:    RIGHT TEMPORAL BONE:   Mildly displaced fractures with fragmentation through the inferior aspect of the porus acusticus externus. Some air throughout the adjacent soft tissue.    Normal tympanic membrane. Normal middle ear cavity and ossicles. No mastoid effusion. No evidence for otosclerosis. Normal cochlea, semicircular canals, vestibule, and vestibular aqueduct. Intact bony carotid canal and facial nerve canal.     LEFT TEMPORAL BONE:   Mildly displaced fractures with fragmentation through the inferior aspect of the porus acusticus externus.    Normal tympanic membrane. Normal middle ear cavity and ossicles. No mastoid effusion. No evidence for otosclerosis. Normal cochlea, semicircular canals, vestibule, and  vestibular aqueduct. Intact bony carotid canal and facial nerve canal.     OTHER: Incompletely imaged bilateral mandibular fractures/dislocations. Please see dedicated imaging for additional findings.      Impression    IMPRESSION:  1.  Mildly displaced fractures with fragmentation involving the bilateral pori acustici externi of the extra auditory canals.   2.  Incompletely imaged bilateral mandibular fractures/dislocations, better characterized on same day maxillofacial CT.     Basic metabolic panel (BMP)     Status: Abnormal   Result Value Ref Range    Sodium 123 (L) 135 - 145 mmol/L    Potassium 4.2 3.4 - 5.3 mmol/L    Chloride 85 (L) 98 - 107 mmol/L    Carbon Dioxide (CO2) 23 22 - 29 mmol/L    Anion Gap 15 7 - 15 mmol/L    Urea Nitrogen 7.9 6.0 - 20.0 mg/dL    Creatinine 0.82 0.51 - 0.95 mg/dL    GFR Estimate 86 >60 mL/min/1.73m2    Calcium 10.7 (H) 8.8 - 10.4 mg/dL    Glucose 131 (H) 70 - 99 mg/dL   Alcohol level blood     Status: Abnormal   Result Value Ref Range    Alcohol ethyl 0.09 (H) <=0.01 g/dL   Extra Tube ([place] Draw)     Status: None    Narrative    The following orders were created for panel order Extra Tube ([place] Draw).  Procedure                               Abnormality         Status                     ---------                               -----------         ------                     Extra Blue Top Tube[[id number]]                             Final result               Extra Red Top Tube[[id number]]                              Final result                 Please view results for these tests on the individual orders.   CBC with platelets and differential     Status: Abnormal   Result Value Ref Range    WBC Count 15.0 (H) 4.0 - 11.0 10e3/uL    RBC Count 4.01 3.80 - 5.20 10e6/uL    Hemoglobin 13.0 11.7 - 15.7 g/dL    Hematocrit 35.5 35.0 - 47.0 %    MCV 89 78 - 100 fL    MCH 32.4 26.5 - 33.0 pg    MCHC 36.6 (H) 31.5 - 36.5 g/dL    RDW 11.7 10.0 - 15.0 %    Platelet Count 282 150 - 450  10e3/uL    % Neutrophils 89 %    % Lymphocytes 4 %    % Monocytes 6 %    % Eosinophils 1 %    % Basophils 0 %    % Immature Granulocytes 1 %    NRBCs per 100 WBC 0 <1 /100    Absolute Neutrophils 13.3 (H) 1.6 - 8.3 10e3/uL    Absolute Lymphocytes 0.6 (L) 0.8 - 5.3 10e3/uL    Absolute Monocytes 0.9 0.0 - 1.3 10e3/uL    Absolute Eosinophils 0.1 0.0 - 0.7 10e3/uL    Absolute Basophils 0.0 0.0 - 0.2 10e3/uL    Absolute Immature Granulocytes 0.1 <=0.4 10e3/uL    Absolute NRBCs 0.0 10e3/uL   Extra Blue Top Tube     Status: None   Result Value Ref Range    Hold Specimen JIC    Extra Red Top Tube     Status: None   Result Value Ref Range    Hold Specimen JIC    INR     Status: Normal   Result Value Ref Range    INR 0.97 0.85 - 1.15    PT 13.0 11.8 - 14.8 Seconds   Partial thromboplastin time     Status: Normal   Result Value Ref Range    aPTT 31 22 - 38 Seconds   Extra Tube ([place] Draw)     Status: None    Narrative    The following orders were created for panel order Extra Tube ([place] Draw).  Procedure                               Abnormality         Status                     ---------                               -----------         ------                     Extra Blue Top Tube[[id number]]                             Final result               Extra Red Top Tube[[id number]]                                                         Extra Green Top ([place]...[[id number]]                      Final result               Extra Purple Top Tube[[id number]]                           Final result               Extra Blood Bank Purple...[[id number]]                      Final result               Extra Blood Bank Purple...[[id number]]                      Final result                 Please view results for these tests on the individual orders.   Extra Blue Top Tube     Status: None   Result Value Ref Range    Hold Specimen JIC    Extra Green Top (Lithium Heparin) Tube     Status: None   Result Value Ref Range    Hold  Specimen JI    Extra Purple Top Tube     Status: None   Result Value Ref Range    Hold Specimen JI    Extra Blood Bank Purple Top Tube     Status: None   Result Value Ref Range    Hold Specimen JI    Extra Blood Bank Purple Top Tube     Status: None   Result Value Ref Range    Hold Specimen JIC    Magnesium     Status: Abnormal   Result Value Ref Range    Magnesium 1.2 (L) 1.7 - 2.3 mg/dL   Phosphorus     Status: Normal   Result Value Ref Range    Phosphorus 4.4 2.5 - 4.5 mg/dL   Hepatic panel     Status: Abnormal   Result Value Ref Range    Protein Total 8.3 6.4 - 8.3 g/dL    Albumin 5.1 3.5 - 5.2 g/dL    Bilirubin Total 1.0 <=1.2 mg/dL    Alkaline Phosphatase 82 40 - 150 U/L    AST 36 0 - 45 U/L    ALT 43 0 - 50 U/L    Bilirubin Direct 0.32 (H) 0.00 - 0.30 mg/dL   EKG 12 lead     Status: None (Preliminary result)   Result Value Ref Range    Systolic Blood Pressure  mmHg    Diastolic Blood Pressure  mmHg    Ventricular Rate 101 BPM    Atrial Rate 101 BPM    AR Interval 162 ms    QRS Duration 78 ms     ms    QTc 440 ms    P Axis 62 degrees    R AXIS 2 degrees    T Axis 27 degrees    Interpretation ECG       Sinus tachycardia  Otherwise normal ECG  When compared with ECG of 27-May-2023 07:32,  No significant change was found     CBC with differential     Status: Abnormal    Narrative    The following orders were created for panel order CBC with differential.  Procedure                               Abnormality         Status                     ---------                               -----------         ------                     CBC with platelets and ...[[id number]]  Abnormal            Final result                 Please view results for these tests on the individual orders.     Medications   ondansetron (ZOFRAN) injection 4 mg (4 mg Intravenous $Given 5/4/25 1955)   HYDROmorphone (DILAUDID) injection 1 mg (1 mg Intravenous $Given 5/4/25 1841)   ciprofloxacin (CILOXAN) 0.3 % ophthalmic solution 4 drop (4  drops Both Ears $Given 5/4/25 1951)   diazepam (VALIUM) tablet 10 mg ( Oral See Alternative 5/4/25 1926)     Or   diazepam (VALIUM) injection 5-10 mg (5 mg Intravenous $Given 5/4/25 1926)   multivitamin w/minerals (THERA-VIT-M) tablet 1 tablet (has no administration in time range)   folic acid injection 1 mg (1 mg Intravenous $Given 5/4/25 1948)   thiamine (B-1) injection 100 mg (100 mg Intravenous $Given 5/4/25 1917)   gabapentin (NEURONTIN) half-tab 300 mg (has no administration in time range)   acetaminophen (TYLENOL) tablet 1,000 mg (has no administration in time range)   magnesium sulfate 4 g in 100 mL sterile water intermittent infusion (has no administration in time range)   lactated ringers BOLUS 1,000 mL (0 mLs Intravenous Stopped 5/4/25 2024)   dextrose 5% and 0.9% NaCl BOLUS 1,000 mL (1,000 mLs Intravenous $New Bag 5/4/25 2019)   ketorolac (TORADOL) injection 15 mg (15 mg Intravenous $Given 5/4/25 1957)   divalproex sodium delayed-release (DEPAKOTE) DR tablet 250 mg (250 mg Oral $Given 5/4/25 2022)     Labs Ordered and Resulted from Time of ED Arrival to Time of ED Departure   COMPREHENSIVE METABOLIC PANEL - Abnormal       Result Value    Sodium 128 (*)     Potassium 4.6      Carbon Dioxide (CO2) 25      Anion Gap 12      Urea Nitrogen 7.4      Creatinine 0.97 (*)     GFR Estimate 70      Calcium 9.5      Chloride 91 (*)     Glucose 123 (*)     Alkaline Phosphatase 61      AST 29      ALT 32      Protein Total 6.4      Albumin 4.1      Bilirubin Total 0.8     CBC WITH PLATELETS AND DIFFERENTIAL - Abnormal    WBC Count 14.0 (*)     RBC Count 3.82      Hemoglobin 12.2      Hematocrit 34.6 (*)     MCV 91      MCH 31.9      MCHC 35.3      RDW 11.9      Platelet Count 268      % Neutrophils 88      % Lymphocytes 5      % Monocytes 6      % Eosinophils 0      % Basophils 0      % Immature Granulocytes 1      NRBCs per 100 WBC 0      Absolute Neutrophils 12.3 (*)     Absolute Lymphocytes 0.7 (*)     Absolute  Monocytes 0.9      Absolute Eosinophils 0.0      Absolute Basophils 0.0      Absolute Immature Granulocytes 0.1      Absolute NRBCs 0.0     MAGNESIUM - Abnormal    Magnesium 1.1 (*)    INR - Normal    INR 1.06      PT 13.8     PARTIAL THROMBOPLASTIN TIME - Normal    aPTT 26     PHOSPHORUS - Normal    Phosphorus 4.1       No orders to display          Critical care was not performed.     Medical Decision Making  The patient's presentation was of high complexity (an acute health issue posing potential threat to life or bodily function).    The patient's evaluation involved:  ordering and/or review of 3+ test(s) in this encounter (see separate area of note for details)  discussion of management or test interpretation with another health professional (ENT, Trauma)    The patient's management necessitated high risk (a parenteral controlled substance), high risk (a decision regarding emergency major procedure (went to the OR with operative service)), and high risk (a decision regarding hospitalization).    Assessment & Plan    Alana Mujica is a 49 year old female with a history of hypertension, alcohol use disorder, alcohol withdrawal, and osteoporosis who presents to the ED for evaluation of fall.  Patient was transferred from [facility] due to severe facial trauma.  Arrives in stable condition with reassuring vital signs no new neurologic deficits.    Given her alcohol use disorder and significant drinking yesterday was placed on CIWA protocol and does have some mild to moderate alcohol withdrawal symptoms developing.    Patient is reporting a fairly severe posttraumatic headache that she describes as different than her jaw pain.  Will order Depakote for this.  She is receiving Dilaudid for the pain of the broken temporal bone mandibular fracture/dislocation.  So ordered some Tylenol and Toradol.    Trauma surgery and ENT were consulted.  Trauma surgery will admit the patient.  ENT plans for fixation in the  operating room tonight.  Recommends some ciprofloxacin drops for the ears.    Patient has hypomagnesemia, placed on magnesium replacement protocol.  Other electrolytes are reassuring.    Patient will go from the emergency department to the operating room prior to admission.    New Prescriptions    No medications on file       Final diagnoses:   Bilateral closed fracture of mandible, initial encounter (H)   TMJ (dislocation of temporomandibular joint), initial encounter   Closed fracture of temporal bone, initial encounter (H)   Alcohol withdrawal syndrome without complication (H)   Severe alcohol use disorder (H)       James Briceno Jr., MD   [facility] EMERGENCY DEPARTMENT  5/4/2025     James Briceno MD  05/04/25 2033

## 2025-05-04 NOTE — ED NOTES
Bed: ED14  Expected date:   Expected time:   Means of arrival:   Comments:  Transfer: Alana Mujica

## 2025-05-04 NOTE — ED TRIAGE NOTES
Pt stated she was drinking last night- had multiple shots, a white claw and beer- fell outside of the bar after she tripped on her foot- hit jaw on pavement-  says unsure if LOC. Does not remember much. Not on thinners. Pt c/o 5/10 jaw pain and R chest pain. Has thrown up x3 times since then. A/ox4 Ambulatory- denies neck or back pain.      Triage Assessment (Adult)       Row Name 05/04/25 1204          Triage Assessment    Airway WDL WDL        Respiratory WDL    Respiratory WDL WDL        Cardiac WDL    Cardiac WDL WDL        Peripheral/Neurovascular WDL    Peripheral Neurovascular WDL WDL

## 2025-05-04 NOTE — MEDICATION SCRIBE - ADMISSION MEDICATION HISTORY
Medication Scribe Admission Medication History    Admission medication history is complete. The information provided in this note is only as accurate as the sources available at the time of the update.    Information Source(s): Patient via in-person    Pertinent Information:   Patient reported that she did not take any of her at home medications today  Changes made to PTA medication list:  Added: None  Deleted:   fluticasone (FLONASE) 50 MCG/ACT nasal spray     Spray 1 spray into both nostrils daily as needed for rhinitis or allergies   Changed: From fluticasone Discontinued 5/4/2025  Discontinued - fluticasone (FLONASE) 50 MCG/ACT nasal spra  Spray 1 spray into both nostrils daily,   To fluticasone (FLONASE) 50 MCG/ACT nasal spray Spray 1 spray into both nostrils daily as needed for rhinitis or allergies      Allergies reviewed with patient and updates made in EHR: yes    Medication History Completed By: Maria A Toledo 5/4/2025 6:55 PM    PTA Med List   Medication Sig Note Last Dose/Taking    estradiol (ESTRACE) 0.5 MG tablet Take 1 tablet (0.5 mg) by mouth daily.  5/3/2025 Morning    etonogestrel (NEXPLANON) 68 MG IMPL 1 each by Subdermal route once. 5/4/2025: Continuous Device  Taking    fluticasone (FLONASE) 50 MCG/ACT nasal spray Spray 1 spray into both nostrils daily as needed for rhinitis or allergies.  Unknown    Homeopathic Products (LIVER SUPPORT SL) Place under the tongue.  5/3/2025 Morning    minoxidil (LONITEN) 2.5 MG tablet Take 2.5 mg by mouth daily.  5/3/2025 Morning    multivitamin w/minerals (THERA-VIT-M) tablet Take 1 tablet by mouth daily  5/3/2025 Morning    omega 3 1000 MG CAPS   5/3/2025 Morning    traZODone (DESYREL) 50 MG tablet Take 1-2 tablets ( mg) by mouth at bedtime  5/3/2025 Bedtime    valACYclovir (VALTREX) 1000 mg tablet TAKE 2 TABLETS BY MOUTH DAILY TWICE DAILY FOR 1 DAY PER COLD SORE FLARE  Unknown    vitamin B complex with vitamin C (STRESS TAB) tablet Take 1 tablet by  mouth daily  5/3/2025 Morning

## 2025-05-04 NOTE — ED PROVIDER NOTES
"ED ATTENDING PHYSICIAN NOTE:  I evaluated this patient in conjunction with Marlon Castellanos Resident Physician.   I participated in the management of the patient. I either personally performed, or was present for, the key portions of the evaluation and management.    HPI:  Alana Mujica is a 49 year old female who was drinking last night at a [employer] event reports she was dancing in heels was walking forward she thinks she caught bit her her heel her right knee twisted and she fell.  She reported as a \"face plant\" hitting her face.  Her segment other saw her after that said there was a brief loss of consciousness she was noted to have some blood coming out of her ear pain in her jaw no shortness of breath she did develop some chest pain today which she describes as sort of soreness and presented to the ER.  Otherwise patient has been appropriate there was apparently was 1 episode of vomiting.    Exam:  General: The patient is alert, patient is uncomfortable    HENT: There is blood in both ear canals the left canal appears fresh.  There is no facial bone instability she has pain with attempting to open her jaw.  Extract motions are intact without diplopia    Cardiovascular: Regular rate and rhythm. Good pulses     Respiratory: Lungs are clear.     Gastrointestinal: Abdomen soft. No guarding, no rebound.     Musculoskeletal: She has some middle discomfort to palpation over the right knee there is tenderness over the jaw otherwise no focal bony tenderness    Skin: No rashes or petechiae.     Neurologic: The patient is alert and oriented x3. GCS 15.  Extraocular motions intact.  She has 3 out of 3 object recall at 5 minutes memory is intact she has good strength and can move all extremities.    Psychiatric: The patient is non-tearful.     EKG was performed at 1213 read at 1315 sinus tachycardia ventricular of 101 DE interval 162 QRS duration 78  QTc of 440 axis 62 to 27 EKG was compared to 5/27/2023 and is " similar    Independent Interpretation:  I viewed the head CT and do not see signs of a bleed.    I reassessed the patient and updated her on results she is having some nausea Zofran was ordered I was able to clear from her c-collar.    Discussion of Management:  I discussed the case with Dr Goldberg reports the CT of the head and C-spine are negative the maxillofacial bones are positive for bilateral bilateral mandibular condylar fractures  I discussed the trauma care with the general surgeon on-call Dr. Reid  I discussed the case with ENT Dr. Farnsworth who reports the patient require transfer to another hospital to manage this.          Medical Decision Making:  The patient suffered significant trauma to her jaw had trouble opening it in fact on CT scan during the pan scan for partial trauma she has bilateral mandibular condylar fractures with posterior dislocation.  I discussed the case with the radiologist who felt that that could explain the bleeding from both of her ear canals however a temporal bone fracture is possible a CT scan was therefore to further evaluate this due to the patient being unable to be cared for here at [facility] I felt that he would best be served at a trauma center and was transferred to the [place].    DIAGNOSIS:    ICD-10-CM    1. Bilateral fracture of mandible (H)  S02.609A       2. Dislocation of jaw, bilateral, initial encounter  S03.03XA       3. Severe alcohol use disorder (H)  F10.20       4. Ground-level fall  W18.30XA       5. Hemorrhage of ear canal  H92.20       6. Nausea and vomiting  R11.2       7. Photophobia  H53.149       8. Osteoporosis  M81.0       9. Diplopia  H53.2       10. History of alcohol withdrawal syndrome  F10.91       11. Hyponatremia  E87.1       12. Leukocytosis, unspecified type  D72.829             5/4/2025  [facility] EMERGENCY DEPT      Isaak Mcdowell MD  05/04/25 8483

## 2025-05-04 NOTE — ED TRIAGE NOTES
Triage Assessment (Adult)       Row Name 05/04/25 1748          Triage Assessment    Airway WDL WDL        Respiratory WDL    Respiratory WDL WDL        Cardiac WDL    Cardiac WDL WDL        Peripheral/Neurovascular WDL    Peripheral Neurovascular WDL WDL        Cognitive/Neuro/Behavioral WDL    Cognitive/Neuro/Behavioral WDL WDL

## 2025-05-04 NOTE — ED TRIAGE NOTES
BIBA transfer from [facility] for fall from last night around 0000. Bilateral lower mandibular fractures. 5mg IV morphine around 1720, 4mg IV Zofran 1720 En route. Pain 4/10. Vitally stable.
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
## 2025-05-05 ENCOUNTER — APPOINTMENT (OUTPATIENT)
Dept: GENERAL RADIOLOGY | Facility: CLINIC | Age: 50
End: 2025-05-05
Attending: STUDENT IN AN ORGANIZED HEALTH CARE EDUCATION/TRAINING PROGRAM
Payer: COMMERCIAL

## 2025-05-05 VITALS
BODY MASS INDEX: 24.34 KG/M2 | TEMPERATURE: 98.4 F | RESPIRATION RATE: 16 BRPM | SYSTOLIC BLOOD PRESSURE: 110 MMHG | HEIGHT: 62 IN | OXYGEN SATURATION: 98 % | HEART RATE: 79 BPM | WEIGHT: 132.28 LBS | DIASTOLIC BLOOD PRESSURE: 70 MMHG
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
## 2025-05-05 PROBLEM — R51.9 NONINTRACTABLE HEADACHE, UNSPECIFIED CHRONICITY PATTERN, UNSPECIFIED HEADACHE TYPE: Status: ACTIVE | Noted: 2025-05-05

## 2025-05-05 LAB
ANION GAP SERPL CALCULATED.3IONS-SCNC: 12 MMOL/L (ref 7–15)
ATRIAL RATE - MUSE: 101 BPM
ATRIAL RATE - MUSE: 83 BPM
BUN SERPL-MCNC: 7.5 MG/DL (ref 6–20)
CALCIUM SERPL-MCNC: 8.5 MG/DL (ref 8.8–10.4)
CHLORIDE SERPL-SCNC: 95 MMOL/L (ref 98–107)
CREAT SERPL-MCNC: 0.93 MG/DL (ref 0.51–0.95)
DIASTOLIC BLOOD PRESSURE - MUSE: NORMAL MMHG
DIASTOLIC BLOOD PRESSURE - MUSE: NORMAL MMHG
EGFRCR SERPLBLD CKD-EPI 2021: 74 ML/MIN/1.73M2
ERYTHROCYTE [DISTWIDTH] IN BLOOD BY AUTOMATED COUNT: 12.3 % (ref 10–15)
GLUCOSE SERPL-MCNC: 172 MG/DL (ref 70–99)
HCO3 SERPL-SCNC: 24 MMOL/L (ref 22–29)
HCT VFR BLD AUTO: 35.5 % (ref 35–47)
HGB BLD-MCNC: 12.2 G/DL (ref 11.7–15.7)
INTERPRETATION ECG - MUSE: NORMAL
INTERPRETATION ECG - MUSE: NORMAL
MAGNESIUM SERPL-MCNC: 2.2 MG/DL (ref 1.7–2.3)
MAGNESIUM SERPL-MCNC: 2.3 MG/DL (ref 1.7–2.3)
MCH RBC QN AUTO: 31.9 PG (ref 26.5–33)
MCHC RBC AUTO-ENTMCNC: 34.4 G/DL (ref 31.5–36.5)
MCV RBC AUTO: 93 FL (ref 78–100)
P AXIS - MUSE: 49 DEGREES
P AXIS - MUSE: 62 DEGREES
PHOSPHATE SERPL-MCNC: 2.1 MG/DL (ref 2.5–4.5)
PHOSPHATE SERPL-MCNC: 3 MG/DL (ref 2.5–4.5)
PLATELET # BLD AUTO: 261 10E3/UL (ref 150–450)
POTASSIUM SERPL-SCNC: 4.4 MMOL/L (ref 3.4–5.3)
PR INTERVAL - MUSE: 162 MS
PR INTERVAL - MUSE: 164 MS
QRS DURATION - MUSE: 70 MS
QRS DURATION - MUSE: 78 MS
QT - MUSE: 340 MS
QT - MUSE: 374 MS
QTC - MUSE: 439 MS
QTC - MUSE: 440 MS
R AXIS - MUSE: 2 DEGREES
R AXIS - MUSE: 33 DEGREES
RBC # BLD AUTO: 3.83 10E6/UL (ref 3.8–5.2)
SODIUM SERPL-SCNC: 131 MMOL/L (ref 135–145)
SYSTOLIC BLOOD PRESSURE - MUSE: NORMAL MMHG
SYSTOLIC BLOOD PRESSURE - MUSE: NORMAL MMHG
T AXIS - MUSE: 27 DEGREES
T AXIS - MUSE: 51 DEGREES
VENTRICULAR RATE- MUSE: 101 BPM
VENTRICULAR RATE- MUSE: 83 BPM
WBC # BLD AUTO: 14.8 10E3/UL (ref 4–11)

## 2025-05-05 PROCEDURE — 250N000011 HC RX IP 250 OP 636: Performed by: NURSE PRACTITIONER

## 2025-05-05 PROCEDURE — 250N000011 HC RX IP 250 OP 636: Mod: JZ | Performed by: NURSE ANESTHETIST, CERTIFIED REGISTERED

## 2025-05-05 PROCEDURE — 83735 ASSAY OF MAGNESIUM: CPT | Performed by: SURGERY

## 2025-05-05 PROCEDURE — 250N000011 HC RX IP 250 OP 636: Mod: JZ

## 2025-05-05 PROCEDURE — 83735 ASSAY OF MAGNESIUM: CPT | Performed by: STUDENT IN AN ORGANIZED HEALTH CARE EDUCATION/TRAINING PROGRAM

## 2025-05-05 PROCEDURE — 85018 HEMOGLOBIN: CPT | Performed by: STUDENT IN AN ORGANIZED HEALTH CARE EDUCATION/TRAINING PROGRAM

## 2025-05-05 PROCEDURE — 258N000003 HC RX IP 258 OP 636

## 2025-05-05 PROCEDURE — 999N000179 XR SURGERY CARM FLUORO LESS THAN 5 MIN W STILLS

## 2025-05-05 PROCEDURE — 36415 COLL VENOUS BLD VENIPUNCTURE: CPT | Performed by: STUDENT IN AN ORGANIZED HEALTH CARE EDUCATION/TRAINING PROGRAM

## 2025-05-05 PROCEDURE — 84100 ASSAY OF PHOSPHORUS: CPT | Performed by: STUDENT IN AN ORGANIZED HEALTH CARE EDUCATION/TRAINING PROGRAM

## 2025-05-05 PROCEDURE — 250N000013 HC RX MED GY IP 250 OP 250 PS 637: Performed by: STUDENT IN AN ORGANIZED HEALTH CARE EDUCATION/TRAINING PROGRAM

## 2025-05-05 PROCEDURE — 80048 BASIC METABOLIC PNL TOTAL CA: CPT | Performed by: STUDENT IN AN ORGANIZED HEALTH CARE EDUCATION/TRAINING PROGRAM

## 2025-05-05 PROCEDURE — 250N000009 HC RX 250: Performed by: NURSE ANESTHETIST, CERTIFIED REGISTERED

## 2025-05-05 PROCEDURE — 250N000013 HC RX MED GY IP 250 OP 250 PS 637

## 2025-05-05 PROCEDURE — 96375 TX/PRO/DX INJ NEW DRUG ADDON: CPT

## 2025-05-05 PROCEDURE — 250N000013 HC RX MED GY IP 250 OP 250 PS 637: Performed by: NURSE PRACTITIONER

## 2025-05-05 PROCEDURE — 97161 PT EVAL LOW COMPLEX 20 MIN: CPT | Mod: GP | Performed by: PHYSICAL THERAPIST

## 2025-05-05 PROCEDURE — 250N000011 HC RX IP 250 OP 636

## 2025-05-05 PROCEDURE — 999N000111 HC STATISTIC OT IP EVAL DEFER

## 2025-05-05 PROCEDURE — 250N000011 HC RX IP 250 OP 636: Performed by: NURSE ANESTHETIST, CERTIFIED REGISTERED

## 2025-05-05 PROCEDURE — 84100 ASSAY OF PHOSPHORUS: CPT | Performed by: SURGERY

## 2025-05-05 PROCEDURE — G0378 HOSPITAL OBSERVATION PER HR: HCPCS

## 2025-05-05 PROCEDURE — 250N000013 HC RX MED GY IP 250 OP 250 PS 637: Performed by: SURGERY

## 2025-05-05 PROCEDURE — 97530 THERAPEUTIC ACTIVITIES: CPT | Mod: GP | Performed by: PHYSICAL THERAPIST

## 2025-05-05 DEVICE — IMPLANTABLE DEVICE: Type: IMPLANTABLE DEVICE | Site: MOUTH | Status: FUNCTIONAL

## 2025-05-05 DEVICE — IMP SYN SCR MATRIXWAVE MMF 6X1.8MM TI 04.503.824.01: Type: IMPLANTABLE DEVICE | Site: MOUTH | Status: FUNCTIONAL

## 2025-05-05 DEVICE — IMP SYN PLATE MATRIXWAVE MMF SHORT 10H TI 04.503.820: Type: IMPLANTABLE DEVICE | Site: MOUTH | Status: FUNCTIONAL

## 2025-05-05 RX ORDER — NALTREXONE HYDROCHLORIDE 50 MG/1
TABLET, FILM COATED ORAL
Qty: 30 TABLET | Refills: 0 | Status: SHIPPED | OUTPATIENT
Start: 2025-05-05

## 2025-05-05 RX ORDER — ACETAMINOPHEN 325 MG/10.15ML
650 LIQUID ORAL EVERY 6 HOURS
Status: DISCONTINUED | OUTPATIENT
Start: 2025-05-05 | End: 2025-05-05 | Stop reason: HOSPADM

## 2025-05-05 RX ORDER — SODIUM CHLORIDE, SODIUM LACTATE, POTASSIUM CHLORIDE, CALCIUM CHLORIDE 600; 310; 30; 20 MG/100ML; MG/100ML; MG/100ML; MG/100ML
INJECTION, SOLUTION INTRAVENOUS CONTINUOUS
Status: DISCONTINUED | OUTPATIENT
Start: 2025-05-05 | End: 2025-05-05 | Stop reason: HOSPADM

## 2025-05-05 RX ORDER — ACETAMINOPHEN 325 MG/10.15ML
650 LIQUID ORAL EVERY 6 HOURS
Qty: 236 ML | Refills: 0 | Status: SHIPPED | OUTPATIENT
Start: 2025-05-05

## 2025-05-05 RX ORDER — IBUPROFEN 100 MG/5ML
400 SUSPENSION ORAL EVERY 6 HOURS
Status: DISCONTINUED | OUTPATIENT
Start: 2025-05-05 | End: 2025-05-05 | Stop reason: HOSPADM

## 2025-05-05 RX ORDER — OXYCODONE HCL 5 MG/5 ML
5 SOLUTION, ORAL ORAL EVERY 6 HOURS PRN
Status: DISCONTINUED | OUTPATIENT
Start: 2025-05-05 | End: 2025-05-05 | Stop reason: HOSPADM

## 2025-05-05 RX ORDER — KETOROLAC TROMETHAMINE 30 MG/ML
INJECTION, SOLUTION INTRAMUSCULAR; INTRAVENOUS PRN
Status: DISCONTINUED | OUTPATIENT
Start: 2025-05-05 | End: 2025-05-05

## 2025-05-05 RX ORDER — IBUPROFEN 100 MG/5ML
400 SUSPENSION ORAL EVERY 6 HOURS
Qty: 237 ML | Refills: 0 | Status: SHIPPED | OUTPATIENT
Start: 2025-05-05 | End: 2025-05-07

## 2025-05-05 RX ORDER — AMPICILLIN AND SULBACTAM 2; 1 G/1; G/1
3 INJECTION, POWDER, FOR SOLUTION INTRAMUSCULAR; INTRAVENOUS EVERY 6 HOURS
Status: DISCONTINUED | OUTPATIENT
Start: 2025-05-05 | End: 2025-05-05

## 2025-05-05 RX ORDER — AMPICILLIN AND SULBACTAM 2; 1 G/1; G/1
3 INJECTION, POWDER, FOR SOLUTION INTRAMUSCULAR; INTRAVENOUS EVERY 6 HOURS
Status: DISCONTINUED | OUTPATIENT
Start: 2025-05-05 | End: 2025-05-05 | Stop reason: HOSPADM

## 2025-05-05 RX ORDER — FENTANYL CITRATE 50 UG/ML
25 INJECTION, SOLUTION INTRAMUSCULAR; INTRAVENOUS EVERY 5 MIN PRN
Status: DISCONTINUED | OUTPATIENT
Start: 2025-05-05 | End: 2025-05-05 | Stop reason: HOSPADM

## 2025-05-05 RX ORDER — CIPROFLOXACIN HYDROCHLORIDE 3.5 MG/ML
4 SOLUTION/ DROPS TOPICAL 2 TIMES DAILY
Qty: 10 ML | Refills: 0 | Status: SHIPPED | OUTPATIENT
Start: 2025-05-05 | End: 2025-05-10

## 2025-05-05 RX ORDER — OXYCODONE HCL 5 MG/5 ML
2.5-5 SOLUTION, ORAL ORAL EVERY 8 HOURS PRN
Qty: 30 ML | Refills: 0 | Status: SHIPPED | OUTPATIENT
Start: 2025-05-05 | End: 2025-05-08

## 2025-05-05 RX ORDER — CHLORHEXIDINE GLUCONATE ORAL RINSE 1.2 MG/ML
15 SOLUTION DENTAL 3 TIMES DAILY
Status: DISCONTINUED | OUTPATIENT
Start: 2025-05-05 | End: 2025-05-05 | Stop reason: HOSPADM

## 2025-05-05 RX ORDER — POLYETHYLENE GLYCOL 3350 17 G/17G
17 POWDER, FOR SOLUTION ORAL DAILY
Qty: 116 G | Refills: 0 | Status: SHIPPED | OUTPATIENT
Start: 2025-05-05

## 2025-05-05 RX ORDER — HYDROMORPHONE HCL IN WATER/PF 6 MG/30 ML
0.4 PATIENT CONTROLLED ANALGESIA SYRINGE INTRAVENOUS EVERY 5 MIN PRN
Status: DISCONTINUED | OUTPATIENT
Start: 2025-05-05 | End: 2025-05-05 | Stop reason: HOSPADM

## 2025-05-05 RX ORDER — HYDROMORPHONE HCL IN WATER/PF 6 MG/30 ML
0.2 PATIENT CONTROLLED ANALGESIA SYRINGE INTRAVENOUS EVERY 5 MIN PRN
Status: DISCONTINUED | OUTPATIENT
Start: 2025-05-05 | End: 2025-05-05 | Stop reason: HOSPADM

## 2025-05-05 RX ORDER — OXYCODONE HYDROCHLORIDE 10 MG/1
10 TABLET ORAL
Status: DISCONTINUED | OUTPATIENT
Start: 2025-05-05 | End: 2025-05-05 | Stop reason: HOSPADM

## 2025-05-05 RX ORDER — DEXAMETHASONE SODIUM PHOSPHATE 4 MG/ML
4 INJECTION, SOLUTION INTRA-ARTICULAR; INTRALESIONAL; INTRAMUSCULAR; INTRAVENOUS; SOFT TISSUE
Status: DISCONTINUED | OUTPATIENT
Start: 2025-05-05 | End: 2025-05-05 | Stop reason: HOSPADM

## 2025-05-05 RX ORDER — ONDANSETRON 4 MG/1
4 TABLET, ORALLY DISINTEGRATING ORAL EVERY 30 MIN PRN
Status: DISCONTINUED | OUTPATIENT
Start: 2025-05-05 | End: 2025-05-05 | Stop reason: HOSPADM

## 2025-05-05 RX ORDER — ONDANSETRON 2 MG/ML
INJECTION INTRAMUSCULAR; INTRAVENOUS PRN
Status: DISCONTINUED | OUTPATIENT
Start: 2025-05-05 | End: 2025-05-05

## 2025-05-05 RX ORDER — ONDANSETRON 2 MG/ML
4 INJECTION INTRAMUSCULAR; INTRAVENOUS EVERY 30 MIN PRN
Status: DISCONTINUED | OUTPATIENT
Start: 2025-05-05 | End: 2025-05-05 | Stop reason: HOSPADM

## 2025-05-05 RX ORDER — AMOXICILLIN AND CLAVULANATE POTASSIUM 400; 57 MG/5ML; MG/5ML
875 POWDER, FOR SUSPENSION ORAL 2 TIMES DAILY
Qty: 218.8 ML | Refills: 0 | Status: SHIPPED | OUTPATIENT
Start: 2025-05-05 | End: 2025-05-15

## 2025-05-05 RX ORDER — OXYCODONE HYDROCHLORIDE 5 MG/1
5 TABLET ORAL
Status: COMPLETED | OUTPATIENT
Start: 2025-05-05 | End: 2025-05-05

## 2025-05-05 RX ORDER — ONDANSETRON 4 MG/1
4 TABLET, ORALLY DISINTEGRATING ORAL EVERY 6 HOURS PRN
Qty: 8 TABLET | Refills: 0 | Status: SHIPPED | OUTPATIENT
Start: 2025-05-05

## 2025-05-05 RX ORDER — GUAIFENESIN 600 MG/1
15 TABLET, EXTENDED RELEASE ORAL DAILY
Status: DISCONTINUED | OUTPATIENT
Start: 2025-05-05 | End: 2025-05-05 | Stop reason: HOSPADM

## 2025-05-05 RX ORDER — NALOXONE HYDROCHLORIDE 0.4 MG/ML
0.1 INJECTION, SOLUTION INTRAMUSCULAR; INTRAVENOUS; SUBCUTANEOUS
Status: DISCONTINUED | OUTPATIENT
Start: 2025-05-05 | End: 2025-05-05 | Stop reason: HOSPADM

## 2025-05-05 RX ORDER — CHLORHEXIDINE GLUCONATE ORAL RINSE 1.2 MG/ML
15 SOLUTION DENTAL 3 TIMES DAILY
Qty: 473 ML | Refills: 0 | Status: SHIPPED | OUTPATIENT
Start: 2025-05-05

## 2025-05-05 RX ORDER — FENTANYL CITRATE 50 UG/ML
50 INJECTION, SOLUTION INTRAMUSCULAR; INTRAVENOUS EVERY 5 MIN PRN
Status: DISCONTINUED | OUTPATIENT
Start: 2025-05-05 | End: 2025-05-05 | Stop reason: HOSPADM

## 2025-05-05 RX ORDER — OXYCODONE HCL 5 MG/5 ML
5 SOLUTION, ORAL ORAL ONCE
Status: COMPLETED | OUTPATIENT
Start: 2025-05-05 | End: 2025-05-05

## 2025-05-05 RX ADMIN — KETOROLAC TROMETHAMINE 15 MG: 30 INJECTION, SOLUTION INTRAMUSCULAR at 02:02

## 2025-05-05 RX ADMIN — HYDROMORPHONE HYDROCHLORIDE 0.2 MG: 0.2 INJECTION, SOLUTION INTRAMUSCULAR; INTRAVENOUS; SUBCUTANEOUS at 02:45

## 2025-05-05 RX ADMIN — CHLORHEXIDINE GLUCONATE 15 ML: 1.2 SOLUTION ORAL at 09:13

## 2025-05-05 RX ADMIN — OXYCODONE HYDROCHLORIDE 5 MG: 5 SOLUTION ORAL at 09:07

## 2025-05-05 RX ADMIN — FENTANYL CITRATE 25 MCG: 50 INJECTION, SOLUTION INTRAMUSCULAR; INTRAVENOUS at 02:40

## 2025-05-05 RX ADMIN — SODIUM CHLORIDE, SODIUM LACTATE, POTASSIUM CHLORIDE, AND CALCIUM CHLORIDE: .6; .31; .03; .02 INJECTION, SOLUTION INTRAVENOUS at 02:55

## 2025-05-05 RX ADMIN — OXYCODONE HYDROCHLORIDE 5 MG: 5 SOLUTION ORAL at 03:38

## 2025-05-05 RX ADMIN — AMPICILLIN SODIUM AND SULBACTAM SODIUM 1.5 G: 1; .5 INJECTION, POWDER, FOR SOLUTION INTRAMUSCULAR; INTRAVENOUS at 00:19

## 2025-05-05 RX ADMIN — IBUPROFEN 400 MG: 200 SUSPENSION ORAL at 09:07

## 2025-05-05 RX ADMIN — FENTANYL CITRATE 50 MCG: 50 INJECTION INTRAMUSCULAR; INTRAVENOUS at 00:48

## 2025-05-05 RX ADMIN — OXYCODONE HYDROCHLORIDE 5 MG: 5 SOLUTION ORAL at 15:07

## 2025-05-05 RX ADMIN — HYDROMORPHONE HYDROCHLORIDE 0.2 MG: 0.2 INJECTION, SOLUTION INTRAMUSCULAR; INTRAVENOUS; SUBCUTANEOUS at 02:55

## 2025-05-05 RX ADMIN — IBUPROFEN 400 MG: 200 SUSPENSION ORAL at 15:07

## 2025-05-05 RX ADMIN — FENTANYL CITRATE 25 MCG: 50 INJECTION, SOLUTION INTRAMUSCULAR; INTRAVENOUS at 02:35

## 2025-05-05 RX ADMIN — HYDROMORPHONE HYDROCHLORIDE 0.4 MG: 0.2 INJECTION, SOLUTION INTRAMUSCULAR; INTRAVENOUS; SUBCUTANEOUS at 03:04

## 2025-05-05 RX ADMIN — Medication 15 ML: at 12:02

## 2025-05-05 RX ADMIN — ACETAMINOPHEN 650 MG: 325 SOLUTION ORAL at 12:02

## 2025-05-05 RX ADMIN — CHLORHEXIDINE GLUCONATE 15 ML: 1.2 SOLUTION ORAL at 15:08

## 2025-05-05 RX ADMIN — SODIUM CHLORIDE, SODIUM GLUCONATE, SODIUM ACETATE, POTASSIUM CHLORIDE AND MAGNESIUM CHLORIDE: 526; 502; 368; 37; 30 INJECTION, SOLUTION INTRAVENOUS at 01:20

## 2025-05-05 RX ADMIN — FENTANYL CITRATE 50 MCG: 50 INJECTION, SOLUTION INTRAMUSCULAR; INTRAVENOUS at 02:30

## 2025-05-05 RX ADMIN — AMPICILLIN SODIUM AND SULBACTAM SODIUM 3 G: 2; 1 INJECTION, POWDER, FOR SOLUTION INTRAMUSCULAR; INTRAVENOUS at 13:29

## 2025-05-05 RX ADMIN — HYDROMORPHONE HYDROCHLORIDE 0.4 MG: 0.2 INJECTION, SOLUTION INTRAMUSCULAR; INTRAVENOUS; SUBCUTANEOUS at 03:15

## 2025-05-05 RX ADMIN — ONDANSETRON 4 MG: 2 INJECTION INTRAMUSCULAR; INTRAVENOUS at 01:58

## 2025-05-05 RX ADMIN — FENTANYL CITRATE 50 MCG: 50 INJECTION INTRAMUSCULAR; INTRAVENOUS at 01:36

## 2025-05-05 RX ADMIN — CIPROFLOXACIN HYDROCHLORIDE 4 DROP: 3 SOLUTION/ DROPS OPHTHALMIC at 09:14

## 2025-05-05 ASSESSMENT — ACTIVITIES OF DAILY LIVING (ADL)
ADLS_ACUITY_SCORE: 33
ADLS_ACUITY_SCORE: 59
ADLS_ACUITY_SCORE: 33
ADLS_ACUITY_SCORE: 59
ADLS_ACUITY_SCORE: 57
ADLS_ACUITY_SCORE: 33
ADLS_ACUITY_SCORE: 57

## 2025-05-05 NOTE — ANESTHESIA PROCEDURE NOTES
Airway         Procedure Start/Stop Times: 5/4/2025 11:08 PM  Staff -        CRNA: Airam Pelayo APRN CRNA       Performed By: CRNAIndications and Patient Condition       Indications for airway management: anastacia-procedural       Induction type:intravenous       Mask difficulty assessment: 1 - vent by mask    Final Airway Details       Final airway type: endotracheal airway       Successful airway: Nasal and MALISSA  Endotracheal Airway Details        ETT size (mm): 6.5       Cuffed: yes       Successful intubation technique: video laryngoscopy       VL Blade Size: Glidescope 3       Grade View of Cords: 1       Adjucts: stylet and magill forceps       Position: Center       Measured from: nares       Bite block used: None    Post intubation assessment        Placement verified by: capnometry, equal breath sounds and chest rise        Number of attempts at approach: 1       Secured with: tape       Ease of procedure: easy       Dentition: Intact and Unchanged    Medication(s) Administered   Medication Administration Time: 5/4/2025 11:08 PM

## 2025-05-05 NOTE — PLAN OF CARE
Goal Outcome Evaluation:      Plan of Care Reviewed With: patient    Overall Patient Progress: no changeOverall Patient Progress: no change  Neuro: AOx4, soft spoken due to heavy guiding elastic rubber band in mouth  Cardiac: WDL  Respiratory: on RA, denies SOB  GI/: gomez in placed with ADUOP, no BM, +BS, -flatus  Diet/Appetite: FLD, straws ok  Skin: AMBROSIO jaw bra, ice packs to jaw. Abrasion to knees  LDA: (L) PIV infusing LR @ 100 mL/hr  Activity: NOOB, baseline independent  Pain: 4/10, ice packs applied to facial  Plan: pain management, keep scissors at bedside at all time in order to cut rubber bands for respiratory distress or episode of vomiting. Continue POC

## 2025-05-05 NOTE — ANESTHESIA PREPROCEDURE EVALUATION
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 Co-test 12 months     Osteoporosis      Substance abuse (H)       Past Surgical History:   Procedure Laterality Date     COLONOSCOPY N/A 2022    Procedure: COLONOSCOPY, WITH POLYPECTOMY AND BIOPSY;  Surgeon: Maya Holcomb MD;  Location:  GI     LEEP TX, CERVICAL      greater than 5 years ago from , uncertain date      No Known Allergies   Social History     Tobacco Use     Smoking status: Former     Types: Cigarettes     Passive exposure: Never     Smokeless tobacco: Never   Substance Use Topics     Alcohol use: Not Currently     Comment: 22      Wt Readings from Last 1 Encounters:   25 57.5 kg (126 lb 12.2 oz)        Anesthesia Evaluation   Pt has had prior anesthetic. Type: General and MAC.        ROS/MED HX  ENT/Pulmonary: Comment: Recurrent sinus infections  Deviated septum    (+)                tobacco use, Past use,                       Neurologic:       Cardiovascular:     (+)  - -   -  - -                                 Previous cardiac testing   Echo: Date: Results:    Stress Test:  Date: Results:    ECG Reviewed:  Date: 23 Results:  Sinus rhythm   Normal ECG     Cath:  Date: Results:      METS/Exercise Tolerance: 4 - Raking leaves, gardening    Hematologic:       Musculoskeletal:       GI/Hepatic:     (+) GERD, Asymptomatic on medication,         hepatitis  liver disease,       Renal/Genitourinary:     (+) renal disease,             Endo:       Psychiatric/Substance Use:     (+)  psychiatric history anxiety, other (comment) and depression (Anorexia, Bulemia) alcohol abuse      Infectious Disease:       Malignancy:       Other:            Physical Exam    Airway      Comment: Unable to open her mouth due to pain    Mallampati: III   TM distance: > 3 FB   Neck ROM: full   Mouth opening: < 3 cm    Respiratory Devices and Support         Dental       (+) Edentulous      Cardiovascular          Rhythm and rate: regular and normal     Pulmonary           breath sounds clear to auscultation       OUTSIDE LABS:  CBC:   Lab Results   Component Value Date    WBC 14.0 (H) 05/04/2025    WBC 15.0 (H) 05/04/2025    HGB 12.2 05/04/2025    HGB 13.0 05/04/2025    HCT 34.6 (L) 05/04/2025    HCT 35.5 05/04/2025     05/04/2025     05/04/2025     BMP:   Lab Results   Component Value Date     (L) 05/04/2025     (L) 05/04/2025    POTASSIUM 4.6 05/04/2025    POTASSIUM 4.2 05/04/2025    CHLORIDE 91 (L) 05/04/2025    CHLORIDE 85 (L) 05/04/2025    CO2 25 05/04/2025    CO2 23 05/04/2025    BUN 7.4 05/04/2025    BUN 7.9 05/04/2025    CR 0.97 (H) 05/04/2025    CR 0.82 05/04/2025     (H) 05/04/2025     (H) 05/04/2025     COAGS:   Lab Results   Component Value Date    PTT 26 05/04/2025    INR 1.06 05/04/2025     POC:   Lab Results   Component Value Date     (H) 05/03/2019    HCG Negative 08/25/2022    HCGS Negative 06/22/2022     HEPATIC:   Lab Results   Component Value Date    ALBUMIN 4.1 05/04/2025    PROTTOTAL 6.4 05/04/2025    ALT 32 05/04/2025    AST 29 05/04/2025     (H) 08/26/2022    ALKPHOS 61 05/04/2025    BILITOTAL 0.8 05/04/2025     OTHER:   Lab Results   Component Value Date    LACT 1.9 06/22/2022    A1C 4.7 08/26/2022    KODY 9.5 05/04/2025    PHOS 4.1 05/04/2025    MAG 1.1 (L) 05/04/2025    LIPASE 36 12/04/2023    TSH 2.18 03/27/2025    T4 1.00 12/04/2023    SED 8 03/05/2024       Anesthesia Plan    ASA Status:  2    NPO Status:  NPO Appropriate     Anesthesia Type: General.     - Airway: ETT   Induction: RSI, Intravenous.   Maintenance: Balanced.   Techniques and Equipment:     - Airway: Video-Laryngoscope, Nasal MALISSA     - Lines/Monitors: BIS, 2nd IV     Consents    Anesthesia Plan(s) and associated risks, benefits, and realistic alternatives discussed. Questions answered and patient/representative(s) expressed understanding.     - Discussed: Risks, Benefits and Alternatives for the PROCEDURE were discussed     - Discussed with:  Patient      - Extended Intubation/Ventilatory Support Discussed: Yes.      - Patient is DNR/DNI Status: No     Use of blood products discussed: No .     Postoperative Care    Pain management: Multi-modal analgesia.   PONV prophylaxis: Ondansetron (or other 5HT-3), Dexamethasone or Solumedrol     Comments:               Kathrine Root MD    Clinically Significant Risk Factors Present on Admission         # Hyponatremia: Lowest Na = 123 mmol/L in last 2 days, will monitor as appropriate  # Hypochloremia: Lowest Cl = 85 mmol/L in last 2 days, will monitor as appropriate   # Hypercalcemia: Highest Ca = 10.7 mg/dL in last 2 days, will monitor as appropriate  # Hypomagnesemia: Lowest Mg = 1.1 mg/dL in last 2 days, will replace as needed       # Hypertension: Home medication list includes antihypertensive(s)

## 2025-05-05 NOTE — ANESTHESIA CARE TRANSFER NOTE
Patient: Alana Mujica    Procedure: Procedure(s):  Maxilomandibular Fixation, Open reduction internal fixation mandible       Diagnosis: Mandible fracture (H) [S02.609A]  Diagnosis Additional Information: No value filed.    Anesthesia Type:   General     Note:    Oropharynx: spontaneously breathing and oropharynx clear of all foreign objects  Level of Consciousness: awake  Oxygen Supplementation: face mask  Level of Supplemental Oxygen (L/min / FiO2): 6  Independent Airway: airway patency satisfactory and stable  Dentition: dentition unchanged  Vital Signs Stable: post-procedure vital signs reviewed and stable  Report to RN Given: handoff report given  Patient transferred to: PACU    Handoff Report: Identifed the Patient, Identified the Reponsible Provider, Reviewed the pertinent medical history, Discussed the surgical course, Reviewed Intra-OP anesthesia mangement and issues during anesthesia, Set expectations for post-procedure period and Allowed opportunity for questions and acknowledgement of understanding    Vitals:  Vitals Value Taken Time   /93    Temp     Pulse 131 05/05/25 0229   Resp 13 05/05/25 0229   SpO2 100 % 05/05/25 0228   Vitals shown include unfiled device data.    Electronically Signed By: SUE Mcdonald CRNA  May 5, 2025  2:30 AM

## 2025-05-05 NOTE — PROGRESS NOTES
"  ANTHROPOMETRICS  Height: 157.5 cm (5' 2\")  Admission Weight: 57.5 kg (126 lb 12.2 oz) (05/04/25 2105)   Most Recent Weight: 60 kg (132 lb 4.4 oz) (05/05/25 0415)  IBW: 50 kg  BMI: Body mass index is 24.19 kg/m .   Weight History: Wt Readings from Last 15 Encounters:  05/05/25 : 60 kg (132 lb 4.4 oz)  05/04/25 : 57.5 kg (126 lb 12.2 oz)  Dosing Weight: 58 kg, based on actual wt    ASSESSED NUTRITION NEEDS  Estimated Energy Needs: 4583-8848 kcals/day (25 - 30 kcals/kg)  Justification: Maintenance  Estimated Protein Needs: 70-87 grams protein/day (1.2 - 1.5 grams of pro/kg)  Justification: Increased needs  Estimated Fluid Needs: 5410-8549 mL/day (25 - 30 mL/kg)  Justification: Maintenance  "

## 2025-05-05 NOTE — PROGRESS NOTES
05/05/25 1044   Appointment Info   Signing Clinician's Name / Credentials (PT) Nicole Strauss, PT, DPT   Living Environment   People in Home other (see comments)  (partner)   Current Living Arrangements house   Home Accessibility stairs within home   Living Environment Comments pt reports having ~ 8 stairs upstairs or downstairs (split level)   Self-Care   Usual Activity Tolerance good   Current Activity Tolerance moderate  (to good)   Equipment Currently Used at Home none   Fall history within last six months yes  (ETOH related, but otherwise denies any balance related falls)   Activity/Exercise/Self-Care Comment Pt reports prior IND at baseline with mobility tasks and ADLs. Pt drives normally. 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 Pt notes she may have some neuropathy in LEs.   General Information   Onset of Illness/Injury or Date of Surgery 05/04/25   Referring Physician Madhu Bauer, DO   Existing Precautions/Restrictions fall;abdominal   Weight-Bearing Status - LUE partial weight-bearing (% in comments)  (10# x 2 wks per ENT)   Weight-Bearing Status - RUE partial weight-bearing (% in comments)  (10# x 2 wks per ENT)   Cognition   Cognitive Status Comments cooperative, not impulsive and showed appropriate safety during eval   Pain Assessment   Patient Currently in Pain Yes, see Vital Sign flowsheet  (5/10 post-op pain after > hr of pain meds being given)   Posture    Posture Comments intact   Range of Motion (ROM)   ROM Comment intact   Bed Mobility   Comment, (Bed Mobility) SBA-IND supine<>sit   Transfers   Comment, (Transfers) SBA-IND STS   Gait/Stairs (Locomotion)   Comment, (Gait/Stairs) SBA-IND gait, overall stable, consistent martina noted, was not overly slowed   Balance   Balance Comments stable   Clinical Impression   Criteria for Skilled Therapeutic Intervention Yes, treatment indicated   PT Diagnosis  (PT) impaired mobility   Influenced by the following impairments fatigue, pain   Functional limitations due to impairments mildly below baseline bed mobility, transfers and gait   Clinical Presentation (PT Evaluation Complexity) stable   Clinical Presentation Rationale clinical judgement, [facility]   Clinical Decision Making (Complexity) low complexity   Planned Therapy Interventions (PT) balance training;bed mobility training;gait training;home exercise program;neuromuscular re-education;patient/family education;postural re-education;ROM (range of motion);stair training;strengthening;stretching;transfer training;risk factor education;home program guidelines;progressive activity/exercise   Risk & Benefits of therapy have been explained evaluation/treatment results reviewed;care plan/treatment goals reviewed;risks/benefits reviewed;current/potential barriers reviewed;participants voiced agreement with care plan;participants included;patient   PT Total Evaluation Time   PT Eval, Low Complexity Minutes (67110) 5   Physical Therapy Goals   PT Frequency One time eval and treatment only   PT Predicted Duration/Target Date for Goal Attainment 05/06/25   PT Goals Bed Mobility;Transfers;Gait;Stairs   PT: Bed Mobility Independent;Supine to/from sit;Rolling;Bridging;Within precautions;Goal Met  (able to progress to IND supine>sit)   PT: Transfers Independent;Sit to/from stand;Bed to/from chair;Assistive device;Within precautions;Goal Met  (able to progress to IND STS)   PT: Gait Independent;Within precautions;150 feet;Goal Met  (able to progress to IND gait 160 ft)   PT: Stairs Modified independent;8 stairs;Goal Met  (able to progress to CHEIKH stair navigation x 8 stairs)   PT Discharge Planning   PT Discharge Recommendation (DC Rec) home with assist  (assist from partner for any heavy lifting needed (laundry, trash, etc))   PT Rationale for DC Rec Pt is showing ability to IND mobilize safety from a mobility standpoint, has met goals    PT Brief overview of current status Up IND, ambulating hallways with stable gait   PT Total Distance Amb During Session (feet) 265  (175, 90)   Physical Therapy Time and Intention   Timed Code Treatment Minutes 25   Total Session Time (sum of timed and untimed services) 30

## 2025-05-05 NOTE — PROGRESS NOTES
CLINICAL NUTRITION SERVICES  Reason for Assessment:  Nutrition education regarding full liquid diet.   Diet History: Pt reports she ordinarily tries to eat a low sugar diet and is interested in continuing this during her full liquid diet period.   Nutrition Diagnosis:  Food- and nutrition-related knowledge deficit r/t no previous knowledge of full liquid diet AEB MD request for education.  Interventions:  Provided instruction on full liquid diet (MD requesting patient follow for a minimum of 2 weeks).  Recommendations on total calories/protein daily.  Ordered daily liquid MVI with minerals and encouraged continued use of this d/t difficulty meeting micronutrient needs on this diet.  Encouraged use of oral protein supplements to meet protein needs (3-4 daily needed).  Patient interested in trying Ensure Max Protein (150 kcal, 30g pro, 1g sugar) - sent several to patient to try.    Goals:   Pt to consume at least ~1500 calories and 70 grams protein daily  Follow-up:    Patient to ask any further nutrition-related questions before discharge.  In addition, pt may request outpatient RD appointment.  Colleen Chávez, MS, RD, LD, CCTD, [facility]  7A + 7B (beds [phone])  Available on Vocera [7A or 7B Clinical Dietitian]   Weekend/Holiday: Vocera [Weekend Clinical Dietitian]

## 2025-05-05 NOTE — PLAN OF CARE
"Admitted/transferred from:   2 RN full   skin assessment completed by Patrick Perez, RN and KeeleyRN.  Skin assessment finding: issues found jaw bra in place AMBROSIO, abrasion to knee    Interventions/actions: other mepilex to coccyx, call light give, encourage to repo while in bed. Scissor at bedside for emergency      Bedside Emergency Equipment Present:  Suction Regulator: Yes  Suction Canister: Yes  Tubing between Regulator and Canister: Yes  O2 Regulator with Tree: Yes  Ambu Bag: Yes    Documentation of Language Proficiency Assessment:  Does the patient speak a language other than English at home? No   Have they had difficulty understanding or being understood in previous admissions or doctor visits? No   Do they have difficulty clearly explaining what brought them into the hospital? No   Do they show difficulty providing clear teach back about call light use? No     If the answer was \"yes,\" to more than one question, was an  utilized for the remainder of the admission assessment? N/A    If no, please explain:     "

## 2025-05-05 NOTE — DISCHARGE INSTRUCTIONS
Fermin Warner!  It was nice meeting you.  Here is information on resources and clinics that we discussed:    Individual Therapy  [facility]:  clinic that specializes in counseling for both substance use and eating disorders.  [email or web address]  [phone]  [facility]  Psychology Today website:  can read bio's of various Therapists  [email or web address]      2. Addiction Medicine clinic:  meet with a provider who specializes in substance use care.  Discuss goals, how to meet those goals and medications that can assist.  The Vivitrol injection can be coordinated there, as it requires a prior authorization first.   A. [facility]:  [phone] Option #1.  Call to schedule at one of [facility]'s clinics.  We have two clinics:  one in [place] on the [facility], and one in [place] at the [facility].  They can do virtual visits, but if you decide to do the Vivitrol injection, then you'd have to come once/month.   [facility].  They also have Addiction Medicine clinics:  call [phone].  C.  Your primary care clinic might also be able to coordinate and give Vivitrol injections.        If you have any questions, feel free to reach out!  Mike LICSW  [phone] confidential work cell   at [facility]

## 2025-05-05 NOTE — PROGRESS NOTES
Occupational Therapy: Orders received. Chart reviewed and discussed with physical therapy.? Occupational Therapy not indicated due to limited inpatient therapy needs, no ADL concerns. PT following.? Defer discharge recommendations to medical team and PT.? Will complete orders.

## 2025-05-05 NOTE — H&P
[facility]    History and Physical consult note: Trauma Service     Date of Admission:  5/4/2025    Time of Admission/Consult Request (page/call): 05/04/25 at 1800    Time of my evaluation: 1830  Consulting services:  Oral Maxillofacial - Called by ED    Assessment & Plan   Trauma mechanism:Ground level fall   Time/date of injury:5/3/25 at 11 PM  Known Injuries:  Bilateral mandibular fracture  Bilateral TMJ dislocation   Procedure:  ORIF mandible by ENT  Plan:  Admit to trauma surgery  ENT consult. OR tonight  NPO  Pain control   Follow ENT recs for post op cares    Code status: full confirmed with patient.     General Cares:  GI Prophylaxis: n/a  DVT Prophylaxis: SCD, lovenox pod 1  Date of last stool/Bowel Regimen:05/04/25   Pulmonary toilet:IS    ETOH: This patient was asked if in the last 3-6 months there has been a time when she had 4 or more drinks in a single day/outing.. Patient answer to the screening question was in the positive. Spoke with the patient about the correlation of ETOH use and accidents/injuries. We also discussed the importance of abstaining from ETOH use while healing from existing injuries, especially if prescribed narcotics at the time of discharge. The patient demonstrated understanding.  Primary Care Physician   Deirdre E. Milligan    Chief Complaint   Jaw pain    History is obtained from the patient    History of Present Illness   Alana Mujica is a 49 year old female with alcohol use disorder who presented to the ED for evaluation after a ground level fall. Pt stated that she was out last night and had multiple alcoholic beverages when she was walking outside and tripped resulting in a ground level fall. She fell on her face and had a brief loss of consciousness. She was able to wake up on her own but decided to go back home. This morning she woke up with worsening pain in her jaws and headaches. She noticed bleeding out of her both ears.  She is having some hearing difficulties mostly on left side. Headaches are getting better. She is able to talk and swallow without issues. No nausea or vomiting. No chest pain, sob, abdominal pain, constipation/diarrhea or urinary sx.   Work up shows CT head negative, CT C spine negative, CT chest negative, CT face with bilateral mandibular fracture with TMJ dislocation.     Past Medical History    I have reviewed this patient's medical history and updated it with pertinent information if needed.   Past Medical History:   Diagnosis Date    RUPINDER (acute kidney injury) 2018    Alcohol withdrawal (H) 2018    Anorexia     Anxiety     Bulimia (H)     Chemical dependency (H) 2015    Alcohol in treatment    Depressive disorder     Genital herpes     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/15    NIL, +HR HPV. Co-test 12 months    Osteoporosis     Substance abuse (H)        Past Surgical History   I have reviewed this patient's surgical history and updated it with pertinent information if needed.  Past Surgical History:   Procedure Laterality Date    COLONOSCOPY N/A 2022    Procedure: COLONOSCOPY, WITH POLYPECTOMY AND BIOPSY;  Surgeon: Maya Holcomb MD;  Location:  GI    LEEP TX, CERVICAL      greater than 5 years ago from , uncertain date     Prior to Admission Medications   Prior to Admission Medications   Prescriptions Last Dose Informant Patient Reported? Taking?   Homeopathic Products (LIVER SUPPORT SL) 5/3/2025 Morning Self Yes Yes   Sig: Place under the tongue.   estradiol (ESTRACE) 0.5 MG tablet 5/3/2025 Morning Self No Yes   Sig: Take 1 tablet (0.5 mg) by mouth daily.   etonogestrel (NEXPLANON) 68 MG IMPL  Self Yes Yes   Si each by Subdermal route once.   fluticasone (FLONASE) 50 MCG/ACT nasal spray Unknown  Yes Yes   Sig: Spray 1 spray into both nostrils daily as needed for rhinitis or allergies.   minoxidil (LONITEN) 2.5 MG tablet 5/3/2025 Morning Self Yes Yes   Sig: Take 2.5 mg by mouth daily.    multivitamin w/minerals (THERA-VIT-M) tablet 5/3/2025 Morning Self Yes Yes   Sig: Take 1 tablet by mouth daily   omega 3 1000 MG CAPS 5/3/2025 Morning Self Yes Yes   traZODone (DESYREL) 50 MG tablet 5/3/2025 Bedtime Self No Yes   Sig: Take 1-2 tablets ( mg) by mouth at bedtime   valACYclovir (VALTREX) 1000 mg tablet Unknown Self No Yes   Sig: TAKE 2 TABLETS BY MOUTH DAILY TWICE DAILY FOR 1 DAY PER COLD SORE FLARE   vitamin B complex with vitamin C (STRESS TAB) tablet 5/3/2025 Morning Self Yes Yes   Sig: Take 1 tablet by mouth daily      Facility-Administered Medications: None     Allergies   No Known Allergies    Social History   Social History     Socioeconomic History    Marital status: Single     Spouse name: Not on file    Number of children: Not on file    Years of education: Not on file    Highest education level: Not on file   Occupational History    Not on file   Tobacco Use    Smoking status: Former     Types: Cigarettes     Passive exposure: Never    Smokeless tobacco: Never   Vaping Use    Vaping status: Former   Substance and Sexual Activity    Alcohol use: Not Currently     Comment: 9/18/22    Drug use: No    Sexual activity: Yes     Partners: Male     Birth control/protection: Implant   Other Topics Concern    Parent/sibling w/ CABG, MI or angioplasty before 65F 55M? Not Asked   Social History Narrative    Not on file     Social Drivers of Health     Financial Resource Strain: Unknown (9/23/2024)    Financial Resource Strain     Within the past 12 months, have you or your family members you live with been unable to get utilities (heat, electricity) when it was really needed?: Patient declined   Food Insecurity: Unknown (9/23/2024)    Food Insecurity     Within the past 12 months, did you worry that your food would run out before you got money to buy more?: Patient declined     Within the past 12 months, did the food you bought just not last and you didn t have money to get more?: Patient  declined   Transportation Needs: Unknown (9/23/2024)    Transportation Needs     Within the past 12 months, has lack of transportation kept you from medical appointments, getting your medicines, non-medical meetings or appointments, work, or from getting things that you need?: Patient declined   Physical Activity: Unknown (9/23/2024)    Exercise Vital Sign     Days of Exercise per Week: 3 days     Minutes of Exercise per Session: Not on file   Stress: Stress Concern Present (9/23/2024)    [employer] - Occupational Stress Questionnaire     Feeling of Stress : To some extent   Social Connections: Unknown (9/23/2024)    Social Connection and Isolation Panel [NHANES]     Frequency of Communication with Friends and Family: Not on file     Frequency of Social Gatherings with Friends and Family: Twice a week     Attends Sikhism Services: Not on file     Active Member of Clubs or Organizations: Not on file     Attends Club or Organization Meetings: Not on file     Marital Status: Not on file   Interpersonal Safety: Low Risk  (9/23/2024)    Interpersonal Safety     Do you feel physically and emotionally safe where you currently live?: Yes     Within the past 12 months, have you been hit, slapped, kicked or otherwise physically hurt by someone?: No     Within the past 12 months, have you been humiliated or emotionally abused in other ways by your partner or ex-partner?: No   Housing Stability: Unknown (9/23/2024)    Housing Stability     Do you have housing? : Patient declined     Are you worried about losing your housing?: Patient declined       Family History   Family history reviewed with patient and is noncontributory.    Review of Systems   CONSTITUTIONAL: No fever, chills, sweats, fatigue   EYES: no visual blurring, no double vision or visual loss  ENT: decrease in hearing on left side, no tinnitus, no vertigo, no hoarseness  RESPIRATORY: no shortness of breath, no cough, no sputum    CARDIOVASCULAR: no palpitations, no chest  pain, no exertional chest pain or pressure  GASTROINTESTINAL: no nausea or vomiting, or abd pain  GENITOURINARY: no dysuria, no frequency or hesitancy, no hematuria  MUSCULOSKELETAL: no weakness, no redness, no swelling, no joint pain,   SKIN: abrasions on face and right knee.  NEUROLOGIC: no numbness or tingling of hands, no numbness or tingling  of feet, no syncope, no tremors or weakness  PSYCHIATRIC: no sleep disturbances, no anxiety or depression    Physical Exam   Temp: 98.8  F (37.1  C) Temp src: Oral BP: 133/84 Pulse: 92   Resp: 16 SpO2: 97 % O2 Device: None (Room air)    Vital Signs with Ranges  Temp:  [97.7  F (36.5  C)-98.8  F (37.1  C)] 98.8  F (37.1  C)  Pulse:  [] 92  Resp:  [11-20] 16  BP: (116-142)/(74-99) 133/84  SpO2:  [95 %-99 %] 97 % 0 lbs 0 oz    Primary Survey:  Airway: patient talking  Breathing: symmetric respiratory effort bilaterally  Circulation: central pulses present and peripheral pulses present  Disability: Pupils - left 4 mm and brisk, right 4 mm and brisk   [place] Coma Scale - Total 15/15  Eye Response (E): 4  4= spontaneous,  3= to verbal/voice, 2=  to pain, 1= No response   Verbal Response (V): 5   5= Orientated, converses,  4= Confused, converses, 3= Inappropriate words,  2= Incomprehensible sounds,  1=No response   Motor Response (M): 6   6= Obeys commands, 5= Localizes to pain, 4= Withdrawal to pain, 3=Fexion to pain, 2= Extension to pain, 1= No response    Secondary Survey:  General: alert, oriented to person, place, time  Head: atraumatic, normocephalic, trachea midline  Eyes: PERRLA, pupils 3 mm, EOMI, corneas and conjunctivae clear  Ears: pearly grey bilateral TMs and non-inflamed external ear canals  Nose: nares patent, no drainage, nasal septum non-tender  Mouth/Throat: no exudates or erythema,  no dental tenderness or malocclusions, no tongue lacerations  Neck:  No cervical collar present. No midline posterior  tenderness, full AROM without pain or tenderness   Chest/Pulmonary: normal respiratory rate and rhythm,  bilateral clear breath sounds, no wheezes, rales or rhonchi, no chest wall tenderness or deformities,   Cardiovascular: S1, S2,  normal and regular rate and rhythm, no murmurs  Abdomen: soft, non-tender, no guarding, no rebound tenderness and no tenderness to palpation  : normal external genitalia, pelvis stable to lateral compression   Back/Spine: no deformity, no midline tenderness, no sacral tenderness,  no step-offs and no abrasions or contusions  Musculoskel/Extremities: normal extremities, full AROM of major joints without tenderness, edema, erythema, ecchymosis, or abrasions. Bilateral PP. No edema.   Hand: no gross deformities of hands or fingers. Full AROM of hand and fingers in flexion and extension.  strength equal and symmetric.   Skin: no rashes, laceration, ecchymosis, skin warm and dry.   Neuro: PERRLA, alert, oriented x 4. CN II-XII grossly intact. No focal deficits. Strength 5/5 x 4 extremities.  Sensation intact.  Psychiatric: affect/mood normal, cooperative, normal judgement/insight and memory intact    # Pain Assessment:      5/4/2025     5:46 PM   Current Pain Score   Patient currently in pain? yes   - Alana is experiencing pain due to trauma. Pain management was discussed and the plan was created in a collaborative fashion.  Alana's response to the current recommendations: compliant  - Please see the plan for pain management as documented above    Most Recent 3 CBC's:  Recent Labs   Lab Test 05/04/25  1808 05/04/25  1245 01/18/25  0941   WBC 14.0* 15.0* 6.1   HGB 12.2 13.0 14.0   MCV 91 89 92    282 326     Most Recent 3 BMP's:  Recent Labs   Lab Test 05/04/25  1808 05/04/25  1245 02/01/25  1028   * 123* 134*   POTASSIUM 4.6 4.2 4.8   CHLORIDE 91* 85* 100   CO2 25 23 25   BUN 7.4 7.9 10.5   CR 0.97* 0.82 0.78   ANIONGAP 12 15 9   KODY 9.5 10.7* 9.1   * 131* 97      Most Recent 2 LFT's:  Recent Labs   Lab Test 05/04/25  1808 05/04/25  1338   AST 29 36   ALT 32 43   ALKPHOS 61 82   BILITOTAL 0.8 1.0     Most Recent 3 INR's:  Recent Labs   Lab Test 05/04/25  1808 05/04/25  1245 12/04/23  1110   INR 1.06 0.97 0.94       Studies:  No orders to display       Discussed with Trauma staff on call, Dr. Drake-Fab Hancock MD  General Surgery PGY-7  [phone]

## 2025-05-05 NOTE — ANESTHESIA POSTPROCEDURE EVALUATION
Patient: Alana Mujica    Procedure: Procedure(s):  Maxilomandibular Fixation, Open reduction internal fixation mandible       Anesthesia Type:  General    Note:  Disposition: Inpatient   Postop Pain Control: Uneventful            Sign Out: Well controlled pain   PONV: No   Neuro/Psych: Uneventful            Sign Out: Acceptable/Baseline neuro status   Airway/Respiratory: Uneventful            Sign Out: Acceptable/Baseline resp. status   CV/Hemodynamics: Uneventful            Sign Out: Acceptable CV status; No obvious hypovolemia; No obvious fluid overload   Other NRE: NONE   DID A NON-ROUTINE EVENT OCCUR?            Last vitals:  Vitals Value Taken Time   /91 05/05/25 0300   Temp 37.4  C (99.4  F) 05/05/25 0230   Pulse 113 05/05/25 0303   Resp 15 05/05/25 0303   SpO2 93 % 05/05/25 0303   Vitals shown include unfiled device data.    Electronically Signed By: Kathrine Root MD  May 5, 2025  3:04 AM   28-Oct-2021 17:11

## 2025-05-05 NOTE — CONSULTS
"[facility]   Consult Note - Addiction Service     Date of Admission:  5/4/2025    Consult Requested by: ***  Reason for Consult: ***    Assessment & Plan     # Alcohol Use Disorder  # ***List all problems related to AUD here (ie cirrhosis, pancreatitis, etc).  Delete if no AUD***  -We discussed different options to maintain goal of sobriety, including medications to manage cravings, including acamprosate and naltrexone.    For alcohol withdrawal:  -Continue CIWA;  consider adding on gabapentin, 600 mg TID.  Consider prescribing at discharge if this helps with underlying anxiety.    # Mental health:***  Consider health psychology, offering  for spiritual/cultural support, including Smudging    # Harm Reduction:***  Consider checking hep C pcr reflex, HIV.    Consider obtaining syphilis testing  Offer naloxone, PrEP ([facility]), access to syringe exchange (see dot phrase \"dot rksyringe\")    # Immunization review: ***  Consider Hep A IgG and Hep B serologies     # Peer Support:   -Our peer  will meet the patient if agreeable and still hospitalized on Thursday, to provide additional outpatient resources  -To contact Emlii Peer  from [facility] ([facility]): call or text: [phone]    # Addiction Social Worker:   Our addiction social worker Mike Haywood can be contacted if needed, on her pager [phone] or texted/called at [phone]  --***Patient is interested in inpatient addiction treatment after discharge.  Please consult Chem Dependency within 1 week of discharge to begin this evaluation.***  --***Patient is interested in ONLY outpatient addiction treatment after discharge.  Our addiction social worker will give the patient appropriate resources for outpatient evaluation.***  --***View this link for differences between Addiction Consult Team vs Chemical Dependency Consult bit.ly/[email or web address] ***    # Linkage to Care:   ***this section " "is for linkage to primary care, outpatient addiction medicine, or other long term supports.  For new Suboxone patients, Consider Recovery Clinic: message \"Recovery Clinic Pool,\" and see below.  Otherwise include details of linkage to care for outpatient CHAD support***  [facility]  [address], [address]   [place], [place], [address]  Phone: [phone], Fax: [phone]  Mon, Wed, Fridays: Appt hours: 9am-4pm, Walk in hours: 9am-3pm    The patient's care was discussed with the { :029931}.    I spent 120 minutes on the unit/floor managing the care of Alana Mujica. Over 50% of my time was spent on the following:   Significant education and counseling spent on: how substance use disorders and dependence occur, and how it can become a chronic relapsing and remitting medical condition.  In addition, the pharmacology of medical treatments including ***, the importance of follow up, and Harm Reduction advice on how to use substances in a less harmful way why trying to cut down were discussed today.      Greg Lam MD  [facility]   Contact information available via [facility] Paging/Directory  Please see sign in/sign out for up to date coverage information    ChAT team (Addiction Consult Team): Coverage daily 8-4pm     ______________________________________________________________________    Chief Complaint   ***    {History obtained from:6756651::\"History is obtained from the patient\"}    History of Present Illness   Alana Mujica who ***    Drug/Substance of Choice:  ***    Opioid use history: ***  Alcohol use history: ***    Withdrawal history: ***             Review of Systems   {For notewriter, delete & use smartphrase \"ROSByAge\":418115}    Past Medical History:   Diagnosis Date    RUPINDER (acute kidney injury) 9/14/2018    Alcohol withdrawal (H) 8/26/2018    Anorexia     Anxiety     Bulimia (H)     Chemical dependency (H) 11/24/2015    Alcohol in " treatment    Depressive disorder     Genital herpes     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/15    NIL, +HR HPV. Co-test 12 months    Osteoporosis     Substance abuse (H)        Past Surgical History:   Procedure Laterality Date    COLONOSCOPY N/A 11/30/2022    Procedure: COLONOSCOPY, WITH POLYPECTOMY AND BIOPSY;  Surgeon: Maya Holcomb MD;  Location:  GI    LEEP TX, CERVICAL      greater than 5 years ago from 2015, uncertain date       Social History   Social History     Socioeconomic History    Marital status: Single     Spouse name: Not on file    Number of children: Not on file    Years of education: Not on file    Highest education level: Not on file   Occupational History    Not on file   Tobacco Use    Smoking status: Former     Types: Cigarettes     Passive exposure: Never    Smokeless tobacco: Never   Vaping Use    Vaping status: Former   Substance and Sexual Activity    Alcohol use: Yes    Drug use: No    Sexual activity: Yes     Partners: Male     Birth control/protection: Implant   Other Topics Concern    Parent/sibling w/ CABG, MI or angioplasty before 65F 55M? Not Asked   Social History Narrative    Not on file     Social Drivers of Health     Financial Resource Strain: Low Risk  (5/5/2025)    Financial Resource Strain     Within the past 12 months, have you or your family members you live with been unable to get utilities (heat, electricity) when it was really needed?: No   Food Insecurity: Low Risk  (5/5/2025)    Food Insecurity     Within the past 12 months, did you worry that your food would run out before you got money to buy more?: No     Within the past 12 months, did the food you bought just not last and you didn t have money to get more?: No   Transportation Needs: Low Risk  (5/5/2025)    Transportation Needs     Within the past 12 months, has lack of transportation kept you from medical appointments, getting your medicines, non-medical meetings or appointments, work, or from getting things that you  need?: No   Physical Activity: Unknown (9/23/2024)    Exercise Vital Sign     Days of Exercise per Week: 3 days     Minutes of Exercise per Session: Not on file   Stress: Stress Concern Present (9/23/2024)    [employer] - Occupational Stress Questionnaire     Feeling of Stress : To some extent   Social Connections: Unknown (9/23/2024)    Social Connection and Isolation Panel [NHANES]     Frequency of Communication with Friends and Family: Not on file     Frequency of Social Gatherings with Friends and Family: Twice a week     Attends Caodaism Services: Not on file     Active Member of Clubs or Organizations: Not on file     Attends Club or Organization Meetings: Not on file     Marital Status: Not on file   Interpersonal Safety: Unknown (5/4/2025)    Interpersonal Safety     Do you feel physically and emotionally safe where you currently live?: Patient unable to answer     Within the past 12 months, have you been hit, slapped, kicked or otherwise physically hurt by someone?: Patient unable to answer     Within the past 12 months, have you been humiliated or emotionally abused in other ways by your partner or ex-partner?: Patient unable to answer   Housing Stability: Low Risk  (5/5/2025)    Housing Stability     Do you have housing? : Yes     Are you worried about losing your housing?: No       Family History   I have reviewed this patient's family history and updated it with pertinent information if needed.  Family History   Problem Relation Age of Onset    Breast Cancer Mother 68        2014/2015    Hypertension Mother     Hypertension Father     Ulcerative Colitis Maternal Grandmother     No Known Problems Maternal Grandfather     No Known Problems Paternal Grandmother     No Known Problems Paternal Grandfather     No Known Problems Brother     Unknown/Adopted No family hx of     Depression No family hx of     Anxiety Disorder No family hx of     Schizophrenia No family hx of      Bipolar Disorder No family hx of     Suicide No family hx of     Substance Abuse No family hx of     Dementia No family hx of     Eliseo Disease No family hx of     Parkinsonism No family hx of     Autism Spectrum Disorder No family hx of     Intellectual Disability No family hx of     Mental Illness No family hx of          Medications   {Medication List Options:6963259}    Allergies   No Known Allergies    Physical Exam   Temp: 98.7  F (37.1  C) Temp src: Axillary BP: 113/81 Pulse: 88   Resp: 16 SpO2: 98 % O2 Device: None (Room air) Oxygen Delivery: 2 LPM      {OPTIONAL -- recommend using personal SmartPhrase or Notewriter for exam    :678916}    Gen: no acute distress, well nourished, well developed  HEENT: NC/AT, MMM, EOMI,   CV: Extremities WWP, pulses assumed   Resp: breathing comfortably on RA  Abd: +BS, non-tender, non-distended, no guarding or rebound tenderness  Ext: No significant deformities or trauma, moving all ext freely  Skin: No erythema, no lesions or rashes.   Neuro: No focal neurologic deficit  Psych: ***    Due to regulation of Title 42 of the Code of Federal Regulations (CFR) Part 2: Confidentiality laws apply to this note and the information wherein.  Thus, this note cannot be copy and pasted into any other health care staff's note nor can it be included in general medical records sent to ANY outside agency without the patient's written consent.

## 2025-05-05 NOTE — Clinical Note
"/69   Pulse 101   Temp 99.8  F (37.7  C) (Oral)   Resp 16   Ht 1.575 m (5' 2\")   Wt 60 kg (132 lb 4.4 oz)   SpO2 99%   BMI 24.19 kg/m       Neuro: AOx4, soft spoken due to heavy guiding elastic rubber   Cardiac:  Respiratory:  GI/:  Diet/Appetite:  Skin:  LDA:  Activity:  Pain:  Plan:    "

## 2025-05-05 NOTE — TELEPHONE ENCOUNTER
REFERRAL INFORMATION:  Referring By:   Referring Clinic:   Reason for Visit/Diagnosis: ORIF of left condylar fracture and was placed in MMF ok per Silvia      FUTURE VISIT INFORMATION:  Appointment Date: 5/9/25  Appointment Time: 2:40 PM     NOTES STATUS DETAILS   HOSPITAL DISCHARGE / ER notes [facility]  5/4/25: Madhu Bauer DO   5/4/25: Nabeel Kramer MD    PROCEDURE and/or OPERATIVE REPORTS  *related (if  applicable) or most recent* Epic  5/4/25 Maxilomandibular Fixation, Open reduction internal fixation mandible    IMAGES *pertaining images & report*       CT, MRI, PET, NM, US, XRAYS, etc ...    IMAGING  DISC TRACKING   Tracking #:    Epic/ PACS 5/4/25 CT temporal  5/4/25 CT facial  5/4/25 CT cervical  5/4/25 CT ehad

## 2025-05-05 NOTE — PLAN OF CARE
"/70 (BP Location: Right arm)   Pulse 79   Temp 98.4  F (36.9  C) (Axillary)   Resp 16   Ht 1.575 m (5' 2\")   Wt 60 kg (132 lb 4.4 oz)   SpO2 98%   BMI 24.19 kg/m      Goal Outcome Evaluation:      Plan of Care Reviewed With: patient    Overall Patient Progress: improvingOverall Patient Progress: improving     A&Ox4. VSS on RA. Pain managed w/ PRN oxycodone. PIVs removed for pending discharge early this evening. L mandible incision SHADE -- Jaw brace intact. Denies nausea. No BM. Voiding spontaneously -- Soriano removed. Full liquid diet -- straws OK. SBA. Labs reviewed.      "

## 2025-05-05 NOTE — PROGRESS NOTES
Brief addiction note:  Full addiction consult pending.  In brief, discussed supports for her goal of cutting down on alcohol use.  Discussed starting oral naltrexone once she has completed opioids being prescribed for her jaw fracture.    Will also order/link her to outpatient addiction medicine, and provide resources for outpatient mental health supports.      Greg Lam MD  Director, Hospital Medicine Addiction Service  Internal Medicine/Pediatrics Hospitalist & Staff Physician   of Internal Medicine and Pediatrics  [facility]

## 2025-05-05 NOTE — PROGRESS NOTES
"Otolaryngology Progress Note  May 5, 2025    SUBJECTIVE: No acute events overnight s/p ORIF and MMF with Dr. Lehman. No reported emesis overnight. Pain appears to be under fair control.     OBJECTIVE:   /63 (Patient Position: Semi-Mcbride's, Cuff Size: Adult Small)   Pulse 101   Temp 99.8  F (37.7  C) (Oral)   Resp 16   Ht 1.575 m (5' 2\")   Wt 60 kg (132 lb 4.4 oz)   SpO2 96%   BMI 24.19 kg/m     General: Alert and oriented x 3, No acute distress   HEENT: Expected edema along the left face and preauricular incision, incision is c/d/I without evidence of underlying fluid collection or hematoma, jaw bra in place. HB2/6 with mild weakness in the upper divisions, good eye closure.    Pulmonary: Breathing non-labored, no stridor, no accessory muscle use.      Intake/Output Summary (Last 24 hours) at 5/5/2025 0736  Last data filed at 5/5/2025 0637  Gross per 24 hour   Intake 1301.06 ml   Output 2225 ml   Net -923.94 ml       LABS:  ROUTINE IP LABS (Last four results)  BMP  Recent Labs   Lab 05/04/25 1808 05/04/25  1245   * 123*   POTASSIUM 4.6 4.2   CHLORIDE 91* 85*   KODY 9.5 10.7*   CO2 25 23   BUN 7.4 7.9   CR 0.97* 0.82   * 131*     CBC  Recent Labs   Lab 05/04/25  1808 05/04/25  1245   WBC 14.0* 15.0*   RBC 3.82 4.01   HGB 12.2 13.0   HCT 34.6* 35.5   MCV 91 89   MCH 31.9 32.4   MCHC 35.3 36.6*   RDW 11.9 11.7    282     INR  Recent Labs   Lab 05/04/25  1808 05/04/25  1245   INR 1.06 0.97       ASSESSMENT & PLAN: Alana Mujica is a 49 year old female with a past medical history of AUD who sustained bilateral condylar fractures due to an alcohol-related fall on her chin. She subsequently underwent ORIF of the left condylar fracture with MMF while dentures in place. Overall progressing as expected after surgery.     - OK to discharge from ENT perspective  - ENT team will reach out for close clinical follow-up (schedulers messaged)  - Full-liquid diet at discharge  - Recommend nutrition " consult for adapting to new diet (ordered)  - Jaw bra 72h   - Agree with addiction medicine consult  - Peridex TID for 10 days  - Can transition to Augmentin, liquid form, for 10 days post-op  - OK to discontinue ear drops if no bleeding is appreciated from the ears  - Remainder of cares per primary team    -- Patient and above plan to be discussed with Dr. Dominik Paul MD  ENT Resident, PGY-2      Clinically Significant Risk Factors Present on Admission         # Hyponatremia: Lowest Na = 123 mmol/L in last 2 days, will monitor as appropriate  # Hypochloremia: Lowest Cl = 85 mmol/L in last 2 days, will monitor as appropriate   # Hypercalcemia: Highest Ca = 10.7 mg/dL in last 2 days, will monitor as appropriate  # Hypomagnesemia: Lowest Mg = 1.1 mg/dL in last 2 days, will replace as needed       # Hypertension: Home medication list includes antihypertensive(s)

## 2025-05-05 NOTE — PROGRESS NOTES
Otolaryngology progress note  5/5/2025    Alana underwent ORIF of the left condylar fracture as well as MMF placement in the operating room on 5/5/2025.  She has heavy guiding elastic rubber bands in place in her mouth to position her mandible in an appropriate position for healing.  These rubber bands generally break after about 1 day and they need to be constantly replaced.  We will teach Alana how to reapply the bands in order to keep the mouth fixated in shot.  Which shows rubber bands and not traditional wires due to her substance abuse history and alcoholism.  If she were to vomit with her mouth and maxillomandibular fixation this could be a dangerous situation especially in the setting of dentures being the MMF devices.  She needs to have a scissor next to her bed at all times in order to cut the rubber bands if she were to have respiratory distress or an episode of vomiting.  We can easily replace the rubber bands if needed.  I prefer to her be on antibiotics for about 10 days postoperative.  She may also have some left-sided facial weakness due to the manipulation during surgery this is expected.  This will recover with time as we preserved the facial nerve throughout the surgery.  Her treatment course will likely consist of 2 weeks of rigid MMF with these heavy elastics and then we will transition to a more lightly fixating rubber band.  This will help to prevent ankylosis in the future as she continues to heal these challenging and complex fractures.  She should remain in the hospital under the service of the trauma team to further interrogate her systemic illness and ongoing medical needs.  Once she is comfortable and capable of replacing her rubber bands and understands the protocol of carrying the scissor everywhere in order to remove the rubber bands as necessary in the episode of nausea or vomiting she is likely capable of discharging.  She needs aggressive counseling on substance abuse and limiting  her intake of altering substances during her period of recovery.  She may need consultation of the addiction services or other providers as needed in order to help her recover safely.    Shaka Lehman MD

## 2025-05-05 NOTE — OP NOTE
Otolaryngology operative report  5/5/2025    Primary Surgeon: Shaka Lehman MD  Resident Surgeon: Chelsea Dooley MD    Procedure performed: Maxillomandibular fixation, open reduction internal fixation left condylar fracture.    Estimated blood loss: 50 ml    Findings: MMF established using the patient's dentures and permanent lower denture.  The weight plate was placed superior and inferior to these devices and heavy elastics were used for the MMF portion of the procedure.  The condylar fracture was medially displaced and inferiorly located and we reestablished the mandibular hide by extracting this into its normal fossa.  We then placed 1 screw as there was inadequate tissue to place 2 screws in the superior part of the fracture repair.  3 screws were placed on the subcondylar section of the fracture repair.  C-arm confirmed to the the mandibular hide appeared to be reestablished.  The facial nerve was identified and stimulated throughout the course of the procedure and at the end of the case.    Indications for the procedure:  Alana is a 49-year-old who had an alcohol-related fall onto her chin in which she sustained bilateral condylar subcondylar fractures.  She had an open bite and not well-established occlusion.  We discussed the potential treatment options risks and benefits.  I discussed reestablishing maxillomandibular fixation with my desire to alter the denture devices.  She reports voiced a strong preference to not alter the devices based on their cost.  I detailed that the safety of of her recovery could be in jeopardy if we used MMF with her denture and she still wished to avoid any alterations.  I suggested putting a window for oral intake and egress of saliva she declined this option.  We then discussed bilateral and unilateral approaches to the subcondylar fossa.  I showed her her CT scans and the degree of telescoping.  I discussed the risks and benefits of bilateral approach and unilateral  approach.  If we were to establish a good mandibular fixation and reestablish the height and one of the condyles we suggested limiting the amount of surgery to 1 side to decrease the risk of the facial nerve and also to adequately reestablish the hide and allow for long-term function.  She asked appropriate questions and these were answered to the best my ability.    Description of the procedure:  Alana was taken to the operative suite by the care of the anesthesia providers.  She was nasotracheally intubated without complication.  The head of the bed rotated 180 degrees.  A facility protocol timeout identified the patient the operative site and the procedure to be performed and all parties were in agreement.  We sterilely prepped the surgical sites.  We entered her denture into the oral cavity and determined her baseline occlusion.  We then placed the wave plate superiorly and inferiorly avoiding the denture during placement of the screws and trying to avoid the posts of the inferior placement devices.  Once the weight was appropriately secured we did transition to the left subcondylar fracture.  We chose the left side because the condyle segment appeared to be more closely positioned and on the right the height of the mandible seems slightly more appropriate.  Thus if we establish the height of the left side the right would likely follow suit with MMF fixation.  We used a Bovie to dissect through the skin and subcutaneous tissue.  We identified and entered the parotid gland.  We dissected using blunt dissection and bipolar cautery through the parotid towards the subcondylar area.  We identified and followed the facial nerve towards its main trunk.  We identified the pes and the superior component of the facial nerve.  We stimulated this with the press probe and confirmed that we found the entirety of the operative vision.  We then worked posterior to this dissecting towards the subcondylar area.  We then  identified the subcondylar mandible and incised the periosteum.  The fracture was obvious and evident in the surgical field.  The condylar segment was medially displaced and inferior.  We retracted this into the surgical field and after manipulation found it challenging to laterally rotate the condyle into position.  We then released some of the pterygoid musculature to give more range of motion to the condylar head..  This helped to bring the condyle into the appropriate position and we used a drill to place 1 screw hole in the condylar head.  There is no room for additional screws.  We then using the matrix mandible set and a midface plate drilled 6 mm screws into the condyle and 8 mm screws into the subcondylar area.  We then obtained a C arm to confirm that the height of the mandible had been established.  The positioning looked okay and we placed MMF guiding elastics in the oral cavity.  We then closed the incision in multiple layers using a 4-0 Vicryl and a 4-0 Monocryl stitch.  At the end the case the facial nerve stimulated with the press probe and all surgical counts were correct at the end of the case.  We then returned the patient to the care of the anesthesia providers where she was awakened and extubated without complication.  She transition to the PACU in stable condition.    Shaka Lehman MD

## 2025-05-05 NOTE — DISCHARGE SUMMARY
[facility]    Discharge Summary  Trauma Surgery Service    Date of Admission:  5/4/2025  Date of Discharge:  5/5/2025  Attending Physician: Dr. Bubba Keith  Discharging Provider: Ferdinand Lance CNP  Date of Service (when I saw the patient): 05/05/25    Primary Provider: Milligan, Deirdre E  Primary Care clinic: [address] [place] [address]  Phone: [phone]  Fax number: [phone]     Discharge Diagnoses    Bilateral closed fracture of mandible, initial encounter (H)  TMJ (dislocation of temporomandibular joint), initial encounter  Closed fracture of temporal bone, initial encounter (H)  alcohol use disorder (H)  Fall, initial encounter    Hospital Course   Alana Mujica is a 49 year old female with alcohol use disorder sustained bilateral mandible fractures, TMJ dislocation as a result of a fall. Per patient she had a little more to drink than normal and as she was walking outside she stumbled on her right ankle and fell onto her face. She went home following the fall and woke up with jaw pain and bleeding from her ear.    ED work up included CT head, C spine, CT chest which were negative for acute injuries. CT of the facial bones and temporal bones showed:    #Bilateral mandibular condyle fractures with TMJ dislocations and injury to the external auditory canal.  She was seen and evaluated by the Otolaryngology service S/P maxillomandibular fixation, open reduction internal fixation left condylar fracture on 05/05/2025 by  Dr. Shaka Lehman. She has heavy rubber bands in her mouth for appropriate position for healing per Dr. Lehman. Prior to discharge she was provided with instruction on how to reapply the bands if the snap or come out.   Discharge recommendations below:  She should have a pair of scissors readily available to cut the bands in case of vomiting  Full liquid diet  No lifting over 10 pounds for 2 weeks  Wear the jaw bra for 72 hours with  ice packs as needed for pain and swelling  Augmentin x 10 days  Peridex oral rinses x10 days  She will follow up at the ENT clinic for ongoing cares.    # Acute traumatic pain  Prior discharge pain was managed on short course scheduled tylenol, Ibuprofen and PRN Oxycodone. We anticipate     #Acute alcohol intoxication  # Hx Alcohol Use Disorder  BAL 0.9 on admission. No acute signs of alcohol withdrawal prior to discharge. She was seen by our Addiction Medicine Service, resources provided.    #Acute on chronic hyponatremia, hypochloremia, hypomagnesemia, hypophosphatemia  Secondary to long term alcohol use. She did receive crystalloids with improvement. Also received oral mag and phos replacements with improvement prior to discharge.    Significant Results and Procedures   DATE: 05/05/2025  Procedure(s):  Maxilomandibular Fixation, Open reduction internal fixation mandible  Surgeon(s): Surgeons and Role:     * Shaka Lehman MD - Primary     * Chelsea Dooley MD - Resident - Assisting  Code Status   Full Code    SUBJECTIVE: Prior to discharge Alana was able to tolerate a liquid diet and reported adequate pain control.     Physical Exam   Temp: 98.7  F (37.1  C) Temp src: Axillary BP: 113/81 Pulse: 88   Resp: 16 SpO2: 98 % O2 Device: None (Room air) Oxygen Delivery: 2 LPM  Vitals:    05/04/25 2105 05/05/25 0415   Weight: 57.5 kg (126 lb 12.2 oz) 60 kg (132 lb 4.4 oz)     Vital Signs with Ranges  Temp:  [97.6  F (36.4  C)-99.8  F (37.7  C)] 98.7  F (37.1  C)  Pulse:  [] 88  Resp:  [10-18] 16  BP: (103-154)/(63-99) 113/81  SpO2:  [90 %-100 %] 98 %  I/O last 3 completed shifts:  In: 1301.06 [I.V.:1301.06]  Out: 2225 [Urine:2175; Blood:50]    Constitutional: Awake, alert, cooperative, no apparent distress, and appears stated age.  Eyes: PERRL  ENT: Anticipated post op facial swelling, left side mandible incision CDI, scant old blood left external auditory canal   Respiratory: No increased work of breathing, good  "air exchange, clear to auscultation bilaterally, no crackles or wheezing.  Cardiovascular: Normal apical impulse, regular rate and rhythm, normal S1 and S2  GI: Normal bowel sounds, soft, non-distended, non-tender  Genitourinary:  voids spont  Skin: warm and dry  Musculoskeletal: There is no redness, warmth, or swelling of the joints.  Full range of motion noted.  Motor strength is 5 out of 5 all extremities bilaterally.  Tone is normal.  Neurologic: Awake, alert, oriented to name, place and time.   Neuropsychiatric: Calm, normal eye contact, alert, normal affect,     Discharge Disposition   Discharged to home  Condition at discharge: Stable  Discharge VS: Blood pressure 113/81, pulse 88, temperature 98.7  F (37.1  C), temperature source Axillary, resp. rate 16, height 1.575 m (5' 2\"), weight 60 kg (132 lb 4.4 oz), SpO2 98%, not currently breastfeeding.    Consultations This Hospital Stay   TRAUMA SURGERY IP CONSULT  ENT IP CONSULT  ADDICTION SERVICE ADULT IP CONSULT FOR [place]  PHYSICAL THERAPY ADULT IP CONSULT  OCCUPATIONAL THERAPY ADULT IP CONSULT  NUTRITION SERVICES ADULT IP CONSULT    Discharge Orders      Adult Mental Health  Referral      Reason for your hospital stay    Fall   Bilateral mandibular condyle fracture S/P surgical fixation  Acute alcohol intoxication     Activity    Your activity upon discharge: ambulate in house, no driving while on analgesics, and no heavy lifting over 10 pounds for 2 weeks     ADULT [facility] Specialty Follow-up and recommended labs and tests    Follow up with your primary care provider for continued medical care and hospital follow up in 5-10 days.    Ear, Nose and Throat Clinic on May 9th as scheduled   [facility]  Floor 4   [address] [place], [place] [address]   Appointments: [phone]      You have been involved in a recent trauma incident resulting in an injury.  Studies show us that people affected by trauma have higher levels " "of post-traumatic stress disorder (PTSD) and/or depressive symptoms during the year following an injury.     Please consider the following.  Have you:  Had migraines about the event(s) or thought about the event(s) when you didn't want to?  Tried hard not to think about the event(s) or went out of your way to avoid situations that reminded you of the event(s)?  Been constantly on guard, watchful, or easily startled?  Felt numb or detached from people, activities, or your surroundings?   Felt guilty or unable to stop blaming yourself or others for the event(s) or any problems the event (s) may have caused?    If you answered \"yes\" to 3 or more of these questions, or if you simply want to discuss any of your feelings further, we recommend that you talk with your Primary Care Provider or a mental health professional.      Appointments on [place] and/or [facility] (with [facility] or [facility] provider or service). Call [phone] if you haven't heard regarding these appointments within 7 days of discharge.     When to contact your care team    Call the ENT clinic if you have any of the following: temperature greater than 101.5 , increased drainage, increased swelling, or increased pain.     Wound care and dressings    Instructions to care for your surgical incision at home: keep wound clean and dry.     Discharge Instructions    Please ensure you have a pair of scissors with you as needed to cut rubber bands if you vomit  Wear the Jaw bra for 72 hours     Diet    Follow this diet upon discharge:      Full Liquid Diet (straw ok)     Hospital Follow-up with Existing Primary Care Provider (PCP)          Discharge Medications   Current Discharge Medication List        START taking these medications    Details   acetaminophen (TYLENOL) 325 MG/10.15ML liquid Take 20.3 mLs (650 mg) by mouth every 6 hours.  Qty: 236 mL, Refills: 0    Comments: Alternate with ibuprofen  Associated Diagnoses: Bilateral closed fracture of " mandible, initial encounter (H); TMJ (dislocation of temporomandibular joint), initial encounter      amoxicillin-clavulanate (AUGMENTIN) 400-57 MG/5ML suspension Take 10.94 mLs (875 mg) by mouth 2 times daily for 10 days.  Qty: 218.8 mL, Refills: 0    Associated Diagnoses: Bilateral closed fracture of mandible, initial encounter (H); TMJ (dislocation of temporomandibular joint), initial encounter      chlorhexidine (PERIDEX) 0.12 % solution Swish and spit 15 mLs in mouth 3 times daily.  Qty: 473 mL, Refills: 0    Associated Diagnoses: Bilateral closed fracture of mandible, initial encounter (H); TMJ (dislocation of temporomandibular joint), initial encounter      ciprofloxacin (CILOXAN) 0.3 % ophthalmic solution Place 4 drops into both ears 2 times daily for 5 days.  Qty: 10 mL, Refills: 0    Associated Diagnoses: Bilateral closed fracture of mandible, initial encounter (H); Closed fracture of temporal bone, initial encounter (H)      ibuprofen (ADVIL/MOTRIN) 100 MG/5ML suspension Take 20 mLs (400 mg) by mouth every 6 hours.  Qty: 237 mL, Refills: 0    Comments: Alternate with tylenol  Associated Diagnoses: Bilateral closed fracture of mandible, initial encounter (H); TMJ (dislocation of temporomandibular joint), initial encounter      naltrexone (DEPADE/REVIA) 50 MG tablet Once you have been off opioids (oxyodone) for pain control, please take one pill daily to help you drink less alcohol  Qty: 30 tablet, Refills: 0    Associated Diagnoses: Alcohol withdrawal, with unspecified complication (H)      ondansetron (ZOFRAN ODT) 4 MG ODT tab Take 1 tablet (4 mg) by mouth every 6 hours as needed for nausea or vomiting.  Qty: 8 tablet, Refills: 0    Associated Diagnoses: Bilateral closed fracture of mandible, initial encounter (H); TMJ (dislocation of temporomandibular joint), initial encounter      oxyCODONE (ROXICODONE) 5 MG/5ML solution Take 2.5-5 mLs (2.5-5 mg) by mouth every 8 hours as needed for severe pain.  Qty:  30 mL, Refills: 0    Associated Diagnoses: Bilateral closed fracture of mandible, initial encounter (H); TMJ (dislocation of temporomandibular joint), initial encounter      polyethylene glycol (MIRALAX) 17 GM/Dose powder Take 17 g by mouth daily.  Qty: 116 g, Refills: 0    Associated Diagnoses: Bilateral closed fracture of mandible, initial encounter (H); TMJ (dislocation of temporomandibular joint), initial encounter           CONTINUE these medications which have NOT CHANGED    Details   estradiol (ESTRACE) 0.5 MG tablet Take 1 tablet (0.5 mg) by mouth daily.  Qty: 60 tablet, Refills: 1    Associated Diagnoses: Symptomatic menopausal or female climacteric states      etonogestrel (NEXPLANON) 68 MG IMPL 1 each by Subdermal route once.  Qty: 1 each, Refills: 0      fluticasone (FLONASE) 50 MCG/ACT nasal spray Spray 1 spray into both nostrils daily as needed for rhinitis or allergies.      Homeopathic Products (LIVER SUPPORT SL) Place under the tongue.      minoxidil (LONITEN) 2.5 MG tablet Take 2.5 mg by mouth daily.      multivitamin w/minerals (THERA-VIT-M) tablet Take 1 tablet by mouth daily      omega 3 1000 MG CAPS       traZODone (DESYREL) 50 MG tablet Take 1-2 tablets ( mg) by mouth at bedtime  Qty: 180 tablet, Refills: 3    Associated Diagnoses: Insomnia, unspecified type      valACYclovir (VALTREX) 1000 mg tablet TAKE 2 TABLETS BY MOUTH DAILY TWICE DAILY FOR 1 DAY PER COLD SORE FLARE  Qty: 4 tablet, Refills: 11    Associated Diagnoses: Cold sore      vitamin B complex with vitamin C (STRESS TAB) tablet Take 1 tablet by mouth daily           Allergies   No Known Allergies  Data   Most Recent 3 CBC's:  Recent Labs   Lab Test 05/05/25  0724 05/04/25  1808 05/04/25  1245   WBC 14.8* 14.0* 15.0*   HGB 12.2 12.2 13.0   MCV 93 91 89    268 282      Most Recent 3 BMP's:  Recent Labs   Lab Test 05/05/25  0724 05/04/25  1808 05/04/25  1245   * 128* 123*   POTASSIUM 4.4 4.6 4.2   CHLORIDE 95* 91*  85*   CO2 24 25 23   BUN 7.5 7.4 7.9   CR 0.93 0.97* 0.82   ANIONGAP 12 12 15   KODY 8.5* 9.5 10.7*   * 123* 131*     Most Recent 2 LFT's:  Recent Labs   Lab Test 05/04/25  1808 05/04/25  1338   AST 29 36   ALT 32 43   ALKPHOS 61 82   BILITOTAL 0.8 1.0     Most Recent INR's and Anticoagulation Dosing History:  Anticoagulation Dose History  More data exists         Latest Ref Rng & Units 2/18/2019 3/17/2022 3/18/2022 3/31/2022 4/18/2023 12/4/2023 5/4/2025   Recent Dosing and Labs   INR 0.85 - 1.15 0.96  1.38  1.19  1.20  1.01  0.97  0.94  1.06  0.97       Details          Multiple values from one day are sorted in reverse-chronological order             Most Recent 3 Troponin's:  Recent Labs   Lab Test 12/19/20  1432 12/04/18  1107   TROPI <0.015 <0.015     Most Recent 6 Bacteria Isolates From Any Culture (See EPIC Reports for Culture Details):  Recent Labs   Lab Test 07/24/19  1433 07/17/19  1627 04/30/19  0730 02/20/19  2310 09/11/18  1151 04/05/18  1137   CULT No Ureaplasma species isolated  No large colony Mycoplasma species isolated 50,000 to 100,000 colonies/mL  mixed urogenital sasha  Susceptibility testing not routinely done   <10,000 colonies/mL  mixed urogenital sasha  Susceptibility testing not routinely done   <10,000 colonies/mL  urogenital sasha  Susceptibility testing not routinely done   10,000 to 50,000 colonies/mL  Escherichia coli  *  <10,000 colonies/mL  urogenital sasha  Susceptibility testing not routinely done   Questionable specimen     Most Recent TSH, T4 and A1c Labs:  Recent Labs   Lab Test 03/27/25  0814 12/04/23  1014 11/21/23  0830 08/26/22  0715   TSH 2.18  --    < > 2.39   T4  --  1.00  --   --    A1C  --   --   --  4.7    < > = values in this interval not displayed.     Results for orders placed or performed during the hospital encounter of 05/04/25   XR Surgery MADIHA L/T 5 Min Fluoro w Stills    Narrative    This exam was marked as non-reportable because it will not be read by  a   radiologist or a [place] non-radiologist provider.             Time Spent on this Encounter   Ferdinand JOHNSON APRN CNP, personally saw the patient today and spent greater than 30 minutes discharging this patient.    We appreciate the opportunity to care for your patient while in the hospital.  Should you have any questions about their injuries or this discharge summary our contact information is below.    Trauma Services  [facility]   Department of Critical Care and Acute Care Surgery  [address] [place] [address]  Office: [phone]

## 2025-05-05 NOTE — PROGRESS NOTES
Addiction Medicine Team Social Work assessment    Date of Assessment: 05/05/25    Referred by: ED Provider  Reason for consult: Alcohol use disorder leading to severe traumatic injury  Patient information: Pt is a 49 year old female who presented with a fall in the setting of alcohol intoxication resulting with bilateral mandibular fractures with TMJ dislocation.    Sources of information: Patient    Writer and Dr. Lam met with pt for initial visit, support and treatment planning.      Pt states her goal is continued reduction in alcohol use.  Pt states that she used to drink hard liquor heavily.  She acknowledges problematic use and alcohol withdrawals in the past.    Currently, she has reduced her use to drinking beer (2.5-4% alcohol-level beers) though this is daily and usually starting in the morning.  It's not clear how many beers per day, however she feels she has reduced her use and has gained more functionality.      She states that prior to admission she went out with friends to a bar.  A friend encouraged drinking White Claw and Fireball shots along with beer.  She states that usually she drinks at home and only beer.      She lives with EDILIA Smallwood and states Cl does not drink.    Pt states that she last went to [facility] 2 years ago.      Pt states that she has struggled with an eating disorder since college though it got significantly worse when she was done with college and returned home with her parents.  She states she has attended several residential Eating Disorder programs, one of them [place] [facility] [place] 6 [place].          ___________________________________  Fermin Warner!  It was nice meeting you.  Here is information on resources and clinics that we discussed:     Individual Therapy  [facility]:  clinic that specializes in counseling for both substance use and eating disorders.  [email or web address]  [phone]  [facility] Sl[facility]  Psychology Today website:  can read  bio's of various Therapists  [email or web address]        2. Addiction Medicine clinic:  meet with a provider who specializes in substance use care.  Discuss goals, how to meet those goals and medications that can assist.  The Vivitrol injection can be coordinated there, as it requires a prior authorization first.            A. [id number] [place]:  [phone] Option #1.  Call to schedule at one of [facility] clinics.  We have two clinics:  one in [place] on the [facility], and one in [place] at the [facility].  They can do virtual visits, but if you decide to do the Vivitrol injection, then you'd have to come once/month.            B.  [place].  They also have Addiction Medicine clinics:  call [phone].  C.  Your primary care clinic might also be able to coordinate and give Vivitrol injections.          If you have any questions, feel free to reach out!  Mike ADALBERTO  [phone] confidential work cell      Preferred Gender/pronouns: ***  Cultural/Ethnic/Mitzi identities: ***  Primary Language: ***    ?: ***    Current Housing: ***  Stable? ***    Cell Phone status: ***    Support system: ***  Safety in relationships:***     Informal? ***     Professional? ***     Current community resources? ***    Primary Care Clinic: ***    Employment status: ***  Source of income: ***    Current Substance Use: ***  Drug of choice: ***   (In dropdown: alcohol, marijuana, cocaine/crack, meth/amphetamines, heroin, other opiates/synthetics, inhalants, benzodiazepines, hallucinogens, barbiturates/sedatives/hypnotics, OTC drugs, nicotine, gambling, sex, food, other)  --Amount: ***  --Frequency: ***  --Chronicity and Recency of discontinuation/significant reduction?: ***  Last use:  ***  Ever been to detox: ***    Treatment History: ***  Prior MOUD history: ***  If so, methadone or suboxone? ***    Current signs of Withdrawal?: ***  Supports in place to assist in ambulatory  detox?: ***       Are there current psychiatric illnesses or psychological, behavioral, emotional or cognitive problems that need to be addressed because they create risk of complicate treatment? :    Any chronic conditions that affect treatment?:    Mental health needs that warrant specific treatment?: ***    Prior to admission: ADL's?: ***    Coping skills with  emotional, behavioral or cognitive problems : ***      Eating disorder concern:  Body image:    Social Determinants to Health: Racism; Homelessness; Intimate Partner Violence; Food insecurity; Transportation barriers; physical activity; Historical trauma     status:    Strengths Identified: ***    Values Identified:  Cultural/personal ***    Current stage of change: ***  (Put 5/6 stages of change in drop down: precontemplation; contemplation; determination; action; maintenance; relapse)    Reasons for drinking/drug use:   Like the feeling   Trying to forget problems   To cope with stress   To relieve physical pain   To cope with anxiety   To cope with depression   To relax or unwind   Makes it easier to talk with people   Partner encourages use   Most friends drink or use   To cope with family problems   Afraid of withdrawal symptoms/to feel better   Other:     Patient stated goals:   Abstinence/sobriety   Reduced use/cut down   Safer practices   Increased quality of relationships/work/etc   Better overall health      Triggers for relapse: ***    Relapse prevention techniques: what has helped you be sober in the past?    Harm Reduction approach(es): ***    Clinical Social Work Interventions: ***   (Motivational interviewing; relapse prevention; supportive counseling/listening; psychoeducation; re-framing; resource referral; introduction of Addiction Med clinical social work interventions; adjustment to illness counseling; crisis de-escalation      Recommendations & Plan: ***      Due to regulation of Title 42 of the Code of Federal Regulations (CFR)  Part 2: Confidentiality laws apply to this note and the information wherein.  Thus, this note cannot be copy and pasted into any other health care staff's note nor can it be included in general medical records sent to ANY outside agency without the patient's written consent.    Mike Haywood [facility]  She/her/hers  Social Work Services  Addiction Team  [phone] work cell phone  [phone] pager  8:00am-4:30pm M/T/Th/F; Off Wednesday's

## 2025-05-06 ENCOUNTER — PATIENT OUTREACH (OUTPATIENT)
Dept: CARE COORDINATION | Facility: CLINIC | Age: 50
End: 2025-05-06
Payer: COMMERCIAL

## 2025-05-06 NOTE — PROGRESS NOTES
[facility]: Transitions of Care Outreach  Chief Complaint   Patient presents with    Clinic Care Coordination - Post Hospital       Most Recent Admission Date: 5/4/2025   Most Recent Admission Diagnosis: TMJ (dislocation of temporomandibular joint), initial encounter - S03.00XA  Closed fracture of temporal bone, initial encounter (H) - S02.19XA  Severe alcohol use disorder (H) - F10.20  Alcohol withdrawal syndrome without complication (H) - F10.930  Bilateral closed fracture of mandible, initial encounter (H) - S02.609A     Most Recent Discharge Date: 5/5/2025   Most Recent Discharge Diagnosis: Bilateral closed fracture of mandible, initial encounter (H) - S02.609A  TMJ (dislocation of temporomandibular joint), initial encounter - S03.00XA  Closed fracture of temporal bone, initial encounter (H) - S02.19XA  Alcohol withdrawal syndrome without complication (H) - F10.930  Severe alcohol use disorder (H) - F10.20  Fall, initial encounter - W19.XXXA  Nonintractable headache, unspecified chronicity pattern, unspecified headache type - R51.9  Alcohol withdrawal, with unspecified complication (H) - F10.939     Transitions of Care Assessment    Discharge Assessment  How are you doing now that you are home?: I am sore and swollen.  How are your symptoms? (Red Flag symptoms escalate to triage hotline per guidelines): Unchanged  Do you know how to contact your clinic care team if you have future questions or changes to your health status? : Yes  Does the patient have their discharge instructions? : Yes  Does the patient have questions regarding their discharge instructions? : No  Were you started on any new medications or were there changes to any of your previous medications? : Yes  Does the patient have all of their medications?: Yes  Do you have questions regarding any of your medications? : No    Post-op (W CTA Only)  If the patient had a surgery or procedure, do they have any questions for a  nurse?: No         [facility] Explained and offered Care Coordination support to eligible patients: Yes    Patient accepted? No    Follow up Plan     Discharge Follow-Up  Discharge follow up appointment scheduled in alignment with recommended follow up timeframe or Transitions of Risk Category? (Low = within 30 days; Moderate= within 14 days; High= within 7 days): Yes  Discharge Follow Up Appointment Date: 05/09/25  Discharge Follow Up Appointment Scheduled with?: Specialty Care Provider    Future Appointments   Date Time Provider Department Center   5/9/2025  2:40 PM Shaka Lehman MD [facility]   6/19/2025  7:30 AM EAMA1 [facility]   9/23/2025  8:00 AM Milligan, Deirdre E, MD [facility]       Outpatient Plan as outlined on AVS reviewed with patient.    For any urgent concerns, please contact our 24 hour nurse triage line: [phone] ([phone])       GENET Quan  [phone]  [facility] Care [facility]

## 2025-05-07 ENCOUNTER — PATIENT OUTREACH (OUTPATIENT)
Dept: CARE COORDINATION | Facility: CLINIC | Age: 50
End: 2025-05-07
Payer: COMMERCIAL

## 2025-05-07 ENCOUNTER — MYC REFILL (OUTPATIENT)
Dept: PEDIATRICS | Facility: CLINIC | Age: 50
End: 2025-05-07
Payer: COMMERCIAL

## 2025-05-07 ENCOUNTER — TELEPHONE (OUTPATIENT)
Dept: OTOLARYNGOLOGY | Facility: CLINIC | Age: 50
End: 2025-05-07
Payer: COMMERCIAL

## 2025-05-07 ENCOUNTER — HOSPITAL ENCOUNTER (EMERGENCY)
Facility: CLINIC | Age: 50
Discharge: HOME | End: 2025-05-07
Attending: EMERGENCY MEDICINE | Admitting: EMERGENCY MEDICINE
Payer: COMMERCIAL

## 2025-05-07 VITALS
BODY MASS INDEX: 23.29 KG/M2 | DIASTOLIC BLOOD PRESSURE: 100 MMHG | HEART RATE: 89 BPM | WEIGHT: 126.54 LBS | SYSTOLIC BLOOD PRESSURE: 158 MMHG | OXYGEN SATURATION: 100 % | HEIGHT: 62 IN | TEMPERATURE: 98 F | RESPIRATION RATE: 18 BRPM

## 2025-05-07 DIAGNOSIS — S02.609A: ICD-10-CM

## 2025-05-07 DIAGNOSIS — R22.0 SWELLING OF LEFT SIDE OF FACE: ICD-10-CM

## 2025-05-07 DIAGNOSIS — G89.18 POST-OP PAIN: ICD-10-CM

## 2025-05-07 DIAGNOSIS — S03.00XA TMJ (DISLOCATION OF TEMPOROMANDIBULAR JOINT), INITIAL ENCOUNTER: ICD-10-CM

## 2025-05-07 PROCEDURE — 99283 EMERGENCY DEPT VISIT LOW MDM: CPT

## 2025-05-07 RX ORDER — OXYCODONE HCL 5 MG/5 ML
2.5-5 SOLUTION, ORAL ORAL EVERY 8 HOURS PRN
Qty: 30 ML | Refills: 0 | Status: SHIPPED | OUTPATIENT
Start: 2025-05-07

## 2025-05-07 RX ORDER — OXYMETAZOLINE HYDROCHLORIDE 0.05 G/100ML
2 SPRAY NASAL 2 TIMES DAILY PRN
Qty: 1 ML | Refills: 0 | Status: SHIPPED | OUTPATIENT
Start: 2025-05-07 | End: 2025-05-10

## 2025-05-07 ASSESSMENT — COLUMBIA-SUICIDE SEVERITY RATING SCALE - C-SSRS
6. HAVE YOU EVER DONE ANYTHING, STARTED TO DO ANYTHING, OR PREPARED TO DO ANYTHING TO END YOUR LIFE?: NO
1. IN THE PAST MONTH, HAVE YOU WISHED YOU WERE DEAD OR WISHED YOU COULD GO TO SLEEP AND NOT WAKE UP?: NO
2. HAVE YOU ACTUALLY HAD ANY THOUGHTS OF KILLING YOURSELF IN THE PAST MONTH?: NO

## 2025-05-07 NOTE — ED PROVIDER NOTES
"  Emergency Department Note      History of Present Illness     Chief Complaint   Jaw Pain and Post-op Problem      HPI   Alana Mujica is a 49 year old female left facial swelling and pain. Pt had fall while intoxicated and \"she sustained bilateral condylar subcondylar fractures\". She [facility] repair at [facility] and was discharge afterwards. Since discharge, pt feels that facial swelling has gotten worse. Pain is not getting better. No fever. Pt has follow up with ENT in 2 days. When pt called them today to be seen sooner they could not see her sooner. Pt taking liquid oxycodone, ibuprofen and tylenol.     Independent Historian   None    Review of External Notes   I reviewed 5/5/25 ENT op note.     Past Medical History     Medical History and Problem List   Past Medical History:   Diagnosis Date    RUPINDER (acute kidney injury) 9/14/2018    Alcohol withdrawal (H) 8/26/2018    Anorexia     Anxiety     Bulimia (H)     Chemical dependency (H) 11/24/2015    Depressive disorder     Genital herpes     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/15    Osteoporosis     Substance abuse (H)        Medications   oxyCODONE (ROXICODONE) 5 MG/5ML solution  oxymetazoline (AFRIN NASAL SPRAY) 0.05 % nasal spray  acetaminophen (TYLENOL) 325 MG/10.15ML liquid  amoxicillin-clavulanate (AUGMENTIN) 400-57 MG/5ML suspension  chlorhexidine (PERIDEX) 0.12 % solution  ciprofloxacin (CILOXAN) 0.3 % ophthalmic solution  estradiol (ESTRACE) 0.5 MG tablet  etonogestrel (NEXPLANON) 68 MG IMPL  fluticasone (FLONASE) 50 MCG/ACT nasal spray  Homeopathic Products (LIVER SUPPORT SL)  ibuprofen (ADVIL/MOTRIN) 100 MG/5ML suspension  minoxidil (LONITEN) 2.5 MG tablet  multivitamin w/minerals (THERA-VIT-M) tablet  naltrexone (DEPADE/REVIA) 50 MG tablet  omega 3 1000 MG CAPS  ondansetron (ZOFRAN ODT) 4 MG ODT tab  oxyCODONE (ROXICODONE) 5 MG/5ML solution  polyethylene glycol (MIRALAX) 17 GM/Dose powder  traZODone (DESYREL) 50 MG tablet  valACYclovir (VALTREX) 1000 mg " "tablet  vitamin B complex with vitamin C (STRESS TAB) tablet        Surgical History   Past Surgical History:   Procedure Laterality Date    COLONOSCOPY N/A 11/30/2022    Procedure: COLONOSCOPY, WITH POLYPECTOMY AND BIOPSY;  Surgeon: Maya Holcomb MD;  Location: [facility]    LEE TX, CERVICAL      greater than 5 years ago from 2015, uncertain date    OPEN REDUCTION INTERNAL FIXATION MANDIBLE Left 5/4/2025    Procedure: Maxilomandibular Fixation, Open reduction internal fixation mandible;  Surgeon: Shaka Lehman MD;  Location: [facility] OR       Physical Exam     Patient Vitals for the past 24 hrs:   BP Temp Temp src Pulse Resp SpO2 Height Weight   05/07/25 1051 (!) 158/100 98  F (36.7  C) Temporal 89 18 100 % 1.575 m (5' 2\") 57.4 kg (126 lb 8.7 oz)     Physical Exam  VS: Reviewed per above  HENT: Mucous membranes moist.  Normal phonation, no nuchal rigidity.  Elastic MMF bands noted in oral cavity.  Tolerating secretions.  There is some edema in the left external otic canal.  No evidence of purulent discharge.  Left facial region is edematous but no overlying redness or warmth.  EYES: sclera anicteric  CV: Rate as noted,  regular rhythm.   RESP: Effort normal. Breath sounds are normal bilaterally.  GI: no tenderness/rebound/guarding, not distended.  NEURO: Alert, moving all extremities  MSK: No deformity of the extremities  SKIN: Warm and dry      Diagnostics     Lab Results   Labs Ordered and Resulted from Time of ED Arrival to Time of ED Departure - No data to display    Imaging   No orders to display       EKG       Independent Interpretation   None    ED Course      Medications Administered   Medications - No data to display    Procedures   Procedures     Discussion of Management   None    ED Course        Additional Documentation  None    Medical Decision Making / Diagnosis     CMS Diagnoses: None    MIPS       None    MDM   Alana Mujica is a 49 year old female who presents to the ER for evaluation of ongoing " pain as well as progressive swelling of the left facial region after recent surgical repair of bilateral condylar subcondylar fractures on 5/5/2025.  No fever.  No external signs of cellulitis.  Suspect left facial swelling is related to recent operative intervention.  Patient is also taking antibiotics per ENT.  No clear signs of impending airway compromise on history or exam.   Discussed limited exam due to recent surgery as well as MMF bands.  Offered CT imaging and labs.  Ultimately, patient was reassured by her close ENT follow-up in 2 days and was ultimately more focused on pain management rather than additional diagnostics.  Thus, no CT imaging or labs were sent at patient request.  Refill of oxycodone was sent electronically to her pharmacy of choice to hold her over until she can see ENT in a few days.  Return precautions discussed.    Disposition   The patient was discharged.     Diagnosis     ICD-10-CM    1. Post-op pain  G89.18       2. Swelling of left side of face  R22.0            Discharge Medications   Discharge Medication List as of 5/7/2025 12:54 PM        START taking these medications    Details   !! oxyCODONE (ROXICODONE) 5 MG/5ML solution Take 2.5-5 mLs (2.5-5 mg) by mouth every 8 hours as needed for severe pain., Disp-30 mL, R-0, E-Prescribe      oxymetazoline (AFRIN NASAL SPRAY) 0.05 % nasal spray Spray 0.2 mLs (2 sprays) in nostril 2 times daily as needed for congestion., Disp-1 mL, R-0, E-PrescribeNo drip 12 hour formula please       !! - Potential duplicate medications found. Please discuss with provider.                   Beau Sanchez MD  05/07/25 1421

## 2025-05-07 NOTE — TELEPHONE ENCOUNTER
[facility] Call Center    Phone Message    May a detailed message be left on voicemail: yes     Reason for Call: Other: Pt had surgery and her jaw is wired and one of the wires broke and she is wondering if there is a tool that can reattach the wire. [facility] location, thanks,     Action Taken: Other: ENT    Travel Screening: Not Applicable     Date of Service:

## 2025-05-07 NOTE — ED TRIAGE NOTES
Pt reports recently sustaining bilateral mandibular fractures and dislocation. Had surgery at the . Pt here with ongoing concerns for swelling and pain. Pt has been adhering to med schedule and is almost out of oxy. Pt also reports concern for ears-has ear drops but finds them difficult to complete. Concern for decreased oral intake as well. A & Ox4.

## 2025-05-07 NOTE — TELEPHONE ENCOUNTER
Writer called and spoke with pt who stated that one of the elastic bands on the left side snapped and she is so swollen and inflamed that she cannot reach back there to get it back on. Pt wondering if there is a special tool that can be used to get the band on or if pt can come to the clinic and have someone help her replace it. Writer stated that will discuss with clinic staff to see if someone can help pt replace the band.     Silvia Galeas RN

## 2025-05-08 RX ORDER — IBUPROFEN 100 MG/5ML
400 SUSPENSION ORAL EVERY 6 HOURS
Qty: 950 ML | Refills: 0 | Status: SHIPPED | OUTPATIENT
Start: 2025-05-08

## 2025-05-08 NOTE — TELEPHONE ENCOUNTER
REFERRAL INFORMATION:  Referring By:   Referring Clinic:   Reason for Visit/Diagnosis: ORIF of left condylar fracture and was placed in MMF ok per Silvia       FUTURE VISIT INFORMATION:  Appointment Date: 5/15/25  Appointment Time: 1:30 PM      NOTES STATUS DETAILS   HOSPITAL DISCHARGE / ER notes [facility]  5/7/25: Beau Sanchez MD   5/4/25: Madhu Bauer DO   5/4/25: Nabeel Kramer MD    PROCEDURE and/or OPERATIVE REPORTS  *related (if  applicable) or most recent* Epic  5/4/25 Maxilomandibular Fixation, Open reduction internal fixation mandible    IMAGES *pertaining images & report*       CT, MRI, PET, NM, US, XRAYS, etc ...     IMAGING  DISC TRACKING   Tracking #:    Epic/ PACS 5/4/25 CT temporal  5/4/25 CT facial  5/4/25 CT cervical  5/4/25 CT head

## 2025-05-08 NOTE — TELEPHONE ENCOUNTER
Writer called informed pt that per Dr. Lehman is is okay that the one band fell off, patient can try to replace band once swelling goes down. Writer scheduled patient for appointment with Jeanna Cuevas on 12/15 for band replacement. Pt expressed understanding.    Silvia Galeas RN

## 2025-05-09 ENCOUNTER — PRE VISIT (OUTPATIENT)
Dept: OTOLARYNGOLOGY | Facility: CLINIC | Age: 50
End: 2025-05-09

## 2025-05-11 ENCOUNTER — TELEPHONE (OUTPATIENT)
Dept: SURGERY | Facility: CLINIC | Age: 50
End: 2025-05-11
Payer: COMMERCIAL

## 2025-05-11 NOTE — TELEPHONE ENCOUNTER
Patients parents called this morning about all her bands breaking from her MMF. They have more bands but are not sure how to put them on. I talked them through placing the bands around the hooks with tweezers. They can make squares and put it around two hooks on top, two hooks on the bottom, or can just do one hook to one hook. They understood.     Yulia Monet MD  Otolaryngology PGY2

## 2025-05-12 ENCOUNTER — RESULTS FOLLOW-UP (OUTPATIENT)
Dept: PEDIATRICS | Facility: CLINIC | Age: 50
End: 2025-05-12

## 2025-05-12 ENCOUNTER — MYC REFILL (OUTPATIENT)
Dept: PEDIATRICS | Facility: CLINIC | Age: 50
End: 2025-05-12
Payer: COMMERCIAL

## 2025-05-12 DIAGNOSIS — S02.19XA CLOSED FRACTURE OF TEMPORAL BONE, INITIAL ENCOUNTER (H): ICD-10-CM

## 2025-05-12 DIAGNOSIS — N95.1 SYMPTOMATIC MENOPAUSAL OR FEMALE CLIMACTERIC STATES: ICD-10-CM

## 2025-05-12 DIAGNOSIS — S02.609A: ICD-10-CM

## 2025-05-12 RX ORDER — CIPROFLOXACIN HYDROCHLORIDE 3.5 MG/ML
4 SOLUTION/ DROPS TOPICAL 2 TIMES DAILY
Qty: 10 ML | Refills: 0 | Status: CANCELLED | OUTPATIENT
Start: 2025-05-12

## 2025-05-12 NOTE — TELEPHONE ENCOUNTER
Clinic RN: Please investigate patient's chart or contact patient if the information cannot be found because last prescribed in hospital. Dx attached is for jaw fracture, unsure why patient is needing ear drops. Is patient requesting refill?   Luan BIRMINGHAM RN 5/12/2025 at 8:00 AM

## 2025-05-12 NOTE — TELEPHONE ENCOUNTER
Called and left voice message for patient to call [phone] and ask to speak to a nurse.     Upon call back please:  -inquire about medication usage  -was prescribed while inpatient for surgery  -had hospital follow up with PCP 5/8/25    Per chart review  ciprofloxacin (CILOXAN) 0.3 % ophthalmic solution 10 mL 0 5/5/2025 5/10/2025 No   Sig - Route: Place 4 drops into both ears 2 times daily for 5 days. - Both Ears     Bilateral closed fracture of mandible, initial encounter (H) [S02.609A]      Closed fracture of temporal bone, initial encounter (H) [S02.19XA]        Awaiting call back at this time.    Gabbi Villalobos RN

## 2025-05-12 NOTE — TELEPHONE ENCOUNTER
"ESTRADIOL 0.5MG TABLETS      Last Written Prescription Date:  3/28/25  Last Fill Quantity: 60,   # refills: 1  Last Office Visit: 3/28/2025  Future Office visit:   None scheduled    Pharmacy comments   \" There are zero refills remaining on this prescription.  Your patient is requesting advance approval of refills for this medication to PREVENT ANY MISSED DOSES. (Note: Prescription will be refilled when due) \"    Deanna Dietz CMA    "

## 2025-05-13 NOTE — TELEPHONE ENCOUNTER
Do you have any concerns with patient's blood sugar level of 105mg/dL from last lab?     Luan BIRMINGHAM RN 5/13/2025 at 1:14 PM

## 2025-05-13 NOTE — TELEPHONE ENCOUNTER
If she completed the 5 day course I would not continue antibiotics at this time.  It will likely take time to recover hearing after a traumatic injury like that.    If not improving in 2 weeks she should be seen in clinic ofr an ear exam.    Deirdre Milligan, MD  Internal Medicine & Pediatrics  [facility]  She/her

## 2025-05-13 NOTE — TELEPHONE ENCOUNTER
Called patient, call answered and said hello, but no further words. Informed will call back.    Called back and call was answered, no one spoke and call was ended.    Called back and spoke with patient to relay provider message below. Discussed glucose with patient.    Instructed patient to call [phone] for new or worsening symptoms or further questions. Patient verbalized understanding and agrees with the plan.     Gabbi Villalobos RN

## 2025-05-13 NOTE — TELEPHONE ENCOUNTER
RN called and spoke with patient. Patient states when her jaw broke, both of her ear drums were burst. Patient states she still can't hear out of her left ear. Patient thinks it would be best to get a refill at this time. Do you recommend patient continue with ear drops?    Luan BIRMINGHAM RN 5/13/2025 at 8:55 AM

## 2025-05-15 ENCOUNTER — PRE VISIT (OUTPATIENT)
Dept: OTOLARYNGOLOGY | Facility: CLINIC | Age: 50
End: 2025-05-15

## 2025-05-15 ENCOUNTER — OFFICE VISIT (OUTPATIENT)
Dept: OTOLARYNGOLOGY | Facility: CLINIC | Age: 50
End: 2025-05-15
Payer: COMMERCIAL

## 2025-05-15 VITALS
BODY MASS INDEX: 22.26 KG/M2 | WEIGHT: 121 LBS | DIASTOLIC BLOOD PRESSURE: 85 MMHG | SYSTOLIC BLOOD PRESSURE: 150 MMHG | HEIGHT: 62 IN | OXYGEN SATURATION: 97 % | HEART RATE: 82 BPM
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
## 2025-05-15 DIAGNOSIS — S02.609A: ICD-10-CM

## 2025-05-15 PROCEDURE — 1126F AMNT PAIN NOTED NONE PRSNT: CPT | Performed by: PHYSICIAN ASSISTANT

## 2025-05-15 PROCEDURE — 99024 POSTOP FOLLOW-UP VISIT: CPT | Performed by: PHYSICIAN ASSISTANT

## 2025-05-15 PROCEDURE — 3077F SYST BP >= 140 MM HG: CPT | Performed by: PHYSICIAN ASSISTANT

## 2025-05-15 PROCEDURE — 3079F DIAST BP 80-89 MM HG: CPT | Performed by: PHYSICIAN ASSISTANT

## 2025-05-15 RX ORDER — ESTRADIOL 0.5 MG/1
0.5 TABLET ORAL DAILY
Qty: 60 TABLET | Refills: 1 | Status: SHIPPED | OUTPATIENT
Start: 2025-05-15

## 2025-05-15 RX ORDER — CIPROFLOXACIN AND DEXAMETHASONE 3; 1 MG/ML; MG/ML
4 SUSPENSION/ DROPS AURICULAR (OTIC) 2 TIMES DAILY
Qty: 7.5 ML | Refills: 0 | Status: SHIPPED | OUTPATIENT
Start: 2025-05-15

## 2025-05-15 ASSESSMENT — PAIN SCALES - GENERAL: PAINLEVEL_OUTOF10: NO PAIN (0)

## 2025-05-15 NOTE — LETTER
"5/15/2025       RE: Alana Mujica  [address] [place] [address]     Dear Colleague,    Thank you for referring your patient, Alana Mujica, to the [facility] [place] at [facility]. Please see a copy of my visit note below.      [facility] [place]  [address]  [address]TH FLOOR  [place] [facility] [address]  Phone: [phone]  Fax: [phone]    Patient:  Alana Mujica, Date of birth 1975  Date of Visit:  May 15, 2025  Referring Provider Jeanna Cuevas      Fall, initial encounter  Bilateral closed fracture of mandible, initial encounter (H)  Patient was anxious about changing bands however she tolerated procedure well. Provided family with equipment to make band changes more comfortable. Will follow up with Dr. Lehman in a few weeks. Nutrition referral sent due to patient's inability to eat adequately considering her restricted jaw movement. Drops for left ear following debridement.    - Adult Nutrition  Referral  - ciprofloxacin-dexAMETHasone (CIPRODEX) 0.3-0.1 % otic suspension  Dispense: 7.5 mL; Refill: 0          HPI:  Patient presents today for change of rubber bands on her MMF. POD 11 following a fall resulting in CT findings below:    1. Comminuted bilateral mandibular condyle fractures with associated temporomandibular joint dislocations.     2. Subtle mineralization/bony irregularity involving the left greater than right bony external auditory canal suggesting fractures in these regions. Temporal bone CT is recommended for further evaluation.    She currently has 4 heavy bands on 4 separate hooks on her wave plates. Will replace these with 1 light band in 4 separate areas today.      PHYSICAL EXAM:  BP (!) 150/85   Pulse 82   Ht 1.57 m (5' 1.81\")   Wt 54.9 kg (121 lb)   SpO2 97%   BMI 22.27 kg/m    Constitutional:  The patient was accompanied by family, " well-groomed, and anxious    Skin: Normal:  warm and pink without rash    Neurologic: Alert and oriented x 3. Cooperative.    Psychiatric: The patient's affect was anxious, cooperative, and appropriate.     Communication:  Normal; communicates verbally, voice quality muffled due to surgically fixed jaw   Respiratory: Breathing comfortably without stridor or exertion of accessory muscles   Head/Face:  Normocephalic and atraumatic.  No lesions or scars. HB 2/3 with left eyebrow out and weak although complete left eye closure with effort.    Eyes: Extraocular movement intact. Clear sclera.    Ears: Pinnae and tragus non-tender. No external deformity, hearing normal to conversational voice, left canal had residual dried blood which was cleaned.   Nose: No anterior drainage, no external deformity   Neck: Supple with normal laryngeal and tracheal landmarks. Normal range of motion.         Jeanna Cuevas PA-C   Otolaryngology-Head & Neck Surgery             Again, thank you for allowing me to participate in the care of your patient.      Sincerely,    Jeanna Cuevas PA-C

## 2025-05-15 NOTE — LETTER
May 15, 2025      Alana Mujica  [address] [address]        To Whom It May Concern:    Alana Mujica was seen in our clinic. She may return to work without restrictions.      Sincerely,        Jeanna Cuevas PA-C    Electronically signed

## 2025-05-15 NOTE — PATIENT INSTRUCTIONS
You were seen in the ENT Clinic today by Jeanna Cuevas. If you have any questions or concerns after your appointment, please contact us (see below)    The following has been recommended for you based upon your appointment today:  No explicit restrictions, just take it easy  Work note sent to your [employer]  Ciprodex ear drops sent to your pharmacy. Take for 3-5 days  Eat as much as you are able, a nutrition consult has been placed for you  We will call you to schedule a follow up with Dr. Lehman        How to Contact Us:  Send a [employer] message to your provider. Our team will respond to you via [employer]. Occasionally, we will need to call you to get further information.  For urgent matters (Monday-Friday), call the ENT Clinic: [phone] and speak with a call center team member - they will route your call appropriately.   If you'd like to speak directly with a nurse, please find our contact information below. We do our best to check voicemail frequently throughout the day, and will work to call you back within 1-2 days. For urgent matters, please use the general clinic phone numbers listed above.      Silvia Galeas RN  Direct: [phone]

## 2025-05-15 NOTE — TELEPHONE ENCOUNTER
Dr Gonzales this pt saw you 3/27/25.  Labs done and estradiol rx'd for 2 months worth.    Do you want to fill for longer or see her back for check up?      Bhargavi COLLINS RN BSN  [facility]

## 2025-05-19 NOTE — PROGRESS NOTES
"  [facility] [address] [address]  Phone: [phone]  Fax: [phone]    Patient:  Alana Mujica, Date of birth 1975  Date of Visit:  May 15, 2025  Referring Provider Jeanna Cuevas      Fall, initial encounter  Bilateral closed fracture of mandible, initial encounter (H)  Patient was anxious about changing bands however she tolerated procedure well. Provided family with equipment to make band changes more comfortable. Will follow up with Dr. Lehman in a few weeks. Nutrition referral sent due to patient's inability to eat adequately considering her restricted jaw movement. Drops for left ear following debridement.    - Adult Nutrition  Referral  - ciprofloxacin-dexAMETHasone (CIPRODEX) 0.3-0.1 % otic suspension  Dispense: 7.5 mL; Refill: 0          HPI:  Patient presents today for change of rubber bands on her MMF. POD 11 following a fall resulting in CT findings below:    1. Comminuted bilateral mandibular condyle fractures with associated temporomandibular joint dislocations.     2. Subtle mineralization/bony irregularity involving the left greater than right bony external auditory canal suggesting fractures in these regions. Temporal bone CT is recommended for further evaluation.    She currently has 4 heavy bands on 4 separate hooks on her wave plates. Will replace these with 1 light band in 4 separate areas today.      PHYSICAL EXAM:  BP (!) 150/85   Pulse 82   Ht 1.57 m (5' 1.81\")   Wt 54.9 kg (121 lb)   SpO2 97%   BMI 22.27 kg/m    Constitutional:  The patient was accompanied by family, well-groomed, and anxious    Skin: Normal:  warm and pink without rash    Neurologic: Alert and oriented x 3. Cooperative.    Psychiatric: The patient's affect was anxious, cooperative, and appropriate.     Communication:  Normal; communicates verbally, voice quality muffled due to surgically fixed jaw   Respiratory: " Breathing comfortably without stridor or exertion of accessory muscles   Head/Face:  Normocephalic and atraumatic.  No lesions or scars. HB 2/3 with left eyebrow out and weak although complete left eye closure with effort.    Eyes: Extraocular movement intact. Clear sclera.    Ears: Pinnae and tragus non-tender. No external deformity, hearing normal to conversational voice, left canal had residual dried blood which was cleaned.   Nose: No anterior drainage, no external deformity   Neck: Supple with normal laryngeal and tracheal landmarks. Normal range of motion.         Jeanna Cuevas PA-C   Otolaryngology-Head & Neck Surgery

## 2025-05-20 ENCOUNTER — MYC MEDICAL ADVICE (OUTPATIENT)
Dept: OTOLARYNGOLOGY | Facility: CLINIC | Age: 50
End: 2025-05-20
Payer: COMMERCIAL

## 2025-05-20 NOTE — TELEPHONE ENCOUNTER
Left Voicemail (1st Attempt) for the patient to call back and schedule the following:    Appointment type: New ENT  Provider: Dr. Lehman  Return date: Patients convenience  Specialty phone number: [phone]  Additional appointment(s) needed: No  Additonal Notes: Ref by Jeanna Cuevas for Consult

## 2025-05-22 ENCOUNTER — TELEPHONE (OUTPATIENT)
Dept: OTOLARYNGOLOGY | Facility: CLINIC | Age: 50
End: 2025-05-22
Payer: COMMERCIAL

## 2025-05-22 NOTE — TELEPHONE ENCOUNTER
Writer called and spoke with pt regarding follow up appointment with Dr. Lehman. Pt scheduled for 6/30 at 12:00pm. Pt expressed understanding.    Silvia Galeas RN

## 2025-06-02 ENCOUNTER — MYC MEDICAL ADVICE (OUTPATIENT)
Dept: OTOLARYNGOLOGY | Facility: CLINIC | Age: 50
End: 2025-06-02
Payer: COMMERCIAL

## 2025-06-02 LAB
ATRIAL RATE - MUSE: 83 BPM
DIASTOLIC BLOOD PRESSURE - MUSE: NORMAL MMHG
INTERPRETATION ECG - MUSE: NORMAL
P AXIS - MUSE: 49 DEGREES
PR INTERVAL - MUSE: 164 MS
QRS DURATION - MUSE: 70 MS
QT - MUSE: 374 MS
QTC - MUSE: 439 MS
R AXIS - MUSE: 33 DEGREES
SYSTOLIC BLOOD PRESSURE - MUSE: NORMAL MMHG
T AXIS - MUSE: 51 DEGREES
VENTRICULAR RATE- MUSE: 83 BPM

## 2025-06-03 NOTE — TELEPHONE ENCOUNTER
Writer called and spoke to pt regarding MyChart message, pt stated that there is a piece of metal on the top left back tooth is scratching the inside of her cheek. Pt stating that what ever is scratching her also feels loose. Pt also reports a grinding noise that she intermittently hears on the left side. Pt not experiencing increased pain with grinding sound and cannot determine what action causes the sound or where exactly it is coming from. Pt will attempt to get a picture of the loose metal and send it to writer.    Silvia Galeas RN

## 2025-06-05 NOTE — TELEPHONE ENCOUNTER
Writer called and spoke with pt regarding wave plate removal. Writer stated that Dr. Lehman would like to see pt tomorrow in clinic to try and remove one of the screws that may be loose. Dr. Lehman would also like to schedule pt for Wave plate removal under MAC next week. Pt needs to talk with her parents to see if they can bring her to an appointment tomorrow. Writer will work on coordinating trip to the OR and pre-op physical. Pt will message writer on [employer] if the appointment will work tomorrow.    Silvia Galeas RN

## 2025-06-09 ENCOUNTER — PATIENT OUTREACH (OUTPATIENT)
Dept: CARE COORDINATION | Facility: CLINIC | Age: 50
End: 2025-06-09
Payer: COMMERCIAL

## 2025-06-10 ENCOUNTER — TELEPHONE (OUTPATIENT)
Dept: OTOLARYNGOLOGY | Facility: CLINIC | Age: 50
End: 2025-06-10
Payer: COMMERCIAL

## 2025-06-10 NOTE — TELEPHONE ENCOUNTER
Health Call Center    Phone Message    May a detailed message be left on voicemail: yes     Reason for Call: Other: Patient called today. She wanted to schedule the audiogram, but the only one available is after the surgery with Dr. Lehman, I temporarily scheduled it, if it isn't recommended after the surgery, please inform the patient. She would like to do them same day since it's a long drive from [place]. Please advise if this is ok. Thank you!!!      Action Taken: Message routed to:  [facility] ENT    Travel Screening: Not Applicable     Date of Service:

## 2025-06-11 ENCOUNTER — MYC MEDICAL ADVICE (OUTPATIENT)
Dept: OTOLARYNGOLOGY | Facility: CLINIC | Age: 50
End: 2025-06-11
Payer: COMMERCIAL

## 2025-06-11 NOTE — TELEPHONE ENCOUNTER
Writer called and spoke with pt regarding post op appointment. Pt scheduled with WILLIAM Cuevas on 6/27 at 1pm. Pt expressed understanding. Pt wanting to coordinate audiogram same day if possible. Writer will send [employer] message with audio appointment. Pt stated that she never received the peridex solution, writer reordered to pts pharmacy.    Silvia Galeas RN

## 2025-06-12 ENCOUNTER — HOSPITAL ENCOUNTER (OUTPATIENT)
Facility: CLINIC | Age: 50
Discharge: HOME | End: 2025-06-12
Attending: STUDENT IN AN ORGANIZED HEALTH CARE EDUCATION/TRAINING PROGRAM | Admitting: STUDENT IN AN ORGANIZED HEALTH CARE EDUCATION/TRAINING PROGRAM
Payer: COMMERCIAL

## 2025-06-12 ENCOUNTER — ANESTHESIA (OUTPATIENT)
Dept: SURGERY | Facility: CLINIC | Age: 50
End: 2025-06-12
Payer: COMMERCIAL

## 2025-06-12 ENCOUNTER — ANESTHESIA EVENT (OUTPATIENT)
Dept: SURGERY | Facility: CLINIC | Age: 50
End: 2025-06-12
Payer: COMMERCIAL

## 2025-06-12 VITALS
WEIGHT: 127.43 LBS | BODY MASS INDEX: 23.45 KG/M2 | OXYGEN SATURATION: 100 % | HEART RATE: 96 BPM | DIASTOLIC BLOOD PRESSURE: 88 MMHG | TEMPERATURE: 98.2 F | HEIGHT: 62 IN | RESPIRATION RATE: 18 BRPM | SYSTOLIC BLOOD PRESSURE: 126 MMHG

## 2025-06-12 LAB
ANION GAP SERPL CALCULATED.3IONS-SCNC: 16 MMOL/L (ref 7–15)
BUN SERPL-MCNC: 9.1 MG/DL (ref 6–20)
CA-I BLD-MCNC: 4.1 MG/DL (ref 4.4–5.2)
CALCIUM SERPL-MCNC: 9.4 MG/DL (ref 8.8–10.4)
CHLORIDE SERPL-SCNC: 90 MMOL/L (ref 98–107)
CREAT SERPL-MCNC: 0.6 MG/DL (ref 0.51–0.95)
EGFRCR SERPLBLD CKD-EPI 2021: >90 ML/MIN/1.73M2
GLUCOSE SERPL-MCNC: 90 MG/DL (ref 70–99)
HCO3 SERPL-SCNC: 20 MMOL/L (ref 22–29)
MAGNESIUM SERPL-MCNC: 1.7 MG/DL (ref 1.7–2.3)
POTASSIUM SERPL-SCNC: 4.1 MMOL/L (ref 3.4–5.3)
SODIUM SERPL-SCNC: 126 MMOL/L (ref 135–145)

## 2025-06-12 PROCEDURE — 20670 REMOVAL IMPLANT SUPERFICIAL: CPT | Mod: 58 | Performed by: STUDENT IN AN ORGANIZED HEALTH CARE EDUCATION/TRAINING PROGRAM

## 2025-06-12 PROCEDURE — 370N000017 HC ANESTHESIA TECHNICAL FEE, PER MIN: Performed by: STUDENT IN AN ORGANIZED HEALTH CARE EDUCATION/TRAINING PROGRAM

## 2025-06-12 PROCEDURE — 250N000009 HC RX 250

## 2025-06-12 PROCEDURE — 250N000011 HC RX IP 250 OP 636

## 2025-06-12 PROCEDURE — 80048 BASIC METABOLIC PNL TOTAL CA: CPT | Performed by: ANESTHESIOLOGY

## 2025-06-12 PROCEDURE — 272N000001 HC OR GENERAL SUPPLY STERILE: Performed by: STUDENT IN AN ORGANIZED HEALTH CARE EDUCATION/TRAINING PROGRAM

## 2025-06-12 PROCEDURE — 360N000076 HC SURGERY LEVEL 3, PER MIN: Performed by: STUDENT IN AN ORGANIZED HEALTH CARE EDUCATION/TRAINING PROGRAM

## 2025-06-12 PROCEDURE — 82330 ASSAY OF CALCIUM: CPT | Performed by: ANESTHESIOLOGY

## 2025-06-12 PROCEDURE — 250N000013 HC RX MED GY IP 250 OP 250 PS 637: Performed by: ANESTHESIOLOGY

## 2025-06-12 PROCEDURE — 83735 ASSAY OF MAGNESIUM: CPT | Performed by: ANESTHESIOLOGY

## 2025-06-12 PROCEDURE — 258N000003 HC RX IP 258 OP 636

## 2025-06-12 PROCEDURE — 36415 COLL VENOUS BLD VENIPUNCTURE: CPT | Performed by: ANESTHESIOLOGY

## 2025-06-12 PROCEDURE — 250N000013 HC RX MED GY IP 250 OP 250 PS 637

## 2025-06-12 PROCEDURE — 710N000012 HC RECOVERY PHASE 2, PER MINUTE: Performed by: STUDENT IN AN ORGANIZED HEALTH CARE EDUCATION/TRAINING PROGRAM

## 2025-06-12 PROCEDURE — 999N000141 HC STATISTIC PRE-PROCEDURE NURSING ASSESSMENT: Performed by: STUDENT IN AN ORGANIZED HEALTH CARE EDUCATION/TRAINING PROGRAM

## 2025-06-12 RX ORDER — ONDANSETRON 2 MG/ML
INJECTION INTRAMUSCULAR; INTRAVENOUS PRN
Status: DISCONTINUED | OUTPATIENT
Start: 2025-06-12 | End: 2025-06-12

## 2025-06-12 RX ORDER — SODIUM CHLORIDE, SODIUM LACTATE, POTASSIUM CHLORIDE, CALCIUM CHLORIDE 600; 310; 30; 20 MG/100ML; MG/100ML; MG/100ML; MG/100ML
INJECTION, SOLUTION INTRAVENOUS CONTINUOUS
Status: CANCELLED | OUTPATIENT
Start: 2025-06-12

## 2025-06-12 RX ORDER — ONDANSETRON 2 MG/ML
4 INJECTION INTRAMUSCULAR; INTRAVENOUS EVERY 30 MIN PRN
Status: CANCELLED | OUTPATIENT
Start: 2025-06-12

## 2025-06-12 RX ORDER — ONDANSETRON 2 MG/ML
4 INJECTION INTRAMUSCULAR; INTRAVENOUS EVERY 30 MIN PRN
Status: DISCONTINUED | OUTPATIENT
Start: 2025-06-12 | End: 2025-06-12 | Stop reason: HOSPADM

## 2025-06-12 RX ORDER — HYDROMORPHONE HYDROCHLORIDE 1 MG/ML
0.2 INJECTION, SOLUTION INTRAMUSCULAR; INTRAVENOUS; SUBCUTANEOUS EVERY 5 MIN PRN
Refills: 0 | Status: CANCELLED | OUTPATIENT
Start: 2025-06-12

## 2025-06-12 RX ORDER — OXYCODONE HYDROCHLORIDE 10 MG/1
10 TABLET ORAL
Status: DISCONTINUED | OUTPATIENT
Start: 2025-06-12 | End: 2025-06-12 | Stop reason: HOSPADM

## 2025-06-12 RX ORDER — LIDOCAINE HYDROCHLORIDE 20 MG/ML
INJECTION, SOLUTION INFILTRATION; PERINEURAL PRN
Status: DISCONTINUED | OUTPATIENT
Start: 2025-06-12 | End: 2025-06-12

## 2025-06-12 RX ORDER — FENTANYL CITRATE 50 UG/ML
25 INJECTION, SOLUTION INTRAMUSCULAR; INTRAVENOUS EVERY 5 MIN PRN
Refills: 0 | Status: CANCELLED | OUTPATIENT
Start: 2025-06-12

## 2025-06-12 RX ORDER — SODIUM CHLORIDE, SODIUM LACTATE, POTASSIUM CHLORIDE, CALCIUM CHLORIDE 600; 310; 30; 20 MG/100ML; MG/100ML; MG/100ML; MG/100ML
INJECTION, SOLUTION INTRAVENOUS CONTINUOUS PRN
Status: DISCONTINUED | OUTPATIENT
Start: 2025-06-12 | End: 2025-06-12

## 2025-06-12 RX ORDER — ONDANSETRON 4 MG/1
4 TABLET, ORALLY DISINTEGRATING ORAL EVERY 30 MIN PRN
Status: CANCELLED | OUTPATIENT
Start: 2025-06-12

## 2025-06-12 RX ORDER — HYDROMORPHONE HYDROCHLORIDE 1 MG/ML
0.4 INJECTION, SOLUTION INTRAMUSCULAR; INTRAVENOUS; SUBCUTANEOUS EVERY 5 MIN PRN
Refills: 0 | Status: CANCELLED | OUTPATIENT
Start: 2025-06-12

## 2025-06-12 RX ORDER — FENTANYL CITRATE 50 UG/ML
INJECTION, SOLUTION INTRAMUSCULAR; INTRAVENOUS PRN
Status: DISCONTINUED | OUTPATIENT
Start: 2025-06-12 | End: 2025-06-12

## 2025-06-12 RX ORDER — PROPOFOL 10 MG/ML
INJECTION, EMULSION INTRAVENOUS CONTINUOUS PRN
Status: DISCONTINUED | OUTPATIENT
Start: 2025-06-12 | End: 2025-06-12

## 2025-06-12 RX ORDER — DEXAMETHASONE SODIUM PHOSPHATE 4 MG/ML
4 INJECTION, SOLUTION INTRA-ARTICULAR; INTRALESIONAL; INTRAMUSCULAR; INTRAVENOUS; SOFT TISSUE
Status: DISCONTINUED | OUTPATIENT
Start: 2025-06-12 | End: 2025-06-12 | Stop reason: HOSPADM

## 2025-06-12 RX ORDER — ONDANSETRON 4 MG/1
4 TABLET, ORALLY DISINTEGRATING ORAL EVERY 30 MIN PRN
Status: DISCONTINUED | OUTPATIENT
Start: 2025-06-12 | End: 2025-06-12 | Stop reason: HOSPADM

## 2025-06-12 RX ORDER — NALOXONE HYDROCHLORIDE 0.4 MG/ML
0.1 INJECTION, SOLUTION INTRAMUSCULAR; INTRAVENOUS; SUBCUTANEOUS
Status: CANCELLED | OUTPATIENT
Start: 2025-06-12

## 2025-06-12 RX ORDER — OXYCODONE HYDROCHLORIDE 5 MG/1
5 TABLET ORAL
Status: COMPLETED | OUTPATIENT
Start: 2025-06-12 | End: 2025-06-12

## 2025-06-12 RX ORDER — NALOXONE HYDROCHLORIDE 0.4 MG/ML
0.1 INJECTION, SOLUTION INTRAMUSCULAR; INTRAVENOUS; SUBCUTANEOUS
Status: DISCONTINUED | OUTPATIENT
Start: 2025-06-12 | End: 2025-06-12 | Stop reason: HOSPADM

## 2025-06-12 RX ORDER — ACETAMINOPHEN 325 MG/10.15ML
650 LIQUID ORAL ONCE
Status: COMPLETED | OUTPATIENT
Start: 2025-06-12 | End: 2025-06-12

## 2025-06-12 RX ORDER — DEXAMETHASONE SODIUM PHOSPHATE 4 MG/ML
4 INJECTION, SOLUTION INTRA-ARTICULAR; INTRALESIONAL; INTRAMUSCULAR; INTRAVENOUS; SOFT TISSUE
Status: CANCELLED | OUTPATIENT
Start: 2025-06-12

## 2025-06-12 RX ORDER — PROPOFOL 10 MG/ML
INJECTION, EMULSION INTRAVENOUS PRN
Status: DISCONTINUED | OUTPATIENT
Start: 2025-06-12 | End: 2025-06-12

## 2025-06-12 RX ORDER — FENTANYL CITRATE 50 UG/ML
50 INJECTION, SOLUTION INTRAMUSCULAR; INTRAVENOUS EVERY 5 MIN PRN
Refills: 0 | Status: CANCELLED | OUTPATIENT
Start: 2025-06-12

## 2025-06-12 RX ADMIN — ONDANSETRON 4 MG: 2 INJECTION INTRAMUSCULAR; INTRAVENOUS at 12:37

## 2025-06-12 RX ADMIN — FENTANYL CITRATE 25 MCG: 50 INJECTION INTRAMUSCULAR; INTRAVENOUS at 12:32

## 2025-06-12 RX ADMIN — ACETAMINOPHEN 650 MG: 325 SOLUTION ORAL at 10:25

## 2025-06-12 RX ADMIN — MIDAZOLAM 2 MG: 1 INJECTION INTRAMUSCULAR; INTRAVENOUS at 12:12

## 2025-06-12 RX ADMIN — LIDOCAINE HYDROCHLORIDE 60 MG: 20 INJECTION, SOLUTION INFILTRATION; PERINEURAL at 12:29

## 2025-06-12 RX ADMIN — SODIUM CHLORIDE, SODIUM LACTATE, POTASSIUM CHLORIDE, AND CALCIUM CHLORIDE: .6; .31; .03; .02 INJECTION, SOLUTION INTRAVENOUS at 12:18

## 2025-06-12 RX ADMIN — PROPOFOL 20 MG: 10 INJECTION, EMULSION INTRAVENOUS at 12:38

## 2025-06-12 RX ADMIN — FENTANYL CITRATE 50 MCG: 50 INJECTION INTRAMUSCULAR; INTRAVENOUS at 12:26

## 2025-06-12 RX ADMIN — PROPOFOL 100 MCG/KG/MIN: 10 INJECTION, EMULSION INTRAVENOUS at 12:23

## 2025-06-12 RX ADMIN — OXYCODONE HYDROCHLORIDE 5 MG: 5 TABLET ORAL at 13:13

## 2025-06-12 RX ADMIN — FENTANYL CITRATE 25 MCG: 50 INJECTION INTRAMUSCULAR; INTRAVENOUS at 12:38

## 2025-06-12 ASSESSMENT — ACTIVITIES OF DAILY LIVING (ADL)
ADLS_ACUITY_SCORE: 46
ADLS_ACUITY_SCORE: 45

## 2025-06-12 ASSESSMENT — LIFESTYLE VARIABLES: TOBACCO_USE: 1

## 2025-06-12 NOTE — ANESTHESIA CARE TRANSFER NOTE
Patient: Alana Mujica    Procedure: Procedure(s):  REMOVAL, HARDWARE, FACE       Diagnosis: Bilateral closed fracture of mandible (H) [S02.609A]  Diagnosis Additional Information: No value filed.    Anesthesia Type:   MAC     Note:    Oropharynx: oropharynx clear of all foreign objects and spontaneously breathing  Level of Consciousness: awake  Oxygen Supplementation: nasal cannula  Level of Supplemental Oxygen (L/min / FiO2): 6  Independent Airway: airway patency satisfactory and stable  Dentition: dentition unchanged  Vital Signs Stable: post-procedure vital signs reviewed and stable  Report to RN Given: handoff report given  Patient transferred to: Phase II    Handoff Report: Identifed the Patient, Identified the Reponsible Provider, Discussed the surgical course, Reviewed the pertinent medical history, Reviewed Intra-OP anesthesia mangement and issues during anesthesia and Allowed opportunity for questions and acknowledgement of understanding      Vitals:  Vitals Value Taken Time   /80 06/12/25 12:57   Temp     Pulse 99 06/12/25 12:57   Resp     SpO2 96 % 06/12/25 12:57   Vitals shown include unfiled device data.    Electronically Signed By: Kayley Spence MD  June 12, 2025  12:58 PM

## 2025-06-12 NOTE — ANESTHESIA POSTPROCEDURE EVALUATION
Patient: Alana Mujica    Procedure: Procedure(s):  REMOVAL, HARDWARE, FACE       Anesthesia Type:  MAC    Note:  Disposition: Outpatient   Postop Pain Control: Uneventful            Sign Out: Well controlled pain   PONV: No   Neuro/Psych: Uneventful            Sign Out: Acceptable/Baseline neuro status   Airway/Respiratory: Uneventful            Sign Out: Acceptable/Baseline resp. status   CV/Hemodynamics: Uneventful            Sign Out: Acceptable CV status; No obvious hypovolemia; No obvious fluid overload   Other NRE: NONE   DID A NON-ROUTINE EVENT OCCUR? No           Last vitals:  Vitals Value Taken Time   /88 06/12/25 13:38   Temp 36.8  C (98.2  F) 06/12/25 13:38   Pulse 88 06/12/25 13:17   Resp 12 06/12/25 13:17   SpO2 100 % 06/12/25 13:39   Vitals shown include unfiled device data.    Electronically Signed By: Nabeel Hazel MD  June 12, 2025  1:54 PM

## 2025-06-12 NOTE — DISCHARGE INSTRUCTIONS
Contacting your Doctor -   To contact a doctor, call [phone] and ask for the resident on call for ENT-Otolaryngology (answered 24 hours a day)   Emergency Department:  [facility]: [phone]  [facility]: [phone] 911 if you are in need of immediate or emergent help

## 2025-06-12 NOTE — H&P
Otolaryngology New Patient Note  June 12, 2025    HPI: Alana Mujica is a 49 year old female with a past medical history of Alcohol withdrawal, anxiety, prior eating disorder, and recent mandibular fracture requiring ORIF and MMF application. She presents today in similar health to have her Wave removed. She is in good health, unchanged from prior.     Past Medical History:   Diagnosis Date    RUPINDER (acute kidney injury) 9/14/2018    Alcohol withdrawal (H) 8/26/2018    Anorexia     Anxiety     Bulimia (H)     Chemical dependency (H) 11/24/2015    Alcohol in treatment    Depressive disorder     Genital herpes     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/15    NIL, +HR HPV. Co-test 12 months    Osteoporosis     Substance abuse (H)      Past Surgical History:   Procedure Laterality Date    COLONOSCOPY N/A 11/30/2022    Procedure: COLONOSCOPY, WITH POLYPECTOMY AND BIOPSY;  Surgeon: Maya Holcomb MD;  Location: [facility]    LEE TX, CERVICAL      greater than 5 years ago from 2015, uncertain date    OPEN REDUCTION INTERNAL FIXATION MANDIBLE Left 5/4/2025    Procedure: Maxilomandibular Fixation, Open reduction internal fixation mandible;  Surgeon: Shaka Lehman MD;  Location:  OR     No current outpatient medications on file.     No Known Allergies    Social History     Socioeconomic History    Marital status: Single     Spouse name: Not on file    Number of children: Not on file    Years of education: Not on file    Highest education level: Not on file   Occupational History    Not on file   Tobacco Use    Smoking status: Former     Types: Cigarettes     Passive exposure: Past    Smokeless tobacco: Never   Vaping Use    Vaping status: Former   Substance and Sexual Activity    Alcohol use: Yes     Comment: yesterday    Drug use: No    Sexual activity: Yes     Partners: Male     Birth control/protection: Implant   Other Topics Concern    Parent/sibling w/ CABG, MI or angioplasty before 65F 55M? Not Asked   Social History Narrative    Not  on file     Social Drivers of Health     Financial Resource Strain: Low Risk  (5/5/2025)    Financial Resource Strain     Within the past 12 months, have you or your family members you live with been unable to get utilities (heat, electricity) when it was really needed?: No   Food Insecurity: Low Risk  (5/5/2025)    Food Insecurity     Within the past 12 months, did you worry that your food would run out before you got money to buy more?: No     Within the past 12 months, did the food you bought just not last and you didn t have money to get more?: No   Transportation Needs: Low Risk  (5/5/2025)    Transportation Needs     Within the past 12 months, has lack of transportation kept you from medical appointments, getting your medicines, non-medical meetings or appointments, work, or from getting things that you need?: No   Physical Activity: Unknown (9/23/2024)    Exercise Vital Sign     Days of Exercise per Week: 3 days     Minutes of Exercise per Session: Not on file   Stress: Stress Concern Present (9/23/2024)    [employer] [place] [employer] - Occupational Stress Questionnaire     Feeling of Stress : To some extent   Social Connections: Unknown (9/23/2024)    Social Connection and Isolation Panel [NHANES]     Frequency of Communication with Friends and Family: Not on file     Frequency of Social Gatherings with Friends and Family: Twice a week     Attends L[employer] day Services: Not on file     Active Member of Clubs or Organizations: Not on file     Attends Club or Organization Meetings: Not on file     Marital Status: Not on file   Interpersonal Safety: Low Risk  (6/12/2025)    Interpersonal Safety     Do you feel physically and emotionally safe where you currently live?: Yes     Within the past 12 months, have you been hit, slapped, kicked or otherwise physically hurt by someone?: No     Within the past 12 months, have you been humiliated or emotionally abused in other ways by your partner or  "ex-partner?: No   Housing Stability: Low Risk  (5/5/2025)    Housing Stability     Do you have housing? : Yes     Are you worried about losing your housing?: No       Family History   Problem Relation Age of Onset    Breast Cancer Mother 68        2014/2015    Hypertension Mother     Hypertension Father     Ulcerative Colitis Maternal Grandmother     No Known Problems Maternal Grandfather     No Known Problems Paternal Grandmother     No Known Problems Paternal Grandfather     No Known Problems Brother     Unknown/Adopted No family hx of     Depression No family hx of     Anxiety Disorder No family hx of     Schizophrenia No family hx of     Bipolar Disorder No family hx of     Suicide No family hx of     Substance Abuse No family hx of     Dementia No family hx of     [place] Disease No family hx of     Parkinsonism No family hx of     Autism Spectrum Disorder No family hx of     Intellectual Disability No family hx of     Mental Illness No family hx of        ROS: 12 point review of systems is negative unless noted in HPI.    PHYSICAL EXAM:  General: no acute distress  BP (!) 144/97 (BP Location: Left arm)   Pulse 94   Temp 98.1  F (36.7  C) (Oral)   Resp 18   Ht 1.575 m (5' 2\")   Wt 57.8 kg (127 lb 6.8 oz)   SpO2 100%   BMI 23.31 kg/m    HEAD: normocephalic, atraumatic  Face: symmetrical, CN VII intact bilaterally (HB 1), no swelling, edema, or erythema. Sensation V1-V3 intact and equal bilaterally.   Eyes: EOMI without spontaneous or gaze evoked nystagmus, PERRL, clear sclera  Ears: no tragal tenderness, external ear canal open and clear bilaterally, TMs clear bilaterally  Nose: no anterior drainage, intact and midline septum without perforation or hematoma   Mouth: moist, no ulcers, no jaw or tooth tenderness, tongue midline and symmetric, Wave in place  Oropharynx: tonsils within normal limits, uvula midline, no oropharyngeal erythema  Neck: no LAD, trachea midline  Neuro: cranial nerves 2-12 grossly " intact  Resp: Regular rate no stridor  CV: regular rhythm.     ROUTINE IP LABS (Last four results)  BMP  Recent Labs   Lab 06/12/25  1013   *   POTASSIUM 4.1   CHLORIDE 90*   KODY 9.4   CO2 20*   BUN 9.1   CR 0.60   GLC 90     CBCNo lab results found in last 7 days.  INRNo lab results found in last 7 days.    Imaging:  Results for orders placed or performed during the hospital encounter of 05/04/25   XR Surgery MADIHA L/T 5 Min Fluoro w Stills    Narrative    This exam was marked as non-reportable because it will not be read by a   radiologist or a [place] non-radiologist provider.           Assessment and Plan  Alana Mujica is a 49 year old female who presents today for Wave removal. No changes to health, ok to proceed with Surgery.    Shaka Lehman MD   Otolaryngology-Head & Neck Surgery  Please page ENT with questions by dialing [phone] and entering job code [phone] when prompted.

## 2025-06-12 NOTE — ANESTHESIA PREPROCEDURE EVALUATION
Anesthesia Pre-Procedure Evaluation    Patient: Alana Mujica   MRN: [id number] : 1975          Procedure : Procedure(s):  REMOVAL, HARDWARE, FACE         Past Medical History:   Diagnosis Date    RUPINDER (acute kidney injury) 2018    Alcohol withdrawal (H) 2018    Anorexia     Anxiety     Bulimia (H)     Chemical dependency (H) 2015    Alcohol in treatment    Depressive disorder     Genital herpes     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/15    NIL, +HR HPV. Co-test 12 months    Osteoporosis     Substance abuse (H)       Past Surgical History:   Procedure Laterality Date    COLONOSCOPY N/A 2022    Procedure: COLONOSCOPY, WITH POLYPECTOMY AND BIOPSY;  Surgeon: Maya Holcomb MD;  Location:  [facility]    LEEP TX, CERVICAL      greater than 5 years ago from , uncertain date    OPEN REDUCTION INTERNAL FIXATION MANDIBLE Left 2025    Procedure: Maxilomandibular Fixation, Open reduction internal fixation mandible;  Surgeon: Shaka Lehman MD;  Location: [facility] OR      No Known Allergies   Social History     Tobacco Use    Smoking status: Former     Types: Cigarettes     Passive exposure: Past    Smokeless tobacco: Never   Substance Use Topics    Alcohol use: Yes     Comment: yesterday      Wt Readings from Last 1 Encounters:   25 57.8 kg (127 lb 6.8 oz)        Anesthesia Evaluation   Pt has had prior anesthetic. Type: General and MAC.    No history of anesthetic complications       ROS/MED HX  ENT/Pulmonary: Comment: Recurrent sinus infections  Deviated septum  S/p Open reduction internal fixation of mandible on 25    (+)                tobacco use, Past use,                       Neurologic:       Cardiovascular:     (+)  - -   -  - -                                 Previous cardiac testing   Echo: Date: Results:    Stress Test:  Date: Results:    ECG Reviewed:  Date: 25 Results:  Sinus rhythm   Normal ECG     Cath:  Date: Results:      METS/Exercise Tolerance: 4 - Raking leaves, gardening     Hematologic:       Musculoskeletal:       GI/Hepatic:     (+) GERD, Asymptomatic on medication,         hepatitis  liver disease,       Renal/Genitourinary:     (+) renal disease,             Endo:       Psychiatric/Substance Use:     (+) psychiatric history anxiety, other (comment) and depression (Anorexia, Bulemia) alcohol abuse      Infectious Disease:       Malignancy:       Other:              Physical Exam  Airway  Mallampati: IV  TM distance: >3 FB  Neck ROM: full  Mouth opening: < 4 cm    Cardiovascular   Rhythm: regular  Rate: normal rate     Dental Comments: Upper denture plate, but unable to be removed due to arch bar  No reported lower teeth abnormalities        Pulmonary Breath sounds clear to auscultation        Neurological   She appears awake, alert and oriented x3.    Other Findings       OUTSIDE LABS:  CBC:   Lab Results   Component Value Date    WBC 14.8 (H) 05/05/2025    WBC 14.0 (H) 05/04/2025    HGB 12.2 05/05/2025    HGB 12.2 05/04/2025    HCT 35.5 05/05/2025    HCT 34.6 (L) 05/04/2025     05/05/2025     05/04/2025     BMP:   Lab Results   Component Value Date     (L) 06/12/2025     (L) 05/09/2025    POTASSIUM 4.1 06/12/2025    POTASSIUM 4.7 05/09/2025    CHLORIDE 90 (L) 06/12/2025    CHLORIDE 97 (L) 05/09/2025    CO2 20 (L) 06/12/2025    CO2 25 05/09/2025    BUN 9.1 06/12/2025    BUN 21.2 (H) 05/09/2025    CR 0.60 06/12/2025    CR 0.72 05/09/2025    GLC 90 06/12/2025     (H) 05/09/2025     COAGS:   Lab Results   Component Value Date    PTT 26 05/04/2025    INR 1.06 05/04/2025     POC:   Lab Results   Component Value Date     (H) 05/03/2019    HCG Negative 08/25/2022    HCGS Negative 06/22/2022     HEPATIC:   Lab Results   Component Value Date    ALBUMIN 4.1 05/04/2025    PROTTOTAL 6.4 05/04/2025    ALT 32 05/04/2025    AST 29 05/04/2025     (H) 08/26/2022    ALKPHOS 61 05/04/2025    BILITOTAL 0.8 05/04/2025     OTHER:   Lab Results   Component  Value Date    LACT 1.9 06/22/2022    A1C 4.7 08/26/2022    KODY 9.4 06/12/2025    PHOS 3.3 05/09/2025    MAG 1.7 06/12/2025    LIPASE 36 12/04/2023    TSH 2.18 03/27/2025    T4 1.00 12/04/2023    SED 8 03/05/2024       Anesthesia Plan    ASA Status:  2       Anesthesia Type: MAC.  Induction: intravenous.   Techniques and Equipment:       - Monitoring Plan: standard ASA monitoring     Consents            Postoperative Care    Pain management: multimodal analgesia.     Comments:    Other Comments: Limited mouth opening due to being s/p mandibular fx at condylar level. Mouth opening appears to be adequate for oral airway insertion and glidescope blade insertion. Shoulder roll for positioning to assist with any airway management maneuvers if needed. Glidescope with bronchoscope attachments in OR on standby if advanced airway is needed.    I did discuss with the patient that because of her limited mouth opening, I would like to limit the amount of sedation to preserve her breathing, so she may remember intraoperative events.               Kayley Spence MD    I have reviewed the pertinent notes and labs in the chart from the past 30 days and (re)examined the patient.  Any updates or changes from those notes are reflected in this note.    Clinically Significant Risk Factors Present on Admission         # Hyponatremia: Lowest Na = 126 mmol/L in last 2 days, will monitor as appropriate  # Hypochloremia: Lowest Cl = 90 mmol/L in last 2 days, will monitor as appropriate  # Hypocalcemia: Lowest iCa = 4.1 mg/dL in last 2 days, will monitor and replace as appropriate         # Hypertension: Home medication list includes antihypertensive(s)

## 2025-06-12 NOTE — OP NOTE
Otolaryngology operative report  6/12/2025    Primary surgeon: Shaka Lehman MD    Procedure: Wave maxilomandibular fixation removal    Anesthesia: MAC    EBL: 5 ml    Complications: none    Findings: Screws removed, no complications    Description of the procedure:    Alana was taken to the operative suite by the anesthesia team. MAC was introduced through the IV and a facility protocol timeout was performed. The screws were removed and gingival overgrowth was noted. The plate was removed without complication. The lower plate was also removed. 10 screws were removed in total. She tolerated this procedure well without excessive bleeding. I was present for the entire procedure.    Shaka Lehman MD

## 2025-06-13 ENCOUNTER — RESULTS FOLLOW-UP (OUTPATIENT)
Dept: PEDIATRICS | Facility: CLINIC | Age: 50
End: 2025-06-13

## 2025-06-16 ENCOUNTER — TELEPHONE (OUTPATIENT)
Dept: PEDIATRICS | Facility: CLINIC | Age: 50
End: 2025-06-16
Payer: COMMERCIAL

## 2025-06-16 ENCOUNTER — NURSE TRIAGE (OUTPATIENT)
Dept: PEDIATRICS | Facility: CLINIC | Age: 50
End: 2025-06-16
Payer: COMMERCIAL

## 2025-06-16 NOTE — TELEPHONE ENCOUNTER
Reason for Call:  Appointment Request    Patient requesting this type of appt:      headache, wants sodium and chloride labs again  recent hardware removal from jaw 6/12  see nurse triage   Printed on:          Requested provider: Milligan, Deirdre E or any available provider     Reason patient unable to be scheduled: Not within requested timeframe    When does patient want to be seen/preferred time: 1-2 days    Comments: Patient willing to see any provider at this location     Could we send this information to you [facility] or would you prefer to receive a phone call?:   Patient would prefer a phone call   Okay to leave a detailed message?: Yes at Cell number on file:    Telephone Information:   Mobile [phone]       Call taken on 6/16/2025 at 1:32 PM by Lynette Mixon

## 2025-06-16 NOTE — TELEPHONE ENCOUNTER
Nurse Triage SBAR    Is this a 2nd Level Triage? NO    Situation: pt sent message with concerns for headache    Background: Fell and broke jaw. Left side worse- Wave maxilomandibular fixation removal Thursday 6/12    Assessment: pt reporting she started having a headache after hardware removal. Both the front and back of her head is 7-8/10. Pt has been unable to eat since removal. Reporting that she threw up on Saturday but it was bile.   Pt reporting no pain medication use or even tylenol or ibuprofen. Pt reporting she is concerned for her organs so she won't take pain relievers unless she knows it will work.   Pt reporting surgery center was concerned for her sodium and chloride levels and wants labs drawn again because this could be the cause of her headache.     Protocol Recommended Disposition:   See in Office Within 3 Days    Recommendation: scheduled pt for appointment per pt scheduling preferences. Gave pt home care advice per protocol. Advised pt to call back with any new or worsening sx. Pt verbally agrees with plan.     Aura Martinez RN          Reason for Disposition   MILD - MODERATE headache present > 3 days (72 hours)    Additional Information   Negative: Difficult to awaken or acting confused (e.g., disoriented, slurred speech)   Negative: Weakness of the face, arm or leg on one side of the body and new-onset   Negative: Numbness of the face, arm or leg on one side of the body and new-onset   Negative: Loss of speech or garbled speech and new-onset   Negative: Passed out (e.g., fainted, lost consciousness, blacked out and was not responding)   Negative: Sounds like a life-threatening emergency to the triager   Negative: Followed a head injury within last 3 days   Negative: Traumatic Brain Injury (TBI) is suspected   Negative: Sinus pain or congestion is main symptom(s)   Negative: Influenza suspected (i.e., cough, fever, other respiratory symptoms; probable influenza exposure)   Negative:  "Pregnant   Negative: Unable to walk without falling   Negative: Stiff neck (can't touch chin to chest)   Negative: Other family members (or people in same household) with headaches and possibility of carbon monoxide exposure   Negative: SEVERE headache, states 'worst headache' of life   Negative: SEVERE headache, sudden-onset (i.e., reaching maximum intensity within seconds to 1 hour)   Negative: Severe pain in one eye   Negative: Loss of vision or double vision (Exception: Same as previously diagnosed migraines.)   Negative: Patient sounds very sick or weak to the triager   Negative: Fever > 103 F (39.4 C)   Negative: Fever > 100 F (37.8 C) and has diabetes mellitus or a weak immune system (e.g., HIV positive, cancer chemotherapy, organ transplant, splenectomy, chronic steroids)   Negative: SEVERE headache (e.g., excruciating) and has had severe headaches before   Negative: SEVERE headache and not relieved by pain meds   Negative: SEVERE headache and vomiting   Negative: SEVERE headache and fever   Negative: New-onset headache and weak immune system (e.g., HIV positive, cancer chemo, splenectomy, organ transplant, chronic steroids)   Negative: Fever present > 3 days (72 hours)   Negative: Patient wants to be seen   Negative: MODERATE headache (e.g., interferes with normal activities) present > 24 hours and unexplained    Answer Assessment - Initial Assessment Questions  1. LOCATION: \"Where does it hurt?\"       Pain in back of head and front     2. ONSET: \"When did the headache start?\" (e.g., minutes, hours, days)       Headache started after hardware removal     3. PATTERN: \"Does the pain come and go, or has it been constant since it started?\"      Constant     4. SEVERITY: \"How bad is the pain?\" and \"What does it keep you from doing?\"  (e.g., Scale 1-10; mild, moderate, or severe)      7-8/10    6. CAUSE: \"What do you think is causing the headache?\"      Hardware removal, low sodium levels     7. MIGRAINE: \"Have " "you been diagnosed with migraine headaches?\" If Yes, ask: \"Is this headache similar?\"       No     8. HEAD INJURY: \"Has there been any recent injury to the head?\"       Hardware removal from jaw     9. OTHER SYMPTOMS: \"Do you have any other symptoms?\" (e.g., fever, stiff neck, eye pain, sore throat, cold symptoms)      Jaw pain, no fever, little stiff neck, no eye pain, some sinus pain, no sore throat, no cold sx.    Protocols used: Headache-A-OH    "

## 2025-06-17 ENCOUNTER — RESULTS FOLLOW-UP (OUTPATIENT)
Dept: PEDIATRICS | Facility: CLINIC | Age: 50
End: 2025-06-17

## 2025-06-17 ENCOUNTER — OFFICE VISIT (OUTPATIENT)
Dept: PEDIATRICS | Facility: CLINIC | Age: 50
End: 2025-06-17
Payer: COMMERCIAL

## 2025-06-17 VITALS
TEMPERATURE: 98.4 F | HEART RATE: 84 BPM | BODY MASS INDEX: 23.66 KG/M2 | DIASTOLIC BLOOD PRESSURE: 85 MMHG | HEIGHT: 62 IN | RESPIRATION RATE: 16 BRPM | OXYGEN SATURATION: 97 % | SYSTOLIC BLOOD PRESSURE: 133 MMHG | WEIGHT: 128.6 LBS

## 2025-06-17 DIAGNOSIS — R79.89 LOW SERUM CALCIUM: ICD-10-CM

## 2025-06-17 DIAGNOSIS — E87.1 HYPONATREMIA: Primary | ICD-10-CM

## 2025-06-17 LAB
ANION GAP SERPL CALCULATED.3IONS-SCNC: 8 MMOL/L (ref 3–14)
BUN SERPL-MCNC: 8 MG/DL (ref 7–30)
CA-I BLD-MCNC: 4.7 MG/DL (ref 4.4–5.2)
CALCIUM SERPL-MCNC: 10.2 MG/DL (ref 8.5–10.1)
CHLORIDE BLD-SCNC: 94 MMOL/L (ref 94–109)
CO2 SERPL-SCNC: 25 MMOL/L (ref 20–32)
CREAT SERPL-MCNC: 0.7 MG/DL (ref 0.52–1.04)
EGFRCR SERPLBLD CKD-EPI 2021: >90 ML/MIN/1.73M2
GLUCOSE BLD-MCNC: 91 MG/DL (ref 70–99)
POTASSIUM BLD-SCNC: 4.5 MMOL/L (ref 3.4–5.3)
SODIUM SERPL-SCNC: 127 MMOL/L (ref 135–145)

## 2025-06-17 PROCEDURE — 36415 COLL VENOUS BLD VENIPUNCTURE: CPT | Performed by: PHYSICIAN ASSISTANT

## 2025-06-17 PROCEDURE — 80048 BASIC METABOLIC PNL TOTAL CA: CPT | Performed by: PHYSICIAN ASSISTANT

## 2025-06-17 PROCEDURE — 1125F AMNT PAIN NOTED PAIN PRSNT: CPT | Performed by: PHYSICIAN ASSISTANT

## 2025-06-17 PROCEDURE — 3079F DIAST BP 80-89 MM HG: CPT | Performed by: PHYSICIAN ASSISTANT

## 2025-06-17 PROCEDURE — 99213 OFFICE O/P EST LOW 20 MIN: CPT | Performed by: PHYSICIAN ASSISTANT

## 2025-06-17 PROCEDURE — 82330 ASSAY OF CALCIUM: CPT | Performed by: PHYSICIAN ASSISTANT

## 2025-06-17 PROCEDURE — 3075F SYST BP GE 130 - 139MM HG: CPT | Performed by: PHYSICIAN ASSISTANT

## 2025-06-17 ASSESSMENT — PAIN SCALES - GENERAL: PAINLEVEL_OUTOF10: SEVERE PAIN (7)

## 2025-06-17 NOTE — PROGRESS NOTES
Assessment & Plan     Hyponatremia  Repeat labs today.  - Basic metabolic panel  (Ca, Cl, CO2, Creat, Gluc, K, Na, BUN); Future  - Basic metabolic panel  (Ca, Cl, CO2, Creat, Gluc, K, Na, BUN)    Low serum calcium  - Ionized Calcium; Future  - Ionized Calcium    Subjective   Alana is a 49 year old, presenting for the following health issues:  Headache  History of Present Illness       Reason for visit:  Headache   She is taking medications regularly.      Pain History:  When did you first notice your pain? After removing hardware on 06/12/2025   Have you seen anyone else for your pain? No  How has your pain affected your ability to work? Can work part time without limitations   What type of work do you or did you do?  [employer]   Where in your body do you have pain? Headache  Onset/Duration: about 5 days ago   Description  Location: bilateral in the frontal area, bilateral in the temples and occipital    Character: constant headache, pressure   Frequency:  constant  Duration:  always present  Wake with headaches: YES  Able to do daily activities when headache present: YES- making it difficult   Intensity:  7-8/10  Progression of Symptoms:  constant  Accompanying signs and symptoms:  Stiff neck: No  Neck or upper back pain: No  Sinus or URI symptoms YES- sinus pressure   Fever: No  Nausea or vomiting: YES  Dizziness: No  Numbness/tingling: No  Weakness: No  Visual changes: none  History  Head trauma: YES- had recent fall/injury   Family history of migraines: No  Daily pain medication use: No  Previous tests for headaches: in the past have done nerve blockers   Neurologist evaluation: No  Precipitating or Alleviating factors (light/sound/sleep/caffeine): sounds   Therapies tried and outcome: none              Outcome - not effective  Frequent/daily pain medication use: children's tylenol     From triage note from 06/16/2025: Surgery center was concerned for her sodium and chloride levels and wants labs drawn again  "because this could be the cause of her headache.       Review of Systems  Constitutional, HEENT, cardiovascular, pulmonary, gi and gu systems are negative, except as otherwise noted.      Objective    /85 (BP Location: Right arm, Patient Position: Sitting, Cuff Size: Adult Regular)   Pulse 84   Temp 98.4  F (36.9  C) (Temporal)   Resp 16   Ht 1.575 m (5' 2\")   Wt 58.3 kg (128 lb 9.6 oz)   SpO2 97%   BMI 23.52 kg/m    Body mass index is 23.52 kg/m .  Physical Exam   GENERAL: alert and no distress  EYES: Eyes grossly normal to inspection, PERRL and conjunctivae and sclerae normal  HENT: mouth --unable to open mouth  NECK: no adenopathy  RESP: lungs clear to auscultation - no rales, rhonchi or wheezes  CV: regular rate and rhythm, normal S1 S2, no S3 or S4    Results for orders placed or performed in visit on 06/17/25 (from the past 24 hours)   Ionized Calcium   Result Value Ref Range    Calcium Ionized Whole Blood 4.7 4.4 - 5.2 mg/dL   Basic metabolic panel  (Ca, Cl, CO2, Creat, Gluc, K, Na, BUN)   Result Value Ref Range    Sodium 127 (L) 135 - 145 mmol/L    Potassium 4.5 3.4 - 5.3 mmol/L    Chloride 94 94 - 109 mmol/L    Carbon Dioxide (CO2) 25 20 - 32 mmol/L    Anion Gap 8 3 - 14 mmol/L    Urea Nitrogen 8 7 - 30 mg/dL    Creatinine 0.70 0.52 - 1.04 mg/dL    GFR Estimate >90 >60 mL/min/1.73m2    Calcium 10.2 (H) 8.5 - 10.1 mg/dL    Glucose 91 70 - 99 mg/dL           Signed Electronically by: Adiel Neumann PA-C    "

## 2025-06-17 NOTE — TELEPHONE ENCOUNTER
Called and spoke with patient to relay provider message below.     States she would prefer to decrease fluids and check labs in 2 days. Reviewed limiting intake, monitoring symptoms. Reviewed red flags to present to ER for.     Marisabel Neumann please review and advise. What fluid volume would you like her to limit to?      Gabbi Villalobos RN

## 2025-06-18 ENCOUNTER — HOSPITAL ENCOUNTER (EMERGENCY)
Facility: CLINIC | Age: 50
Discharge: HOME | End: 2025-06-18
Attending: EMERGENCY MEDICINE | Admitting: EMERGENCY MEDICINE
Payer: COMMERCIAL

## 2025-06-18 VITALS
OXYGEN SATURATION: 100 % | RESPIRATION RATE: 16 BRPM | BODY MASS INDEX: 23.65 KG/M2 | SYSTOLIC BLOOD PRESSURE: 146 MMHG | DIASTOLIC BLOOD PRESSURE: 91 MMHG | HEART RATE: 88 BPM | HEIGHT: 62 IN | WEIGHT: 128.53 LBS | TEMPERATURE: 98.6 F

## 2025-06-18 DIAGNOSIS — H91.92 HEARING LOSS OF LEFT EAR, UNSPECIFIED HEARING LOSS TYPE: Primary | ICD-10-CM

## 2025-06-18 DIAGNOSIS — E87.1 HYPONATREMIA: ICD-10-CM

## 2025-06-18 DIAGNOSIS — F10.90 ALCOHOL USE: ICD-10-CM

## 2025-06-18 LAB
ALBUMIN SERPL BCG-MCNC: 4.5 G/DL (ref 3.5–5.2)
ALP SERPL-CCNC: 60 U/L (ref 40–150)
ALT SERPL W P-5'-P-CCNC: 59 U/L (ref 0–50)
ANION GAP SERPL CALCULATED.3IONS-SCNC: 13 MMOL/L (ref 7–15)
AST SERPL W P-5'-P-CCNC: 43 U/L (ref 0–45)
BASOPHILS # BLD AUTO: 0.1 10E3/UL (ref 0–0.2)
BASOPHILS NFR BLD AUTO: 1 %
BILIRUB DIRECT SERPL-MCNC: 0.09 MG/DL (ref 0–0.3)
BILIRUB SERPL-MCNC: 0.2 MG/DL
BUN SERPL-MCNC: 15.2 MG/DL (ref 6–20)
CALCIUM SERPL-MCNC: 9.6 MG/DL (ref 8.8–10.4)
CHLORIDE SERPL-SCNC: 99 MMOL/L (ref 98–107)
CREAT SERPL-MCNC: 0.81 MG/DL (ref 0.51–0.95)
EGFRCR SERPLBLD CKD-EPI 2021: 87 ML/MIN/1.73M2
EOSINOPHIL # BLD AUTO: 0.1 10E3/UL (ref 0–0.7)
EOSINOPHIL NFR BLD AUTO: 3 %
ERYTHROCYTE [DISTWIDTH] IN BLOOD BY AUTOMATED COUNT: 12.4 % (ref 10–15)
GLUCOSE SERPL-MCNC: 92 MG/DL (ref 70–99)
HCO3 SERPL-SCNC: 21 MMOL/L (ref 22–29)
HCT VFR BLD AUTO: 35 % (ref 35–47)
HGB BLD-MCNC: 12.3 G/DL (ref 11.7–15.7)
HOLD SPECIMEN: NORMAL
HOLD SPECIMEN: NORMAL
IMM GRANULOCYTES # BLD: 0 10E3/UL
IMM GRANULOCYTES NFR BLD: 0 %
LYMPHOCYTES # BLD AUTO: 1.2 10E3/UL (ref 0.8–5.3)
LYMPHOCYTES NFR BLD AUTO: 22 %
MAGNESIUM SERPL-MCNC: 1.9 MG/DL (ref 1.7–2.3)
MCH RBC QN AUTO: 32.4 PG (ref 26.5–33)
MCHC RBC AUTO-ENTMCNC: 35.1 G/DL (ref 31.5–36.5)
MCV RBC AUTO: 92 FL (ref 78–100)
MONOCYTES # BLD AUTO: 0.6 10E3/UL (ref 0–1.3)
MONOCYTES NFR BLD AUTO: 11 %
NEUTROPHILS # BLD AUTO: 3.6 10E3/UL (ref 1.6–8.3)
NEUTROPHILS NFR BLD AUTO: 64 %
NRBC # BLD AUTO: 0 10E3/UL
NRBC BLD AUTO-RTO: 0 /100
PLATELET # BLD AUTO: 256 10E3/UL (ref 150–450)
POTASSIUM SERPL-SCNC: 4.4 MMOL/L (ref 3.4–5.3)
PROT SERPL-MCNC: 7.4 G/DL (ref 6.4–8.3)
RBC # BLD AUTO: 3.8 10E6/UL (ref 3.8–5.2)
SODIUM SERPL-SCNC: 133 MMOL/L (ref 135–145)
WBC # BLD AUTO: 5.6 10E3/UL (ref 4–11)

## 2025-06-18 PROCEDURE — 36415 COLL VENOUS BLD VENIPUNCTURE: CPT | Performed by: EMERGENCY MEDICINE

## 2025-06-18 PROCEDURE — 83735 ASSAY OF MAGNESIUM: CPT | Performed by: EMERGENCY MEDICINE

## 2025-06-18 PROCEDURE — 84155 ASSAY OF PROTEIN SERUM: CPT | Performed by: EMERGENCY MEDICINE

## 2025-06-18 PROCEDURE — 96360 HYDRATION IV INFUSION INIT: CPT

## 2025-06-18 PROCEDURE — 258N000003 HC RX IP 258 OP 636: Performed by: EMERGENCY MEDICINE

## 2025-06-18 PROCEDURE — 99283 EMERGENCY DEPT VISIT LOW MDM: CPT | Mod: 25

## 2025-06-18 PROCEDURE — 82310 ASSAY OF CALCIUM: CPT | Performed by: EMERGENCY MEDICINE

## 2025-06-18 PROCEDURE — 85004 AUTOMATED DIFF WBC COUNT: CPT | Performed by: EMERGENCY MEDICINE

## 2025-06-18 RX ADMIN — SODIUM CHLORIDE 1000 ML: 0.9 INJECTION, SOLUTION INTRAVENOUS at 15:20

## 2025-06-18 ASSESSMENT — ACTIVITIES OF DAILY LIVING (ADL)
ADLS_ACUITY_SCORE: 55
ADLS_ACUITY_SCORE: 55

## 2025-06-18 NOTE — TELEPHONE ENCOUNTER
"Routing to Marisabel Neumann PA-C-  would you like to place future order to recheck sodium? Lab order was already pended.     Dr. Mcneill huddled with Marisabel Neumann PA-C and agreed that patient  should be seen in ED today - see message from Dr. Mcneill:  \"I talked to reddy - hypertonic saline is an ED thing\"    RN called patient back and relayed recommendation from provider. Patient verbalized understanding and is agreeable to go to ED today. Instructed patient to call back with further questions or concerns.     Elda HINES RN  [facility]   "

## 2025-06-18 NOTE — ED TRIAGE NOTES
Patient presents to ER for low sodium.  Patient seen yesterday following up for labs, sodium yesterday 127.    Patient has headache and jaw pain.  Reports she broke her jaw and had the hardware removed a couple wks ago.   Triage Assessment (Adult)       Row Name 06/18/25 1428          Triage Assessment    Airway WDL WDL        Respiratory WDL    Respiratory WDL WDL        Cardiac WDL    Cardiac WDL WDL

## 2025-06-18 NOTE — ED PROVIDER NOTES
Emergency Department Note      History of Present Illness     Chief Complaint   Abnormal Labs      HPI     Alana Mujica is a 49 year old female with a history of alcoholism and alcohol withdrawal who presents to the ED for evaluation of abnormal labs. Patient reports that she has been feeling nauseous lately, and has had a constant headache since 6/12/25 when the hardware from her surgery was removed. Yesterday she had labs performed and found that she was hyponatremic at 127 so they called her and advised that she present to the ED. She has a history of hyponatremia and has been chronically so for a long time. She was hospitalized on 5/4/25 following a fall that resulted bilateral mandible fractures. She is still unable to chew solid food and is therefore unable to engage in a low liquid diet. Today she is not as nauseous as she has been. She is still drinking alcohol, primarily light beer, and believes that she consumes around six a day. She endorses increased urinary frequency. She is currently taking estrogen.    Independent Historian   None    Review of External Notes   I reviewed the operative note from 6/12/2025 in which patient had a wave maxillomandibular fixation removal.  Discharge summary from 5/5/2025 stated bilateral mandibular condyle fractures with TMJ dislocations and injury to the EAC.  She had acute on chronic hyponatremia, hypokalemia and hypomagnesemia secondary to long-term alcohol use.    Past Medical History     Medical History and Problem List   Alcoholism  Alcoholic hepatitis  Alcohol withdrawal  Anorexia  HPV  Anxiety  Gastroesophageal reflux disease  HSV 2  Bulimia   Osteoporosis     Medications   Estrace     Surgical History   Colonoscopy  LEEP cervical  Open reduction internal fixation mandible  Remove hardware face     Physical Exam     Patient Vitals for the past 24 hrs:   BP Temp Temp src Pulse Resp SpO2 Height Weight   06/18/25 1656 (!) 146/91 -- -- 88 16 100 % -- --   06/18/25  "1429 (!) 177/107 98.6  F (37  C) Oral 91 16 100 % 1.575 m (5' 2\") 58.3 kg (128 lb 8.5 oz)     Physical Exam    HEENT:   Oropharynx is moist, without lesions or trismus.  Eyes:   PERRL.  EOMs intact.      No corneal clouding.   NECK:   Supple, no meningismus.       Negative Brudzinski's sign.  CV:    Regular rate and rhythm.    No murmurs, rubs or gallops.  PULM:   Clear to auscultation bilateral.      No respiratory distress.      No stridor or wheezing.  ABD:  Soft, non-tender, non-distended.      No rebound or guarding.  MSK:    No gross deformity to all four extremities.      No significant joint effusions.  LYMPH:  No cervical lymphadenopathy.  NEURO:  A & O x 3    CN II-XII intact, speech is clear with no aphasia.      Strength is 5/5 in all 4 extremities.  Sensation is intact.      Normal muscular tone, no tremor.  SKIN:   Warm, dry and intact.    PSYCH:   Mood is good and affect is appropriate.        Diagnostics     Lab Results   Labs Ordered and Resulted from Time of ED Arrival to Time of ED Departure   BASIC METABOLIC PANEL - Abnormal       Result Value    Sodium 133 (*)     Potassium 4.4      Chloride 99      Carbon Dioxide (CO2) 21 (*)     Anion Gap 13      Urea Nitrogen 15.2      Creatinine 0.81      GFR Estimate 87      Calcium 9.6      Glucose 92     HEPATIC FUNCTION PANEL - Abnormal    Protein Total 7.4      Albumin 4.5      Bilirubin Total 0.2      Alkaline Phosphatase 60      AST 43      ALT 59 (*)     Bilirubin Direct 0.09     MAGNESIUM - Normal    Magnesium 1.9     CBC WITH PLATELETS AND DIFFERENTIAL    WBC Count 5.6      RBC Count 3.80      Hemoglobin 12.3      Hematocrit 35.0      MCV 92      MCH 32.4      MCHC 35.1      RDW 12.4      Platelet Count 256      % Neutrophils 64      % Lymphocytes 22      % Monocytes 11      % Eosinophils 3      % Basophils 1      % Immature Granulocytes 0      NRBCs per 100 WBC 0      Absolute Neutrophils 3.6      Absolute Lymphocytes 1.2      Absolute Monocytes " 0.6      Absolute Eosinophils 0.1      Absolute Basophils 0.1      Absolute Immature Granulocytes 0.0      Absolute NRBCs 0.0         Imaging   No orders to display     Independent Interpretation   None    ED Course      Medications Administered   Medications   sodium chloride 0.9% BOLUS 1,000 mL (0 mLs Intravenous Stopped 6/18/25 1643)       Procedures   Procedures     Discussion of Management   None    ED Course   ED Course as of 06/18/25 1722   Wed Jun 18, 2025   1520 I obtained the history and performed the examination as described.        Additional Documentation  None    Medical Decision Making / Diagnosis     CMS Diagnoses: None    MIPS   None    MDM     Alana Mujica is a 49 year old female presents with ongoing hyponatremia with sodium of 127 yesterday.  She has a history of hyponatremia secondary to chronic alcohol use.  She continues to abuse alcohol daily.  Sodium today is 133.  She was given IV fluids.  The remainder of her laboratory studies were unrevealing.  She was counseled on alcohol cessation and instructed that if she wants her sodium to improve, she needs to completely discontinue alcohol use.  Follow up with primary care physician to ensure improvement and return to ED for worsening symptoms.    Disposition   The patient was discharged.     Diagnosis     ICD-10-CM    1. Hyponatremia  E87.1       2. Alcohol use  F10.90            Discharge Medications   New Prescriptions    No medications on file         Scribe Disclosure:  I, Pravin Banegas, am serving as a scribe at 3:33 PM on 6/18/2025 to document services personally performed by Joe Guevara MD based on my observations and the provider's statements to me.        Joe Guevara MD  06/18/25 2011

## 2025-06-18 NOTE — TELEPHONE ENCOUNTER
"RN called and spoke with patient   Confirmed she is able to go to [facility] today for appt if accepted     RN contacted [facility] to see if they could take patient today for IVF to help with hyponatremia   [facility] provider Dr. Mcneill requesting to speak with Joan Neumann PA-C regarding patient. Needs further clarification - IV fluids vs fluid restriction?   See message from Dr. Mcneill below:  \"I'm looking at her history and it looks like a chronic issue, but i can't tell if Chelsea is thinking she needs fluids or fluid restriction?     I think she needs ED and here is why.  She has symptomatic hyponatremia - that needs inpatient monitoring.  It is unclear if she needs fluids or fluid restriction too.  Also, with head trauma, that might need imaging.\"      Component      Latest Ref Rng 6/17/2025  2:55 PM   Sodium      135 - 145 mmol/L 127 (L)         Elda HINES RN  [facility]   "

## 2025-06-18 NOTE — TELEPHONE ENCOUNTER
Can we call [facility] to see if they can give patient IV to help with hyponatremia?   Adiel Neumann PA-C

## 2025-06-19 NOTE — TELEPHONE ENCOUNTER
See pt's Mychart message.     Called and spoke with pt,     Assisted pt in scheduling an ED follow-up with Adiel Neumann PA-C on 6/25 at 2:40 PM.     Pt verbalizes understanding and is agreeable with plan. Pt denies any further questions or concerns at this time.     Yessica SILVERIO RN   Clinic RN  [facility]

## 2025-06-20 ENCOUNTER — HOSPITAL ENCOUNTER (OUTPATIENT)
Dept: MAMMOGRAPHY | Facility: CLINIC | Age: 50
Discharge: HOME | End: 2025-06-20
Attending: STUDENT IN AN ORGANIZED HEALTH CARE EDUCATION/TRAINING PROGRAM | Admitting: STUDENT IN AN ORGANIZED HEALTH CARE EDUCATION/TRAINING PROGRAM
Payer: COMMERCIAL

## 2025-06-20 DIAGNOSIS — R92.8 ABNORMAL MAMMOGRAM: ICD-10-CM

## 2025-06-20 PROCEDURE — 76642 ULTRASOUND BREAST LIMITED: CPT | Mod: LT

## 2025-06-23 ENCOUNTER — TRANSFERRED RECORDS (OUTPATIENT)
Dept: HEALTH INFORMATION MANAGEMENT | Facility: CLINIC | Age: 50
End: 2025-06-23
Payer: COMMERCIAL

## 2025-06-23 DIAGNOSIS — G47.00 INSOMNIA, UNSPECIFIED TYPE: ICD-10-CM

## 2025-06-23 RX ORDER — TRAZODONE HYDROCHLORIDE 50 MG/1
50-100 TABLET ORAL AT BEDTIME
Qty: 180 TABLET | Refills: 2 | Status: SHIPPED | OUTPATIENT
Start: 2025-06-23

## 2025-06-25 ENCOUNTER — OFFICE VISIT (OUTPATIENT)
Dept: PEDIATRICS | Facility: CLINIC | Age: 50
End: 2025-06-25
Payer: COMMERCIAL

## 2025-06-25 VITALS
HEIGHT: 62 IN | WEIGHT: 129.2 LBS | OXYGEN SATURATION: 97 % | HEART RATE: 92 BPM | TEMPERATURE: 97.8 F | BODY MASS INDEX: 23.77 KG/M2 | RESPIRATION RATE: 18 BRPM | SYSTOLIC BLOOD PRESSURE: 134 MMHG | DIASTOLIC BLOOD PRESSURE: 82 MMHG

## 2025-06-25 DIAGNOSIS — E87.1 HYPONATREMIA: Primary | ICD-10-CM

## 2025-06-25 PROCEDURE — 1125F AMNT PAIN NOTED PAIN PRSNT: CPT | Performed by: PHYSICIAN ASSISTANT

## 2025-06-25 PROCEDURE — 3075F SYST BP GE 130 - 139MM HG: CPT | Performed by: PHYSICIAN ASSISTANT

## 2025-06-25 PROCEDURE — 99212 OFFICE O/P EST SF 10 MIN: CPT | Mod: 24 | Performed by: PHYSICIAN ASSISTANT

## 2025-06-25 PROCEDURE — 3079F DIAST BP 80-89 MM HG: CPT | Performed by: PHYSICIAN ASSISTANT

## 2025-06-25 ASSESSMENT — PAIN SCALES - GENERAL: PAINLEVEL_OUTOF10: SEVERE PAIN (7)

## 2025-06-25 NOTE — PROGRESS NOTES
"  Assessment & Plan     Hyponatremia  Improved after fluids in the ED. Continue to monitor symptoms. No alcohol use.     Subjective   Alana is a 49 year old, presenting for the following health issues:  ER F/U        6/25/2025     2:51 PM   Additional Questions   Roomed by Bina   Accompanied by none         6/25/2025     2:51 PM   Patient Reported Additional Medications   Patient reports taking the following new medications none     HPI        ED/UC Followup:  Facility:  [facility]  Date of visit: 6/18/25  Reason for visit: Hyponatremia, ETOH use  Current Status: a little better but still has headache    Symptoms overall improving. Jaw pain improving.       Review of Systems  Constitutional, HEENT, cardiovascular, pulmonary, gi and gu systems are negative, except as otherwise noted.      Objective    /82 (BP Location: Right arm, Patient Position: Sitting, Cuff Size: Adult Regular)   Pulse 92   Temp 97.8  F (36.6  C) (Temporal)   Resp 18   Ht 1.575 m (5' 2\")   Wt 58.6 kg (129 lb 3.2 oz)   SpO2 97%   BMI 23.63 kg/m    Body mass index is 23.63 kg/m .  Physical Exam   GENERAL: alert and no distress  RESP: lungs clear to auscultation - no rales, rhonchi or wheezes  CV: regular rate and rhythm, normal S1 S2, no S3 or S4,  No results found for any visits on 06/25/25.        Signed Electronically by: Adiel Neumann PA-C    "

## 2025-06-27 ENCOUNTER — OFFICE VISIT (OUTPATIENT)
Dept: OTOLARYNGOLOGY | Facility: CLINIC | Age: 50
End: 2025-06-27
Payer: COMMERCIAL

## 2025-06-27 VITALS
SYSTOLIC BLOOD PRESSURE: 167 MMHG | WEIGHT: 123 LBS | DIASTOLIC BLOOD PRESSURE: 113 MMHG | TEMPERATURE: 97.2 F | BODY MASS INDEX: 22.5 KG/M2 | HEART RATE: 82 BPM | OXYGEN SATURATION: 100 %

## 2025-06-27 DIAGNOSIS — H91.92 HEARING LOSS OF LEFT EAR, UNSPECIFIED HEARING LOSS TYPE: Primary | ICD-10-CM

## 2025-06-27 PROCEDURE — 99203 OFFICE O/P NEW LOW 30 MIN: CPT | Mod: 25 | Performed by: PHYSICIAN ASSISTANT

## 2025-06-27 PROCEDURE — 1125F AMNT PAIN NOTED PAIN PRSNT: CPT | Performed by: PHYSICIAN ASSISTANT

## 2025-06-27 PROCEDURE — 3080F DIAST BP >= 90 MM HG: CPT | Performed by: PHYSICIAN ASSISTANT

## 2025-06-27 PROCEDURE — 92504 EAR MICROSCOPY EXAMINATION: CPT | Performed by: PHYSICIAN ASSISTANT

## 2025-06-27 PROCEDURE — 3077F SYST BP >= 140 MM HG: CPT | Performed by: PHYSICIAN ASSISTANT

## 2025-06-27 ASSESSMENT — PAIN SCALES - GENERAL: PAINLEVEL_OUTOF10: SEVERE PAIN (7)

## 2025-06-27 NOTE — LETTER
6/27/2025       RE: Alana Mujica  [address] [place] [address]     Dear Colleague,    Thank you for referring your patient, Alana Mujica, to the [facility] [place] [facility]. Please see a copy of my visit note below.      [facility] [address]  [address] FLO[address]  [facility] [address]  Phone: [phone]  Fax: [phone]    Patient:  Alana Mujica, Date of birth 1975  Date of Visit:  Jun 27, 2025  Referring Provider Referred Self  Reason for visit: Hearing loss      Assessment & Plan    Hearing loss of left ear:  - Hearing test shows slight hearing loss at a super high frequency, which is normal age related pattern. The sensation of blockage may be related to scar tissue and narrowing of the ear canal.  - Send a picture to ear surgeons for further evaluation. Referral to an ear specialist for potential surgical intervention.    Jaw issues:  - Jaw issues and recent fractures with surgery are likely contributing to the sensation of ear blockage. Muscles are weaker due to previous surgeries and lack of use.  - Coordination with dental specialists to ensure clinical optimization before any therapies. Consideration of shaving or implanting to even things out.       Subjective  Alana is a 49 year old female who presents for RECHECK (2 week post op)  History of Present Illness    - Alana Mujica, 49-year-old female.  - Experiencing ear pressure and blockage sensation since a fall.  - Noticed symptoms after removal of a blood scab from the left ear.  - Slight hearing loss at a super high frequency, but overall hearing test results are nearly perfect.  - Jaw issues affecting ability to eat solid food and causing discomfort when speaking.  - History of multiple surgeries and trauma.  - Low sodium levels noted after a recent procedure, leading to an ER visit for IV  fluids.  - Persistent headaches, with no relief from over-the-counter pain medications.         Pertinent history obtain from: chart review and patient    Objective    Vital signs:  BP (!) 167/113   Pulse 82   Temp 97.2  F (36.2  C)   Wt 55.8 kg (123 lb)   SpO2 100%   BMI 22.50 kg/m    Blood pressure (!) 167/113, pulse 82, temperature 97.2  F (36.2  C), weight 55.8 kg (123 lb), SpO2 100%, not currently breastfeeding.  Physical Exam    - HEENT: Examination of the left ear revealed scar tissue and narrowing of the ear canal. Hearing test showed mild high-frequency hearing loss, but overall hearing is intact.  - MUSCULOSKELETAL: Jaw examination indicated muscle tightness and restricted range of motion, with difficulty with anterior bite and limited jaw opening.       Otologic microscope exam:   Patient's ear pathology required use of the binocular microscope for the purpose of cleaning and improving visualization in order to assure a more accurate diagnostic evaluation.     Right ear was examined under the microscope.  Normal appearing TM, nicely aerated middle ear space. It was cleaned with suction.     Left ear was also examined under the microscope.  Normal appearing TM, nicely aerated middle ear space. It was cleaned with suction. There is a small bony projection from the anterior canal wall which is consistent with a fragment of fracture which has affected the canal.       Audiogram: 6/27/2025 - data independently reviewed  Right ear: normal hearing   Left ear: essentially normal with a mild likely SNHL at 8kHz   SRT right: 10 left: 5   WR right: 100% left: 100%   Acoustic Reflexes: Left ipsi present, could not test others  Tympanograms: type A right, type A left         Results    - Hearing test: Slight hearing loss at super high frequency, otherwise normal.  - Sodium level: Previously reported as low, improved after ER visit.       Laboratory data and imaging listed below was reviewed prior to this  encounter.           Consent was obtained from the patient to use an AI documentation tool in the creation of  this note    Again, thank you for allowing me to participate in the care of your patient.      Sincerely,    Jeanna Cuevas PA-C

## 2025-06-27 NOTE — PATIENT INSTRUCTIONS
You were seen in the ENT Clinic today by Jeanna Cuevas. If you have any questions or concerns after your appointment, please contact us (see below)      Based on our discussion, I have outlined the following instructions for you:      - I will discuss your ear with our ear surgeon to see if there is any necessary intervention.  - Follow up with Dr. Lehman as scheduled to continue optimizing your jaw.    It was a pleasure to see you today. Please let me know if you have any questions or concerns.  -Jeanna          How to Contact Us:  Send a Inform Direct message to your provider. Our team will respond to you via Inform Direct. Occasionally, we will need to call you to get further information.  For urgent matters (Monday-Friday), call the ENT Clinic: [phone] and speak with a call center team member - they will route your call appropriately.   If you'd like to speak directly with a nurse, please find our contact information below. We do our best to check voicemail frequently throughout the day, and will work to call you back within 1-2 days. For urgent matters, please use the general clinic phone numbers listed above.      Aimee SWARTZ LPN  Direct: [phone]

## 2025-06-27 NOTE — NURSING NOTE
Chief Complaint   Patient presents with    RECHECK     2 week post op     Blood pressure (!) 172/106, pulse 83, temperature 97.2  F (36.2  C), weight 55.8 kg (123 lb), SpO2 100%, not currently breastfeeding.  Sagar He LPN

## 2025-07-08 NOTE — PROGRESS NOTES
[facility] [address] [address]  Phone: [phone]  Fax: [phone]    Patient:  Alana Mujica, Date of birth 1975  Date of Visit:  Jun 27, 2025  Referring Provider Referred Self  Reason for visit: Hearing loss       Assessment & Plan     Hearing loss of left ear:  - Hearing test shows slight hearing loss at a super high frequency, which is normal age related pattern. The sensation of blockage may be related to scar tissue and narrowing of the ear canal.  - Send a picture to ear surgeons for further evaluation. Referral to an ear specialist for potential surgical intervention.    Jaw issues:  - Jaw issues and recent fractures with surgery are likely contributing to the sensation of ear blockage. Muscles are weaker due to previous surgeries and lack of use.  - Coordination with dental specialists to ensure clinical optimization before any therapies. Consideration of shaving or implanting to even things out.       Subjective   Alana is a 49 year old female who presents for RECHECK (2 week post op)  History of Present Illness    - Alana Mujica, 49-year-old female.  - Experiencing ear pressure and blockage sensation since a fall.  - Noticed symptoms after removal of a blood scab from the left ear.  - Slight hearing loss at a super high frequency, but overall hearing test results are nearly perfect.  - Jaw issues affecting ability to eat solid food and causing discomfort when speaking.  - History of multiple surgeries and trauma.  - Low sodium levels noted after a recent procedure, leading to an ER visit for IV fluids.  - Persistent headaches, with no relief from over-the-counter pain medications.         Pertinent history obtain from: chart review and patient    Objective     Vital signs:  BP (!) 167/113   Pulse 82   Temp 97.2  F (36.2  C)   Wt 55.8 kg (123 lb)   SpO2 100%   BMI 22.50 kg/m    Blood pressure (!)  167/113, pulse 82, temperature 97.2  F (36.2  C), weight 55.8 kg (123 lb), SpO2 100%, not currently breastfeeding.  Physical Exam    - HEENT: Examination of the left ear revealed scar tissue and narrowing of the ear canal. Hearing test showed mild high-frequency hearing loss, but overall hearing is intact.  - MUSCULOSKELETAL: Jaw examination indicated muscle tightness and restricted range of motion, with difficulty with anterior bite and limited jaw opening.       Otologic microscope exam:   Patient's ear pathology required use of the binocular microscope for the purpose of cleaning and improving visualization in order to assure a more accurate diagnostic evaluation.     Right ear was examined under the microscope.  Normal appearing TM, nicely aerated middle ear space. It was cleaned with suction.     Left ear was also examined under the microscope.  Normal appearing TM, nicely aerated middle ear space. It was cleaned with suction. There is a small bony projection from the anterior canal wall which is consistent with a fragment of fracture which has affected the canal.       Audiogram: 6/27/2025 - data independently reviewed  Right ear: normal hearing   Left ear: essentially normal with a mild likely SNHL at 8kHz   SRT right: 10 left: 5   WR right: 100% left: 100%   Acoustic Reflexes: Left ipsi present, could not test others  Tympanograms: type A right, type A left         Results    - Hearing test: Slight hearing loss at super high frequency, otherwise normal.  - Sodium level: Previously reported as low, improved after ER visit.       Laboratory data and imaging listed below was reviewed prior to this encounter.           Consent was obtained from the patient to use an AI documentation tool in the creation of  this note

## 2025-07-29 ENCOUNTER — MYC MEDICAL ADVICE (OUTPATIENT)
Dept: OTOLARYNGOLOGY | Facility: CLINIC | Age: 50
End: 2025-07-29
Payer: COMMERCIAL

## 2025-07-30 ENCOUNTER — TRANSFERRED RECORDS (OUTPATIENT)
Dept: HEALTH INFORMATION MANAGEMENT | Facility: CLINIC | Age: 50
End: 2025-07-30
Payer: COMMERCIAL

## 2025-08-04 ENCOUNTER — OFFICE VISIT (OUTPATIENT)
Dept: OBGYN | Facility: CLINIC | Age: 50
End: 2025-08-04
Payer: COMMERCIAL

## 2025-08-04 VITALS — DIASTOLIC BLOOD PRESSURE: 84 MMHG | WEIGHT: 117 LBS | SYSTOLIC BLOOD PRESSURE: 142 MMHG | BODY MASS INDEX: 21.4 KG/M2

## 2025-08-04 DIAGNOSIS — Z30.017 ENCOUNTER FOR INITIAL PRESCRIPTION OF IMPLANTABLE SUBDERMAL CONTRACEPTIVE: Primary | ICD-10-CM

## 2025-08-04 DIAGNOSIS — N95.1 SYMPTOMATIC MENOPAUSAL OR FEMALE CLIMACTERIC STATES: ICD-10-CM

## 2025-08-04 DIAGNOSIS — Z30.017 NEXPLANON INSERTION: ICD-10-CM

## 2025-08-04 RX ORDER — PROGESTERONE 100 MG/1
100 CAPSULE ORAL DAILY
Qty: 90 CAPSULE | Refills: 3 | Status: SHIPPED | OUTPATIENT
Start: 2025-08-04

## 2025-08-04 ASSESSMENT — ANXIETY QUESTIONNAIRES
GAD7 TOTAL SCORE: 5
2. NOT BEING ABLE TO STOP OR CONTROL WORRYING: SEVERAL DAYS
1. FEELING NERVOUS, ANXIOUS, OR ON EDGE: SEVERAL DAYS
6. BECOMING EASILY ANNOYED OR IRRITABLE: SEVERAL DAYS
IF YOU CHECKED OFF ANY PROBLEMS ON THIS QUESTIONNAIRE, HOW DIFFICULT HAVE THESE PROBLEMS MADE IT FOR YOU TO DO YOUR WORK, TAKE CARE OF THINGS AT HOME, OR GET ALONG WITH OTHER PEOPLE: NOT DIFFICULT AT ALL
GAD7 TOTAL SCORE: 5
3. WORRYING TOO MUCH ABOUT DIFFERENT THINGS: SEVERAL DAYS
5. BEING SO RESTLESS THAT IT IS HARD TO SIT STILL: NOT AT ALL
7. FEELING AFRAID AS IF SOMETHING AWFUL MIGHT HAPPEN: NOT AT ALL

## 2025-08-04 ASSESSMENT — PATIENT HEALTH QUESTIONNAIRE - PHQ9
SUM OF ALL RESPONSES TO PHQ QUESTIONS 1-9: 0
5. POOR APPETITE OR OVEREATING: SEVERAL DAYS

## 2025-08-11 ENCOUNTER — TRANSFERRED RECORDS (OUTPATIENT)
Dept: HEALTH INFORMATION MANAGEMENT | Facility: CLINIC | Age: 50
End: 2025-08-11
Payer: COMMERCIAL

## 2025-08-25 ENCOUNTER — MYC REFILL (OUTPATIENT)
Dept: OBGYN | Facility: CLINIC | Age: 50
End: 2025-08-25
Payer: COMMERCIAL

## 2025-08-25 DIAGNOSIS — N95.1 SYMPTOMATIC MENOPAUSAL OR FEMALE CLIMACTERIC STATES: ICD-10-CM

## 2025-08-25 RX ORDER — ESTRADIOL 0.5 MG/1
0.5 TABLET ORAL DAILY
Qty: 60 TABLET | Refills: 1 | Status: SHIPPED | OUTPATIENT
Start: 2025-08-25

## (undated) DEVICE — PREP POVIDONE-IODINE 7.5% SCRUB 4OZ BOTTLE MDS093945

## (undated) DEVICE — SYRINGE EAR/ULCER STERILE 2 OZ BULB 4172

## (undated) DEVICE — SOL NACL 0.9% IRRIG 1000ML BOTTLE 2F7124

## (undated) DEVICE — PAD CHUX UNDERPAD 23X36" 676105

## (undated) DEVICE — ESU ELEC BLADE 2.75" COATED/INSULATED E1455

## (undated) DEVICE — DRSG TEGADERM 2 3/8X2 3/4" 1624W

## (undated) DEVICE — LINEN TOWEL PACK X6 WHITE 5487

## (undated) DEVICE — IMP SCR SYN MATRIX 2.0X06MM SELF TAP  04.503.406.01: Type: IMPLANTABLE DEVICE | Site: MOUTH | Status: NON-FUNCTIONAL

## (undated) DEVICE — SYR 10ML FINGER CONTROL W/O NDL 309695

## (undated) DEVICE — BRUSH SURGICAL EZ SCRUB PLAIN 371603

## (undated) DEVICE — IMPLANTABLE DEVICE: Type: IMPLANTABLE DEVICE | Site: MOUTH | Status: NON-FUNCTIONAL

## (undated) DEVICE — ESU ELEC NDL 1" E1552

## (undated) DEVICE — ADH LIQUID MASTISOL TOPICAL VIAL 2-3ML 0523-48

## (undated) DEVICE — GLOVE PROTEXIS MICRO 7.5 LT BLUE 2D73PM75

## (undated) DEVICE — STRAP UNIVERSAL POSITIONING 2-PIECE 4X47X76" 91-287

## (undated) DEVICE — DRAPE U SPLIT 74X120" 29440

## (undated) DEVICE — ESU NDL COLORADO MICRO E1651

## (undated) DEVICE — SPONGE PACK THROAT 2X18" 31-708

## (undated) DEVICE — PREP POVIDONE-IODINE 10% SOLUTION 4OZ BOTTLE MDS093944

## (undated) DEVICE — LINEN TOWEL PACK X5 5464

## (undated) DEVICE — PREP SKIN SCRUB TRAY 4461A

## (undated) DEVICE — ESU GROUND PAD ADULT W/CORD E7507

## (undated) DEVICE — Device

## (undated) DEVICE — PACK NEURO MINOR UMMC SNE32MNMU4

## (undated) DEVICE — NIM PROBE NS STD INCR PRASS TIP STRL LF DISP 8225825X

## (undated) DEVICE — BIT DRL 50MM 1.5MM MATRIXMIDFACE CRNMXF JLTCH CPLNG STP 6MM

## (undated) DEVICE — ANTIFOG SOLUTION W/FOAM PAD 31142527

## (undated) DEVICE — BLADE KNIFE SURG 15 371115

## (undated) RX ORDER — FENTANYL CITRATE 50 UG/ML
INJECTION, SOLUTION INTRAMUSCULAR; INTRAVENOUS
Status: DISPENSED
Start: 2025-05-05

## (undated) RX ORDER — HYDROMORPHONE HCL IN WATER/PF 6 MG/30 ML
PATIENT CONTROLLED ANALGESIA SYRINGE INTRAVENOUS
Status: DISPENSED
Start: 2025-05-05

## (undated) RX ORDER — PROPOFOL 10 MG/ML
INJECTION, EMULSION INTRAVENOUS
Status: DISPENSED
Start: 2025-06-12

## (undated) RX ORDER — FENTANYL CITRATE-0.9 % NACL/PF 10 MCG/ML
PLASTIC BAG, INJECTION (ML) INTRAVENOUS
Status: DISPENSED
Start: 2025-05-04

## (undated) RX ORDER — PROPOFOL 10 MG/ML
INJECTION, EMULSION INTRAVENOUS
Status: DISPENSED
Start: 2022-11-30

## (undated) RX ORDER — ACETAMINOPHEN 325 MG/10.15ML
LIQUID ORAL
Status: DISPENSED
Start: 2025-06-12

## (undated) RX ORDER — OXYCODONE HYDROCHLORIDE 5 MG/1
TABLET ORAL
Status: DISPENSED
Start: 2025-06-12

## (undated) RX ORDER — FENTANYL CITRATE 50 UG/ML
INJECTION, SOLUTION INTRAMUSCULAR; INTRAVENOUS
Status: DISPENSED
Start: 2025-06-12

## (undated) RX ORDER — SODIUM CHLORIDE, SODIUM LACTATE, POTASSIUM CHLORIDE, CALCIUM CHLORIDE 600; 310; 30; 20 MG/100ML; MG/100ML; MG/100ML; MG/100ML
INJECTION, SOLUTION INTRAVENOUS
Status: DISPENSED
Start: 2025-05-05

## (undated) RX ORDER — KETOROLAC TROMETHAMINE 30 MG/ML
INJECTION, SOLUTION INTRAMUSCULAR; INTRAVENOUS
Status: DISPENSED
Start: 2025-05-05

## (undated) RX ORDER — ACETAMINOPHEN 325 MG/1
TABLET ORAL
Status: DISPENSED
Start: 2025-05-04

## (undated) RX ORDER — FENTANYL CITRATE 50 UG/ML
INJECTION, SOLUTION INTRAMUSCULAR; INTRAVENOUS
Status: DISPENSED
Start: 2025-05-04

## (undated) RX ORDER — CHLORHEXIDINE GLUCONATE ORAL RINSE 1.2 MG/ML
SOLUTION DENTAL
Status: DISPENSED
Start: 2025-06-12

## (undated) RX ORDER — OXYCODONE HCL 5 MG/5 ML
SOLUTION, ORAL ORAL
Status: DISPENSED
Start: 2025-05-05

## (undated) RX ORDER — ONDANSETRON 2 MG/ML
INJECTION INTRAMUSCULAR; INTRAVENOUS
Status: DISPENSED
Start: 2025-05-05

## (undated) RX ORDER — AMPICILLIN AND SULBACTAM 2; 1 G/1; G/1
INJECTION, POWDER, FOR SOLUTION INTRAMUSCULAR; INTRAVENOUS
Status: DISPENSED
Start: 2025-05-04

## (undated) RX ORDER — OXYCODONE HYDROCHLORIDE 5 MG/1
TABLET ORAL
Status: DISPENSED
Start: 2025-05-05

## (undated) RX ORDER — DEXAMETHASONE SODIUM PHOSPHATE 4 MG/ML
INJECTION, SOLUTION INTRA-ARTICULAR; INTRALESIONAL; INTRAMUSCULAR; INTRAVENOUS; SOFT TISSUE
Status: DISPENSED
Start: 2025-05-05